# Patient Record
Sex: FEMALE | Race: WHITE | Employment: OTHER | URBAN - METROPOLITAN AREA
[De-identification: names, ages, dates, MRNs, and addresses within clinical notes are randomized per-mention and may not be internally consistent; named-entity substitution may affect disease eponyms.]

---

## 2017-04-03 PROBLEM — S32.9XXG: Status: ACTIVE | Noted: 2017-04-03

## 2017-04-03 PROBLEM — S22.089A CLOSED FRACTURE OF TWELFTH THORACIC VERTEBRA (HCC): Status: ACTIVE | Noted: 2017-04-03

## 2017-04-17 PROBLEM — S46.912A LEFT SHOULDER STRAIN: Status: ACTIVE | Noted: 2017-04-17

## 2017-05-24 PROBLEM — M81.0 OSTEOPOROSIS: Status: ACTIVE | Noted: 2017-05-24

## 2018-02-13 PROBLEM — I65.23 BILATERAL CAROTID ARTERY STENOSIS: Status: ACTIVE | Noted: 2018-02-13

## 2018-02-13 PROBLEM — I70.213 ATHEROSCLEROSIS OF NATIVE ARTERY OF BOTH LOWER EXTREMITIES WITH INTERMITTENT CLAUDICATION (HCC): Status: ACTIVE | Noted: 2018-02-13

## 2018-03-01 NOTE — PRE-PROCEDURE INSTRUCTIONS
[facility] PRE-ADMISSION TESTING INSTRUCTIONS    The Preadmission Testing patient is instructed accordingly using the following criteria (check applicable):    ARRIVAL INSTRUCTIONS:  [x] Parking the day of Surgery is located in the Main Entrance lot. Upon entering the door, make an immediate right to the surgery reception desk    [x] Complimentary [address] Parking is available Monday through Friday 6 am to 6 pm    [x] Bring photo ID and insurance card    [x] Bring in a copy of Living will or Durable Power of  papers. [x] Please be sure to arrange transportation to and from the hospital    [x] Please arrange for someone to be with you for the 24 hour period post procedure due to having anesthesia      GENERAL INSTRUCTIONS:    [x] Nothing by mouth after midnight, including gum, candy, mints or water    [x] You may brush your teeth, but do not swallow any water    [x] Take medications as instructed with 1-2 oz of water    [] Stop herbal supplements and vitamins 5 days prior to procedure    [x] Follow preop dosing of blood thinners per physician instructions    [] Do not take insulin or oral diabetic medications    [] If diabetic and have low blood sugar or feel symptomatic, take 1-2oz apple juice or glucose tablets    [] Bring inhalers day of surgery    [] Bring C-PAP/ Bi-Pap day of surgery    [] Bring urine specimen day of surgery    [x] Shower or bath with soap, lather and rinse well, AM of Surgery, no lotion, powders or creams to surgical site    [] Follow bowel prep as instructed per surgeon    [x] No tobacco products within 24 hours of surgery     [x] No alcohol or illegal drug use within 24 hours of surgery.     [x] Jewelry, body piercing's, eyeglasses, contact lenses and dentures are not permitted into surgery (bring cases)      [x] Please do not wear any nail polish, make up or hair products on the day of surgery    [x] If not already done, you can expect a call from registration    [x] If surgeon requests a time change you will be notified the day prior to surgery    [] (For Dr Lisa Quintana' patients) You will likely have an arrival time change and will be notified anytime after 2pm the day before surgery.     [x] If you receive a survey after surgery we would greatly appreciate your comments    [] Parent/guardian of a minor must accompany their child and remain on the premises  the entire time they are under our care     [] Pediatric patients may bring favorite toy, blanket or comfort item with them    [] A caregiver or family member must remain with the patient during their stay if they are mentally handicapped, have dementia, disoriented or unable to use a call light or would be a safety concern if left unattended    [x] Please notify surgeon if you develop any illness between now and time of surgery (cold, cough, sore throat, fever, nausea, vomiting) or any signs of infections  including skin, wounds, and dental.    [] Other instructions    EDUCATIONAL MATERIALS PROVIDED:    [] PAT Preoperative Education Packet/Booklet     [] Medication List    [] Fluoroscopy Information Pamphlet    [] Transfusion bracelet applied with instructions    [] Joint replacement video reviewed    [] Shower with soap, lather and rinse well, and use CHG wipes provided the evening before surgery as instructed

## 2018-03-10 ENCOUNTER — PREP FOR PROCEDURE (OUTPATIENT)
Dept: SURGERY | Age: 68
End: 2018-03-10

## 2018-03-10 RX ORDER — SODIUM CHLORIDE, SODIUM LACTATE, POTASSIUM CHLORIDE, CALCIUM CHLORIDE 600; 310; 30; 20 MG/100ML; MG/100ML; MG/100ML; MG/100ML
INJECTION, SOLUTION INTRAVENOUS CONTINUOUS
Status: CANCELLED | OUTPATIENT
Start: 2018-03-10

## 2018-03-12 ENCOUNTER — ANESTHESIA EVENT (OUTPATIENT)
Dept: OPERATING ROOM | Age: 68
DRG: 470 | End: 2018-03-12
Payer: MEDICARE

## 2018-03-12 ASSESSMENT — ENCOUNTER SYMPTOMS
EYES NEGATIVE: 1
SHORTNESS OF BREATH: 1
COUGH: 1
BACK PAIN: 1
GASTROINTESTINAL NEGATIVE: 1

## 2018-03-13 ENCOUNTER — ANESTHESIA (OUTPATIENT)
Dept: OPERATING ROOM | Age: 68
DRG: 470 | End: 2018-03-13
Payer: MEDICARE

## 2018-03-13 ENCOUNTER — APPOINTMENT (OUTPATIENT)
Dept: GENERAL RADIOLOGY | Age: 68
DRG: 470 | End: 2018-03-13
Attending: ORTHOPAEDIC SURGERY
Payer: MEDICARE

## 2018-03-13 ENCOUNTER — HOSPITAL ENCOUNTER (INPATIENT)
Age: 68
LOS: 2 days | Discharge: NURSING, REHAB OR LONG-TERM CARE | DRG: 470 | End: 2018-03-15
Attending: ORTHOPAEDIC SURGERY | Admitting: ORTHOPAEDIC SURGERY
Payer: MEDICARE

## 2018-03-13 VITALS
OXYGEN SATURATION: 100 % | TEMPERATURE: 97.5 F | DIASTOLIC BLOOD PRESSURE: 75 MMHG | SYSTOLIC BLOOD PRESSURE: 110 MMHG | RESPIRATION RATE: 24 BRPM

## 2018-03-13 DIAGNOSIS — M16.52 POST-TRAUMATIC OSTEOARTHRITIS OF LEFT HIP: Chronic | ICD-10-CM

## 2018-03-13 DIAGNOSIS — Z96.642 PRESENCE OF LEFT ARTIFICIAL HIP JOINT: ICD-10-CM

## 2018-03-13 DIAGNOSIS — Z01.818 PRE-OP TESTING: Primary | ICD-10-CM

## 2018-03-13 PROBLEM — M19.90 OSTEOARTHRITIS: Status: ACTIVE | Noted: 2018-03-13

## 2018-03-13 LAB
ANION GAP SERPL CALCULATED.3IONS-SCNC: 12 MMOL/L (ref 7–16)
BUN BLDV-MCNC: 14 MG/DL (ref 8–23)
CALCIUM SERPL-MCNC: 9.3 MG/DL (ref 8.6–10.2)
CHLORIDE BLD-SCNC: 95 MMOL/L (ref 98–107)
CO2: 28 MMOL/L (ref 22–29)
CREAT SERPL-MCNC: 0.6 MG/DL (ref 0.5–1)
GFR AFRICAN AMERICAN: >60
GFR NON-AFRICAN AMERICAN: >60 ML/MIN/1.73
GLUCOSE BLD-MCNC: 114 MG/DL (ref 74–109)
HCT VFR BLD CALC: 40.6 % (ref 34–48)
HEMOGLOBIN: 12.9 G/DL (ref 11.5–15.5)
MCH RBC QN AUTO: 29.9 PG (ref 26–35)
MCHC RBC AUTO-ENTMCNC: 31.8 % (ref 32–34.5)
MCV RBC AUTO: 94.2 FL (ref 80–99.9)
PDW BLD-RTO: 13.7 FL (ref 11.5–15)
PLATELET # BLD: 236 E9/L (ref 130–450)
PMV BLD AUTO: 10.8 FL (ref 7–12)
POTASSIUM REFLEX MAGNESIUM: 5.2 MMOL/L (ref 3.5–5)
RBC # BLD: 4.31 E12/L (ref 3.5–5.5)
SODIUM BLD-SCNC: 135 MMOL/L (ref 132–146)
WBC # BLD: 8.9 E9/L (ref 4.5–11.5)

## 2018-03-13 PROCEDURE — 6370000000 HC RX 637 (ALT 250 FOR IP): Performed by: ORTHOPAEDIC SURGERY

## 2018-03-13 PROCEDURE — 73501 X-RAY EXAM HIP UNI 1 VIEW: CPT

## 2018-03-13 PROCEDURE — 6360000002 HC RX W HCPCS: Performed by: ORTHOPAEDIC SURGERY

## 2018-03-13 PROCEDURE — 88311 DECALCIFY TISSUE: CPT

## 2018-03-13 PROCEDURE — 6360000002 HC RX W HCPCS: Performed by: PHYSICIAN ASSISTANT

## 2018-03-13 PROCEDURE — 2500000003 HC RX 250 WO HCPCS: Performed by: NURSE ANESTHETIST, CERTIFIED REGISTERED

## 2018-03-13 PROCEDURE — 2720000010 HC SURG SUPPLY STERILE: Performed by: ORTHOPAEDIC SURGERY

## 2018-03-13 PROCEDURE — 7100000001 HC PACU RECOVERY - ADDTL 15 MIN: Performed by: ORTHOPAEDIC SURGERY

## 2018-03-13 PROCEDURE — 80048 BASIC METABOLIC PNL TOTAL CA: CPT

## 2018-03-13 PROCEDURE — 6370000000 HC RX 637 (ALT 250 FOR IP): Performed by: ANESTHESIOLOGY

## 2018-03-13 PROCEDURE — 7100000000 HC PACU RECOVERY - FIRST 15 MIN: Performed by: ORTHOPAEDIC SURGERY

## 2018-03-13 PROCEDURE — C1776 JOINT DEVICE (IMPLANTABLE): HCPCS | Performed by: ORTHOPAEDIC SURGERY

## 2018-03-13 PROCEDURE — 3600000015 HC SURGERY LEVEL 5 ADDTL 15MIN: Performed by: ORTHOPAEDIC SURGERY

## 2018-03-13 PROCEDURE — 0SRB02A REPLACEMENT OF LEFT HIP JOINT WITH METAL ON POLYETHYLENE SYNTHETIC SUBSTITUTE, UNCEMENTED, OPEN APPROACH: ICD-10-PCS | Performed by: ORTHOPAEDIC SURGERY

## 2018-03-13 PROCEDURE — 2700000000 HC OXYGEN THERAPY PER DAY

## 2018-03-13 PROCEDURE — 3600000005 HC SURGERY LEVEL 5 BASE: Performed by: ORTHOPAEDIC SURGERY

## 2018-03-13 PROCEDURE — 36415 COLL VENOUS BLD VENIPUNCTURE: CPT

## 2018-03-13 PROCEDURE — 85027 COMPLETE CBC AUTOMATED: CPT

## 2018-03-13 PROCEDURE — S0028 INJECTION, FAMOTIDINE, 20 MG: HCPCS | Performed by: ORTHOPAEDIC SURGERY

## 2018-03-13 PROCEDURE — 2580000003 HC RX 258: Performed by: PHYSICIAN ASSISTANT

## 2018-03-13 PROCEDURE — 2580000003 HC RX 258: Performed by: ORTHOPAEDIC SURGERY

## 2018-03-13 PROCEDURE — 88304 TISSUE EXAM BY PATHOLOGIST: CPT

## 2018-03-13 PROCEDURE — 3700000001 HC ADD 15 MINUTES (ANESTHESIA): Performed by: ORTHOPAEDIC SURGERY

## 2018-03-13 PROCEDURE — 2500000003 HC RX 250 WO HCPCS: Performed by: ORTHOPAEDIC SURGERY

## 2018-03-13 PROCEDURE — 6360000002 HC RX W HCPCS

## 2018-03-13 PROCEDURE — 6360000002 HC RX W HCPCS: Performed by: ANESTHESIOLOGY

## 2018-03-13 PROCEDURE — 6360000002 HC RX W HCPCS: Performed by: NURSE ANESTHETIST, CERTIFIED REGISTERED

## 2018-03-13 PROCEDURE — 1200000000 HC SEMI PRIVATE

## 2018-03-13 PROCEDURE — 3700000000 HC ANESTHESIA ATTENDED CARE: Performed by: ORTHOPAEDIC SURGERY

## 2018-03-13 PROCEDURE — 97161 PT EVAL LOW COMPLEX 20 MIN: CPT

## 2018-03-13 DEVICE — LINER ACET SZ F OD44MM ID28MM ACT ARTC E1 BEAR G7: Type: IMPLANTABLE DEVICE | Site: HIP | Status: FUNCTIONAL

## 2018-03-13 DEVICE — STEM FEM SZ 10 L140MM STD OFFSET DST HIP TI PPS TYP 1 TAPR: Type: IMPLANTABLE DEVICE | Site: HIP | Status: FUNCTIONAL

## 2018-03-13 DEVICE — G7 DUAL MOBILITY LINER 44MM F: Type: IMPLANTABLE DEVICE | Site: HIP | Status: FUNCTIONAL

## 2018-03-13 DEVICE — SCREW BNE L30MM DIA6.5MM TI ALLY DOME 2 MOBILITY LO PROF G7: Type: IMPLANTABLE DEVICE | Site: HIP | Status: FUNCTIONAL

## 2018-03-13 DEVICE — IMPLANTABLE DEVICE: Type: IMPLANTABLE DEVICE | Site: HIP | Status: FUNCTIONAL

## 2018-03-13 DEVICE — SCREW BNE L25MM DIA6.5MM TI ALLY DOME 2 MOBILITY LO PROF G7: Type: IMPLANTABLE DEVICE | Site: HIP | Status: FUNCTIONAL

## 2018-03-13 RX ORDER — ROCURONIUM BROMIDE 10 MG/ML
INJECTION, SOLUTION INTRAVENOUS PRN
Status: DISCONTINUED | OUTPATIENT
Start: 2018-03-13 | End: 2018-03-13 | Stop reason: SDUPTHER

## 2018-03-13 RX ORDER — PROMETHAZINE HYDROCHLORIDE 25 MG/ML
6.25 INJECTION, SOLUTION INTRAMUSCULAR; INTRAVENOUS
Status: DISCONTINUED | OUTPATIENT
Start: 2018-03-13 | End: 2018-03-13 | Stop reason: HOSPADM

## 2018-03-13 RX ORDER — HYDROMORPHONE HCL 110MG/55ML
PATIENT CONTROLLED ANALGESIA SYRINGE INTRAVENOUS
Status: DISCONTINUED
Start: 2018-03-13 | End: 2018-03-14

## 2018-03-13 RX ORDER — FENTANYL CITRATE 50 UG/ML
INJECTION, SOLUTION INTRAMUSCULAR; INTRAVENOUS
Status: COMPLETED
Start: 2018-03-13 | End: 2018-03-13

## 2018-03-13 RX ORDER — FENTANYL CITRATE 50 UG/ML
INJECTION, SOLUTION INTRAMUSCULAR; INTRAVENOUS PRN
Status: DISCONTINUED | OUTPATIENT
Start: 2018-03-13 | End: 2018-03-13 | Stop reason: SDUPTHER

## 2018-03-13 RX ORDER — PROPOFOL 10 MG/ML
INJECTION, EMULSION INTRAVENOUS PRN
Status: DISCONTINUED | OUTPATIENT
Start: 2018-03-13 | End: 2018-03-13 | Stop reason: SDUPTHER

## 2018-03-13 RX ORDER — ONDANSETRON 2 MG/ML
INJECTION INTRAMUSCULAR; INTRAVENOUS PRN
Status: DISCONTINUED | OUTPATIENT
Start: 2018-03-13 | End: 2018-03-13 | Stop reason: SDUPTHER

## 2018-03-13 RX ORDER — LEVOTHYROXINE SODIUM 0.1 MG/1
100 TABLET ORAL DAILY
Status: DISCONTINUED | OUTPATIENT
Start: 2018-03-13 | End: 2018-03-15 | Stop reason: HOSPADM

## 2018-03-13 RX ORDER — MORPHINE SULFATE 2 MG/ML
2 INJECTION, SOLUTION INTRAMUSCULAR; INTRAVENOUS
Status: DISPENSED | OUTPATIENT
Start: 2018-03-13 | End: 2018-03-15

## 2018-03-13 RX ORDER — L-METHYLFOLATE-ALGAE-VIT B12-B6 CAP 3-90.314-2-35 MG 3-90.314-2-35 MG
1 CAP ORAL DAILY
Status: DISCONTINUED | OUTPATIENT
Start: 2018-03-13 | End: 2018-03-13 | Stop reason: CLARIF

## 2018-03-13 RX ORDER — SIMVASTATIN 40 MG
40 TABLET ORAL NIGHTLY
Status: DISCONTINUED | OUTPATIENT
Start: 2018-03-13 | End: 2018-03-15 | Stop reason: HOSPADM

## 2018-03-13 RX ORDER — FENTANYL CITRATE 50 UG/ML
25 INJECTION, SOLUTION INTRAMUSCULAR; INTRAVENOUS EVERY 5 MIN PRN
Status: DISCONTINUED | OUTPATIENT
Start: 2018-03-13 | End: 2018-03-13 | Stop reason: HOSPADM

## 2018-03-13 RX ORDER — ASPIRIN 81 MG/1
81 TABLET, CHEWABLE ORAL DAILY
Status: DISCONTINUED | OUTPATIENT
Start: 2018-03-14 | End: 2018-03-15 | Stop reason: HOSPADM

## 2018-03-13 RX ORDER — MEPERIDINE HYDROCHLORIDE 25 MG/ML
12.5 INJECTION INTRAMUSCULAR; INTRAVENOUS; SUBCUTANEOUS EVERY 5 MIN PRN
Status: DISCONTINUED | OUTPATIENT
Start: 2018-03-13 | End: 2018-03-13 | Stop reason: HOSPADM

## 2018-03-13 RX ORDER — GABAPENTIN 100 MG/1
CAPSULE ORAL
Status: DISCONTINUED
Start: 2018-03-13 | End: 2018-03-13

## 2018-03-13 RX ORDER — DOCUSATE SODIUM 100 MG/1
100 CAPSULE, LIQUID FILLED ORAL 2 TIMES DAILY
Status: DISCONTINUED | OUTPATIENT
Start: 2018-03-13 | End: 2018-03-15 | Stop reason: HOSPADM

## 2018-03-13 RX ORDER — CARVEDILOL 3.12 MG/1
3.12 TABLET ORAL 2 TIMES DAILY
Status: DISCONTINUED | OUTPATIENT
Start: 2018-03-13 | End: 2018-03-15 | Stop reason: HOSPADM

## 2018-03-13 RX ORDER — SODIUM CHLORIDE 0.9 % (FLUSH) 0.9 %
10 SYRINGE (ML) INJECTION PRN
Status: DISCONTINUED | OUTPATIENT
Start: 2018-03-13 | End: 2018-03-15 | Stop reason: HOSPADM

## 2018-03-13 RX ORDER — DEXAMETHASONE SODIUM PHOSPHATE 4 MG/ML
INJECTION, SOLUTION INTRA-ARTICULAR; INTRALESIONAL; INTRAMUSCULAR; INTRAVENOUS; SOFT TISSUE PRN
Status: DISCONTINUED | OUTPATIENT
Start: 2018-03-13 | End: 2018-03-13 | Stop reason: SDUPTHER

## 2018-03-13 RX ORDER — ACETAMINOPHEN 500 MG
TABLET ORAL
Status: DISCONTINUED
Start: 2018-03-13 | End: 2018-03-13

## 2018-03-13 RX ORDER — FENTANYL CITRATE 50 UG/ML
50 INJECTION, SOLUTION INTRAMUSCULAR; INTRAVENOUS EVERY 5 MIN PRN
Status: DISCONTINUED | OUTPATIENT
Start: 2018-03-13 | End: 2018-03-13 | Stop reason: HOSPADM

## 2018-03-13 RX ORDER — CELECOXIB 100 MG/1
200 CAPSULE ORAL ONCE
Status: COMPLETED | OUTPATIENT
Start: 2018-03-13 | End: 2018-03-13

## 2018-03-13 RX ORDER — ASPIRIN 81 MG/1
81 TABLET ORAL 2 TIMES DAILY
Status: CANCELLED | OUTPATIENT
Start: 2018-03-14 | End: 2018-03-29

## 2018-03-13 RX ORDER — OXYCODONE HYDROCHLORIDE 5 MG/1
5 TABLET ORAL ONCE
Status: COMPLETED | OUTPATIENT
Start: 2018-03-13 | End: 2018-03-13

## 2018-03-13 RX ORDER — OXYCODONE HYDROCHLORIDE AND ACETAMINOPHEN 5; 325 MG/1; MG/1
2 TABLET ORAL EVERY 4 HOURS PRN
Status: DISCONTINUED | OUTPATIENT
Start: 2018-03-13 | End: 2018-03-15 | Stop reason: HOSPADM

## 2018-03-13 RX ORDER — OXYCODONE HYDROCHLORIDE AND ACETAMINOPHEN 5; 325 MG/1; MG/1
1 TABLET ORAL EVERY 4 HOURS PRN
Status: DISCONTINUED | OUTPATIENT
Start: 2018-03-13 | End: 2018-03-15 | Stop reason: HOSPADM

## 2018-03-13 RX ORDER — ACETAMINOPHEN 500 MG
1000 TABLET ORAL ONCE
Status: COMPLETED | OUTPATIENT
Start: 2018-03-13 | End: 2018-03-13

## 2018-03-13 RX ORDER — CELECOXIB 100 MG/1
CAPSULE ORAL
Status: DISCONTINUED
Start: 2018-03-13 | End: 2018-03-13

## 2018-03-13 RX ORDER — SODIUM CHLORIDE, SODIUM LACTATE, POTASSIUM CHLORIDE, CALCIUM CHLORIDE 600; 310; 30; 20 MG/100ML; MG/100ML; MG/100ML; MG/100ML
INJECTION, SOLUTION INTRAVENOUS CONTINUOUS
Status: DISCONTINUED | OUTPATIENT
Start: 2018-03-13 | End: 2018-03-14

## 2018-03-13 RX ORDER — OXYCODONE HYDROCHLORIDE 5 MG/1
TABLET ORAL
Status: DISCONTINUED
Start: 2018-03-13 | End: 2018-03-13

## 2018-03-13 RX ORDER — SODIUM CHLORIDE, SODIUM LACTATE, POTASSIUM CHLORIDE, CALCIUM CHLORIDE 600; 310; 30; 20 MG/100ML; MG/100ML; MG/100ML; MG/100ML
INJECTION, SOLUTION INTRAVENOUS CONTINUOUS
Status: DISCONTINUED | OUTPATIENT
Start: 2018-03-13 | End: 2018-03-13

## 2018-03-13 RX ORDER — GABAPENTIN 100 MG/1
200 CAPSULE ORAL ONCE
Status: COMPLETED | OUTPATIENT
Start: 2018-03-13 | End: 2018-03-13

## 2018-03-13 RX ORDER — GABAPENTIN 400 MG/1
800 CAPSULE ORAL 3 TIMES DAILY
Status: DISCONTINUED | OUTPATIENT
Start: 2018-03-13 | End: 2018-03-15 | Stop reason: HOSPADM

## 2018-03-13 RX ORDER — LIDOCAINE HYDROCHLORIDE 20 MG/ML
INJECTION, SOLUTION EPIDURAL; INFILTRATION; INTRACAUDAL; PERINEURAL PRN
Status: DISCONTINUED | OUTPATIENT
Start: 2018-03-13 | End: 2018-03-13 | Stop reason: SDUPTHER

## 2018-03-13 RX ORDER — GLYCOPYRROLATE 0.2 MG/ML
INJECTION INTRAMUSCULAR; INTRAVENOUS PRN
Status: DISCONTINUED | OUTPATIENT
Start: 2018-03-13 | End: 2018-03-13 | Stop reason: SDUPTHER

## 2018-03-13 RX ORDER — VANCOMYCIN HYDROCHLORIDE 500 MG/10ML
INJECTION, POWDER, LYOPHILIZED, FOR SOLUTION INTRAVENOUS PRN
Status: DISCONTINUED | OUTPATIENT
Start: 2018-03-13 | End: 2018-03-13 | Stop reason: HOSPADM

## 2018-03-13 RX ORDER — DIPHENHYDRAMINE HYDROCHLORIDE 50 MG/ML
12.5 INJECTION INTRAMUSCULAR; INTRAVENOUS
Status: DISCONTINUED | OUTPATIENT
Start: 2018-03-13 | End: 2018-03-13 | Stop reason: HOSPADM

## 2018-03-13 RX ORDER — ONDANSETRON 2 MG/ML
4 INJECTION INTRAMUSCULAR; INTRAVENOUS EVERY 6 HOURS PRN
Status: DISCONTINUED | OUTPATIENT
Start: 2018-03-13 | End: 2018-03-15 | Stop reason: HOSPADM

## 2018-03-13 RX ORDER — SODIUM CHLORIDE 0.9 % (FLUSH) 0.9 %
10 SYRINGE (ML) INJECTION EVERY 12 HOURS SCHEDULED
Status: DISCONTINUED | OUTPATIENT
Start: 2018-03-13 | End: 2018-03-15 | Stop reason: HOSPADM

## 2018-03-13 RX ORDER — NEOSTIGMINE METHYLSULFATE 1 MG/ML
INJECTION, SOLUTION INTRAVENOUS PRN
Status: DISCONTINUED | OUTPATIENT
Start: 2018-03-13 | End: 2018-03-13 | Stop reason: SDUPTHER

## 2018-03-13 RX ORDER — FUROSEMIDE 40 MG/1
40 TABLET ORAL 2 TIMES DAILY
Status: DISCONTINUED | OUTPATIENT
Start: 2018-03-13 | End: 2018-03-15 | Stop reason: HOSPADM

## 2018-03-13 RX ORDER — OXYCODONE HYDROCHLORIDE AND ACETAMINOPHEN 5; 325 MG/1; MG/1
1 TABLET ORAL
Status: DISCONTINUED | OUTPATIENT
Start: 2018-03-13 | End: 2018-03-13 | Stop reason: HOSPADM

## 2018-03-13 RX ADMIN — PHENYLEPHRINE HYDROCHLORIDE 100 MCG: 10 INJECTION INTRAVENOUS at 15:20

## 2018-03-13 RX ADMIN — FENTANYL CITRATE 50 MCG: 50 INJECTION, SOLUTION INTRAMUSCULAR; INTRAVENOUS at 14:53

## 2018-03-13 RX ADMIN — FENTANYL CITRATE 100 MCG: 50 INJECTION, SOLUTION INTRAMUSCULAR; INTRAVENOUS at 14:25

## 2018-03-13 RX ADMIN — Medication 0.6 MG: at 16:30

## 2018-03-13 RX ADMIN — DOCUSATE SODIUM 100 MG: 100 CAPSULE, LIQUID FILLED ORAL at 20:15

## 2018-03-13 RX ADMIN — OXYCODONE HYDROCHLORIDE 5 MG: 5 TABLET ORAL at 13:03

## 2018-03-13 RX ADMIN — CELECOXIB 200 MG: 100 CAPSULE ORAL at 13:03

## 2018-03-13 RX ADMIN — SIMVASTATIN 40 MG: 40 TABLET, FILM COATED ORAL at 20:16

## 2018-03-13 RX ADMIN — PHENYLEPHRINE HYDROCHLORIDE 100 MCG: 10 INJECTION INTRAVENOUS at 15:22

## 2018-03-13 RX ADMIN — FENTANYL CITRATE 50 MCG: 50 INJECTION, SOLUTION INTRAMUSCULAR; INTRAVENOUS at 16:58

## 2018-03-13 RX ADMIN — OXYCODONE HYDROCHLORIDE AND ACETAMINOPHEN 2 TABLET: 5; 325 TABLET ORAL at 23:26

## 2018-03-13 RX ADMIN — GABAPENTIN 800 MG: 400 CAPSULE ORAL at 20:16

## 2018-03-13 RX ADMIN — ACETAMINOPHEN 1000 MG: 500 TABLET ORAL at 13:03

## 2018-03-13 RX ADMIN — FENTANYL CITRATE 50 MCG: 50 INJECTION, SOLUTION INTRAMUSCULAR; INTRAVENOUS at 16:51

## 2018-03-13 RX ADMIN — PROMETHAZINE HYDROCHLORIDE 6.25 MG: 25 INJECTION INTRAMUSCULAR; INTRAVENOUS at 16:54

## 2018-03-13 RX ADMIN — GABAPENTIN 200 MG: 100 CAPSULE ORAL at 13:02

## 2018-03-13 RX ADMIN — OXYCODONE HYDROCHLORIDE AND ACETAMINOPHEN 2 TABLET: 5; 325 TABLET ORAL at 19:32

## 2018-03-13 RX ADMIN — Medication 3 MG: at 16:30

## 2018-03-13 RX ADMIN — CEFAZOLIN SODIUM 2 G: 2 SOLUTION INTRAVENOUS at 21:58

## 2018-03-13 RX ADMIN — MORPHINE SULFATE 2 MG: 2 INJECTION, SOLUTION INTRAMUSCULAR; INTRAVENOUS at 18:21

## 2018-03-13 RX ADMIN — LIDOCAINE HYDROCHLORIDE 100 MG: 20 INJECTION, SOLUTION EPIDURAL; INFILTRATION; INTRACAUDAL; PERINEURAL at 14:25

## 2018-03-13 RX ADMIN — PHENYLEPHRINE HYDROCHLORIDE 100 MCG: 10 INJECTION INTRAVENOUS at 15:59

## 2018-03-13 RX ADMIN — FUROSEMIDE 40 MG: 40 TABLET ORAL at 19:19

## 2018-03-13 RX ADMIN — ONDANSETRON 4 MG: 2 INJECTION, SOLUTION INTRAMUSCULAR; INTRAVENOUS at 14:30

## 2018-03-13 RX ADMIN — CEFAZOLIN SODIUM 2 G: 2 SOLUTION INTRAVENOUS at 14:20

## 2018-03-13 RX ADMIN — PHENYLEPHRINE HYDROCHLORIDE 100 MCG: 10 INJECTION INTRAVENOUS at 16:12

## 2018-03-13 RX ADMIN — ROCURONIUM BROMIDE 40 MG: 10 SOLUTION INTRAVENOUS at 14:25

## 2018-03-13 RX ADMIN — SODIUM CHLORIDE, POTASSIUM CHLORIDE, SODIUM LACTATE AND CALCIUM CHLORIDE: 600; 310; 30; 20 INJECTION, SOLUTION INTRAVENOUS at 14:20

## 2018-03-13 RX ADMIN — FENTANYL CITRATE 100 MCG: 50 INJECTION, SOLUTION INTRAMUSCULAR; INTRAVENOUS at 15:09

## 2018-03-13 RX ADMIN — ROCURONIUM BROMIDE 10 MG: 10 SOLUTION INTRAVENOUS at 15:44

## 2018-03-13 RX ADMIN — FAMOTIDINE 20 MG: 10 INJECTION INTRAVENOUS at 20:15

## 2018-03-13 RX ADMIN — DEXAMETHASONE SODIUM PHOSPHATE 8 MG: 4 INJECTION, SOLUTION INTRA-ARTICULAR; INTRALESIONAL; INTRAMUSCULAR; INTRAVENOUS; SOFT TISSUE at 14:30

## 2018-03-13 RX ADMIN — PROPOFOL 180 MG: 10 INJECTION, EMULSION INTRAVENOUS at 14:25

## 2018-03-13 RX ADMIN — PROPOFOL 20 MG: 10 INJECTION, EMULSION INTRAVENOUS at 15:15

## 2018-03-13 RX ADMIN — TRANEXAMIC ACID 1000 MG: 100 INJECTION, SOLUTION INTRAVENOUS at 14:53

## 2018-03-13 RX ADMIN — HYDROMORPHONE HYDROCHLORIDE 0.5 MG: 1 INJECTION, SOLUTION INTRAMUSCULAR; INTRAVENOUS; SUBCUTANEOUS at 17:21

## 2018-03-13 RX ADMIN — TRANEXAMIC ACID 1000 MG: 100 INJECTION, SOLUTION INTRAVENOUS at 17:37

## 2018-03-13 ASSESSMENT — PULMONARY FUNCTION TESTS
PIF_VALUE: 19
PIF_VALUE: 18
PIF_VALUE: 17
PIF_VALUE: 19
PIF_VALUE: 17
PIF_VALUE: 19
PIF_VALUE: 18
PIF_VALUE: 30
PIF_VALUE: 3
PIF_VALUE: 19
PIF_VALUE: 18
PIF_VALUE: 18
PIF_VALUE: 19
PIF_VALUE: 2
PIF_VALUE: 19
PIF_VALUE: 18
PIF_VALUE: 19
PIF_VALUE: 10
PIF_VALUE: 20
PIF_VALUE: 18
PIF_VALUE: 1
PIF_VALUE: 17
PIF_VALUE: 18
PIF_VALUE: 17
PIF_VALUE: 18
PIF_VALUE: 18
PIF_VALUE: 19
PIF_VALUE: 18
PIF_VALUE: 18
PIF_VALUE: 17
PIF_VALUE: 19
PIF_VALUE: 16
PIF_VALUE: 17
PIF_VALUE: 19
PIF_VALUE: 19
PIF_VALUE: 18
PIF_VALUE: 19
PIF_VALUE: 17
PIF_VALUE: 19
PIF_VALUE: 18
PIF_VALUE: 18
PIF_VALUE: 17
PIF_VALUE: 1
PIF_VALUE: 18
PIF_VALUE: 18
PIF_VALUE: 4
PIF_VALUE: 18
PIF_VALUE: 19
PIF_VALUE: 0
PIF_VALUE: 18
PIF_VALUE: 19
PIF_VALUE: 18
PIF_VALUE: 23
PIF_VALUE: 19
PIF_VALUE: 18
PIF_VALUE: 18
PIF_VALUE: 17
PIF_VALUE: 17
PIF_VALUE: 18
PIF_VALUE: 3
PIF_VALUE: 17
PIF_VALUE: 3
PIF_VALUE: 18
PIF_VALUE: 5
PIF_VALUE: 18
PIF_VALUE: 17
PIF_VALUE: 19
PIF_VALUE: 17
PIF_VALUE: 17
PIF_VALUE: 19
PIF_VALUE: 20
PIF_VALUE: 18
PIF_VALUE: 0
PIF_VALUE: 19
PIF_VALUE: 18
PIF_VALUE: 18
PIF_VALUE: 19
PIF_VALUE: 0
PIF_VALUE: 17
PIF_VALUE: 18
PIF_VALUE: 17
PIF_VALUE: 18
PIF_VALUE: 18
PIF_VALUE: 19
PIF_VALUE: 19
PIF_VALUE: 18
PIF_VALUE: 19
PIF_VALUE: 18
PIF_VALUE: 19
PIF_VALUE: 17
PIF_VALUE: 19
PIF_VALUE: 18
PIF_VALUE: 18
PIF_VALUE: 17
PIF_VALUE: 2
PIF_VALUE: 19
PIF_VALUE: 18
PIF_VALUE: 19
PIF_VALUE: 18
PIF_VALUE: 19
PIF_VALUE: 17
PIF_VALUE: 19
PIF_VALUE: 18
PIF_VALUE: 19
PIF_VALUE: 1
PIF_VALUE: 18
PIF_VALUE: 19
PIF_VALUE: 25
PIF_VALUE: 17
PIF_VALUE: 19
PIF_VALUE: 18
PIF_VALUE: 19
PIF_VALUE: 18
PIF_VALUE: 19
PIF_VALUE: 17
PIF_VALUE: 18
PIF_VALUE: 19
PIF_VALUE: 21
PIF_VALUE: 1
PIF_VALUE: 18
PIF_VALUE: 18
PIF_VALUE: 19
PIF_VALUE: 18

## 2018-03-13 ASSESSMENT — PAIN SCALES - GENERAL
PAINLEVEL_OUTOF10: 7
PAINLEVEL_OUTOF10: 10
PAINLEVEL_OUTOF10: 5
PAINLEVEL_OUTOF10: 10
PAINLEVEL_OUTOF10: 7
PAINLEVEL_OUTOF10: 3
PAINLEVEL_OUTOF10: 7
PAINLEVEL_OUTOF10: 5
PAINLEVEL_OUTOF10: 10

## 2018-03-13 ASSESSMENT — PAIN DESCRIPTION - ORIENTATION
ORIENTATION: LEFT
ORIENTATION: LEFT

## 2018-03-13 ASSESSMENT — PAIN DESCRIPTION - PAIN TYPE
TYPE: SURGICAL PAIN;CHRONIC PAIN
TYPE: ACUTE PAIN

## 2018-03-13 ASSESSMENT — PAIN DESCRIPTION - DESCRIPTORS
DESCRIPTORS: ACHING;DISCOMFORT;DULL
DESCRIPTORS: CONSTANT

## 2018-03-13 ASSESSMENT — PAIN DESCRIPTION - LOCATION
LOCATION: BACK;HIP
LOCATION: HIP

## 2018-03-13 ASSESSMENT — PAIN DESCRIPTION - ONSET: ONSET: ON-GOING

## 2018-03-13 ASSESSMENT — PAIN DESCRIPTION - DIRECTION: RADIATING_TOWARDS: DOWN L LEG

## 2018-03-13 ASSESSMENT — PAIN DESCRIPTION - PROGRESSION: CLINICAL_PROGRESSION: GRADUALLY WORSENING

## 2018-03-13 ASSESSMENT — PAIN DESCRIPTION - FREQUENCY: FREQUENCY: CONTINUOUS

## 2018-03-13 NOTE — PROGRESS NOTES
Nursing Transfer Note    Data:  Summary of patients progress: post left total hip arthroplasty  Reason for transfer: inpatient    Action:  Explained reason for transfer to Patient. Report given to: Nguyen, using RN Handoff Navigator. Mode of transportation: Bed    Response:  RN Recommendations: TXA second dose given in pacu, Ancef given in OR (see MAR).

## 2018-03-13 NOTE — PROGRESS NOTES
Physical Therapy    Facility/Department: [address] ORTHO SURGERY  Initial Assessment    NAME: Olga Lidia Torres  : 1950  MRN: [id number]    Date of Service: 3/13/2018    Patient Diagnosis(es): The encounter diagnosis was Pre-op testing. has a past medical history of Arthritis; CAD (coronary artery disease); Carotid stenosis; CHF (congestive heart failure) ([address] Closed fracture of pelvis with delayed healing; Closed fracture of twelfth thoracic vertebra (HCC); COPD (chronic obstructive pulmonary disease) ([address] DVT of leg (deep venous thrombosis) ([address] Hyperlipidemia; Hypertension; Mitral regurgitation; Prolonged emergence from general anesthesia; PVD (peripheral vascular disease) with claudication ([address] Retinal ischemia; Stenosis of intracranial portions of left internal carotid artery; and Thyroid disease. has a past surgical history that includes Hysterectomy; Appendectomy; Cholecystectomy; Pacemaker insertion (Left, 2014); other surgical history (14); ECHO Limited (12/3/2014); other surgical history (2015); back surgery (10/26/15); other surgical history (2016); Carotid-subclavian Bypass Graft (Left, 2016); and eye surgery. Evaluating Therapist: Feroz Blankenship PT       Room #: 521   DIAGNOSIS:  OA s.p  L ROHIHT with excision of h. o.L hip   PRECAUTIONS: falls, FWB, posterolateral hip precautions     Social:  Pt lives alone  in a  1  Floor apartment , no   steps  to enter. Prior to admission pt walked with ww     Initial Evaluation  Date: 3/13/18 Treatment      Short Term/ Long Term   Goals   Was pt agreeable to Eval/treatment? Yes      Does pt have pain? L hip pain  - pt has been medicated      Bed Mobility  Rolling:  NT   Supine to sit:  Mod assist   Sit to supine:  NT   Scooting:   Mod assist    SBA    Transfers Sit to stand:  Min assist   Stand to sit: min assist   Stand pivot: min assist    SBA    Ambulation    6 steps  with  ww  with min assist   200 feet with ww  with

## 2018-03-13 NOTE — ANESTHESIA PRE PROCEDURE
Department of Anesthesiology  Preprocedure Note       Name:  Barb Lopez   Age:  69 y.o.  :  1950                                          MRN:  [id number]         Date:  3/13/2018      Surgeon: Wilma Metz):  Abdirizak Erwin MD    Procedure: Procedure(s):  LEFT HIP TOTAL JOINT  ARTHROPLASTY AND REMOVAL HARDWARE LEFT ACETABULUM      Medications prior to admission:   Prior to Admission medications    Medication Sig Start Date End Date Taking? Authorizing Provider   [id number] Ludy Wynn) 3-07.167-3-05 MG CAPS capsule Take by mouth daily   Yes Historical Provider, MD   oxyCODONE-acetaminophen (PERCOCET)  MG per tablet Take 1 tablet by mouth every 4 hours as needed for Pain . 17  Yes Kristi Truong MD   levothyroxine (SYNTHROID) 100 MCG tablet Take 100 mcg by mouth Daily   Yes Historical Provider, MD   simvastatin (ZOCOR) 20 MG tablet Take 2 tablets by mouth nightly 17  Yes Cem Garcia DO   gabapentin (NEURONTIN) 800 MG tablet Take 800 mg by mouth three times daily 16  Yes Historical Provider, MD   carvedilol (COREG) 3.125 MG tablet Take 3.125 mg by mouth 2 times daily 16  Yes Historical Provider, MD   KLOR-CON M20 20 MEQ tablet Take 20 mEq by mouth daily  8/20/15  Yes Historical Provider, MD   furosemide (LASIX) 40 MG tablet Take 1 tablet by mouth 2 times daily. 14  Yes Gia Chaudhry MD   aspirin 81 MG EC tablet Take 81 mg by mouth daily    Historical Provider, MD       Current medications:    Current Facility-Administered Medications   Medication Dose Route Frequency Provider Last Rate Last Dose    ceFAZolin (ANCEF) 2 g in dextrose 3 % 50 mL IVPB (duplex)  2 g Intravenous On Call to [address] [place] [address]        lactated ringers infusion   Intravenous Continuous [place], PA           Allergies:     Allergies   Allergen Reactions    Anesthetics, Betty      Difficulty waking up from ether    Penicillins Rash       Problem List:    Patient Active

## 2018-03-14 ENCOUNTER — APPOINTMENT (OUTPATIENT)
Dept: ULTRASOUND IMAGING | Age: 68
DRG: 470 | End: 2018-03-14
Attending: ORTHOPAEDIC SURGERY
Payer: MEDICARE

## 2018-03-14 PROBLEM — I70.213 ATHEROSCLEROSIS OF NATIVE ARTERY OF BOTH LOWER EXTREMITIES WITH INTERMITTENT CLAUDICATION (HCC): Chronic | Status: ACTIVE | Noted: 2018-02-13

## 2018-03-14 PROBLEM — S32.9XXG: Status: RESOLVED | Noted: 2017-04-03 | Resolved: 2018-03-14

## 2018-03-14 PROBLEM — S22.089A CLOSED FRACTURE OF TWELFTH THORACIC VERTEBRA (HCC): Status: RESOLVED | Noted: 2017-04-03 | Resolved: 2018-03-14

## 2018-03-14 PROBLEM — S46.912A LEFT SHOULDER STRAIN: Status: RESOLVED | Noted: 2017-04-17 | Resolved: 2018-03-14

## 2018-03-14 PROBLEM — M81.0 OSTEOPOROSIS: Chronic | Status: ACTIVE | Noted: 2017-05-24

## 2018-03-14 PROBLEM — I65.23 BILATERAL CAROTID ARTERY STENOSIS: Chronic | Status: ACTIVE | Noted: 2018-02-13

## 2018-03-14 LAB
ANION GAP SERPL CALCULATED.3IONS-SCNC: 17 MMOL/L (ref 7–16)
BUN BLDV-MCNC: 15 MG/DL (ref 8–23)
CALCIUM SERPL-MCNC: 8.4 MG/DL (ref 8.6–10.2)
CHLORIDE BLD-SCNC: 94 MMOL/L (ref 98–107)
CO2: 21 MMOL/L (ref 22–29)
CREAT SERPL-MCNC: 0.6 MG/DL (ref 0.5–1)
GFR AFRICAN AMERICAN: >60
GFR NON-AFRICAN AMERICAN: >60 ML/MIN/1.73
GLUCOSE BLD-MCNC: 219 MG/DL (ref 74–109)
HCT VFR BLD CALC: 32.4 % (ref 34–48)
HEMOGLOBIN: 10.1 G/DL (ref 11.5–15.5)
MCH RBC QN AUTO: 30.2 PG (ref 26–35)
MCHC RBC AUTO-ENTMCNC: 31.2 % (ref 32–34.5)
MCV RBC AUTO: 97 FL (ref 80–99.9)
PDW BLD-RTO: 13.6 FL (ref 11.5–15)
PLATELET # BLD: 226 E9/L (ref 130–450)
PMV BLD AUTO: 11 FL (ref 7–12)
POTASSIUM REFLEX MAGNESIUM: 4.6 MMOL/L (ref 3.5–5)
RBC # BLD: 3.34 E12/L (ref 3.5–5.5)
SODIUM BLD-SCNC: 132 MMOL/L (ref 132–146)
WBC # BLD: 21.7 E9/L (ref 4.5–11.5)

## 2018-03-14 PROCEDURE — 97110 THERAPEUTIC EXERCISES: CPT

## 2018-03-14 PROCEDURE — G8988 SELF CARE GOAL STATUS: HCPCS

## 2018-03-14 PROCEDURE — 36415 COLL VENOUS BLD VENIPUNCTURE: CPT

## 2018-03-14 PROCEDURE — 80048 BASIC METABOLIC PNL TOTAL CA: CPT

## 2018-03-14 PROCEDURE — 1200000000 HC SEMI PRIVATE

## 2018-03-14 PROCEDURE — 6360000002 HC RX W HCPCS: Performed by: ORTHOPAEDIC SURGERY

## 2018-03-14 PROCEDURE — 97165 OT EVAL LOW COMPLEX 30 MIN: CPT

## 2018-03-14 PROCEDURE — 93971 EXTREMITY STUDY: CPT

## 2018-03-14 PROCEDURE — 2500000003 HC RX 250 WO HCPCS: Performed by: ORTHOPAEDIC SURGERY

## 2018-03-14 PROCEDURE — 97530 THERAPEUTIC ACTIVITIES: CPT

## 2018-03-14 PROCEDURE — S0028 INJECTION, FAMOTIDINE, 20 MG: HCPCS | Performed by: ORTHOPAEDIC SURGERY

## 2018-03-14 PROCEDURE — G8987 SELF CARE CURRENT STATUS: HCPCS

## 2018-03-14 PROCEDURE — 2580000003 HC RX 258: Performed by: ORTHOPAEDIC SURGERY

## 2018-03-14 PROCEDURE — 6370000000 HC RX 637 (ALT 250 FOR IP): Performed by: INTERNAL MEDICINE

## 2018-03-14 PROCEDURE — 2700000000 HC OXYGEN THERAPY PER DAY

## 2018-03-14 PROCEDURE — 6370000000 HC RX 637 (ALT 250 FOR IP): Performed by: ORTHOPAEDIC SURGERY

## 2018-03-14 PROCEDURE — 85027 COMPLETE CBC AUTOMATED: CPT

## 2018-03-14 RX ADMIN — Medication 10 ML: at 21:08

## 2018-03-14 RX ADMIN — FUROSEMIDE 40 MG: 40 TABLET ORAL at 18:30

## 2018-03-14 RX ADMIN — FAMOTIDINE 20 MG: 10 INJECTION INTRAVENOUS at 21:08

## 2018-03-14 RX ADMIN — LEVOTHYROXINE SODIUM 100 MCG: 100 TABLET ORAL at 06:02

## 2018-03-14 RX ADMIN — DOCUSATE SODIUM 100 MG: 100 CAPSULE, LIQUID FILLED ORAL at 21:08

## 2018-03-14 RX ADMIN — GABAPENTIN 800 MG: 400 CAPSULE ORAL at 09:39

## 2018-03-14 RX ADMIN — GABAPENTIN 800 MG: 400 CAPSULE ORAL at 15:05

## 2018-03-14 RX ADMIN — FUROSEMIDE 40 MG: 40 TABLET ORAL at 06:02

## 2018-03-14 RX ADMIN — CARVEDILOL 3.12 MG: 3.12 TABLET, FILM COATED ORAL at 10:59

## 2018-03-14 RX ADMIN — OXYCODONE HYDROCHLORIDE AND ACETAMINOPHEN 2 TABLET: 5; 325 TABLET ORAL at 03:47

## 2018-03-14 RX ADMIN — APIXABAN 2.5 MG: 2.5 TABLET, FILM COATED ORAL at 18:30

## 2018-03-14 RX ADMIN — CEFAZOLIN SODIUM 2 G: 2 SOLUTION INTRAVENOUS at 06:03

## 2018-03-14 RX ADMIN — Medication 10 ML: at 09:38

## 2018-03-14 RX ADMIN — OXYCODONE HYDROCHLORIDE AND ACETAMINOPHEN 2 TABLET: 5; 325 TABLET ORAL at 09:38

## 2018-03-14 RX ADMIN — ASPIRIN 81 MG 81 MG: 81 TABLET ORAL at 09:37

## 2018-03-14 RX ADMIN — FAMOTIDINE 20 MG: 10 INJECTION INTRAVENOUS at 09:38

## 2018-03-14 RX ADMIN — DOCUSATE SODIUM 100 MG: 100 CAPSULE, LIQUID FILLED ORAL at 09:37

## 2018-03-14 RX ADMIN — OXYCODONE HYDROCHLORIDE AND ACETAMINOPHEN 2 TABLET: 5; 325 TABLET ORAL at 18:28

## 2018-03-14 RX ADMIN — GABAPENTIN 800 MG: 400 CAPSULE ORAL at 21:09

## 2018-03-14 RX ADMIN — OXYCODONE HYDROCHLORIDE AND ACETAMINOPHEN 2 TABLET: 5; 325 TABLET ORAL at 23:11

## 2018-03-14 RX ADMIN — SIMVASTATIN 40 MG: 40 TABLET, FILM COATED ORAL at 21:09

## 2018-03-14 RX ADMIN — MORPHINE SULFATE 2 MG: 2 INJECTION, SOLUTION INTRAMUSCULAR; INTRAVENOUS at 01:36

## 2018-03-14 RX ADMIN — APIXABAN 2.5 MG: 2.5 TABLET, FILM COATED ORAL at 06:02

## 2018-03-14 ASSESSMENT — PAIN DESCRIPTION - ONSET
ONSET: ON-GOING
ONSET: ON-GOING
ONSET: GRADUAL
ONSET: ON-GOING
ONSET: GRADUAL

## 2018-03-14 ASSESSMENT — PAIN DESCRIPTION - ORIENTATION
ORIENTATION: LEFT

## 2018-03-14 ASSESSMENT — PAIN DESCRIPTION - LOCATION
LOCATION: HIP

## 2018-03-14 ASSESSMENT — PAIN DESCRIPTION - DESCRIPTORS
DESCRIPTORS: ACHING;DISCOMFORT;DULL
DESCRIPTORS: ACHING;DISCOMFORT
DESCRIPTORS: ACHING
DESCRIPTORS: ACHING;DISCOMFORT;DULL
DESCRIPTORS: ACHING;DISCOMFORT
DESCRIPTORS: ACHING

## 2018-03-14 ASSESSMENT — PAIN DESCRIPTION - FREQUENCY
FREQUENCY: CONTINUOUS

## 2018-03-14 ASSESSMENT — PAIN SCALES - GENERAL
PAINLEVEL_OUTOF10: 10
PAINLEVEL_OUTOF10: 5
PAINLEVEL_OUTOF10: 9
PAINLEVEL_OUTOF10: 9
PAINLEVEL_OUTOF10: 0
PAINLEVEL_OUTOF10: 0
PAINLEVEL_OUTOF10: 9
PAINLEVEL_OUTOF10: 8
PAINLEVEL_OUTOF10: 7
PAINLEVEL_OUTOF10: 0

## 2018-03-14 ASSESSMENT — PAIN DESCRIPTION - PAIN TYPE
TYPE: SURGICAL PAIN
TYPE: ACUTE PAIN
TYPE: ACUTE PAIN
TYPE: SURGICAL PAIN

## 2018-03-14 ASSESSMENT — PAIN DESCRIPTION - PROGRESSION
CLINICAL_PROGRESSION: GRADUALLY IMPROVING
CLINICAL_PROGRESSION: GRADUALLY WORSENING

## 2018-03-14 NOTE — PROGRESS NOTES
Physical Therapy  Facility/Department: [address] ORTHO SURGERY  Daily Treatment Note  NAME: Pauly Torres  : 1950  MRN: [id number]    Date of Service: 3/14/2018    Patient Diagnosis(es):   Patient Active Problem List    Diagnosis Date Noted    Closed fracture of pelvis with delayed healing 2017     Priority: High     Class: Chronic    Osteoarthritis 2018    Post-traumatic osteoarthritis of left hip 2018    Bilateral carotid artery stenosis 2018    Atherosclerosis of native artery of both lower extremities with intermittent claudication ([address] 2018    Osteoporosis 2017    Left shoulder strain 2017    Closed fracture of twelfth thoracic vertebra ([address] 2017     Class: Acute    Left carotid artery occlusion 2016    Debility 2016    Low back pain 2016    Coronary artery disease involving native coronary artery without angina pectoris 2016    PVD (peripheral vascular disease) ([address] 10/21/2015    Hypothyroid 2015    SSS (sick sinus syndrome) ([address] 2015    Pacemaker 2015    Seizure ([address] 2014    COPD (chronic obstructive pulmonary disease) ([address] 2013    History of DVT (deep vein thrombosis) 2013    HTN (hypertension), benign 2013    Mixed hyperlipidemia 2013       Past Medical History:   Diagnosis Date    Arthritis     asthmatic bronchitis    CAD (coronary artery disease)     Carotid stenosis     [facility]     Closed fracture of pelvis with delayed healing 4/3/2017    Closed fracture of twelfth thoracic vertebra ([address] 4/3/2017    T 5,6,12    COPD (chronic obstructive pulmonary disease) ([address]     DVT of leg (deep venous thrombosis) ([address] 2010    left    Hyperlipidemia     Hypertension     Mitral regurgitation     Trace    Prolonged emergence from general anesthesia     PVD (peripheral vascular disease) with claudication ([address] 2014    exercise of the following: ankle pumps, quad sets, heel slides, SAQs while pt supine in bed. LAQs while pt sitting up in chair. Patient education  Pt was educated on PT objectives during treatment session, hand placement during transfers, w/w usage and safety. Patient response to education:   Pt verbalized understanding Pt demonstrated skill Pt requires further education in this area   Yes    yes     Additional Comments: Pt found sitting up in chair. Cueing required for hand placement during transfers. Cueing required for gait sequencing with w/w to help offload pain in left LE. No LOB during ambulation. Cueing required for maintain hip precautions during functional mobility. Pt reported being apprehensive about using walker. Pt reported she has had many falls at home. LE exercises performed supine in bed after functional mobility. Pt was left in bed with call light left by patient. Chair/bed alarm: no    Time in: 0835  Time out: 0904    Pt is making good progress toward established Physical Therapy goals. Continue with physical therapy current plan of care.     Josh Antony, PT  License Number: [id number]

## 2018-03-14 NOTE — PROGRESS NOTES
OCCUPATIONAL THERAPY INITIAL EVALUATION      Date:3/14/2018  Patient Name: Christie Miller  MRN: [id number]  : 1950  Room: [address] [place], [place] [address]     Evaluating OT: Felipe Go OTR/L #[id number]      Recommended Adaptive Equipment: TBD     AM PAC ADL RAW SCORE -      Diagnosis: Osteoarthritis, L ROHITH with excision of HO L Hip  Surgery:  L ROHITH with excision of HO L Hip 3/14  Past Medical History: Arthritis, CAD, CHF, Throaric vertebral Fx T5,6,12 2017, HTN, PVD    Precautions: falls, FWB, Posterolateral hip precautions     Home Living: Pt lives alone in a 1st floor apt with 0 stairs to enter and 0 rails; bed/bath in unit  Bathroom setup: shower/tub and standard commode  Equipment owned: ww- states recently broken    Prior Level of Function: IND with ADLs Assistance with IADLs; using ww for ambulation. Driving: No  Occupation: Retired    Pain Level: pt c/o L hip pain this session     Cognition: oriented x 3; follows 1 step directions. Poor Problem solving skills  fair Memory   Fair- Sequencing  Additional Comments: Pt is able to answer orientation question this date but demonstrates being very confused.  Pt has trouble with instruction for safety often requiring repeated verbal and tactile cues for ww management and transfers    Sensory:   Hearing: WFl  Vision: WFL    Glasses: yes [x] no [] reading []      UE Assessment:  Hand Dominance: Right [x]  Left []     Strength ROM Additional Info:    RUE   4/5 WFl good  and good FMC/dexterity noted during ADL tasks     LUE 4/5 WFl good  and good FMC/dexterity noted during ADL tasks   Sensation: Pt denies numbness and tingling this date  Tone: WFL  Edema:None noted    Functional Assessment:   Initial Eval Status 3/14/17 Comments   Feeding  IND    Grooming  SBA in sitting    Upper Body Dressing SBA     Lower Body Dressing Max A    Bathing Mod A    Toileting  Mod A    Bed Mobility  Supine to Sit: n/t  Sit to Supine:SBA  Rolling: n/t    Functional Transfers Sit to Stand: Min A  Stand to sit: Min A    Functional Mobility SBA with use of ww, moderate verbal cues for slow turns and use of UE to offset weight      Sit balance: IND  Stand balance: fair  Endurance/Activity tolerance: poor                              Comments: Upon arrival pt seated in bedside chair. At end of session pt supine in bed with all devices within reach, all lines and tubes intact. Physical therapist present     Treatment: Pt seated in bedside chair upon therapist arrival and  Requires assistance to review posterolateral hip precautions. Pt is assisted to stand with use of ww and walks a short distance in room and across home distance. Pt requires continued verbal cues to use UE to offset weight, due to stated increase pain,  and for ww management. Pt demonstrates confusion this date and requires redirection and frequent verbal cues. Pt was able to return to room and states needing to use the bathroom. Pt assisted to become seated on commode requiring step-by-step verbal and tactile cues for commode transfer. Pt was assisted to return to bed and requires assistance to recall 3 of 3 hip precautions. Pt will require further education for precautions and education on LB dressing and bathing AE while adhering to posterolateral precautions.       Assessment of current deficits   Functional mobility [x]  ADLs [x] Strength []  Cognition []  Functional transfers  [x] IADLs [x] Safety Awareness [x]  Endurance [x]  Fine Motor Coordination [] Balance [x] Vision/perception [] Sensation []   Gross Motor Coordination [] ROM []       Eval Complexity: Low      Treatment frequency: 2-4 x week      Plan of Care:  ADL retraining [x]   Equipment needs [x]   Neuromuscular re-education []  Energy Conservation Techniques [x]  Functional Transfer training [x]  Patient and/or Family Education [x]  Functional Mobility training [x]  Environmental Modifications [x]  Cognitive re-training []    Compensatory techniques for ADLs

## 2018-03-14 NOTE — CARE COORDINATION
Social Work:    Social service spoke with Lynnette prior to surgery. Lynnette resides in an apartment alone and prefers rehab at [facility]. A referral was made today and patient was accepted.      Electronically signed by DAVID Masterson on 3/14/2018 at 11:51 AM

## 2018-03-14 NOTE — PROGRESS NOTES
Left 5/19/2016    CHOLECYSTECTOMY      ECHOCARDIOGRAM LIMITED  12/3/2014         EYE SURGERY      HYSTERECTOMY      OTHER SURGICAL HISTORY  12/2/14    reposition pacer lead and katty    OTHER SURGICAL HISTORY  5/5/2015    kyphoplasty T8    OTHER SURGICAL HISTORY  2/16/2016    lumbar myelogram    PACEMAKER INSERTION Left 11-    Dr. Deedee Brink     Evaluating Therapist: Artur Rivas, PT         Room #: 934   DIAGNOSIS:  OA s.p  L ROHITH with excision of h. o.L hip   PRECAUTIONS: falls, FWB, posterolateral hip precautions      Social:  Pt lives alone  in a  1  Floor apartment , no   steps  to enter. Prior to admission pt walked with ww       Initial Evaluation  Date: 3/13/18 Treatment   3/14 pm  Short Term/ Long Term   Goals   Was pt agreeable to Eval/treatment? Yes  yes     Does pt have pain?  L hip pain  - pt has been medicated  Left hip     Bed Mobility  Rolling:  NT   Supine to sit:  Mod assist   Sit to supine:  NT   Scooting:  Mod assist   Rolling:  NT   Supine to sit:  NT  Sit to supine:  NT   Scooting:  NT  SBA    Transfers Sit to stand:  Min assist   Stand to sit: min assist   Stand pivot: min assist   Sit to stand: CGA  Stand to sit: CGA  Stand pivot: CGA  SBA    Ambulation    6 steps  with  ww  with min assist   100 feet and 15 feet x 2 with w/w  feet with ww  with  SBA    Stair negotiation: ascended and descended  NT   NT  4  steps with  1 rail with SBA    LE ROM  Decreased throughout L hip, distally WFL        LE strength  3-/ 5 L hip, 3+ to 4-/ 5 distally    Increase by 1-2/ 3 MMT grade    AM- PAC RAW score   15/ 24   17/24          Balance: standing with w/w CGA. Patient education  Pt was educated on PT objectives during treatment session, hand placement during transfers, w/w usage and safety.      Patient response to education:   Pt verbalized understanding Pt demonstrated skill Pt requires further education in this area   Yes    yes     Additional Comments: Pt found sitting up in chair. Pt assisted into restroom and then needed a seated rest break prior to longer distance ambulation. Cueing required for w/w safety. No LOB during ambulation. Slow gait speed. Pt took multiple rest breaks when standing due to pain. Ambulation distance limited due to pain. Pt was left sitting up in chair with call light left by patient. Chair/bed alarm: no    Time in: 1303  Time out: 0318    Pt is making good progress toward established Physical Therapy goals. Continue with physical therapy current plan of care.     Winsome Olmedo, PT  License Number: [id number]

## 2018-03-15 VITALS
TEMPERATURE: 98 F | SYSTOLIC BLOOD PRESSURE: 98 MMHG | RESPIRATION RATE: 16 BRPM | BODY MASS INDEX: 38.46 KG/M2 | HEIGHT: 62 IN | DIASTOLIC BLOOD PRESSURE: 52 MMHG | WEIGHT: 209 LBS | OXYGEN SATURATION: 92 % | HEART RATE: 76 BPM

## 2018-03-15 LAB
ANION GAP SERPL CALCULATED.3IONS-SCNC: 10 MMOL/L (ref 7–16)
BUN BLDV-MCNC: 15 MG/DL (ref 8–23)
CALCIUM SERPL-MCNC: 8.3 MG/DL (ref 8.6–10.2)
CHLORIDE BLD-SCNC: 98 MMOL/L (ref 98–107)
CO2: 28 MMOL/L (ref 22–29)
CREAT SERPL-MCNC: 0.7 MG/DL (ref 0.5–1)
GFR AFRICAN AMERICAN: >60
GFR NON-AFRICAN AMERICAN: >60 ML/MIN/1.73
GLUCOSE BLD-MCNC: 161 MG/DL (ref 74–109)
HCT VFR BLD CALC: 25.1 % (ref 34–48)
HEMOGLOBIN: 8.1 G/DL (ref 11.5–15.5)
MCH RBC QN AUTO: 30.5 PG (ref 26–35)
MCHC RBC AUTO-ENTMCNC: 32.3 % (ref 32–34.5)
MCV RBC AUTO: 94.4 FL (ref 80–99.9)
PDW BLD-RTO: 13.9 FL (ref 11.5–15)
PLATELET # BLD: 196 E9/L (ref 130–450)
PMV BLD AUTO: 11.1 FL (ref 7–12)
POTASSIUM REFLEX MAGNESIUM: 3.7 MMOL/L (ref 3.5–5)
RBC # BLD: 2.66 E12/L (ref 3.5–5.5)
SODIUM BLD-SCNC: 136 MMOL/L (ref 132–146)
WBC # BLD: 13.5 E9/L (ref 4.5–11.5)

## 2018-03-15 PROCEDURE — 97530 THERAPEUTIC ACTIVITIES: CPT

## 2018-03-15 PROCEDURE — 97535 SELF CARE MNGMENT TRAINING: CPT

## 2018-03-15 PROCEDURE — 36415 COLL VENOUS BLD VENIPUNCTURE: CPT

## 2018-03-15 PROCEDURE — 6370000000 HC RX 637 (ALT 250 FOR IP): Performed by: INTERNAL MEDICINE

## 2018-03-15 PROCEDURE — 80048 BASIC METABOLIC PNL TOTAL CA: CPT

## 2018-03-15 PROCEDURE — S0028 INJECTION, FAMOTIDINE, 20 MG: HCPCS | Performed by: ORTHOPAEDIC SURGERY

## 2018-03-15 PROCEDURE — 6370000000 HC RX 637 (ALT 250 FOR IP): Performed by: ORTHOPAEDIC SURGERY

## 2018-03-15 PROCEDURE — 85027 COMPLETE CBC AUTOMATED: CPT

## 2018-03-15 PROCEDURE — 2580000003 HC RX 258: Performed by: ORTHOPAEDIC SURGERY

## 2018-03-15 PROCEDURE — 2500000003 HC RX 250 WO HCPCS: Performed by: ORTHOPAEDIC SURGERY

## 2018-03-15 RX ORDER — OXYCODONE HYDROCHLORIDE AND ACETAMINOPHEN 5; 325 MG/1; MG/1
1 TABLET ORAL EVERY 4 HOURS PRN
Qty: 60 TABLET | Refills: 0 | Status: SHIPPED | OUTPATIENT
Start: 2018-03-15 | End: 2018-03-25

## 2018-03-15 RX ORDER — FAMOTIDINE 20 MG/1
20 TABLET, FILM COATED ORAL 2 TIMES DAILY
Status: DISCONTINUED | OUTPATIENT
Start: 2018-03-15 | End: 2018-03-15 | Stop reason: HOSPADM

## 2018-03-15 RX ADMIN — APIXABAN 2.5 MG: 2.5 TABLET, FILM COATED ORAL at 07:26

## 2018-03-15 RX ADMIN — LEVOTHYROXINE SODIUM 100 MCG: 100 TABLET ORAL at 07:26

## 2018-03-15 RX ADMIN — OXYCODONE HYDROCHLORIDE AND ACETAMINOPHEN 2 TABLET: 5; 325 TABLET ORAL at 03:17

## 2018-03-15 RX ADMIN — GABAPENTIN 800 MG: 400 CAPSULE ORAL at 14:19

## 2018-03-15 RX ADMIN — Medication 10 ML: at 09:46

## 2018-03-15 RX ADMIN — OXYCODONE HYDROCHLORIDE AND ACETAMINOPHEN 2 TABLET: 5; 325 TABLET ORAL at 07:55

## 2018-03-15 RX ADMIN — OXYCODONE HYDROCHLORIDE AND ACETAMINOPHEN 2 TABLET: 5; 325 TABLET ORAL at 14:20

## 2018-03-15 RX ADMIN — FAMOTIDINE 20 MG: 10 INJECTION INTRAVENOUS at 09:46

## 2018-03-15 RX ADMIN — CARVEDILOL 3.12 MG: 3.12 TABLET, FILM COATED ORAL at 09:47

## 2018-03-15 RX ADMIN — GABAPENTIN 800 MG: 400 CAPSULE ORAL at 09:46

## 2018-03-15 RX ADMIN — DOCUSATE SODIUM 100 MG: 100 CAPSULE, LIQUID FILLED ORAL at 09:48

## 2018-03-15 RX ADMIN — ASPIRIN 81 MG 81 MG: 81 TABLET ORAL at 09:47

## 2018-03-15 ASSESSMENT — PAIN DESCRIPTION - DESCRIPTORS
DESCRIPTORS: ACHING;DISCOMFORT;DULL
DESCRIPTORS: ACHING;CONSTANT;DISCOMFORT
DESCRIPTORS: ACHING;CONSTANT;DISCOMFORT
DESCRIPTORS: ACHING;DISCOMFORT;DULL
DESCRIPTORS: ACHING;CONSTANT;DISCOMFORT

## 2018-03-15 ASSESSMENT — PAIN DESCRIPTION - PROGRESSION
CLINICAL_PROGRESSION: GRADUALLY WORSENING
CLINICAL_PROGRESSION: GRADUALLY IMPROVING
CLINICAL_PROGRESSION: GRADUALLY WORSENING

## 2018-03-15 ASSESSMENT — PAIN DESCRIPTION - PAIN TYPE
TYPE: ACUTE PAIN

## 2018-03-15 ASSESSMENT — PAIN DESCRIPTION - ONSET
ONSET: ON-GOING

## 2018-03-15 ASSESSMENT — PAIN DESCRIPTION - FREQUENCY
FREQUENCY: CONTINUOUS

## 2018-03-15 ASSESSMENT — PAIN DESCRIPTION - ORIENTATION
ORIENTATION: LEFT

## 2018-03-15 ASSESSMENT — PAIN SCALES - GENERAL
PAINLEVEL_OUTOF10: 0
PAINLEVEL_OUTOF10: 9
PAINLEVEL_OUTOF10: 7
PAINLEVEL_OUTOF10: 6
PAINLEVEL_OUTOF10: 8
PAINLEVEL_OUTOF10: 5

## 2018-03-15 ASSESSMENT — PAIN DESCRIPTION - LOCATION
LOCATION: HIP

## 2018-03-15 NOTE — PROGRESS NOTES
Physical Therapy  Facility/Department: [address] ORTHO SURGERY  Daily Treatment Note  NAME: Syd Torres  : 1950  MRN: [id number]    Date of Service: 3/15/2018    Patient Diagnosis(es):   Patient Active Problem List    Diagnosis Date Noted    Osteoarthritis 2018    Post-traumatic osteoarthritis of left hip 2018    Bilateral carotid artery stenosis 2018    Atherosclerosis of native artery of both lower extremities with intermittent claudication ([address] 2018    Osteoporosis 2017    Left carotid artery occlusion 2016    Coronary artery disease involving native coronary artery without angina pectoris 2016    PVD (peripheral vascular disease) ([address] 10/21/2015    Hypothyroid 2015    SSS (sick sinus syndrome) ([address] 2015    Pacemaker 2015    Seizure ([address] 2014    COPD (chronic obstructive pulmonary disease) ([address] 2013    History of DVT (deep vein thrombosis) 2013    HTN (hypertension), benign 2013    Mixed hyperlipidemia 2013       Past Medical History:   Diagnosis Date    Arthritis     asthmatic bronchitis    CAD (coronary artery disease)     Carotid stenosis     [facility]     Closed fracture of pelvis with delayed healing 4/3/2017    Closed fracture of twelfth thoracic vertebra ([address] 4/3/2017    T 5,6,12    COPD (chronic obstructive pulmonary disease) ([address]     DVT of leg (deep venous thrombosis) ([address] 2010    left    Hyperlipidemia     Hypertension     Mitral regurgitation     Trace    Prolonged emergence from general anesthesia     PVD (peripheral vascular disease) with claudication ([address] 2014    Retinal ischemia     Stenosis of intracranial portions of left internal carotid artery 2014    Thyroid disease      Past Surgical History:   Procedure Laterality Date    APPENDECTOMY      BACK SURGERY  10/26/15    L3 kypho    CAROTID-SUBCLAVIAN BYPASS GRAFT Additional Comments: Pt found in bed. No LOB during ambulation. Cueing required for w/w safety. Pt was left in bed with call light left by patient. Chair/bed alarm: no    Time in: 1326  Time out: 1344    Pt is making good progress toward established Physical Therapy goals. Continue with physical therapy current plan of care.     Latisha King, PT  License Number: [id number]

## 2018-03-15 NOTE — PROGRESS NOTES
Recommendations:  Continue to assess     AM-PAC Inpatient Daily Activity Raw Score:  16    Pain Level: /10   Additional Notes:    Patient has made  progress during treatment sessions toward set goals. [x] Continue with current OT Plan of care.   [] Prepare for Discharge    Ashanti Johnston ESTEFANI/L [id number]    Total Tx Time: 22

## 2018-03-15 NOTE — OP NOTE
PROCEDURE:  The patient was met in the preop holding area. The left hip was marked as the correct operative site. She was taken to  the operating room where general anesthesia was administered. Intravenous  antibiotics were given prophylactically. She was positioned on her right  side and secured on a peg board. An axillary roll was placed. All bony  prominences were well padded. The left lower extremity was prepped and  draped in the usual sterile fashion. Following surgical timeout, I  accessed the left hip through the previous posterolateral incision. Skin  was incised sharply through the subcutaneous fat. The fascia was split in  line with the incision. Adhesions were released from beneath the tensor  fascia kaylee. Charnley retractor was placed. The posterior capsule was  then taken down with Bovie electrocautery and tied with #1 Ethibond. There  was a large fragment of heterotopic bone along the posterior aspect of the  greater trochanter, and this was excised in one large piece consistent with  heterotopic bone from her previous trauma. The underlying hip joint was  then visualized. The hip was then gently dislocated posteriorly. I  re-produced the femoral neck osteotomy above the lesser trochanter based on  our templating. Inspection of the femoral head revealed global  full-thickness cartilage loss with large collar osteophytes. I then  mobilized the femur anteriorly and placed our acetabular retractors. Osteophytes were trimmed from the superior and posterior portions of the  socket. I sequentially reamed the socket into a hemisphere up to size 53  mm. Along the medial wall, I can visualize a small portion of her  acetabular hardware, but this did not need to be removed for accurate  acetabular component placement. The 54 [place] acetabular shell from  [employer] was then impacted in about 40 to 45 degrees of vertical opening and  20 degrees of anteversion.   There was full seating of surgical instrumentation, wound closure,  application of surgical dressing, and patient transport from the operating  room table, no qualified resident available.         Gulshan Carmichael MD    D: 03/14/2018 12:58:20       T: 03/14/2018 21:33:34     [id number]  Job#: [id number]     Doc#: [id number]    CC:

## 2018-03-15 NOTE — PROGRESS NOTES
Left 5/19/2016    CHOLECYSTECTOMY      ECHOCARDIOGRAM LIMITED  12/3/2014         EYE SURGERY      HYSTERECTOMY      OTHER SURGICAL HISTORY  12/2/14    reposition pacer lead and katty    OTHER SURGICAL HISTORY  5/5/2015    kyphoplasty T8    OTHER SURGICAL HISTORY  2/16/2016    lumbar myelogram    PACEMAKER INSERTION Left 11-    Dr. Noemy Jaeger     Evaluating Therapist: Waldemar Howard, PT         Room #: 932   DIAGNOSIS:  OA s.p  L ROHITH with excision of h. o.L hip   PRECAUTIONS: falls, FWB, posterolateral hip precautions      Social:  Pt lives alone  in a  1  Floor apartment , no   steps  to enter. Prior to admission pt walked with ww       Initial Evaluation  Date: 3/13/18 Treatment   3/15 am   Short Term/ Long Term   Goals   Was pt agreeable to Eval/treatment? Yes  yes     Does pt have pain?  L hip pain  - pt has been medicated  Left hip     Bed Mobility  Rolling:  NT   Supine to sit:  Mod assist   Sit to supine:  NT   Scooting:  Mod assist  NT  SBA    Transfers Sit to stand:  Min assist   Stand to sit: min assist   Stand pivot: min assist   Sit to stand: CGA  Stand to sit: CGA  Stand pivot: CGA  Cueing for hand placement  SBA    Ambulation    6 steps  with  ww  with min assist  45 feet and 105 feet with w/w CG/ feet with ww  with  SBA    Stair negotiation: ascended and descended  NT   1 platform step with w/w Mod A  4  steps with  1 rail with SBA    LE ROM  Decreased throughout L hip, distally WFL        LE strength  3-/ 5 L hip, 3+ to 4-/ 5 distally    Increase by 1-2/ 3 MMT grade    AM- PAC RAW score   15/ 24   17/24          Balance: standing with w/w CG/SBA. Patient education  Pt was educated on PT objectives during treatment session, hand placement during transfers, w/w usage and safety, stair negotiation.      Patient response to education:   Pt verbalized understanding Pt demonstrated skill Pt requires further education in this area   Yes    yes     Additional Comments:

## 2018-03-15 NOTE — PROGRESS NOTES
Chart reviewed. Labs and vitals noted. No acute medical issues identified. Recommend close monitoring of CBC on discharge due to sudden drop in H/H post op and need for continued anticoagulation. Ok to discharge to [facility] once ok with ortho. Call if needed.     Electronically signed by Debbie Palma MD on 3/15/18 at 12:03 PM

## 2018-03-15 NOTE — PROGRESS NOTES
Total Joint - L ROHITH    Post op Day 2      S:  Intermittent pain. No issues overnight. O:     Vitals:    03/15/18 1229   BP: (!) 98/52   Pulse: 76   Resp: 16   Temp: 98 °F (36.7 °C)   SpO2:         Dressing c/d/i   NV exam - stable   Calf - soft     H/H:   Recent Labs      03/15/18   0310   HGB  8.1*   HCT  25.1*        PT/INR: No results for input(s): PROT, INR in the last 72 hours.     A/P:   Acute post surgical blood loss anemia - expected   Con't PT/OT - WBAT   Post op x-rays reviewed:  Pelvic rami fx's not intraoperative   Daily dressing change             DVT prophylaxis x 30 days   D/C planning - to [facility] today

## 2018-03-15 NOTE — CARE COORDINATION
Social Work: [facility] [id number] [facility] received authorization for Mary Conteh to transfer today. [facility] today at 5:00 p.m. providing Dr. Cabrera Leader approves upon his visit this afternoon. Patient and son Yasmeen Hastings, as well as Melvin Saavedra at [employer] are aware.     Electronically signed by DAVID Barahona on 3/15/2018 at 1:13 PM

## 2018-04-09 ENCOUNTER — NURSE ONLY (OUTPATIENT)
Dept: NON INVASIVE DIAGNOSTICS | Age: 68
End: 2018-04-09
Payer: MEDICARE

## 2018-04-09 DIAGNOSIS — I49.5 SSS (SICK SINUS SYNDROME) (HCC): ICD-10-CM

## 2018-04-09 DIAGNOSIS — Z95.0 PACEMAKER: Primary | Chronic | ICD-10-CM

## 2018-04-09 PROCEDURE — 93296 REM INTERROG EVL PM/IDS: CPT | Performed by: INTERNAL MEDICINE

## 2018-04-09 PROCEDURE — 93294 REM INTERROG EVL PM/LDLS PM: CPT | Performed by: INTERNAL MEDICINE

## 2018-04-10 ENCOUNTER — NURSE ONLY (OUTPATIENT)
Dept: NON INVASIVE DIAGNOSTICS | Age: 68
End: 2018-04-10

## 2018-04-10 DIAGNOSIS — Z95.0 PACEMAKER: Primary | Chronic | ICD-10-CM

## 2018-04-10 DIAGNOSIS — I49.5 SSS (SICK SINUS SYNDROME) (HCC): ICD-10-CM

## 2018-05-07 ENCOUNTER — APPOINTMENT (OUTPATIENT)
Dept: GENERAL RADIOLOGY | Age: 68
End: 2018-05-07
Payer: MEDICARE

## 2018-05-07 ENCOUNTER — HOSPITAL ENCOUNTER (EMERGENCY)
Age: 68
Discharge: HOME | End: 2018-05-07
Attending: EMERGENCY MEDICINE
Payer: MEDICARE

## 2018-05-07 ENCOUNTER — APPOINTMENT (OUTPATIENT)
Dept: CT IMAGING | Age: 68
End: 2018-05-07
Payer: MEDICARE

## 2018-05-07 VITALS
SYSTOLIC BLOOD PRESSURE: 128 MMHG | BODY MASS INDEX: 35.15 KG/M2 | HEIGHT: 62 IN | WEIGHT: 191 LBS | TEMPERATURE: 98 F | DIASTOLIC BLOOD PRESSURE: 64 MMHG | OXYGEN SATURATION: 96 % | HEART RATE: 70 BPM | RESPIRATION RATE: 16 BRPM

## 2018-05-07 DIAGNOSIS — M79.601 PAIN OF RIGHT UPPER EXTREMITY: ICD-10-CM

## 2018-05-07 DIAGNOSIS — M25.551 PAIN OF RIGHT HIP JOINT: ICD-10-CM
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
## 2018-05-07 DIAGNOSIS — S00.03XA SCALP HEMATOMA, INITIAL ENCOUNTER: Primary | ICD-10-CM

## 2018-05-07 DIAGNOSIS — M54.2 NECK PAIN: ICD-10-CM

## 2018-05-07 DIAGNOSIS — M54.9 ACUTE BACK PAIN, UNSPECIFIED BACK LOCATION, UNSPECIFIED BACK PAIN LATERALITY: ICD-10-CM

## 2018-05-07 PROCEDURE — 6360000002 HC RX W HCPCS: Performed by: PHYSICIAN ASSISTANT

## 2018-05-07 PROCEDURE — 73060 X-RAY EXAM OF HUMERUS: CPT

## 2018-05-07 PROCEDURE — 73502 X-RAY EXAM HIP UNI 2-3 VIEWS: CPT

## 2018-05-07 PROCEDURE — 6370000000 HC RX 637 (ALT 250 FOR IP): Performed by: PHYSICIAN ASSISTANT

## 2018-05-07 PROCEDURE — 70450 CT HEAD/BRAIN W/O DYE: CPT

## 2018-05-07 PROCEDURE — 90471 IMMUNIZATION ADMIN: CPT | Performed by: PHYSICIAN ASSISTANT

## 2018-05-07 PROCEDURE — 72072 X-RAY EXAM THORAC SPINE 3VWS: CPT

## 2018-05-07 PROCEDURE — 90715 TDAP VACCINE 7 YRS/> IM: CPT | Performed by: PHYSICIAN ASSISTANT

## 2018-05-07 PROCEDURE — 72125 CT NECK SPINE W/O DYE: CPT

## 2018-05-07 PROCEDURE — 72110 X-RAY EXAM L-2 SPINE 4/>VWS: CPT

## 2018-05-07 PROCEDURE — 99284 EMERGENCY DEPT VISIT MOD MDM: CPT

## 2018-05-07 RX ORDER — ACETAMINOPHEN 500 MG
1000 TABLET ORAL ONCE
Status: COMPLETED | OUTPATIENT
Start: 2018-05-07 | End: 2018-05-07

## 2018-05-07 RX ADMIN — TETANUS TOXOID, REDUCED DIPHTHERIA TOXOID AND ACELLULAR PERTUSSIS VACCINE, ADSORBED 0.5 ML: 5; 2.5; 8; 8; 2.5 SUSPENSION INTRAMUSCULAR at 11:51

## 2018-05-07 RX ADMIN — ACETAMINOPHEN 1000 MG: 500 TABLET ORAL at 11:49

## 2018-05-07 ASSESSMENT — PAIN DESCRIPTION - PAIN TYPE: TYPE: ACUTE PAIN

## 2018-05-07 ASSESSMENT — PAIN SCALES - GENERAL
PAINLEVEL_OUTOF10: 10
PAINLEVEL_OUTOF10: 10

## 2018-05-07 ASSESSMENT — PAIN DESCRIPTION - LOCATION: LOCATION: HEAD;HIP

## 2018-05-07 ASSESSMENT — PAIN DESCRIPTION - FREQUENCY: FREQUENCY: CONTINUOUS

## 2018-05-07 ASSESSMENT — PAIN DESCRIPTION - ORIENTATION: ORIENTATION: RIGHT

## 2018-05-07 ASSESSMENT — PAIN DESCRIPTION - DESCRIPTORS: DESCRIPTORS: SHARP;ACHING;STABBING

## 2018-06-27 ENCOUNTER — HOSPITAL ENCOUNTER (OUTPATIENT)
Dept: INFUSION THERAPY | Age: 68
Setting detail: INFUSION SERIES
Discharge: HOME | End: 2018-06-27
Payer: MEDICARE

## 2018-06-27 NOTE — PROGRESS NOTES
Spoke to Dr Tyrell Murray 6/26 regarding pts authorization for med assistance.    Spoke to Mariella Quiroga and informed her that co pay assistance is in progress but we need to cancel pts appt today

## 2018-07-16 ENCOUNTER — TELEPHONE (OUTPATIENT)
Dept: NON INVASIVE DIAGNOSTICS | Age: 68
End: 2018-07-16

## 2018-07-16 NOTE — TELEPHONE ENCOUNTER
Spoke to patient and informed her that I requested the [facility] for her pickup on 9/19/2018 with Dr. Ke Alvarado.

## 2018-07-24 ENCOUNTER — NURSE ONLY (OUTPATIENT)
Dept: NON INVASIVE DIAGNOSTICS | Age: 68
End: 2018-07-24
Payer: MEDICARE

## 2018-07-24 DIAGNOSIS — I49.5 SSS (SICK SINUS SYNDROME) (HCC): ICD-10-CM

## 2018-07-24 DIAGNOSIS — Z95.0 PACEMAKER: Primary | Chronic | ICD-10-CM

## 2018-07-24 PROCEDURE — 93296 REM INTERROG EVL PM/IDS: CPT | Performed by: INTERNAL MEDICINE

## 2018-07-24 PROCEDURE — 93294 REM INTERROG EVL PM/LDLS PM: CPT | Performed by: INTERNAL MEDICINE

## 2018-07-25 ENCOUNTER — TELEPHONE (OUTPATIENT)
Dept: NON INVASIVE DIAGNOSTICS | Age: 68
End: 2018-07-25

## 2018-07-25 NOTE — PROGRESS NOTES
See [employer] [place] report. Remote monitoring reviewed over a 90 day period.   End of 90 day monitoring period date of service 7-

## 2018-07-25 NOTE — PROGRESS NOTES
I have personally reviewed the remote pacemaker data. Stable battery and lead parameters. - No clinically significant arrhythmias noted  9 Brief NSVT noted for max of 4s.   Continue current medical therapy including beta-blockers  2 episodes of \"SVT\" appears to be sinus tach vs. AT, max 38s  - continue follow up as recommended    Avery Perrin MD, [address]  [facility] [place]: [place] [facility]  10:39 AM  7/25/2018

## 2018-07-31 ENCOUNTER — APPOINTMENT (OUTPATIENT)
Dept: GENERAL RADIOLOGY | Age: 68
End: 2018-07-31
Payer: MEDICARE

## 2018-07-31 ENCOUNTER — APPOINTMENT (OUTPATIENT)
Dept: CT IMAGING | Age: 68
End: 2018-07-31
Payer: MEDICARE

## 2018-07-31 ENCOUNTER — HOSPITAL ENCOUNTER (OUTPATIENT)
Age: 68
Setting detail: OBSERVATION
Discharge: HOME | End: 2018-08-02
Attending: EMERGENCY MEDICINE | Admitting: SURGERY
Payer: MEDICARE
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
## 2018-07-31 DIAGNOSIS — R07.89 CHEST WALL PAIN: ICD-10-CM

## 2018-07-31 DIAGNOSIS — S22.42XA CLOSED FRACTURE OF MULTIPLE RIBS OF LEFT SIDE, INITIAL ENCOUNTER: Primary | ICD-10-CM

## 2018-07-31 PROBLEM — T14.90XA TRAUMA: Status: ACTIVE | Noted: 2018-07-31

## 2018-07-31 LAB
ALBUMIN SERPL-MCNC: 3.9 G/DL (ref 3.5–5.2)
ALP BLD-CCNC: 100 U/L (ref 35–104)
ALT SERPL-CCNC: 13 U/L (ref 0–32)
ANION GAP SERPL CALCULATED.3IONS-SCNC: 12 MMOL/L (ref 7–16)
AST SERPL-CCNC: 17 U/L (ref 0–31)
BILIRUB SERPL-MCNC: 0.4 MG/DL (ref 0–1.2)
BUN BLDV-MCNC: 17 MG/DL (ref 8–23)
CALCIUM SERPL-MCNC: 9.2 MG/DL (ref 8.6–10.2)
CHLORIDE BLD-SCNC: 101 MMOL/L (ref 98–107)
CO2: 26 MMOL/L (ref 22–29)
CREAT SERPL-MCNC: 0.6 MG/DL (ref 0.5–1)
GFR AFRICAN AMERICAN: >60
GFR NON-AFRICAN AMERICAN: >60 ML/MIN/1.73
GLUCOSE BLD-MCNC: 180 MG/DL (ref 74–109)
HCT VFR BLD CALC: 37.9 % (ref 34–48)
HEMOGLOBIN: 12.1 G/DL (ref 11.5–15.5)
MCH RBC QN AUTO: 28.2 PG (ref 26–35)
MCHC RBC AUTO-ENTMCNC: 31.9 % (ref 32–34.5)
MCV RBC AUTO: 88.3 FL (ref 80–99.9)
PDW BLD-RTO: 16.3 FL (ref 11.5–15)
PLATELET # BLD: 263 E9/L (ref 130–450)
PMV BLD AUTO: 10.1 FL (ref 7–12)
POTASSIUM SERPL-SCNC: 4.9 MMOL/L (ref 3.5–5)
PRO-BNP: 215 PG/ML (ref 0–125)
RBC # BLD: 4.29 E12/L (ref 3.5–5.5)
SODIUM BLD-SCNC: 139 MMOL/L (ref 132–146)
TOTAL PROTEIN: 7 G/DL (ref 6.4–8.3)
TROPONIN: <0.01 NG/ML (ref 0–0.03)
WBC # BLD: 11.8 E9/L (ref 4.5–11.5)

## 2018-07-31 PROCEDURE — 73030 X-RAY EXAM OF SHOULDER: CPT

## 2018-07-31 PROCEDURE — 72125 CT NECK SPINE W/O DYE: CPT

## 2018-07-31 PROCEDURE — G0378 HOSPITAL OBSERVATION PER HR: HCPCS

## 2018-07-31 PROCEDURE — 6360000002 HC RX W HCPCS: Performed by: STUDENT IN AN ORGANIZED HEALTH CARE EDUCATION/TRAINING PROGRAM

## 2018-07-31 PROCEDURE — 84484 ASSAY OF TROPONIN QUANT: CPT

## 2018-07-31 PROCEDURE — 99285 EMERGENCY DEPT VISIT HI MDM: CPT

## 2018-07-31 PROCEDURE — 80053 COMPREHEN METABOLIC PANEL: CPT

## 2018-07-31 PROCEDURE — 2580000003 HC RX 258: Performed by: STUDENT IN AN ORGANIZED HEALTH CARE EDUCATION/TRAINING PROGRAM

## 2018-07-31 PROCEDURE — 71260 CT THORAX DX C+: CPT

## 2018-07-31 PROCEDURE — 6360000004 HC RX CONTRAST MEDICATION: Performed by: RADIOLOGY

## 2018-07-31 PROCEDURE — 96374 THER/PROPH/DIAG INJ IV PUSH: CPT

## 2018-07-31 PROCEDURE — 83880 ASSAY OF NATRIURETIC PEPTIDE: CPT

## 2018-07-31 PROCEDURE — 6360000002 HC RX W HCPCS: Performed by: PHYSICIAN ASSISTANT

## 2018-07-31 PROCEDURE — 2580000003 HC RX 258: Performed by: PHYSICIAN ASSISTANT

## 2018-07-31 PROCEDURE — 71111 X-RAY EXAM RIBS/CHEST4/> VWS: CPT

## 2018-07-31 PROCEDURE — 85027 COMPLETE CBC AUTOMATED: CPT

## 2018-07-31 PROCEDURE — 72128 CT CHEST SPINE W/O DYE: CPT

## 2018-07-31 PROCEDURE — 99219 PR INITIAL OBSERVATION CARE/DAY 50 MINUTES: CPT | Performed by: SURGERY

## 2018-07-31 PROCEDURE — 6370000000 HC RX 637 (ALT 250 FOR IP)

## 2018-07-31 PROCEDURE — 36415 COLL VENOUS BLD VENIPUNCTURE: CPT

## 2018-07-31 PROCEDURE — 6370000000 HC RX 637 (ALT 250 FOR IP): Performed by: STUDENT IN AN ORGANIZED HEALTH CARE EDUCATION/TRAINING PROGRAM

## 2018-07-31 PROCEDURE — 73060 X-RAY EXAM OF HUMERUS: CPT

## 2018-07-31 PROCEDURE — 96376 TX/PRO/DX INJ SAME DRUG ADON: CPT

## 2018-07-31 PROCEDURE — 96375 TX/PRO/DX INJ NEW DRUG ADDON: CPT

## 2018-07-31 RX ORDER — SODIUM CHLORIDE 0.9 % (FLUSH) 0.9 %
10 SYRINGE (ML) INJECTION PRN
Status: DISCONTINUED | OUTPATIENT
Start: 2018-07-31 | End: 2018-08-02 | Stop reason: HOSPADM

## 2018-07-31 RX ORDER — ASPIRIN 81 MG/1
81 TABLET ORAL DAILY
Status: DISCONTINUED | OUTPATIENT
Start: 2018-07-31 | End: 2018-08-02 | Stop reason: HOSPADM

## 2018-07-31 RX ORDER — MORPHINE SULFATE 4 MG/ML
4 INJECTION, SOLUTION INTRAMUSCULAR; INTRAVENOUS ONCE
Status: COMPLETED | OUTPATIENT
Start: 2018-07-31 | End: 2018-07-31

## 2018-07-31 RX ORDER — 0.9 % SODIUM CHLORIDE 0.9 %
1000 INTRAVENOUS SOLUTION INTRAVENOUS ONCE
Status: COMPLETED | OUTPATIENT
Start: 2018-07-31 | End: 2018-07-31

## 2018-07-31 RX ORDER — GABAPENTIN 400 MG/1
800 CAPSULE ORAL 3 TIMES DAILY
Status: DISCONTINUED | OUTPATIENT
Start: 2018-07-31 | End: 2018-08-02 | Stop reason: HOSPADM

## 2018-07-31 RX ORDER — ACETAMINOPHEN 325 MG/1
650 TABLET ORAL EVERY 4 HOURS PRN
Status: DISCONTINUED | OUTPATIENT
Start: 2018-07-31 | End: 2018-08-02 | Stop reason: HOSPADM

## 2018-07-31 RX ORDER — LIDOCAINE 50 MG/G
1 PATCH TOPICAL DAILY
Status: DISCONTINUED | OUTPATIENT
Start: 2018-08-01 | End: 2018-08-02 | Stop reason: HOSPADM

## 2018-07-31 RX ORDER — SODIUM CHLORIDE 0.9 % (FLUSH) 0.9 %
10 SYRINGE (ML) INJECTION EVERY 12 HOURS SCHEDULED
Status: DISCONTINUED | OUTPATIENT
Start: 2018-07-31 | End: 2018-08-02 | Stop reason: HOSPADM

## 2018-07-31 RX ORDER — MORPHINE SULFATE 2 MG/ML
2 INJECTION, SOLUTION INTRAMUSCULAR; INTRAVENOUS
Status: DISCONTINUED | OUTPATIENT
Start: 2018-07-31 | End: 2018-08-02

## 2018-07-31 RX ORDER — OXYCODONE HYDROCHLORIDE AND ACETAMINOPHEN 5; 325 MG/1; MG/1
2 TABLET ORAL EVERY 4 HOURS PRN
Status: DISCONTINUED | OUTPATIENT
Start: 2018-07-31 | End: 2018-08-02 | Stop reason: HOSPADM

## 2018-07-31 RX ORDER — FUROSEMIDE 40 MG/1
40 TABLET ORAL DAILY
Status: DISCONTINUED | OUTPATIENT
Start: 2018-08-01 | End: 2018-08-02 | Stop reason: HOSPADM

## 2018-07-31 RX ORDER — CARVEDILOL 3.12 MG/1
3.12 TABLET ORAL 2 TIMES DAILY
Status: DISCONTINUED | OUTPATIENT
Start: 2018-07-31 | End: 2018-08-02 | Stop reason: HOSPADM

## 2018-07-31 RX ORDER — OXYCODONE HYDROCHLORIDE AND ACETAMINOPHEN 5; 325 MG/1; MG/1
1 TABLET ORAL EVERY 4 HOURS PRN
Status: DISCONTINUED | OUTPATIENT
Start: 2018-07-31 | End: 2018-08-02 | Stop reason: HOSPADM

## 2018-07-31 RX ORDER — METHOCARBAMOL 500 MG/1
1000 TABLET, FILM COATED ORAL 4 TIMES DAILY
Status: DISCONTINUED | OUTPATIENT
Start: 2018-07-31 | End: 2018-08-02 | Stop reason: HOSPADM

## 2018-07-31 RX ORDER — POTASSIUM CHLORIDE 20 MEQ/1
20 TABLET, EXTENDED RELEASE ORAL DAILY
Status: DISCONTINUED | OUTPATIENT
Start: 2018-08-01 | End: 2018-08-02 | Stop reason: HOSPADM

## 2018-07-31 RX ORDER — OXYCODONE HYDROCHLORIDE AND ACETAMINOPHEN 5; 325 MG/1; MG/1
TABLET ORAL
Status: COMPLETED
Start: 2018-07-31 | End: 2018-07-31

## 2018-07-31 RX ORDER — L-METHYLFOLATE-ALGAE-VIT B12-B6 CAP 3-90.314-2-35 MG 3-90.314-2-35 MG
1 CAP ORAL DAILY
Status: DISCONTINUED | OUTPATIENT
Start: 2018-07-31 | End: 2018-07-31 | Stop reason: CLARIF

## 2018-07-31 RX ORDER — LEVOTHYROXINE SODIUM 0.1 MG/1
100 TABLET ORAL DAILY
Status: DISCONTINUED | OUTPATIENT
Start: 2018-08-01 | End: 2018-08-02 | Stop reason: HOSPADM

## 2018-07-31 RX ORDER — SIMVASTATIN 20 MG
20 TABLET ORAL NIGHTLY
Status: DISCONTINUED | OUTPATIENT
Start: 2018-07-31 | End: 2018-08-02 | Stop reason: HOSPADM

## 2018-07-31 RX ORDER — ONDANSETRON 2 MG/ML
4 INJECTION INTRAMUSCULAR; INTRAVENOUS EVERY 6 HOURS PRN
Status: DISCONTINUED | OUTPATIENT
Start: 2018-07-31 | End: 2018-08-02 | Stop reason: HOSPADM

## 2018-07-31 RX ADMIN — MORPHINE SULFATE 4 MG: 4 INJECTION INTRAVENOUS at 12:17

## 2018-07-31 RX ADMIN — GABAPENTIN 800 MG: 400 CAPSULE ORAL at 22:10

## 2018-07-31 RX ADMIN — OXYCODONE HYDROCHLORIDE AND ACETAMINOPHEN 1 TABLET: 5; 325 TABLET ORAL at 19:11

## 2018-07-31 RX ADMIN — MORPHINE SULFATE 2 MG: 2 INJECTION, SOLUTION INTRAMUSCULAR; INTRAVENOUS at 22:57

## 2018-07-31 RX ADMIN — CARVEDILOL 3.12 MG: 3.12 TABLET, FILM COATED ORAL at 22:10

## 2018-07-31 RX ADMIN — ACETAMINOPHEN 650 MG: 325 TABLET, FILM COATED ORAL at 21:07

## 2018-07-31 RX ADMIN — Medication 10 ML: at 22:11

## 2018-07-31 RX ADMIN — SIMVASTATIN 20 MG: 20 TABLET, FILM COATED ORAL at 22:10

## 2018-07-31 RX ADMIN — IOPAMIDOL 90 ML: 755 INJECTION, SOLUTION INTRAVENOUS at 15:03

## 2018-07-31 RX ADMIN — HYDROMORPHONE HYDROCHLORIDE 0.5 MG: 1 INJECTION, SOLUTION INTRAMUSCULAR; INTRAVENOUS; SUBCUTANEOUS at 16:45

## 2018-07-31 RX ADMIN — SODIUM CHLORIDE 1000 ML: 9 INJECTION, SOLUTION INTRAVENOUS at 14:05

## 2018-07-31 ASSESSMENT — PAIN DESCRIPTION - ORIENTATION
ORIENTATION: LEFT

## 2018-07-31 ASSESSMENT — PAIN DESCRIPTION - PROGRESSION
CLINICAL_PROGRESSION: NOT CHANGED
CLINICAL_PROGRESSION: NOT CHANGED

## 2018-07-31 ASSESSMENT — PAIN DESCRIPTION - LOCATION
LOCATION: CHEST
LOCATION: NECK;RIB CAGE
LOCATION: NECK;RIB CAGE

## 2018-07-31 ASSESSMENT — PAIN DESCRIPTION - FREQUENCY
FREQUENCY: CONTINUOUS

## 2018-07-31 ASSESSMENT — PAIN SCALES - GENERAL
PAINLEVEL_OUTOF10: 8
PAINLEVEL_OUTOF10: 8
PAINLEVEL_OUTOF10: 10

## 2018-07-31 ASSESSMENT — PAIN DESCRIPTION - PAIN TYPE
TYPE: ACUTE PAIN

## 2018-07-31 ASSESSMENT — PAIN DESCRIPTION - DESCRIPTORS
DESCRIPTORS: ACHING;DISCOMFORT;CONSTANT
DESCRIPTORS: SHARP
DESCRIPTORS: ACHING;DISCOMFORT;CONSTANT

## 2018-07-31 ASSESSMENT — PAIN DESCRIPTION - ONSET
ONSET: ON-GOING
ONSET: ON-GOING

## 2018-07-31 NOTE — ED PROVIDER NOTES
11-); other surgical history (12/2/14); ECHO Limited (12/3/2014); other surgical history (5/5/2015); back surgery (10/26/15); other surgical history (2/16/2016); Carotid-subclavian Bypass Graft (Left, 5/19/2016); eye surgery; pr total hip arthroplasty (Left, 3/13/2018); joint replacement (Left); and Fixation Kyphoplasty. Social History:  reports that she quit smoking about 13 months ago. Her smoking use included Cigarettes. She has a 10.00 pack-year smoking history. She has never used smokeless tobacco. She reports that she does not drink alcohol or use drugs. Family History: family history includes Heart Disease in her father and mother. The patients home medications have been reviewed. Allergies: Anesthetics, lulu and Penicillins  Allergies have been reviewed with patient. Physical Exam   VS:  /66   Pulse 62   Temp 97.7 °F (36.5 °C) (Temporal)   Resp 14   Ht 5' 2\" (1.575 m)   Wt 170 lb (77.1 kg)   SpO2 98%   BMI 31.09 kg/m²     Oxygen Saturation Interpretation: Normal.    Constitutional:  Alert and oriented x4, development consistent with age, NAD  HEENT:  NC/NT. No bruising or lacerations, No blood noted in nares or ears, Airway patent. Neck:  No midline or paravertebral tenderness. No crepitus   Chest:  Symmetrical without visible rash or tenderness. No bruising   Respiratory:  Clear and equal bilaterally with good airflow, No respiratory distress    CV:  Regular rate and rhythm  Chest: Left lateral rib tenderness, no bruising or rash, no crepitus  GI:  Abdomen soft, nontender, No firm or pulsatile mass, No guarding or rigidity   Pelvis:  Stable, nontender to palpation. No pain with palpation to hips   Back:  No midline or paravertebral tenderness. No step-offs. No costovertebral tenderness. Extremities: No tenderness or edema noted. Integument:  Normal turgor. Warm, dry, without visible rash, unless noted elsewhere.   Neurological:  GCS 15, CN II-XII intact, Motor 3. Fall, initial encounter      Plan   Dispo per consult  Patient condition is good    New Medications     New Prescriptions    No medications on file       Electronically signed by Samir Ch PA-C   DD: 7/31/18    **This report was transcribed using voice recognition software. Every effort was made to ensure accuracy; however, inadvertent computerized transcription errors may be present.     END OF ED PROVIDER NOTE        Samir Ch PA-C  07/31/18 9911

## 2018-07-31 NOTE — PROGRESS NOTES
[address] [place] [address] was ordered [employer]. As per the [facility], nonformulary herbals and certain dietary supplements will be discontinued.  The herbal or dietary supplement may be continued after discharge from the hospital.  Nedra Nair [facility],7/31/2018 7:57 PM

## 2018-08-01 PROBLEM — D73.89 NODULE OF SPLEEN: Status: ACTIVE | Noted: 2018-08-01

## 2018-08-01 LAB
ALBUMIN SERPL-MCNC: 3.5 G/DL (ref 3.5–5.2)
ALP BLD-CCNC: 105 U/L (ref 35–104)
ALT SERPL-CCNC: 15 U/L (ref 0–32)
ANION GAP SERPL CALCULATED.3IONS-SCNC: 11 MMOL/L (ref 7–16)
AST SERPL-CCNC: 17 U/L (ref 0–31)
BASOPHILS ABSOLUTE: 0.08 E9/L (ref 0–0.2)
BASOPHILS RELATIVE PERCENT: 0.8 % (ref 0–2)
BILIRUB SERPL-MCNC: 0.3 MG/DL (ref 0–1.2)
BUN BLDV-MCNC: 12 MG/DL (ref 8–23)
CALCIUM SERPL-MCNC: 8.5 MG/DL (ref 8.6–10.2)
CHLORIDE BLD-SCNC: 103 MMOL/L (ref 98–107)
CO2: 25 MMOL/L (ref 22–29)
CREAT SERPL-MCNC: 0.5 MG/DL (ref 0.5–1)
EOSINOPHILS ABSOLUTE: 0.46 E9/L (ref 0.05–0.5)
EOSINOPHILS RELATIVE PERCENT: 4.5 % (ref 0–6)
GFR AFRICAN AMERICAN: >60
GFR NON-AFRICAN AMERICAN: >60 ML/MIN/1.73
GLUCOSE BLD-MCNC: 123 MG/DL (ref 74–109)
HCT VFR BLD CALC: 37.5 % (ref 34–48)
HEMOGLOBIN: 11.8 G/DL (ref 11.5–15.5)
IMMATURE GRANULOCYTES #: 0.06 E9/L
IMMATURE GRANULOCYTES %: 0.6 % (ref 0–5)
LYMPHOCYTES ABSOLUTE: 2.06 E9/L (ref 1.5–4)
LYMPHOCYTES RELATIVE PERCENT: 20.4 % (ref 20–42)
MCH RBC QN AUTO: 28.1 PG (ref 26–35)
MCHC RBC AUTO-ENTMCNC: 31.5 % (ref 32–34.5)
MCV RBC AUTO: 89.3 FL (ref 80–99.9)
MONOCYTES ABSOLUTE: 0.57 E9/L (ref 0.1–0.95)
MONOCYTES RELATIVE PERCENT: 5.6 % (ref 2–12)
NEUTROPHILS ABSOLUTE: 6.89 E9/L (ref 1.8–7.3)
NEUTROPHILS RELATIVE PERCENT: 68.1 % (ref 43–80)
PDW BLD-RTO: 16.2 FL (ref 11.5–15)
PLATELET # BLD: 242 E9/L (ref 130–450)
PMV BLD AUTO: 10.4 FL (ref 7–12)
POTASSIUM REFLEX MAGNESIUM: 4.3 MMOL/L (ref 3.5–5)
RBC # BLD: 4.2 E12/L (ref 3.5–5.5)
SODIUM BLD-SCNC: 139 MMOL/L (ref 132–146)
TOTAL PROTEIN: 6.2 G/DL (ref 6.4–8.3)
WBC # BLD: 10.1 E9/L (ref 4.5–11.5)

## 2018-08-01 PROCEDURE — 2580000003 HC RX 258: Performed by: STUDENT IN AN ORGANIZED HEALTH CARE EDUCATION/TRAINING PROGRAM

## 2018-08-01 PROCEDURE — 97165 OT EVAL LOW COMPLEX 30 MIN: CPT

## 2018-08-01 PROCEDURE — G9160 LANG COMP GOAL STATUS: HCPCS

## 2018-08-01 PROCEDURE — 36415 COLL VENOUS BLD VENIPUNCTURE: CPT

## 2018-08-01 PROCEDURE — G8979 MOBILITY GOAL STATUS: HCPCS

## 2018-08-01 PROCEDURE — G8989 SELF CARE D/C STATUS: HCPCS

## 2018-08-01 PROCEDURE — 6360000002 HC RX W HCPCS: Performed by: STUDENT IN AN ORGANIZED HEALTH CARE EDUCATION/TRAINING PROGRAM

## 2018-08-01 PROCEDURE — 96372 THER/PROPH/DIAG INJ SC/IM: CPT

## 2018-08-01 PROCEDURE — APPSS30 APP SPLIT SHARED TIME 16-30 MINUTES: Performed by: NURSE PRACTITIONER

## 2018-08-01 PROCEDURE — G0378 HOSPITAL OBSERVATION PER HR: HCPCS

## 2018-08-01 PROCEDURE — 97162 PT EVAL MOD COMPLEX 30 MIN: CPT

## 2018-08-01 PROCEDURE — 6370000000 HC RX 637 (ALT 250 FOR IP): Performed by: STUDENT IN AN ORGANIZED HEALTH CARE EDUCATION/TRAINING PROGRAM

## 2018-08-01 PROCEDURE — 80053 COMPREHEN METABOLIC PANEL: CPT

## 2018-08-01 PROCEDURE — G9159 LANG COMP CURRENT STATUS: HCPCS

## 2018-08-01 PROCEDURE — G8987 SELF CARE CURRENT STATUS: HCPCS

## 2018-08-01 PROCEDURE — G9161 LANG COMP D/C STATUS: HCPCS

## 2018-08-01 PROCEDURE — 99232 SBSQ HOSP IP/OBS MODERATE 35: CPT | Performed by: SURGERY

## 2018-08-01 PROCEDURE — 6360000002 HC RX W HCPCS: Performed by: SURGERY

## 2018-08-01 PROCEDURE — 92523 SPEECH SOUND LANG COMPREHEN: CPT

## 2018-08-01 PROCEDURE — 85025 COMPLETE CBC W/AUTO DIFF WBC: CPT

## 2018-08-01 PROCEDURE — G8988 SELF CARE GOAL STATUS: HCPCS

## 2018-08-01 PROCEDURE — 96376 TX/PRO/DX INJ SAME DRUG ADON: CPT

## 2018-08-01 PROCEDURE — G8978 MOBILITY CURRENT STATUS: HCPCS

## 2018-08-01 RX ADMIN — CARVEDILOL 3.12 MG: 3.12 TABLET, FILM COATED ORAL at 21:34

## 2018-08-01 RX ADMIN — GABAPENTIN 800 MG: 400 CAPSULE ORAL at 13:43

## 2018-08-01 RX ADMIN — Medication 10 ML: at 09:08

## 2018-08-01 RX ADMIN — GABAPENTIN 800 MG: 400 CAPSULE ORAL at 08:15

## 2018-08-01 RX ADMIN — ASPIRIN 81 MG: 81 TABLET, COATED ORAL at 09:05

## 2018-08-01 RX ADMIN — OXYCODONE HYDROCHLORIDE AND ACETAMINOPHEN 2 TABLET: 5; 325 TABLET ORAL at 05:34

## 2018-08-01 RX ADMIN — ENOXAPARIN SODIUM 30 MG: 30 INJECTION SUBCUTANEOUS at 20:20

## 2018-08-01 RX ADMIN — MORPHINE SULFATE 2 MG: 2 INJECTION, SOLUTION INTRAMUSCULAR; INTRAVENOUS at 08:21

## 2018-08-01 RX ADMIN — CARVEDILOL 3.12 MG: 3.12 TABLET, FILM COATED ORAL at 09:07

## 2018-08-01 RX ADMIN — METHOCARBAMOL 1000 MG: 500 TABLET ORAL at 00:38

## 2018-08-01 RX ADMIN — METHOCARBAMOL 1000 MG: 500 TABLET ORAL at 17:00

## 2018-08-01 RX ADMIN — FUROSEMIDE 40 MG: 40 TABLET ORAL at 09:07

## 2018-08-01 RX ADMIN — POTASSIUM CHLORIDE 20 MEQ: 1500 TABLET, EXTENDED RELEASE ORAL at 09:07

## 2018-08-01 RX ADMIN — METHOCARBAMOL 1000 MG: 500 TABLET ORAL at 13:44

## 2018-08-01 RX ADMIN — Medication 10 ML: at 20:21

## 2018-08-01 RX ADMIN — MORPHINE SULFATE 2 MG: 2 INJECTION, SOLUTION INTRAMUSCULAR; INTRAVENOUS at 21:34

## 2018-08-01 RX ADMIN — METHOCARBAMOL 1000 MG: 500 TABLET ORAL at 09:05

## 2018-08-01 RX ADMIN — LEVOTHYROXINE SODIUM 100 MCG: 100 TABLET ORAL at 07:15

## 2018-08-01 RX ADMIN — OXYCODONE HYDROCHLORIDE AND ACETAMINOPHEN 2 TABLET: 5; 325 TABLET ORAL at 13:43

## 2018-08-01 RX ADMIN — OXYCODONE HYDROCHLORIDE AND ACETAMINOPHEN 2 TABLET: 5; 325 TABLET ORAL at 00:22

## 2018-08-01 RX ADMIN — OXYCODONE HYDROCHLORIDE AND ACETAMINOPHEN 1 TABLET: 5; 325 TABLET ORAL at 22:52

## 2018-08-01 RX ADMIN — SIMVASTATIN 20 MG: 20 TABLET, FILM COATED ORAL at 20:20

## 2018-08-01 RX ADMIN — GABAPENTIN 800 MG: 400 CAPSULE ORAL at 20:20

## 2018-08-01 RX ADMIN — METHOCARBAMOL 1000 MG: 500 TABLET ORAL at 20:20

## 2018-08-01 ASSESSMENT — PAIN DESCRIPTION - ONSET
ONSET: ON-GOING
ONSET: ON-GOING

## 2018-08-01 ASSESSMENT — PAIN DESCRIPTION - FREQUENCY
FREQUENCY: CONTINUOUS

## 2018-08-01 ASSESSMENT — PAIN SCALES - GENERAL
PAINLEVEL_OUTOF10: 4
PAINLEVEL_OUTOF10: 9
PAINLEVEL_OUTOF10: 10
PAINLEVEL_OUTOF10: 9
PAINLEVEL_OUTOF10: 9
PAINLEVEL_OUTOF10: 0
PAINLEVEL_OUTOF10: 4
PAINLEVEL_OUTOF10: 10
PAINLEVEL_OUTOF10: 10

## 2018-08-01 ASSESSMENT — PAIN DESCRIPTION - PAIN TYPE
TYPE: ACUTE PAIN

## 2018-08-01 ASSESSMENT — PAIN DESCRIPTION - ORIENTATION
ORIENTATION: LEFT

## 2018-08-01 ASSESSMENT — PAIN DESCRIPTION - DESCRIPTORS
DESCRIPTORS: ACHING;SORE;DISCOMFORT
DESCRIPTORS: SORE;ACHING;DISCOMFORT
DESCRIPTORS: ACHING;CONSTANT;DISCOMFORT
DESCRIPTORS: SORE;TENDER

## 2018-08-01 ASSESSMENT — PAIN DESCRIPTION - LOCATION
LOCATION: NECK;RIB CAGE
LOCATION: RIB CAGE
LOCATION: RIB CAGE
LOCATION: NECK;RIB CAGE

## 2018-08-01 ASSESSMENT — PAIN DESCRIPTION - PROGRESSION
CLINICAL_PROGRESSION: NOT CHANGED
CLINICAL_PROGRESSION: NOT CHANGED

## 2018-08-01 NOTE — PROGRESS NOTES
neutral mechanics , states Trauma d/c'd C collar   Patient agreeable to treatment yes  Home Living: Pt lives alone  in a one floor apartment with no stairs to enter and no rails; bed/bath on main floor   Bathroom setup: tub shower with HR and elevated with BHR  Equipment owned: ww, rollator, shower seat    Prior Level of Function: Independent with ADLs assisted by son with IADLs; using ww for ambulation. Driving: no   Medication management:self  Occupation: walking    Pain Level: pt c/o L chest pain due to rib fx.'s  this session     Cognition: oriented x 3; follows 2-3 step directions. Good- Problem solving skills  good Memory   Good- Sequencing  Additional Comments: alert and cooperative , followed commands with good- safety  Sensory:   Hearing: WFL  Vision: WFL    Glasses: yes [x] no [] reading []      UE Assessment:  Hand Dominance: Right [x]  Left []     Strength ROM Additional Info:    RUE   4/5 WFL good  and WFL FMC/dexterity noted during ADL tasks     LUE 4-/5 WFL Good-  and WFL FMC/dexterity noted during ADL tasks   Sensation: intact  Tone: WFL  Edema:none noted    Functional Assessment:   Initial Eval Status 8/1/18 Comments   Feeding  Independent    Grooming  Min A    Upper Body Dressing setup     Lower Body Dressing SBA    Bathing n/t    Toileting  SBA     Bed Mobility  Supine to Sit: SBA  Sit to Supine:n/t    Functional Transfers Sit to stand SBA with ww   Functional Mobility SBA household distance with ww       Sit balance: good  Stand balance: SBA  Endurance/Activity tolerance: good-                              Comments: Upon arrival pt supine in bed. At end of session pt sitting up in chair with all devices within reach, all lines and tubes intact. Nursing notified.       Treatment: OT evaluation, education on spinal mechanics and bed mobiilty, LE dressing and bathing, walker safety education, OT for functional assessment of  ADL, Functional Transfer/Mobility Training, Equipment Needs, evaluation    Hadley Luther.  [address], [address]

## 2018-08-01 NOTE — PROGRESS NOTES
SPEECH/LANGUAGE PATHOLOGY  SPEECH/LANGUAGE/COGNITIVE EVALUATION      PATIENT NAME:  Wolfgang Torres      :  1950      TODAY'S DATE:  2018      SPEECH PATHOLOGY DIAGNOSIS:    WFL    THERAPY RECOMMENDATIONS:   [x]Speech Pathology intervention is not warranted at this time.    []Speech Pathology intervention is recommended with emphasis on the following:                  MOTOR SPEECH       Oral Peripheral Examination   [x]Adequate lingual/labial strength   []Generalized oral weakness   []Right labiobuccal weakness   []Left labiobuccal weakness   []Right lingual deviation    []Left lingual deviation   []Inadequate velopharyngeal closure  []Oral apraxia      []CNT    Parameters of Speech Production  Respiration:  [x]WFL []SOB []Inadequate for speech production  Articulation:  [x]WFL []Distortions []Anticipatory struggle []CNT  Resonance:  [x]WFL []Hypernasal  []Hyponasal  []Nasal emission []CNT  Quality:   [x]WFL []Hoarse []Harsh []Strained [] Breathiness []CNT  Pitch:    [x]WFL []High []Low []CNT  Intensity: [x]WFL []Loud []Quiet []CNT  Fluency:  [x]Intact []Dysfluent []CNT  Prosody [x]Intact []Monotone []Irregular fluctuation      RECEPTIVE LANGUAGE    Comprehension of Yes/No Questions:   [x]WNL []Incomplete []Latent  []Inconsistent []Perseveration []Cueing  []Unable []CNT    Process  Simple Verbal Commands:   [x]WNL []Incomplete []Latent  []Inconsistent []Perseveration []Cueing  []Unable []CNT  Process Intermediate Verbal Commands:   [x]WNL []Incomplete []Latent  []Inconsistent []Perseveration []Cueing  []Unable []CNT  Process Complex Verbal Commands:   [x]WNL []Incomplete []Latent  []Inconsistent []Perseveration []Cueing  []Unable []CNT    Comprehension of Conversation:     [x]WNL []Incomplete []Latent  []Inconsistent []Perseveration []Cueing  []Unable []CNT       EXPRESSIVE LANGUAGE     Serials: ([x]Functional[] Impaired)    Imitation:  Words  ([x]Functional[] Impaired)    Sentences ([x]Functional[]

## 2018-08-01 NOTE — PROGRESS NOTES
Trauma Tertiary Survey    Admit Date: 7/31/2018  Hospital day 1    CC:  Fall from Treadmill       Past Medical History:   Diagnosis Date    Arthritis     asthmatic bronchitis    CAD (coronary artery disease)     Carotid stenosis     [facility]estive [facility] failure) [facility]     Closed fracture of pelvis with delayed healing 4/3/2017    Closed fracture of twelfth thoracic vertebra ([facility] 4/3/2017    T 5,6,12    COPD (chronic obstructive pulmonary disease) [facility]     DVT of leg (deep venous thrombosis) ([facility] 2010    left    Hyperlipidemia     Hypertension     Mitral regurgitation     Trace    Prolonged emergence from general anesthesia     PVD (peripheral vascular disease) with claudication ([facility] 11/18/2014    Retinal ischemia     Stenosis of intracranial portions of left internal carotid artery 11/18/2014    Thyroid disease        Alcohol pre-screening:  Women: How many times in the past year have you had 4 or more drinks in a day? none    Scheduled Meds:   aspirin  81 mg Oral Daily    carvedilol  3.125 mg Oral BID    furosemide  40 mg Oral Daily    gabapentin  800 mg Oral TID    potassium chloride  20 mEq Oral Daily    levothyroxine  100 mcg Oral Daily    simvastatin  20 mg Oral Nightly    sodium chloride flush  10 mL Intravenous 2 times per day    methocarbamol  1,000 mg Oral 4x Daily    lidocaine  1 patch Transdermal Daily     Continuous Infusions:  PRN Meds:ondansetron, sodium chloride flush, acetaminophen, magnesium hydroxide, oxyCODONE-acetaminophen **OR** oxyCODONE-acetaminophen, morphine    Subjective:   Denies any new complains this AM, major complaint is pain in her left breast.  Denies any cervical or back pain at this time. Objective:   Patient Vitals for the past 8 hrs:   BP Temp Temp src Pulse Resp SpO2   08/01/18 0345 (!) 151/70 97.9 °F (36.6 °C) Temporal 68 16 90 %       No intake/output data recorded. No intake/output data recorded.     Past Medical History:   Diagnosis Date  Arthritis     asthmatic bronchitis    CAD (coronary artery disease)     Carotid stenosis     [facility]congestive heart failure) [facility]     Closed fracture of pelvis with delayed healing 4/3/2017    Closed fracture of twelfth thoracic vertebra ([facility] 4/3/2017    T 5,6,12    COPD (chronic obstructive pulmonary disease) ([facility]     DVT of leg (deep venous thrombosis) ([facility] 2010    left    Hyperlipidemia     Hypertension     Mitral regurgitation     Trace    Prolonged emergence from general anesthesia     PVD (peripheral vascular disease) with claudication ([facility] [address] 11/18/2014    Retinal ischemia     Stenosis of intracranial portions of left internal carotid artery 11/18/2014    Thyroid disease        Radiology:  XR SHOULDER LEFT (MIN 2 VIEWS)   Final Result      XR HUMERUS LEFT (MIN 2 VIEWS)   Final Result   ALERT:  THIS IS AN ABNORMAL REPORT   1. A suspected left upper rib fracture is seen. Further evaluation   with CT scan chest recommended. 2. Mild left glenohumeral and acromioclavicular osteoarthritis. 3. Osteopenia. CT Thoracic Spine WO Contrast   Final Result   Degenerative changes   In the interval the patient is status post vertebral augmentation   Multiple compression fractures not otherwise significantly changed      CT CERVICAL SPINE WO CONTRAST   Final Result   Degenerative changes      CT CHEST W CONTRAST   Final Result      XR RIBS BILATERAL (MIN 4 VIEWS)   Final Result      1. Mild displacement of fractures involving left lateral rib 4, 5, and   6.      2. Opacities seen at left costophrenic sulcus suggestive of left   pleural effusion or hemothorax.           PHYSICAL EXAM:   GCS:  4 - Opens eyes on own   6 - Follows simple motor commands  5 - Alert and oriented    Pupil size:  Left 4 mm Right 4 mm  Pupil reaction: Yes  Wiggles fingers: Left Yes Right Yes  Hand grasp:   Left normal   Right normal  Wiggles toes: Left Yes    Right Yes  Plantar flexion: Left normal  Right normal    PHYSICAL EXAM  General: No apparent distress, comfortable   HEENT: Trachea midline, no masses, Pupils equal round cervical collar remains in place  Chest: Respiratory effort was normal with no retractions or use of accessory muscles. [address]  Cardiovascular: Extremities warm, well perfused,   Abdomen:  Soft and non distended. No tenderness, guarding, rebound, or rigidity   Extremities: Moves all 4 extremities, No pedal edema, bilateral knee abrasions     Spine:     Spine Tenderness ROM   Cervical 0 /10 Cervical collar maintained    Thoracic 0 /10 Normal   Lumbar 0 /10 Normal     Musculoskeletal    Joint Tenderness Swelling ROM   Right shoulder absent absent normal   Left shoulder absent absent normal   Right elbow absent absent normal   Left elbow absent absent normal   Right wrist absent absent normal   Left wrist absent absent normal   Right hand grasp absent absent normal   Left hand grasp absent absent normal   Right hip absent absent normal   Left hip absent absent normal   Right knee absent absent normal   Left knee absent absent normal   Right ankle absent absent normal   Left ankle absent absent normal   Right foot absent absent normal   Left foot absent absent normal       CONSULTS: Neurosurgery    PROCEDURES: None    INJURIES:        Active Problems:    Trauma    Multiple closed fractures of ribs of left side    Chest wall pain    Fall    Nodule of spleen  Resolved Problems:    * No resolved hospital problems. *        Assessment/Plan:       · Neuro:  GCS 15, no acute issues, monitor neuro status  · CV: HR near normal limits, no acute issues, monitor hemodynamics  · Pulm: Left rib fx's. Monitor RR and SpO2, SMI  · GI: tolerating general diet. Monitor bowel function  · Renal: no acute issues   · ID: afebrile, no acute issues     · Endocrine: no acute issues,   · MSK: Rib fx's, cervical pain with collar.   PT/OT, neurosurgery rec's, uses walker at home   · Heme: no acute issues      Bowel regime:  MOM  Pain

## 2018-08-02 VITALS
RESPIRATION RATE: 18 BRPM | TEMPERATURE: 97.6 F | SYSTOLIC BLOOD PRESSURE: 112 MMHG | HEART RATE: 68 BPM | DIASTOLIC BLOOD PRESSURE: 62 MMHG | BODY MASS INDEX: 31.28 KG/M2 | HEIGHT: 62 IN | OXYGEN SATURATION: 95 % | WEIGHT: 170 LBS

## 2018-08-02 LAB
ALBUMIN SERPL-MCNC: 3 G/DL (ref 3.5–5.2)
ALP BLD-CCNC: 95 U/L (ref 35–104)
ALT SERPL-CCNC: 14 U/L (ref 0–32)
ANION GAP SERPL CALCULATED.3IONS-SCNC: 10 MMOL/L (ref 7–16)
AST SERPL-CCNC: 20 U/L (ref 0–31)
BASOPHILS ABSOLUTE: 0.06 E9/L (ref 0–0.2)
BASOPHILS RELATIVE PERCENT: 0.6 % (ref 0–2)
BILIRUB SERPL-MCNC: <0.2 MG/DL (ref 0–1.2)
BUN BLDV-MCNC: 14 MG/DL (ref 8–23)
CALCIUM SERPL-MCNC: 8.4 MG/DL (ref 8.6–10.2)
CHLORIDE BLD-SCNC: 103 MMOL/L (ref 98–107)
CO2: 22 MMOL/L (ref 22–29)
CREAT SERPL-MCNC: 0.5 MG/DL (ref 0.5–1)
EOSINOPHILS ABSOLUTE: 0.39 E9/L (ref 0.05–0.5)
EOSINOPHILS RELATIVE PERCENT: 4 % (ref 0–6)
GFR AFRICAN AMERICAN: >60
GFR NON-AFRICAN AMERICAN: >60 ML/MIN/1.73
GLUCOSE BLD-MCNC: 142 MG/DL (ref 74–109)
HCT VFR BLD CALC: 38.4 % (ref 34–48)
HEMOGLOBIN: 11.9 G/DL (ref 11.5–15.5)
IMMATURE GRANULOCYTES #: 0.05 E9/L
IMMATURE GRANULOCYTES %: 0.5 % (ref 0–5)
LYMPHOCYTES ABSOLUTE: 1.75 E9/L (ref 1.5–4)
LYMPHOCYTES RELATIVE PERCENT: 18.2 % (ref 20–42)
MCH RBC QN AUTO: 27.5 PG (ref 26–35)
MCHC RBC AUTO-ENTMCNC: 31 % (ref 32–34.5)
MCV RBC AUTO: 88.9 FL (ref 80–99.9)
MONOCYTES ABSOLUTE: 0.55 E9/L (ref 0.1–0.95)
MONOCYTES RELATIVE PERCENT: 5.7 % (ref 2–12)
NEUTROPHILS ABSOLUTE: 6.83 E9/L (ref 1.8–7.3)
NEUTROPHILS RELATIVE PERCENT: 71 % (ref 43–80)
PDW BLD-RTO: 16.3 FL (ref 11.5–15)
PLATELET # BLD: 215 E9/L (ref 130–450)
PMV BLD AUTO: 11.2 FL (ref 7–12)
POTASSIUM REFLEX MAGNESIUM: 4.9 MMOL/L (ref 3.5–5)
RBC # BLD: 4.32 E12/L (ref 3.5–5.5)
SODIUM BLD-SCNC: 135 MMOL/L (ref 132–146)
TOTAL PROTEIN: 6.3 G/DL (ref 6.4–8.3)
WBC # BLD: 9.6 E9/L (ref 4.5–11.5)

## 2018-08-02 PROCEDURE — 6370000000 HC RX 637 (ALT 250 FOR IP): Performed by: STUDENT IN AN ORGANIZED HEALTH CARE EDUCATION/TRAINING PROGRAM

## 2018-08-02 PROCEDURE — 6360000002 HC RX W HCPCS: Performed by: SURGERY

## 2018-08-02 PROCEDURE — 85025 COMPLETE CBC W/AUTO DIFF WBC: CPT

## 2018-08-02 PROCEDURE — 96372 THER/PROPH/DIAG INJ SC/IM: CPT

## 2018-08-02 PROCEDURE — 2580000003 HC RX 258: Performed by: STUDENT IN AN ORGANIZED HEALTH CARE EDUCATION/TRAINING PROGRAM

## 2018-08-02 PROCEDURE — 36415 COLL VENOUS BLD VENIPUNCTURE: CPT

## 2018-08-02 PROCEDURE — 6360000002 HC RX W HCPCS: Performed by: STUDENT IN AN ORGANIZED HEALTH CARE EDUCATION/TRAINING PROGRAM

## 2018-08-02 PROCEDURE — G0378 HOSPITAL OBSERVATION PER HR: HCPCS

## 2018-08-02 PROCEDURE — 96376 TX/PRO/DX INJ SAME DRUG ADON: CPT

## 2018-08-02 PROCEDURE — 80053 COMPREHEN METABOLIC PANEL: CPT

## 2018-08-02 PROCEDURE — 99238 HOSP IP/OBS DSCHRG MGMT 30/<: CPT | Performed by: SURGERY

## 2018-08-02 RX ORDER — METHOCARBAMOL 500 MG/1
1000 TABLET, FILM COATED ORAL 4 TIMES DAILY
Qty: 80 TABLET | Refills: 0 | OUTPATIENT
Start: 2018-08-02 | End: 2018-08-12

## 2018-08-02 RX ADMIN — CARVEDILOL 3.12 MG: 3.12 TABLET, FILM COATED ORAL at 09:28

## 2018-08-02 RX ADMIN — METHOCARBAMOL 1000 MG: 500 TABLET ORAL at 11:40

## 2018-08-02 RX ADMIN — MORPHINE SULFATE 2 MG: 2 INJECTION, SOLUTION INTRAMUSCULAR; INTRAVENOUS at 06:47

## 2018-08-02 RX ADMIN — OXYCODONE HYDROCHLORIDE AND ACETAMINOPHEN 2 TABLET: 5; 325 TABLET ORAL at 04:20

## 2018-08-02 RX ADMIN — POTASSIUM CHLORIDE 20 MEQ: 1500 TABLET, EXTENDED RELEASE ORAL at 09:28

## 2018-08-02 RX ADMIN — GABAPENTIN 800 MG: 400 CAPSULE ORAL at 09:28

## 2018-08-02 RX ADMIN — MORPHINE SULFATE 2 MG: 2 INJECTION, SOLUTION INTRAMUSCULAR; INTRAVENOUS at 01:29

## 2018-08-02 RX ADMIN — FUROSEMIDE 40 MG: 40 TABLET ORAL at 09:28

## 2018-08-02 RX ADMIN — GABAPENTIN 800 MG: 400 CAPSULE ORAL at 14:28

## 2018-08-02 RX ADMIN — METHOCARBAMOL 1000 MG: 500 TABLET ORAL at 09:28

## 2018-08-02 RX ADMIN — OXYCODONE HYDROCHLORIDE AND ACETAMINOPHEN 2 TABLET: 5; 325 TABLET ORAL at 14:28

## 2018-08-02 RX ADMIN — OXYCODONE HYDROCHLORIDE AND ACETAMINOPHEN 2 TABLET: 5; 325 TABLET ORAL at 09:26

## 2018-08-02 RX ADMIN — LEVOTHYROXINE SODIUM 100 MCG: 100 TABLET ORAL at 06:53

## 2018-08-02 RX ADMIN — Medication 10 ML: at 09:27

## 2018-08-02 RX ADMIN — ENOXAPARIN SODIUM 30 MG: 30 INJECTION SUBCUTANEOUS at 09:27

## 2018-08-02 RX ADMIN — ASPIRIN 81 MG: 81 TABLET, COATED ORAL at 09:28

## 2018-08-02 ASSESSMENT — PAIN SCALES - GENERAL
PAINLEVEL_OUTOF10: 10
PAINLEVEL_OUTOF10: 9
PAINLEVEL_OUTOF10: 0
PAINLEVEL_OUTOF10: 7
PAINLEVEL_OUTOF10: 9
PAINLEVEL_OUTOF10: 3
PAINLEVEL_OUTOF10: 8
PAINLEVEL_OUTOF10: 0
PAINLEVEL_OUTOF10: 10
PAINLEVEL_OUTOF10: 10

## 2018-08-02 ASSESSMENT — PAIN DESCRIPTION - ONSET
ONSET: ON-GOING
ONSET: PROGRESSIVE
ONSET: ON-GOING

## 2018-08-02 ASSESSMENT — PAIN DESCRIPTION - FREQUENCY
FREQUENCY: CONTINUOUS

## 2018-08-02 ASSESSMENT — PAIN DESCRIPTION - ORIENTATION
ORIENTATION: LEFT

## 2018-08-02 ASSESSMENT — PAIN DESCRIPTION - LOCATION
LOCATION: RIB CAGE

## 2018-08-02 ASSESSMENT — PAIN DESCRIPTION - PAIN TYPE
TYPE: ACUTE PAIN

## 2018-08-02 ASSESSMENT — PAIN DESCRIPTION - PROGRESSION
CLINICAL_PROGRESSION: NOT CHANGED
CLINICAL_PROGRESSION: NOT CHANGED

## 2018-08-02 ASSESSMENT — PAIN DESCRIPTION - DESCRIPTORS
DESCRIPTORS: ACHING;DISCOMFORT;SORE
DESCRIPTORS: ACHING;CONSTANT;DISCOMFORT;SPASM
DESCRIPTORS: ACHING;CONSTANT;DISCOMFORT
DESCRIPTORS: ACHING;CONSTANT;DISCOMFORT
DESCRIPTORS: ACHING;DISCOMFORT;SORE

## 2018-08-02 NOTE — CARE COORDINATION
SW & CM met with pt in her room. Explained that her AM-PAC scores (PT 18/24, OT 19/24) do not qualify her to inpatient rehab , for her insurance to cover the expense. HHC/Home PT/OT offered. Pt chose [facility] for nursing, PT and OT. PT recommending front-wheeled walker.  Ordered through [facility]

## 2018-08-02 NOTE — CONSULTS
[address]                   [address] [place],  [facility]                                   CONSULTATION    PATIENT NAME: Marilyn Wolf                    :        1950  MED REC NO:   [id number]                            ROOM:       0089  ACCOUNT NO:   [de-identified]                           ADMIT DATE: 2018  PROVIDER:     Rashad Tarango MD    CONSULT DATE:  2018    REASON FOR CONSULTATION:  History of multiple vertebroplasties, status post  fall with back pain. HISTORY OF PRESENT ILLNESS:  This is a 49-year-old female with known  pacemaker who has had multiple falls in the past, multiple vertebral  kyphoplasties who recently fell from a standing height landed on her upper  left side, complaining of acute chest wall pain and back pain. There was  no loss of consciousness. Imaging revealed some acute rib fractures, but  no clear acute spine fractures; however, she cannot have an MRI because she  has a pacemaker. Her H and P was reviewed, films were reviewed. She was  examined. PHYSICAL EXAMINATION:  GENERAL:  She is awake, she is alert, she is oriented. Good grasp of the  medical issues. HEENT:  Pupils equal, round, reactive. Extraocular movements full. NECK:  Supple. No neck pain. CHEST:  She does have some upper left anterior chest wall pain due to her  rib fractures. NEUROLOGIC:  There was no motor deficits, no sensory  deficits. DIAGNOSES:  Acute rib fractures with history of multiple falls, status post  multiple kyphoplasties. For now, I agree with nonoperative management,  treat her conservatively, likely would need acute rehab. If pain continues  for a longer duration, she could potentially have a bone scan because she  is unable to have the MRI, just to see if she did have some sort of acute  fracture that we missed on CT imaging, but for now, advance activity and  diet as tolerated as well.   We will follow with you.         Amari Arroyo MD    D: 08/02/2018 8:42:46       T: 08/02/2018 11:25:22     [id number]  Job#: [id number]     Doc#: [id number]    CC:

## 2018-08-02 NOTE — PLAN OF CARE
Problem: Falls - Risk of:  Goal: Absence of physical injury  Absence of physical injury   Outcome: Met This Shift      Problem: Risk for Impaired Skin Integrity  Goal: Tissue integrity - skin and mucous membranes  Structural intactness and normal physiological function of skin and  mucous membranes.    Outcome: Met This Shift      Problem: Pain:  Goal: Control of acute pain  Control of acute pain   Outcome: Ongoing

## 2018-08-08 LAB
EKG ATRIAL RATE: 71 BPM
EKG P AXIS: 8 DEGREES
EKG P-R INTERVAL: 144 MS
EKG Q-T INTERVAL: 398 MS
EKG QRS DURATION: 82 MS
EKG QTC CALCULATION (BAZETT): 432 MS
EKG R AXIS: -17 DEGREES
EKG T AXIS: 10 DEGREES
EKG VENTRICULAR RATE: 71 BPM

## 2018-08-17 NOTE — DISCHARGE SUMMARY
Physician Discharge Summary     Patient ID:  Anila Hinojosa  [id number]  69 y.o.  1950    Admit date: 2018    Discharge date and time: 2018  4:50 PM     Admitting Physician: Susie Kelley MD     Admission Diagnoses: Trauma [T14.90XA]  Trauma [T14.90XA]    Discharge Diagnoses:   Patient Active Problem List   Diagnosis    HTN (hypertension), benign    Mixed hyperlipidemia    COPD [facility]chronic obstructive pulmonary disease) ([facility]    History of DVT (deep vein thrombosis)    Seizure (HCC)    SSS (sick sinus syndrome) ([facility]    Pacemaker    Hypothyroid    PVD (peripheral vascular disease) ([facility]    Coronary artery disease involving native coronary artery without angina pectoris    Left carotid artery occlusion    Osteoporosis    Bilateral carotid artery stenosis    Atherosclerosis of native artery of both lower extremities with intermittent claudication (HCC)    Osteoarthritis    Post-traumatic osteoarthritis of left hip    Trauma    Multiple closed fractures of ribs of left side    Chest wall pain    Fall    Nodule of spleen       Hospital Course: Anila Hinojosa is a 69 y.o. female who presented to the ED as a trauma after fall . Work up revealed L 3-4 rib fx and neck strain . Physical therapy evaluated and treated the patient and recommended discharge home when medically stable. The patient's course was otherwise uneventful. She progressed well, pain was controlled on PO medications. She was tolerating a regular diet with no nausea or vomiting, and was in a suitable condition for discharge to home.     Consults: none    Significant Diagnostic Studies:   Xr Ribs Bilateral (min 4 Views)    Result Date: 2018  Patient MRN:  [id number] : 1950 Age: 69 years Gender: Female Order Date:  2018 11:45 AM EXAM: XR RIBS BILATERAL (MIN 4 VIEWS) COMPARISON: 2017 INDICATION: Rib pain IMAGES: Ribs, unilateral with three views and PA chest FINDINGS: Chest: There is moderate cardiomegaly with mild pulmonary vascular congestion. No pneumothorax. Opacities are seen at left costophrenic sulcus. Left pacemaker. Ribs: There is minimally displaced fracture involving lateral left rib 4. There is mild displaced fracture involving lateral left rib 5. Mild displacement of fractures also seen involving anterior lateral left sixth rib. No evidence of right-sided rib fracture. 1. Mild displacement of fractures involving left lateral rib 4, 5, and 6. 2. Opacities seen at left costophrenic sulcus suggestive of left pleural effusion or hemothorax. Xr Humerus Left (min 2 Views)    Result Date: 2018  Patient MRN: [id number] : 1950 Age:  69 years Gender: Female Order Date: 2018 4:45 PM Exam: XR HUMERUS LEFT (MIN 2 VIEWS) Number of Views: 2 Indication:  Trauma, fall Thursday on the left side with worsening pain Comparison: 2017 Findings: Osteopenia. Mild degenerative changes left acromioclavicular joint. Mild degenerative changes left glenohumeral joint. Suspected left rib fracture noted at indeterminate level based on this exam. No other potential fractures are seen. ALERT:  THIS IS AN ABNORMAL REPORT 1. A suspected left upper rib fracture is seen. Further evaluation with CT scan chest recommended. 2. Mild left glenohumeral and acromioclavicular osteoarthritis. 3. Osteopenia. Ct Chest W Contrast    Result Date: 2018  Patient MRN:  [id number] : 1950 Age: 69 years Gender: Female Order Date:  2018 2:00 PM EXAM: CT CHEST W CONTRAST NUMBER OF IMAGES:  392 INDICATION:  Trauma, evaluated rib fractures and hemothorax  COMPARISON: None TECHNIQUE: Helical CT scan of the chest was performed to the neck base to the adrenal glands. IV contrast was administered. Coronal and sagittal reconstructions were also reviewed. Low-dose CT  acquisition technique included one of following options; 1 . Automated exposure control, 2.  Adjustment of MA and or KV according to patient's size or 3. Use of iterative reconstruction. FINDINGS: There is no evidence of pneumothorax, appreciable pleural effusion or pneumothorax. There are moderate linear and dependent atelectatic changes at the bases of both lungs predominantly posteriorly but also at the base of the right middle lobe. No pulmonary masses are seen. No pneumomediastinum. There is an azygos lobe on the right which is a normal variant. Thoracic aorta is mildly atherosclerotic but normal in caliber with no dissection or aortic injury. The heart is enlarged with a cardiac pacemaker in place. There are scattered calcifications of the coronary arteries particularly the LAD and left circumflex. No pericardial effusion is seen. No pathological lymphadenopathy is seen. The thyroid gland is not well seen. Through the upper abdomen show no acute abnormality. There is evidence of previous cholecystectomy and there are surgical clips in the region of the gastroesophageal junction. There is a small hiatal hernia. There is a 13 mm x 11 mm hypodense nodule in the superior spleen which is difficult to characterize but is most likely a cyst or possibly hemangioma. The visible portions of the liver and spleen appear intact. There is a nondisplaced fracture of the anterolateral left seventh rib which appears to be healing with surrounding callus formation. There is a nondisplaced fracture of the anterolateral left third and fourth ribs which appear acute. There is an old appearing fracture involving the posterior lateral right ninth rib. There is a healed fracture of the left posterior lateral fifth rib. The sternum and visible clavicles appear intact. The visible scapulae appear intact. There are chronic depression deformities of T8, T9 and T12 treated by vertebroplasty with approximately 40-50% loss of height of these vertebrae. There is mild compression deformity of the superior endplate of T7 which is likely old.  I do not see any discrete tablet  Generic drug:  potassium chloride     L-methylfolate-B6-B12 3-90.314-2-35 MG Caps capsule     levothyroxine 100 MCG tablet  Commonly known as:  SYNTHROID     oxyCODONE-acetaminophen  MG per tablet  Commonly known as:  PERCOCET     simvastatin 20 MG tablet  Commonly known as:  ZOCOR  Take 2 tablets by mouth nightly            Disposition: d/c home     Patient Instructions:    Activity: Per discharge instructions  Diet per Discharge instructions   Wound Care: Per discharge instructions   Follow-up with per discharge instructions         Signed:  Electronically signed by Go Hayes DO on 8/17/2018 at 8:33 AM

## 2018-09-20 ENCOUNTER — OFFICE VISIT (OUTPATIENT)
Dept: NON INVASIVE DIAGNOSTICS | Age: 68
End: 2018-09-20
Payer: MEDICARE

## 2018-09-20 VITALS
HEIGHT: 60 IN | DIASTOLIC BLOOD PRESSURE: 80 MMHG | WEIGHT: 196 LBS | SYSTOLIC BLOOD PRESSURE: 134 MMHG | HEART RATE: 85 BPM | RESPIRATION RATE: 18 BRPM | BODY MASS INDEX: 38.48 KG/M2

## 2018-09-20 DIAGNOSIS — Z95.0 PACEMAKER: Primary | Chronic | ICD-10-CM

## 2018-09-20 DIAGNOSIS — I10 HTN (HYPERTENSION), BENIGN: ICD-10-CM

## 2018-09-20 DIAGNOSIS — I49.5 SSS (SICK SINUS SYNDROME) (HCC): ICD-10-CM

## 2018-09-20 PROCEDURE — 93280 PM DEVICE PROGR EVAL DUAL: CPT | Performed by: INTERNAL MEDICINE

## 2018-09-20 PROCEDURE — 99213 OFFICE O/P EST LOW 20 MIN: CPT | Performed by: INTERNAL MEDICINE

## 2018-09-20 NOTE — PROGRESS NOTES
Vrates 146-150        15 VT-NS, 3-4 seconds, Vrates 148-200   Reprogramming included see below  Overall device function is normal  All device programmable settings were evaluated per above and in the scanned document, along with iterative adjustments (capture thresholds) to assess and select the most appropriate final programming to provide for consistent delivery of the appropriate therapy and to verify function of the device. Assessment: This is a 69 y.o.  female with a history of COPD, HTN, DVT, hypothyroidism, PVD, DAISHA, CAD, seizure D/O who had symptomatic sinus node dysfunction and was seen by Dr. Marci Aleman originally who ultimately recommended pacemaker placement. She underwent Medtronic dual chamber PPM on 11/28/14 and was discharged without noted acute complications. Her post op CXR and device interrogation showed no noted abnormalities. However on 12/1/14 she presented with \"stabbing\" anterior chest pain. She was found on CXR and chest CT to have an RV lead perforation. She underwent lead revision on 12/2/14, she had a small-moderate pericardial effusion which was monitored and no significant change was identified. She is now presenting for an overdue follow-up    1. Pacemaker in situ  - DOI 11/28/2014   - Medtronic; dual chamber; MRI conditional   - Indication: Sinus node dysfunction   - h/o Lead perforation; s/p revision on 12/2/14   - normal device function; see device reprogramming above     2. COPD  - states she quit smoking, now off bronchodilators      3. Carotid stenosis/ PVD   - asa, statin     4. CAD   - BB, asa, statin   - following with Dr Priya Alegre      5. Possible seizures in the past      6. H/o ETOH abuse  - states she has not drank excessively, drinks a beer or 2 per month. Cessation encouraged     7. Hypothyroidism  Lab Results   Component Value Date    TSH 3.520 04/05/2017   - on replacement therapy   - per primary     8. HTN   - controlled on today's visit     Plan:     1.  Remote

## 2018-10-05 ENCOUNTER — APPOINTMENT (OUTPATIENT)
Dept: CT IMAGING | Age: 68
End: 2018-10-05
Payer: MEDICARE

## 2018-10-05 ENCOUNTER — HOSPITAL ENCOUNTER (EMERGENCY)
Age: 68
Discharge: HOME | End: 2018-10-05
Payer: MEDICARE

## 2018-10-05 VITALS
HEART RATE: 72 BPM | SYSTOLIC BLOOD PRESSURE: 142 MMHG | BODY MASS INDEX: 38.48 KG/M2 | TEMPERATURE: 98.1 F | OXYGEN SATURATION: 96 % | RESPIRATION RATE: 16 BRPM | HEIGHT: 60 IN | WEIGHT: 196 LBS | DIASTOLIC BLOOD PRESSURE: 66 MMHG

## 2018-10-05 DIAGNOSIS — S20.229A CONTUSION OF BACK, UNSPECIFIED LATERALITY, INITIAL ENCOUNTER: ICD-10-CM

## 2018-10-05 DIAGNOSIS — S09.90XA CLOSED HEAD INJURY, INITIAL ENCOUNTER: Primary | ICD-10-CM

## 2018-10-05 PROCEDURE — 99284 EMERGENCY DEPT VISIT MOD MDM: CPT

## 2018-10-05 PROCEDURE — 70450 CT HEAD/BRAIN W/O DYE: CPT

## 2018-10-05 PROCEDURE — 72131 CT LUMBAR SPINE W/O DYE: CPT

## 2018-10-05 ASSESSMENT — PAIN DESCRIPTION - PAIN TYPE: TYPE: ACUTE PAIN

## 2018-10-05 ASSESSMENT — PAIN DESCRIPTION - ORIENTATION: ORIENTATION: LOWER

## 2018-10-05 ASSESSMENT — PAIN DESCRIPTION - ONSET: ONSET: SUDDEN

## 2018-10-05 ASSESSMENT — PAIN DESCRIPTION - LOCATION: LOCATION: BACK;COCCYX

## 2018-10-05 ASSESSMENT — PAIN DESCRIPTION - DESCRIPTORS: DESCRIPTORS: PATIENT UNABLE TO DESCRIBE

## 2018-10-05 ASSESSMENT — PAIN DESCRIPTION - FREQUENCY: FREQUENCY: CONTINUOUS

## 2018-10-23 ENCOUNTER — NURSE ONLY (OUTPATIENT)
Dept: NON INVASIVE DIAGNOSTICS | Age: 68
End: 2018-10-23
Payer: MEDICARE

## 2018-10-23 DIAGNOSIS — Z95.0 PACEMAKER: Primary | Chronic | ICD-10-CM

## 2018-10-23 DIAGNOSIS — I49.5 SSS (SICK SINUS SYNDROME) (HCC): ICD-10-CM

## 2018-10-23 PROCEDURE — 93296 REM INTERROG EVL PM/IDS: CPT | Performed by: INTERNAL MEDICINE

## 2018-10-23 PROCEDURE — 93294 REM INTERROG EVL PM/LDLS PM: CPT | Performed by: INTERNAL MEDICINE

## 2018-10-24 ENCOUNTER — TELEPHONE (OUTPATIENT)
Dept: NON INVASIVE DIAGNOSTICS | Age: 68
End: 2018-10-24

## 2019-03-04 ENCOUNTER — TELEPHONE (OUTPATIENT)
Dept: NON INVASIVE DIAGNOSTICS | Age: 69
End: 2019-03-04

## 2019-03-04 ENCOUNTER — NURSE ONLY (OUTPATIENT)
Dept: NON INVASIVE DIAGNOSTICS | Age: 69
End: 2019-03-04
Payer: MEDICARE

## 2019-03-04 DIAGNOSIS — Z95.0 PACEMAKER: Primary | ICD-10-CM

## 2019-03-04 DIAGNOSIS — I49.5 SSS (SICK SINUS SYNDROME) (HCC): ICD-10-CM

## 2019-03-04 PROCEDURE — 93296 REM INTERROG EVL PM/IDS: CPT | Performed by: INTERNAL MEDICINE

## 2019-03-04 PROCEDURE — 93294 REM INTERROG EVL PM/LDLS PM: CPT | Performed by: INTERNAL MEDICINE

## 2019-03-07 ENCOUNTER — TELEPHONE (OUTPATIENT)
Dept: NON INVASIVE DIAGNOSTICS | Age: 69
End: 2019-03-07

## 2019-04-13 ENCOUNTER — HOSPITAL ENCOUNTER (EMERGENCY)
Age: 69
Discharge: HOME | End: 2019-04-13
Attending: EMERGENCY MEDICINE
Payer: MEDICARE

## 2019-04-13 ENCOUNTER — APPOINTMENT (OUTPATIENT)
Dept: GENERAL RADIOLOGY | Age: 69
End: 2019-04-13
Payer: MEDICARE

## 2019-04-13 ENCOUNTER — APPOINTMENT (OUTPATIENT)
Dept: ULTRASOUND IMAGING | Age: 69
End: 2019-04-13
Payer: MEDICARE

## 2019-04-13 VITALS
OXYGEN SATURATION: 94 % | HEART RATE: 64 BPM | DIASTOLIC BLOOD PRESSURE: 69 MMHG | HEIGHT: 60 IN | RESPIRATION RATE: 16 BRPM | SYSTOLIC BLOOD PRESSURE: 114 MMHG | WEIGHT: 170 LBS | TEMPERATURE: 97.7 F | BODY MASS INDEX: 33.38 KG/M2

## 2019-04-13 DIAGNOSIS — M25.561 ACUTE PAIN OF RIGHT KNEE: Primary | ICD-10-CM

## 2019-04-13 PROCEDURE — 99284 EMERGENCY DEPT VISIT MOD MDM: CPT

## 2019-04-13 PROCEDURE — 6370000000 HC RX 637 (ALT 250 FOR IP): Performed by: EMERGENCY MEDICINE

## 2019-04-13 PROCEDURE — 73562 X-RAY EXAM OF KNEE 3: CPT

## 2019-04-13 PROCEDURE — 93971 EXTREMITY STUDY: CPT

## 2019-04-13 PROCEDURE — 73502 X-RAY EXAM HIP UNI 2-3 VIEWS: CPT

## 2019-04-13 RX ORDER — HYDROCODONE BITARTRATE AND ACETAMINOPHEN 7.5; 325 MG/1; MG/1
1 TABLET ORAL ONCE
Status: COMPLETED | OUTPATIENT
Start: 2019-04-13 | End: 2019-04-13

## 2019-04-13 RX ADMIN — HYDROCODONE BITARTRATE AND ACETAMINOPHEN 1 TABLET: 7.5; 325 TABLET ORAL at 17:15

## 2019-04-13 ASSESSMENT — ENCOUNTER SYMPTOMS
SHORTNESS OF BREATH: 0
ABDOMINAL PAIN: 0
BACK PAIN: 0
COUGH: 0

## 2019-04-13 ASSESSMENT — PAIN DESCRIPTION - ORIENTATION: ORIENTATION: RIGHT

## 2019-04-13 ASSESSMENT — PAIN DESCRIPTION - PROGRESSION: CLINICAL_PROGRESSION: GRADUALLY IMPROVING

## 2019-04-13 ASSESSMENT — PAIN DESCRIPTION - LOCATION: LOCATION: KNEE

## 2019-04-13 ASSESSMENT — PAIN SCALES - GENERAL
PAINLEVEL_OUTOF10: 8
PAINLEVEL_OUTOF10: 10

## 2019-04-13 ASSESSMENT — PAIN DESCRIPTION - FREQUENCY: FREQUENCY: CONTINUOUS

## 2019-04-13 ASSESSMENT — PAIN DESCRIPTION - PAIN TYPE: TYPE: ACUTE PAIN

## 2019-04-13 NOTE — ED NOTES
Bed: 18  Expected date:   Expected time:   Means of arrival:   Comments:  800 Nilton Kidd RN  04/13/19 [id number]

## 2019-04-13 NOTE — ED PROVIDER NOTES
This is a 49-year-old female with a past medical history of CHF, hypertension, hyperlipidemia and arthritis who presents to the ED for evaluation of knee pain. The patient Mariel Sprague that she has been having chronic knee pain to her right knee for the past several weeks. She states that she has been being followed by her orthopedic surgeon recently had images to her right knee and hip recently. Patient denies any traumas or falls. She states that today she felt as though she cannot bear any weight on her right knee. She describes her pain as an 8 out of 10 which is slightly improved with her oxycodone that she takes for home. She denies any use of anticoagulation, fevers or chills. The history is provided by the patient. No  was used. Review of Systems   Constitutional: Negative for fever. HENT: Negative for congestion. Eyes: Negative for visual disturbance. Respiratory: Negative for cough and shortness of breath. Cardiovascular: Negative for chest pain and leg swelling. Gastrointestinal: Negative for abdominal pain. Endocrine: Negative for polyuria. Genitourinary: Negative for dysuria. Musculoskeletal: Positive for joint swelling. Negative for back pain. Skin: Negative for rash. Allergic/Immunologic: Negative for immunocompromised state. Neurological: Negative for headaches. Hematological: Does not bruise/bleed easily. Psychiatric/Behavioral: Negative for confusion. Physical Exam   Constitutional: She is oriented to person, place, and time. She appears well-developed and well-nourished. HENT:   Head: Normocephalic and atraumatic. Mouth/Throat: Oropharynx is clear and moist.   Eyes: EOM are normal.   Neck: Normal range of motion. Neck supple. Cardiovascular: Normal rate and regular rhythm. Pulmonary/Chest: Effort normal and breath sounds normal. No stridor. No respiratory distress. She has no wheezes. Abdominal: Soft. She exhibits no mass.  There is no tenderness. There is no guarding. Musculoskeletal: She exhibits no edema. Tenderness to right hip at lateral aspect without any signs of deformity or shortening. Tenderness to anterior aspect of right knee without warmth or signs or erythema or drainage. Pulses intact. Sensation intact. Able to move toes and ankles without difficulty   Neurological: She is alert and oriented to person, place, and time. No cranial nerve deficit. Skin: Skin is warm and dry. Psychiatric: She has a normal mood and affect. Her behavior is normal.   Nursing note and vitals reviewed. Procedures    MDM  Number of Diagnoses or Management Options  Acute pain of right knee:   Diagnosis management comments: This is a 77year old female with a PMH of DVTs, COPD, HTN and Hyperlipidemia who presented to the ED for evaluation of right knee pain. The patient remarked that she has been being evaluated for chronic right knee pain and recently received a steroid injection by her orthoepedic specialist. She stated that she has been treating her pain with Percocet. She stated that today her pain was worse after she bumped into a coffee table denied hitting her head or fall to the ground. The patient did have some tenderness to the anterior aspect of her right knee over the patella but has strong pulses and sensation was intact. The patient's CAT scan was reviewed and showed similar changes to her x-ray today. Patient also had an unremarkable ultrasound of her leg. The patient had no acute findings and was advised to follow up with her PCP and orthopedic specialist. She was advised to return to the ED if she developed any numbness, tingling or any other concerns whatsoever.  She was agreeable to plan.             --------------------------------------------- PAST HISTORY ---------------------------------------------  Past Medical History:  has a past medical history of Arthritis, CAD (coronary artery disease), Carotid stenosis, CHF above commented      3. Left hip prosthesis in place. 4. Post operatory changes and fusion in the lumbar spine. XR KNEE RIGHT (3 VIEWS)   Final Result      1. Curvilinear lucency seen on lateral view could represent fracture   involving medial femoral condyle. CT could be helpful for further   evaluation. 2. Focal area of sclerosis involving medial femoral condyle appears to   have progressed compared to prior which may be related to evolving   area of bone infarct or active bone lesion. Fracture as described   above may be pathologic. MRI could be helpful for further evaluation   with and without contrast.      ALERT:  THIS IS AN ABNORMAL REPORT.                ------------------------- NURSING NOTES AND VITALS REVIEWED ---------------------------  Date / Time Roomed:  4/13/2019  3:25 PM  ED Bed Assignment:  18/18    The nursing notes within the ED encounter and vital signs as below have been reviewed. /69   Pulse 64   Temp 97.7 °F (36.5 °C) (Oral)   Resp 16   Ht 5' (1.524 m)   Wt 170 lb (77.1 kg)   SpO2 94%   BMI 33.20 kg/m²   Oxygen Saturation Interpretation: Normal      ------------------------------------------ PROGRESS NOTES ------------------------------------------  6:51 PM  I have spoken with the patient and discussed todays results, in addition to providing specific details for the plan of care and counseling regarding the diagnosis and prognosis. Their questions are answered at this time and they are agreeable with the plan. I discussed at length with them reasons for immediate return here for re evaluation.  They will followup with their and the on call primary care physician, general surgeon and orthopedic physician by calling their office tomorrow and on Monday.      --------------------------------- ADDITIONAL PROVIDER NOTES ---------------------------------  At this time the patient is without objective evidence of an acute process requiring hospitalization or inpatient management. They have remained hemodynamically stable throughout their entire ED visit and are stable for discharge with outpatient follow-up. The plan has been discussed in detail and they are aware of the specific conditions for emergent return, as well as the importance of follow-up. New Prescriptions    No medications on file       Diagnosis:  1. Acute pain of right knee        Disposition:  Patient's disposition: Discharge to home  Patient's condition is stable.           Lori Blanc DO  Resident  04/13/19 [id number]

## 2019-05-14 ENCOUNTER — HOSPITAL ENCOUNTER (OUTPATIENT)
Age: 69
Discharge: HOME | End: 2019-05-16

## 2019-05-14 LAB
ANION GAP SERPL CALCULATED.3IONS-SCNC: 11 MMOL/L (ref 7–16)
BUN BLDV-MCNC: 10 MG/DL (ref 8–23)
CALCIUM SERPL-MCNC: 9.6 MG/DL (ref 8.6–10.2)
CHLORIDE BLD-SCNC: 103 MMOL/L (ref 98–107)
CO2: 26 MMOL/L (ref 22–29)
CREAT SERPL-MCNC: 0.5 MG/DL (ref 0.5–1)
GFR AFRICAN AMERICAN: >60
GFR NON-AFRICAN AMERICAN: >60 ML/MIN/1.73
GLUCOSE BLD-MCNC: 113 MG/DL (ref 74–99)
HCT VFR BLD CALC: 42.4 % (ref 34–48)
HEMOGLOBIN: 13.2 G/DL (ref 11.5–15.5)
MCH RBC QN AUTO: 31.4 PG (ref 26–35)
MCHC RBC AUTO-ENTMCNC: 31.1 % (ref 32–34.5)
MCV RBC AUTO: 100.7 FL (ref 80–99.9)
PDW BLD-RTO: 13.6 FL (ref 11.5–15)
PLATELET # BLD: 259 E9/L (ref 130–450)
PMV BLD AUTO: 10.2 FL (ref 7–12)
POTASSIUM SERPL-SCNC: 5.5 MMOL/L (ref 3.5–5)
RBC # BLD: 4.21 E12/L (ref 3.5–5.5)
SODIUM BLD-SCNC: 140 MMOL/L (ref 132–146)
WBC # BLD: 11.1 E9/L (ref 4.5–11.5)

## 2019-05-14 PROCEDURE — 87081 CULTURE SCREEN ONLY: CPT

## 2019-05-14 PROCEDURE — 87088 URINE BACTERIA CULTURE: CPT

## 2019-05-14 PROCEDURE — 80048 BASIC METABOLIC PNL TOTAL CA: CPT

## 2019-05-14 PROCEDURE — 85027 COMPLETE CBC AUTOMATED: CPT

## 2019-05-14 PROCEDURE — 81001 URINALYSIS AUTO W/SCOPE: CPT

## 2019-05-15 LAB
BACTERIA: ABNORMAL /HPF
BILIRUBIN URINE: ABNORMAL
BLOOD, URINE: NEGATIVE
CLARITY: CLEAR
COLOR: YELLOW
CRYSTALS, UA: ABNORMAL
GLUCOSE URINE: NEGATIVE MG/DL
KETONES, URINE: NEGATIVE MG/DL
LEUKOCYTE ESTERASE, URINE: ABNORMAL
NITRITE, URINE: NEGATIVE
PH UA: 6 (ref 5–9)
PROTEIN UA: NEGATIVE MG/DL
RBC UA: ABNORMAL /HPF (ref 0–2)
SPECIFIC GRAVITY UA: 1.02 (ref 1–1.03)
UROBILINOGEN, URINE: 1 E.U./DL
WBC UA: ABNORMAL /HPF (ref 0–5)

## 2019-05-16 ENCOUNTER — OFFICE VISIT (OUTPATIENT)
Dept: CARDIOLOGY CLINIC | Age: 69
End: 2019-05-16
Payer: MEDICARE

## 2019-05-16 VITALS
DIASTOLIC BLOOD PRESSURE: 62 MMHG | WEIGHT: 170 LBS | HEART RATE: 93 BPM | BODY MASS INDEX: 26.68 KG/M2 | SYSTOLIC BLOOD PRESSURE: 110 MMHG | RESPIRATION RATE: 16 BRPM | HEIGHT: 67 IN

## 2019-05-16 DIAGNOSIS — I25.10 CORONARY ARTERY DISEASE INVOLVING NATIVE CORONARY ARTERY OF NATIVE HEART WITHOUT ANGINA PECTORIS: Primary | ICD-10-CM

## 2019-05-16 LAB
MRSA CULTURE ONLY: NORMAL
URINE CULTURE, ROUTINE: NORMAL

## 2019-05-16 PROCEDURE — 99214 OFFICE O/P EST MOD 30 MIN: CPT | Performed by: INTERNAL MEDICINE

## 2019-05-16 PROCEDURE — 93000 ELECTROCARDIOGRAM COMPLETE: CPT | Performed by: INTERNAL MEDICINE

## 2019-05-17 NOTE — PROGRESS NOTES
Ankit Torres  1950  Date of Service: 5/16/2019    Patient Active Problem List    Diagnosis Date Noted    Nodule of spleen 08/01/2018    Trauma 07/31/2018    Multiple closed fractures of ribs of left side     Chest wall pain     Osteoarthritis 03/13/2018    Post-traumatic osteoarthritis of left hip 03/13/2018    Bilateral carotid artery stenosis 02/13/2018    Atherosclerosis of native artery of both lower extremities with intermittent claudication ([address] 02/13/2018    Osteoporosis 05/24/2017     Overview Note:     Diagnosis added to problem list by Geoffrey Matias [facility] based on transcribed order from Dr. Curtis Robison Left carotid artery occlusion 04/19/2016    Coronary artery disease involving native coronary artery without angina pectoris 01/16/2016     Overview Note:     By stress only showing a fixed anteroapical defect in 2007. Since then repeat stress tests & echo's show normal perfusion & EF with no WMA's.  PVD (peripheral vascular disease) ([address] 10/21/2015    Hypothyroid 05/04/2015    SSS (sick sinus syndrome) ([address] 04/13/2015    Pacemaker 04/13/2015     Overview Note:     Dual MDT 20/50/94- complicated by lead perforation s/p revision (MRI Conditional Medtronic)        Seizure ([address] 11/17/2014    COPD (chronic obstructive pulmonary disease) ([address] 08/19/2013    History of DVT (deep vein thrombosis) 08/19/2013    HTN (hypertension), benign 08/18/2013    Mixed hyperlipidemia 08/18/2013       Social History     Socioeconomic History    Marital status:       Spouse name: None    Number of children: None    Years of education: None    Highest education level: None   Occupational History    Occupation: retired- [employer],     Social Needs    Financial resource strain: None    Food insecurity:     Worry: None     Inability: None    Transportation needs:     Medical: None     Non-medical: None   Tobacco Use    Smoking status: Former Smoker     Packs/day: 1.00 Years: 10.00     Pack years: 10.00     Types: Cigarettes     Last attempt to quit: 6/15/2017     Years since quittin.9    Smokeless tobacco: Never Used   Substance and Sexual Activity    Alcohol use: No    Drug use: No    Sexual activity: None   Lifestyle    Physical activity:     Days per week: None     Minutes per session: None    Stress: None   Relationships    Social connections:     Talks on phone: None     Gets together: None     Attends Muslim service: None     Active member of club or organization: None     Attends meetings of clubs or organizations: None     Relationship status: None    Intimate partner violence:     Fear of current or ex partner: None     Emotionally abused: None     Physically abused: None     Forced sexual activity: None   Other Topics Concern    None   Social History Narrative    None       Current Outpatient Medications   Medication Sig Dispense Refill    oxyCODONE-acetaminophen (PERCOCET)  MG per tablet TAKE 1 TABLET BY MOUTH 4 TIMES A DAY AS NEEDED  0    L-methylfolate-B6-B12 (METANX) 2-73.145-2-54 MG CAPS capsule Take by mouth daily      levothyroxine (SYNTHROID) 100 MCG tablet Take 100 mcg by mouth Daily      simvastatin (ZOCOR) 20 MG tablet Take 2 tablets by mouth nightly (Patient taking differently: Take 20 mg by mouth nightly ) 30 tablet 3    gabapentin (NEURONTIN) 800 MG tablet Take 800 mg by mouth three times daily  2    carvedilol (COREG) 3.125 MG tablet Take 3.125 mg by mouth 2 times daily  2    KLOR-CON M20 20 MEQ tablet Take 20 mEq by mouth daily   0    aspirin 81 MG EC tablet Take 81 mg by mouth daily      furosemide (LASIX) 40 MG tablet Take 1 tablet by mouth 2 times daily. (Patient taking differently: Take 40 mg by mouth daily ) 60 tablet 0     No current facility-administered medications for this visit.          Allergies   Allergen Reactions    Anesthetics, Betty      Difficulty waking up from ether    Penicillins Rash       Chief Complaint:  Parviz Corey is here today for follow up and management/recomendations for preop    History of Present Illness: Parviz Corey states that She performs no physical activities d/t her knee pain. She denies any chest discomfort, dyspnea on exertion, orthopnea/PND, or lower extremity edema. She denies any palpitations or presyncopal symptoms. REVIEW OF SYSTEMS:  As above. Patient does not complain of any fever, chills, nausea, vomiting or diarrhea. No focal, motor or neurological deficits. No changes in his/her vision, hearing, bowel or bladder habits. She is not known to have a history of thyroid problems. No recent nose bleeds. PHYSICAL EXAM:  Vitals:    05/16/19 1119   BP: 110/62   Pulse: 93   Resp: 16   Weight: 170 lb (77.1 kg)   Height: 5' 7\" (1.702 m)       GENERAL:  She is alert and oriented x 3, communicates well, in no distress. NECK:  No masses, trachea is mid position. Supple, full ROM, no JVD or bruits. No palpable thyromegaly or lymphadenopathy. HEART:  Regular rate and rhythm. Normal S1 and S2. There is an S4 gallop and a I/VI systolic murmur. LUNGS:  Clear to auscultation bilaterally. No use of accessory muscles. symmetrical excursion. ABDOMEN:  Soft, non-tender. Normal bowel sounds. EXTREMITIES:  Full ROM x 4. Trace right lower extremity edema. Good distal pulses. EYES:  Extraocular muscles intact. PERRL. Normal lids & conjunctiva. ENT:  Nares are clear & not bleeding. Moist mucosa. Normal lips formation. No external masses   NEURO: no tremors, full ROM x 4, EOMI. SKIN:  Warm, dry and intact. Normal turgor. EKG: Sinus rhythm, 93 bpm, nl axis, nonspecific ST - T wave changes. Assessment:   1. Possible CAD vs attenuation artifact on a remote stress only in 2007. She denies any symptoms of ischemia. She has had repeat stress tests with normal perfusion including 12-14. Normal EF & no WMA's.   Another stress test 2-18 showing no ischemia or MI & nl EF. 2. SSS/pacer  3. COPD  4. ETOH  5. PVD        Recommendations:  1. She is low risk for periop ischemic cardiac events. No further cardiac testing preop. 2. Cont to f/u with EP  3. F/u with Cardiology PRN      Thank you for allowing me to participate in your patient's care.       [id number], [address] [facility]  Interventional Cardiology

## 2019-05-28 ENCOUNTER — HOSPITAL ENCOUNTER (OUTPATIENT)
Age: 69
Discharge: HOME | End: 2019-05-30

## 2019-05-28 LAB
ABO/RH: NORMAL
ANTIBODY SCREEN: NORMAL

## 2019-05-28 PROCEDURE — 86901 BLOOD TYPING SEROLOGIC RH(D): CPT

## 2019-05-28 PROCEDURE — 86900 BLOOD TYPING SEROLOGIC ABO: CPT

## 2019-05-28 PROCEDURE — 86850 RBC ANTIBODY SCREEN: CPT

## 2019-05-29 ENCOUNTER — HOSPITAL ENCOUNTER (OUTPATIENT)
Age: 69
Discharge: HOME | End: 2019-05-31

## 2019-05-29 LAB
ANION GAP SERPL CALCULATED.3IONS-SCNC: 9 MMOL/L (ref 7–16)
BUN BLDV-MCNC: 9 MG/DL (ref 8–23)
CALCIUM SERPL-MCNC: 8.1 MG/DL (ref 8.6–10.2)
CHLORIDE BLD-SCNC: 102 MMOL/L (ref 98–107)
CO2: 22 MMOL/L (ref 22–29)
CREAT SERPL-MCNC: 0.5 MG/DL (ref 0.5–1)
GFR AFRICAN AMERICAN: >60
GFR NON-AFRICAN AMERICAN: >60 ML/MIN/1.73
GLUCOSE BLD-MCNC: 159 MG/DL (ref 74–99)
HCT VFR BLD CALC: 32.3 % (ref 34–48)
HEMOGLOBIN: 10.2 G/DL (ref 11.5–15.5)
MCH RBC QN AUTO: 31.4 PG (ref 26–35)
MCHC RBC AUTO-ENTMCNC: 31.6 % (ref 32–34.5)
MCV RBC AUTO: 99.4 FL (ref 80–99.9)
PDW BLD-RTO: 13.3 FL (ref 11.5–15)
PLATELET # BLD: 209 E9/L (ref 130–450)
PMV BLD AUTO: 11.1 FL (ref 7–12)
POTASSIUM SERPL-SCNC: 4.3 MMOL/L (ref 3.5–5)
RBC # BLD: 3.25 E12/L (ref 3.5–5.5)
SODIUM BLD-SCNC: 133 MMOL/L (ref 132–146)
WBC # BLD: 18.3 E9/L (ref 4.5–11.5)

## 2019-05-29 PROCEDURE — 85027 COMPLETE CBC AUTOMATED: CPT

## 2019-05-29 PROCEDURE — 80048 BASIC METABOLIC PNL TOTAL CA: CPT

## 2019-05-30 ENCOUNTER — HOSPITAL ENCOUNTER (OUTPATIENT)
Age: 69
Discharge: HOME | End: 2019-06-01

## 2019-05-30 LAB
ANION GAP SERPL CALCULATED.3IONS-SCNC: 12 MMOL/L (ref 7–16)
BUN BLDV-MCNC: 9 MG/DL (ref 8–23)
CALCIUM SERPL-MCNC: 8.3 MG/DL (ref 8.6–10.2)
CHLORIDE BLD-SCNC: 99 MMOL/L (ref 98–107)
CO2: 25 MMOL/L (ref 22–29)
CREAT SERPL-MCNC: 0.5 MG/DL (ref 0.5–1)
GFR AFRICAN AMERICAN: >60
GFR NON-AFRICAN AMERICAN: >60 ML/MIN/1.73
GLUCOSE BLD-MCNC: 119 MG/DL (ref 74–99)
HCT VFR BLD CALC: 31 % (ref 34–48)
HEMOGLOBIN: 9.5 G/DL (ref 11.5–15.5)
MCH RBC QN AUTO: 31.1 PG (ref 26–35)
MCHC RBC AUTO-ENTMCNC: 30.6 % (ref 32–34.5)
MCV RBC AUTO: 101.6 FL (ref 80–99.9)
PDW BLD-RTO: 13.8 FL (ref 11.5–15)
PLATELET # BLD: 190 E9/L (ref 130–450)
PMV BLD AUTO: 10.9 FL (ref 7–12)
POTASSIUM SERPL-SCNC: 3.9 MMOL/L (ref 3.5–5)
RBC # BLD: 3.05 E12/L (ref 3.5–5.5)
SODIUM BLD-SCNC: 136 MMOL/L (ref 132–146)
WBC # BLD: 13.4 E9/L (ref 4.5–11.5)

## 2019-05-30 PROCEDURE — 80048 BASIC METABOLIC PNL TOTAL CA: CPT

## 2019-05-30 PROCEDURE — 85027 COMPLETE CBC AUTOMATED: CPT

## 2019-06-04 ENCOUNTER — TELEPHONE (OUTPATIENT)
Dept: NON INVASIVE DIAGNOSTICS | Age: 69
End: 2019-06-04

## 2019-06-06 NOTE — TELEPHONE ENCOUNTER
Doug's son called and states his mom is in rehab, but should be home next week. He will send a remote transmission when she returns home. I instructed him to call us and let us know when he sent so the device clinic can be aware. He verbalized understanding.

## 2019-06-18 ENCOUNTER — TELEPHONE (OUTPATIENT)
Dept: NON INVASIVE DIAGNOSTICS | Age: 69
End: 2019-06-18

## 2019-06-21 ENCOUNTER — TELEPHONE (OUTPATIENT)
Dept: NON INVASIVE DIAGNOSTICS | Age: 69
End: 2019-06-21

## 2019-06-21 NOTE — TELEPHONE ENCOUNTER
David Stephens called and informed us that Chandni Suarez is still in rehab. She should be home on 6/22/2019. I asked becky to please make sure she sends a remote transmission.

## 2019-06-21 NOTE — TELEPHONE ENCOUNTER
Message left inquiring if patient isstill in rehab - remote [facility] transmission scheduled to be sent today. Requested feed back regarding status.

## 2019-06-21 NOTE — TELEPHONE ENCOUNTER
Miriam Coleychristopher called back and Mol is still in rehab. We moved her remote to Monday 6/24/2019. Miriam Clark said she should be home on 6/22/2019.   Check remote on 6/24/2019

## 2019-06-24 ENCOUNTER — NURSE ONLY (OUTPATIENT)
Dept: NON INVASIVE DIAGNOSTICS | Age: 69
End: 2019-06-24
Payer: MEDICARE

## 2019-06-24 DIAGNOSIS — Z95.0 PACEMAKER: Primary | ICD-10-CM

## 2019-06-24 DIAGNOSIS — I49.5 SSS (SICK SINUS SYNDROME) (HCC): ICD-10-CM

## 2019-06-24 PROCEDURE — 93296 REM INTERROG EVL PM/IDS: CPT | Performed by: INTERNAL MEDICINE

## 2019-06-24 PROCEDURE — 93294 REM INTERROG EVL PM/LDLS PM: CPT | Performed by: INTERNAL MEDICINE

## 2019-06-25 ENCOUNTER — TELEPHONE (OUTPATIENT)
Dept: NON INVASIVE DIAGNOSTICS | Age: 69
End: 2019-06-25

## 2019-06-25 NOTE — PROGRESS NOTES
See [employer] [facility] report. Remote monitoring reviewed over a 90 day period. End of 90 day monitoring period date of service 6-24-19.

## 2019-06-25 NOTE — TELEPHONE ENCOUNTER
We have received your remote transmission. Our staff will contact you if there is anything that needs to be discussed. Your next appointment is September 23, 2019 for remote transmission. Patient verbalized understanding.

## 2019-09-23 ENCOUNTER — NURSE ONLY (OUTPATIENT)
Dept: NON INVASIVE DIAGNOSTICS | Age: 69
End: 2019-09-23
Payer: MEDICARE

## 2019-09-23 DIAGNOSIS — Z95.0 PACEMAKER: Primary | ICD-10-CM

## 2019-09-23 DIAGNOSIS — I49.5 SSS (SICK SINUS SYNDROME) (HCC): ICD-10-CM

## 2019-09-23 PROCEDURE — 93294 REM INTERROG EVL PM/LDLS PM: CPT | Performed by: INTERNAL MEDICINE

## 2019-09-23 PROCEDURE — 93296 REM INTERROG EVL PM/IDS: CPT | Performed by: INTERNAL MEDICINE

## 2019-10-08 ENCOUNTER — TELEPHONE (OUTPATIENT)
Dept: NON INVASIVE DIAGNOSTICS | Age: 69
End: 2019-10-08

## 2019-10-22 ENCOUNTER — HOSPITAL ENCOUNTER (OUTPATIENT)
Dept: CT IMAGING | Age: 69
Discharge: HOME | End: 2019-10-24
Payer: MEDICARE

## 2019-10-22 DIAGNOSIS — I71.40 ABDOMINAL AORTIC ANEURYSM (AAA) WITHOUT RUPTURE: ICD-10-CM

## 2019-10-22 PROCEDURE — 71250 CT THORAX DX C-: CPT

## 2019-11-13 ENCOUNTER — HOSPITAL ENCOUNTER (OUTPATIENT)
Dept: MAMMOGRAPHY | Age: 69
Discharge: HOME | End: 2019-11-15
Payer: MEDICARE

## 2019-11-13 ENCOUNTER — HOSPITAL ENCOUNTER (OUTPATIENT)
Age: 69
Discharge: HOME | End: 2019-11-13
Payer: MEDICARE

## 2019-11-13 ENCOUNTER — HOSPITAL ENCOUNTER (OUTPATIENT)
Dept: INTERVENTIONAL RADIOLOGY/VASCULAR | Age: 69
Discharge: HOME | End: 2019-11-15
Payer: MEDICARE

## 2019-11-13 ENCOUNTER — HOSPITAL ENCOUNTER (OUTPATIENT)
Dept: ULTRASOUND IMAGING | Age: 69
Discharge: HOME | End: 2019-11-15
Payer: MEDICARE

## 2019-11-13 DIAGNOSIS — I65.22 STENOSIS OF LEFT CAROTID ARTERY: ICD-10-CM

## 2019-11-13 DIAGNOSIS — N95.1 MENOPAUSAL STATE: ICD-10-CM

## 2019-11-13 DIAGNOSIS — Z12.2 ENCOUNTER FOR SCREENING FOR CANCER OF RESPIRATORY ORGANS: ICD-10-CM

## 2019-11-13 DIAGNOSIS — Z13.820 OSTEOPOROSIS SCREENING: ICD-10-CM

## 2019-11-13 DIAGNOSIS — I73.9 PERIPHERAL VASCULAR DISEASE, UNSPECIFIED (HCC): ICD-10-CM

## 2019-11-13 LAB
INR BLD: 1
PLATELET # BLD: 219 E9/L (ref 130–450)
PROTHROMBIN TIME: 12.2 SEC (ref 9.3–12.4)

## 2019-11-13 PROCEDURE — 93922 UPR/L XTREMITY ART 2 LEVELS: CPT

## 2019-11-13 PROCEDURE — 85610 PROTHROMBIN TIME: CPT

## 2019-11-13 PROCEDURE — 85049 AUTOMATED PLATELET COUNT: CPT

## 2019-11-13 PROCEDURE — 77080 DXA BONE DENSITY AXIAL: CPT

## 2019-11-13 PROCEDURE — 93880 EXTRACRANIAL BILAT STUDY: CPT

## 2019-11-13 PROCEDURE — 36415 COLL VENOUS BLD VENIPUNCTURE: CPT

## 2019-11-19 ENCOUNTER — HOSPITAL ENCOUNTER (OUTPATIENT)
Dept: NUCLEAR MEDICINE | Age: 69
Discharge: HOME | End: 2019-11-19
Payer: MEDICARE

## 2019-11-19 ENCOUNTER — HOSPITAL ENCOUNTER (OUTPATIENT)
Dept: CT IMAGING | Age: 69
Discharge: HOME | End: 2019-11-21
Payer: MEDICARE

## 2019-11-19 ENCOUNTER — HOSPITAL ENCOUNTER (OUTPATIENT)
Dept: GENERAL RADIOLOGY | Age: 69
Discharge: HOME | End: 2019-11-21
Payer: MEDICARE

## 2019-11-19 ENCOUNTER — OFFICE VISIT (OUTPATIENT)
Dept: VASCULAR SURGERY | Age: 69
End: 2019-11-19
Payer: MEDICARE

## 2019-11-19 VITALS
SYSTOLIC BLOOD PRESSURE: 122 MMHG | DIASTOLIC BLOOD PRESSURE: 60 MMHG | HEART RATE: 78 BPM | RESPIRATION RATE: 18 BRPM | OXYGEN SATURATION: 96 %

## 2019-11-19 DIAGNOSIS — I65.23 BILATERAL CAROTID ARTERY STENOSIS: Primary | ICD-10-CM

## 2019-11-19 DIAGNOSIS — S22.080A CLOSED WEDGE COMPRESSION FRACTURE OF ELEVENTH THORACIC VERTEBRA, INITIAL ENCOUNTER: ICD-10-CM

## 2019-11-19 DIAGNOSIS — M54.16 LUMBAR RADICULOPATHY: ICD-10-CM

## 2019-11-19 DIAGNOSIS — I70.213 ATHEROSCLEROSIS OF NATIVE ARTERIES OF EXTREMITIES WITH INTERMITTENT CLAUDICATION, BILATERAL LEGS (HCC): ICD-10-CM

## 2019-11-19 PROCEDURE — 72132 CT LUMBAR SPINE W/DYE: CPT

## 2019-11-19 PROCEDURE — 6360000004 HC RX CONTRAST MEDICATION: Performed by: RADIOLOGY

## 2019-11-19 PROCEDURE — A9503 TC99M MEDRONATE: HCPCS | Performed by: RADIOLOGY

## 2019-11-19 PROCEDURE — 62284 INJECTION FOR MYELOGRAM: CPT

## 2019-11-19 PROCEDURE — 78306 BONE IMAGING WHOLE BODY: CPT

## 2019-11-19 PROCEDURE — 3430000000 HC RX DIAGNOSTIC RADIOPHARMACEUTICAL: Performed by: RADIOLOGY

## 2019-11-19 PROCEDURE — 99213 OFFICE O/P EST LOW 20 MIN: CPT | Performed by: SURGERY

## 2019-11-19 RX ORDER — TC 99M MEDRONATE 20 MG/10ML
25 INJECTION, POWDER, LYOPHILIZED, FOR SOLUTION INTRAVENOUS
Status: COMPLETED | OUTPATIENT
Start: 2019-11-19 | End: 2019-11-19

## 2019-11-19 RX ADMIN — TC 99M MEDRONATE 25 MILLICURIE: 20 INJECTION, POWDER, LYOPHILIZED, FOR SOLUTION INTRAVENOUS at 11:18

## 2019-11-19 RX ADMIN — IOPAMIDOL 13 ML: 408 INJECTION, SOLUTION INTRATHECAL at 10:17

## 2019-11-19 ASSESSMENT — PAIN - FUNCTIONAL ASSESSMENT: PAIN_FUNCTIONAL_ASSESSMENT: 0-10

## 2019-11-19 ASSESSMENT — PAIN DESCRIPTION - DESCRIPTORS: DESCRIPTORS: SHARP;THROBBING;CONSTANT

## 2019-12-27 ENCOUNTER — NURSE ONLY (OUTPATIENT)
Dept: NON INVASIVE DIAGNOSTICS | Age: 69
End: 2019-12-27
Payer: MEDICARE

## 2019-12-27 PROCEDURE — 93296 REM INTERROG EVL PM/IDS: CPT | Performed by: INTERNAL MEDICINE

## 2019-12-27 PROCEDURE — 93294 REM INTERROG EVL PM/LDLS PM: CPT | Performed by: INTERNAL MEDICINE

## 2020-01-01 ENCOUNTER — APPOINTMENT (OUTPATIENT)
Dept: GENERAL RADIOLOGY | Age: 70
DRG: 004 | End: 2020-01-01
Payer: MEDICARE

## 2020-01-01 ENCOUNTER — APPOINTMENT (OUTPATIENT)
Dept: CT IMAGING | Age: 70
DRG: 193 | End: 2020-01-01
Payer: MEDICARE

## 2020-01-01 ENCOUNTER — TELEPHONE (OUTPATIENT)
Dept: NON INVASIVE DIAGNOSTICS | Age: 70
End: 2020-01-01

## 2020-01-01 ENCOUNTER — HOSPITAL ENCOUNTER (INPATIENT)
Age: 70
LOS: 9 days | Discharge: NURSING, REHAB OR LONG-TERM CARE | DRG: 193 | End: 2020-12-08
Attending: EMERGENCY MEDICINE | Admitting: INTERNAL MEDICINE
Payer: MEDICARE

## 2020-01-01 ENCOUNTER — HOSPITAL ENCOUNTER (OUTPATIENT)
Age: 70
Discharge: HOME | End: 2020-08-04
Payer: MEDICARE

## 2020-01-01 ENCOUNTER — APPOINTMENT (OUTPATIENT)
Dept: ULTRASOUND IMAGING | Age: 70
DRG: 247 | End: 2020-01-01
Attending: INTERNAL MEDICINE
Payer: MEDICARE

## 2020-01-01 ENCOUNTER — ANESTHESIA (OUTPATIENT)
Dept: OPERATING ROOM | Age: 70
DRG: 004 | End: 2020-01-01
Payer: MEDICARE

## 2020-01-01 ENCOUNTER — HOSPITAL ENCOUNTER (OUTPATIENT)
Age: 70
Discharge: HOME | End: 2020-08-06
Payer: MEDICARE

## 2020-01-01 ENCOUNTER — APPOINTMENT (OUTPATIENT)
Dept: CT IMAGING | Age: 70
DRG: 004 | End: 2020-01-01
Payer: MEDICARE

## 2020-01-01 ENCOUNTER — APPOINTMENT (OUTPATIENT)
Dept: GENERAL RADIOLOGY | Age: 70
DRG: 193 | End: 2020-01-01
Payer: MEDICARE

## 2020-01-01 ENCOUNTER — NURSE ONLY (OUTPATIENT)
Dept: NON INVASIVE DIAGNOSTICS | Age: 70
End: 2020-01-01
Payer: MEDICARE

## 2020-01-01 ENCOUNTER — TELEPHONE (OUTPATIENT)
Dept: CARDIOLOGY CLINIC | Age: 70
End: 2020-01-01

## 2020-01-01 ENCOUNTER — HOSPITAL ENCOUNTER (INPATIENT)
Dept: CARDIAC CATH/INVASIVE PROCEDURES | Age: 70
LOS: 1 days | Discharge: HOME | DRG: 247 | End: 2020-08-11
Attending: INTERNAL MEDICINE | Admitting: INTERNAL MEDICINE
Payer: MEDICARE

## 2020-01-01 ENCOUNTER — HOSPITAL ENCOUNTER (OUTPATIENT)
Dept: CARDIOLOGY | Age: 70
Discharge: HOME | End: 2020-07-14
Payer: MEDICARE

## 2020-01-01 ENCOUNTER — VIRTUAL VISIT (OUTPATIENT)
Dept: NON INVASIVE DIAGNOSTICS | Age: 70
End: 2020-01-01
Payer: MEDICARE

## 2020-01-01 ENCOUNTER — ANESTHESIA EVENT (OUTPATIENT)
Dept: OPERATING ROOM | Age: 70
DRG: 004 | End: 2020-01-01
Payer: MEDICARE

## 2020-01-01 ENCOUNTER — TELEPHONE (OUTPATIENT)
Dept: OTHER | Facility: CLINIC | Age: 70
End: 2020-01-01

## 2020-01-01 ENCOUNTER — HOSPITAL ENCOUNTER (INPATIENT)
Age: 70
LOS: 50 days | DRG: 004 | End: 2021-02-01
Attending: EMERGENCY MEDICINE | Admitting: INTERNAL MEDICINE
Payer: MEDICARE

## 2020-01-01 ENCOUNTER — TELEPHONE (OUTPATIENT)
Dept: CARDIOLOGY | Age: 70
End: 2020-01-01

## 2020-01-01 ENCOUNTER — OFFICE VISIT (OUTPATIENT)
Dept: CARDIOLOGY CLINIC | Age: 70
End: 2020-01-01
Payer: MEDICARE

## 2020-01-01 ENCOUNTER — TELEPHONE (OUTPATIENT)
Dept: CARDIAC REHAB | Age: 70
End: 2020-01-01

## 2020-01-01 ENCOUNTER — TELEPHONE (OUTPATIENT)
Dept: CARDIAC CATH/INVASIVE PROCEDURES | Age: 70
End: 2020-01-01

## 2020-01-01 VITALS
HEART RATE: 72 BPM | SYSTOLIC BLOOD PRESSURE: 118 MMHG | OXYGEN SATURATION: 95 % | HEIGHT: 62 IN | RESPIRATION RATE: 18 BRPM | BODY MASS INDEX: 30.11 KG/M2 | DIASTOLIC BLOOD PRESSURE: 70 MMHG | WEIGHT: 163.6 LBS

## 2020-01-01 VITALS
HEART RATE: 71 BPM | DIASTOLIC BLOOD PRESSURE: 62 MMHG | WEIGHT: 188.56 LBS | HEIGHT: 64 IN | RESPIRATION RATE: 18 BRPM | SYSTOLIC BLOOD PRESSURE: 104 MMHG | OXYGEN SATURATION: 90 % | BODY MASS INDEX: 32.19 KG/M2 | TEMPERATURE: 97.7 F

## 2020-01-01 VITALS
SYSTOLIC BLOOD PRESSURE: 110 MMHG | WEIGHT: 163 LBS | DIASTOLIC BLOOD PRESSURE: 70 MMHG | HEART RATE: 86 BPM | BODY MASS INDEX: 26.2 KG/M2 | HEIGHT: 66 IN

## 2020-01-01 VITALS
HEIGHT: 62 IN | WEIGHT: 161.8 LBS | BODY MASS INDEX: 29.77 KG/M2 | SYSTOLIC BLOOD PRESSURE: 108 MMHG | HEART RATE: 76 BPM | RESPIRATION RATE: 18 BRPM | DIASTOLIC BLOOD PRESSURE: 60 MMHG

## 2020-01-01 VITALS — OXYGEN SATURATION: 90 % | RESPIRATION RATE: 12 BRPM

## 2020-01-01 VITALS
RESPIRATION RATE: 16 BRPM | WEIGHT: 163 LBS | OXYGEN SATURATION: 92 % | TEMPERATURE: 98.2 F | HEART RATE: 70 BPM | SYSTOLIC BLOOD PRESSURE: 90 MMHG | DIASTOLIC BLOOD PRESSURE: 60 MMHG | HEIGHT: 62 IN | BODY MASS INDEX: 30 KG/M2

## 2020-01-01 DIAGNOSIS — U07.1 ACUTE RESPIRATORY FAILURE DUE TO COVID-19 (HCC): Primary | ICD-10-CM

## 2020-01-01 DIAGNOSIS — S43.015A ANTERIOR SHOULDER DISLOCATION, LEFT, INITIAL ENCOUNTER: ICD-10-CM

## 2020-01-01 DIAGNOSIS — J96.00 ACUTE RESPIRATORY FAILURE DUE TO COVID-19 (HCC): Primary | ICD-10-CM

## 2020-01-01 DIAGNOSIS — S22.42XA CLOSED FRACTURE OF MULTIPLE RIBS OF LEFT SIDE, INITIAL ENCOUNTER: ICD-10-CM

## 2020-01-01 DIAGNOSIS — R79.89 ELEVATED LFTS: ICD-10-CM

## 2020-01-01 DIAGNOSIS — D64.9 ANEMIA, UNSPECIFIED TYPE: ICD-10-CM

## 2020-01-01 DIAGNOSIS — T14.90XA TRAUMA: ICD-10-CM

## 2020-01-01 DIAGNOSIS — E87.1 HYPONATREMIA: ICD-10-CM

## 2020-01-01 DIAGNOSIS — J43.2 CENTRILOBULAR EMPHYSEMA (HCC): Chronic | ICD-10-CM

## 2020-01-01 DIAGNOSIS — S00.93XA CONTUSION OF HEAD, INITIAL ENCOUNTER: Primary | ICD-10-CM

## 2020-01-01 LAB
AADO2: 199.1 MMHG
AADO2: 199.1 MMHG
AADO2: 209.4 MMHG
AADO2: 209.9 MMHG
AADO2: 217.5 MMHG
AADO2: 233.3 MMHG
AADO2: 252.7 MMHG
AADO2: 255.8 MMHG
AADO2: 281.9 MMHG
AADO2: 290.7 MMHG
AADO2: 301.2 MMHG
AADO2: 301.8 MMHG
AADO2: 308.4 MMHG
AADO2: 354.9 MMHG
AADO2: 359.4 MMHG
AADO2: 375.4 MMHG
AADO2: 378 MMHG
AADO2: 386.9 MMHG
AADO2: 412.1 MMHG
AADO2: 547.4 MMHG
AADO2: 549.7 MMHG
AADO2: 554.3 MMHG
AADO2: 556.8 MMHG
ABO/RH: NORMAL
ABO/RH: NORMAL
ADENOVIRUS BY PCR: NOT DETECTED
ALBUMIN SERPL-MCNC: 2 G/DL (ref 3.5–5.2)
ALBUMIN SERPL-MCNC: 2.3 G/DL (ref 3.5–5.2)
ALBUMIN SERPL-MCNC: 2.3 G/DL (ref 3.5–5.2)
ALBUMIN SERPL-MCNC: 2.4 G/DL (ref 3.5–5.2)
ALBUMIN SERPL-MCNC: 2.5 G/DL (ref 3.5–5.2)
ALBUMIN SERPL-MCNC: 2.6 G/DL (ref 3.5–5.2)
ALBUMIN SERPL-MCNC: 2.6 G/DL (ref 3.5–5.2)
ALBUMIN SERPL-MCNC: 2.8 G/DL (ref 3.5–5.2)
ALBUMIN SERPL-MCNC: 2.9 G/DL (ref 3.5–5.2)
ALBUMIN SERPL-MCNC: 2.9 G/DL (ref 3.5–5.2)
ALBUMIN SERPL-MCNC: 3.1 G/DL (ref 3.5–5.2)
ALBUMIN SERPL-MCNC: 3.2 G/DL (ref 3.5–5.2)
ALBUMIN SERPL-MCNC: 3.3 G/DL (ref 3.5–5.2)
ALBUMIN SERPL-MCNC: 3.3 G/DL (ref 3.5–5.2)
ALBUMIN SERPL-MCNC: 3.4 G/DL (ref 3.5–5.2)
ALBUMIN SERPL-MCNC: 3.4 G/DL (ref 3.5–5.2)
ALBUMIN SERPL-MCNC: 3.5 G/DL (ref 3.5–5.2)
ALBUMIN SERPL-MCNC: 3.6 G/DL (ref 3.5–5.2)
ALP BLD-CCNC: 100 U/L (ref 35–104)
ALP BLD-CCNC: 104 U/L (ref 35–104)
ALP BLD-CCNC: 113 U/L (ref 35–104)
ALP BLD-CCNC: 114 U/L (ref 35–104)
ALP BLD-CCNC: 127 U/L (ref 35–104)
ALP BLD-CCNC: 56 U/L (ref 35–104)
ALP BLD-CCNC: 68 U/L (ref 35–104)
ALP BLD-CCNC: 71 U/L (ref 35–104)
ALP BLD-CCNC: 71 U/L (ref 35–104)
ALP BLD-CCNC: 75 U/L (ref 35–104)
ALP BLD-CCNC: 76 U/L (ref 35–104)
ALP BLD-CCNC: 78 U/L (ref 35–104)
ALP BLD-CCNC: 79 U/L (ref 35–104)
ALP BLD-CCNC: 80 U/L (ref 35–104)
ALP BLD-CCNC: 83 U/L (ref 35–104)
ALP BLD-CCNC: 85 U/L (ref 35–104)
ALP BLD-CCNC: 86 U/L (ref 35–104)
ALP BLD-CCNC: 87 U/L (ref 35–104)
ALP BLD-CCNC: 91 U/L (ref 35–104)
ALP BLD-CCNC: 98 U/L (ref 35–104)
ALT SERPL-CCNC: 12 U/L (ref 0–32)
ALT SERPL-CCNC: 125 U/L (ref 0–32)
ALT SERPL-CCNC: 14 U/L (ref 0–32)
ALT SERPL-CCNC: 16 U/L (ref 0–32)
ALT SERPL-CCNC: 17 U/L (ref 0–32)
ALT SERPL-CCNC: 18 U/L (ref 0–32)
ALT SERPL-CCNC: 19 U/L (ref 0–32)
ALT SERPL-CCNC: 20 U/L (ref 0–32)
ALT SERPL-CCNC: 20 U/L (ref 0–32)
ALT SERPL-CCNC: 21 U/L (ref 0–32)
ALT SERPL-CCNC: 21 U/L (ref 0–32)
ALT SERPL-CCNC: 22 U/L (ref 0–32)
ALT SERPL-CCNC: 22 U/L (ref 0–32)
ALT SERPL-CCNC: 23 U/L (ref 0–32)
ALT SERPL-CCNC: 25 U/L (ref 0–32)
ALT SERPL-CCNC: 27 U/L (ref 0–32)
ALT SERPL-CCNC: 30 U/L (ref 0–32)
ALT SERPL-CCNC: 33 U/L (ref 0–32)
ALT SERPL-CCNC: 38 U/L (ref 0–32)
ALT SERPL-CCNC: 50 U/L (ref 0–32)
ALT SERPL-CCNC: 90 U/L (ref 0–32)
ALT SERPL-CCNC: 93 U/L (ref 0–32)
AMORPHOUS: ABNORMAL
ANION GAP SERPL CALCULATED.3IONS-SCNC: 12 MMOL/L (ref 7–16)
ANION GAP SERPL CALCULATED.3IONS-SCNC: 12 MMOL/L (ref 7–16)
ANION GAP SERPL CALCULATED.3IONS-SCNC: 2 MMOL/L (ref 7–16)
ANION GAP SERPL CALCULATED.3IONS-SCNC: 3 MMOL/L (ref 7–16)
ANION GAP SERPL CALCULATED.3IONS-SCNC: 4 MMOL/L (ref 7–16)
ANION GAP SERPL CALCULATED.3IONS-SCNC: 5 MMOL/L (ref 7–16)
ANION GAP SERPL CALCULATED.3IONS-SCNC: 6 MMOL/L (ref 7–16)
ANION GAP SERPL CALCULATED.3IONS-SCNC: 7 MMOL/L (ref 7–16)
ANION GAP SERPL CALCULATED.3IONS-SCNC: 8 MMOL/L (ref 7–16)
ANION GAP SERPL CALCULATED.3IONS-SCNC: 9 MMOL/L (ref 7–16)
ANION GAP SERPL CALCULATED.3IONS-SCNC: 9 MMOL/L (ref 7–16)
ANISOCYTOSIS: ABNORMAL
ANTIBODY SCREEN: NORMAL
ANTIBODY SCREEN: NORMAL
APTT: 21.6 SEC (ref 24.5–35.1)
AST SERPL-CCNC: 15 U/L (ref 0–31)
AST SERPL-CCNC: 16 U/L (ref 0–31)
AST SERPL-CCNC: 16 U/L (ref 0–31)
AST SERPL-CCNC: 17 U/L (ref 0–31)
AST SERPL-CCNC: 19 U/L (ref 0–31)
AST SERPL-CCNC: 20 U/L (ref 0–31)
AST SERPL-CCNC: 21 U/L (ref 0–31)
AST SERPL-CCNC: 22 U/L (ref 0–31)
AST SERPL-CCNC: 25 U/L (ref 0–31)
AST SERPL-CCNC: 26 U/L (ref 0–31)
AST SERPL-CCNC: 31 U/L (ref 0–31)
AST SERPL-CCNC: 32 U/L (ref 0–31)
AST SERPL-CCNC: 35 U/L (ref 0–31)
AST SERPL-CCNC: 37 U/L (ref 0–31)
AST SERPL-CCNC: 48 U/L (ref 0–31)
AST SERPL-CCNC: 56 U/L (ref 0–31)
AST SERPL-CCNC: 67 U/L (ref 0–31)
ATYPICAL LYMPHOCYTE RELATIVE PERCENT: 1 % (ref 0–4)
ATYPICAL LYMPHOCYTE RELATIVE PERCENT: 2.6 % (ref 0–4)
ATYPICAL LYMPHOCYTE RELATIVE PERCENT: 2.6 % (ref 0–4)
ATYPICAL LYMPHOCYTE RELATIVE PERCENT: 3 % (ref 0–4)
B.E.: -0.5 MMOL/L (ref -3–3)
B.E.: -1.6 MMOL/L (ref -3–3)
B.E.: -1.6 MMOL/L (ref -3–3)
B.E.: -2.2 MMOL/L (ref -3–3)
B.E.: -2.9 MMOL/L (ref -3–3)
B.E.: -3 MMOL/L (ref -3–3)
B.E.: -3.5 MMOL/L (ref -3–3)
B.E.: -4.6 MMOL/L (ref -3–3)
B.E.: 1.2 MMOL/L (ref -3–3)
B.E.: 1.5 MMOL/L (ref -3–3)
B.E.: 1.7 MMOL/L (ref -3–3)
B.E.: 2 MMOL/L (ref -3–3)
B.E.: 2.2 MMOL/L (ref -3–3)
B.E.: 2.4 MMOL/L (ref -3–3)
B.E.: 2.8 MMOL/L (ref -3–3)
B.E.: 3 MMOL/L (ref -3–3)
B.E.: 3 MMOL/L (ref -3–3)
B.E.: 4 MMOL/L (ref -3–3)
B.E.: 4 MMOL/L (ref -3–3)
B.E.: 4.2 MMOL/L (ref -3–3)
B.E.: 4.5 MMOL/L (ref -3–3)
B.E.: 4.8 MMOL/L (ref -3–3)
B.E.: 5.3 MMOL/L (ref -3–3)
B.E.: 6 MMOL/L (ref -3–3)
B.E.: 8.3 MMOL/L (ref -3–3)
BACTERIA: ABNORMAL /HPF
BASOPHILIC STIPPLING: ABNORMAL
BASOPHILS ABSOLUTE: 0 E9/L (ref 0–0.2)
BASOPHILS ABSOLUTE: 0.01 E9/L (ref 0–0.2)
BASOPHILS ABSOLUTE: 0.02 E9/L (ref 0–0.2)
BASOPHILS ABSOLUTE: 0.03 E9/L (ref 0–0.2)
BASOPHILS ABSOLUTE: 0.04 E9/L (ref 0–0.2)
BASOPHILS ABSOLUTE: 0.05 E9/L (ref 0–0.2)
BASOPHILS ABSOLUTE: 0.06 E9/L (ref 0–0.2)
BASOPHILS ABSOLUTE: 0.06 E9/L (ref 0–0.2)
BASOPHILS RELATIVE PERCENT: 0 % (ref 0–2)
BASOPHILS RELATIVE PERCENT: 0.1 % (ref 0–2)
BASOPHILS RELATIVE PERCENT: 0.2 % (ref 0–2)
BASOPHILS RELATIVE PERCENT: 0.2 % (ref 0–2)
BASOPHILS RELATIVE PERCENT: 0.3 % (ref 0–2)
BASOPHILS RELATIVE PERCENT: 0.4 % (ref 0–2)
BASOPHILS RELATIVE PERCENT: 0.4 % (ref 0–2)
BASOPHILS RELATIVE PERCENT: 0.9 % (ref 0–2)
BILIRUB SERPL-MCNC: 0.3 MG/DL (ref 0–1.2)
BILIRUB SERPL-MCNC: 0.4 MG/DL (ref 0–1.2)
BILIRUB SERPL-MCNC: 0.5 MG/DL (ref 0–1.2)
BILIRUB SERPL-MCNC: 0.6 MG/DL (ref 0–1.2)
BILIRUB SERPL-MCNC: 0.7 MG/DL (ref 0–1.2)
BILIRUBIN URINE: ABNORMAL
BILIRUBIN URINE: NEGATIVE
BLOOD BANK DISPENSE STATUS: NORMAL
BLOOD BANK DISPENSE STATUS: NORMAL
BLOOD BANK PRODUCT CODE: NORMAL
BLOOD BANK PRODUCT CODE: NORMAL
BLOOD CULTURE, ROUTINE: NORMAL
BLOOD CULTURE, ROUTINE: NORMAL
BLOOD, URINE: ABNORMAL
BLOOD, URINE: ABNORMAL
BLOOD, URINE: NEGATIVE
BLOOD, URINE: NEGATIVE
BORDETELLA PARAPERTUSSIS BY PCR: NOT DETECTED
BORDETELLA PERTUSSIS BY PCR: NOT DETECTED
BPU ID: NORMAL
BPU ID: NORMAL
BUN BLDV-MCNC: 10 MG/DL (ref 8–23)
BUN BLDV-MCNC: 11 MG/DL (ref 8–23)
BUN BLDV-MCNC: 12 MG/DL (ref 8–23)
BUN BLDV-MCNC: 13 MG/DL (ref 8–23)
BUN BLDV-MCNC: 14 MG/DL (ref 8–23)
BUN BLDV-MCNC: 14 MG/DL (ref 8–23)
BUN BLDV-MCNC: 15 MG/DL (ref 8–23)
BUN BLDV-MCNC: 16 MG/DL (ref 8–23)
BUN BLDV-MCNC: 17 MG/DL
BUN BLDV-MCNC: 19 MG/DL (ref 8–23)
BUN BLDV-MCNC: 19 MG/DL (ref 8–23)
BUN BLDV-MCNC: 21 MG/DL (ref 8–23)
BUN BLDV-MCNC: 21 MG/DL (ref 8–23)
BUN BLDV-MCNC: 24 MG/DL (ref 8–23)
BUN BLDV-MCNC: 6 MG/DL (ref 8–23)
BUN BLDV-MCNC: 7 MG/DL (ref 8–23)
BUN BLDV-MCNC: 7 MG/DL (ref 8–23)
BUN BLDV-MCNC: 8 MG/DL (ref 8–23)
BUN BLDV-MCNC: 9 MG/DL (ref 8–23)
BURR CELLS: ABNORMAL
C-REACTIVE PROTEIN: 1.9 MG/DL (ref 0–0.4)
C-REACTIVE PROTEIN: 11.3 MG/DL (ref 0–0.4)
CALCIUM SERPL-MCNC: 5.5 MG/DL (ref 8.6–10.2)
CALCIUM SERPL-MCNC: 5.9 MG/DL (ref 8.6–10.2)
CALCIUM SERPL-MCNC: 5.9 MG/DL (ref 8.6–10.2)
CALCIUM SERPL-MCNC: 6 MG/DL (ref 8.6–10.2)
CALCIUM SERPL-MCNC: 6.1 MG/DL (ref 8.6–10.2)
CALCIUM SERPL-MCNC: 6.2 MG/DL (ref 8.6–10.2)
CALCIUM SERPL-MCNC: 6.2 MG/DL (ref 8.6–10.2)
CALCIUM SERPL-MCNC: 6.3 MG/DL (ref 8.6–10.2)
CALCIUM SERPL-MCNC: 6.4 MG/DL (ref 8.6–10.2)
CALCIUM SERPL-MCNC: 6.5 MG/DL (ref 8.6–10.2)
CALCIUM SERPL-MCNC: 6.5 MG/DL (ref 8.6–10.2)
CALCIUM SERPL-MCNC: 6.6 MG/DL (ref 8.6–10.2)
CALCIUM SERPL-MCNC: 6.8 MG/DL (ref 8.6–10.2)
CALCIUM SERPL-MCNC: 6.9 MG/DL (ref 8.6–10.2)
CALCIUM SERPL-MCNC: 7 MG/DL (ref 8.6–10.2)
CALCIUM SERPL-MCNC: 7.2 MG/DL (ref 8.6–10.2)
CALCIUM SERPL-MCNC: 7.3 MG/DL (ref 8.6–10.2)
CALCIUM SERPL-MCNC: 7.4 MG/DL (ref 8.6–10.2)
CALCIUM SERPL-MCNC: 7.4 MG/DL (ref 8.6–10.2)
CALCIUM SERPL-MCNC: 7.5 MG/DL (ref 8.6–10.2)
CALCIUM SERPL-MCNC: 7.6 MG/DL (ref 8.6–10.2)
CALCIUM SERPL-MCNC: 8 MG/DL (ref 8.6–10.2)
CALCIUM SERPL-MCNC: 8.2 MG/DL (ref 8.6–10.2)
CALCIUM SERPL-MCNC: 8.2 MG/DL (ref 8.6–10.2)
CALCIUM SERPL-MCNC: 8.4 MG/DL (ref 8.6–10.2)
CALCIUM SERPL-MCNC: 8.4 MG/DL (ref 8.6–10.2)
CALCIUM SERPL-MCNC: 8.5 MG/DL (ref 8.6–10.2)
CALCIUM SERPL-MCNC: 8.7 MG/DL (ref 8.6–10.2)
CALCIUM SERPL-MCNC: 8.7 MG/DL (ref 8.6–10.2)
CALCIUM SERPL-MCNC: 8.8 MG/DL (ref 8.6–10.2)
CALCIUM SERPL-MCNC: 8.9 MG/DL (ref 8.6–10.2)
CALCIUM SERPL-MCNC: 9.1 MG/DL (ref 8.6–10.2)
CALCIUM SERPL-MCNC: 9.2 MG/DL
CALCIUM SERPL-MCNC: 9.3 MG/DL (ref 8.6–10.2)
CHLAMYDOPHILIA PNEUMONIAE BY PCR: NOT DETECTED
CHLORIDE BLD-SCNC: 100 MMOL/L (ref 98–107)
CHLORIDE BLD-SCNC: 102 MMOL/L (ref 98–107)
CHLORIDE BLD-SCNC: 103 MMOL/L (ref 98–107)
CHLORIDE BLD-SCNC: 104 MMOL/L (ref 98–107)
CHLORIDE BLD-SCNC: 105 MMOL/L
CHLORIDE BLD-SCNC: 106 MMOL/L (ref 98–107)
CHLORIDE BLD-SCNC: 108 MMOL/L (ref 98–107)
CHLORIDE BLD-SCNC: 108 MMOL/L (ref 98–107)
CHLORIDE BLD-SCNC: 110 MMOL/L (ref 98–107)
CHLORIDE BLD-SCNC: 111 MMOL/L (ref 98–107)
CHLORIDE BLD-SCNC: 113 MMOL/L (ref 98–107)
CHLORIDE BLD-SCNC: 115 MMOL/L (ref 98–107)
CHLORIDE BLD-SCNC: 87 MMOL/L (ref 98–107)
CHLORIDE BLD-SCNC: 87 MMOL/L (ref 98–107)
CHLORIDE BLD-SCNC: 88 MMOL/L (ref 98–107)
CHLORIDE BLD-SCNC: 89 MMOL/L (ref 98–107)
CHLORIDE BLD-SCNC: 90 MMOL/L (ref 98–107)
CHLORIDE BLD-SCNC: 90 MMOL/L (ref 98–107)
CHLORIDE BLD-SCNC: 91 MMOL/L (ref 98–107)
CHLORIDE BLD-SCNC: 92 MMOL/L (ref 98–107)
CHLORIDE BLD-SCNC: 93 MMOL/L (ref 98–107)
CHLORIDE BLD-SCNC: 94 MMOL/L (ref 98–107)
CHLORIDE BLD-SCNC: 95 MMOL/L (ref 98–107)
CHLORIDE BLD-SCNC: 96 MMOL/L (ref 98–107)
CHLORIDE BLD-SCNC: 96 MMOL/L (ref 98–107)
CHLORIDE BLD-SCNC: 97 MMOL/L (ref 98–107)
CHLORIDE BLD-SCNC: 98 MMOL/L (ref 98–107)
CHLORIDE BLD-SCNC: 98 MMOL/L (ref 98–107)
CHLORIDE BLD-SCNC: 99 MMOL/L (ref 98–107)
CHLORIDE URINE RANDOM: 112 MMOL/L
CHLORIDE URINE RANDOM: 116 MMOL/L
CHLORIDE URINE RANDOM: 170 MMOL/L
CHLORIDE URINE RANDOM: 22 MMOL/L
CHLORIDE URINE RANDOM: 39 MMOL/L
CHLORIDE URINE RANDOM: 90 MMOL/L
CHP ED QC CHECK: NORMAL
CLARITY: ABNORMAL
CLARITY: ABNORMAL
CLARITY: CLEAR
CLARITY: CLEAR
CO2: 21 MMOL/L (ref 22–29)
CO2: 22 MMOL/L (ref 22–29)
CO2: 22 MMOL/L (ref 22–29)
CO2: 23 MMOL/L (ref 22–29)
CO2: 24 MMOL/L
CO2: 24 MMOL/L (ref 22–29)
CO2: 25 MMOL/L (ref 22–29)
CO2: 25 MMOL/L (ref 22–29)
CO2: 26 MMOL/L (ref 22–29)
CO2: 27 MMOL/L (ref 22–29)
CO2: 28 MMOL/L (ref 22–29)
CO2: 29 MMOL/L (ref 22–29)
CO2: 30 MMOL/L (ref 22–29)
CO2: 31 MMOL/L (ref 22–29)
CO2: 32 MMOL/L (ref 22–29)
CO2: 33 MMOL/L (ref 22–29)
CO2: 34 MMOL/L (ref 22–29)
CO2: 34 MMOL/L (ref 22–29)
CO2: 35 MMOL/L (ref 22–29)
COARSE CASTS, UA: ABNORMAL /LPF (ref 0–2)
COHB: 0.2 % (ref 0–1.5)
COHB: 0.3 % (ref 0–1.5)
COHB: 0.4 % (ref 0–1.5)
COHB: 0.4 % (ref 0–1.5)
COHB: 0.5 % (ref 0–1.5)
COHB: 0.6 % (ref 0–1.5)
COHB: 0.7 % (ref 0–1.5)
COHB: 0.8 % (ref 0–1.5)
COHB: 0.8 % (ref 0–1.5)
COHB: 0.9 % (ref 0–1.5)
COHB: 1 % (ref 0–1.5)
COHB: 1 % (ref 0–1.5)
COHB: 1.2 % (ref 0–1.5)
COHB: 1.3 % (ref 0–1.5)
COHB: 1.3 % (ref 0–1.5)
COHB: 1.4 % (ref 0–1.5)
COLOR: ABNORMAL
COLOR: YELLOW
COMMENT: ABNORMAL
COMMENT: ABNORMAL
CORONAVIRUS 229E BY PCR: NOT DETECTED
CORONAVIRUS HKU1 BY PCR: NOT DETECTED
CORONAVIRUS NL63 BY PCR: NOT DETECTED
CORONAVIRUS OC43 BY PCR: NOT DETECTED
CORTISOL TOTAL: 1.42 MCG/DL (ref 2.68–18.4)
CREAT SERPL-MCNC: 0.3 MG/DL (ref 0.5–1)
CREAT SERPL-MCNC: 0.4 MG/DL (ref 0.5–1)
CREAT SERPL-MCNC: 0.5 MG/DL (ref 0.5–1)
CREAT SERPL-MCNC: 0.6 MG/DL
CREAT SERPL-MCNC: 0.6 MG/DL (ref 0.5–1)
CREATININE URINE: 162 MG/DL (ref 29–226)
CREATININE URINE: 22 MG/DL (ref 29–226)
CREATININE URINE: 67 MG/DL (ref 29–226)
CREATININE URINE: 86 MG/DL (ref 29–226)
CRITICAL: ABNORMAL
CULTURE, BLOOD 2: NORMAL
CULTURE, BLOOD 2: NORMAL
CULTURE, RESPIRATORY: ABNORMAL
CULTURE, RESPIRATORY: ABNORMAL
D DIMER: 1004 NG/ML DDU
D DIMER: 1037 NG/ML DDU
D DIMER: 1330 NG/ML DDU
D DIMER: 2041 NG/ML DDU
D DIMER: 2100 NG/ML DDU
D DIMER: 2403 NG/ML DDU
D DIMER: 2509 NG/ML DDU
D DIMER: 2549 NG/ML DDU
D DIMER: 2598 NG/ML DDU
D DIMER: 3086 NG/ML DDU
D DIMER: 3262 NG/ML DDU
D DIMER: 3496 NG/ML DDU
D DIMER: 3520 NG/ML DDU
D DIMER: 3727 NG/ML DDU
D DIMER: 3839 NG/ML DDU
D DIMER: 405 NG/ML DDU
D DIMER: 440 NG/ML DDU
D DIMER: 553 NG/ML DDU
D DIMER: 659 NG/ML DDU
D DIMER: 692 NG/ML DDU
D DIMER: 695 NG/ML DDU
D DIMER: 765 NG/ML DDU
D DIMER: 819 NG/ML DDU
D DIMER: 866 NG/ML DDU
D DIMER: 874 NG/ML DDU
D DIMER: 887 NG/ML DDU
D DIMER: 983 NG/ML DDU
DATE ANALYZED: ABNORMAL
DATE OF COLLECTION: ABNORMAL
DESCRIPTION BLOOD BANK: NORMAL
DESCRIPTION BLOOD BANK: NORMAL
EKG ATRIAL RATE: 348 BPM
EKG ATRIAL RATE: 67 BPM
EKG ATRIAL RATE: 70 BPM
EKG ATRIAL RATE: 74 BPM
EKG ATRIAL RATE: 87 BPM
EKG P AXIS: 0 DEGREES
EKG P AXIS: 13 DEGREES
EKG P AXIS: 29 DEGREES
EKG P AXIS: 50 DEGREES
EKG P-R INTERVAL: 126 MS
EKG P-R INTERVAL: 134 MS
EKG P-R INTERVAL: 136 MS
EKG P-R INTERVAL: 140 MS
EKG Q-T INTERVAL: 324 MS
EKG Q-T INTERVAL: 386 MS
EKG Q-T INTERVAL: 414 MS
EKG Q-T INTERVAL: 430 MS
EKG Q-T INTERVAL: 444 MS
EKG QRS DURATION: 82 MS
EKG QRS DURATION: 82 MS
EKG QRS DURATION: 86 MS
EKG QRS DURATION: 88 MS
EKG QRS DURATION: 88 MS
EKG QTC CALCULATION (BAZETT): 389 MS
EKG QTC CALCULATION (BAZETT): 454 MS
EKG QTC CALCULATION (BAZETT): 459 MS
EKG QTC CALCULATION (BAZETT): 479 MS
EKG QTC CALCULATION (BAZETT): 519 MS
EKG R AXIS: -11 DEGREES
EKG R AXIS: -12 DEGREES
EKG R AXIS: -13 DEGREES
EKG R AXIS: -18 DEGREES
EKG R AXIS: -26 DEGREES
EKG T AXIS: -159 DEGREES
EKG T AXIS: -22 DEGREES
EKG T AXIS: -32 DEGREES
EKG T AXIS: -63 DEGREES
EKG T AXIS: 124 DEGREES
EKG VENTRICULAR RATE: 109 BPM
EKG VENTRICULAR RATE: 67 BPM
EKG VENTRICULAR RATE: 70 BPM
EKG VENTRICULAR RATE: 74 BPM
EKG VENTRICULAR RATE: 87 BPM
EOSINOPHILS ABSOLUTE: 0 E9/L (ref 0.05–0.5)
EOSINOPHILS ABSOLUTE: 0.01 E9/L (ref 0.05–0.5)
EOSINOPHILS ABSOLUTE: 0.02 E9/L (ref 0.05–0.5)
EOSINOPHILS ABSOLUTE: 0.03 E9/L (ref 0.05–0.5)
EOSINOPHILS ABSOLUTE: 0.04 E9/L (ref 0.05–0.5)
EOSINOPHILS ABSOLUTE: 0.05 E9/L (ref 0.05–0.5)
EOSINOPHILS ABSOLUTE: 0.06 E9/L (ref 0.05–0.5)
EOSINOPHILS ABSOLUTE: 0.08 E9/L (ref 0.05–0.5)
EOSINOPHILS ABSOLUTE: 0.15 E9/L (ref 0.05–0.5)
EOSINOPHILS ABSOLUTE: 0.18 E9/L (ref 0.05–0.5)
EOSINOPHILS ABSOLUTE: 0.42 E9/L (ref 0.05–0.5)
EOSINOPHILS ABSOLUTE: 0.6 E9/L (ref 0.05–0.5)
EOSINOPHILS RELATIVE PERCENT: 0 % (ref 0–6)
EOSINOPHILS RELATIVE PERCENT: 0.1 % (ref 0–6)
EOSINOPHILS RELATIVE PERCENT: 0.3 % (ref 0–6)
EOSINOPHILS RELATIVE PERCENT: 0.3 % (ref 0–6)
EOSINOPHILS RELATIVE PERCENT: 0.4 % (ref 0–6)
EOSINOPHILS RELATIVE PERCENT: 0.6 % (ref 0–6)
EOSINOPHILS RELATIVE PERCENT: 1 % (ref 0–6)
EOSINOPHILS RELATIVE PERCENT: 1.2 % (ref 0–6)
EOSINOPHILS RELATIVE PERCENT: 1.7 % (ref 0–6)
EOSINOPHILS RELATIVE PERCENT: 1.8 % (ref 0–6)
EOSINOPHILS RELATIVE PERCENT: 1.8 % (ref 0–6)
FERRITIN: 349 NG/ML
FERRITIN: 90 NG/ML
FIBRINOGEN: 213 MG/DL (ref 225–540)
FIBRINOGEN: 269 MG/DL (ref 225–540)
FIBRINOGEN: 274 MG/DL (ref 225–540)
FIBRINOGEN: 276 MG/DL (ref 225–540)
FIBRINOGEN: 282 MG/DL (ref 225–540)
FIBRINOGEN: 290 MG/DL (ref 225–540)
FIBRINOGEN: 295 MG/DL (ref 225–540)
FIBRINOGEN: 297 MG/DL (ref 225–540)
FIBRINOGEN: 301 MG/DL (ref 225–540)
FIBRINOGEN: 321 MG/DL (ref 225–540)
FIBRINOGEN: 331 MG/DL (ref 225–540)
FIBRINOGEN: 342 MG/DL (ref 225–540)
FIBRINOGEN: 345 MG/DL (ref 225–540)
FIBRINOGEN: 354 MG/DL (ref 225–540)
FIBRINOGEN: 355 MG/DL (ref 225–540)
FIBRINOGEN: 413 MG/DL (ref 225–540)
FIBRINOGEN: 438 MG/DL (ref 225–540)
FIBRINOGEN: 439 MG/DL (ref 225–540)
FIBRINOGEN: 444 MG/DL (ref 225–540)
FIBRINOGEN: 495 MG/DL (ref 225–540)
FIBRINOGEN: 551 MG/DL (ref 225–540)
FIBRINOGEN: 584 MG/DL (ref 225–540)
FIBRINOGEN: >700 MG/DL (ref 225–540)
FIO2: 100 %
FIO2: 45 %
FIO2: 45 %
FIO2: 50 %
FIO2: 55 %
FIO2: 60 %
FIO2: 70 %
FIO2: 80 %
GFR AFRICAN AMERICAN: >60
GFR CALCULATED: NORMAL
GFR NON-AFRICAN AMERICAN: >60 ML/MIN/1.73
GLUCOSE BLD-MCNC: 109 MG/DL (ref 74–99)
GLUCOSE BLD-MCNC: 113 MG/DL (ref 74–99)
GLUCOSE BLD-MCNC: 134 MG/DL (ref 74–99)
GLUCOSE BLD-MCNC: 142 MG/DL (ref 74–99)
GLUCOSE BLD-MCNC: 142 MG/DL (ref 74–99)
GLUCOSE BLD-MCNC: 143 MG/DL (ref 74–99)
GLUCOSE BLD-MCNC: 144 MG/DL (ref 74–99)
GLUCOSE BLD-MCNC: 145 MG/DL (ref 74–99)
GLUCOSE BLD-MCNC: 146 MG/DL (ref 74–99)
GLUCOSE BLD-MCNC: 148 MG/DL (ref 74–99)
GLUCOSE BLD-MCNC: 153 MG/DL (ref 74–99)
GLUCOSE BLD-MCNC: 153 MG/DL (ref 74–99)
GLUCOSE BLD-MCNC: 159 MG/DL (ref 74–99)
GLUCOSE BLD-MCNC: 160 MG/DL (ref 74–99)
GLUCOSE BLD-MCNC: 161 MG/DL (ref 74–99)
GLUCOSE BLD-MCNC: 163 MG/DL (ref 74–99)
GLUCOSE BLD-MCNC: 166 MG/DL (ref 74–99)
GLUCOSE BLD-MCNC: 169 MG/DL (ref 74–99)
GLUCOSE BLD-MCNC: 170 MG/DL (ref 74–99)
GLUCOSE BLD-MCNC: 175 MG/DL (ref 74–99)
GLUCOSE BLD-MCNC: 177 MG/DL (ref 74–99)
GLUCOSE BLD-MCNC: 183 MG/DL (ref 74–99)
GLUCOSE BLD-MCNC: 184 MG/DL (ref 74–99)
GLUCOSE BLD-MCNC: 190 MG/DL (ref 74–99)
GLUCOSE BLD-MCNC: 196 MG/DL (ref 74–99)
GLUCOSE BLD-MCNC: 196 MG/DL (ref 74–99)
GLUCOSE BLD-MCNC: 198 MG/DL (ref 74–99)
GLUCOSE BLD-MCNC: 203 MG/DL (ref 74–99)
GLUCOSE BLD-MCNC: 208 MG/DL (ref 74–99)
GLUCOSE BLD-MCNC: 212 MG/DL (ref 74–99)
GLUCOSE BLD-MCNC: 212 MG/DL (ref 74–99)
GLUCOSE BLD-MCNC: 214 MG/DL (ref 74–99)
GLUCOSE BLD-MCNC: 214 MG/DL (ref 74–99)
GLUCOSE BLD-MCNC: 216 MG/DL (ref 74–99)
GLUCOSE BLD-MCNC: 217 MG/DL (ref 74–99)
GLUCOSE BLD-MCNC: 220 MG/DL (ref 74–99)
GLUCOSE BLD-MCNC: 224 MG/DL (ref 74–99)
GLUCOSE BLD-MCNC: 225 MG/DL (ref 74–99)
GLUCOSE BLD-MCNC: 227 MG/DL (ref 74–99)
GLUCOSE BLD-MCNC: 234 MG/DL (ref 74–99)
GLUCOSE BLD-MCNC: 236 MG/DL (ref 74–99)
GLUCOSE BLD-MCNC: 237 MG/DL (ref 74–99)
GLUCOSE BLD-MCNC: 238 MG/DL (ref 74–99)
GLUCOSE BLD-MCNC: 240 MG/DL (ref 74–99)
GLUCOSE BLD-MCNC: 244 MG/DL (ref 74–99)
GLUCOSE BLD-MCNC: 252 MG/DL (ref 74–99)
GLUCOSE BLD-MCNC: 263 MG/DL (ref 74–99)
GLUCOSE BLD-MCNC: 271 MG/DL (ref 74–99)
GLUCOSE BLD-MCNC: 278 MG/DL (ref 74–99)
GLUCOSE BLD-MCNC: 279 MG/DL (ref 74–99)
GLUCOSE BLD-MCNC: 283 MG/DL (ref 74–99)
GLUCOSE BLD-MCNC: 300 MG/DL (ref 74–99)
GLUCOSE BLD-MCNC: 306 MG/DL (ref 74–99)
GLUCOSE BLD-MCNC: 308 MG/DL (ref 74–99)
GLUCOSE BLD-MCNC: 310 MG/DL (ref 74–99)
GLUCOSE BLD-MCNC: 76 MG/DL (ref 74–99)
GLUCOSE BLD-MCNC: 91 MG/DL (ref 74–99)
GLUCOSE BLD-MCNC: 98 MG/DL
GLUCOSE URINE: 100 MG/DL
GLUCOSE URINE: 250 MG/DL
GLUCOSE URINE: NEGATIVE MG/DL
GLUCOSE URINE: NEGATIVE MG/DL
HCO3: 21.7 MMOL/L (ref 22–26)
HCO3: 22 MMOL/L (ref 22–26)
HCO3: 22.5 MMOL/L (ref 22–26)
HCO3: 23.6 MMOL/L (ref 22–26)
HCO3: 23.7 MMOL/L (ref 22–26)
HCO3: 24.7 MMOL/L (ref 22–26)
HCO3: 25.9 MMOL/L (ref 22–26)
HCO3: 26.3 MMOL/L (ref 22–26)
HCO3: 26.6 MMOL/L (ref 22–26)
HCO3: 26.9 MMOL/L (ref 22–26)
HCO3: 27.4 MMOL/L (ref 22–26)
HCO3: 27.5 MMOL/L (ref 22–26)
HCO3: 27.8 MMOL/L (ref 22–26)
HCO3: 28.2 MMOL/L (ref 22–26)
HCO3: 28.4 MMOL/L (ref 22–26)
HCO3: 28.6 MMOL/L (ref 22–26)
HCO3: 28.8 MMOL/L (ref 22–26)
HCO3: 28.9 MMOL/L (ref 22–26)
HCO3: 29 MMOL/L (ref 22–26)
HCO3: 29.5 MMOL/L (ref 22–26)
HCO3: 30.1 MMOL/L (ref 22–26)
HCO3: 30.6 MMOL/L (ref 22–26)
HCO3: 34.8 MMOL/L (ref 22–26)
HCT VFR BLD CALC: 21.1 % (ref 34–48)
HCT VFR BLD CALC: 26 % (ref 34–48)
HCT VFR BLD CALC: 26.4 % (ref 34–48)
HCT VFR BLD CALC: 27.1 % (ref 34–48)
HCT VFR BLD CALC: 27.2 % (ref 34–48)
HCT VFR BLD CALC: 27.6 % (ref 34–48)
HCT VFR BLD CALC: 28.1 % (ref 34–48)
HCT VFR BLD CALC: 28.7 % (ref 34–48)
HCT VFR BLD CALC: 29.1 % (ref 34–48)
HCT VFR BLD CALC: 29.7 % (ref 34–48)
HCT VFR BLD CALC: 30.6 % (ref 34–48)
HCT VFR BLD CALC: 30.9 % (ref 34–48)
HCT VFR BLD CALC: 31.1 % (ref 34–48)
HCT VFR BLD CALC: 31.2 % (ref 34–48)
HCT VFR BLD CALC: 32.2 % (ref 34–48)
HCT VFR BLD CALC: 32.2 % (ref 34–48)
HCT VFR BLD CALC: 32.7 % (ref 34–48)
HCT VFR BLD CALC: 32.9 % (ref 34–48)
HCT VFR BLD CALC: 32.9 % (ref 34–48)
HCT VFR BLD CALC: 33.2 % (ref 34–48)
HCT VFR BLD CALC: 33.5 % (ref 34–48)
HCT VFR BLD CALC: 33.6 % (ref 34–48)
HCT VFR BLD CALC: 33.6 % (ref 34–48)
HCT VFR BLD CALC: 33.7 % (ref 34–48)
HCT VFR BLD CALC: 34.2 % (ref 34–48)
HCT VFR BLD CALC: 34.4 % (ref 34–48)
HCT VFR BLD CALC: 34.5 % (ref 34–48)
HCT VFR BLD CALC: 35 % (ref 34–48)
HCT VFR BLD CALC: 36.7 % (ref 34–48)
HCT VFR BLD CALC: 37.7 % (ref 34–48)
HCT VFR BLD CALC: 37.8 % (ref 34–48)
HCT VFR BLD CALC: 37.9 % (ref 34–48)
HCT VFR BLD CALC: 44.3 % (ref 34–48)
HEMOCCULT STL QL: NORMAL
HEMOGLOBIN: 10 G/DL (ref 11.5–15.5)
HEMOGLOBIN: 10 G/DL (ref 11.5–15.5)
HEMOGLOBIN: 10.1 G/DL (ref 11.5–15.5)
HEMOGLOBIN: 10.3 G/DL (ref 11.5–15.5)
HEMOGLOBIN: 10.4 G/DL (ref 11.5–15.5)
HEMOGLOBIN: 10.6 G/DL (ref 11.5–15.5)
HEMOGLOBIN: 10.6 G/DL (ref 11.5–15.5)
HEMOGLOBIN: 10.7 G/DL (ref 11.5–15.5)
HEMOGLOBIN: 10.7 G/DL (ref 11.5–15.5)
HEMOGLOBIN: 11 G/DL (ref 11.5–15.5)
HEMOGLOBIN: 11 G/DL (ref 11.5–15.5)
HEMOGLOBIN: 11.1 G/DL (ref 11.5–15.5)
HEMOGLOBIN: 11.8 G/DL (ref 11.5–15.5)
HEMOGLOBIN: 12 G/DL (ref 11.5–15.5)
HEMOGLOBIN: 14.3 G/DL (ref 11.5–15.5)
HEMOGLOBIN: 6.8 G/DL (ref 11.5–15.5)
HEMOGLOBIN: 8.1 G/DL (ref 11.5–15.5)
HEMOGLOBIN: 8.5 G/DL (ref 11.5–15.5)
HEMOGLOBIN: 8.6 G/DL (ref 11.5–15.5)
HEMOGLOBIN: 8.6 G/DL (ref 11.5–15.5)
HEMOGLOBIN: 8.8 G/DL (ref 11.5–15.5)
HEMOGLOBIN: 9 G/DL (ref 11.5–15.5)
HEMOGLOBIN: 9.1 G/DL (ref 11.5–15.5)
HEMOGLOBIN: 9.3 G/DL (ref 11.5–15.5)
HEMOGLOBIN: 9.4 G/DL (ref 11.5–15.5)
HEMOGLOBIN: 9.4 G/DL (ref 11.5–15.5)
HEMOGLOBIN: 9.5 G/DL (ref 11.5–15.5)
HEMOGLOBIN: 9.7 G/DL (ref 11.5–15.5)
HHB: 1.1 % (ref 0–5)
HHB: 1.9 % (ref 0–5)
HHB: 11.4 % (ref 0–5)
HHB: 11.8 % (ref 0–5)
HHB: 13 % (ref 0–5)
HHB: 2.7 % (ref 0–5)
HHB: 3.5 % (ref 0–5)
HHB: 3.6 % (ref 0–5)
HHB: 3.7 % (ref 0–5)
HHB: 3.9 % (ref 0–5)
HHB: 37.4 % (ref 0–5)
HHB: 4.1 % (ref 0–5)
HHB: 4.6 % (ref 0–5)
HHB: 5.4 % (ref 0–5)
HHB: 5.6 % (ref 0–5)
HHB: 6.4 % (ref 0–5)
HHB: 6.7 % (ref 0–5)
HHB: 7.5 % (ref 0–5)
HHB: 7.7 % (ref 0–5)
HHB: 7.7 % (ref 0–5)
HHB: 7.9 % (ref 0–5)
HHB: 8 % (ref 0–5)
HHB: 8.5 % (ref 0–5)
HHB: 8.7 % (ref 0–5)
HHB: 9.5 % (ref 0–5)
HUMAN METAPNEUMOVIRUS BY PCR: NOT DETECTED
HUMAN RHINOVIRUS/ENTEROVIRUS BY PCR: NOT DETECTED
HYPOCHROMIA: ABNORMAL
IMMATURE GRANULOCYTES #: 0.09 E9/L
IMMATURE GRANULOCYTES #: 0.13 E9/L
IMMATURE GRANULOCYTES #: 0.13 E9/L
IMMATURE GRANULOCYTES #: 0.14 E9/L
IMMATURE GRANULOCYTES #: 0.14 E9/L
IMMATURE GRANULOCYTES #: 0.15 E9/L
IMMATURE GRANULOCYTES #: 0.18 E9/L
IMMATURE GRANULOCYTES #: 0.2 E9/L
IMMATURE GRANULOCYTES #: 0.21 E9/L
IMMATURE GRANULOCYTES #: 0.25 E9/L
IMMATURE GRANULOCYTES #: 0.26 E9/L
IMMATURE GRANULOCYTES #: 0.28 E9/L
IMMATURE GRANULOCYTES #: 0.29 E9/L
IMMATURE GRANULOCYTES #: 0.35 E9/L
IMMATURE GRANULOCYTES #: 0.4 E9/L
IMMATURE GRANULOCYTES #: 0.53 E9/L
IMMATURE GRANULOCYTES #: 0.79 E9/L
IMMATURE GRANULOCYTES %: 0.8 % (ref 0–5)
IMMATURE GRANULOCYTES %: 0.9 % (ref 0–5)
IMMATURE GRANULOCYTES %: 1.3 % (ref 0–5)
IMMATURE GRANULOCYTES %: 1.3 % (ref 0–5)
IMMATURE GRANULOCYTES %: 1.4 % (ref 0–5)
IMMATURE GRANULOCYTES %: 1.9 % (ref 0–5)
IMMATURE GRANULOCYTES %: 2.2 % (ref 0–5)
IMMATURE GRANULOCYTES %: 2.2 % (ref 0–5)
IMMATURE GRANULOCYTES %: 2.6 % (ref 0–5)
IMMATURE GRANULOCYTES %: 2.7 % (ref 0–5)
IMMATURE GRANULOCYTES %: 3.1 % (ref 0–5)
IMMATURE GRANULOCYTES %: 3.1 % (ref 0–5)
IMMATURE GRANULOCYTES %: 4.4 % (ref 0–5)
IMMATURE GRANULOCYTES %: 4.5 % (ref 0–5)
IMMATURE GRANULOCYTES %: 4.5 % (ref 0–5)
INFLUENZA A BY PCR: NOT DETECTED
INFLUENZA B BY PCR: NOT DETECTED
INR BLD: 1
INR BLD: 1
KETONES, URINE: ABNORMAL MG/DL
KETONES, URINE: NEGATIVE MG/DL
L. PNEUMOPHILA SEROGP 1 UR AG: NORMAL
LAB: ABNORMAL
LACTATE DEHYDROGENASE: 288 U/L (ref 135–214)
LACTATE DEHYDROGENASE: 410 U/L (ref 135–214)
LACTIC ACID, SEPSIS: 0.9 MMOL/L (ref 0.5–1.9)
LACTIC ACID, SEPSIS: 1.6 MMOL/L (ref 0.5–1.9)
LACTIC ACID: 0.6 MMOL/L (ref 0.5–2.2)
LACTIC ACID: 0.7 MMOL/L (ref 0.5–2.2)
LACTIC ACID: 0.7 MMOL/L (ref 0.5–2.2)
LACTIC ACID: 0.8 MMOL/L (ref 0.5–2.2)
LACTIC ACID: 0.9 MMOL/L (ref 0.5–2.2)
LACTIC ACID: 1 MMOL/L (ref 0.5–2.2)
LACTIC ACID: 1.2 MMOL/L (ref 0.5–2.2)
LACTIC ACID: 1.2 MMOL/L (ref 0.5–2.2)
LACTIC ACID: 1.4 MMOL/L (ref 0.5–2.2)
LACTIC ACID: 1.4 MMOL/L (ref 0.5–2.2)
LACTIC ACID: 1.9 MMOL/L (ref 0.5–2.2)
LACTIC ACID: 2 MMOL/L (ref 0.5–2.2)
LEUKOCYTE ESTERASE, URINE: NEGATIVE
LV EF: 60 %
LV EF: 63 %
LVEF MODALITY: NORMAL
LVEF MODALITY: NORMAL
LYMPHOCYTES ABSOLUTE: 0.25 E9/L (ref 1.5–4)
LYMPHOCYTES ABSOLUTE: 0.38 E9/L (ref 1.5–4)
LYMPHOCYTES ABSOLUTE: 0.38 E9/L (ref 1.5–4)
LYMPHOCYTES ABSOLUTE: 0.44 E9/L (ref 1.5–4)
LYMPHOCYTES ABSOLUTE: 0.49 E9/L (ref 1.5–4)
LYMPHOCYTES ABSOLUTE: 0.5 E9/L (ref 1.5–4)
LYMPHOCYTES ABSOLUTE: 0.5 E9/L (ref 1.5–4)
LYMPHOCYTES ABSOLUTE: 0.59 E9/L (ref 1.5–4)
LYMPHOCYTES ABSOLUTE: 0.61 E9/L (ref 1.5–4)
LYMPHOCYTES ABSOLUTE: 0.65 E9/L (ref 1.5–4)
LYMPHOCYTES ABSOLUTE: 0.65 E9/L (ref 1.5–4)
LYMPHOCYTES ABSOLUTE: 0.69 E9/L (ref 1.5–4)
LYMPHOCYTES ABSOLUTE: 0.75 E9/L (ref 1.5–4)
LYMPHOCYTES ABSOLUTE: 0.75 E9/L (ref 1.5–4)
LYMPHOCYTES ABSOLUTE: 0.76 E9/L (ref 1.5–4)
LYMPHOCYTES ABSOLUTE: 0.79 E9/L (ref 1.5–4)
LYMPHOCYTES ABSOLUTE: 0.8 E9/L (ref 1.5–4)
LYMPHOCYTES ABSOLUTE: 0.88 E9/L (ref 1.5–4)
LYMPHOCYTES ABSOLUTE: 0.91 E9/L (ref 1.5–4)
LYMPHOCYTES ABSOLUTE: 1.08 E9/L (ref 1.5–4)
LYMPHOCYTES ABSOLUTE: 1.23 E9/L (ref 1.5–4)
LYMPHOCYTES ABSOLUTE: 1.26 E9/L (ref 1.5–4)
LYMPHOCYTES ABSOLUTE: 1.46 E9/L (ref 1.5–4)
LYMPHOCYTES ABSOLUTE: 1.51 E9/L (ref 1.5–4)
LYMPHOCYTES ABSOLUTE: 1.58 E9/L (ref 1.5–4)
LYMPHOCYTES ABSOLUTE: 1.94 E9/L (ref 1.5–4)
LYMPHOCYTES ABSOLUTE: 2.47 E9/L (ref 1.5–4)
LYMPHOCYTES ABSOLUTE: 3.98 E9/L (ref 1.5–4)
LYMPHOCYTES RELATIVE PERCENT: 10.2 % (ref 20–42)
LYMPHOCYTES RELATIVE PERCENT: 10.2 % (ref 20–42)
LYMPHOCYTES RELATIVE PERCENT: 10.5 % (ref 20–42)
LYMPHOCYTES RELATIVE PERCENT: 11.4 % (ref 20–42)
LYMPHOCYTES RELATIVE PERCENT: 15 % (ref 20–42)
LYMPHOCYTES RELATIVE PERCENT: 3.3 % (ref 20–42)
LYMPHOCYTES RELATIVE PERCENT: 3.5 % (ref 20–42)
LYMPHOCYTES RELATIVE PERCENT: 3.7 % (ref 20–42)
LYMPHOCYTES RELATIVE PERCENT: 3.9 % (ref 20–42)
LYMPHOCYTES RELATIVE PERCENT: 4.2 % (ref 20–42)
LYMPHOCYTES RELATIVE PERCENT: 4.4 % (ref 20–42)
LYMPHOCYTES RELATIVE PERCENT: 4.7 % (ref 20–42)
LYMPHOCYTES RELATIVE PERCENT: 5 % (ref 20–42)
LYMPHOCYTES RELATIVE PERCENT: 5.6 % (ref 20–42)
LYMPHOCYTES RELATIVE PERCENT: 5.7 % (ref 20–42)
LYMPHOCYTES RELATIVE PERCENT: 5.8 % (ref 20–42)
LYMPHOCYTES RELATIVE PERCENT: 6 % (ref 20–42)
LYMPHOCYTES RELATIVE PERCENT: 6 % (ref 20–42)
LYMPHOCYTES RELATIVE PERCENT: 6.9 % (ref 20–42)
LYMPHOCYTES RELATIVE PERCENT: 6.9 % (ref 20–42)
LYMPHOCYTES RELATIVE PERCENT: 7 % (ref 20–42)
LYMPHOCYTES RELATIVE PERCENT: 7 % (ref 20–42)
LYMPHOCYTES RELATIVE PERCENT: 8 % (ref 20–42)
LYMPHOCYTES RELATIVE PERCENT: 8.4 % (ref 20–42)
LYMPHOCYTES RELATIVE PERCENT: 9.6 % (ref 20–42)
LYMPHOCYTES RELATIVE PERCENT: 9.9 % (ref 20–42)
Lab: ABNORMAL
MAGNESIUM: 1.5 MG/DL (ref 1.6–2.6)
MAGNESIUM: 1.8 MG/DL (ref 1.6–2.6)
MAGNESIUM: 1.9 MG/DL (ref 1.6–2.6)
MAGNESIUM: 2 MG/DL
MAGNESIUM: 2 MG/DL (ref 1.6–2.6)
MAGNESIUM: 2.1 MG/DL (ref 1.6–2.6)
MAGNESIUM: 2.3 MG/DL (ref 1.6–2.6)
MCH RBC QN AUTO: 30.1 PG (ref 26–35)
MCH RBC QN AUTO: 30.2 PG (ref 26–35)
MCH RBC QN AUTO: 30.3 PG (ref 26–35)
MCH RBC QN AUTO: 30.4 PG (ref 26–35)
MCH RBC QN AUTO: 30.4 PG (ref 26–35)
MCH RBC QN AUTO: 30.6 PG (ref 26–35)
MCH RBC QN AUTO: 30.7 PG (ref 26–35)
MCH RBC QN AUTO: 30.8 PG (ref 26–35)
MCH RBC QN AUTO: 30.9 PG (ref 26–35)
MCH RBC QN AUTO: 31.1 PG (ref 26–35)
MCH RBC QN AUTO: 31.1 PG (ref 26–35)
MCH RBC QN AUTO: 31.2 PG (ref 26–35)
MCH RBC QN AUTO: 31.2 PG (ref 26–35)
MCH RBC QN AUTO: 31.3 PG (ref 26–35)
MCH RBC QN AUTO: 31.4 PG (ref 26–35)
MCH RBC QN AUTO: 31.5 PG (ref 26–35)
MCH RBC QN AUTO: 31.9 PG (ref 26–35)
MCHC RBC AUTO-ENTMCNC: 29.7 % (ref 32–34.5)
MCHC RBC AUTO-ENTMCNC: 29.9 % (ref 32–34.5)
MCHC RBC AUTO-ENTMCNC: 29.9 % (ref 32–34.5)
MCHC RBC AUTO-ENTMCNC: 30.1 % (ref 32–34.5)
MCHC RBC AUTO-ENTMCNC: 30.1 % (ref 32–34.5)
MCHC RBC AUTO-ENTMCNC: 30.4 % (ref 32–34.5)
MCHC RBC AUTO-ENTMCNC: 30.6 % (ref 32–34.5)
MCHC RBC AUTO-ENTMCNC: 30.7 % (ref 32–34.5)
MCHC RBC AUTO-ENTMCNC: 30.9 % (ref 32–34.5)
MCHC RBC AUTO-ENTMCNC: 31.1 % (ref 32–34.5)
MCHC RBC AUTO-ENTMCNC: 31.3 % (ref 32–34.5)
MCHC RBC AUTO-ENTMCNC: 31.4 % (ref 32–34.5)
MCHC RBC AUTO-ENTMCNC: 31.5 % (ref 32–34.5)
MCHC RBC AUTO-ENTMCNC: 31.6 % (ref 32–34.5)
MCHC RBC AUTO-ENTMCNC: 31.6 % (ref 32–34.5)
MCHC RBC AUTO-ENTMCNC: 31.7 % (ref 32–34.5)
MCHC RBC AUTO-ENTMCNC: 31.7 % (ref 32–34.5)
MCHC RBC AUTO-ENTMCNC: 31.8 % (ref 32–34.5)
MCHC RBC AUTO-ENTMCNC: 31.8 % (ref 32–34.5)
MCHC RBC AUTO-ENTMCNC: 31.9 % (ref 32–34.5)
MCHC RBC AUTO-ENTMCNC: 32.2 % (ref 32–34.5)
MCHC RBC AUTO-ENTMCNC: 32.3 % (ref 32–34.5)
MCHC RBC AUTO-ENTMCNC: 32.3 % (ref 32–34.5)
MCHC RBC AUTO-ENTMCNC: 32.4 % (ref 32–34.5)
MCHC RBC AUTO-ENTMCNC: 32.6 % (ref 32–34.5)
MCHC RBC AUTO-ENTMCNC: 32.7 % (ref 32–34.5)
MCHC RBC AUTO-ENTMCNC: 32.8 % (ref 32–34.5)
MCV RBC AUTO: 100 FL (ref 80–99.9)
MCV RBC AUTO: 100.3 FL (ref 80–99.9)
MCV RBC AUTO: 100.6 FL (ref 80–99.9)
MCV RBC AUTO: 100.6 FL (ref 80–99.9)
MCV RBC AUTO: 100.9 FL (ref 80–99.9)
MCV RBC AUTO: 101.1 FL (ref 80–99.9)
MCV RBC AUTO: 101.7 FL (ref 80–99.9)
MCV RBC AUTO: 102.2 FL (ref 80–99.9)
MCV RBC AUTO: 102.8 FL (ref 80–99.9)
MCV RBC AUTO: 92.5 FL (ref 80–99.9)
MCV RBC AUTO: 93.5 FL (ref 80–99.9)
MCV RBC AUTO: 94 FL (ref 80–99.9)
MCV RBC AUTO: 94 FL (ref 80–99.9)
MCV RBC AUTO: 96 FL (ref 80–99.9)
MCV RBC AUTO: 96.1 FL (ref 80–99.9)
MCV RBC AUTO: 96.6 FL (ref 80–99.9)
MCV RBC AUTO: 96.6 FL (ref 80–99.9)
MCV RBC AUTO: 96.9 FL (ref 80–99.9)
MCV RBC AUTO: 97.2 FL (ref 80–99.9)
MCV RBC AUTO: 97.3 FL (ref 80–99.9)
MCV RBC AUTO: 97.4 FL (ref 80–99.9)
MCV RBC AUTO: 97.7 FL (ref 80–99.9)
MCV RBC AUTO: 98 FL (ref 80–99.9)
MCV RBC AUTO: 98.5 FL (ref 80–99.9)
MCV RBC AUTO: 98.6 FL (ref 80–99.9)
MCV RBC AUTO: 98.9 FL (ref 80–99.9)
MCV RBC AUTO: 99.1 FL (ref 80–99.9)
MCV RBC AUTO: 99.4 FL (ref 80–99.9)
MCV RBC AUTO: 99.7 FL (ref 80–99.9)
METAMYELOCYTES RELATIVE PERCENT: 0.9 % (ref 0–1)
METAMYELOCYTES RELATIVE PERCENT: 0.9 % (ref 0–1)
METAMYELOCYTES RELATIVE PERCENT: 1 % (ref 0–1)
METAMYELOCYTES RELATIVE PERCENT: 1.7 % (ref 0–1)
METAMYELOCYTES RELATIVE PERCENT: 2 % (ref 0–1)
METAMYELOCYTES RELATIVE PERCENT: 2.6 % (ref 0–1)
METAMYELOCYTES RELATIVE PERCENT: 2.6 % (ref 0–1)
METAMYELOCYTES RELATIVE PERCENT: 3.5 % (ref 0–1)
METER GLUCOSE: 106 MG/DL (ref 74–99)
METER GLUCOSE: 108 MG/DL (ref 74–99)
METER GLUCOSE: 112 MG/DL (ref 74–99)
METER GLUCOSE: 117 MG/DL (ref 74–99)
METER GLUCOSE: 120 MG/DL (ref 74–99)
METER GLUCOSE: 123 MG/DL (ref 74–99)
METER GLUCOSE: 126 MG/DL (ref 74–99)
METER GLUCOSE: 130 MG/DL (ref 74–99)
METER GLUCOSE: 132 MG/DL (ref 74–99)
METER GLUCOSE: 137 MG/DL (ref 74–99)
METER GLUCOSE: 137 MG/DL (ref 74–99)
METER GLUCOSE: 140 MG/DL (ref 74–99)
METER GLUCOSE: 141 MG/DL (ref 74–99)
METER GLUCOSE: 143 MG/DL (ref 74–99)
METER GLUCOSE: 143 MG/DL (ref 74–99)
METER GLUCOSE: 144 MG/DL (ref 74–99)
METER GLUCOSE: 145 MG/DL (ref 74–99)
METER GLUCOSE: 146 MG/DL (ref 74–99)
METER GLUCOSE: 152 MG/DL (ref 74–99)
METER GLUCOSE: 154 MG/DL (ref 74–99)
METER GLUCOSE: 162 MG/DL (ref 74–99)
METER GLUCOSE: 165 MG/DL (ref 74–99)
METER GLUCOSE: 166 MG/DL (ref 74–99)
METER GLUCOSE: 169 MG/DL (ref 74–99)
METER GLUCOSE: 170 MG/DL (ref 74–99)
METER GLUCOSE: 170 MG/DL (ref 74–99)
METER GLUCOSE: 171 MG/DL (ref 74–99)
METER GLUCOSE: 174 MG/DL (ref 74–99)
METER GLUCOSE: 174 MG/DL (ref 74–99)
METER GLUCOSE: 181 MG/DL (ref 74–99)
METER GLUCOSE: 183 MG/DL (ref 74–99)
METER GLUCOSE: 187 MG/DL (ref 74–99)
METER GLUCOSE: 187 MG/DL (ref 74–99)
METER GLUCOSE: 188 MG/DL (ref 74–99)
METER GLUCOSE: 190 MG/DL (ref 74–99)
METER GLUCOSE: 194 MG/DL (ref 74–99)
METER GLUCOSE: 195 MG/DL (ref 74–99)
METER GLUCOSE: 196 MG/DL (ref 74–99)
METER GLUCOSE: 198 MG/DL (ref 74–99)
METER GLUCOSE: 209 MG/DL (ref 74–99)
METER GLUCOSE: 209 MG/DL (ref 74–99)
METER GLUCOSE: 212 MG/DL (ref 74–99)
METER GLUCOSE: 213 MG/DL (ref 74–99)
METER GLUCOSE: 214 MG/DL (ref 74–99)
METER GLUCOSE: 215 MG/DL (ref 74–99)
METER GLUCOSE: 216 MG/DL (ref 74–99)
METER GLUCOSE: 232 MG/DL (ref 74–99)
METER GLUCOSE: 243 MG/DL (ref 74–99)
METER GLUCOSE: 255 MG/DL (ref 74–99)
METER GLUCOSE: 259 MG/DL (ref 74–99)
METER GLUCOSE: 268 MG/DL (ref 74–99)
METER GLUCOSE: 269 MG/DL (ref 74–99)
METER GLUCOSE: 270 MG/DL (ref 74–99)
METER GLUCOSE: 272 MG/DL (ref 74–99)
METER GLUCOSE: 303 MG/DL (ref 74–99)
METER GLUCOSE: 55 MG/DL (ref 74–99)
METER GLUCOSE: 56 MG/DL (ref 74–99)
METER GLUCOSE: 89 MG/DL (ref 74–99)
METER GLUCOSE: 90 MG/DL (ref 74–99)
METER GLUCOSE: 92 MG/DL (ref 74–99)
METHB: 0 % (ref 0–1.5)
METHB: 0.1 % (ref 0–1.5)
METHB: 0.2 % (ref 0–1.5)
METHB: 0.2 % (ref 0–1.5)
METHB: 0.3 % (ref 0–1.5)
MODE: ABNORMAL
MODE: AC
MONOCYTES ABSOLUTE: 0 E9/L (ref 0.1–0.95)
MONOCYTES ABSOLUTE: 0.06 E9/L (ref 0.1–0.95)
MONOCYTES ABSOLUTE: 0.11 E9/L (ref 0.1–0.95)
MONOCYTES ABSOLUTE: 0.11 E9/L (ref 0.1–0.95)
MONOCYTES ABSOLUTE: 0.14 E9/L (ref 0.1–0.95)
MONOCYTES ABSOLUTE: 0.19 E9/L (ref 0.1–0.95)
MONOCYTES ABSOLUTE: 0.19 E9/L (ref 0.1–0.95)
MONOCYTES ABSOLUTE: 0.2 E9/L (ref 0.1–0.95)
MONOCYTES ABSOLUTE: 0.22 E9/L (ref 0.1–0.95)
MONOCYTES ABSOLUTE: 0.22 E9/L (ref 0.1–0.95)
MONOCYTES ABSOLUTE: 0.24 E9/L (ref 0.1–0.95)
MONOCYTES ABSOLUTE: 0.24 E9/L (ref 0.1–0.95)
MONOCYTES ABSOLUTE: 0.26 E9/L (ref 0.1–0.95)
MONOCYTES ABSOLUTE: 0.28 E9/L (ref 0.1–0.95)
MONOCYTES ABSOLUTE: 0.31 E9/L (ref 0.1–0.95)
MONOCYTES ABSOLUTE: 0.35 E9/L (ref 0.1–0.95)
MONOCYTES ABSOLUTE: 0.35 E9/L (ref 0.1–0.95)
MONOCYTES ABSOLUTE: 0.36 E9/L (ref 0.1–0.95)
MONOCYTES ABSOLUTE: 0.36 E9/L (ref 0.1–0.95)
MONOCYTES ABSOLUTE: 0.38 E9/L (ref 0.1–0.95)
MONOCYTES ABSOLUTE: 0.38 E9/L (ref 0.1–0.95)
MONOCYTES ABSOLUTE: 0.4 E9/L (ref 0.1–0.95)
MONOCYTES ABSOLUTE: 0.4 E9/L (ref 0.1–0.95)
MONOCYTES ABSOLUTE: 0.57 E9/L (ref 0.1–0.95)
MONOCYTES ABSOLUTE: 0.66 E9/L (ref 0.1–0.95)
MONOCYTES ABSOLUTE: 0.69 E9/L (ref 0.1–0.95)
MONOCYTES ABSOLUTE: 0.91 E9/L (ref 0.1–0.95)
MONOCYTES ABSOLUTE: 0.99 E9/L (ref 0.1–0.95)
MONOCYTES ABSOLUTE: 1.22 E9/L (ref 0.1–0.95)
MONOCYTES ABSOLUTE: 1.66 E9/L (ref 0.1–0.95)
MONOCYTES RELATIVE PERCENT: 0 % (ref 2–12)
MONOCYTES RELATIVE PERCENT: 0.9 % (ref 2–12)
MONOCYTES RELATIVE PERCENT: 0.9 % (ref 2–12)
MONOCYTES RELATIVE PERCENT: 1 % (ref 2–12)
MONOCYTES RELATIVE PERCENT: 1.6 % (ref 2–12)
MONOCYTES RELATIVE PERCENT: 1.7 % (ref 2–12)
MONOCYTES RELATIVE PERCENT: 1.8 % (ref 2–12)
MONOCYTES RELATIVE PERCENT: 1.9 % (ref 2–12)
MONOCYTES RELATIVE PERCENT: 2 % (ref 2–12)
MONOCYTES RELATIVE PERCENT: 2.1 % (ref 2–12)
MONOCYTES RELATIVE PERCENT: 2.2 % (ref 2–12)
MONOCYTES RELATIVE PERCENT: 2.5 % (ref 2–12)
MONOCYTES RELATIVE PERCENT: 2.8 % (ref 2–12)
MONOCYTES RELATIVE PERCENT: 3 % (ref 2–12)
MONOCYTES RELATIVE PERCENT: 3.2 % (ref 2–12)
MONOCYTES RELATIVE PERCENT: 3.4 % (ref 2–12)
MONOCYTES RELATIVE PERCENT: 3.5 % (ref 2–12)
MONOCYTES RELATIVE PERCENT: 3.5 % (ref 2–12)
MONOCYTES RELATIVE PERCENT: 3.9 % (ref 2–12)
MONOCYTES RELATIVE PERCENT: 4 % (ref 2–12)
MONOCYTES RELATIVE PERCENT: 4.2 % (ref 2–12)
MONOCYTES RELATIVE PERCENT: 4.3 % (ref 2–12)
MONOCYTES RELATIVE PERCENT: 5 % (ref 2–12)
MONOCYTES RELATIVE PERCENT: 6.1 % (ref 2–12)
MONOCYTES RELATIVE PERCENT: 6.2 % (ref 2–12)
MRSA CULTURE ONLY: NORMAL
MYCOPLASMA PNEUMONIAE BY PCR: NOT DETECTED
MYELOCYTE PERCENT: 1.7 % (ref 0–0)
MYELOCYTE PERCENT: 1.8 % (ref 0–0)
MYELOCYTE PERCENT: 2.6 % (ref 0–0)
MYELOCYTE PERCENT: 3 % (ref 0–0)
MYELOCYTE PERCENT: 4.4 % (ref 0–0)
NEUTROPHILS ABSOLUTE: 10.24 E9/L (ref 1.8–7.3)
NEUTROPHILS ABSOLUTE: 10.58 E9/L (ref 1.8–7.3)
NEUTROPHILS ABSOLUTE: 11.69 E9/L (ref 1.8–7.3)
NEUTROPHILS ABSOLUTE: 12.19 E9/L (ref 1.8–7.3)
NEUTROPHILS ABSOLUTE: 14.51 E9/L (ref 1.8–7.3)
NEUTROPHILS ABSOLUTE: 15.28 E9/L (ref 1.8–7.3)
NEUTROPHILS ABSOLUTE: 15.3 E9/L (ref 1.8–7.3)
NEUTROPHILS ABSOLUTE: 15.33 E9/L (ref 1.8–7.3)
NEUTROPHILS ABSOLUTE: 15.4 E9/L (ref 1.8–7.3)
NEUTROPHILS ABSOLUTE: 16.25 E9/L (ref 1.8–7.3)
NEUTROPHILS ABSOLUTE: 19.78 E9/L (ref 1.8–7.3)
NEUTROPHILS ABSOLUTE: 20.75 E9/L (ref 1.8–7.3)
NEUTROPHILS ABSOLUTE: 22.05 E9/L (ref 1.8–7.3)
NEUTROPHILS ABSOLUTE: 24.1 E9/L (ref 1.8–7.3)
NEUTROPHILS ABSOLUTE: 27.89 E9/L (ref 1.8–7.3)
NEUTROPHILS ABSOLUTE: 4.79 E9/L (ref 1.8–7.3)
NEUTROPHILS ABSOLUTE: 5.29 E9/L (ref 1.8–7.3)
NEUTROPHILS ABSOLUTE: 5.89 E9/L (ref 1.8–7.3)
NEUTROPHILS ABSOLUTE: 6.34 E9/L (ref 1.8–7.3)
NEUTROPHILS ABSOLUTE: 6.99 E9/L (ref 1.8–7.3)
NEUTROPHILS ABSOLUTE: 7.66 E9/L (ref 1.8–7.3)
NEUTROPHILS ABSOLUTE: 7.74 E9/L (ref 1.8–7.3)
NEUTROPHILS ABSOLUTE: 8.09 E9/L (ref 1.8–7.3)
NEUTROPHILS ABSOLUTE: 8.46 E9/L (ref 1.8–7.3)
NEUTROPHILS ABSOLUTE: 8.65 E9/L (ref 1.8–7.3)
NEUTROPHILS ABSOLUTE: 8.96 E9/L (ref 1.8–7.3)
NEUTROPHILS ABSOLUTE: 9.01 E9/L (ref 1.8–7.3)
NEUTROPHILS ABSOLUTE: 9.22 E9/L (ref 1.8–7.3)
NEUTROPHILS ABSOLUTE: 9.76 E9/L (ref 1.8–7.3)
NEUTROPHILS ABSOLUTE: 9.99 E9/L (ref 1.8–7.3)
NEUTROPHILS RELATIVE PERCENT: 75.4 % (ref 43–80)
NEUTROPHILS RELATIVE PERCENT: 79.8 % (ref 43–80)
NEUTROPHILS RELATIVE PERCENT: 80.6 % (ref 43–80)
NEUTROPHILS RELATIVE PERCENT: 82.2 % (ref 43–80)
NEUTROPHILS RELATIVE PERCENT: 82.4 % (ref 43–80)
NEUTROPHILS RELATIVE PERCENT: 84.2 % (ref 43–80)
NEUTROPHILS RELATIVE PERCENT: 84.5 % (ref 43–80)
NEUTROPHILS RELATIVE PERCENT: 84.8 % (ref 43–80)
NEUTROPHILS RELATIVE PERCENT: 85 % (ref 43–80)
NEUTROPHILS RELATIVE PERCENT: 85.3 % (ref 43–80)
NEUTROPHILS RELATIVE PERCENT: 86.1 % (ref 43–80)
NEUTROPHILS RELATIVE PERCENT: 86.3 % (ref 43–80)
NEUTROPHILS RELATIVE PERCENT: 87 % (ref 43–80)
NEUTROPHILS RELATIVE PERCENT: 87 % (ref 43–80)
NEUTROPHILS RELATIVE PERCENT: 87.6 % (ref 43–80)
NEUTROPHILS RELATIVE PERCENT: 87.8 % (ref 43–80)
NEUTROPHILS RELATIVE PERCENT: 87.8 % (ref 43–80)
NEUTROPHILS RELATIVE PERCENT: 87.9 % (ref 43–80)
NEUTROPHILS RELATIVE PERCENT: 88.2 % (ref 43–80)
NEUTROPHILS RELATIVE PERCENT: 88.6 % (ref 43–80)
NEUTROPHILS RELATIVE PERCENT: 88.9 % (ref 43–80)
NEUTROPHILS RELATIVE PERCENT: 90.1 % (ref 43–80)
NEUTROPHILS RELATIVE PERCENT: 90.3 % (ref 43–80)
NEUTROPHILS RELATIVE PERCENT: 91 % (ref 43–80)
NEUTROPHILS RELATIVE PERCENT: 91.5 % (ref 43–80)
NEUTROPHILS RELATIVE PERCENT: 91.7 % (ref 43–80)
NEUTROPHILS RELATIVE PERCENT: 92.2 % (ref 43–80)
NEUTROPHILS RELATIVE PERCENT: 93 % (ref 43–80)
NEUTROPHILS RELATIVE PERCENT: 93.2 % (ref 43–80)
NEUTROPHILS RELATIVE PERCENT: 93.5 % (ref 43–80)
NITRITE, URINE: NEGATIVE
NUCLEATED RED BLOOD CELLS: 0 /100 WBC
NUCLEATED RED BLOOD CELLS: 0.9 /100 WBC
NUCLEATED RED BLOOD CELLS: 1.8 /100 WBC
NUCLEATED RED BLOOD CELLS: 2 /100 WBC
NUCLEATED RED BLOOD CELLS: 4.3 /100 WBC
O2 CONTENT: 10.9 ML/DL
O2 CONTENT: 11 ML/DL
O2 CONTENT: 12.1 ML/DL
O2 CONTENT: 12.3 ML/DL
O2 CONTENT: 12.5 ML/DL
O2 CONTENT: 12.9 ML/DL
O2 CONTENT: 13.1 ML/DL
O2 CONTENT: 13.2 ML/DL
O2 CONTENT: 13.3 ML/DL
O2 CONTENT: 13.4 ML/DL
O2 CONTENT: 13.6 ML/DL
O2 CONTENT: 13.7 ML/DL
O2 CONTENT: 14.3 ML/DL
O2 CONTENT: 14.3 ML/DL
O2 CONTENT: 14.5 ML/DL
O2 CONTENT: 14.6 ML/DL
O2 CONTENT: 14.6 ML/DL
O2 CONTENT: 14.7 ML/DL
O2 CONTENT: 15.4 ML/DL
O2 CONTENT: 15.6 ML/DL
O2 CONTENT: 16.3 ML/DL
O2 SATURATION: 62.3 % (ref 92–98.5)
O2 SATURATION: 86.9 % (ref 92–98.5)
O2 SATURATION: 88 % (ref 92–98.5)
O2 SATURATION: 88.5 % (ref 92–98.5)
O2 SATURATION: 90.4 % (ref 92–98.5)
O2 SATURATION: 91.2 % (ref 92–98.5)
O2 SATURATION: 91.4 % (ref 92–98.5)
O2 SATURATION: 91.9 % (ref 92–98.5)
O2 SATURATION: 92 % (ref 92–98.5)
O2 SATURATION: 92.2 % (ref 92–98.5)
O2 SATURATION: 92.2 % (ref 92–98.5)
O2 SATURATION: 92.4 % (ref 92–98.5)
O2 SATURATION: 93.3 % (ref 92–98.5)
O2 SATURATION: 93.5 % (ref 92–98.5)
O2 SATURATION: 94.4 % (ref 92–98.5)
O2 SATURATION: 94.6 % (ref 92–98.5)
O2 SATURATION: 95.4 % (ref 92–98.5)
O2 SATURATION: 95.9 % (ref 92–98.5)
O2 SATURATION: 96.1 % (ref 92–98.5)
O2 SATURATION: 96.3 % (ref 92–98.5)
O2 SATURATION: 96.4 % (ref 92–98.5)
O2 SATURATION: 96.5 % (ref 92–98.5)
O2 SATURATION: 97.3 % (ref 92–98.5)
O2 SATURATION: 98.1 % (ref 92–98.5)
O2 SATURATION: 98.9 % (ref 92–98.5)
O2HB: 61.8 % (ref 94–97)
O2HB: 86 % (ref 94–97)
O2HB: 86.8 % (ref 94–97)
O2HB: 87.7 % (ref 94–97)
O2HB: 89.2 % (ref 94–97)
O2HB: 90 % (ref 94–97)
O2HB: 90.4 % (ref 94–97)
O2HB: 90.8 % (ref 94–97)
O2HB: 90.8 % (ref 94–97)
O2HB: 91 % (ref 94–97)
O2HB: 91 % (ref 94–97)
O2HB: 91.2 % (ref 94–97)
O2HB: 92.4 % (ref 94–97)
O2HB: 93 % (ref 94–97)
O2HB: 93.6 % (ref 94–97)
O2HB: 94.1 % (ref 94–97)
O2HB: 94.4 % (ref 94–97)
O2HB: 94.8 % (ref 94–97)
O2HB: 94.9 % (ref 94–97)
O2HB: 95.1 % (ref 94–97)
O2HB: 95.5 % (ref 94–97)
O2HB: 95.7 % (ref 94–97)
O2HB: 96.6 % (ref 94–97)
O2HB: 97.3 % (ref 94–97)
O2HB: 98.4 % (ref 94–97)
OPERATOR ID: 1394
OPERATOR ID: 166
OPERATOR ID: 166
OPERATOR ID: 1687
OPERATOR ID: 2485
OPERATOR ID: 3114
OPERATOR ID: 3114
OPERATOR ID: 3186
OPERATOR ID: 3342
OPERATOR ID: 3342
OPERATOR ID: 420
OPERATOR ID: 789
OPERATOR ID: ABNORMAL
ORGANISM: ABNORMAL
OSMOLALITY URINE: 215 MOSM/KG (ref 300–900)
OSMOLALITY URINE: 455 MOSM/KG (ref 300–900)
OSMOLALITY URINE: 614 MOSM/KG (ref 300–900)
OSMOLALITY URINE: 633 MOSM/KG (ref 300–900)
OSMOLALITY URINE: 716 MOSM/KG (ref 300–900)
OSMOLALITY URINE: 746 MOSM/KG (ref 300–900)
OSMOLALITY: 260 MOSM/KG (ref 285–310)
OSMOLALITY: 269 MOSM/KG (ref 285–310)
OSMOLALITY: 317 MOSM/KG (ref 285–310)
OVALOCYTES: ABNORMAL
PARAINFLUENZA VIRUS 1 BY PCR: NOT DETECTED
PARAINFLUENZA VIRUS 2 BY PCR: NOT DETECTED
PARAINFLUENZA VIRUS 3 BY PCR: NOT DETECTED
PARAINFLUENZA VIRUS 4 BY PCR: NOT DETECTED
PATIENT TEMP: 37 C
PCO2: 35.1 MMHG (ref 35–45)
PCO2: 38 MMHG (ref 35–45)
PCO2: 40 MMHG (ref 35–45)
PCO2: 40 MMHG (ref 35–45)
PCO2: 41.1 MMHG (ref 35–45)
PCO2: 41.2 MMHG (ref 35–45)
PCO2: 41.3 MMHG (ref 35–45)
PCO2: 42.4 MMHG (ref 35–45)
PCO2: 42.5 MMHG (ref 35–45)
PCO2: 42.6 MMHG (ref 35–45)
PCO2: 42.9 MMHG (ref 35–45)
PCO2: 43 MMHG (ref 35–45)
PCO2: 43.4 MMHG (ref 35–45)
PCO2: 45 MMHG (ref 35–45)
PCO2: 45.8 MMHG (ref 35–45)
PCO2: 47.5 MMHG (ref 35–45)
PCO2: 48 MMHG (ref 35–45)
PCO2: 48.7 MMHG (ref 35–45)
PCO2: 55.7 MMHG (ref 35–45)
PCO2: 57.8 MMHG (ref 35–45)
PCO2: 57.8 MMHG (ref 35–45)
PCO2: 69 MMHG (ref 35–45)
PCO2: 88.8 MMHG (ref 35–45)
PDW BLD-RTO: 13.3 FL (ref 11.5–15)
PDW BLD-RTO: 13.3 FL (ref 11.5–15)
PDW BLD-RTO: 13.4 FL (ref 11.5–15)
PDW BLD-RTO: 13.4 FL (ref 11.5–15)
PDW BLD-RTO: 13.5 FL (ref 11.5–15)
PDW BLD-RTO: 13.6 FL (ref 11.5–15)
PDW BLD-RTO: 13.6 FL (ref 11.5–15)
PDW BLD-RTO: 13.7 FL (ref 11.5–15)
PDW BLD-RTO: 14 FL (ref 11.5–15)
PDW BLD-RTO: 14.3 FL (ref 11.5–15)
PDW BLD-RTO: 14.4 FL (ref 11.5–15)
PDW BLD-RTO: 15.2 FL (ref 11.5–15)
PDW BLD-RTO: 15.4 FL (ref 11.5–15)
PDW BLD-RTO: 15.8 FL (ref 11.5–15)
PDW BLD-RTO: 15.9 FL (ref 11.5–15)
PDW BLD-RTO: 16.1 FL (ref 11.5–15)
PDW BLD-RTO: 16.1 FL (ref 11.5–15)
PDW BLD-RTO: 17.2 FL (ref 11.5–15)
PDW BLD-RTO: 17.6 FL (ref 11.5–15)
PDW BLD-RTO: 17.7 FL (ref 11.5–15)
PDW BLD-RTO: 18 FL (ref 11.5–15)
PDW BLD-RTO: 18 FL (ref 11.5–15)
PDW BLD-RTO: 18.5 FL (ref 11.5–15)
PDW BLD-RTO: 18.8 FL (ref 11.5–15)
PDW BLD-RTO: 18.9 FL (ref 11.5–15)
PDW BLD-RTO: 18.9 FL (ref 11.5–15)
PDW BLD-RTO: 19 FL (ref 11.5–15)
PDW BLD-RTO: 19.1 FL (ref 11.5–15)
PDW BLD-RTO: 19.9 FL (ref 11.5–15)
PEEP/CPAP: 10 CMH2O
PEEP/CPAP: 12 CMH2O
PFO2: 0.42 MMHG/%
PFO2: 0.69 MMHG/%
PFO2: 0.72 MMHG/%
PFO2: 0.78 MMHG/%
PFO2: 0.85 MMHG/%
PFO2: 0.86 MMHG/%
PFO2: 0.89 MMHG/%
PFO2: 0.92 MMHG/%
PFO2: 0.98 MMHG/%
PFO2: 1.04 MMHG/%
PFO2: 1.07 MMHG/%
PFO2: 1.09 MMHG/%
PFO2: 1.21 MMHG/%
PFO2: 1.24 MMHG/%
PFO2: 1.24 MMHG/%
PFO2: 1.26 MMHG/%
PFO2: 1.28 MMHG/%
PFO2: 1.38 MMHG/%
PFO2: 1.45 MMHG/%
PFO2: 1.67 MMHG/%
PFO2: 1.79 MMHG/%
PFO2: 1.89 MMHG/%
PFO2: 2.22 MMHG/%
PH BLOOD GAS: 7.1 (ref 7.35–7.45)
PH BLOOD GAS: 7.22 (ref 7.35–7.45)
PH BLOOD GAS: 7.3 (ref 7.35–7.45)
PH BLOOD GAS: 7.31 (ref 7.35–7.45)
PH BLOOD GAS: 7.33 (ref 7.35–7.45)
PH BLOOD GAS: 7.34 (ref 7.35–7.45)
PH BLOOD GAS: 7.35 (ref 7.35–7.45)
PH BLOOD GAS: 7.37 (ref 7.35–7.45)
PH BLOOD GAS: 7.38 (ref 7.35–7.45)
PH BLOOD GAS: 7.38 (ref 7.35–7.45)
PH BLOOD GAS: 7.39 (ref 7.35–7.45)
PH BLOOD GAS: 7.39 (ref 7.35–7.45)
PH BLOOD GAS: 7.4 (ref 7.35–7.45)
PH BLOOD GAS: 7.41 (ref 7.35–7.45)
PH BLOOD GAS: 7.43 (ref 7.35–7.45)
PH BLOOD GAS: 7.43 (ref 7.35–7.45)
PH BLOOD GAS: 7.44 (ref 7.35–7.45)
PH BLOOD GAS: 7.44 (ref 7.35–7.45)
PH BLOOD GAS: 7.45 (ref 7.35–7.45)
PH BLOOD GAS: 7.45 (ref 7.35–7.45)
PH BLOOD GAS: 7.46 (ref 7.35–7.45)
PH UA: 6 (ref 5–9)
PH UA: 7 (ref 5–9)
PHOSPHORUS: 1.3 MG/DL (ref 2.5–4.5)
PHOSPHORUS: 1.5 MG/DL (ref 2.5–4.5)
PHOSPHORUS: 1.7 MG/DL (ref 2.5–4.5)
PHOSPHORUS: 1.8 MG/DL (ref 2.5–4.5)
PHOSPHORUS: 1.9 MG/DL (ref 2.5–4.5)
PHOSPHORUS: 1.9 MG/DL (ref 2.5–4.5)
PHOSPHORUS: 2 MG/DL (ref 2.5–4.5)
PHOSPHORUS: 2 MG/DL (ref 2.5–4.5)
PHOSPHORUS: 2.1 MG/DL (ref 2.5–4.5)
PHOSPHORUS: 2.1 MG/DL (ref 2.5–4.5)
PHOSPHORUS: 2.2 MG/DL (ref 2.5–4.5)
PHOSPHORUS: 2.2 MG/DL (ref 2.5–4.5)
PHOSPHORUS: 2.3 MG/DL (ref 2.5–4.5)
PHOSPHORUS: 2.5 MG/DL (ref 2.5–4.5)
PHOSPHORUS: 2.8 MG/DL (ref 2.5–4.5)
PHOSPHORUS: 2.9 MG/DL (ref 2.5–4.5)
PHOSPHORUS: 3.2 MG/DL (ref 2.5–4.5)
PIP: 14 CMH2O
PIP: 14 CMH2O
PIP: 26 CMH2O
PLATELET # BLD: 129 E9/L (ref 130–450)
PLATELET # BLD: 138 E9/L (ref 130–450)
PLATELET # BLD: 147 E9/L (ref 130–450)
PLATELET # BLD: 148 E9/L (ref 130–450)
PLATELET # BLD: 152 E9/L (ref 130–450)
PLATELET # BLD: 152 E9/L (ref 130–450)
PLATELET # BLD: 158 E9/L (ref 130–450)
PLATELET # BLD: 170 E9/L (ref 130–450)
PLATELET # BLD: 174 E9/L (ref 130–450)
PLATELET # BLD: 175 E9/L (ref 130–450)
PLATELET # BLD: 176 E9/L (ref 130–450)
PLATELET # BLD: 177 E9/L (ref 130–450)
PLATELET # BLD: 177 E9/L (ref 130–450)
PLATELET # BLD: 179 E9/L (ref 130–450)
PLATELET # BLD: 180 E9/L (ref 130–450)
PLATELET # BLD: 183 E9/L (ref 130–450)
PLATELET # BLD: 189 E9/L (ref 130–450)
PLATELET # BLD: 192 E9/L (ref 130–450)
PLATELET # BLD: 195 E9/L (ref 130–450)
PLATELET # BLD: 205 E9/L (ref 130–450)
PLATELET # BLD: 208 E9/L (ref 130–450)
PLATELET # BLD: 209 E9/L (ref 130–450)
PLATELET # BLD: 223 E9/L (ref 130–450)
PLATELET # BLD: 241 E9/L (ref 130–450)
PLATELET # BLD: 250 E9/L (ref 130–450)
PLATELET # BLD: 252 E9/L (ref 130–450)
PLATELET # BLD: 265 E9/L (ref 130–450)
PLATELET # BLD: 279 E9/L (ref 130–450)
PLATELET # BLD: 279 E9/L (ref 130–450)
PLATELET # BLD: 289 E9/L (ref 130–450)
PLATELET # BLD: 312 E9/L (ref 130–450)
PMV BLD AUTO: 10 FL (ref 7–12)
PMV BLD AUTO: 10 FL (ref 7–12)
PMV BLD AUTO: 10.1 FL (ref 7–12)
PMV BLD AUTO: 10.2 FL (ref 7–12)
PMV BLD AUTO: 10.2 FL (ref 7–12)
PMV BLD AUTO: 10.3 FL (ref 7–12)
PMV BLD AUTO: 10.6 FL (ref 7–12)
PMV BLD AUTO: 10.8 FL (ref 7–12)
PMV BLD AUTO: 10.9 FL (ref 7–12)
PMV BLD AUTO: 8.9 FL (ref 7–12)
PMV BLD AUTO: 8.9 FL (ref 7–12)
PMV BLD AUTO: 9.4 FL (ref 7–12)
PMV BLD AUTO: 9.5 FL (ref 7–12)
PMV BLD AUTO: 9.7 FL (ref 7–12)
PMV BLD AUTO: 9.8 FL (ref 7–12)
PMV BLD AUTO: 9.9 FL (ref 7–12)
PO2: 113.4 MMHG (ref 75–100)
PO2: 155.3 MMHG (ref 75–100)
PO2: 42.4 MMHG (ref 75–100)
PO2: 50.5 MMHG (ref 75–100)
PO2: 54.4 MMHG (ref 75–100)
PO2: 56.5 MMHG (ref 75–100)
PO2: 60.4 MMHG (ref 75–100)
PO2: 61.9 MMHG (ref 75–100)
PO2: 62.3 MMHG (ref 75–100)
PO2: 62.3 MMHG (ref 75–100)
PO2: 62.6 MMHG (ref 75–100)
PO2: 64.4 MMHG (ref 75–100)
PO2: 64.6 MMHG (ref 75–100)
PO2: 67.3 MMHG (ref 75–100)
PO2: 68.4 MMHG (ref 75–100)
PO2: 69.3 MMHG (ref 75–100)
PO2: 72.4 MMHG (ref 75–100)
PO2: 72.5 MMHG (ref 75–100)
PO2: 76.8 MMHG (ref 75–100)
PO2: 83.5 MMHG (ref 75–100)
PO2: 85 MMHG (ref 75–100)
PO2: 85.6 MMHG (ref 75–100)
PO2: 87.2 MMHG (ref 75–100)
PO2: 89.6 MMHG (ref 75–100)
PO2: 97.6 MMHG (ref 75–100)
POC ACT LR: 151 SECONDS
POC ACT LR: 213 SECONDS
POC ACT LR: 256 SECONDS
POC ACT LR: 302 SECONDS
POC ACT LR: >400 SECONDS
POIKILOCYTES: ABNORMAL
POLYCHROMASIA: ABNORMAL
POTASSIUM REFLEX MAGNESIUM: 3.3 MMOL/L (ref 3.5–5)
POTASSIUM REFLEX MAGNESIUM: 4.3 MMOL/L (ref 3.5–5)
POTASSIUM REFLEX MAGNESIUM: 4.4 MMOL/L (ref 3.5–5)
POTASSIUM REFLEX MAGNESIUM: 4.6 MMOL/L (ref 3.5–5)
POTASSIUM SERPL-SCNC: 2.8 MMOL/L (ref 3.5–5)
POTASSIUM SERPL-SCNC: 3.2 MMOL/L (ref 3.5–5)
POTASSIUM SERPL-SCNC: 3.2 MMOL/L (ref 3.5–5)
POTASSIUM SERPL-SCNC: 3.3 MMOL/L (ref 3.5–5)
POTASSIUM SERPL-SCNC: 3.4 MMOL/L (ref 3.5–5)
POTASSIUM SERPL-SCNC: 3.5 MMOL/L (ref 3.5–5)
POTASSIUM SERPL-SCNC: 3.6 MMOL/L (ref 3.5–5)
POTASSIUM SERPL-SCNC: 3.7 MMOL/L (ref 3.5–5)
POTASSIUM SERPL-SCNC: 3.8 MMOL/L (ref 3.5–5)
POTASSIUM SERPL-SCNC: 3.8 MMOL/L (ref 3.5–5)
POTASSIUM SERPL-SCNC: 3.9 MMOL/L (ref 3.5–5)
POTASSIUM SERPL-SCNC: 3.9 MMOL/L (ref 3.5–5)
POTASSIUM SERPL-SCNC: 4 MMOL/L (ref 3.5–5)
POTASSIUM SERPL-SCNC: 4.1 MMOL/L (ref 3.5–5)
POTASSIUM SERPL-SCNC: 4.1 MMOL/L (ref 3.5–5)
POTASSIUM SERPL-SCNC: 4.2 MMOL/L (ref 3.5–5)
POTASSIUM SERPL-SCNC: 4.3 MMOL/L (ref 3.5–5)
POTASSIUM SERPL-SCNC: 4.4 MMOL/L
POTASSIUM SERPL-SCNC: 4.4 MMOL/L (ref 3.5–5)
POTASSIUM SERPL-SCNC: 4.5 MMOL/L (ref 3.5–5)
POTASSIUM SERPL-SCNC: 4.6 MMOL/L (ref 3.5–5)
POTASSIUM SERPL-SCNC: 4.6 MMOL/L (ref 3.5–5)
POTASSIUM SERPL-SCNC: 4.7 MMOL/L (ref 3.5–5)
POTASSIUM SERPL-SCNC: 4.9 MMOL/L (ref 3.5–5)
POTASSIUM SERPL-SCNC: 5.2 MMOL/L (ref 3.5–5)
POTASSIUM, UR: 15.9 MMOL/L
POTASSIUM, UR: 27.1 MMOL/L
POTASSIUM, UR: 30 MMOL/L
POTASSIUM, UR: 37.2 MMOL/L
POTASSIUM, UR: 44.8 MMOL/L
PRO-BNP: 1036 PG/ML (ref 0–125)
PRO-BNP: 106 PG/ML (ref 0–125)
PRO-BNP: 841 PG/ML (ref 0–125)
PROCALCITONIN: 0.04 NG/ML (ref 0–0.08)
PROCALCITONIN: 0.06 NG/ML (ref 0–0.08)
PROCALCITONIN: 0.07 NG/ML (ref 0–0.08)
PROCALCITONIN: 0.09 NG/ML (ref 0–0.08)
PROMYELOCYTES PERCENT: 1.8 % (ref 0–0)
PROTEIN PROTEIN: 185 MG/DL (ref 0–12)
PROTEIN UA: 100 MG/DL
PROTEIN UA: ABNORMAL MG/DL
PROTEIN UA: ABNORMAL MG/DL
PROTEIN UA: NEGATIVE MG/DL
PROTEIN/CREAT RATIO: 1.1
PROTEIN/CREAT RATIO: 1.1 (ref 0–0.2)
PROTHROMBIN TIME: 10.9 SEC (ref 9.3–12.4)
PROTHROMBIN TIME: 11.4 SEC (ref 9.3–12.4)
RBC # BLD: 2.13 E12/L (ref 3.5–5.5)
RBC # BLD: 2.68 E12/L (ref 3.5–5.5)
RBC # BLD: 2.75 E12/L (ref 3.5–5.5)
RBC # BLD: 2.8 E12/L (ref 3.5–5.5)
RBC # BLD: 2.81 E12/L (ref 3.5–5.5)
RBC # BLD: 2.81 E12/L (ref 3.5–5.5)
RBC # BLD: 2.86 E12/L (ref 3.5–5.5)
RBC # BLD: 2.99 E12/L (ref 3.5–5.5)
RBC # BLD: 3.05 E12/L (ref 3.5–5.5)
RBC # BLD: 3.07 E12/L (ref 3.5–5.5)
RBC # BLD: 3.09 E12/L (ref 3.5–5.5)
RBC # BLD: 3.09 E12/L (ref 3.5–5.5)
RBC # BLD: 3.19 E12/L (ref 3.5–5.5)
RBC # BLD: 3.2 E12/L (ref 3.5–5.5)
RBC # BLD: 3.2 E12/L (ref 3.5–5.5)
RBC # BLD: 3.27 E12/L (ref 3.5–5.5)
RBC # BLD: 3.3 E12/L (ref 3.5–5.5)
RBC # BLD: 3.34 E12/L (ref 3.5–5.5)
RBC # BLD: 3.34 E12/L (ref 3.5–5.5)
RBC # BLD: 3.44 E12/L (ref 3.5–5.5)
RBC # BLD: 3.44 E12/L (ref 3.5–5.5)
RBC # BLD: 3.46 E12/L (ref 3.5–5.5)
RBC # BLD: 3.52 E12/L (ref 3.5–5.5)
RBC # BLD: 3.54 E12/L (ref 3.5–5.5)
RBC # BLD: 3.56 E12/L (ref 3.5–5.5)
RBC # BLD: 3.59 E12/L (ref 3.5–5.5)
RBC # BLD: 3.77 E12/L (ref 3.5–5.5)
RBC # BLD: 3.88 E12/L (ref 3.5–5.5)
RBC # BLD: 3.88 E12/L (ref 3.5–5.5)
RBC # BLD: 3.89 E12/L (ref 3.5–5.5)
RBC # BLD: 4.57 E12/L (ref 3.5–5.5)
RBC # BLD: ABNORMAL 10*6/UL
RBC UA: ABNORMAL /HPF (ref 0–2)
RESPIRATORY SYNCYTIAL VIRUS BY PCR: NOT DETECTED
RI(T): 1313 %
RI(T): 182 %
RI(T): 223 %
RI(T): 234 %
RI(T): 251 %
RI(T): 300 %
RI(T): 320 %
RI(T): 352 %
RI(T): 374 %
RI(T): 377 %
RI(T): 378 %
RI(T): 417 %
RI(T): 468 %
RI(T): 473 %
RI(T): 493 %
RI(T): 549 %
RI(T): 561 %
RI(T): 577 %
RI(T): 622 %
RI(T): 652 %
RI(T): 695 %
RI(T): 766 %
RI(T): 793 %
RR MECHANICAL: 14 B/MIN
RR MECHANICAL: 16 B/MIN
RR MECHANICAL: 24 B/MIN
RR MECHANICAL: 26 B/MIN
RR MECHANICAL: 28 B/MIN
SARS-COV-2, NAAT: DETECTED
SARS-COV-2, PCR: NOT DETECTED
SARS-COV-2, PCR: NOT DETECTED
SARS-COV-2: NOT DETECTED
SARS-COV-2: NOT DETECTED
SCHISTOCYTES: ABNORMAL
SCHISTOCYTES: ABNORMAL
SEDIMENTATION RATE, ERYTHROCYTE: 27 MM/HR (ref 0–20)
SEDIMENTATION RATE, ERYTHROCYTE: 8 MM/HR (ref 0–20)
SMEAR, RESPIRATORY: ABNORMAL
SODIUM BLD-SCNC: 119 MMOL/L (ref 132–146)
SODIUM BLD-SCNC: 121 MMOL/L (ref 132–146)
SODIUM BLD-SCNC: 121 MMOL/L (ref 132–146)
SODIUM BLD-SCNC: 122 MMOL/L (ref 132–146)
SODIUM BLD-SCNC: 122 MMOL/L (ref 132–146)
SODIUM BLD-SCNC: 124 MMOL/L (ref 132–146)
SODIUM BLD-SCNC: 124 MMOL/L (ref 132–146)
SODIUM BLD-SCNC: 125 MMOL/L (ref 132–146)
SODIUM BLD-SCNC: 126 MMOL/L (ref 132–146)
SODIUM BLD-SCNC: 127 MMOL/L (ref 132–146)
SODIUM BLD-SCNC: 128 MMOL/L (ref 132–146)
SODIUM BLD-SCNC: 128 MMOL/L (ref 132–146)
SODIUM BLD-SCNC: 129 MMOL/L (ref 132–146)
SODIUM BLD-SCNC: 129 MMOL/L (ref 132–146)
SODIUM BLD-SCNC: 131 MMOL/L (ref 132–146)
SODIUM BLD-SCNC: 131 MMOL/L (ref 132–146)
SODIUM BLD-SCNC: 132 MMOL/L (ref 132–146)
SODIUM BLD-SCNC: 133 MMOL/L (ref 132–146)
SODIUM BLD-SCNC: 133 MMOL/L (ref 132–146)
SODIUM BLD-SCNC: 136 MMOL/L (ref 132–146)
SODIUM BLD-SCNC: 137 MMOL/L (ref 132–146)
SODIUM BLD-SCNC: 138 MMOL/L (ref 132–146)
SODIUM BLD-SCNC: 139 MMOL/L
SODIUM BLD-SCNC: 139 MMOL/L (ref 132–146)
SODIUM BLD-SCNC: 140 MMOL/L (ref 132–146)
SODIUM BLD-SCNC: 141 MMOL/L (ref 132–146)
SODIUM BLD-SCNC: 142 MMOL/L (ref 132–146)
SODIUM BLD-SCNC: 142 MMOL/L (ref 132–146)
SODIUM BLD-SCNC: 143 MMOL/L (ref 132–146)
SODIUM BLD-SCNC: 144 MMOL/L (ref 132–146)
SODIUM BLD-SCNC: 146 MMOL/L (ref 132–146)
SODIUM BLD-SCNC: 149 MMOL/L (ref 132–146)
SODIUM URINE: 159 MMOL/L
SODIUM URINE: 71 MMOL/L
SODIUM URINE: <20 MMOL/L
SOURCE, BLOOD GAS: ABNORMAL
SOURCE: NORMAL
SOURCE: NORMAL
SPECIFIC GRAVITY UA: 1.02 (ref 1–1.03)
SPECIFIC GRAVITY UA: >=1.03 (ref 1–1.03)
STREP PNEUMONIAE ANTIGEN, URINE: NORMAL
TARGET CELLS: ABNORMAL
TEAR DROP CELLS: ABNORMAL
THB: 10 G/DL (ref 11.5–16.5)
THB: 10.1 G/DL (ref 11.5–16.5)
THB: 10.4 G/DL (ref 11.5–16.5)
THB: 10.6 G/DL (ref 11.5–16.5)
THB: 10.6 G/DL (ref 11.5–16.5)
THB: 10.7 G/DL (ref 11.5–16.5)
THB: 10.7 G/DL (ref 11.5–16.5)
THB: 10.8 G/DL (ref 11.5–16.5)
THB: 11.1 G/DL (ref 11.5–16.5)
THB: 11.3 G/DL (ref 11.5–16.5)
THB: 11.4 G/DL (ref 11.5–16.5)
THB: 11.5 G/DL (ref 11.5–16.5)
THB: 11.6 G/DL (ref 11.5–16.5)
THB: 12.1 G/DL (ref 11.5–16.5)
THB: 12.1 G/DL (ref 11.5–16.5)
THB: 12.5 G/DL (ref 11.5–16.5)
THB: 9.1 G/DL (ref 11.5–16.5)
THB: 9.1 G/DL (ref 11.5–16.5)
THB: 9.4 G/DL (ref 11.5–16.5)
THB: 9.6 G/DL (ref 11.5–16.5)
THB: 9.7 G/DL (ref 11.5–16.5)
THB: 9.7 G/DL (ref 11.5–16.5)
THB: 9.8 G/DL (ref 11.5–16.5)
THB: 9.8 G/DL (ref 11.5–16.5)
THB: 9.9 G/DL (ref 11.5–16.5)
TIME ANALYZED: 1019
TIME ANALYZED: 1145
TIME ANALYZED: 1159
TIME ANALYZED: 1208
TIME ANALYZED: 1232
TIME ANALYZED: 1237
TIME ANALYZED: 1350
TIME ANALYZED: 1509
TIME ANALYZED: 1601
TIME ANALYZED: 504
TIME ANALYZED: 530
TIME ANALYZED: 536
TIME ANALYZED: 536
TIME ANALYZED: 545
TIME ANALYZED: 549
TIME ANALYZED: 552
TIME ANALYZED: 555
TIME ANALYZED: 556
TIME ANALYZED: 603
TIME ANALYZED: 620
TIME ANALYZED: 624
TIME ANALYZED: 625
TIME ANALYZED: 635
TIME ANALYZED: 648
TIME ANALYZED: 657
TOTAL PROTEIN: 4.2 G/DL (ref 6.4–8.3)
TOTAL PROTEIN: 4.3 G/DL (ref 6.4–8.3)
TOTAL PROTEIN: 4.5 G/DL (ref 6.4–8.3)
TOTAL PROTEIN: 4.6 G/DL (ref 6.4–8.3)
TOTAL PROTEIN: 4.8 G/DL (ref 6.4–8.3)
TOTAL PROTEIN: 4.9 G/DL (ref 6.4–8.3)
TOTAL PROTEIN: 4.9 G/DL (ref 6.4–8.3)
TOTAL PROTEIN: 5 G/DL (ref 6.4–8.3)
TOTAL PROTEIN: 5 G/DL (ref 6.4–8.3)
TOTAL PROTEIN: 5.2 G/DL (ref 6.4–8.3)
TOTAL PROTEIN: 5.3 G/DL (ref 6.4–8.3)
TOTAL PROTEIN: 5.4 G/DL (ref 6.4–8.3)
TOTAL PROTEIN: 5.5 G/DL (ref 6.4–8.3)
TOTAL PROTEIN: 5.6 G/DL (ref 6.4–8.3)
TOTAL PROTEIN: 5.9 G/DL (ref 6.4–8.3)
TOTAL PROTEIN: 6.2 G/DL (ref 6.4–8.3)
TOTAL PROTEIN: 6.5 G/DL (ref 6.4–8.3)
TROPONIN: <0.01 NG/ML (ref 0–0.03)
TSH SERPL DL<=0.05 MIU/L-ACNC: 0.38 UIU/ML (ref 0.27–4.2)
UROBILINOGEN, URINE: 0.2 E.U./DL
UROBILINOGEN, URINE: 0.2 E.U./DL
UROBILINOGEN, URINE: 1 E.U./DL
UROBILINOGEN, URINE: 1 E.U./DL
VANCOMYCIN TROUGH: 9.9 MCG/ML (ref 5–16)
VT MECHANICAL: 400 ML
VT MECHANICAL: 450 ML
WBC # BLD: 10.5 E9/L (ref 4.5–11.5)
WBC # BLD: 10.6 E9/L (ref 4.5–11.5)
WBC # BLD: 10.7 E9/L (ref 4.5–11.5)
WBC # BLD: 10.9 E9/L (ref 4.5–11.5)
WBC # BLD: 11.1 E9/L (ref 4.5–11.5)
WBC # BLD: 11.2 E9/L (ref 4.5–11.5)
WBC # BLD: 11.5 E9/L (ref 4.5–11.5)
WBC # BLD: 12.9 E9/L (ref 4.5–11.5)
WBC # BLD: 14.8 E9/L (ref 4.5–11.5)
WBC # BLD: 16.1 E9/L (ref 4.5–11.5)
WBC # BLD: 16.4 E9/L (ref 4.5–11.5)
WBC # BLD: 16.9 E9/L (ref 4.5–11.5)
WBC # BLD: 17.5 E9/L (ref 4.5–11.5)
WBC # BLD: 17.7 E9/L (ref 4.5–11.5)
WBC # BLD: 17.7 E9/L (ref 4.5–11.5)
WBC # BLD: 21.5 E9/L (ref 4.5–11.5)
WBC # BLD: 24.5 E9/L (ref 4.5–11.5)
WBC # BLD: 24.7 E9/L (ref 4.5–11.5)
WBC # BLD: 27.7 E9/L (ref 4.5–11.5)
WBC # BLD: 33.2 E9/L (ref 4.5–11.5)
WBC # BLD: 5.5 E9/L (ref 4.5–11.5)
WBC # BLD: 6.2 E9/L (ref 4.5–11.5)
WBC # BLD: 6.6 E9/L (ref 4.5–11.5)
WBC # BLD: 7.9 E9/L (ref 4.5–11.5)
WBC # BLD: 8.4 E9/L (ref 4.5–11.5)
WBC # BLD: 8.7 E9/L (ref 4.5–11.5)
WBC # BLD: 8.8 E9/L (ref 4.5–11.5)
WBC # BLD: 8.8 E9/L (ref 4.5–11.5)
WBC # BLD: 9.3 E9/L (ref 4.5–11.5)
WBC # BLD: 9.4 E9/L (ref 4.5–11.5)
WBC # BLD: 9.9 E9/L (ref 4.5–11.5)
WBC UA: ABNORMAL /HPF (ref 0–5)
YEAST: PRESENT /HPF

## 2020-01-01 PROCEDURE — 2000000000 HC ICU R&B

## 2020-01-01 PROCEDURE — 84133 ASSAY OF URINE POTASSIUM: CPT

## 2020-01-01 PROCEDURE — 97535 SELF CARE MNGMENT TRAINING: CPT

## 2020-01-01 PROCEDURE — 71045 X-RAY EXAM CHEST 1 VIEW: CPT

## 2020-01-01 PROCEDURE — 94640 AIRWAY INHALATION TREATMENT: CPT

## 2020-01-01 PROCEDURE — 6360000002 HC RX W HCPCS: Performed by: INTERNAL MEDICINE

## 2020-01-01 PROCEDURE — 6370000000 HC RX 637 (ALT 250 FOR IP): Performed by: PHYSICIAN ASSISTANT

## 2020-01-01 PROCEDURE — 6370000000 HC RX 637 (ALT 250 FOR IP): Performed by: NURSE PRACTITIONER

## 2020-01-01 PROCEDURE — 2580000003 HC RX 258: Performed by: STUDENT IN AN ORGANIZED HEALTH CARE EDUCATION/TRAINING PROGRAM

## 2020-01-01 PROCEDURE — C9113 INJ PANTOPRAZOLE SODIUM, VIA: HCPCS | Performed by: STUDENT IN AN ORGANIZED HEALTH CARE EDUCATION/TRAINING PROGRAM

## 2020-01-01 PROCEDURE — 97530 THERAPEUTIC ACTIVITIES: CPT

## 2020-01-01 PROCEDURE — 94660 CPAP INITIATION&MGMT: CPT

## 2020-01-01 PROCEDURE — 2580000003 HC RX 258: Performed by: INTERNAL MEDICINE

## 2020-01-01 PROCEDURE — 6370000000 HC RX 637 (ALT 250 FOR IP): Performed by: INTERNAL MEDICINE

## 2020-01-01 PROCEDURE — 80053 COMPREHEN METABOLIC PANEL: CPT

## 2020-01-01 PROCEDURE — 0BJ08ZZ INSPECTION OF TRACHEOBRONCHIAL TREE, VIA NATURAL OR ARTIFICIAL OPENING ENDOSCOPIC: ICD-10-PCS | Performed by: SURGERY

## 2020-01-01 PROCEDURE — 82962 GLUCOSE BLOOD TEST: CPT

## 2020-01-01 PROCEDURE — 85025 COMPLETE CBC W/AUTO DIFF WBC: CPT

## 2020-01-01 PROCEDURE — 6370000000 HC RX 637 (ALT 250 FOR IP): Performed by: SURGERY

## 2020-01-01 PROCEDURE — 2580000003 HC RX 258

## 2020-01-01 PROCEDURE — 93458 L HRT ARTERY/VENTRICLE ANGIO: CPT | Performed by: INTERNAL MEDICINE

## 2020-01-01 PROCEDURE — 85384 FIBRINOGEN ACTIVITY: CPT

## 2020-01-01 PROCEDURE — 2700000000 HC OXYGEN THERAPY PER DAY

## 2020-01-01 PROCEDURE — 85014 HEMATOCRIT: CPT

## 2020-01-01 PROCEDURE — 70450 CT HEAD/BRAIN W/O DYE: CPT

## 2020-01-01 PROCEDURE — 82805 BLOOD GASES W/O2 SATURATION: CPT

## 2020-01-01 PROCEDURE — 85651 RBC SED RATE NONAUTOMATED: CPT

## 2020-01-01 PROCEDURE — G0378 HOSPITAL OBSERVATION PER HR: HCPCS

## 2020-01-01 PROCEDURE — 37799 UNLISTED PX VASCULAR SURGERY: CPT

## 2020-01-01 PROCEDURE — 2580000003 HC RX 258: Performed by: NURSE PRACTITIONER

## 2020-01-01 PROCEDURE — 83735 ASSAY OF MAGNESIUM: CPT

## 2020-01-01 PROCEDURE — 72125 CT NECK SPINE W/O DYE: CPT

## 2020-01-01 PROCEDURE — 84300 ASSAY OF URINE SODIUM: CPT

## 2020-01-01 PROCEDURE — 84443 ASSAY THYROID STIM HORMONE: CPT

## 2020-01-01 PROCEDURE — 94003 VENT MGMT INPAT SUBQ DAY: CPT

## 2020-01-01 PROCEDURE — G0379 DIRECT REFER HOSPITAL OBSERV: HCPCS

## 2020-01-01 PROCEDURE — B2111ZZ FLUOROSCOPY OF MULTIPLE CORONARY ARTERIES USING LOW OSMOLAR CONTRAST: ICD-10-PCS | Performed by: INTERNAL MEDICINE

## 2020-01-01 PROCEDURE — 94002 VENT MGMT INPAT INIT DAY: CPT

## 2020-01-01 PROCEDURE — 2580000003 HC RX 258: Performed by: SURGERY

## 2020-01-01 PROCEDURE — 80048 BASIC METABOLIC PNL TOTAL CA: CPT

## 2020-01-01 PROCEDURE — 93010 ELECTROCARDIOGRAM REPORT: CPT | Performed by: INTERNAL MEDICINE

## 2020-01-01 PROCEDURE — 6360000002 HC RX W HCPCS: Performed by: EMERGENCY MEDICINE

## 2020-01-01 PROCEDURE — 6370000000 HC RX 637 (ALT 250 FOR IP): Performed by: STUDENT IN AN ORGANIZED HEALTH CARE EDUCATION/TRAINING PROGRAM

## 2020-01-01 PROCEDURE — 43762 RPLC GTUBE NO REVJ TRC: CPT

## 2020-01-01 PROCEDURE — 027035Z DILATION OF CORONARY ARTERY, ONE ARTERY WITH TWO DRUG-ELUTING INTRALUMINAL DEVICES, PERCUTANEOUS APPROACH: ICD-10-PCS | Performed by: INTERNAL MEDICINE

## 2020-01-01 PROCEDURE — 85378 FIBRIN DEGRADE SEMIQUANT: CPT

## 2020-01-01 PROCEDURE — XW033E5 INTRODUCTION OF REMDESIVIR ANTI-INFECTIVE INTO PERIPHERAL VEIN, PERCUTANEOUS APPROACH, NEW TECHNOLOGY GROUP 5: ICD-10-PCS | Performed by: INTERNAL MEDICINE

## 2020-01-01 PROCEDURE — 6360000002 HC RX W HCPCS: Performed by: SURGERY

## 2020-01-01 PROCEDURE — 84156 ASSAY OF PROTEIN URINE: CPT

## 2020-01-01 PROCEDURE — 2500000003 HC RX 250 WO HCPCS: Performed by: INTERNAL MEDICINE

## 2020-01-01 PROCEDURE — 2500000003 HC RX 250 WO HCPCS: Performed by: EMERGENCY MEDICINE

## 2020-01-01 PROCEDURE — 86850 RBC ANTIBODY SCREEN: CPT

## 2020-01-01 PROCEDURE — 99231 SBSQ HOSP IP/OBS SF/LOW 25: CPT | Performed by: FAMILY MEDICINE

## 2020-01-01 PROCEDURE — 36415 COLL VENOUS BLD VENIPUNCTURE: CPT

## 2020-01-01 PROCEDURE — 2580000003 HC RX 258: Performed by: EMERGENCY MEDICINE

## 2020-01-01 PROCEDURE — 93306 TTE W/DOPPLER COMPLETE: CPT | Performed by: PSYCHIATRY & NEUROLOGY

## 2020-01-01 PROCEDURE — 6360000002 HC RX W HCPCS: Performed by: NURSE PRACTITIONER

## 2020-01-01 PROCEDURE — 97110 THERAPEUTIC EXERCISES: CPT

## 2020-01-01 PROCEDURE — 83605 ASSAY OF LACTIC ACID: CPT

## 2020-01-01 PROCEDURE — 84100 ASSAY OF PHOSPHORUS: CPT

## 2020-01-01 PROCEDURE — 86140 C-REACTIVE PROTEIN: CPT

## 2020-01-01 PROCEDURE — 71275 CT ANGIOGRAPHY CHEST: CPT

## 2020-01-01 PROCEDURE — 94669 MECHANICAL CHEST WALL OSCILL: CPT

## 2020-01-01 PROCEDURE — 31502 CHANGE OF WINDPIPE AIRWAY: CPT

## 2020-01-01 PROCEDURE — 36556 INSERT NON-TUNNEL CV CATH: CPT

## 2020-01-01 PROCEDURE — 74176 CT ABD & PELVIS W/O CONTRAST: CPT

## 2020-01-01 PROCEDURE — U0003 INFECTIOUS AGENT DETECTION BY NUCLEIC ACID (DNA OR RNA); SEVERE ACUTE RESPIRATORY SYNDROME CORONAVIRUS 2 (SARS-COV-2) (CORONAVIRUS DISEASE [COVID-19]), AMPLIFIED PROBE TECHNIQUE, MAKING USE OF HIGH THROUGHPUT TECHNOLOGIES AS DESCRIBED BY CMS-2020-01-R: HCPCS

## 2020-01-01 PROCEDURE — 83930 ASSAY OF BLOOD OSMOLALITY: CPT

## 2020-01-01 PROCEDURE — 3430000000 HC RX DIAGNOSTIC RADIOPHARMACEUTICAL: Performed by: INTERNAL MEDICINE

## 2020-01-01 PROCEDURE — 82570 ASSAY OF URINE CREATININE: CPT

## 2020-01-01 PROCEDURE — 96375 TX/PRO/DX INJ NEW DRUG ADDON: CPT

## 2020-01-01 PROCEDURE — 3700000000 HC ANESTHESIA ATTENDED CARE: Performed by: SURGERY

## 2020-01-01 PROCEDURE — 2500000003 HC RX 250 WO HCPCS: Performed by: STUDENT IN AN ORGANIZED HEALTH CARE EDUCATION/TRAINING PROGRAM

## 2020-01-01 PROCEDURE — 99215 OFFICE O/P EST HI 40 MIN: CPT | Performed by: INTERNAL MEDICINE

## 2020-01-01 PROCEDURE — 0RSKXZZ REPOSITION LEFT SHOULDER JOINT, EXTERNAL APPROACH: ICD-10-PCS | Performed by: INTERNAL MEDICINE

## 2020-01-01 PROCEDURE — 82728 ASSAY OF FERRITIN: CPT

## 2020-01-01 PROCEDURE — 6360000002 HC RX W HCPCS: Performed by: STUDENT IN AN ORGANIZED HEALTH CARE EDUCATION/TRAINING PROGRAM

## 2020-01-01 PROCEDURE — 99222 1ST HOSP IP/OBS MODERATE 55: CPT | Performed by: SURGERY

## 2020-01-01 PROCEDURE — 2500000003 HC RX 250 WO HCPCS: Performed by: SURGERY

## 2020-01-01 PROCEDURE — 94761 N-INVAS EAR/PLS OXIMETRY MLT: CPT

## 2020-01-01 PROCEDURE — 1200000000 HC SEMI PRIVATE

## 2020-01-01 PROCEDURE — 82436 ASSAY OF URINE CHLORIDE: CPT

## 2020-01-01 PROCEDURE — 83935 ASSAY OF URINE OSMOLALITY: CPT

## 2020-01-01 PROCEDURE — 3700000001 HC ADD 15 MINUTES (ANESTHESIA): Performed by: SURGERY

## 2020-01-01 PROCEDURE — 84145 PROCALCITONIN (PCT): CPT

## 2020-01-01 PROCEDURE — 86901 BLOOD TYPING SEROLOGIC RH(D): CPT

## 2020-01-01 PROCEDURE — 86923 COMPATIBILITY TEST ELECTRIC: CPT

## 2020-01-01 PROCEDURE — C1769 GUIDE WIRE: HCPCS | Performed by: SURGERY

## 2020-01-01 PROCEDURE — 85018 HEMOGLOBIN: CPT

## 2020-01-01 PROCEDURE — 73521 X-RAY EXAM HIPS BI 2 VIEWS: CPT

## 2020-01-01 PROCEDURE — 87070 CULTURE OTHR SPECIMN AEROBIC: CPT

## 2020-01-01 PROCEDURE — 31500 INSERT EMERGENCY AIRWAY: CPT

## 2020-01-01 PROCEDURE — 96365 THER/PROPH/DIAG IV INF INIT: CPT

## 2020-01-01 PROCEDURE — C1887 CATHETER, GUIDING: HCPCS

## 2020-01-01 PROCEDURE — 99232 SBSQ HOSP IP/OBS MODERATE 35: CPT | Performed by: INTERNAL MEDICINE

## 2020-01-01 PROCEDURE — 93926 LOWER EXTREMITY STUDY: CPT

## 2020-01-01 PROCEDURE — 83880 ASSAY OF NATRIURETIC PEPTIDE: CPT

## 2020-01-01 PROCEDURE — 85347 COAGULATION TIME ACTIVATED: CPT

## 2020-01-01 PROCEDURE — 2140000000 HC CCU INTERMEDIATE R&B

## 2020-01-01 PROCEDURE — 93000 ELECTROCARDIOGRAM COMPLETE: CPT | Performed by: INTERNAL MEDICINE

## 2020-01-01 PROCEDURE — 97161 PT EVAL LOW COMPLEX 20 MIN: CPT

## 2020-01-01 PROCEDURE — 6370000000 HC RX 637 (ALT 250 FOR IP)

## 2020-01-01 PROCEDURE — 93296 REM INTERROG EVL PM/IDS: CPT | Performed by: INTERNAL MEDICINE

## 2020-01-01 PROCEDURE — 36592 COLLECT BLOOD FROM PICC: CPT

## 2020-01-01 PROCEDURE — 73030 X-RAY EXAM OF SHOULDER: CPT

## 2020-01-01 PROCEDURE — 96376 TX/PRO/DX INJ SAME DRUG ADON: CPT

## 2020-01-01 PROCEDURE — 6360000002 HC RX W HCPCS

## 2020-01-01 PROCEDURE — 02C03ZZ EXTIRPATION OF MATTER FROM CORONARY ARTERY, ONE ARTERY, PERCUTANEOUS APPROACH: ICD-10-PCS | Performed by: INTERNAL MEDICINE

## 2020-01-01 PROCEDURE — 6360000002 HC RX W HCPCS: Performed by: NURSE ANESTHETIST, CERTIFIED REGISTERED

## 2020-01-01 PROCEDURE — 93005 ELECTROCARDIOGRAM TRACING: CPT | Performed by: EMERGENCY MEDICINE

## 2020-01-01 PROCEDURE — 81001 URINALYSIS AUTO W/SCOPE: CPT

## 2020-01-01 PROCEDURE — 87449 NOS EACH ORGANISM AG IA: CPT

## 2020-01-01 PROCEDURE — 73080 X-RAY EXAM OF ELBOW: CPT

## 2020-01-01 PROCEDURE — 87206 SMEAR FLUORESCENT/ACID STAI: CPT

## 2020-01-01 PROCEDURE — 23650 CLTX SHO DSLC W/MNPJ WO ANES: CPT

## 2020-01-01 PROCEDURE — 99222 1ST HOSP IP/OBS MODERATE 55: CPT | Performed by: ORTHOPAEDIC SURGERY

## 2020-01-01 PROCEDURE — C9113 INJ PANTOPRAZOLE SODIUM, VIA: HCPCS | Performed by: SURGERY

## 2020-01-01 PROCEDURE — C9602 PERC D-E COR STENT ATHER S: HCPCS | Performed by: INTERNAL MEDICINE

## 2020-01-01 PROCEDURE — 83615 LACTATE (LD) (LDH) ENZYME: CPT

## 2020-01-01 PROCEDURE — 6360000004 HC RX CONTRAST MEDICATION: Performed by: RADIOLOGY

## 2020-01-01 PROCEDURE — 2580000003 HC RX 258: Performed by: RADIOLOGY

## 2020-01-01 PROCEDURE — 2500000003 HC RX 250 WO HCPCS

## 2020-01-01 PROCEDURE — 99285 EMERGENCY DEPT VISIT HI MDM: CPT

## 2020-01-01 PROCEDURE — C1894 INTRO/SHEATH, NON-LASER: HCPCS

## 2020-01-01 PROCEDURE — C1769 GUIDE WIRE: HCPCS

## 2020-01-01 PROCEDURE — 93005 ELECTROCARDIOGRAM TRACING: CPT | Performed by: INTERNAL MEDICINE

## 2020-01-01 PROCEDURE — 2709999900 HC NON-CHARGEABLE SUPPLY

## 2020-01-01 PROCEDURE — C9600 PERC DRUG-EL COR STENT SING: HCPCS | Performed by: INTERNAL MEDICINE

## 2020-01-01 PROCEDURE — U0002 COVID-19 LAB TEST NON-CDC: HCPCS

## 2020-01-01 PROCEDURE — 85610 PROTHROMBIN TIME: CPT

## 2020-01-01 PROCEDURE — 02HV33Z INSERTION OF INFUSION DEVICE INTO SUPERIOR VENA CAVA, PERCUTANEOUS APPROACH: ICD-10-PCS | Performed by: INTERNAL MEDICINE

## 2020-01-01 PROCEDURE — 97165 OT EVAL LOW COMPLEX 30 MIN: CPT

## 2020-01-01 PROCEDURE — 94770 HC ETCO2 MONITOR DAILY: CPT

## 2020-01-01 PROCEDURE — 89220 SPUTUM SPECIMEN COLLECTION: CPT

## 2020-01-01 PROCEDURE — 85027 COMPLETE CBC AUTOMATED: CPT

## 2020-01-01 PROCEDURE — 84484 ASSAY OF TROPONIN QUANT: CPT

## 2020-01-01 PROCEDURE — 31600 PLANNED TRACHEOSTOMY: CPT | Performed by: SURGERY

## 2020-01-01 PROCEDURE — 99152 MOD SED SAME PHYS/QHP 5/>YRS: CPT

## 2020-01-01 PROCEDURE — 93294 REM INTERROG EVL PM/LDLS PM: CPT | Performed by: INTERNAL MEDICINE

## 2020-01-01 PROCEDURE — 87040 BLOOD CULTURE FOR BACTERIA: CPT

## 2020-01-01 PROCEDURE — 86900 BLOOD TYPING SEROLOGIC ABO: CPT

## 2020-01-01 PROCEDURE — 71046 X-RAY EXAM CHEST 2 VIEWS: CPT

## 2020-01-01 PROCEDURE — 43246 EGD PLACE GASTROSTOMY TUBE: CPT | Performed by: SURGERY

## 2020-01-01 PROCEDURE — 0202U NFCT DS 22 TRGT SARS-COV-2: CPT

## 2020-01-01 PROCEDURE — P9016 RBC LEUKOCYTES REDUCED: HCPCS

## 2020-01-01 PROCEDURE — C1874 STENT, COATED/COV W/DEL SYS: HCPCS

## 2020-01-01 PROCEDURE — 96374 THER/PROPH/DIAG INJ IV PUSH: CPT

## 2020-01-01 PROCEDURE — A9500 TC99M SESTAMIBI: HCPCS | Performed by: INTERNAL MEDICINE

## 2020-01-01 PROCEDURE — 3600000002 HC SURGERY LEVEL 2 BASE: Performed by: SURGERY

## 2020-01-01 PROCEDURE — 4A023N7 MEASUREMENT OF CARDIAC SAMPLING AND PRESSURE, LEFT HEART, PERCUTANEOUS APPROACH: ICD-10-PCS | Performed by: INTERNAL MEDICINE

## 2020-01-01 PROCEDURE — 94664 DEMO&/EVAL PT USE INHALER: CPT

## 2020-01-01 PROCEDURE — 87081 CULTURE SCREEN ONLY: CPT

## 2020-01-01 PROCEDURE — 92933 PRQ TRLML C ATHRC ST ANGIOP1: CPT | Performed by: INTERNAL MEDICINE

## 2020-01-01 PROCEDURE — 80202 ASSAY OF VANCOMYCIN: CPT

## 2020-01-01 PROCEDURE — P9035 PLATELET PHERES LEUKOREDUCED: HCPCS

## 2020-01-01 PROCEDURE — 99233 SBSQ HOSP IP/OBS HIGH 50: CPT | Performed by: STUDENT IN AN ORGANIZED HEALTH CARE EDUCATION/TRAINING PROGRAM

## 2020-01-01 PROCEDURE — XW033H5 INTRODUCTION OF TOCILIZUMAB INTO PERIPHERAL VEIN, PERCUTANEOUS APPROACH, NEW TECHNOLOGY GROUP 5: ICD-10-PCS | Performed by: INTERNAL MEDICINE

## 2020-01-01 PROCEDURE — C1751 CATH, INF, PER/CENT/MIDLINE: HCPCS

## 2020-01-01 PROCEDURE — 36569 INSJ PICC 5 YR+ W/O IMAGING: CPT

## 2020-01-01 PROCEDURE — 99221 1ST HOSP IP/OBS SF/LOW 40: CPT | Performed by: STUDENT IN AN ORGANIZED HEALTH CARE EDUCATION/TRAINING PROGRAM

## 2020-01-01 PROCEDURE — P9047 ALBUMIN (HUMAN), 25%, 50ML: HCPCS | Performed by: INTERNAL MEDICINE

## 2020-01-01 PROCEDURE — 0B110F4 BYPASS TRACHEA TO CUTANEOUS WITH TRACHEOSTOMY DEVICE, OPEN APPROACH: ICD-10-PCS | Performed by: SURGERY

## 2020-01-01 PROCEDURE — 78452 HT MUSCLE IMAGE SPECT MULT: CPT

## 2020-01-01 PROCEDURE — 85730 THROMBOPLASTIN TIME PARTIAL: CPT

## 2020-01-01 PROCEDURE — 72131 CT LUMBAR SPINE W/O DYE: CPT

## 2020-01-01 PROCEDURE — 5A1955Z RESPIRATORY VENTILATION, GREATER THAN 96 CONSECUTIVE HOURS: ICD-10-PCS | Performed by: INTERNAL MEDICINE

## 2020-01-01 PROCEDURE — 99213 OFFICE O/P EST LOW 20 MIN: CPT | Performed by: INTERNAL MEDICINE

## 2020-01-01 PROCEDURE — 3600000012 HC SURGERY LEVEL 2 ADDTL 15MIN: Performed by: SURGERY

## 2020-01-01 PROCEDURE — 74018 RADEX ABDOMEN 1 VIEW: CPT

## 2020-01-01 PROCEDURE — 82533 TOTAL CORTISOL: CPT

## 2020-01-01 PROCEDURE — C1725 CATH, TRANSLUMIN NON-LASER: HCPCS

## 2020-01-01 PROCEDURE — C1724 CATH, TRANS ATHEREC,ROTATION: HCPCS

## 2020-01-01 PROCEDURE — 93017 CV STRESS TEST TRACING ONLY: CPT

## 2020-01-01 PROCEDURE — 96368 THER/DIAG CONCURRENT INF: CPT

## 2020-01-01 PROCEDURE — 2500000003 HC RX 250 WO HCPCS: Performed by: NURSE ANESTHETIST, CERTIFIED REGISTERED

## 2020-01-01 PROCEDURE — 2709999900 HC NON-CHARGEABLE SUPPLY: Performed by: SURGERY

## 2020-01-01 PROCEDURE — 0DH63UZ INSERTION OF FEEDING DEVICE INTO STOMACH, PERCUTANEOUS APPROACH: ICD-10-PCS | Performed by: SURGERY

## 2020-01-01 PROCEDURE — 93005 ELECTROCARDIOGRAM TRACING: CPT | Performed by: STUDENT IN AN ORGANIZED HEALTH CARE EDUCATION/TRAINING PROGRAM

## 2020-01-01 PROCEDURE — 76937 US GUIDE VASCULAR ACCESS: CPT

## 2020-01-01 RX ORDER — 0.9 % SODIUM CHLORIDE 0.9 %
250 INTRAVENOUS SOLUTION INTRAVENOUS ONCE
Status: COMPLETED | OUTPATIENT
Start: 2020-01-01 | End: 2020-01-01

## 2020-01-01 RX ORDER — ZINC SULFATE 50(220)MG
50 CAPSULE ORAL DAILY
Status: DISCONTINUED | OUTPATIENT
Start: 2020-01-01 | End: 2021-01-01 | Stop reason: HOSPADM

## 2020-01-01 RX ORDER — PREDNISONE 20 MG/1
40 TABLET ORAL DAILY
Qty: 20 TABLET | Refills: 0 | Status: ON HOLD | DISCHARGE
Start: 2020-01-01 | End: 2021-01-01 | Stop reason: HOSPADM

## 2020-01-01 RX ORDER — MIDAZOLAM HYDROCHLORIDE 2 MG/2ML
2 INJECTION, SOLUTION INTRAMUSCULAR; INTRAVENOUS ONCE
Status: COMPLETED | OUTPATIENT
Start: 2020-01-01 | End: 2020-01-01

## 2020-01-01 RX ORDER — SODIUM CHLORIDE 0.9 % (FLUSH) 0.9 %
10 SYRINGE (ML) INJECTION PRN
Status: DISCONTINUED | OUTPATIENT
Start: 2020-01-01 | End: 2020-01-01 | Stop reason: HOSPADM

## 2020-01-01 RX ORDER — RANOLAZINE 500 MG/1
500 TABLET, EXTENDED RELEASE ORAL 2 TIMES DAILY
Qty: 180 TABLET | Refills: 3 | Status: SHIPPED | OUTPATIENT
Start: 2020-01-01

## 2020-01-01 RX ORDER — LIDOCAINE HYDROCHLORIDE AND EPINEPHRINE 10; 10 MG/ML; UG/ML
INJECTION, SOLUTION INFILTRATION; PERINEURAL PRN
Status: DISCONTINUED | OUTPATIENT
Start: 2020-01-01 | End: 2020-01-01 | Stop reason: ALTCHOICE

## 2020-01-01 RX ORDER — ZINC GLUCONATE 50 MG
50 TABLET ORAL DAILY
COMMUNITY

## 2020-01-01 RX ORDER — ACETYLCYSTEINE 200 MG/ML
70 SOLUTION ORAL; RESPIRATORY (INHALATION) 3 TIMES DAILY PRN
COMMUNITY

## 2020-01-01 RX ORDER — SODIUM CHLORIDE 9 MG/ML
10 INJECTION INTRAVENOUS DAILY
Status: DISCONTINUED | OUTPATIENT
Start: 2020-01-01 | End: 2021-01-01

## 2020-01-01 RX ORDER — POTASSIUM CHLORIDE 29.8 MG/ML
40 INJECTION INTRAVENOUS ONCE
Status: COMPLETED | OUTPATIENT
Start: 2020-01-01 | End: 2020-01-01

## 2020-01-01 RX ORDER — ACETAMINOPHEN 325 MG/1
650 TABLET ORAL EVERY 6 HOURS PRN
Status: DISCONTINUED | OUTPATIENT
Start: 2020-01-01 | End: 2020-01-01 | Stop reason: HOSPADM

## 2020-01-01 RX ORDER — LIDOCAINE 4 G/G
1 PATCH TOPICAL DAILY
Status: DISCONTINUED | OUTPATIENT
Start: 2020-01-01 | End: 2020-01-01 | Stop reason: HOSPADM

## 2020-01-01 RX ORDER — BUDESONIDE 0.5 MG/2ML
500 INHALANT ORAL 2 TIMES DAILY
Status: DISCONTINUED | OUTPATIENT
Start: 2020-01-01 | End: 2020-01-01 | Stop reason: HOSPADM

## 2020-01-01 RX ORDER — MAGNESIUM SULFATE IN WATER 40 MG/ML
2 INJECTION, SOLUTION INTRAVENOUS ONCE
Status: COMPLETED | OUTPATIENT
Start: 2020-01-01 | End: 2020-01-01

## 2020-01-01 RX ORDER — ARFORMOTEROL TARTRATE 15 UG/2ML
15 SOLUTION RESPIRATORY (INHALATION) 2 TIMES DAILY
Status: DISCONTINUED | OUTPATIENT
Start: 2020-01-01 | End: 2020-01-01 | Stop reason: HOSPADM

## 2020-01-01 RX ORDER — INSULIN GLARGINE 100 [IU]/ML
10 INJECTION, SOLUTION SUBCUTANEOUS DAILY
Status: DISCONTINUED | OUTPATIENT
Start: 2020-01-01 | End: 2020-01-01

## 2020-01-01 RX ORDER — PREDNISONE 20 MG/1
40 TABLET ORAL DAILY
Qty: 20 TABLET | Refills: 0 | Status: SHIPPED | OUTPATIENT
Start: 2020-01-01 | End: 2020-01-01

## 2020-01-01 RX ORDER — HEPARIN SODIUM (PORCINE) LOCK FLUSH IV SOLN 100 UNIT/ML 100 UNIT/ML
3 SOLUTION INTRAVENOUS PRN
Status: DISCONTINUED | OUTPATIENT
Start: 2020-01-01 | End: 2021-01-01 | Stop reason: HOSPADM

## 2020-01-01 RX ORDER — METOPROLOL SUCCINATE 25 MG/1
25 TABLET, EXTENDED RELEASE ORAL DAILY
Status: DISCONTINUED | OUTPATIENT
Start: 2020-01-01 | End: 2020-01-01 | Stop reason: ALTCHOICE

## 2020-01-01 RX ORDER — SODIUM CHLORIDE 0.9 % (FLUSH) 0.9 %
10 SYRINGE (ML) INJECTION EVERY 12 HOURS SCHEDULED
Status: DISCONTINUED | OUTPATIENT
Start: 2020-01-01 | End: 2020-01-01 | Stop reason: HOSPADM

## 2020-01-01 RX ORDER — ALBUTEROL SULFATE 90 UG/1
2 AEROSOL, METERED RESPIRATORY (INHALATION)
Status: DISCONTINUED | OUTPATIENT
Start: 2020-01-01 | End: 2020-01-01

## 2020-01-01 RX ORDER — POLYETHYLENE GLYCOL 3350 17 G/17G
17 POWDER, FOR SOLUTION ORAL DAILY PRN
Status: DISCONTINUED | OUTPATIENT
Start: 2020-01-01 | End: 2021-01-01 | Stop reason: HOSPADM

## 2020-01-01 RX ORDER — CLOPIDOGREL BISULFATE 75 MG/1
75 TABLET ORAL DAILY
Status: DISCONTINUED | OUTPATIENT
Start: 2020-01-01 | End: 2020-01-01 | Stop reason: HOSPADM

## 2020-01-01 RX ORDER — ALBUTEROL SULFATE 2.5 MG/3ML
2.5 SOLUTION RESPIRATORY (INHALATION) 4 TIMES DAILY
Status: DISPENSED | OUTPATIENT
Start: 2020-01-01 | End: 2020-01-01

## 2020-01-01 RX ORDER — ACETAMINOPHEN 650 MG/1
650 SUPPOSITORY RECTAL EVERY 6 HOURS PRN
Status: DISCONTINUED | OUTPATIENT
Start: 2020-01-01 | End: 2020-01-01 | Stop reason: HOSPADM

## 2020-01-01 RX ORDER — ALBUMIN (HUMAN) 12.5 G/50ML
25 SOLUTION INTRAVENOUS ONCE
Status: COMPLETED | OUTPATIENT
Start: 2020-01-01 | End: 2020-01-01

## 2020-01-01 RX ORDER — 0.9 % SODIUM CHLORIDE 0.9 %
20 INTRAVENOUS SOLUTION INTRAVENOUS ONCE
Status: COMPLETED | OUTPATIENT
Start: 2020-01-01 | End: 2020-01-01

## 2020-01-01 RX ORDER — SODIUM CHLORIDE 0.9 % (FLUSH) 0.9 %
10 SYRINGE (ML) INJECTION EVERY 12 HOURS SCHEDULED
Status: DISCONTINUED | OUTPATIENT
Start: 2020-01-01 | End: 2021-01-01 | Stop reason: HOSPADM

## 2020-01-01 RX ORDER — OXYCODONE AND ACETAMINOPHEN 10; 325 MG/1; MG/1
1 TABLET ORAL EVERY 4 HOURS PRN
Status: DISCONTINUED | OUTPATIENT
Start: 2020-01-01 | End: 2020-01-01 | Stop reason: HOSPADM

## 2020-01-01 RX ORDER — POTASSIUM CHLORIDE 20 MEQ/1
40 TABLET, EXTENDED RELEASE ORAL ONCE
Status: COMPLETED | OUTPATIENT
Start: 2020-01-01 | End: 2020-01-01

## 2020-01-01 RX ORDER — METOPROLOL SUCCINATE 25 MG/1
25 TABLET, EXTENDED RELEASE ORAL DAILY
Status: DISCONTINUED | OUTPATIENT
Start: 2020-01-01 | End: 2020-01-01 | Stop reason: HOSPADM

## 2020-01-01 RX ORDER — ARFORMOTEROL TARTRATE 15 UG/2ML
15 SOLUTION RESPIRATORY (INHALATION) 2 TIMES DAILY
Qty: 120 ML | Refills: 3 | Status: SHIPPED | OUTPATIENT
Start: 2020-01-01 | End: 2020-01-01

## 2020-01-01 RX ORDER — ALBUTEROL SULFATE 90 UG/1
2 AEROSOL, METERED RESPIRATORY (INHALATION) ONCE
Status: COMPLETED | OUTPATIENT
Start: 2020-01-01 | End: 2020-01-01

## 2020-01-01 RX ORDER — METHYLPREDNISOLONE SODIUM SUCCINATE 40 MG/ML
40 INJECTION, POWDER, LYOPHILIZED, FOR SOLUTION INTRAMUSCULAR; INTRAVENOUS EVERY 12 HOURS
Status: DISCONTINUED | OUTPATIENT
Start: 2020-01-01 | End: 2020-01-01

## 2020-01-01 RX ORDER — ACETAMINOPHEN 650 MG/1
650 SUPPOSITORY RECTAL ONCE
Status: DISCONTINUED | OUTPATIENT
Start: 2020-01-01 | End: 2021-01-01

## 2020-01-01 RX ORDER — ONDANSETRON 2 MG/ML
4 INJECTION INTRAMUSCULAR; INTRAVENOUS EVERY 6 HOURS PRN
Status: DISCONTINUED | OUTPATIENT
Start: 2020-01-01 | End: 2021-01-01 | Stop reason: HOSPADM

## 2020-01-01 RX ORDER — CHLORHEXIDINE GLUCONATE 0.12 MG/ML
15 RINSE ORAL 2 TIMES DAILY
Status: DISCONTINUED | OUTPATIENT
Start: 2020-01-01 | End: 2021-01-01 | Stop reason: HOSPADM

## 2020-01-01 RX ORDER — ETOMIDATE 2 MG/ML
0.3 INJECTION INTRAVENOUS ONCE
Status: COMPLETED | OUTPATIENT
Start: 2020-01-01 | End: 2020-01-01

## 2020-01-01 RX ORDER — LIDOCAINE HYDROCHLORIDE 20 MG/ML
INJECTION, SOLUTION EPIDURAL; INFILTRATION; INTRACAUDAL; PERINEURAL PRN
Status: DISCONTINUED | OUTPATIENT
Start: 2020-01-01 | End: 2020-01-01 | Stop reason: SDUPTHER

## 2020-01-01 RX ORDER — 0.9 % SODIUM CHLORIDE 0.9 %
1000 INTRAVENOUS SOLUTION INTRAVENOUS ONCE
Status: COMPLETED | OUTPATIENT
Start: 2020-01-01 | End: 2020-01-01

## 2020-01-01 RX ORDER — ATORVASTATIN CALCIUM 10 MG/1
10 TABLET, FILM COATED ORAL NIGHTLY
Status: DISCONTINUED | OUTPATIENT
Start: 2020-01-01 | End: 2020-01-01 | Stop reason: HOSPADM

## 2020-01-01 RX ORDER — CARVEDILOL 6.25 MG/1
6.25 TABLET ORAL 2 TIMES DAILY
Qty: 60 TABLET | Refills: 11 | Status: SHIPPED
Start: 2020-01-01 | End: 2020-01-01 | Stop reason: SINTOL

## 2020-01-01 RX ORDER — SENNOSIDES 8.8 MG/5ML
5 LIQUID ORAL NIGHTLY
Status: DISCONTINUED | OUTPATIENT
Start: 2020-01-01 | End: 2021-01-01

## 2020-01-01 RX ORDER — GABAPENTIN 400 MG/1
800 CAPSULE ORAL 3 TIMES DAILY
Status: DISCONTINUED | OUTPATIENT
Start: 2020-01-01 | End: 2020-01-01 | Stop reason: HOSPADM

## 2020-01-01 RX ORDER — ASCORBIC ACID 500 MG
500 TABLET ORAL 2 TIMES DAILY
Status: DISCONTINUED | OUTPATIENT
Start: 2020-01-01 | End: 2021-01-01

## 2020-01-01 RX ORDER — ASPIRIN 325 MG
325 TABLET ORAL ONCE
Status: COMPLETED | OUTPATIENT
Start: 2020-01-01 | End: 2020-01-01

## 2020-01-01 RX ORDER — FLUTICASONE FUROATE AND VILANTEROL 100; 25 UG/1; UG/1
1 POWDER RESPIRATORY (INHALATION) DAILY
Status: DISCONTINUED | OUTPATIENT
Start: 2020-01-01 | End: 2020-01-01 | Stop reason: CLARIF

## 2020-01-01 RX ORDER — ONDANSETRON 2 MG/ML
4 INJECTION INTRAMUSCULAR; INTRAVENOUS EVERY 6 HOURS PRN
Status: DISCONTINUED | OUTPATIENT
Start: 2020-01-01 | End: 2020-01-01 | Stop reason: HOSPADM

## 2020-01-01 RX ORDER — IPRATROPIUM BROMIDE AND ALBUTEROL SULFATE 2.5; .5 MG/3ML; MG/3ML
1 SOLUTION RESPIRATORY (INHALATION)
Status: DISCONTINUED | OUTPATIENT
Start: 2020-01-01 | End: 2021-01-01 | Stop reason: HOSPADM

## 2020-01-01 RX ORDER — DOXYCYCLINE HYCLATE 100 MG/1
100 CAPSULE ORAL EVERY 12 HOURS SCHEDULED
Status: DISCONTINUED | OUTPATIENT
Start: 2020-01-01 | End: 2020-01-01

## 2020-01-01 RX ORDER — PANTOPRAZOLE SODIUM 40 MG/1
40 TABLET, DELAYED RELEASE ORAL DAILY
Status: ON HOLD | COMMUNITY
End: 2021-01-01 | Stop reason: HOSPADM

## 2020-01-01 RX ORDER — MORPHINE SULFATE 2 MG/ML
2 INJECTION, SOLUTION INTRAMUSCULAR; INTRAVENOUS
Status: DISCONTINUED | OUTPATIENT
Start: 2020-01-01 | End: 2020-01-01

## 2020-01-01 RX ORDER — METHYLPREDNISOLONE SODIUM SUCCINATE 40 MG/ML
40 INJECTION, POWDER, LYOPHILIZED, FOR SOLUTION INTRAMUSCULAR; INTRAVENOUS EVERY 8 HOURS
Status: DISCONTINUED | OUTPATIENT
Start: 2020-01-01 | End: 2020-01-01

## 2020-01-01 RX ORDER — LEVOTHYROXINE SODIUM 112 UG/1
112 TABLET ORAL DAILY
Status: DISCONTINUED | OUTPATIENT
Start: 2020-01-01 | End: 2020-01-01 | Stop reason: HOSPADM

## 2020-01-01 RX ORDER — SODIUM CHLORIDE 9 MG/ML
INJECTION, SOLUTION INTRAVENOUS CONTINUOUS
Status: ACTIVE | OUTPATIENT
Start: 2020-01-01 | End: 2020-01-01

## 2020-01-01 RX ORDER — OXYCODONE AND ACETAMINOPHEN 10; 325 MG/1; MG/1
1 TABLET ORAL EVERY 4 HOURS PRN
Qty: 18 TABLET | Refills: 0 | Status: SHIPPED | DISCHARGE
Start: 2020-01-01 | End: 2020-01-01

## 2020-01-01 RX ORDER — BUMETANIDE 0.25 MG/ML
1 INJECTION, SOLUTION INTRAMUSCULAR; INTRAVENOUS ONCE
Status: COMPLETED | OUTPATIENT
Start: 2020-01-01 | End: 2020-01-01

## 2020-01-01 RX ORDER — SODIUM CHLORIDE 0.9 % (FLUSH) 0.9 %
10 SYRINGE (ML) INJECTION PRN
Status: COMPLETED | OUTPATIENT
Start: 2020-01-01 | End: 2020-01-01

## 2020-01-01 RX ORDER — KETAMINE HYDROCHLORIDE 10 MG/ML
1 INJECTION, SOLUTION INTRAMUSCULAR; INTRAVENOUS ONCE
Status: COMPLETED | OUTPATIENT
Start: 2020-01-01 | End: 2020-01-01

## 2020-01-01 RX ORDER — METOPROLOL SUCCINATE 25 MG/1
25 TABLET, EXTENDED RELEASE ORAL DAILY
Status: ON HOLD | COMMUNITY
End: 2021-01-01 | Stop reason: HOSPADM

## 2020-01-01 RX ORDER — DEXTROSE MONOHYDRATE 25 G/50ML
INJECTION, SOLUTION INTRAVENOUS
Status: COMPLETED
Start: 2020-01-01 | End: 2020-01-01

## 2020-01-01 RX ORDER — SODIUM CHLORIDE FOR INHALATION 3 %
4 VIAL, NEBULIZER (ML) INHALATION 2 TIMES DAILY
Status: DISCONTINUED | OUTPATIENT
Start: 2020-01-01 | End: 2020-01-01

## 2020-01-01 RX ORDER — INSULIN GLARGINE 100 [IU]/ML
25 INJECTION, SOLUTION SUBCUTANEOUS DAILY
Status: DISCONTINUED | OUTPATIENT
Start: 2020-01-01 | End: 2021-01-01

## 2020-01-01 RX ORDER — OXYCODONE HYDROCHLORIDE AND ACETAMINOPHEN 5; 325 MG/1; MG/1
1 TABLET ORAL EVERY 4 HOURS PRN
Status: DISCONTINUED | OUTPATIENT
Start: 2020-01-01 | End: 2020-01-01 | Stop reason: HOSPADM

## 2020-01-01 RX ORDER — MORPHINE SULFATE 2 MG/ML
2 INJECTION, SOLUTION INTRAMUSCULAR; INTRAVENOUS EVERY 6 HOURS PRN
Status: DISCONTINUED | OUTPATIENT
Start: 2020-01-01 | End: 2020-01-01 | Stop reason: HOSPADM

## 2020-01-01 RX ORDER — POTASSIUM CHLORIDE 20 MEQ/1
20 TABLET, EXTENDED RELEASE ORAL DAILY
Status: DISCONTINUED | OUTPATIENT
Start: 2020-01-01 | End: 2020-01-01 | Stop reason: HOSPADM

## 2020-01-01 RX ORDER — ARFORMOTEROL TARTRATE 15 UG/2ML
15 SOLUTION RESPIRATORY (INHALATION) 2 TIMES DAILY
Qty: 120 ML | Refills: 3 | DISCHARGE
Start: 2020-01-01

## 2020-01-01 RX ORDER — GUAIFENESIN 400 MG/1
400 TABLET ORAL 3 TIMES DAILY
Qty: 56 TABLET | Refills: 0 | Status: SHIPPED | OUTPATIENT
Start: 2020-01-01 | End: 2020-01-01

## 2020-01-01 RX ORDER — IPRATROPIUM BROMIDE AND ALBUTEROL SULFATE 2.5; .5 MG/3ML; MG/3ML
1 SOLUTION RESPIRATORY (INHALATION) 4 TIMES DAILY
Qty: 360 ML | Refills: 3 | Status: SHIPPED | OUTPATIENT
Start: 2020-01-01 | End: 2020-01-01 | Stop reason: SDUPTHER

## 2020-01-01 RX ORDER — NICOTINE 21 MG/24HR
1 PATCH, TRANSDERMAL 24 HOURS TRANSDERMAL DAILY
Status: DISCONTINUED | OUTPATIENT
Start: 2020-01-01 | End: 2021-01-01 | Stop reason: HOSPADM

## 2020-01-01 RX ORDER — POTASSIUM CHLORIDE 20 MEQ/1
20 TABLET, EXTENDED RELEASE ORAL DAILY
Status: DISCONTINUED | OUTPATIENT
Start: 2020-01-01 | End: 2020-01-01

## 2020-01-01 RX ORDER — CALCIUM GLUCONATE 94 MG/ML
1 INJECTION, SOLUTION INTRAVENOUS ONCE
Status: DISCONTINUED | OUTPATIENT
Start: 2020-01-01 | End: 2020-01-01

## 2020-01-01 RX ORDER — ACETAMINOPHEN 325 MG/1
650 TABLET ORAL EVERY 6 HOURS PRN
Status: DISCONTINUED | OUTPATIENT
Start: 2020-01-01 | End: 2020-01-01 | Stop reason: SDUPTHER

## 2020-01-01 RX ORDER — LIDOCAINE HYDROCHLORIDE 10 MG/ML
5 INJECTION, SOLUTION EPIDURAL; INFILTRATION; INTRACAUDAL; PERINEURAL ONCE
Status: COMPLETED | OUTPATIENT
Start: 2020-01-01 | End: 2020-01-01

## 2020-01-01 RX ORDER — METOPROLOL SUCCINATE 25 MG/1
25 TABLET, EXTENDED RELEASE ORAL DAILY
COMMUNITY
End: 2020-01-01 | Stop reason: SDUPTHER

## 2020-01-01 RX ORDER — MORPHINE SULFATE 2 MG/ML
INJECTION, SOLUTION INTRAMUSCULAR; INTRAVENOUS
Status: COMPLETED
Start: 2020-01-01 | End: 2020-01-01

## 2020-01-01 RX ORDER — BUDESONIDE 0.5 MG/2ML
500 INHALANT ORAL 2 TIMES DAILY
Qty: 60 AMPULE | Refills: 3 | Status: SHIPPED | OUTPATIENT
Start: 2020-01-01 | End: 2020-01-01

## 2020-01-01 RX ORDER — BUMETANIDE 0.25 MG/ML
0.5 INJECTION, SOLUTION INTRAMUSCULAR; INTRAVENOUS ONCE
Status: DISCONTINUED | OUTPATIENT
Start: 2020-01-01 | End: 2020-01-01

## 2020-01-01 RX ORDER — FENTANYL CITRATE 50 UG/ML
INJECTION, SOLUTION INTRAMUSCULAR; INTRAVENOUS PRN
Status: DISCONTINUED | OUTPATIENT
Start: 2020-01-01 | End: 2020-01-01 | Stop reason: SDUPTHER

## 2020-01-01 RX ORDER — ASPIRIN 81 MG/1
81 TABLET ORAL DAILY
Status: DISCONTINUED | OUTPATIENT
Start: 2020-01-01 | End: 2020-01-01 | Stop reason: SDUPTHER

## 2020-01-01 RX ORDER — LEVOTHYROXINE SODIUM 0.1 MG/1
100 TABLET ORAL DAILY
Status: DISCONTINUED | OUTPATIENT
Start: 2020-01-01 | End: 2021-01-01

## 2020-01-01 RX ORDER — SODIUM CHLORIDE 9 MG/ML
INJECTION, SOLUTION INTRAVENOUS ONCE
Status: COMPLETED | OUTPATIENT
Start: 2020-01-01 | End: 2020-01-01

## 2020-01-01 RX ORDER — ACETAMINOPHEN 325 MG/1
650 TABLET ORAL EVERY 6 HOURS PRN
COMMUNITY

## 2020-01-01 RX ORDER — VITAMIN B COMPLEX
2000 TABLET ORAL DAILY
Status: DISCONTINUED | OUTPATIENT
Start: 2020-01-01 | End: 2021-01-01

## 2020-01-01 RX ORDER — METHOCARBAMOL 500 MG/1
500 TABLET, FILM COATED ORAL 4 TIMES DAILY
Status: DISCONTINUED | OUTPATIENT
Start: 2020-01-01 | End: 2020-01-01 | Stop reason: HOSPADM

## 2020-01-01 RX ORDER — ACETAMINOPHEN 650 MG/1
650 SUPPOSITORY RECTAL EVERY 6 HOURS PRN
Status: DISCONTINUED | OUTPATIENT
Start: 2020-01-01 | End: 2021-01-01

## 2020-01-01 RX ORDER — KETOROLAC TROMETHAMINE 30 MG/ML
15 INJECTION, SOLUTION INTRAMUSCULAR; INTRAVENOUS EVERY 6 HOURS PRN
Status: DISPENSED | OUTPATIENT
Start: 2020-01-01 | End: 2020-01-01

## 2020-01-01 RX ORDER — INSULIN GLARGINE 100 [IU]/ML
15 INJECTION, SOLUTION SUBCUTANEOUS ONCE
Status: COMPLETED | OUTPATIENT
Start: 2020-01-01 | End: 2020-01-01

## 2020-01-01 RX ORDER — PROMETHAZINE HYDROCHLORIDE 25 MG/1
12.5 TABLET ORAL EVERY 6 HOURS PRN
Status: DISCONTINUED | OUTPATIENT
Start: 2020-01-01 | End: 2021-01-01

## 2020-01-01 RX ORDER — AMIODARONE HYDROCHLORIDE 200 MG/1
200 TABLET ORAL DAILY
Status: DISCONTINUED | OUTPATIENT
Start: 2021-01-01 | End: 2021-01-01

## 2020-01-01 RX ORDER — IPRATROPIUM BROMIDE AND ALBUTEROL SULFATE 2.5; .5 MG/3ML; MG/3ML
1 SOLUTION RESPIRATORY (INHALATION)
Status: DISCONTINUED | OUTPATIENT
Start: 2020-01-01 | End: 2020-01-01 | Stop reason: SDUPTHER

## 2020-01-01 RX ORDER — ACETAMINOPHEN 325 MG/1
650 TABLET ORAL EVERY 6 HOURS PRN
Status: DISCONTINUED | OUTPATIENT
Start: 2020-01-01 | End: 2021-01-01

## 2020-01-01 RX ORDER — POTASSIUM CHLORIDE 29.8 MG/ML
20 INJECTION INTRAVENOUS ONCE
Status: COMPLETED | OUTPATIENT
Start: 2020-01-01 | End: 2020-01-01

## 2020-01-01 RX ORDER — AMIODARONE HYDROCHLORIDE 200 MG/1
400 TABLET ORAL 2 TIMES DAILY
Status: COMPLETED | OUTPATIENT
Start: 2020-01-01 | End: 2021-01-01

## 2020-01-01 RX ORDER — 0.9 % SODIUM CHLORIDE 0.9 %
30 INTRAVENOUS SOLUTION INTRAVENOUS PRN
Status: DISCONTINUED | OUTPATIENT
Start: 2020-01-01 | End: 2020-01-01

## 2020-01-01 RX ORDER — IPRATROPIUM BROMIDE AND ALBUTEROL SULFATE 2.5; .5 MG/3ML; MG/3ML
3 SOLUTION RESPIRATORY (INHALATION) ONCE
Status: DISCONTINUED | OUTPATIENT
Start: 2020-01-01 | End: 2020-01-01

## 2020-01-01 RX ORDER — ACETYLCYSTEINE 200 MG/ML
400 SOLUTION ORAL; RESPIRATORY (INHALATION) 2 TIMES DAILY
Status: DISCONTINUED | OUTPATIENT
Start: 2020-01-01 | End: 2020-01-01 | Stop reason: HOSPADM

## 2020-01-01 RX ORDER — POLYETHYLENE GLYCOL 3350 17 G/17G
17 POWDER, FOR SOLUTION ORAL DAILY PRN
Status: DISCONTINUED | OUTPATIENT
Start: 2020-01-01 | End: 2020-01-01 | Stop reason: HOSPADM

## 2020-01-01 RX ORDER — IPRATROPIUM BROMIDE AND ALBUTEROL SULFATE 2.5; .5 MG/3ML; MG/3ML
1 SOLUTION RESPIRATORY (INHALATION) 4 TIMES DAILY
Qty: 360 ML | Refills: 3 | DISCHARGE
Start: 2020-01-01

## 2020-01-01 RX ORDER — OXYCODONE AND ACETAMINOPHEN 10; 325 MG/1; MG/1
1 TABLET ORAL EVERY 4 HOURS PRN
Status: ON HOLD | COMMUNITY
End: 2021-01-01 | Stop reason: HOSPADM

## 2020-01-01 RX ORDER — ARFORMOTEROL TARTRATE 15 UG/2ML
15 SOLUTION RESPIRATORY (INHALATION) 2 TIMES DAILY
Status: DISCONTINUED | OUTPATIENT
Start: 2020-01-01 | End: 2021-01-01 | Stop reason: HOSPADM

## 2020-01-01 RX ORDER — GABAPENTIN 400 MG/1
800 CAPSULE ORAL 3 TIMES DAILY
Status: DISCONTINUED | OUTPATIENT
Start: 2020-01-01 | End: 2021-01-01 | Stop reason: HOSPADM

## 2020-01-01 RX ORDER — RANOLAZINE 500 MG/1
500 TABLET, EXTENDED RELEASE ORAL 2 TIMES DAILY
Status: DISCONTINUED | OUTPATIENT
Start: 2020-01-01 | End: 2020-01-01

## 2020-01-01 RX ORDER — 0.9 % SODIUM CHLORIDE 0.9 %
500 INTRAVENOUS SOLUTION INTRAVENOUS ONCE
Status: COMPLETED | OUTPATIENT
Start: 2020-01-01 | End: 2020-01-01

## 2020-01-01 RX ORDER — POTASSIUM CHLORIDE 20 MEQ/1
20 TABLET, EXTENDED RELEASE ORAL ONCE
Status: COMPLETED | OUTPATIENT
Start: 2020-01-01 | End: 2020-01-01

## 2020-01-01 RX ORDER — MECOBAL/LEVOMEFOLAT CA/B6 PHOS 2-3-35 MG
TABLET ORAL DAILY
COMMUNITY

## 2020-01-01 RX ORDER — SODIUM CHLORIDE 450 MG/100ML
INJECTION, SOLUTION INTRAVENOUS CONTINUOUS
Status: DISCONTINUED | OUTPATIENT
Start: 2020-01-01 | End: 2020-01-01

## 2020-01-01 RX ORDER — LANOLIN ALCOHOL/MO/W.PET/CERES
100 CREAM (GRAM) TOPICAL DAILY
Status: DISCONTINUED | OUTPATIENT
Start: 2020-01-01 | End: 2021-01-01 | Stop reason: HOSPADM

## 2020-01-01 RX ORDER — DEXAMETHASONE SODIUM PHOSPHATE 10 MG/ML
10 INJECTION, SOLUTION INTRAMUSCULAR; INTRAVENOUS ONCE
Status: COMPLETED | OUTPATIENT
Start: 2020-01-01 | End: 2020-01-01

## 2020-01-01 RX ORDER — POTASSIUM CHLORIDE 29.8 MG/ML
40 INJECTION INTRAVENOUS
Status: DISCONTINUED | OUTPATIENT
Start: 2020-01-01 | End: 2020-01-01

## 2020-01-01 RX ORDER — ATORVASTATIN CALCIUM 40 MG/1
40 TABLET, FILM COATED ORAL NIGHTLY
Status: DISCONTINUED | OUTPATIENT
Start: 2020-01-01 | End: 2020-01-01 | Stop reason: HOSPADM

## 2020-01-01 RX ORDER — FENTANYL 50 UG/H
1 PATCH TRANSDERMAL
Status: DISCONTINUED | OUTPATIENT
Start: 2020-01-01 | End: 2021-01-01

## 2020-01-01 RX ORDER — ACETAMINOPHEN 650 MG/1
650 SUPPOSITORY RECTAL EVERY 6 HOURS PRN
Status: DISCONTINUED | OUTPATIENT
Start: 2020-01-01 | End: 2020-01-01 | Stop reason: SDUPTHER

## 2020-01-01 RX ORDER — METOPROLOL TARTRATE 5 MG/5ML
5 INJECTION INTRAVENOUS EVERY 6 HOURS PRN
Status: DISCONTINUED | OUTPATIENT
Start: 2020-01-01 | End: 2021-01-01

## 2020-01-01 RX ORDER — NICOTINE 21 MG/24HR
1 PATCH, TRANSDERMAL 24 HOURS TRANSDERMAL DAILY
Status: DISCONTINUED | OUTPATIENT
Start: 2020-01-01 | End: 2020-01-01 | Stop reason: HOSPADM

## 2020-01-01 RX ORDER — CLOPIDOGREL BISULFATE 75 MG/1
75 TABLET ORAL DAILY
Status: DISCONTINUED | OUTPATIENT
Start: 2020-01-01 | End: 2020-01-01

## 2020-01-01 RX ORDER — CEFAZOLIN SODIUM 1 G/3ML
INJECTION, POWDER, FOR SOLUTION INTRAMUSCULAR; INTRAVENOUS PRN
Status: DISCONTINUED | OUTPATIENT
Start: 2020-01-01 | End: 2020-01-01 | Stop reason: SDUPTHER

## 2020-01-01 RX ORDER — HYDROCODONE BITARTRATE AND ACETAMINOPHEN 5; 325 MG/1; MG/1
1 TABLET ORAL EVERY 8 HOURS PRN
Qty: 9 TABLET | Refills: 0 | Status: SHIPPED | OUTPATIENT
Start: 2020-01-01 | End: 2020-01-01

## 2020-01-01 RX ORDER — SODIUM CHLORIDE 9 MG/ML
INJECTION, SOLUTION INTRAVENOUS EVERY 12 HOURS
Status: DISCONTINUED | OUTPATIENT
Start: 2020-01-01 | End: 2020-01-01

## 2020-01-01 RX ORDER — FUROSEMIDE 40 MG/1
40 TABLET ORAL DAILY
Qty: 30 TABLET | Refills: 3 | Status: ON HOLD
Start: 2020-01-01 | End: 2020-01-01 | Stop reason: HOSPADM

## 2020-01-01 RX ORDER — SIMVASTATIN 20 MG
20 TABLET ORAL NIGHTLY
Status: DISCONTINUED | OUTPATIENT
Start: 2020-01-01 | End: 2020-01-01 | Stop reason: CLARIF

## 2020-01-01 RX ORDER — GUAIFENESIN 400 MG/1
400 TABLET ORAL 3 TIMES DAILY
Status: DISCONTINUED | OUTPATIENT
Start: 2020-01-01 | End: 2020-01-01 | Stop reason: HOSPADM

## 2020-01-01 RX ORDER — ATORVASTATIN CALCIUM 20 MG/1
20 TABLET, FILM COATED ORAL DAILY
Refills: 3 | Status: DISCONTINUED | OUTPATIENT
Start: 2020-01-01 | End: 2021-01-01 | Stop reason: HOSPADM

## 2020-01-01 RX ORDER — ASPIRIN 81 MG/1
81 TABLET ORAL DAILY
Status: DISCONTINUED | OUTPATIENT
Start: 2020-01-01 | End: 2020-01-01 | Stop reason: HOSPADM

## 2020-01-01 RX ORDER — IPRATROPIUM BROMIDE AND ALBUTEROL SULFATE 2.5; .5 MG/3ML; MG/3ML
1 SOLUTION RESPIRATORY (INHALATION)
Status: DISCONTINUED | OUTPATIENT
Start: 2020-01-01 | End: 2020-01-01 | Stop reason: ALTCHOICE

## 2020-01-01 RX ORDER — FENTANYL CITRATE 50 UG/ML
25 INJECTION, SOLUTION INTRAMUSCULAR; INTRAVENOUS
Status: DISCONTINUED | OUTPATIENT
Start: 2020-01-01 | End: 2021-01-01

## 2020-01-01 RX ORDER — BUDESONIDE 0.5 MG/2ML
500 INHALANT ORAL 2 TIMES DAILY
Qty: 60 AMPULE | Refills: 3 | DISCHARGE
Start: 2020-01-01

## 2020-01-01 RX ORDER — ROCURONIUM BROMIDE 10 MG/ML
1 INJECTION, SOLUTION INTRAVENOUS ONCE
Status: COMPLETED | OUTPATIENT
Start: 2020-01-01 | End: 2020-01-01

## 2020-01-01 RX ORDER — BUMETANIDE 0.25 MG/ML
0.5 INJECTION, SOLUTION INTRAMUSCULAR; INTRAVENOUS ONCE
Status: COMPLETED | OUTPATIENT
Start: 2020-01-01 | End: 2020-01-01

## 2020-01-01 RX ORDER — SODIUM CHLORIDE 9 MG/ML
500 INJECTION, SOLUTION INTRAVENOUS CONTINUOUS
Status: DISCONTINUED | OUTPATIENT
Start: 2020-01-01 | End: 2020-01-01

## 2020-01-01 RX ORDER — INSULIN GLARGINE 100 [IU]/ML
25 INJECTION, SOLUTION SUBCUTANEOUS DAILY
Status: DISCONTINUED | OUTPATIENT
Start: 2020-01-01 | End: 2020-01-01

## 2020-01-01 RX ORDER — LEVOFLOXACIN 750 MG/1
750 TABLET ORAL DAILY
Status: ON HOLD | COMMUNITY
End: 2021-01-01 | Stop reason: HOSPADM

## 2020-01-01 RX ORDER — CLOPIDOGREL BISULFATE 75 MG/1
75 TABLET ORAL DAILY
Qty: 30 TABLET | Refills: 3 | Status: SHIPPED | OUTPATIENT
Start: 2020-01-01

## 2020-01-01 RX ORDER — DEXAMETHASONE 4 MG/1
6 TABLET ORAL EVERY 12 HOURS SCHEDULED
Status: DISCONTINUED | OUTPATIENT
Start: 2020-01-01 | End: 2020-01-01 | Stop reason: ALTCHOICE

## 2020-01-01 RX ORDER — FENTANYL CITRATE 50 UG/ML
50 INJECTION, SOLUTION INTRAMUSCULAR; INTRAVENOUS ONCE
Status: COMPLETED | OUTPATIENT
Start: 2020-01-01 | End: 2020-01-01

## 2020-01-01 RX ORDER — MIDAZOLAM HYDROCHLORIDE 1 MG/ML
INJECTION INTRAMUSCULAR; INTRAVENOUS
Status: COMPLETED
Start: 2020-01-01 | End: 2020-01-01

## 2020-01-01 RX ORDER — ACETAMINOPHEN 325 MG/1
650 TABLET ORAL EVERY 4 HOURS PRN
Status: DISCONTINUED | OUTPATIENT
Start: 2020-01-01 | End: 2020-01-01 | Stop reason: HOSPADM

## 2020-01-01 RX ORDER — RANOLAZINE 500 MG/1
500 TABLET, EXTENDED RELEASE ORAL 2 TIMES DAILY
Status: DISCONTINUED | OUTPATIENT
Start: 2020-01-01 | End: 2020-01-01 | Stop reason: HOSPADM

## 2020-01-01 RX ORDER — MIDODRINE HYDROCHLORIDE 5 MG/1
10 TABLET ORAL 3 TIMES DAILY
Status: DISCONTINUED | OUTPATIENT
Start: 2020-01-01 | End: 2021-01-01

## 2020-01-01 RX ORDER — ACETYLCYSTEINE 200 MG/ML
600 SOLUTION ORAL; RESPIRATORY (INHALATION) 2 TIMES DAILY
Status: DISCONTINUED | OUTPATIENT
Start: 2020-01-01 | End: 2020-01-01 | Stop reason: DRUGHIGH

## 2020-01-01 RX ORDER — PANTOPRAZOLE SODIUM 40 MG/10ML
40 INJECTION, POWDER, LYOPHILIZED, FOR SOLUTION INTRAVENOUS DAILY
Status: DISCONTINUED | OUTPATIENT
Start: 2020-01-01 | End: 2021-01-01

## 2020-01-01 RX ORDER — CLOPIDOGREL BISULFATE 75 MG/1
75 TABLET ORAL DAILY
Status: DISCONTINUED | OUTPATIENT
Start: 2020-01-01 | End: 2021-01-01 | Stop reason: HOSPADM

## 2020-01-01 RX ORDER — PROMETHAZINE HYDROCHLORIDE 25 MG/1
12.5 TABLET ORAL EVERY 6 HOURS PRN
Status: DISCONTINUED | OUTPATIENT
Start: 2020-01-01 | End: 2020-01-01 | Stop reason: HOSPADM

## 2020-01-01 RX ORDER — LEVOTHYROXINE SODIUM 0.1 MG/1
100 TABLET ORAL DAILY
Status: DISCONTINUED | OUTPATIENT
Start: 2020-01-01 | End: 2020-01-01 | Stop reason: HOSPADM

## 2020-01-01 RX ORDER — FUROSEMIDE 40 MG/1
40 TABLET ORAL DAILY
Status: DISCONTINUED | OUTPATIENT
Start: 2020-01-01 | End: 2020-01-01

## 2020-01-01 RX ORDER — SODIUM CHLORIDE 0.9 % (FLUSH) 0.9 %
10 SYRINGE (ML) INJECTION PRN
Status: DISCONTINUED | OUTPATIENT
Start: 2020-01-01 | End: 2020-01-01

## 2020-01-01 RX ORDER — POTASSIUM CHLORIDE 7.45 MG/ML
10 INJECTION INTRAVENOUS ONCE
Status: COMPLETED | OUTPATIENT
Start: 2020-01-01 | End: 2020-01-01

## 2020-01-01 RX ORDER — SODIUM CHLORIDE 0.9 % (FLUSH) 0.9 %
10 SYRINGE (ML) INJECTION PRN
Status: DISCONTINUED | OUTPATIENT
Start: 2020-01-01 | End: 2021-01-01

## 2020-01-01 RX ORDER — FUROSEMIDE 10 MG/ML
40 INJECTION INTRAMUSCULAR; INTRAVENOUS DAILY
Status: DISCONTINUED | OUTPATIENT
Start: 2020-01-01 | End: 2021-01-01

## 2020-01-01 RX ORDER — METOPROLOL SUCCINATE 25 MG/1
25 TABLET, EXTENDED RELEASE ORAL DAILY
Qty: 30 TABLET | Refills: 11 | Status: SHIPPED
Start: 2020-01-01 | End: 2020-01-01 | Stop reason: ALTCHOICE

## 2020-01-01 RX ORDER — ROCURONIUM BROMIDE 10 MG/ML
INJECTION, SOLUTION INTRAVENOUS PRN
Status: DISCONTINUED | OUTPATIENT
Start: 2020-01-01 | End: 2020-01-01 | Stop reason: SDUPTHER

## 2020-01-01 RX ORDER — GUAIFENESIN 400 MG/1
400 TABLET ORAL 3 TIMES DAILY
Qty: 56 TABLET | Refills: 0 | Status: ON HOLD | DISCHARGE
Start: 2020-01-01 | End: 2021-01-01 | Stop reason: HOSPADM

## 2020-01-01 RX ORDER — MORPHINE SULFATE 2 MG/ML
2 INJECTION, SOLUTION INTRAMUSCULAR; INTRAVENOUS EVERY 4 HOURS PRN
Status: DISCONTINUED | OUTPATIENT
Start: 2020-01-01 | End: 2020-01-01

## 2020-01-01 RX ORDER — PROPOFOL 10 MG/ML
1 INJECTION, EMULSION INTRAVENOUS ONCE
Status: COMPLETED | OUTPATIENT
Start: 2020-01-01 | End: 2020-01-01

## 2020-01-01 RX ORDER — BUDESONIDE 0.25 MG/2ML
0.25 INHALANT ORAL 2 TIMES DAILY
Status: DISCONTINUED | OUTPATIENT
Start: 2020-01-01 | End: 2020-01-01 | Stop reason: HOSPADM

## 2020-01-01 RX ORDER — HEPARIN SODIUM (PORCINE) LOCK FLUSH IV SOLN 100 UNIT/ML 100 UNIT/ML
3 SOLUTION INTRAVENOUS EVERY 12 HOURS SCHEDULED
Status: DISCONTINUED | OUTPATIENT
Start: 2020-01-01 | End: 2021-01-01

## 2020-01-01 RX ORDER — METHYLPREDNISOLONE SODIUM SUCCINATE 40 MG/ML
40 INJECTION, POWDER, LYOPHILIZED, FOR SOLUTION INTRAMUSCULAR; INTRAVENOUS EVERY 6 HOURS
Status: DISCONTINUED | OUTPATIENT
Start: 2020-01-01 | End: 2020-01-01

## 2020-01-01 RX ORDER — BUDESONIDE 0.5 MG/2ML
500 INHALANT ORAL 2 TIMES DAILY
Status: DISCONTINUED | OUTPATIENT
Start: 2020-01-01 | End: 2021-01-01 | Stop reason: HOSPADM

## 2020-01-01 RX ORDER — FUROSEMIDE 40 MG/1
40 TABLET ORAL DAILY
Status: DISCONTINUED | OUTPATIENT
Start: 2020-01-01 | End: 2020-01-01 | Stop reason: HOSPADM

## 2020-01-01 RX ORDER — ATORVASTATIN CALCIUM 20 MG/1
20 TABLET, FILM COATED ORAL NIGHTLY
COMMUNITY

## 2020-01-01 RX ORDER — POTASSIUM CHLORIDE 29.8 MG/ML
20 INJECTION INTRAVENOUS PRN
Status: DISCONTINUED | OUTPATIENT
Start: 2020-01-01 | End: 2021-01-01 | Stop reason: HOSPADM

## 2020-01-01 RX ORDER — DIAZEPAM 5 MG/1
2.5 TABLET ORAL EVERY 6 HOURS PRN
Status: DISCONTINUED | OUTPATIENT
Start: 2020-01-01 | End: 2020-01-01 | Stop reason: HOSPADM

## 2020-01-01 RX ORDER — CARVEDILOL 3.12 MG/1
3.12 TABLET ORAL 2 TIMES DAILY
COMMUNITY
Start: 2020-01-01 | End: 2020-01-01 | Stop reason: ALTCHOICE

## 2020-01-01 RX ORDER — DIMETHICONE, OXYBENZONE, AND PADIMATE O 2; 2.5; 6.6 G/100G; G/100G; G/100G
STICK TOPICAL
Status: COMPLETED
Start: 2020-01-01 | End: 2020-01-01

## 2020-01-01 RX ORDER — BACITRACIN 500 [USP'U]/G
OINTMENT TOPICAL PRN
COMMUNITY

## 2020-01-01 RX ORDER — L-METHYLFOLATE-ALGAE-VIT B12-B6 CAP 3-90.314-2-35 MG 3-90.314-2-35 MG
1 CAP ORAL DAILY
Status: DISCONTINUED | OUTPATIENT
Start: 2020-01-01 | End: 2020-01-01 | Stop reason: CLARIF

## 2020-01-01 RX ORDER — PREDNISONE 20 MG/1
40 TABLET ORAL DAILY
Status: DISCONTINUED | OUTPATIENT
Start: 2020-01-01 | End: 2020-01-01 | Stop reason: HOSPADM

## 2020-01-01 RX ORDER — POTASSIUM CHLORIDE 7.45 MG/ML
10 INJECTION INTRAVENOUS
Status: DISCONTINUED | OUTPATIENT
Start: 2020-01-01 | End: 2020-01-01

## 2020-01-01 RX ORDER — ACETYLCYSTEINE 200 MG/ML
600 SOLUTION ORAL; RESPIRATORY (INHALATION) 2 TIMES DAILY
Status: DISCONTINUED | OUTPATIENT
Start: 2020-01-01 | End: 2020-01-01

## 2020-01-01 RX ORDER — SODIUM CHLORIDE 9 MG/ML
INJECTION, SOLUTION INTRAVENOUS CONTINUOUS
Status: DISCONTINUED | OUTPATIENT
Start: 2020-01-01 | End: 2020-01-01

## 2020-01-01 RX ORDER — ALBUTEROL SULFATE 2.5 MG/3ML
2.5 SOLUTION RESPIRATORY (INHALATION)
Status: DISCONTINUED | OUTPATIENT
Start: 2020-01-01 | End: 2020-01-01 | Stop reason: HOSPADM

## 2020-01-01 RX ADMIN — POTASSIUM CHLORIDE 20 MEQ: 1500 TABLET, EXTENDED RELEASE ORAL at 10:03

## 2020-01-01 RX ADMIN — MIDODRINE HYDROCHLORIDE 10 MG: 5 TABLET ORAL at 12:11

## 2020-01-01 RX ADMIN — ALBUTEROL SULFATE 2 PUFF: 90 AEROSOL, METERED RESPIRATORY (INHALATION) at 16:21

## 2020-01-01 RX ADMIN — SODIUM CHLORIDE 20 ML: 9 INJECTION, SOLUTION INTRAVENOUS at 14:45

## 2020-01-01 RX ADMIN — ATORVASTATIN CALCIUM 20 MG: 20 TABLET, FILM COATED ORAL at 08:20

## 2020-01-01 RX ADMIN — Medication 100 MG: at 08:47

## 2020-01-01 RX ADMIN — PANTOPRAZOLE SODIUM 40 MG: 40 INJECTION, POWDER, FOR SOLUTION INTRAVENOUS at 09:42

## 2020-01-01 RX ADMIN — MIDODRINE HYDROCHLORIDE 10 MG: 5 TABLET ORAL at 13:00

## 2020-01-01 RX ADMIN — Medication 10 ML: at 20:39

## 2020-01-01 RX ADMIN — REGADENOSON 0.4 MG: 0.08 INJECTION, SOLUTION INTRAVENOUS at 11:12

## 2020-01-01 RX ADMIN — INSULIN LISPRO 2 UNITS: 100 INJECTION, SOLUTION INTRAVENOUS; SUBCUTANEOUS at 13:15

## 2020-01-01 RX ADMIN — POTASSIUM CHLORIDE 40 MEQ: 20 TABLET, EXTENDED RELEASE ORAL at 21:18

## 2020-01-01 RX ADMIN — METHOCARBAMOL TABLETS 500 MG: 500 TABLET, COATED ORAL at 09:00

## 2020-01-01 RX ADMIN — ZINC SULFATE 220 MG (50 MG) CAPSULE 50 MG: CAPSULE at 08:53

## 2020-01-01 RX ADMIN — PANTOPRAZOLE SODIUM 40 MG: 40 INJECTION, POWDER, FOR SOLUTION INTRAVENOUS at 09:02

## 2020-01-01 RX ADMIN — ACETYLCYSTEINE 400 MG: 200 SOLUTION ORAL; RESPIRATORY (INHALATION) at 21:07

## 2020-01-01 RX ADMIN — MORPHINE SULFATE 2 MG: 2 INJECTION, SOLUTION INTRAMUSCULAR; INTRAVENOUS at 17:25

## 2020-01-01 RX ADMIN — INSULIN LISPRO 3 UNITS: 100 INJECTION, SOLUTION INTRAVENOUS; SUBCUTANEOUS at 12:45

## 2020-01-01 RX ADMIN — Medication 150 MCG/HR: at 09:52

## 2020-01-01 RX ADMIN — HYDROCORTISONE SODIUM SUCCINATE 100 MG: 100 INJECTION, POWDER, FOR SOLUTION INTRAMUSCULAR; INTRAVENOUS at 16:38

## 2020-01-01 RX ADMIN — HYDROCORTISONE SODIUM SUCCINATE 100 MG: 100 INJECTION, POWDER, FOR SOLUTION INTRAMUSCULAR; INTRAVENOUS at 10:47

## 2020-01-01 RX ADMIN — Medication: at 17:19

## 2020-01-01 RX ADMIN — POTASSIUM CHLORIDE 20 MEQ: 400 INJECTION, SOLUTION INTRAVENOUS at 21:14

## 2020-01-01 RX ADMIN — CLOPIDOGREL 75 MG: 75 TABLET, FILM COATED ORAL at 08:20

## 2020-01-01 RX ADMIN — Medication 10 ML: at 10:32

## 2020-01-01 RX ADMIN — RANOLAZINE 500 MG: 500 TABLET, FILM COATED, EXTENDED RELEASE ORAL at 08:42

## 2020-01-01 RX ADMIN — Medication 10 ML: at 20:30

## 2020-01-01 RX ADMIN — DOCUSATE SODIUM 100 MG: 50 LIQUID ORAL at 08:20

## 2020-01-01 RX ADMIN — ASPIRIN 325 MG: 325 TABLET, FILM COATED ORAL at 07:39

## 2020-01-01 RX ADMIN — Medication 1 MG/HR: at 22:57

## 2020-01-01 RX ADMIN — METHOCARBAMOL TABLETS 500 MG: 500 TABLET, COATED ORAL at 09:08

## 2020-01-01 RX ADMIN — CLOPIDOGREL BISULFATE 75 MG: 75 TABLET ORAL at 09:32

## 2020-01-01 RX ADMIN — POTASSIUM PHOSPHATE, MONOBASIC AND POTASSIUM PHOSPHATE, DIBASIC 30 MMOL: 224; 236 INJECTION, SOLUTION, CONCENTRATE INTRAVENOUS at 10:16

## 2020-01-01 RX ADMIN — CEFEPIME 2 G: 2 INJECTION, POWDER, FOR SOLUTION INTRAMUSCULAR; INTRAVENOUS at 16:15

## 2020-01-01 RX ADMIN — Medication 10 ML: at 21:19

## 2020-01-01 RX ADMIN — ACETYLCYSTEINE 400 MG: 200 SOLUTION ORAL; RESPIRATORY (INHALATION) at 08:13

## 2020-01-01 RX ADMIN — INSULIN LISPRO 6 UNITS: 100 INJECTION, SOLUTION INTRAVENOUS; SUBCUTANEOUS at 00:18

## 2020-01-01 RX ADMIN — POTASSIUM CHLORIDE 10 MEQ: 10 INJECTION, SOLUTION INTRAVENOUS at 14:56

## 2020-01-01 RX ADMIN — HYDROCORTISONE SODIUM SUCCINATE 50 MG: 100 INJECTION, POWDER, FOR SOLUTION INTRAMUSCULAR; INTRAVENOUS at 09:55

## 2020-01-01 RX ADMIN — OXYCODONE AND ACETAMINOPHEN 1 TABLET: 10; 325 TABLET ORAL at 10:55

## 2020-01-01 RX ADMIN — SODIUM CHLORIDE: 4.5 INJECTION, SOLUTION INTRAVENOUS at 07:31

## 2020-01-01 RX ADMIN — SODIUM CHLORIDE, PRESERVATIVE FREE 300 UNITS: 5 INJECTION INTRAVENOUS at 21:17

## 2020-01-01 RX ADMIN — INSULIN LISPRO 2 UNITS: 100 INJECTION, SOLUTION INTRAVENOUS; SUBCUTANEOUS at 06:32

## 2020-01-01 RX ADMIN — IPRATROPIUM BROMIDE AND ALBUTEROL SULFATE 1 AMPULE: 2.5; .5 SOLUTION RESPIRATORY (INHALATION) at 20:39

## 2020-01-01 RX ADMIN — RANOLAZINE 500 MG: 500 TABLET, FILM COATED, EXTENDED RELEASE ORAL at 20:48

## 2020-01-01 RX ADMIN — Medication 100 MG: at 09:55

## 2020-01-01 RX ADMIN — IPRATROPIUM BROMIDE AND ALBUTEROL SULFATE 1 AMPULE: .5; 3 SOLUTION RESPIRATORY (INHALATION) at 10:57

## 2020-01-01 RX ADMIN — GABAPENTIN 800 MG: 400 CAPSULE ORAL at 09:19

## 2020-01-01 RX ADMIN — ALBUTEROL SULFATE 2 PUFF: 90 AEROSOL, METERED RESPIRATORY (INHALATION) at 08:02

## 2020-01-01 RX ADMIN — ZINC SULFATE 220 MG (50 MG) CAPSULE 50 MG: CAPSULE at 08:52

## 2020-01-01 RX ADMIN — Medication 10 ML: at 09:08

## 2020-01-01 RX ADMIN — VASOPRESSIN 0.04 UNITS/MIN: 20 INJECTION INTRAVENOUS at 07:28

## 2020-01-01 RX ADMIN — VASOPRESSIN 0.04 UNITS/MIN: 20 INJECTION INTRAVENOUS at 06:40

## 2020-01-01 RX ADMIN — ZINC SULFATE 220 MG (50 MG) CAPSULE 50 MG: CAPSULE at 08:20

## 2020-01-01 RX ADMIN — OXYCODONE AND ACETAMINOPHEN 1 TABLET: 10; 325 TABLET ORAL at 06:48

## 2020-01-01 RX ADMIN — BUDESONIDE 500 MCG: 0.5 SUSPENSION RESPIRATORY (INHALATION) at 20:39

## 2020-01-01 RX ADMIN — SODIUM CHLORIDE 2 MCG/KG/MIN: 9 INJECTION, SOLUTION INTRAVENOUS at 09:28

## 2020-01-01 RX ADMIN — GABAPENTIN 800 MG: 400 CAPSULE ORAL at 13:21

## 2020-01-01 RX ADMIN — Medication 15 ML: at 20:56

## 2020-01-01 RX ADMIN — ENOXAPARIN SODIUM 30 MG: 30 INJECTION SUBCUTANEOUS at 09:31

## 2020-01-01 RX ADMIN — SODIUM CHLORIDE, PRESERVATIVE FREE 10 ML: 5 INJECTION INTRAVENOUS at 09:19

## 2020-01-01 RX ADMIN — INSULIN LISPRO 3 UNITS: 100 INJECTION, SOLUTION INTRAVENOUS; SUBCUTANEOUS at 12:00

## 2020-01-01 RX ADMIN — CLOPIDOGREL BISULFATE 75 MG: 75 TABLET ORAL at 08:42

## 2020-01-01 RX ADMIN — Medication 10 ML: at 20:26

## 2020-01-01 RX ADMIN — CEFTRIAXONE 1 G: 1 INJECTION, POWDER, FOR SOLUTION INTRAMUSCULAR; INTRAVENOUS at 18:41

## 2020-01-01 RX ADMIN — MIDODRINE HYDROCHLORIDE 10 MG: 5 TABLET ORAL at 18:30

## 2020-01-01 RX ADMIN — ARFORMOTEROL TARTRATE 15 MCG: 15 SOLUTION RESPIRATORY (INHALATION) at 20:30

## 2020-01-01 RX ADMIN — Medication 10 ML: at 20:48

## 2020-01-01 RX ADMIN — METHYLPREDNISOLONE SODIUM SUCCINATE 40 MG: 40 INJECTION, POWDER, LYOPHILIZED, FOR SOLUTION INTRAMUSCULAR; INTRAVENOUS at 06:30

## 2020-01-01 RX ADMIN — Medication 15 ML: at 20:25

## 2020-01-01 RX ADMIN — MIDODRINE HYDROCHLORIDE 10 MG: 5 TABLET ORAL at 09:56

## 2020-01-01 RX ADMIN — METHYLPREDNISOLONE SODIUM SUCCINATE 40 MG: 40 INJECTION, POWDER, LYOPHILIZED, FOR SOLUTION INTRAMUSCULAR; INTRAVENOUS at 01:51

## 2020-01-01 RX ADMIN — Medication 100 MG: at 12:06

## 2020-01-01 RX ADMIN — ENOXAPARIN SODIUM 30 MG: 30 INJECTION SUBCUTANEOUS at 09:02

## 2020-01-01 RX ADMIN — CEFEPIME 2 G: 2 INJECTION, POWDER, FOR SOLUTION INTRAVENOUS at 09:43

## 2020-01-01 RX ADMIN — RANOLAZINE 500 MG: 500 TABLET, FILM COATED, EXTENDED RELEASE ORAL at 20:32

## 2020-01-01 RX ADMIN — SODIUM CHLORIDE, PRESERVATIVE FREE 10 ML: 5 INJECTION INTRAVENOUS at 11:12

## 2020-01-01 RX ADMIN — ATORVASTATIN CALCIUM 20 MG: 20 TABLET, FILM COATED ORAL at 11:46

## 2020-01-01 RX ADMIN — IPRATROPIUM BROMIDE AND ALBUTEROL SULFATE 1 AMPULE: 2.5; .5 SOLUTION RESPIRATORY (INHALATION) at 12:29

## 2020-01-01 RX ADMIN — Medication 5 MG/HR: at 23:29

## 2020-01-01 RX ADMIN — LEVOTHYROXINE SODIUM 100 MCG: 100 TABLET ORAL at 06:32

## 2020-01-01 RX ADMIN — Medication 10 ML: at 09:43

## 2020-01-01 RX ADMIN — ATORVASTATIN CALCIUM 20 MG: 20 TABLET, FILM COATED ORAL at 08:50

## 2020-01-01 RX ADMIN — OXYCODONE AND ACETAMINOPHEN 1 TABLET: 10; 325 TABLET ORAL at 12:53

## 2020-01-01 RX ADMIN — DOCUSATE SODIUM 100 MG: 50 LIQUID ORAL at 12:43

## 2020-01-01 RX ADMIN — GUAIFENESIN 400 MG: 400 TABLET ORAL at 08:02

## 2020-01-01 RX ADMIN — ALBUTEROL SULFATE 2.5 MG: 2.5 SOLUTION RESPIRATORY (INHALATION) at 11:54

## 2020-01-01 RX ADMIN — Medication 1 TABLET: at 08:02

## 2020-01-01 RX ADMIN — SODIUM CHLORIDE: 4.5 INJECTION, SOLUTION INTRAVENOUS at 21:04

## 2020-01-01 RX ADMIN — CEFEPIME 2 G: 2 INJECTION, POWDER, FOR SOLUTION INTRAVENOUS at 21:43

## 2020-01-01 RX ADMIN — PANTOPRAZOLE SODIUM 40 MG: 40 INJECTION, POWDER, FOR SOLUTION INTRAVENOUS at 09:55

## 2020-01-01 RX ADMIN — Medication 2000 UNITS: at 08:52

## 2020-01-01 RX ADMIN — IPRATROPIUM BROMIDE AND ALBUTEROL SULFATE 1 AMPULE: 2.5; .5 SOLUTION RESPIRATORY (INHALATION) at 09:20

## 2020-01-01 RX ADMIN — SODIUM CHLORIDE 2 MCG/KG/MIN: 9 INJECTION, SOLUTION INTRAVENOUS at 18:53

## 2020-01-01 RX ADMIN — Medication 100 MCG/HR: at 07:22

## 2020-01-01 RX ADMIN — METOPROLOL TARTRATE 5 MG: 5 INJECTION INTRAVENOUS at 05:11

## 2020-01-01 RX ADMIN — Medication 10 ML: at 08:51

## 2020-01-01 RX ADMIN — GABAPENTIN 800 MG: 400 CAPSULE ORAL at 08:30

## 2020-01-01 RX ADMIN — Medication 100 MG: at 09:04

## 2020-01-01 RX ADMIN — IPRATROPIUM BROMIDE AND ALBUTEROL SULFATE 1 AMPULE: 2.5; .5 SOLUTION RESPIRATORY (INHALATION) at 08:26

## 2020-01-01 RX ADMIN — ENOXAPARIN SODIUM 40 MG: 40 INJECTION SUBCUTANEOUS at 08:52

## 2020-01-01 RX ADMIN — OXYCODONE AND ACETAMINOPHEN 1 TABLET: 10; 325 TABLET ORAL at 15:30

## 2020-01-01 RX ADMIN — ENOXAPARIN SODIUM 40 MG: 40 INJECTION SUBCUTANEOUS at 08:58

## 2020-01-01 RX ADMIN — OXYCODONE HYDROCHLORIDE AND ACETAMINOPHEN 500 MG: 500 TABLET ORAL at 08:56

## 2020-01-01 RX ADMIN — OXYCODONE HYDROCHLORIDE AND ACETAMINOPHEN 500 MG: 500 TABLET ORAL at 08:43

## 2020-01-01 RX ADMIN — AMIODARONE HYDROCHLORIDE 1 MG/MIN: 50 INJECTION, SOLUTION INTRAVENOUS at 10:39

## 2020-01-01 RX ADMIN — SENNOSIDES 8.8 MG: 8.8 SYRUP ORAL at 21:01

## 2020-01-01 RX ADMIN — SODIUM CHLORIDE, PRESERVATIVE FREE 10 ML: 5 INJECTION INTRAVENOUS at 08:52

## 2020-01-01 RX ADMIN — FUROSEMIDE 40 MG: 40 TABLET ORAL at 09:00

## 2020-01-01 RX ADMIN — MIDODRINE HYDROCHLORIDE 10 MG: 5 TABLET ORAL at 17:30

## 2020-01-01 RX ADMIN — OXYCODONE AND ACETAMINOPHEN 1 TABLET: 10; 325 TABLET ORAL at 16:34

## 2020-01-01 RX ADMIN — Medication 1 TABLET: at 09:08

## 2020-01-01 RX ADMIN — IPRATROPIUM BROMIDE AND ALBUTEROL SULFATE 1 AMPULE: 2.5; .5 SOLUTION RESPIRATORY (INHALATION) at 19:45

## 2020-01-01 RX ADMIN — NOREPINEPHRINE BITARTRATE 2 MCG/MIN: 1 INJECTION, SOLUTION, CONCENTRATE INTRAVENOUS at 08:51

## 2020-01-01 RX ADMIN — METHOCARBAMOL TABLETS 500 MG: 500 TABLET, COATED ORAL at 21:24

## 2020-01-01 RX ADMIN — IPRATROPIUM BROMIDE 1 PUFF: 17 AEROSOL, METERED RESPIRATORY (INHALATION) at 16:21

## 2020-01-01 RX ADMIN — HYDROCORTISONE SODIUM SUCCINATE 50 MG: 100 INJECTION, POWDER, FOR SOLUTION INTRAMUSCULAR; INTRAVENOUS at 09:19

## 2020-01-01 RX ADMIN — IPRATROPIUM BROMIDE AND ALBUTEROL SULFATE 1 AMPULE: 2.5; .5 SOLUTION RESPIRATORY (INHALATION) at 10:58

## 2020-01-01 RX ADMIN — ENOXAPARIN SODIUM 100 MG: 100 INJECTION SUBCUTANEOUS at 08:44

## 2020-01-01 RX ADMIN — HYDROCORTISONE SODIUM SUCCINATE 100 MG: 100 INJECTION, POWDER, FOR SOLUTION INTRAMUSCULAR; INTRAVENOUS at 00:34

## 2020-01-01 RX ADMIN — MORPHINE SULFATE 2 MG: 2 INJECTION, SOLUTION INTRAMUSCULAR; INTRAVENOUS at 20:46

## 2020-01-01 RX ADMIN — PANTOPRAZOLE SODIUM 40 MG: 40 INJECTION, POWDER, FOR SOLUTION INTRAVENOUS at 09:09

## 2020-01-01 RX ADMIN — METHYLPREDNISOLONE SODIUM SUCCINATE 40 MG: 40 INJECTION, POWDER, LYOPHILIZED, FOR SOLUTION INTRAMUSCULAR; INTRAVENOUS at 20:19

## 2020-01-01 RX ADMIN — CLOPIDOGREL 75 MG: 75 TABLET, FILM COATED ORAL at 08:52

## 2020-01-01 RX ADMIN — IPRATROPIUM BROMIDE AND ALBUTEROL SULFATE 1 AMPULE: 2.5; .5 SOLUTION RESPIRATORY (INHALATION) at 20:21

## 2020-01-01 RX ADMIN — ATORVASTATIN CALCIUM 20 MG: 20 TABLET, FILM COATED ORAL at 08:52

## 2020-01-01 RX ADMIN — CEFEPIME 2 G: 2 INJECTION, POWDER, FOR SOLUTION INTRAVENOUS at 09:04

## 2020-01-01 RX ADMIN — ARFORMOTEROL TARTRATE 15 MCG: 15 SOLUTION RESPIRATORY (INHALATION) at 20:14

## 2020-01-01 RX ADMIN — OXYCODONE AND ACETAMINOPHEN 1 TABLET: 5; 325 TABLET ORAL at 17:48

## 2020-01-01 RX ADMIN — HYDROCORTISONE SODIUM SUCCINATE 50 MG: 100 INJECTION, POWDER, FOR SOLUTION INTRAMUSCULAR; INTRAVENOUS at 21:25

## 2020-01-01 RX ADMIN — SODIUM CHLORIDE, PRESERVATIVE FREE 300 UNITS: 5 INJECTION INTRAVENOUS at 21:19

## 2020-01-01 RX ADMIN — OXYCODONE HYDROCHLORIDE AND ACETAMINOPHEN 500 MG: 500 TABLET ORAL at 21:27

## 2020-01-01 RX ADMIN — MORPHINE SULFATE 2 MG: 2 INJECTION, SOLUTION INTRAMUSCULAR; INTRAVENOUS at 05:54

## 2020-01-01 RX ADMIN — MIDODRINE HYDROCHLORIDE 10 MG: 5 TABLET ORAL at 08:30

## 2020-01-01 RX ADMIN — OXYCODONE AND ACETAMINOPHEN 1 TABLET: 10; 325 TABLET ORAL at 15:17

## 2020-01-01 RX ADMIN — HYDROCORTISONE SODIUM SUCCINATE 50 MG: 100 INJECTION, POWDER, FOR SOLUTION INTRAMUSCULAR; INTRAVENOUS at 16:27

## 2020-01-01 RX ADMIN — Medication 50 MCG/HR: at 23:52

## 2020-01-01 RX ADMIN — Medication 1 TABLET: at 08:43

## 2020-01-01 RX ADMIN — DOXYCYCLINE HYCLATE 100 MG: 100 CAPSULE ORAL at 19:46

## 2020-01-01 RX ADMIN — OXYCODONE HYDROCHLORIDE AND ACETAMINOPHEN 500 MG: 500 TABLET ORAL at 20:04

## 2020-01-01 RX ADMIN — BUDESONIDE 500 MCG: 0.5 SUSPENSION RESPIRATORY (INHALATION) at 08:25

## 2020-01-01 RX ADMIN — MIDODRINE HYDROCHLORIDE 10 MG: 5 TABLET ORAL at 12:52

## 2020-01-01 RX ADMIN — INSULIN GLARGINE 10 UNITS: 100 INJECTION, SOLUTION SUBCUTANEOUS at 11:00

## 2020-01-01 RX ADMIN — IPRATROPIUM BROMIDE AND ALBUTEROL SULFATE 1 AMPULE: 2.5; .5 SOLUTION RESPIRATORY (INHALATION) at 08:24

## 2020-01-01 RX ADMIN — FUROSEMIDE 40 MG: 40 TABLET ORAL at 09:08

## 2020-01-01 RX ADMIN — HYDROCORTISONE SODIUM SUCCINATE 50 MG: 100 INJECTION, POWDER, FOR SOLUTION INTRAMUSCULAR; INTRAVENOUS at 04:59

## 2020-01-01 RX ADMIN — DOXYCYCLINE HYCLATE 100 MG: 100 CAPSULE ORAL at 11:43

## 2020-01-01 RX ADMIN — METHOCARBAMOL TABLETS 500 MG: 500 TABLET, COATED ORAL at 12:23

## 2020-01-01 RX ADMIN — HYDROCORTISONE SODIUM SUCCINATE 100 MG: 100 INJECTION, POWDER, FOR SOLUTION INTRAMUSCULAR; INTRAVENOUS at 00:00

## 2020-01-01 RX ADMIN — LEVOTHYROXINE SODIUM 100 MCG: 100 TABLET ORAL at 06:07

## 2020-01-01 RX ADMIN — HYDROCORTISONE SODIUM SUCCINATE 50 MG: 100 INJECTION, POWDER, FOR SOLUTION INTRAMUSCULAR; INTRAVENOUS at 22:22

## 2020-01-01 RX ADMIN — ENOXAPARIN SODIUM 30 MG: 30 INJECTION SUBCUTANEOUS at 08:42

## 2020-01-01 RX ADMIN — INSULIN LISPRO 3 UNITS: 100 INJECTION, SOLUTION INTRAVENOUS; SUBCUTANEOUS at 06:24

## 2020-01-01 RX ADMIN — Medication 10 ML: at 21:24

## 2020-01-01 RX ADMIN — INSULIN GLARGINE 25 UNITS: 100 INJECTION, SOLUTION SUBCUTANEOUS at 08:37

## 2020-01-01 RX ADMIN — Medication 10 ML: at 09:19

## 2020-01-01 RX ADMIN — MORPHINE SULFATE 2 MG: 2 INJECTION, SOLUTION INTRAMUSCULAR; INTRAVENOUS at 22:14

## 2020-01-01 RX ADMIN — METHOCARBAMOL TABLETS 500 MG: 500 TABLET, COATED ORAL at 20:20

## 2020-01-01 RX ADMIN — ARFORMOTEROL TARTRATE 15 MCG: 15 SOLUTION RESPIRATORY (INHALATION) at 21:37

## 2020-01-01 RX ADMIN — LEVOTHYROXINE SODIUM 100 MCG: 100 TABLET ORAL at 06:29

## 2020-01-01 RX ADMIN — MIDODRINE HYDROCHLORIDE 10 MG: 5 TABLET ORAL at 11:31

## 2020-01-01 RX ADMIN — AZITHROMYCIN 500 MG: 500 INJECTION, POWDER, LYOPHILIZED, FOR SOLUTION INTRAVENOUS at 23:04

## 2020-01-01 RX ADMIN — KETAMINE HYDROCHLORIDE 73.5 MG: 10 INJECTION, SOLUTION INTRAMUSCULAR; INTRAVENOUS at 19:29

## 2020-01-01 RX ADMIN — ALBUTEROL SULFATE 2 PUFF: 90 AEROSOL, METERED RESPIRATORY (INHALATION) at 12:28

## 2020-01-01 RX ADMIN — Medication 200 MCG/HR: at 15:54

## 2020-01-01 RX ADMIN — METHOCARBAMOL TABLETS 500 MG: 500 TABLET, COATED ORAL at 08:02

## 2020-01-01 RX ADMIN — METHYLPREDNISOLONE SODIUM SUCCINATE 40 MG: 40 INJECTION, POWDER, LYOPHILIZED, FOR SOLUTION INTRAMUSCULAR; INTRAVENOUS at 12:09

## 2020-01-01 RX ADMIN — Medication 150 MCG/HR: at 11:51

## 2020-01-01 RX ADMIN — VANCOMYCIN HYDROCHLORIDE 1000 MG: 1 INJECTION, POWDER, LYOPHILIZED, FOR SOLUTION INTRAVENOUS at 00:53

## 2020-01-01 RX ADMIN — HYDROCORTISONE SODIUM SUCCINATE 50 MG: 100 INJECTION, POWDER, FOR SOLUTION INTRAMUSCULAR; INTRAVENOUS at 17:19

## 2020-01-01 RX ADMIN — METHYLPREDNISOLONE SODIUM SUCCINATE 40 MG: 40 INJECTION, POWDER, LYOPHILIZED, FOR SOLUTION INTRAMUSCULAR; INTRAVENOUS at 04:45

## 2020-01-01 RX ADMIN — ALBUTEROL SULFATE 2 PUFF: 90 AEROSOL, METERED RESPIRATORY (INHALATION) at 20:18

## 2020-01-01 RX ADMIN — MIDODRINE HYDROCHLORIDE 10 MG: 5 TABLET ORAL at 08:57

## 2020-01-01 RX ADMIN — BUDESONIDE 500 MCG: 0.5 SUSPENSION RESPIRATORY (INHALATION) at 09:11

## 2020-01-01 RX ADMIN — POTASSIUM CHLORIDE 20 MEQ: 20 TABLET, EXTENDED RELEASE ORAL at 09:09

## 2020-01-01 RX ADMIN — Medication 15 ML: at 19:53

## 2020-01-01 RX ADMIN — ATORVASTATIN CALCIUM 10 MG: 10 TABLET, FILM COATED ORAL at 23:46

## 2020-01-01 RX ADMIN — Medication 10 ML: at 08:53

## 2020-01-01 RX ADMIN — DOXYCYCLINE HYCLATE 100 MG: 100 CAPSULE ORAL at 09:32

## 2020-01-01 RX ADMIN — BUDESONIDE 500 MCG: 0.5 SUSPENSION RESPIRATORY (INHALATION) at 07:56

## 2020-01-01 RX ADMIN — DOCUSATE SODIUM 100 MG: 50 LIQUID ORAL at 09:55

## 2020-01-01 RX ADMIN — INSULIN LISPRO 3 UNITS: 100 INJECTION, SOLUTION INTRAVENOUS; SUBCUTANEOUS at 17:45

## 2020-01-01 RX ADMIN — ASPIRIN 81 MG: 81 TABLET, COATED ORAL at 09:32

## 2020-01-01 RX ADMIN — GABAPENTIN 800 MG: 400 CAPSULE ORAL at 08:43

## 2020-01-01 RX ADMIN — IPRATROPIUM BROMIDE AND ALBUTEROL SULFATE 1 AMPULE: 2.5; .5 SOLUTION RESPIRATORY (INHALATION) at 09:06

## 2020-01-01 RX ADMIN — Medication 10 ML: at 20:34

## 2020-01-01 RX ADMIN — HYDROCORTISONE SODIUM SUCCINATE 50 MG: 100 INJECTION, POWDER, FOR SOLUTION INTRAMUSCULAR; INTRAVENOUS at 08:51

## 2020-01-01 RX ADMIN — Medication 75 MCG/HR: at 01:05

## 2020-01-01 RX ADMIN — GABAPENTIN 800 MG: 400 CAPSULE ORAL at 17:20

## 2020-01-01 RX ADMIN — IPRATROPIUM BROMIDE AND ALBUTEROL SULFATE 1 AMPULE: 2.5; .5 SOLUTION RESPIRATORY (INHALATION) at 08:21

## 2020-01-01 RX ADMIN — CLOPIDOGREL BISULFATE 75 MG: 75 TABLET ORAL at 11:44

## 2020-01-01 RX ADMIN — GUAIFENESIN 400 MG: 400 TABLET ORAL at 13:18

## 2020-01-01 RX ADMIN — ASPIRIN 81 MG: 81 TABLET, COATED ORAL at 09:08

## 2020-01-01 RX ADMIN — INSULIN LISPRO 3 UNITS: 100 INJECTION, SOLUTION INTRAVENOUS; SUBCUTANEOUS at 11:01

## 2020-01-01 RX ADMIN — INSULIN LISPRO 3 UNITS: 100 INJECTION, SOLUTION INTRAVENOUS; SUBCUTANEOUS at 06:16

## 2020-01-01 RX ADMIN — OXYCODONE AND ACETAMINOPHEN 1 TABLET: 10; 325 TABLET ORAL at 06:07

## 2020-01-01 RX ADMIN — METOPROLOL SUCCINATE 25 MG: 25 TABLET, FILM COATED, EXTENDED RELEASE ORAL at 09:22

## 2020-01-01 RX ADMIN — RANOLAZINE 500 MG: 500 TABLET, FILM COATED, EXTENDED RELEASE ORAL at 20:46

## 2020-01-01 RX ADMIN — Medication 150 MCG/HR: at 22:18

## 2020-01-01 RX ADMIN — METHYLPREDNISOLONE SODIUM SUCCINATE 40 MG: 40 INJECTION, POWDER, LYOPHILIZED, FOR SOLUTION INTRAMUSCULAR; INTRAVENOUS at 00:03

## 2020-01-01 RX ADMIN — INSULIN LISPRO 3 UNITS: 100 INJECTION, SOLUTION INTRAVENOUS; SUBCUTANEOUS at 01:20

## 2020-01-01 RX ADMIN — BUDESONIDE 500 MCG: 0.5 SUSPENSION RESPIRATORY (INHALATION) at 08:41

## 2020-01-01 RX ADMIN — IPRATROPIUM BROMIDE AND ALBUTEROL SULFATE 1 AMPULE: 2.5; .5 SOLUTION RESPIRATORY (INHALATION) at 16:17

## 2020-01-01 RX ADMIN — DOXYCYCLINE HYCLATE 100 MG: 100 CAPSULE ORAL at 21:26

## 2020-01-01 RX ADMIN — OXYCODONE AND ACETAMINOPHEN 1 TABLET: 5; 325 TABLET ORAL at 03:42

## 2020-01-01 RX ADMIN — CLOPIDOGREL BISULFATE 75 MG: 75 TABLET ORAL at 09:01

## 2020-01-01 RX ADMIN — SODIUM CHLORIDE: 9 INJECTION, SOLUTION INTRAVENOUS at 10:40

## 2020-01-01 RX ADMIN — ATORVASTATIN CALCIUM 10 MG: 10 TABLET, FILM COATED ORAL at 20:20

## 2020-01-01 RX ADMIN — SODIUM PHOSPHATE, MONOBASIC, MONOHYDRATE 30 MMOL: 276; 142 INJECTION, SOLUTION INTRAVENOUS at 08:27

## 2020-01-01 RX ADMIN — WATER 2 ML: 1 INJECTION INTRAMUSCULAR; INTRAVENOUS; SUBCUTANEOUS at 01:02

## 2020-01-01 RX ADMIN — Medication 15 ML: at 09:42

## 2020-01-01 RX ADMIN — OXYCODONE AND ACETAMINOPHEN 1 TABLET: 10; 325 TABLET ORAL at 10:57

## 2020-01-01 RX ADMIN — INSULIN LISPRO 3 UNITS: 100 INJECTION, SOLUTION INTRAVENOUS; SUBCUTANEOUS at 18:42

## 2020-01-01 RX ADMIN — HYDROCORTISONE SODIUM SUCCINATE 100 MG: 100 INJECTION, POWDER, FOR SOLUTION INTRAMUSCULAR; INTRAVENOUS at 08:48

## 2020-01-01 RX ADMIN — BUDESONIDE 500 MCG: 0.5 SUSPENSION RESPIRATORY (INHALATION) at 19:59

## 2020-01-01 RX ADMIN — INSULIN GLARGINE 25 UNITS: 100 INJECTION, SOLUTION SUBCUTANEOUS at 09:13

## 2020-01-01 RX ADMIN — FENTANYL CITRATE 25 MCG: 50 INJECTION, SOLUTION INTRAMUSCULAR; INTRAVENOUS at 11:17

## 2020-01-01 RX ADMIN — IPRATROPIUM BROMIDE AND ALBUTEROL SULFATE 1 AMPULE: 2.5; .5 SOLUTION RESPIRATORY (INHALATION) at 20:30

## 2020-01-01 RX ADMIN — WATER 10 ML: 1 INJECTION INTRAMUSCULAR; INTRAVENOUS; SUBCUTANEOUS at 11:29

## 2020-01-01 RX ADMIN — POTASSIUM CHLORIDE 20 MEQ: 29.8 INJECTION, SOLUTION INTRAVENOUS at 16:29

## 2020-01-01 RX ADMIN — VASOPRESSIN 0.04 UNITS/MIN: 20 INJECTION INTRAVENOUS at 16:55

## 2020-01-01 RX ADMIN — GABAPENTIN 800 MG: 400 CAPSULE ORAL at 01:16

## 2020-01-01 RX ADMIN — OXYCODONE AND ACETAMINOPHEN 1 TABLET: 10; 325 TABLET ORAL at 21:24

## 2020-01-01 RX ADMIN — POTASSIUM PHOSPHATE, MONOBASIC POTASSIUM PHOSPHATE, DIBASIC 30 MMOL: 224; 236 INJECTION, SOLUTION, CONCENTRATE INTRAVENOUS at 08:07

## 2020-01-01 RX ADMIN — ARFORMOTEROL TARTRATE 15 MCG: 15 SOLUTION RESPIRATORY (INHALATION) at 21:19

## 2020-01-01 RX ADMIN — FUROSEMIDE 40 MG: 40 TABLET ORAL at 08:43

## 2020-01-01 RX ADMIN — LIDOCAINE HYDROCHLORIDE 1 ML: 10 INJECTION, SOLUTION EPIDURAL; INFILTRATION; INTRACAUDAL; PERINEURAL at 09:50

## 2020-01-01 RX ADMIN — INSULIN LISPRO 3 UNITS: 100 INJECTION, SOLUTION INTRAVENOUS; SUBCUTANEOUS at 17:36

## 2020-01-01 RX ADMIN — HYDROCORTISONE SODIUM SUCCINATE 50 MG: 100 INJECTION, POWDER, FOR SOLUTION INTRAMUSCULAR; INTRAVENOUS at 08:14

## 2020-01-01 RX ADMIN — SENNOSIDES 8.8 MG: 8.8 SYRUP ORAL at 20:59

## 2020-01-01 RX ADMIN — Medication 10 ML: at 20:12

## 2020-01-01 RX ADMIN — Medication 15 ML: at 20:28

## 2020-01-01 RX ADMIN — INSULIN GLARGINE 25 UNITS: 100 INJECTION, SOLUTION SUBCUTANEOUS at 09:28

## 2020-01-01 RX ADMIN — ENOXAPARIN SODIUM 40 MG: 40 INJECTION SUBCUTANEOUS at 08:56

## 2020-01-01 RX ADMIN — IPRATROPIUM BROMIDE AND ALBUTEROL SULFATE 1 AMPULE: 2.5; .5 SOLUTION RESPIRATORY (INHALATION) at 11:53

## 2020-01-01 RX ADMIN — POTASSIUM CHLORIDE 20 MEQ: 400 INJECTION, SOLUTION INTRAVENOUS at 09:29

## 2020-01-01 RX ADMIN — CEFEPIME 2 G: 2 INJECTION, POWDER, FOR SOLUTION INTRAMUSCULAR; INTRAVENOUS at 04:18

## 2020-01-01 RX ADMIN — METHOCARBAMOL TABLETS 500 MG: 500 TABLET, COATED ORAL at 12:53

## 2020-01-01 RX ADMIN — INSULIN GLARGINE 25 UNITS: 100 INJECTION, SOLUTION SUBCUTANEOUS at 09:34

## 2020-01-01 RX ADMIN — DOXYCYCLINE HYCLATE 100 MG: 100 CAPSULE ORAL at 08:02

## 2020-01-01 RX ADMIN — SODIUM CHLORIDE 2 MCG/KG/MIN: 9 INJECTION, SOLUTION INTRAVENOUS at 06:12

## 2020-01-01 RX ADMIN — BUDESONIDE 250 MCG: 0.25 SUSPENSION RESPIRATORY (INHALATION) at 09:04

## 2020-01-01 RX ADMIN — MIDODRINE HYDROCHLORIDE 10 MG: 5 TABLET ORAL at 17:20

## 2020-01-01 RX ADMIN — POTASSIUM PHOSPHATE, MONOBASIC AND POTASSIUM PHOSPHATE, DIBASIC 30 MMOL: 224; 236 INJECTION, SOLUTION, CONCENTRATE INTRAVENOUS at 09:52

## 2020-01-01 RX ADMIN — INSULIN LISPRO 9 UNITS: 100 INJECTION, SOLUTION INTRAVENOUS; SUBCUTANEOUS at 06:40

## 2020-01-01 RX ADMIN — OXYCODONE HYDROCHLORIDE AND ACETAMINOPHEN 500 MG: 500 TABLET ORAL at 20:35

## 2020-01-01 RX ADMIN — ASPIRIN 81 MG: 81 TABLET, COATED ORAL at 08:42

## 2020-01-01 RX ADMIN — Medication 10 ML: at 20:17

## 2020-01-01 RX ADMIN — WATER 2 ML: 1 INJECTION INTRAMUSCULAR; INTRAVENOUS; SUBCUTANEOUS at 00:01

## 2020-01-01 RX ADMIN — Medication 15 ML: at 08:31

## 2020-01-01 RX ADMIN — METHYLPREDNISOLONE SODIUM SUCCINATE 40 MG: 40 INJECTION, POWDER, LYOPHILIZED, FOR SOLUTION INTRAMUSCULAR; INTRAVENOUS at 21:43

## 2020-01-01 RX ADMIN — METOPROLOL SUCCINATE 25 MG: 25 TABLET, FILM COATED, EXTENDED RELEASE ORAL at 09:32

## 2020-01-01 RX ADMIN — ATORVASTATIN CALCIUM 10 MG: 10 TABLET, FILM COATED ORAL at 21:45

## 2020-01-01 RX ADMIN — Medication 10 ML: at 08:56

## 2020-01-01 RX ADMIN — METHOCARBAMOL TABLETS 500 MG: 500 TABLET, COATED ORAL at 09:45

## 2020-01-01 RX ADMIN — GABAPENTIN 800 MG: 400 CAPSULE ORAL at 17:25

## 2020-01-01 RX ADMIN — ARFORMOTEROL TARTRATE 15 MCG: 15 SOLUTION RESPIRATORY (INHALATION) at 08:20

## 2020-01-01 RX ADMIN — LEVOTHYROXINE SODIUM 100 MCG: 100 TABLET ORAL at 06:03

## 2020-01-01 RX ADMIN — MORPHINE SULFATE 2 MG: 2 INJECTION, SOLUTION INTRAMUSCULAR; INTRAVENOUS at 10:29

## 2020-01-01 RX ADMIN — MIDAZOLAM HYDROCHLORIDE 2 MG: 2 INJECTION, SOLUTION INTRAMUSCULAR; INTRAVENOUS at 13:39

## 2020-01-01 RX ADMIN — POTASSIUM PHOSPHATE, MONOBASIC POTASSIUM PHOSPHATE, DIBASIC 20 MMOL: 224; 236 INJECTION, SOLUTION, CONCENTRATE INTRAVENOUS at 19:47

## 2020-01-01 RX ADMIN — ENOXAPARIN SODIUM 40 MG: 40 INJECTION SUBCUTANEOUS at 09:18

## 2020-01-01 RX ADMIN — METOPROLOL SUCCINATE 25 MG: 25 TABLET, FILM COATED, EXTENDED RELEASE ORAL at 08:43

## 2020-01-01 RX ADMIN — Medication 2000 UNITS: at 09:04

## 2020-01-01 RX ADMIN — INSULIN GLARGINE 25 UNITS: 100 INJECTION, SOLUTION SUBCUTANEOUS at 10:00

## 2020-01-01 RX ADMIN — ARFORMOTEROL TARTRATE 15 MCG: 15 SOLUTION RESPIRATORY (INHALATION) at 08:53

## 2020-01-01 RX ADMIN — MIDODRINE HYDROCHLORIDE 10 MG: 5 TABLET ORAL at 16:36

## 2020-01-01 RX ADMIN — SODIUM CHLORIDE: 9 INJECTION, SOLUTION INTRAVENOUS at 01:26

## 2020-01-01 RX ADMIN — METHOCARBAMOL TABLETS 500 MG: 500 TABLET, COATED ORAL at 21:45

## 2020-01-01 RX ADMIN — HYDROCORTISONE SODIUM SUCCINATE 50 MG: 100 INJECTION, POWDER, FOR SOLUTION INTRAMUSCULAR; INTRAVENOUS at 08:20

## 2020-01-01 RX ADMIN — MORPHINE SULFATE 2 MG: 2 INJECTION, SOLUTION INTRAMUSCULAR; INTRAVENOUS at 04:37

## 2020-01-01 RX ADMIN — IPRATROPIUM BROMIDE 1 PUFF: 17 AEROSOL, METERED RESPIRATORY (INHALATION) at 08:01

## 2020-01-01 RX ADMIN — Medication 2000 UNITS: at 12:20

## 2020-01-01 RX ADMIN — HYDROCORTISONE SODIUM SUCCINATE 100 MG: 100 INJECTION, POWDER, FOR SOLUTION INTRAMUSCULAR; INTRAVENOUS at 08:31

## 2020-01-01 RX ADMIN — POTASSIUM PHOSPHATE, MONOBASIC AND POTASSIUM PHOSPHATE, DIBASIC 30 MMOL: 224; 236 INJECTION, SOLUTION, CONCENTRATE INTRAVENOUS at 09:38

## 2020-01-01 RX ADMIN — SODIUM CHLORIDE, PRESERVATIVE FREE 10 ML: 5 INJECTION INTRAVENOUS at 10:36

## 2020-01-01 RX ADMIN — GABAPENTIN 800 MG: 400 CAPSULE ORAL at 08:49

## 2020-01-01 RX ADMIN — ENOXAPARIN SODIUM 30 MG: 30 INJECTION SUBCUTANEOUS at 08:48

## 2020-01-01 RX ADMIN — INSULIN GLARGINE 25 UNITS: 100 INJECTION, SOLUTION SUBCUTANEOUS at 09:30

## 2020-01-01 RX ADMIN — BUDESONIDE 500 MCG: 0.5 SUSPENSION RESPIRATORY (INHALATION) at 19:33

## 2020-01-01 RX ADMIN — LEVOTHYROXINE SODIUM 100 MCG: 100 TABLET ORAL at 08:48

## 2020-01-01 RX ADMIN — GABAPENTIN 800 MG: 400 CAPSULE ORAL at 14:44

## 2020-01-01 RX ADMIN — Medication 150 MCG/HR: at 21:22

## 2020-01-01 RX ADMIN — DOCUSATE SODIUM 100 MG: 50 LIQUID ORAL at 08:44

## 2020-01-01 RX ADMIN — FENTANYL CITRATE 25 MCG: 50 INJECTION, SOLUTION INTRAMUSCULAR; INTRAVENOUS at 07:03

## 2020-01-01 RX ADMIN — OXYCODONE AND ACETAMINOPHEN 1 TABLET: 10; 325 TABLET ORAL at 18:29

## 2020-01-01 RX ADMIN — Medication 150 MCG/HR: at 15:40

## 2020-01-01 RX ADMIN — OXYCODONE AND ACETAMINOPHEN 1 TABLET: 10; 325 TABLET ORAL at 23:55

## 2020-01-01 RX ADMIN — GABAPENTIN 800 MG: 400 CAPSULE ORAL at 13:33

## 2020-01-01 RX ADMIN — OXYCODONE HYDROCHLORIDE AND ACETAMINOPHEN 500 MG: 500 TABLET ORAL at 19:53

## 2020-01-01 RX ADMIN — SODIUM CHLORIDE, PRESERVATIVE FREE 10 ML: 5 INJECTION INTRAVENOUS at 08:31

## 2020-01-01 RX ADMIN — METHYLPREDNISOLONE SODIUM SUCCINATE 40 MG: 40 INJECTION, POWDER, LYOPHILIZED, FOR SOLUTION INTRAMUSCULAR; INTRAVENOUS at 09:32

## 2020-01-01 RX ADMIN — BUMETANIDE 0.5 MG: 0.25 INJECTION, SOLUTION INTRAMUSCULAR; INTRAVENOUS at 12:10

## 2020-01-01 RX ADMIN — GABAPENTIN 800 MG: 400 CAPSULE ORAL at 01:22

## 2020-01-01 RX ADMIN — Medication 10 ML: at 21:26

## 2020-01-01 RX ADMIN — IPRATROPIUM BROMIDE AND ALBUTEROL SULFATE 1 AMPULE: 2.5; .5 SOLUTION RESPIRATORY (INHALATION) at 20:31

## 2020-01-01 RX ADMIN — METHOCARBAMOL TABLETS 500 MG: 500 TABLET, COATED ORAL at 13:17

## 2020-01-01 RX ADMIN — Medication 10 ML: at 16:27

## 2020-01-01 RX ADMIN — CEFEPIME 2 G: 2 INJECTION, POWDER, FOR SOLUTION INTRAVENOUS at 21:47

## 2020-01-01 RX ADMIN — SODIUM CHLORIDE SOLN NEBU 3% 4 ML: 3 NEBU SOLN at 21:14

## 2020-01-01 RX ADMIN — SODIUM CHLORIDE: 9 INJECTION, SOLUTION INTRAVENOUS at 07:50

## 2020-01-01 RX ADMIN — RANOLAZINE 500 MG: 500 TABLET, FILM COATED, EXTENDED RELEASE ORAL at 09:33

## 2020-01-01 RX ADMIN — Medication 15 ML: at 21:25

## 2020-01-01 RX ADMIN — AMIODARONE HYDROCHLORIDE 1 MG/MIN: 50 INJECTION, SOLUTION INTRAVENOUS at 17:17

## 2020-01-01 RX ADMIN — CEFAZOLIN 2000 MG: 1 INJECTION, POWDER, FOR SOLUTION INTRAMUSCULAR; INTRAVENOUS at 14:10

## 2020-01-01 RX ADMIN — SODIUM CHLORIDE, PRESERVATIVE FREE 10 ML: 5 INJECTION INTRAVENOUS at 09:43

## 2020-01-01 RX ADMIN — GABAPENTIN 800 MG: 400 CAPSULE ORAL at 14:54

## 2020-01-01 RX ADMIN — METHYLPREDNISOLONE SODIUM SUCCINATE 40 MG: 40 INJECTION, POWDER, LYOPHILIZED, FOR SOLUTION INTRAMUSCULAR; INTRAVENOUS at 18:42

## 2020-01-01 RX ADMIN — VASOPRESSIN 0.04 UNITS/MIN: 20 INJECTION INTRAVENOUS at 21:12

## 2020-01-01 RX ADMIN — DOCUSATE SODIUM 100 MG: 50 LIQUID ORAL at 11:55

## 2020-01-01 RX ADMIN — Medication 1 TABLET: at 08:50

## 2020-01-01 RX ADMIN — POTASSIUM PHOSPHATE, MONOBASIC AND POTASSIUM PHOSPHATE, DIBASIC 30 MMOL: 224; 236 INJECTION, SOLUTION, CONCENTRATE INTRAVENOUS at 10:56

## 2020-01-01 RX ADMIN — CLOPIDOGREL 75 MG: 75 TABLET, FILM COATED ORAL at 08:30

## 2020-01-01 RX ADMIN — OXYCODONE AND ACETAMINOPHEN 1 TABLET: 10; 325 TABLET ORAL at 06:24

## 2020-01-01 RX ADMIN — MIDODRINE HYDROCHLORIDE 10 MG: 5 TABLET ORAL at 18:55

## 2020-01-01 RX ADMIN — ATORVASTATIN CALCIUM 10 MG: 10 TABLET, FILM COATED ORAL at 21:26

## 2020-01-01 RX ADMIN — Medication 10 ML: at 20:56

## 2020-01-01 RX ADMIN — ACETAMINOPHEN 650 MG: 325 TABLET ORAL at 20:56

## 2020-01-01 RX ADMIN — LEVOTHYROXINE SODIUM 100 MCG: 100 TABLET ORAL at 06:24

## 2020-01-01 RX ADMIN — GUAIFENESIN 400 MG: 400 TABLET ORAL at 15:49

## 2020-01-01 RX ADMIN — CLOPIDOGREL 75 MG: 75 TABLET, FILM COATED ORAL at 16:30

## 2020-01-01 RX ADMIN — INSULIN LISPRO 9 UNITS: 100 INJECTION, SOLUTION INTRAVENOUS; SUBCUTANEOUS at 18:25

## 2020-01-01 RX ADMIN — GABAPENTIN 800 MG: 400 CAPSULE ORAL at 08:56

## 2020-01-01 RX ADMIN — GABAPENTIN 800 MG: 400 CAPSULE ORAL at 09:00

## 2020-01-01 RX ADMIN — SODIUM CHLORIDE, PRESERVATIVE FREE 300 UNITS: 5 INJECTION INTRAVENOUS at 08:45

## 2020-01-01 RX ADMIN — ALBUTEROL SULFATE 2.5 MG: 2.5 SOLUTION RESPIRATORY (INHALATION) at 21:56

## 2020-01-01 RX ADMIN — ARFORMOTEROL TARTRATE 15 MCG: 15 SOLUTION RESPIRATORY (INHALATION) at 21:57

## 2020-01-01 RX ADMIN — Medication 150 MCG/HR: at 10:22

## 2020-01-01 RX ADMIN — Medication 10 ML: at 09:01

## 2020-01-01 RX ADMIN — Medication 100 MG: at 08:51

## 2020-01-01 RX ADMIN — SODIUM CHLORIDE: 9 INJECTION, SOLUTION INTRAVENOUS at 13:31

## 2020-01-01 RX ADMIN — GABAPENTIN 800 MG: 400 CAPSULE ORAL at 17:43

## 2020-01-01 RX ADMIN — MIDODRINE HYDROCHLORIDE 10 MG: 5 TABLET ORAL at 14:56

## 2020-01-01 RX ADMIN — ASPIRIN 81 MG: 81 TABLET, COATED ORAL at 08:02

## 2020-01-01 RX ADMIN — Medication 15 ML: at 08:58

## 2020-01-01 RX ADMIN — IPRATROPIUM BROMIDE AND ALBUTEROL SULFATE 1 AMPULE: 2.5; .5 SOLUTION RESPIRATORY (INHALATION) at 11:33

## 2020-01-01 RX ADMIN — GABAPENTIN 800 MG: 400 CAPSULE ORAL at 06:41

## 2020-01-01 RX ADMIN — ENOXAPARIN SODIUM 30 MG: 30 INJECTION SUBCUTANEOUS at 07:47

## 2020-01-01 RX ADMIN — GABAPENTIN 800 MG: 400 CAPSULE ORAL at 12:33

## 2020-01-01 RX ADMIN — VASOPRESSIN 0.04 UNITS/MIN: 20 INJECTION INTRAVENOUS at 02:11

## 2020-01-01 RX ADMIN — BUDESONIDE 500 MCG: 0.5 SUSPENSION RESPIRATORY (INHALATION) at 08:31

## 2020-01-01 RX ADMIN — DOXYCYCLINE HYCLATE 100 MG: 100 CAPSULE ORAL at 20:20

## 2020-01-01 RX ADMIN — SODIUM CHLORIDE, PRESERVATIVE FREE 300 UNITS: 5 INJECTION INTRAVENOUS at 20:05

## 2020-01-01 RX ADMIN — INSULIN LISPRO 9 UNITS: 100 INJECTION, SOLUTION INTRAVENOUS; SUBCUTANEOUS at 05:54

## 2020-01-01 RX ADMIN — MIDODRINE HYDROCHLORIDE 10 MG: 5 TABLET ORAL at 08:59

## 2020-01-01 RX ADMIN — IPRATROPIUM BROMIDE 1 PUFF: 17 AEROSOL, METERED RESPIRATORY (INHALATION) at 10:57

## 2020-01-01 RX ADMIN — BUDESONIDE 500 MCG: 0.5 SUSPENSION RESPIRATORY (INHALATION) at 08:21

## 2020-01-01 RX ADMIN — LEVOTHYROXINE SODIUM 100 MCG: 100 TABLET ORAL at 06:10

## 2020-01-01 RX ADMIN — SENNOSIDES 8.8 MG: 8.8 SYRUP ORAL at 20:12

## 2020-01-01 RX ADMIN — PHENYLEPHRINE HYDROCHLORIDE 125 MCG/MIN: 10 INJECTION INTRAVENOUS at 01:05

## 2020-01-01 RX ADMIN — METHYLPREDNISOLONE SODIUM SUCCINATE 40 MG: 40 INJECTION, POWDER, LYOPHILIZED, FOR SOLUTION INTRAMUSCULAR; INTRAVENOUS at 23:04

## 2020-01-01 RX ADMIN — Medication 1 TABLET: at 09:00

## 2020-01-01 RX ADMIN — HYDROCORTISONE SODIUM SUCCINATE 50 MG: 100 INJECTION, POWDER, FOR SOLUTION INTRAMUSCULAR; INTRAVENOUS at 20:54

## 2020-01-01 RX ADMIN — METHYLPREDNISOLONE SODIUM SUCCINATE 40 MG: 40 INJECTION, POWDER, LYOPHILIZED, FOR SOLUTION INTRAMUSCULAR; INTRAVENOUS at 15:37

## 2020-01-01 RX ADMIN — POTASSIUM CHLORIDE 20 MEQ: 20 TABLET, EXTENDED RELEASE ORAL at 08:56

## 2020-01-01 RX ADMIN — OXYCODONE AND ACETAMINOPHEN 1 TABLET: 10; 325 TABLET ORAL at 21:43

## 2020-01-01 RX ADMIN — ALBUTEROL SULFATE 2 PUFF: 90 AEROSOL, METERED RESPIRATORY (INHALATION) at 07:53

## 2020-01-01 RX ADMIN — INSULIN LISPRO 3 UNITS: 100 INJECTION, SOLUTION INTRAVENOUS; SUBCUTANEOUS at 13:00

## 2020-01-01 RX ADMIN — METHYLPREDNISOLONE SODIUM SUCCINATE 40 MG: 40 INJECTION, POWDER, LYOPHILIZED, FOR SOLUTION INTRAMUSCULAR; INTRAVENOUS at 17:25

## 2020-01-01 RX ADMIN — CEFEPIME 2 G: 2 INJECTION, POWDER, FOR SOLUTION INTRAMUSCULAR; INTRAVENOUS at 03:34

## 2020-01-01 RX ADMIN — GABAPENTIN 800 MG: 400 CAPSULE ORAL at 14:11

## 2020-01-01 RX ADMIN — Medication 10 ML: at 19:47

## 2020-01-01 RX ADMIN — AMIODARONE HYDROCHLORIDE 0.5 MG/MIN: 50 INJECTION, SOLUTION INTRAVENOUS at 22:28

## 2020-01-01 RX ADMIN — DEXTROSE MONOHYDRATE 50 ML: 25 INJECTION, SOLUTION INTRAVENOUS at 19:00

## 2020-01-01 RX ADMIN — POTASSIUM CHLORIDE 20 MEQ: 400 INJECTION, SOLUTION INTRAVENOUS at 08:15

## 2020-01-01 RX ADMIN — CLOPIDOGREL BISULFATE 75 MG: 75 TABLET ORAL at 10:02

## 2020-01-01 RX ADMIN — PROMETHAZINE HYDROCHLORIDE 12.5 MG: 25 TABLET ORAL at 03:40

## 2020-01-01 RX ADMIN — POLYETHYLENE GLYCOL 3350 17 G: 17 POWDER, FOR SOLUTION ORAL at 14:22

## 2020-01-01 RX ADMIN — OXYCODONE HYDROCHLORIDE AND ACETAMINOPHEN 500 MG: 500 TABLET ORAL at 21:19

## 2020-01-01 RX ADMIN — ATORVASTATIN CALCIUM 20 MG: 20 TABLET, FILM COATED ORAL at 08:58

## 2020-01-01 RX ADMIN — CEFEPIME 2 G: 2 INJECTION, POWDER, FOR SOLUTION INTRAMUSCULAR; INTRAVENOUS at 03:45

## 2020-01-01 RX ADMIN — MORPHINE SULFATE 2 MG: 2 INJECTION, SOLUTION INTRAMUSCULAR; INTRAVENOUS at 17:48

## 2020-01-01 RX ADMIN — GABAPENTIN 800 MG: 400 CAPSULE ORAL at 19:45

## 2020-01-01 RX ADMIN — Medication 10 ML: at 11:50

## 2020-01-01 RX ADMIN — SODIUM CHLORIDE SOLN NEBU 3% 4 ML: 3 NEBU SOLN at 08:04

## 2020-01-01 RX ADMIN — ALBUTEROL SULFATE 2 PUFF: 90 AEROSOL, METERED RESPIRATORY (INHALATION) at 10:57

## 2020-01-01 RX ADMIN — CEFEPIME 2 G: 2 INJECTION, POWDER, FOR SOLUTION INTRAMUSCULAR; INTRAVENOUS at 19:30

## 2020-01-01 RX ADMIN — GUAIFENESIN 400 MG: 400 TABLET ORAL at 20:32

## 2020-01-01 RX ADMIN — Medication 100 MG: at 08:20

## 2020-01-01 RX ADMIN — LEVOTHYROXINE SODIUM 100 MCG: 100 TABLET ORAL at 05:38

## 2020-01-01 RX ADMIN — ARFORMOTEROL TARTRATE 15 MCG: 15 SOLUTION RESPIRATORY (INHALATION) at 07:56

## 2020-01-01 RX ADMIN — Medication 15 ML: at 08:46

## 2020-01-01 RX ADMIN — OXYCODONE AND ACETAMINOPHEN 1 TABLET: 10; 325 TABLET ORAL at 05:39

## 2020-01-01 RX ADMIN — Medication 150 MCG/HR: at 12:54

## 2020-01-01 RX ADMIN — SODIUM CHLORIDE 500 ML: 9 INJECTION, SOLUTION INTRAVENOUS at 14:37

## 2020-01-01 RX ADMIN — CALCIUM GLUCONATE 2 G: 98 INJECTION, SOLUTION INTRAVENOUS at 08:19

## 2020-01-01 RX ADMIN — CLOPIDOGREL 75 MG: 75 TABLET, FILM COATED ORAL at 09:19

## 2020-01-01 RX ADMIN — Medication 10 ML: at 09:00

## 2020-01-01 RX ADMIN — ALBUTEROL SULFATE 2 PUFF: 90 AEROSOL, METERED RESPIRATORY (INHALATION) at 16:58

## 2020-01-01 RX ADMIN — LEVOTHYROXINE SODIUM 100 MCG: 100 TABLET ORAL at 06:41

## 2020-01-01 RX ADMIN — AMIODARONE HYDROCHLORIDE 400 MG: 200 TABLET ORAL at 20:52

## 2020-01-01 RX ADMIN — IPRATROPIUM BROMIDE AND ALBUTEROL SULFATE 1 AMPULE: 2.5; .5 SOLUTION RESPIRATORY (INHALATION) at 15:43

## 2020-01-01 RX ADMIN — OXYCODONE AND ACETAMINOPHEN 1 TABLET: 5; 325 TABLET ORAL at 20:51

## 2020-01-01 RX ADMIN — AMIODARONE HYDROCHLORIDE 0.5 MG/MIN: 50 INJECTION, SOLUTION INTRAVENOUS at 05:38

## 2020-01-01 RX ADMIN — INSULIN LISPRO 2 UNITS: 100 INJECTION, SOLUTION INTRAVENOUS; SUBCUTANEOUS at 00:34

## 2020-01-01 RX ADMIN — CALCIUM GLUCONATE 2 G: 98 INJECTION, SOLUTION INTRAVENOUS at 01:22

## 2020-01-01 RX ADMIN — METHOCARBAMOL TABLETS 500 MG: 500 TABLET, COATED ORAL at 21:26

## 2020-01-01 RX ADMIN — VASOPRESSIN 0.04 UNITS/MIN: 20 INJECTION INTRAVENOUS at 00:28

## 2020-01-01 RX ADMIN — SODIUM CHLORIDE: 4.5 INJECTION, SOLUTION INTRAVENOUS at 09:30

## 2020-01-01 RX ADMIN — ARFORMOTEROL TARTRATE 15 MCG: 15 SOLUTION RESPIRATORY (INHALATION) at 19:37

## 2020-01-01 RX ADMIN — MAGNESIUM SULFATE 2 G: 2 INJECTION INTRAVENOUS at 08:12

## 2020-01-01 RX ADMIN — IPRATROPIUM BROMIDE AND ALBUTEROL SULFATE 1 AMPULE: 2.5; .5 SOLUTION RESPIRATORY (INHALATION) at 12:11

## 2020-01-01 RX ADMIN — AMIODARONE HYDROCHLORIDE 150 MG: 50 INJECTION, SOLUTION INTRAVENOUS at 10:13

## 2020-01-01 RX ADMIN — MORPHINE SULFATE 2 MG: 2 INJECTION, SOLUTION INTRAMUSCULAR; INTRAVENOUS at 18:42

## 2020-01-01 RX ADMIN — Medication 5 MG/HR: at 15:39

## 2020-01-01 RX ADMIN — ARFORMOTEROL TARTRATE 15 MCG: 15 SOLUTION RESPIRATORY (INHALATION) at 07:54

## 2020-01-01 RX ADMIN — ARFORMOTEROL TARTRATE 15 MCG: 15 SOLUTION RESPIRATORY (INHALATION) at 20:10

## 2020-01-01 RX ADMIN — SODIUM CHLORIDE, PRESERVATIVE FREE 300 UNITS: 5 INJECTION INTRAVENOUS at 08:52

## 2020-01-01 RX ADMIN — HYDROCORTISONE SODIUM SUCCINATE 50 MG: 100 INJECTION, POWDER, FOR SOLUTION INTRAMUSCULAR; INTRAVENOUS at 09:11

## 2020-01-01 RX ADMIN — OXYCODONE HYDROCHLORIDE AND ACETAMINOPHEN 500 MG: 500 TABLET ORAL at 11:30

## 2020-01-01 RX ADMIN — Medication 2000 UNITS: at 09:55

## 2020-01-01 RX ADMIN — Medication 10 ML: at 08:31

## 2020-01-01 RX ADMIN — CLOPIDOGREL 75 MG: 75 TABLET, FILM COATED ORAL at 08:48

## 2020-01-01 RX ADMIN — Medication 200 MCG/HR: at 20:20

## 2020-01-01 RX ADMIN — IPRATROPIUM BROMIDE AND ALBUTEROL SULFATE 1 AMPULE: 2.5; .5 SOLUTION RESPIRATORY (INHALATION) at 20:10

## 2020-01-01 RX ADMIN — PANTOPRAZOLE SODIUM 40 MG: 40 INJECTION, POWDER, FOR SOLUTION INTRAVENOUS at 09:03

## 2020-01-01 RX ADMIN — ARFORMOTEROL TARTRATE 15 MCG: 15 SOLUTION RESPIRATORY (INHALATION) at 08:06

## 2020-01-01 RX ADMIN — HYDROCORTISONE SODIUM SUCCINATE 100 MG: 100 INJECTION, POWDER, FOR SOLUTION INTRAMUSCULAR; INTRAVENOUS at 18:29

## 2020-01-01 RX ADMIN — INSULIN LISPRO 12 UNITS: 100 INJECTION, SOLUTION INTRAVENOUS; SUBCUTANEOUS at 00:06

## 2020-01-01 RX ADMIN — BUDESONIDE 500 MCG: 0.5 SUSPENSION RESPIRATORY (INHALATION) at 19:21

## 2020-01-01 RX ADMIN — ALBUTEROL SULFATE 2.5 MG: 2.5 SOLUTION RESPIRATORY (INHALATION) at 15:59

## 2020-01-01 RX ADMIN — BUDESONIDE 500 MCG: 0.5 SUSPENSION RESPIRATORY (INHALATION) at 08:23

## 2020-01-01 RX ADMIN — BUDESONIDE 500 MCG: 0.5 SUSPENSION RESPIRATORY (INHALATION) at 21:14

## 2020-01-01 RX ADMIN — CEFTRIAXONE 1 G: 1 INJECTION, POWDER, FOR SOLUTION INTRAMUSCULAR; INTRAVENOUS at 19:47

## 2020-01-01 RX ADMIN — GABAPENTIN 800 MG: 400 CAPSULE ORAL at 09:45

## 2020-01-01 RX ADMIN — ENOXAPARIN SODIUM 40 MG: 40 INJECTION SUBCUTANEOUS at 08:31

## 2020-01-01 RX ADMIN — LEVOTHYROXINE SODIUM 100 MCG: 100 TABLET ORAL at 06:26

## 2020-01-01 RX ADMIN — SODIUM CHLORIDE, PRESERVATIVE FREE 10 ML: 5 INJECTION INTRAVENOUS at 08:53

## 2020-01-01 RX ADMIN — INSULIN LISPRO 3 UNITS: 100 INJECTION, SOLUTION INTRAVENOUS; SUBCUTANEOUS at 17:43

## 2020-01-01 RX ADMIN — HYDROCORTISONE SODIUM SUCCINATE 50 MG: 100 INJECTION, POWDER, FOR SOLUTION INTRAMUSCULAR; INTRAVENOUS at 01:22

## 2020-01-01 RX ADMIN — MIDODRINE HYDROCHLORIDE 10 MG: 5 TABLET ORAL at 18:00

## 2020-01-01 RX ADMIN — ENOXAPARIN SODIUM 30 MG: 30 INJECTION SUBCUTANEOUS at 20:19

## 2020-01-01 RX ADMIN — ENOXAPARIN SODIUM 30 MG: 30 INJECTION SUBCUTANEOUS at 21:26

## 2020-01-01 RX ADMIN — Medication 1 TABLET: at 08:42

## 2020-01-01 RX ADMIN — Medication 10 ML: at 07:48

## 2020-01-01 RX ADMIN — RANOLAZINE 500 MG: 500 TABLET, FILM COATED, EXTENDED RELEASE ORAL at 08:58

## 2020-01-01 RX ADMIN — SODIUM CHLORIDE 250 ML: 9 INJECTION, SOLUTION INTRAVENOUS at 21:57

## 2020-01-01 RX ADMIN — HYDROCORTISONE SODIUM SUCCINATE 50 MG: 100 INJECTION, POWDER, FOR SOLUTION INTRAMUSCULAR; INTRAVENOUS at 20:26

## 2020-01-01 RX ADMIN — MORPHINE SULFATE 2 MG: 2 INJECTION, SOLUTION INTRAMUSCULAR; INTRAVENOUS at 11:47

## 2020-01-01 RX ADMIN — Medication 1 TABLET: at 09:45

## 2020-01-01 RX ADMIN — Medication 15 ML: at 21:05

## 2020-01-01 RX ADMIN — SENNOSIDES 8.8 MG: 8.8 SYRUP ORAL at 20:39

## 2020-01-01 RX ADMIN — HYDROCORTISONE SODIUM SUCCINATE 100 MG: 100 INJECTION, POWDER, FOR SOLUTION INTRAMUSCULAR; INTRAVENOUS at 01:35

## 2020-01-01 RX ADMIN — Medication 5 MG/HR: at 05:23

## 2020-01-01 RX ADMIN — INSULIN GLARGINE 15 UNITS: 100 INJECTION, SOLUTION SUBCUTANEOUS at 12:27

## 2020-01-01 RX ADMIN — MIDAZOLAM HYDROCHLORIDE 2 MG: 2 INJECTION, SOLUTION INTRAMUSCULAR; INTRAVENOUS at 13:21

## 2020-01-01 RX ADMIN — LEVOTHYROXINE SODIUM 100 MCG: 100 TABLET ORAL at 05:45

## 2020-01-01 RX ADMIN — VANCOMYCIN HYDROCHLORIDE 1000 MG: 1 INJECTION, POWDER, LYOPHILIZED, FOR SOLUTION INTRAVENOUS at 16:29

## 2020-01-01 RX ADMIN — RANOLAZINE 500 MG: 500 TABLET, FILM COATED, EXTENDED RELEASE ORAL at 21:02

## 2020-01-01 RX ADMIN — BUDESONIDE 500 MCG: 0.5 SUSPENSION RESPIRATORY (INHALATION) at 21:37

## 2020-01-01 RX ADMIN — METHYLPREDNISOLONE SODIUM SUCCINATE 40 MG: 40 INJECTION, POWDER, LYOPHILIZED, FOR SOLUTION INTRAMUSCULAR; INTRAVENOUS at 14:11

## 2020-01-01 RX ADMIN — Medication 2000 UNITS: at 08:47

## 2020-01-01 RX ADMIN — IPRATROPIUM BROMIDE AND ALBUTEROL SULFATE 1 AMPULE: 2.5; .5 SOLUTION RESPIRATORY (INHALATION) at 08:52

## 2020-01-01 RX ADMIN — INSULIN LISPRO 3 UNITS: 100 INJECTION, SOLUTION INTRAVENOUS; SUBCUTANEOUS at 00:23

## 2020-01-01 RX ADMIN — Medication 15 ML: at 21:18

## 2020-01-01 RX ADMIN — SODIUM CHLORIDE, PRESERVATIVE FREE 300 UNITS: 5 INJECTION INTRAVENOUS at 20:26

## 2020-01-01 RX ADMIN — PANTOPRAZOLE SODIUM 40 MG: 40 INJECTION, POWDER, FOR SOLUTION INTRAVENOUS at 08:58

## 2020-01-01 RX ADMIN — POTASSIUM CHLORIDE 40 MEQ: 29.8 INJECTION, SOLUTION INTRAVENOUS at 10:46

## 2020-01-01 RX ADMIN — Medication 10 ML: at 08:45

## 2020-01-01 RX ADMIN — POTASSIUM CHLORIDE 20 MEQ: 400 INJECTION, SOLUTION INTRAVENOUS at 01:45

## 2020-01-01 RX ADMIN — INSULIN LISPRO 3 UNITS: 100 INJECTION, SOLUTION INTRAVENOUS; SUBCUTANEOUS at 17:23

## 2020-01-01 RX ADMIN — ACETYLCYSTEINE 600 MG: 200 SOLUTION ORAL; RESPIRATORY (INHALATION) at 08:04

## 2020-01-01 RX ADMIN — HYDROCORTISONE SODIUM SUCCINATE 50 MG: 100 INJECTION, POWDER, FOR SOLUTION INTRAMUSCULAR; INTRAVENOUS at 20:03

## 2020-01-01 RX ADMIN — METHOCARBAMOL TABLETS 500 MG: 500 TABLET, COATED ORAL at 16:05

## 2020-01-01 RX ADMIN — SODIUM CHLORIDE: 9 INJECTION, SOLUTION INTRAVENOUS at 21:30

## 2020-01-01 RX ADMIN — ARFORMOTEROL TARTRATE 15 MCG: 15 SOLUTION RESPIRATORY (INHALATION) at 09:11

## 2020-01-01 RX ADMIN — GUAIFENESIN 400 MG: 400 TABLET ORAL at 21:46

## 2020-01-01 RX ADMIN — Medication 2000 UNITS: at 08:59

## 2020-01-01 RX ADMIN — ENOXAPARIN SODIUM 30 MG: 30 INJECTION SUBCUTANEOUS at 20:31

## 2020-01-01 RX ADMIN — ASPIRIN 81 MG: 81 TABLET, COATED ORAL at 10:02

## 2020-01-01 RX ADMIN — BUDESONIDE 500 MCG: 0.5 SUSPENSION RESPIRATORY (INHALATION) at 09:19

## 2020-01-01 RX ADMIN — IPRATROPIUM BROMIDE AND ALBUTEROL SULFATE 1 AMPULE: 2.5; .5 SOLUTION RESPIRATORY (INHALATION) at 16:30

## 2020-01-01 RX ADMIN — METOPROLOL SUCCINATE 25 MG: 25 TABLET, EXTENDED RELEASE ORAL at 10:02

## 2020-01-01 RX ADMIN — CEFEPIME 2 G: 2 INJECTION, POWDER, FOR SOLUTION INTRAMUSCULAR; INTRAVENOUS at 15:19

## 2020-01-01 RX ADMIN — OXYCODONE HYDROCHLORIDE AND ACETAMINOPHEN 500 MG: 500 TABLET ORAL at 09:10

## 2020-01-01 RX ADMIN — METOPROLOL TARTRATE 5 MG: 5 INJECTION INTRAVENOUS at 12:57

## 2020-01-01 RX ADMIN — Medication 1 MG/HR: at 22:03

## 2020-01-01 RX ADMIN — ARFORMOTEROL TARTRATE 15 MCG: 15 SOLUTION RESPIRATORY (INHALATION) at 21:25

## 2020-01-01 RX ADMIN — ACETYLCYSTEINE 600 MG: 200 SOLUTION ORAL; RESPIRATORY (INHALATION) at 21:19

## 2020-01-01 RX ADMIN — METHYLPREDNISOLONE SODIUM SUCCINATE 40 MG: 40 INJECTION, POWDER, LYOPHILIZED, FOR SOLUTION INTRAMUSCULAR; INTRAVENOUS at 09:43

## 2020-01-01 RX ADMIN — ALBUTEROL SULFATE 2.5 MG: 2.5 SOLUTION RESPIRATORY (INHALATION) at 12:09

## 2020-01-01 RX ADMIN — GABAPENTIN 800 MG: 400 CAPSULE ORAL at 09:38

## 2020-01-01 RX ADMIN — Medication 10 ML: at 20:59

## 2020-01-01 RX ADMIN — OXYCODONE AND ACETAMINOPHEN 1 TABLET: 10; 325 TABLET ORAL at 04:01

## 2020-01-01 RX ADMIN — IPRATROPIUM BROMIDE AND ALBUTEROL SULFATE 1 AMPULE: 2.5; .5 SOLUTION RESPIRATORY (INHALATION) at 20:11

## 2020-01-01 RX ADMIN — ASPIRIN 81 MG: 81 TABLET, COATED ORAL at 10:57

## 2020-01-01 RX ADMIN — POTASSIUM CHLORIDE 20 MEQ: 20 TABLET, EXTENDED RELEASE ORAL at 10:57

## 2020-01-01 RX ADMIN — DOXYCYCLINE 100 MG: 100 INJECTION, POWDER, LYOPHILIZED, FOR SOLUTION INTRAVENOUS at 17:59

## 2020-01-01 RX ADMIN — RANOLAZINE 500 MG: 500 TABLET, FILM COATED, EXTENDED RELEASE ORAL at 08:43

## 2020-01-01 RX ADMIN — ARFORMOTEROL TARTRATE 15 MCG: 15 SOLUTION RESPIRATORY (INHALATION) at 07:52

## 2020-01-01 RX ADMIN — MIDODRINE HYDROCHLORIDE 10 MG: 5 TABLET ORAL at 16:29

## 2020-01-01 RX ADMIN — Medication 2000 UNITS: at 11:55

## 2020-01-01 RX ADMIN — ALBUTEROL SULFATE 2 PUFF: 90 AEROSOL, METERED RESPIRATORY (INHALATION) at 16:06

## 2020-01-01 RX ADMIN — HYDROCORTISONE SODIUM SUCCINATE 50 MG: 100 INJECTION, POWDER, FOR SOLUTION INTRAMUSCULAR; INTRAVENOUS at 21:42

## 2020-01-01 RX ADMIN — ZINC SULFATE 220 MG (50 MG) CAPSULE 50 MG: CAPSULE at 09:55

## 2020-01-01 RX ADMIN — HYDROCORTISONE SODIUM SUCCINATE 50 MG: 100 INJECTION, POWDER, FOR SOLUTION INTRAMUSCULAR; INTRAVENOUS at 21:19

## 2020-01-01 RX ADMIN — SODIUM CHLORIDE, PRESERVATIVE FREE 10 ML: 5 INJECTION INTRAVENOUS at 08:59

## 2020-01-01 RX ADMIN — IPRATROPIUM BROMIDE AND ALBUTEROL SULFATE 1 AMPULE: 2.5; .5 SOLUTION RESPIRATORY (INHALATION) at 20:14

## 2020-01-01 RX ADMIN — SODIUM CHLORIDE: 4.5 INJECTION, SOLUTION INTRAVENOUS at 09:33

## 2020-01-01 RX ADMIN — FUROSEMIDE 40 MG: 40 TABLET ORAL at 08:03

## 2020-01-01 RX ADMIN — AMIODARONE HYDROCHLORIDE 150 MG: 50 INJECTION, SOLUTION INTRAVENOUS at 17:00

## 2020-01-01 RX ADMIN — MAGNESIUM SULFATE HEPTAHYDRATE 2 G: 40 INJECTION, SOLUTION INTRAVENOUS at 10:04

## 2020-01-01 RX ADMIN — ZINC SULFATE 220 MG (50 MG) CAPSULE 50 MG: CAPSULE at 09:10

## 2020-01-01 RX ADMIN — GABAPENTIN 800 MG: 400 CAPSULE ORAL at 21:45

## 2020-01-01 RX ADMIN — BUDESONIDE 500 MCG: 0.5 SUSPENSION RESPIRATORY (INHALATION) at 07:54

## 2020-01-01 RX ADMIN — MIDODRINE HYDROCHLORIDE 10 MG: 5 TABLET ORAL at 18:16

## 2020-01-01 RX ADMIN — OXYCODONE AND ACETAMINOPHEN 1 TABLET: 10; 325 TABLET ORAL at 10:17

## 2020-01-01 RX ADMIN — Medication 100 MCG/HR: at 03:34

## 2020-01-01 RX ADMIN — LEVOTHYROXINE SODIUM 100 MCG: 100 TABLET ORAL at 06:30

## 2020-01-01 RX ADMIN — GABAPENTIN 800 MG: 400 CAPSULE ORAL at 08:42

## 2020-01-01 RX ADMIN — Medication 10 ML: at 10:00

## 2020-01-01 RX ADMIN — IPRATROPIUM BROMIDE AND ALBUTEROL SULFATE 1 AMPULE: 2.5; .5 SOLUTION RESPIRATORY (INHALATION) at 16:02

## 2020-01-01 RX ADMIN — IPRATROPIUM BROMIDE AND ALBUTEROL SULFATE 1 AMPULE: 2.5; .5 SOLUTION RESPIRATORY (INHALATION) at 08:18

## 2020-01-01 RX ADMIN — SODIUM CHLORIDE: 9 INJECTION, SOLUTION INTRAVENOUS at 01:47

## 2020-01-01 RX ADMIN — GABAPENTIN 800 MG: 400 CAPSULE ORAL at 20:20

## 2020-01-01 RX ADMIN — Medication 15 ML: at 20:12

## 2020-01-01 RX ADMIN — Medication 15 ML: at 09:18

## 2020-01-01 RX ADMIN — ARFORMOTEROL TARTRATE 15 MCG: 15 SOLUTION RESPIRATORY (INHALATION) at 08:22

## 2020-01-01 RX ADMIN — SODIUM CHLORIDE SOLN NEBU 3% 4 ML: 3 NEBU SOLN at 19:59

## 2020-01-01 RX ADMIN — GABAPENTIN 800 MG: 400 CAPSULE ORAL at 01:20

## 2020-01-01 RX ADMIN — CLOPIDOGREL BISULFATE 75 MG: 75 TABLET ORAL at 08:50

## 2020-01-01 RX ADMIN — POTASSIUM CHLORIDE 20 MEQ: 400 INJECTION, SOLUTION INTRAVENOUS at 19:19

## 2020-01-01 RX ADMIN — INSULIN LISPRO 6 UNITS: 100 INJECTION, SOLUTION INTRAVENOUS; SUBCUTANEOUS at 12:22

## 2020-01-01 RX ADMIN — SODIUM CHLORIDE, PRESERVATIVE FREE 300 UNITS: 5 INJECTION INTRAVENOUS at 20:12

## 2020-01-01 RX ADMIN — IPRATROPIUM BROMIDE 1 PUFF: 17 AEROSOL, METERED RESPIRATORY (INHALATION) at 20:40

## 2020-01-01 RX ADMIN — ENOXAPARIN SODIUM 30 MG: 30 INJECTION SUBCUTANEOUS at 08:03

## 2020-01-01 RX ADMIN — METHYLPREDNISOLONE SODIUM SUCCINATE 40 MG: 40 INJECTION, POWDER, LYOPHILIZED, FOR SOLUTION INTRAMUSCULAR; INTRAVENOUS at 03:26

## 2020-01-01 RX ADMIN — OXYCODONE HYDROCHLORIDE AND ACETAMINOPHEN 500 MG: 500 TABLET ORAL at 20:25

## 2020-01-01 RX ADMIN — PANTOPRAZOLE SODIUM 40 MG: 40 INJECTION, POWDER, FOR SOLUTION INTRAVENOUS at 08:51

## 2020-01-01 RX ADMIN — ARFORMOTEROL TARTRATE 15 MCG: 15 SOLUTION RESPIRATORY (INHALATION) at 21:08

## 2020-01-01 RX ADMIN — Medication 10 ML: at 21:00

## 2020-01-01 RX ADMIN — SODIUM CHLORIDE, PRESERVATIVE FREE 10 ML: 5 INJECTION INTRAVENOUS at 09:56

## 2020-01-01 RX ADMIN — ATORVASTATIN CALCIUM 20 MG: 20 TABLET, FILM COATED ORAL at 08:30

## 2020-01-01 RX ADMIN — Medication 15 ML: at 20:58

## 2020-01-01 RX ADMIN — Medication 10 ML: at 21:18

## 2020-01-01 RX ADMIN — SODIUM CHLORIDE SOLN NEBU 3% 4 ML: 3 NEBU SOLN at 07:55

## 2020-01-01 RX ADMIN — MIDODRINE HYDROCHLORIDE 10 MG: 5 TABLET ORAL at 11:55

## 2020-01-01 RX ADMIN — ARFORMOTEROL TARTRATE 15 MCG: 15 SOLUTION RESPIRATORY (INHALATION) at 08:25

## 2020-01-01 RX ADMIN — PANTOPRAZOLE SODIUM 40 MG: 40 INJECTION, POWDER, FOR SOLUTION INTRAVENOUS at 08:15

## 2020-01-01 RX ADMIN — Medication 10 ML: at 09:05

## 2020-01-01 RX ADMIN — POTASSIUM CHLORIDE 20 MEQ: 400 INJECTION, SOLUTION INTRAVENOUS at 18:13

## 2020-01-01 RX ADMIN — INSULIN LISPRO 3 UNITS: 100 INJECTION, SOLUTION INTRAVENOUS; SUBCUTANEOUS at 12:39

## 2020-01-01 RX ADMIN — GUAIFENESIN 400 MG: 400 TABLET ORAL at 12:59

## 2020-01-01 RX ADMIN — MIDAZOLAM 2 MG: 1 INJECTION INTRAMUSCULAR; INTRAVENOUS at 13:39

## 2020-01-01 RX ADMIN — ATORVASTATIN CALCIUM 20 MG: 20 TABLET, FILM COATED ORAL at 09:10

## 2020-01-01 RX ADMIN — BUDESONIDE 500 MCG: 0.5 SUSPENSION RESPIRATORY (INHALATION) at 08:54

## 2020-01-01 RX ADMIN — SODIUM CHLORIDE: 9 INJECTION, SOLUTION INTRAVENOUS at 14:52

## 2020-01-01 RX ADMIN — RANOLAZINE 500 MG: 500 TABLET, FILM COATED, EXTENDED RELEASE ORAL at 08:02

## 2020-01-01 RX ADMIN — INSULIN LISPRO 3 UNITS: 100 INJECTION, SOLUTION INTRAVENOUS; SUBCUTANEOUS at 05:37

## 2020-01-01 RX ADMIN — GABAPENTIN 800 MG: 400 CAPSULE ORAL at 11:45

## 2020-01-01 RX ADMIN — RANOLAZINE 500 MG: 500 TABLET, FILM COATED, EXTENDED RELEASE ORAL at 21:24

## 2020-01-01 RX ADMIN — ATORVASTATIN CALCIUM 20 MG: 20 TABLET, FILM COATED ORAL at 09:18

## 2020-01-01 RX ADMIN — Medication 200 MCG/HR: at 09:37

## 2020-01-01 RX ADMIN — OXYCODONE AND ACETAMINOPHEN 1 TABLET: 10; 325 TABLET ORAL at 02:19

## 2020-01-01 RX ADMIN — ATORVASTATIN CALCIUM 20 MG: 20 TABLET, FILM COATED ORAL at 08:47

## 2020-01-01 RX ADMIN — PANTOPRAZOLE SODIUM 40 MG: 40 INJECTION, POWDER, FOR SOLUTION INTRAVENOUS at 08:46

## 2020-01-01 RX ADMIN — Medication 15 ML: at 08:30

## 2020-01-01 RX ADMIN — MIDODRINE HYDROCHLORIDE 10 MG: 5 TABLET ORAL at 17:29

## 2020-01-01 RX ADMIN — PANTOPRAZOLE SODIUM 40 MG: 40 INJECTION, POWDER, FOR SOLUTION INTRAVENOUS at 08:20

## 2020-01-01 RX ADMIN — MORPHINE SULFATE 2 MG: 2 INJECTION, SOLUTION INTRAMUSCULAR; INTRAVENOUS at 07:27

## 2020-01-01 RX ADMIN — DOXYCYCLINE HYCLATE 100 MG: 100 CAPSULE ORAL at 08:50

## 2020-01-01 RX ADMIN — CALCIUM GLUCONATE 2 G: 98 INJECTION, SOLUTION INTRAVENOUS at 12:02

## 2020-01-01 RX ADMIN — ARFORMOTEROL TARTRATE 15 MCG: 15 SOLUTION RESPIRATORY (INHALATION) at 08:23

## 2020-01-01 RX ADMIN — POTASSIUM CHLORIDE 20 MEQ: 400 INJECTION, SOLUTION INTRAVENOUS at 10:58

## 2020-01-01 RX ADMIN — MORPHINE SULFATE 2 MG: 2 INJECTION, SOLUTION INTRAMUSCULAR; INTRAVENOUS at 01:42

## 2020-01-01 RX ADMIN — Medication 100 MCG/HR: at 13:11

## 2020-01-01 RX ADMIN — BUDESONIDE 500 MCG: 0.5 SUSPENSION RESPIRATORY (INHALATION) at 20:21

## 2020-01-01 RX ADMIN — POTASSIUM CHLORIDE 20 MEQ: 400 INJECTION, SOLUTION INTRAVENOUS at 08:45

## 2020-01-01 RX ADMIN — REMDESIVIR 200 MG: 100 INJECTION, POWDER, LYOPHILIZED, FOR SOLUTION INTRAVENOUS at 14:07

## 2020-01-01 RX ADMIN — HYDROCORTISONE SODIUM SUCCINATE 100 MG: 100 INJECTION, POWDER, FOR SOLUTION INTRAMUSCULAR; INTRAVENOUS at 16:30

## 2020-01-01 RX ADMIN — OXYCODONE AND ACETAMINOPHEN 1 TABLET: 10; 325 TABLET ORAL at 21:02

## 2020-01-01 RX ADMIN — DIAZEPAM 2.5 MG: 5 TABLET ORAL at 12:09

## 2020-01-01 RX ADMIN — OXYCODONE HYDROCHLORIDE AND ACETAMINOPHEN 500 MG: 500 TABLET ORAL at 08:52

## 2020-01-01 RX ADMIN — METHYLPREDNISOLONE SODIUM SUCCINATE 40 MG: 40 INJECTION, POWDER, LYOPHILIZED, FOR SOLUTION INTRAMUSCULAR; INTRAVENOUS at 21:26

## 2020-01-01 RX ADMIN — BUDESONIDE 500 MCG: 0.5 SUSPENSION RESPIRATORY (INHALATION) at 08:20

## 2020-01-01 RX ADMIN — Medication 1 MG/HR: at 12:29

## 2020-01-01 RX ADMIN — BUDESONIDE 500 MCG: 0.5 SUSPENSION RESPIRATORY (INHALATION) at 19:45

## 2020-01-01 RX ADMIN — SODIUM CHLORIDE: 9 INJECTION, SOLUTION INTRAVENOUS at 07:39

## 2020-01-01 RX ADMIN — Medication 15 ML: at 21:19

## 2020-01-01 RX ADMIN — KETOROLAC TROMETHAMINE 15 MG: 30 INJECTION, SOLUTION INTRAMUSCULAR; INTRAVENOUS at 14:01

## 2020-01-01 RX ADMIN — ASPIRIN 81 MG: 81 TABLET, COATED ORAL at 09:01

## 2020-01-01 RX ADMIN — FUROSEMIDE 40 MG: 40 TABLET ORAL at 14:44

## 2020-01-01 RX ADMIN — METHOCARBAMOL TABLETS 500 MG: 500 TABLET, COATED ORAL at 18:15

## 2020-01-01 RX ADMIN — SODIUM CHLORIDE, PRESERVATIVE FREE 300 UNITS: 5 INJECTION INTRAVENOUS at 20:58

## 2020-01-01 RX ADMIN — ATORVASTATIN CALCIUM 10 MG: 10 TABLET, FILM COATED ORAL at 21:43

## 2020-01-01 RX ADMIN — POTASSIUM CHLORIDE 20 MEQ: 20 TABLET, EXTENDED RELEASE ORAL at 08:02

## 2020-01-01 RX ADMIN — BUDESONIDE 500 MCG: 0.5 SUSPENSION RESPIRATORY (INHALATION) at 20:26

## 2020-01-01 RX ADMIN — METHOCARBAMOL TABLETS 500 MG: 500 TABLET, COATED ORAL at 16:34

## 2020-01-01 RX ADMIN — INSULIN LISPRO 3 UNITS: 100 INJECTION, SOLUTION INTRAVENOUS; SUBCUTANEOUS at 23:53

## 2020-01-01 RX ADMIN — METHOCARBAMOL TABLETS 500 MG: 500 TABLET, COATED ORAL at 13:34

## 2020-01-01 RX ADMIN — INSULIN LISPRO 3 UNITS: 100 INJECTION, SOLUTION INTRAVENOUS; SUBCUTANEOUS at 01:16

## 2020-01-01 RX ADMIN — POTASSIUM CHLORIDE 20 MEQ: 400 INJECTION, SOLUTION INTRAVENOUS at 11:25

## 2020-01-01 RX ADMIN — DOCUSATE SODIUM 100 MG: 50 LIQUID ORAL at 20:26

## 2020-01-01 RX ADMIN — NOREPINEPHRINE BITARTRATE 3 MCG/MIN: 1 INJECTION, SOLUTION, CONCENTRATE INTRAVENOUS at 15:05

## 2020-01-01 RX ADMIN — Medication 15 ML: at 08:57

## 2020-01-01 RX ADMIN — IPRATROPIUM BROMIDE AND ALBUTEROL SULFATE 1 AMPULE: 2.5; .5 SOLUTION RESPIRATORY (INHALATION) at 08:27

## 2020-01-01 RX ADMIN — MIDODRINE HYDROCHLORIDE 10 MG: 5 TABLET ORAL at 08:21

## 2020-01-01 RX ADMIN — Medication 10 ML: at 21:17

## 2020-01-01 RX ADMIN — ZINC SULFATE 220 MG (50 MG) CAPSULE 50 MG: CAPSULE at 09:04

## 2020-01-01 RX ADMIN — ATORVASTATIN CALCIUM 20 MG: 20 TABLET, FILM COATED ORAL at 08:44

## 2020-01-01 RX ADMIN — CLOPIDOGREL BISULFATE 75 MG: 75 TABLET ORAL at 09:45

## 2020-01-01 RX ADMIN — GABAPENTIN 800 MG: 400 CAPSULE ORAL at 00:37

## 2020-01-01 RX ADMIN — GABAPENTIN 800 MG: 400 CAPSULE ORAL at 10:02

## 2020-01-01 RX ADMIN — RANOLAZINE 500 MG: 500 TABLET, FILM COATED, EXTENDED RELEASE ORAL at 09:08

## 2020-01-01 RX ADMIN — Medication 10 ML: at 20:18

## 2020-01-01 RX ADMIN — GUAIFENESIN 400 MG: 400 TABLET ORAL at 21:28

## 2020-01-01 RX ADMIN — POTASSIUM CHLORIDE 20 MEQ: 400 INJECTION, SOLUTION INTRAVENOUS at 17:46

## 2020-01-01 RX ADMIN — CEFEPIME 2 G: 2 INJECTION, POWDER, FOR SOLUTION INTRAVENOUS at 10:39

## 2020-01-01 RX ADMIN — CALCIUM GLUCONATE 2 G: 98 INJECTION, SOLUTION INTRAVENOUS at 10:16

## 2020-01-01 RX ADMIN — POTASSIUM CHLORIDE 20 MEQ: 20 TABLET, EXTENDED RELEASE ORAL at 08:42

## 2020-01-01 RX ADMIN — SODIUM CHLORIDE SOLN NEBU 3% 4 ML: 3 NEBU SOLN at 21:25

## 2020-01-01 RX ADMIN — POTASSIUM CHLORIDE 20 MEQ: 1500 TABLET, EXTENDED RELEASE ORAL at 10:04

## 2020-01-01 RX ADMIN — PROPOFOL 1000 MCG: 10 INJECTION, EMULSION INTRAVENOUS at 19:29

## 2020-01-01 RX ADMIN — Medication 10 ML: at 08:59

## 2020-01-01 RX ADMIN — GUAIFENESIN 400 MG: 400 TABLET ORAL at 07:47

## 2020-01-01 RX ADMIN — IPRATROPIUM BROMIDE AND ALBUTEROL SULFATE 1 AMPULE: 2.5; .5 SOLUTION RESPIRATORY (INHALATION) at 08:41

## 2020-01-01 RX ADMIN — CEFEPIME 2 G: 2 INJECTION, POWDER, FOR SOLUTION INTRAVENOUS at 21:25

## 2020-01-01 RX ADMIN — GUAIFENESIN 400 MG: 400 TABLET ORAL at 08:50

## 2020-01-01 RX ADMIN — ATORVASTATIN CALCIUM 10 MG: 10 TABLET, FILM COATED ORAL at 20:28

## 2020-01-01 RX ADMIN — CLOPIDOGREL 75 MG: 75 TABLET, FILM COATED ORAL at 09:10

## 2020-01-01 RX ADMIN — METHOCARBAMOL TABLETS 500 MG: 500 TABLET, COATED ORAL at 16:50

## 2020-01-01 RX ADMIN — SODIUM CHLORIDE 20 ML: 9 INJECTION, SOLUTION INTRAVENOUS at 16:30

## 2020-01-01 RX ADMIN — DOXYCYCLINE HYCLATE 100 MG: 100 CAPSULE ORAL at 08:56

## 2020-01-01 RX ADMIN — IPRATROPIUM BROMIDE AND ALBUTEROL SULFATE 1 AMPULE: 2.5; .5 SOLUTION RESPIRATORY (INHALATION) at 19:37

## 2020-01-01 RX ADMIN — Medication 150 MCG/HR: at 18:11

## 2020-01-01 RX ADMIN — OXYCODONE HYDROCHLORIDE AND ACETAMINOPHEN 500 MG: 500 TABLET ORAL at 09:55

## 2020-01-01 RX ADMIN — SENNOSIDES 8.8 MG: 8.8 SYRUP ORAL at 21:21

## 2020-01-01 RX ADMIN — Medication 10 ML: at 21:34

## 2020-01-01 RX ADMIN — GABAPENTIN 800 MG: 400 CAPSULE ORAL at 20:28

## 2020-01-01 RX ADMIN — SENNOSIDES 8.8 MG: 8.8 SYRUP ORAL at 20:53

## 2020-01-01 RX ADMIN — OXYCODONE AND ACETAMINOPHEN 1 TABLET: 10; 325 TABLET ORAL at 01:25

## 2020-01-01 RX ADMIN — WATER 10 ML: 1 INJECTION INTRAMUSCULAR; INTRAVENOUS; SUBCUTANEOUS at 18:00

## 2020-01-01 RX ADMIN — Medication 2000 UNITS: at 08:56

## 2020-01-01 RX ADMIN — BUDESONIDE 500 MCG: 0.5 SUSPENSION RESPIRATORY (INHALATION) at 08:52

## 2020-01-01 RX ADMIN — AMIODARONE HYDROCHLORIDE 400 MG: 200 TABLET ORAL at 11:28

## 2020-01-01 RX ADMIN — CEFEPIME 2 G: 2 INJECTION, POWDER, FOR SOLUTION INTRAVENOUS at 20:15

## 2020-01-01 RX ADMIN — IPRATROPIUM BROMIDE 1 PUFF: 17 AEROSOL, METERED RESPIRATORY (INHALATION) at 07:53

## 2020-01-01 RX ADMIN — HYDROCORTISONE SODIUM SUCCINATE 50 MG: 100 INJECTION, POWDER, FOR SOLUTION INTRAMUSCULAR; INTRAVENOUS at 17:25

## 2020-01-01 RX ADMIN — MIDODRINE HYDROCHLORIDE 10 MG: 5 TABLET ORAL at 17:31

## 2020-01-01 RX ADMIN — FUROSEMIDE 40 MG: 10 INJECTION, SOLUTION INTRAMUSCULAR; INTRAVENOUS at 11:22

## 2020-01-01 RX ADMIN — METOPROLOL SUCCINATE 25 MG: 25 TABLET, FILM COATED, EXTENDED RELEASE ORAL at 08:42

## 2020-01-01 RX ADMIN — Medication 2000 UNITS: at 08:50

## 2020-01-01 RX ADMIN — MORPHINE SULFATE 2 MG: 2 INJECTION, SOLUTION INTRAMUSCULAR; INTRAVENOUS at 22:06

## 2020-01-01 RX ADMIN — ALBUTEROL SULFATE 2 PUFF: 90 AEROSOL, METERED RESPIRATORY (INHALATION) at 16:39

## 2020-01-01 RX ADMIN — METHOCARBAMOL TABLETS 500 MG: 500 TABLET, COATED ORAL at 17:09

## 2020-01-01 RX ADMIN — BUDESONIDE 500 MCG: 0.5 SUSPENSION RESPIRATORY (INHALATION) at 07:48

## 2020-01-01 RX ADMIN — Medication 10 ML: at 09:12

## 2020-01-01 RX ADMIN — MIDODRINE HYDROCHLORIDE 10 MG: 5 TABLET ORAL at 08:50

## 2020-01-01 RX ADMIN — Medication 10 ML: at 08:04

## 2020-01-01 RX ADMIN — Medication 3 MG/HR: at 22:28

## 2020-01-01 RX ADMIN — ALBUTEROL SULFATE 2.5 MG: 2.5 SOLUTION RESPIRATORY (INHALATION) at 16:24

## 2020-01-01 RX ADMIN — BUDESONIDE 500 MCG: 0.5 SUSPENSION RESPIRATORY (INHALATION) at 19:41

## 2020-01-01 RX ADMIN — LEVOTHYROXINE SODIUM 100 MCG: 100 TABLET ORAL at 06:39

## 2020-01-01 RX ADMIN — REMDESIVIR 100 MG: 100 INJECTION, POWDER, LYOPHILIZED, FOR SOLUTION INTRAVENOUS at 15:41

## 2020-01-01 RX ADMIN — Medication 5 MG/HR: at 23:44

## 2020-01-01 RX ADMIN — ARFORMOTEROL TARTRATE 15 MCG: 15 SOLUTION RESPIRATORY (INHALATION) at 21:14

## 2020-01-01 RX ADMIN — SODIUM CHLORIDE, PRESERVATIVE FREE 10 ML: 5 INJECTION INTRAVENOUS at 08:32

## 2020-01-01 RX ADMIN — HYDROCORTISONE SODIUM SUCCINATE 100 MG: 100 INJECTION, POWDER, FOR SOLUTION INTRAMUSCULAR; INTRAVENOUS at 01:02

## 2020-01-01 RX ADMIN — Medication 15 ML: at 09:56

## 2020-01-01 RX ADMIN — METHOCARBAMOL TABLETS 500 MG: 500 TABLET, COATED ORAL at 20:28

## 2020-01-01 RX ADMIN — IPRATROPIUM BROMIDE AND ALBUTEROL SULFATE 1 AMPULE: 2.5; .5 SOLUTION RESPIRATORY (INHALATION) at 16:31

## 2020-01-01 RX ADMIN — BUDESONIDE 500 MCG: 0.5 SUSPENSION RESPIRATORY (INHALATION) at 20:30

## 2020-01-01 RX ADMIN — Medication 2000 UNITS: at 08:30

## 2020-01-01 RX ADMIN — GABAPENTIN 800 MG: 400 CAPSULE ORAL at 23:45

## 2020-01-01 RX ADMIN — MIDODRINE HYDROCHLORIDE 10 MG: 5 TABLET ORAL at 11:52

## 2020-01-01 RX ADMIN — Medication 9 MILLICURIE: at 09:34

## 2020-01-01 RX ADMIN — MORPHINE SULFATE 2 MG: 2 INJECTION, SOLUTION INTRAMUSCULAR; INTRAVENOUS at 12:09

## 2020-01-01 RX ADMIN — IPRATROPIUM BROMIDE AND ALBUTEROL SULFATE 1 AMPULE: 2.5; .5 SOLUTION RESPIRATORY (INHALATION) at 07:49

## 2020-01-01 RX ADMIN — MIDODRINE HYDROCHLORIDE 10 MG: 5 TABLET ORAL at 07:57

## 2020-01-01 RX ADMIN — INSULIN LISPRO 6 UNITS: 100 INJECTION, SOLUTION INTRAVENOUS; SUBCUTANEOUS at 00:03

## 2020-01-01 RX ADMIN — GABAPENTIN 800 MG: 400 CAPSULE ORAL at 15:36

## 2020-01-01 RX ADMIN — IPRATROPIUM BROMIDE AND ALBUTEROL SULFATE 1 AMPULE: 2.5; .5 SOLUTION RESPIRATORY (INHALATION) at 19:28

## 2020-01-01 RX ADMIN — GABAPENTIN 800 MG: 400 CAPSULE ORAL at 09:08

## 2020-01-01 RX ADMIN — METHOCARBAMOL TABLETS 500 MG: 500 TABLET, COATED ORAL at 13:36

## 2020-01-01 RX ADMIN — ASPIRIN 81 MG: 81 TABLET, COATED ORAL at 09:45

## 2020-01-01 RX ADMIN — Medication 125 MCG/HR: at 16:13

## 2020-01-01 RX ADMIN — ETOMIDATE 25.6 MG: 2 INJECTION, SOLUTION INTRAVENOUS at 09:39

## 2020-01-01 RX ADMIN — Medication 3 MG/HR: at 06:45

## 2020-01-01 RX ADMIN — Medication 150 MCG/HR: at 02:00

## 2020-01-01 RX ADMIN — Medication 10 ML: at 09:56

## 2020-01-01 RX ADMIN — Medication 10 ML: at 20:53

## 2020-01-01 RX ADMIN — HYDROMORPHONE HYDROCHLORIDE 0.5 MG: 1 INJECTION, SOLUTION INTRAMUSCULAR; INTRAVENOUS; SUBCUTANEOUS at 21:07

## 2020-01-01 RX ADMIN — GUAIFENESIN 400 MG: 400 TABLET ORAL at 09:45

## 2020-01-01 RX ADMIN — Medication 5 MG/HR: at 03:11

## 2020-01-01 RX ADMIN — Medication 150 MCG/HR: at 22:08

## 2020-01-01 RX ADMIN — PANTOPRAZOLE SODIUM 40 MG: 40 INJECTION, POWDER, FOR SOLUTION INTRAVENOUS at 08:30

## 2020-01-01 RX ADMIN — ARFORMOTEROL TARTRATE 15 MCG: 15 SOLUTION RESPIRATORY (INHALATION) at 08:19

## 2020-01-01 RX ADMIN — FUROSEMIDE 40 MG: 40 TABLET ORAL at 08:57

## 2020-01-01 RX ADMIN — BUDESONIDE 500 MCG: 0.5 SUSPENSION RESPIRATORY (INHALATION) at 08:27

## 2020-01-01 RX ADMIN — PHENYLEPHRINE HYDROCHLORIDE 125 MCG/MIN: 10 INJECTION INTRAVENOUS at 07:29

## 2020-01-01 RX ADMIN — INSULIN GLARGINE 25 UNITS: 100 INJECTION, SOLUTION SUBCUTANEOUS at 08:47

## 2020-01-01 RX ADMIN — OXYCODONE AND ACETAMINOPHEN 1 TABLET: 10; 325 TABLET ORAL at 12:59

## 2020-01-01 RX ADMIN — ARFORMOTEROL TARTRATE 15 MCG: 15 SOLUTION RESPIRATORY (INHALATION) at 08:04

## 2020-01-01 RX ADMIN — LEVOTHYROXINE SODIUM 100 MCG: 100 TABLET ORAL at 06:31

## 2020-01-01 RX ADMIN — METHYLPREDNISOLONE SODIUM SUCCINATE 40 MG: 40 INJECTION, POWDER, LYOPHILIZED, FOR SOLUTION INTRAMUSCULAR; INTRAVENOUS at 08:49

## 2020-01-01 RX ADMIN — DOCUSATE SODIUM 100 MG: 50 LIQUID ORAL at 08:15

## 2020-01-01 RX ADMIN — IPRATROPIUM BROMIDE AND ALBUTEROL SULFATE 1 AMPULE: .5; 3 SOLUTION RESPIRATORY (INHALATION) at 20:19

## 2020-01-01 RX ADMIN — SODIUM CHLORIDE: 9 INJECTION, SOLUTION INTRAVENOUS at 15:36

## 2020-01-01 RX ADMIN — CLOPIDOGREL 75 MG: 75 TABLET, FILM COATED ORAL at 08:56

## 2020-01-01 RX ADMIN — SODIUM CHLORIDE, PRESERVATIVE FREE 10 ML: 5 INJECTION INTRAVENOUS at 09:44

## 2020-01-01 RX ADMIN — SODIUM CHLORIDE 1000 ML: 9 INJECTION, SOLUTION INTRAVENOUS at 17:23

## 2020-01-01 RX ADMIN — RANOLAZINE 500 MG: 500 TABLET, FILM COATED, EXTENDED RELEASE ORAL at 09:01

## 2020-01-01 RX ADMIN — Medication 150 MCG/HR: at 16:45

## 2020-01-01 RX ADMIN — Medication 1 TABLET: at 09:01

## 2020-01-01 RX ADMIN — GABAPENTIN 800 MG: 400 CAPSULE ORAL at 14:48

## 2020-01-01 RX ADMIN — IPRATROPIUM BROMIDE AND ALBUTEROL SULFATE 1 AMPULE: 2.5; .5 SOLUTION RESPIRATORY (INHALATION) at 19:21

## 2020-01-01 RX ADMIN — GABAPENTIN 800 MG: 400 CAPSULE ORAL at 13:34

## 2020-01-01 RX ADMIN — SODIUM PHOSPHATE, MONOBASIC, MONOHYDRATE 30 MMOL: 276; 142 INJECTION, SOLUTION INTRAVENOUS at 11:25

## 2020-01-01 RX ADMIN — NOREPINEPHRINE BITARTRATE: 1 INJECTION, SOLUTION, CONCENTRATE INTRAVENOUS at 22:52

## 2020-01-01 RX ADMIN — DOCUSATE SODIUM 100 MG: 50 LIQUID ORAL at 09:56

## 2020-01-01 RX ADMIN — BUDESONIDE 500 MCG: 0.5 SUSPENSION RESPIRATORY (INHALATION) at 19:28

## 2020-01-01 RX ADMIN — Medication 15 ML: at 10:00

## 2020-01-01 RX ADMIN — GABAPENTIN 800 MG: 400 CAPSULE ORAL at 13:04

## 2020-01-01 RX ADMIN — MAGNESIUM SULFATE HEPTAHYDRATE 2 G: 40 INJECTION, SOLUTION INTRAVENOUS at 17:29

## 2020-01-01 RX ADMIN — RANOLAZINE 500 MG: 500 TABLET, FILM COATED, EXTENDED RELEASE ORAL at 20:14

## 2020-01-01 RX ADMIN — ARFORMOTEROL TARTRATE 15 MCG: 15 SOLUTION RESPIRATORY (INHALATION) at 19:28

## 2020-01-01 RX ADMIN — OXYCODONE AND ACETAMINOPHEN 1 TABLET: 5; 325 TABLET ORAL at 16:27

## 2020-01-01 RX ADMIN — SODIUM CHLORIDE, PRESERVATIVE FREE 10 ML: 5 INJECTION INTRAVENOUS at 09:08

## 2020-01-01 RX ADMIN — LEVOTHYROXINE SODIUM 100 MCG: 100 TABLET ORAL at 05:39

## 2020-01-01 RX ADMIN — Medication 150 MCG/HR: at 05:35

## 2020-01-01 RX ADMIN — SODIUM CHLORIDE 1000 ML: 9 INJECTION, SOLUTION INTRAVENOUS at 13:35

## 2020-01-01 RX ADMIN — Medication 15 ML: at 09:57

## 2020-01-01 RX ADMIN — INSULIN LISPRO 3 UNITS: 100 INJECTION, SOLUTION INTRAVENOUS; SUBCUTANEOUS at 01:06

## 2020-01-01 RX ADMIN — Medication 15 ML: at 08:20

## 2020-01-01 RX ADMIN — METHYLPREDNISOLONE SODIUM SUCCINATE 40 MG: 40 INJECTION, POWDER, LYOPHILIZED, FOR SOLUTION INTRAMUSCULAR; INTRAVENOUS at 06:29

## 2020-01-01 RX ADMIN — SENNOSIDES 8.8 MG: 8.8 SYRUP ORAL at 20:05

## 2020-01-01 RX ADMIN — MIDODRINE HYDROCHLORIDE 10 MG: 5 TABLET ORAL at 16:28

## 2020-01-01 RX ADMIN — METHYLPREDNISOLONE SODIUM SUCCINATE 40 MG: 40 INJECTION, POWDER, LYOPHILIZED, FOR SOLUTION INTRAMUSCULAR; INTRAVENOUS at 07:21

## 2020-01-01 RX ADMIN — Medication 10 ML: at 09:44

## 2020-01-01 RX ADMIN — GUAIFENESIN 400 MG: 400 TABLET ORAL at 20:20

## 2020-01-01 RX ADMIN — ASPIRIN 81 MG: 81 TABLET, COATED ORAL at 08:43

## 2020-01-01 RX ADMIN — TOCILIZUMAB 700 MG: 20 INJECTION, SOLUTION, CONCENTRATE INTRAVENOUS at 22:18

## 2020-01-01 RX ADMIN — GABAPENTIN 800 MG: 400 CAPSULE ORAL at 20:30

## 2020-01-01 RX ADMIN — BUMETANIDE 1 MG: 0.25 INJECTION INTRAMUSCULAR; INTRAVENOUS at 10:04

## 2020-01-01 RX ADMIN — GABAPENTIN 800 MG: 400 CAPSULE ORAL at 17:19

## 2020-01-01 RX ADMIN — ASPIRIN 81 MG: 81 TABLET, COATED ORAL at 08:56

## 2020-01-01 RX ADMIN — IPRATROPIUM BROMIDE AND ALBUTEROL SULFATE 1 AMPULE: 2.5; .5 SOLUTION RESPIRATORY (INHALATION) at 15:42

## 2020-01-01 RX ADMIN — DEXAMETHASONE SODIUM PHOSPHATE 10 MG: 10 INJECTION, SOLUTION INTRAMUSCULAR; INTRAVENOUS at 13:34

## 2020-01-01 RX ADMIN — ALBUTEROL SULFATE 2 PUFF: 90 AEROSOL, METERED RESPIRATORY (INHALATION) at 13:33

## 2020-01-01 RX ADMIN — ATORVASTATIN CALCIUM 10 MG: 10 TABLET, FILM COATED ORAL at 20:14

## 2020-01-01 RX ADMIN — BUDESONIDE 500 MCG: 0.5 SUSPENSION RESPIRATORY (INHALATION) at 08:17

## 2020-01-01 RX ADMIN — IPRATROPIUM BROMIDE AND ALBUTEROL SULFATE 1 AMPULE: 2.5; .5 SOLUTION RESPIRATORY (INHALATION) at 19:46

## 2020-01-01 RX ADMIN — BUDESONIDE 250 MCG: 0.25 SUSPENSION RESPIRATORY (INHALATION) at 21:58

## 2020-01-01 RX ADMIN — ROCURONIUM BROMIDE 85 MG: 10 INJECTION INTRAVENOUS at 09:40

## 2020-01-01 RX ADMIN — BUDESONIDE 500 MCG: 0.5 SUSPENSION RESPIRATORY (INHALATION) at 20:14

## 2020-01-01 RX ADMIN — GUAIFENESIN 400 MG: 400 TABLET ORAL at 21:24

## 2020-01-01 RX ADMIN — VANCOMYCIN HYDROCHLORIDE 1000 MG: 1 INJECTION, POWDER, LYOPHILIZED, FOR SOLUTION INTRAVENOUS at 01:45

## 2020-01-01 RX ADMIN — METHYLPREDNISOLONE SODIUM SUCCINATE 40 MG: 40 INJECTION, POWDER, LYOPHILIZED, FOR SOLUTION INTRAMUSCULAR; INTRAVENOUS at 11:43

## 2020-01-01 RX ADMIN — OXYCODONE AND ACETAMINOPHEN 1 TABLET: 10; 325 TABLET ORAL at 23:39

## 2020-01-01 RX ADMIN — ARFORMOTEROL TARTRATE 15 MCG: 15 SOLUTION RESPIRATORY (INHALATION) at 08:26

## 2020-01-01 RX ADMIN — LEVOTHYROXINE SODIUM 100 MCG: 100 TABLET ORAL at 06:48

## 2020-01-01 RX ADMIN — LEVOTHYROXINE SODIUM 100 MCG: 100 TABLET ORAL at 06:54

## 2020-01-01 RX ADMIN — MIDODRINE HYDROCHLORIDE 10 MG: 5 TABLET ORAL at 12:31

## 2020-01-01 RX ADMIN — CEFEPIME 2 G: 2 INJECTION, POWDER, FOR SOLUTION INTRAVENOUS at 20:19

## 2020-01-01 RX ADMIN — GABAPENTIN 800 MG: 400 CAPSULE ORAL at 08:52

## 2020-01-01 RX ADMIN — ZINC SULFATE 220 MG (50 MG) CAPSULE 50 MG: CAPSULE at 12:06

## 2020-01-01 RX ADMIN — INSULIN GLARGINE 25 UNITS: 100 INJECTION, SOLUTION SUBCUTANEOUS at 08:36

## 2020-01-01 RX ADMIN — SODIUM CHLORIDE SOLN NEBU 3% 4 ML: 3 NEBU SOLN at 07:52

## 2020-01-01 RX ADMIN — Medication 10 ML: at 20:27

## 2020-01-01 RX ADMIN — GABAPENTIN 800 MG: 400 CAPSULE ORAL at 12:59

## 2020-01-01 RX ADMIN — ROCURONIUM BROMIDE 50 MG: 10 INJECTION, SOLUTION INTRAVENOUS at 14:07

## 2020-01-01 RX ADMIN — DOXYCYCLINE HYCLATE 100 MG: 100 CAPSULE ORAL at 09:01

## 2020-01-01 RX ADMIN — OXYCODONE AND ACETAMINOPHEN 1 TABLET: 10; 325 TABLET ORAL at 01:59

## 2020-01-01 RX ADMIN — PANTOPRAZOLE SODIUM 40 MG: 40 INJECTION, POWDER, FOR SOLUTION INTRAVENOUS at 08:31

## 2020-01-01 RX ADMIN — OXYCODONE AND ACETAMINOPHEN 1 TABLET: 10; 325 TABLET ORAL at 10:42

## 2020-01-01 RX ADMIN — HYDROCORTISONE SODIUM SUCCINATE 50 MG: 100 INJECTION, POWDER, FOR SOLUTION INTRAMUSCULAR; INTRAVENOUS at 00:48

## 2020-01-01 RX ADMIN — Medication 10 ML: at 20:15

## 2020-01-01 RX ADMIN — Medication 3 MG/HR: at 23:14

## 2020-01-01 RX ADMIN — OXYCODONE HYDROCHLORIDE AND ACETAMINOPHEN 500 MG: 500 TABLET ORAL at 08:51

## 2020-01-01 RX ADMIN — MIDODRINE HYDROCHLORIDE 10 MG: 5 TABLET ORAL at 09:10

## 2020-01-01 RX ADMIN — MIDODRINE HYDROCHLORIDE 10 MG: 5 TABLET ORAL at 08:46

## 2020-01-01 RX ADMIN — GABAPENTIN 800 MG: 400 CAPSULE ORAL at 19:30

## 2020-01-01 RX ADMIN — BUDESONIDE 500 MCG: 0.5 SUSPENSION RESPIRATORY (INHALATION) at 21:07

## 2020-01-01 RX ADMIN — GABAPENTIN 800 MG: 400 CAPSULE ORAL at 00:48

## 2020-01-01 RX ADMIN — GABAPENTIN 800 MG: 400 CAPSULE ORAL at 20:33

## 2020-01-01 RX ADMIN — Medication 15 ML: at 09:09

## 2020-01-01 RX ADMIN — OXYCODONE HYDROCHLORIDE AND ACETAMINOPHEN 500 MG: 500 TABLET ORAL at 20:12

## 2020-01-01 RX ADMIN — DEXAMETHASONE 6 MG: 4 TABLET ORAL at 19:30

## 2020-01-01 RX ADMIN — SODIUM CHLORIDE, PRESERVATIVE FREE 10 ML: 5 INJECTION INTRAVENOUS at 09:11

## 2020-01-01 RX ADMIN — METHYLPREDNISOLONE SODIUM SUCCINATE 40 MG: 40 INJECTION, POWDER, LYOPHILIZED, FOR SOLUTION INTRAMUSCULAR; INTRAVENOUS at 12:33

## 2020-01-01 RX ADMIN — HYDROCORTISONE SODIUM SUCCINATE 50 MG: 100 INJECTION, POWDER, FOR SOLUTION INTRAMUSCULAR; INTRAVENOUS at 18:00

## 2020-01-01 RX ADMIN — GABAPENTIN 800 MG: 400 CAPSULE ORAL at 17:21

## 2020-01-01 RX ADMIN — IPRATROPIUM BROMIDE 1 PUFF: 17 AEROSOL, METERED RESPIRATORY (INHALATION) at 13:33

## 2020-01-01 RX ADMIN — SODIUM CHLORIDE, PRESERVATIVE FREE 300 UNITS: 5 INJECTION INTRAVENOUS at 20:52

## 2020-01-01 RX ADMIN — WATER 10 ML: 1 INJECTION INTRAMUSCULAR; INTRAVENOUS; SUBCUTANEOUS at 08:31

## 2020-01-01 RX ADMIN — MORPHINE SULFATE 2 MG: 2 INJECTION, SOLUTION INTRAMUSCULAR; INTRAVENOUS at 03:03

## 2020-01-01 RX ADMIN — Medication 100 MCG/HR: at 23:22

## 2020-01-01 RX ADMIN — ENOXAPARIN SODIUM 30 MG: 30 INJECTION SUBCUTANEOUS at 15:37

## 2020-01-01 RX ADMIN — SODIUM CHLORIDE, PRESERVATIVE FREE 10 ML: 5 INJECTION INTRAVENOUS at 08:20

## 2020-01-01 RX ADMIN — CEFEPIME 2 G: 2 INJECTION, POWDER, FOR SOLUTION INTRAVENOUS at 10:37

## 2020-01-01 RX ADMIN — CLOPIDOGREL BISULFATE 75 MG: 75 TABLET ORAL at 08:56

## 2020-01-01 RX ADMIN — MORPHINE SULFATE 2 MG: 2 INJECTION, SOLUTION INTRAMUSCULAR; INTRAVENOUS at 18:50

## 2020-01-01 RX ADMIN — DOXYCYCLINE HYCLATE 100 MG: 100 CAPSULE ORAL at 20:28

## 2020-01-01 RX ADMIN — METHOCARBAMOL TABLETS 500 MG: 500 TABLET, COATED ORAL at 12:59

## 2020-01-01 RX ADMIN — Medication 200 MCG/HR: at 13:06

## 2020-01-01 RX ADMIN — IPRATROPIUM BROMIDE AND ALBUTEROL SULFATE 1 AMPULE: 2.5; .5 SOLUTION RESPIRATORY (INHALATION) at 20:37

## 2020-01-01 RX ADMIN — ALBUTEROL SULFATE 2.5 MG: 2.5 SOLUTION RESPIRATORY (INHALATION) at 09:03

## 2020-01-01 RX ADMIN — IPRATROPIUM BROMIDE 1 PUFF: 17 AEROSOL, METERED RESPIRATORY (INHALATION) at 20:39

## 2020-01-01 RX ADMIN — RANOLAZINE 500 MG: 500 TABLET, FILM COATED, EXTENDED RELEASE ORAL at 09:45

## 2020-01-01 RX ADMIN — GABAPENTIN 800 MG: 400 CAPSULE ORAL at 20:54

## 2020-01-01 RX ADMIN — IPRATROPIUM BROMIDE AND ALBUTEROL SULFATE 1 AMPULE: 2.5; .5 SOLUTION RESPIRATORY (INHALATION) at 16:40

## 2020-01-01 RX ADMIN — ARFORMOTEROL TARTRATE 15 MCG: 15 SOLUTION RESPIRATORY (INHALATION) at 07:48

## 2020-01-01 RX ADMIN — WATER 10 ML: 1 INJECTION INTRAMUSCULAR; INTRAVENOUS; SUBCUTANEOUS at 09:42

## 2020-01-01 RX ADMIN — OXYCODONE AND ACETAMINOPHEN 1 TABLET: 5; 325 TABLET ORAL at 10:04

## 2020-01-01 RX ADMIN — BUDESONIDE 250 MCG: 0.25 SUSPENSION RESPIRATORY (INHALATION) at 12:09

## 2020-01-01 RX ADMIN — Medication 2000 UNITS: at 08:21

## 2020-01-01 RX ADMIN — METHOCARBAMOL TABLETS 500 MG: 500 TABLET, COATED ORAL at 16:41

## 2020-01-01 RX ADMIN — GABAPENTIN 800 MG: 400 CAPSULE ORAL at 09:10

## 2020-01-01 RX ADMIN — RANOLAZINE 500 MG: 500 TABLET, FILM COATED, EXTENDED RELEASE ORAL at 20:28

## 2020-01-01 RX ADMIN — ARFORMOTEROL TARTRATE 15 MCG: 15 SOLUTION RESPIRATORY (INHALATION) at 08:17

## 2020-01-01 RX ADMIN — Medication 10 ML: at 19:35

## 2020-01-01 RX ADMIN — IPRATROPIUM BROMIDE AND ALBUTEROL SULFATE 1 AMPULE: 2.5; .5 SOLUTION RESPIRATORY (INHALATION) at 12:17

## 2020-01-01 RX ADMIN — GABAPENTIN 800 MG: 400 CAPSULE ORAL at 14:14

## 2020-01-01 RX ADMIN — Medication 125 MCG/HR: at 00:59

## 2020-01-01 RX ADMIN — ARFORMOTEROL TARTRATE 15 MCG: 15 SOLUTION RESPIRATORY (INHALATION) at 19:45

## 2020-01-01 RX ADMIN — GABAPENTIN 800 MG: 400 CAPSULE ORAL at 21:20

## 2020-01-01 RX ADMIN — IPRATROPIUM BROMIDE AND ALBUTEROL SULFATE 1 AMPULE: 2.5; .5 SOLUTION RESPIRATORY (INHALATION) at 16:49

## 2020-01-01 RX ADMIN — GABAPENTIN 800 MG: 400 CAPSULE ORAL at 21:35

## 2020-01-01 RX ADMIN — HYDROCORTISONE SODIUM SUCCINATE 50 MG: 100 INJECTION, POWDER, FOR SOLUTION INTRAMUSCULAR; INTRAVENOUS at 17:00

## 2020-01-01 RX ADMIN — BUDESONIDE 500 MCG: 0.5 SUSPENSION RESPIRATORY (INHALATION) at 07:52

## 2020-01-01 RX ADMIN — OXYCODONE AND ACETAMINOPHEN 1 TABLET: 10; 325 TABLET ORAL at 19:03

## 2020-01-01 RX ADMIN — Medication 200 MCG/HR: at 12:22

## 2020-01-01 RX ADMIN — GABAPENTIN 800 MG: 400 CAPSULE ORAL at 21:24

## 2020-01-01 RX ADMIN — POTASSIUM CHLORIDE 20 MEQ: 400 INJECTION, SOLUTION INTRAVENOUS at 06:35

## 2020-01-01 RX ADMIN — HYDROCORTISONE SODIUM SUCCINATE 50 MG: 100 INJECTION, POWDER, FOR SOLUTION INTRAMUSCULAR; INTRAVENOUS at 21:00

## 2020-01-01 RX ADMIN — MIDODRINE HYDROCHLORIDE 10 MG: 5 TABLET ORAL at 12:43

## 2020-01-01 RX ADMIN — VASOPRESSIN 0.04 UNITS/MIN: 20 INJECTION INTRAVENOUS at 10:54

## 2020-01-01 RX ADMIN — IPRATROPIUM BROMIDE AND ALBUTEROL SULFATE 1 AMPULE: 2.5; .5 SOLUTION RESPIRATORY (INHALATION) at 12:19

## 2020-01-01 RX ADMIN — SODIUM CHLORIDE SOLN NEBU 3% 4 ML: 3 NEBU SOLN at 21:37

## 2020-01-01 RX ADMIN — IPRATROPIUM BROMIDE AND ALBUTEROL SULFATE 1 AMPULE: 2.5; .5 SOLUTION RESPIRATORY (INHALATION) at 16:23

## 2020-01-01 RX ADMIN — FENTANYL CITRATE 50 MCG: 50 INJECTION, SOLUTION INTRAMUSCULAR; INTRAVENOUS at 12:39

## 2020-01-01 RX ADMIN — ARFORMOTEROL TARTRATE 15 MCG: 15 SOLUTION RESPIRATORY (INHALATION) at 09:19

## 2020-01-01 RX ADMIN — RANOLAZINE 500 MG: 500 TABLET, FILM COATED, EXTENDED RELEASE ORAL at 08:50

## 2020-01-01 RX ADMIN — ARFORMOTEROL TARTRATE 15 MCG: 15 SOLUTION RESPIRATORY (INHALATION) at 19:46

## 2020-01-01 RX ADMIN — LEVOTHYROXINE SODIUM 100 MCG: 100 TABLET ORAL at 05:58

## 2020-01-01 RX ADMIN — IOPAMIDOL 75 ML: 755 INJECTION, SOLUTION INTRAVENOUS at 20:42

## 2020-01-01 RX ADMIN — Medication 150 MCG/HR: at 09:18

## 2020-01-01 RX ADMIN — ARFORMOTEROL TARTRATE 15 MCG: 15 SOLUTION RESPIRATORY (INHALATION) at 20:39

## 2020-01-01 RX ADMIN — SODIUM CHLORIDE, PRESERVATIVE FREE 300 UNITS: 5 INJECTION INTRAVENOUS at 08:57

## 2020-01-01 RX ADMIN — MIDODRINE HYDROCHLORIDE 10 MG: 5 TABLET ORAL at 11:13

## 2020-01-01 RX ADMIN — DESMOPRESSIN ACETATE 40 MG: 0.2 TABLET ORAL at 20:51

## 2020-01-01 RX ADMIN — SODIUM CHLORIDE: 9 INJECTION, SOLUTION INTRAVENOUS at 00:22

## 2020-01-01 RX ADMIN — DOXYCYCLINE HYCLATE 100 MG: 100 CAPSULE ORAL at 08:42

## 2020-01-01 RX ADMIN — HYDROCORTISONE SODIUM SUCCINATE 50 MG: 100 INJECTION, POWDER, FOR SOLUTION INTRAMUSCULAR; INTRAVENOUS at 09:42

## 2020-01-01 RX ADMIN — Medication 1 TABLET: at 08:58

## 2020-01-01 RX ADMIN — OXYCODONE HYDROCHLORIDE AND ACETAMINOPHEN 500 MG: 500 TABLET ORAL at 08:57

## 2020-01-01 RX ADMIN — IPRATROPIUM BROMIDE AND ALBUTEROL SULFATE 1 AMPULE: 2.5; .5 SOLUTION RESPIRATORY (INHALATION) at 12:26

## 2020-01-01 RX ADMIN — SODIUM CHLORIDE: 9 INJECTION, SOLUTION INTRAVENOUS at 13:23

## 2020-01-01 RX ADMIN — RANOLAZINE 500 MG: 500 TABLET, FILM COATED, EXTENDED RELEASE ORAL at 21:26

## 2020-01-01 RX ADMIN — Medication 150 MCG/HR: at 05:30

## 2020-01-01 RX ADMIN — GABAPENTIN 800 MG: 400 CAPSULE ORAL at 09:11

## 2020-01-01 RX ADMIN — SODIUM CHLORIDE, PRESERVATIVE FREE 300 UNITS: 5 INJECTION INTRAVENOUS at 20:04

## 2020-01-01 RX ADMIN — ENOXAPARIN SODIUM 100 MG: 100 INJECTION SUBCUTANEOUS at 20:25

## 2020-01-01 RX ADMIN — ATORVASTATIN CALCIUM 10 MG: 10 TABLET, FILM COATED ORAL at 19:46

## 2020-01-01 RX ADMIN — DOXYCYCLINE HYCLATE 100 MG: 100 CAPSULE ORAL at 21:02

## 2020-01-01 RX ADMIN — GABAPENTIN 800 MG: 400 CAPSULE ORAL at 21:46

## 2020-01-01 RX ADMIN — GABAPENTIN 800 MG: 400 CAPSULE ORAL at 17:45

## 2020-01-01 RX ADMIN — SODIUM CHLORIDE 30 ML: 9 INJECTION, SOLUTION INTRAVENOUS at 16:15

## 2020-01-01 RX ADMIN — GUAIFENESIN 400 MG: 400 TABLET ORAL at 13:49

## 2020-01-01 RX ADMIN — METHYLPREDNISOLONE SODIUM SUCCINATE 40 MG: 40 INJECTION, POWDER, LYOPHILIZED, FOR SOLUTION INTRAMUSCULAR; INTRAVENOUS at 19:46

## 2020-01-01 RX ADMIN — GUAIFENESIN 400 MG: 400 TABLET ORAL at 09:01

## 2020-01-01 RX ADMIN — SENNOSIDES 8.8 MG: 8.8 SYRUP ORAL at 21:26

## 2020-01-01 RX ADMIN — ARFORMOTEROL TARTRATE 15 MCG: 15 SOLUTION RESPIRATORY (INHALATION) at 19:21

## 2020-01-01 RX ADMIN — SODIUM CHLORIDE 30 ML: 9 INJECTION, SOLUTION INTRAVENOUS at 04:52

## 2020-01-01 RX ADMIN — Medication 150 MCG/HR: at 20:30

## 2020-01-01 RX ADMIN — FENTANYL CITRATE 25 MCG: 50 INJECTION, SOLUTION INTRAMUSCULAR; INTRAVENOUS at 03:31

## 2020-01-01 RX ADMIN — Medication 200 MCG/HR: at 05:54

## 2020-01-01 RX ADMIN — AMIODARONE HYDROCHLORIDE 0.5 MG/MIN: 50 INJECTION, SOLUTION INTRAVENOUS at 17:43

## 2020-01-01 RX ADMIN — IPRATROPIUM BROMIDE AND ALBUTEROL SULFATE 1 AMPULE: 2.5; .5 SOLUTION RESPIRATORY (INHALATION) at 07:54

## 2020-01-01 RX ADMIN — VASOPRESSIN 0.04 UNITS/MIN: 20 INJECTION INTRAVENOUS at 23:29

## 2020-01-01 RX ADMIN — OXYCODONE HYDROCHLORIDE AND ACETAMINOPHEN 500 MG: 500 TABLET ORAL at 08:20

## 2020-01-01 RX ADMIN — Medication 2000 UNITS: at 09:18

## 2020-01-01 RX ADMIN — SENNOSIDES 8.8 MG: 8.8 SYRUP ORAL at 20:27

## 2020-01-01 RX ADMIN — PHENYLEPHRINE HYDROCHLORIDE 100 MCG/MIN: 10 INJECTION INTRAVENOUS at 10:13

## 2020-01-01 RX ADMIN — GABAPENTIN 800 MG: 400 CAPSULE ORAL at 08:48

## 2020-01-01 RX ADMIN — ARFORMOTEROL TARTRATE 15 MCG: 15 SOLUTION RESPIRATORY (INHALATION) at 08:41

## 2020-01-01 RX ADMIN — INSULIN LISPRO 3 UNITS: 100 INJECTION, SOLUTION INTRAVENOUS; SUBCUTANEOUS at 01:03

## 2020-01-01 RX ADMIN — GUAIFENESIN 400 MG: 400 TABLET ORAL at 13:09

## 2020-01-01 RX ADMIN — Medication 10 ML: at 10:29

## 2020-01-01 RX ADMIN — GABAPENTIN 800 MG: 400 CAPSULE ORAL at 09:01

## 2020-01-01 RX ADMIN — GABAPENTIN 800 MG: 400 CAPSULE ORAL at 15:25

## 2020-01-01 RX ADMIN — CLOPIDOGREL 75 MG: 75 TABLET, FILM COATED ORAL at 08:58

## 2020-01-01 RX ADMIN — NOREPINEPHRINE BITARTRATE 12 MCG/MIN: 1 INJECTION, SOLUTION, CONCENTRATE INTRAVENOUS at 10:22

## 2020-01-01 RX ADMIN — SODIUM CHLORIDE, PRESERVATIVE FREE 10 ML: 5 INJECTION INTRAVENOUS at 09:34

## 2020-01-01 RX ADMIN — INSULIN LISPRO 3 UNITS: 100 INJECTION, SOLUTION INTRAVENOUS; SUBCUTANEOUS at 06:18

## 2020-01-01 RX ADMIN — IPRATROPIUM BROMIDE 1 PUFF: 17 AEROSOL, METERED RESPIRATORY (INHALATION) at 16:39

## 2020-01-01 RX ADMIN — ENOXAPARIN SODIUM 40 MG: 40 INJECTION SUBCUTANEOUS at 08:14

## 2020-01-01 RX ADMIN — CLOPIDOGREL BISULFATE 75 MG: 75 TABLET ORAL at 09:08

## 2020-01-01 RX ADMIN — GABAPENTIN 800 MG: 400 CAPSULE ORAL at 08:03

## 2020-01-01 RX ADMIN — DOXYCYCLINE HYCLATE 100 MG: 100 CAPSULE ORAL at 09:08

## 2020-01-01 RX ADMIN — HYDROCORTISONE SODIUM SUCCINATE 100 MG: 100 INJECTION, POWDER, FOR SOLUTION INTRAMUSCULAR; INTRAVENOUS at 08:58

## 2020-01-01 RX ADMIN — Medication 15 ML: at 21:01

## 2020-01-01 RX ADMIN — GABAPENTIN 800 MG: 400 CAPSULE ORAL at 22:21

## 2020-01-01 RX ADMIN — GUAIFENESIN 400 MG: 400 TABLET ORAL at 09:38

## 2020-01-01 RX ADMIN — Medication 10 ML: at 21:47

## 2020-01-01 RX ADMIN — ARFORMOTEROL TARTRATE 15 MCG: 15 SOLUTION RESPIRATORY (INHALATION) at 20:19

## 2020-01-01 RX ADMIN — AMIODARONE HYDROCHLORIDE 400 MG: 200 TABLET ORAL at 09:55

## 2020-01-01 RX ADMIN — Medication 40 ML: at 10:00

## 2020-01-01 RX ADMIN — ENOXAPARIN SODIUM 40 MG: 40 INJECTION SUBCUTANEOUS at 09:09

## 2020-01-01 RX ADMIN — GABAPENTIN 800 MG: 400 CAPSULE ORAL at 13:37

## 2020-01-01 RX ADMIN — MIDODRINE HYDROCHLORIDE 10 MG: 5 TABLET ORAL at 09:02

## 2020-01-01 RX ADMIN — LIDOCAINE HYDROCHLORIDE 100 MG: 20 INJECTION, SOLUTION EPIDURAL; INFILTRATION; INTRACAUDAL; PERINEURAL at 14:07

## 2020-01-01 RX ADMIN — METHYLPREDNISOLONE SODIUM SUCCINATE 40 MG: 40 INJECTION, POWDER, LYOPHILIZED, FOR SOLUTION INTRAMUSCULAR; INTRAVENOUS at 18:50

## 2020-01-01 RX ADMIN — OXYCODONE AND ACETAMINOPHEN 1 TABLET: 10; 325 TABLET ORAL at 15:36

## 2020-01-01 RX ADMIN — GABAPENTIN 800 MG: 400 CAPSULE ORAL at 21:05

## 2020-01-01 RX ADMIN — SODIUM CHLORIDE 500 ML: 9 INJECTION, SOLUTION INTRAVENOUS at 10:19

## 2020-01-01 RX ADMIN — OXYCODONE AND ACETAMINOPHEN 1 TABLET: 10; 325 TABLET ORAL at 03:08

## 2020-01-01 RX ADMIN — METHOCARBAMOL TABLETS 500 MG: 500 TABLET, COATED ORAL at 00:36

## 2020-01-01 RX ADMIN — ATORVASTATIN CALCIUM 10 MG: 10 TABLET, FILM COATED ORAL at 20:32

## 2020-01-01 RX ADMIN — Medication 10 ML: at 09:02

## 2020-01-01 RX ADMIN — LEVOTHYROXINE SODIUM 100 MCG: 100 TABLET ORAL at 06:00

## 2020-01-01 RX ADMIN — DOCUSATE SODIUM 100 MG: 50 LIQUID ORAL at 11:31

## 2020-01-01 RX ADMIN — METHYLPREDNISOLONE SODIUM SUCCINATE 40 MG: 40 INJECTION, POWDER, LYOPHILIZED, FOR SOLUTION INTRAMUSCULAR; INTRAVENOUS at 01:14

## 2020-01-01 RX ADMIN — DOCUSATE SODIUM 100 MG: 50 LIQUID ORAL at 10:03

## 2020-01-01 RX ADMIN — DOXYCYCLINE HYCLATE 100 MG: 100 CAPSULE ORAL at 09:45

## 2020-01-01 RX ADMIN — Medication 15 ML: at 08:51

## 2020-01-01 RX ADMIN — NOREPINEPHRINE BITARTRATE: 1 INJECTION, SOLUTION, CONCENTRATE INTRAVENOUS at 11:47

## 2020-01-01 RX ADMIN — GABAPENTIN 800 MG: 400 CAPSULE ORAL at 17:30

## 2020-01-01 RX ADMIN — PANTOPRAZOLE SODIUM 40 MG: 40 INJECTION, POWDER, FOR SOLUTION INTRAVENOUS at 09:18

## 2020-01-01 RX ADMIN — POTASSIUM CHLORIDE 20 MEQ: 400 INJECTION, SOLUTION INTRAVENOUS at 21:18

## 2020-01-01 RX ADMIN — ENOXAPARIN SODIUM 30 MG: 30 INJECTION SUBCUTANEOUS at 09:43

## 2020-01-01 RX ADMIN — Medication 100 MCG/HR: at 03:44

## 2020-01-01 RX ADMIN — Medication 10 ML: at 09:34

## 2020-01-01 RX ADMIN — IPRATROPIUM BROMIDE AND ALBUTEROL SULFATE 1 AMPULE: 2.5; .5 SOLUTION RESPIRATORY (INHALATION) at 15:40

## 2020-01-01 RX ADMIN — Medication 10 ML: at 08:49

## 2020-01-01 RX ADMIN — MIDODRINE HYDROCHLORIDE 10 MG: 5 TABLET ORAL at 08:51

## 2020-01-01 RX ADMIN — GABAPENTIN 800 MG: 400 CAPSULE ORAL at 14:30

## 2020-01-01 RX ADMIN — INSULIN LISPRO 3 UNITS: 100 INJECTION, SOLUTION INTRAVENOUS; SUBCUTANEOUS at 00:31

## 2020-01-01 RX ADMIN — POTASSIUM CHLORIDE 20 MEQ: 400 INJECTION, SOLUTION INTRAVENOUS at 20:03

## 2020-01-01 RX ADMIN — POTASSIUM CHLORIDE 20 MEQ: 400 INJECTION, SOLUTION INTRAVENOUS at 19:00

## 2020-01-01 RX ADMIN — BUDESONIDE 500 MCG: 0.5 SUSPENSION RESPIRATORY (INHALATION) at 19:38

## 2020-01-01 RX ADMIN — SODIUM CHLORIDE, PRESERVATIVE FREE 300 UNITS: 5 INJECTION INTRAVENOUS at 20:59

## 2020-01-01 RX ADMIN — MORPHINE SULFATE 2 MG: 2 INJECTION, SOLUTION INTRAMUSCULAR; INTRAVENOUS at 14:11

## 2020-01-01 RX ADMIN — GABAPENTIN 800 MG: 400 CAPSULE ORAL at 13:18

## 2020-01-01 RX ADMIN — FENTANYL CITRATE 50 MCG: 50 INJECTION, SOLUTION INTRAMUSCULAR; INTRAVENOUS at 18:44

## 2020-01-01 RX ADMIN — ATORVASTATIN CALCIUM 10 MG: 10 TABLET, FILM COATED ORAL at 21:02

## 2020-01-01 RX ADMIN — Medication 4 MG/HR: at 01:34

## 2020-01-01 RX ADMIN — METHOCARBAMOL TABLETS 500 MG: 500 TABLET, COATED ORAL at 15:36

## 2020-01-01 RX ADMIN — Medication 10 ML: at 20:05

## 2020-01-01 RX ADMIN — Medication 10 ML: at 18:49

## 2020-01-01 RX ADMIN — INSULIN LISPRO 2 UNITS: 100 INJECTION, SOLUTION INTRAVENOUS; SUBCUTANEOUS at 19:00

## 2020-01-01 RX ADMIN — INSULIN LISPRO 9 UNITS: 100 INJECTION, SOLUTION INTRAVENOUS; SUBCUTANEOUS at 11:50

## 2020-01-01 RX ADMIN — IPRATROPIUM BROMIDE AND ALBUTEROL SULFATE 1 AMPULE: 2.5; .5 SOLUTION RESPIRATORY (INHALATION) at 12:13

## 2020-01-01 RX ADMIN — FUROSEMIDE 40 MG: 10 INJECTION, SOLUTION INTRAMUSCULAR; INTRAVENOUS at 09:55

## 2020-01-01 RX ADMIN — DOCUSATE SODIUM 100 MG: 50 LIQUID ORAL at 09:18

## 2020-01-01 RX ADMIN — GABAPENTIN 800 MG: 400 CAPSULE ORAL at 20:32

## 2020-01-01 RX ADMIN — Medication 15 ML: at 20:05

## 2020-01-01 RX ADMIN — Medication 200 MCG/HR: at 02:30

## 2020-01-01 RX ADMIN — GABAPENTIN 800 MG: 400 CAPSULE ORAL at 06:39

## 2020-01-01 RX ADMIN — GABAPENTIN 800 MG: 400 CAPSULE ORAL at 08:14

## 2020-01-01 RX ADMIN — OXYCODONE AND ACETAMINOPHEN 1 TABLET: 10; 325 TABLET ORAL at 21:46

## 2020-01-01 RX ADMIN — POTASSIUM PHOSPHATE, MONOBASIC AND POTASSIUM PHOSPHATE, DIBASIC 30 MMOL: 224; 236 INJECTION, SOLUTION, CONCENTRATE INTRAVENOUS at 21:57

## 2020-01-01 RX ADMIN — IPRATROPIUM BROMIDE 1 PUFF: 17 AEROSOL, METERED RESPIRATORY (INHALATION) at 16:06

## 2020-01-01 RX ADMIN — ENOXAPARIN SODIUM 30 MG: 30 INJECTION SUBCUTANEOUS at 21:43

## 2020-01-01 RX ADMIN — IPRATROPIUM BROMIDE 1 PUFF: 17 AEROSOL, METERED RESPIRATORY (INHALATION) at 20:18

## 2020-01-01 RX ADMIN — CEFEPIME 2 G: 2 INJECTION, POWDER, FOR SOLUTION INTRAVENOUS at 09:31

## 2020-01-01 RX ADMIN — MIDODRINE HYDROCHLORIDE 10 MG: 5 TABLET ORAL at 11:47

## 2020-01-01 RX ADMIN — GABAPENTIN 800 MG: 400 CAPSULE ORAL at 08:20

## 2020-01-01 RX ADMIN — CLOPIDOGREL 75 MG: 75 TABLET, FILM COATED ORAL at 08:14

## 2020-01-01 RX ADMIN — ATORVASTATIN CALCIUM 10 MG: 10 TABLET, FILM COATED ORAL at 20:48

## 2020-01-01 RX ADMIN — BUDESONIDE 500 MCG: 0.5 SUSPENSION RESPIRATORY (INHALATION) at 08:19

## 2020-01-01 RX ADMIN — ARFORMOTEROL TARTRATE 15 MCG: 15 SOLUTION RESPIRATORY (INHALATION) at 19:59

## 2020-01-01 RX ADMIN — ALBUTEROL SULFATE 4 PUFF: 90 AEROSOL, METERED RESPIRATORY (INHALATION) at 12:36

## 2020-01-01 RX ADMIN — FENTANYL CITRATE 100 MCG: 50 INJECTION, SOLUTION INTRAMUSCULAR; INTRAVENOUS at 14:07

## 2020-01-01 RX ADMIN — CEFEPIME 2 G: 2 INJECTION, POWDER, FOR SOLUTION INTRAVENOUS at 21:24

## 2020-01-01 RX ADMIN — GABAPENTIN 800 MG: 400 CAPSULE ORAL at 17:31

## 2020-01-01 RX ADMIN — OXYCODONE HYDROCHLORIDE AND ACETAMINOPHEN 500 MG: 500 TABLET ORAL at 20:56

## 2020-01-01 RX ADMIN — INSULIN LISPRO 9 UNITS: 100 INJECTION, SOLUTION INTRAVENOUS; SUBCUTANEOUS at 18:45

## 2020-01-01 RX ADMIN — GUAIFENESIN 400 MG: 400 TABLET ORAL at 13:21

## 2020-01-01 RX ADMIN — HYDROCORTISONE SODIUM SUCCINATE 100 MG: 100 INJECTION, POWDER, FOR SOLUTION INTRAMUSCULAR; INTRAVENOUS at 17:18

## 2020-01-01 RX ADMIN — ARFORMOTEROL TARTRATE 15 MCG: 15 SOLUTION RESPIRATORY (INHALATION) at 20:11

## 2020-01-01 RX ADMIN — ARFORMOTEROL TARTRATE 15 MCG: 15 SOLUTION RESPIRATORY (INHALATION) at 20:21

## 2020-01-01 RX ADMIN — MIDODRINE HYDROCHLORIDE 10 MG: 5 TABLET ORAL at 17:25

## 2020-01-01 RX ADMIN — ENOXAPARIN SODIUM 40 MG: 40 INJECTION SUBCUTANEOUS at 08:47

## 2020-01-01 RX ADMIN — METHOCARBAMOL TABLETS 500 MG: 500 TABLET, COATED ORAL at 08:42

## 2020-01-01 RX ADMIN — GABAPENTIN 800 MG: 400 CAPSULE ORAL at 13:42

## 2020-01-01 RX ADMIN — MIDODRINE HYDROCHLORIDE 10 MG: 5 TABLET ORAL at 12:06

## 2020-01-01 RX ADMIN — Medication 5 MG/HR: at 21:22

## 2020-01-01 RX ADMIN — ATORVASTATIN CALCIUM 20 MG: 20 TABLET, FILM COATED ORAL at 09:56

## 2020-01-01 RX ADMIN — OXYCODONE AND ACETAMINOPHEN 1 TABLET: 10; 325 TABLET ORAL at 02:21

## 2020-01-01 RX ADMIN — ENOXAPARIN SODIUM 30 MG: 30 INJECTION SUBCUTANEOUS at 21:24

## 2020-01-01 RX ADMIN — OXYCODONE AND ACETAMINOPHEN 1 TABLET: 10; 325 TABLET ORAL at 06:43

## 2020-01-01 RX ADMIN — Medication 5 MG/HR: at 11:06

## 2020-01-01 RX ADMIN — BUDESONIDE 500 MCG: 0.5 SUSPENSION RESPIRATORY (INHALATION) at 20:11

## 2020-01-01 RX ADMIN — Medication 10 ML: at 09:09

## 2020-01-01 RX ADMIN — SENNOSIDES 8.8 MG: 8.8 SYRUP ORAL at 19:53

## 2020-01-01 RX ADMIN — Medication 200 MCG/HR: at 17:44

## 2020-01-01 RX ADMIN — Medication 1 TABLET: at 10:57

## 2020-01-01 RX ADMIN — ALBUMIN (HUMAN) 25 G: 0.25 INJECTION, SOLUTION INTRAVENOUS at 21:00

## 2020-01-01 RX ADMIN — Medication 150 MCG/HR: at 01:10

## 2020-01-01 RX ADMIN — BUDESONIDE 500 MCG: 0.5 SUSPENSION RESPIRATORY (INHALATION) at 09:06

## 2020-01-01 RX ADMIN — Medication 100 MG: at 08:56

## 2020-01-01 RX ADMIN — PANTOPRAZOLE SODIUM 40 MG: 40 INJECTION, POWDER, FOR SOLUTION INTRAVENOUS at 08:52

## 2020-01-01 RX ADMIN — IPRATROPIUM BROMIDE AND ALBUTEROL SULFATE 1 AMPULE: 2.5; .5 SOLUTION RESPIRATORY (INHALATION) at 19:40

## 2020-01-01 RX ADMIN — METHYLPREDNISOLONE SODIUM SUCCINATE 40 MG: 40 INJECTION, POWDER, LYOPHILIZED, FOR SOLUTION INTRAMUSCULAR; INTRAVENOUS at 21:46

## 2020-01-01 RX ADMIN — GABAPENTIN 800 MG: 400 CAPSULE ORAL at 21:02

## 2020-01-01 RX ADMIN — Medication 15 ML: at 20:21

## 2020-01-01 RX ADMIN — MORPHINE SULFATE 2 MG: 2 INJECTION, SOLUTION INTRAMUSCULAR; INTRAVENOUS at 06:03

## 2020-01-01 RX ADMIN — DOXYCYCLINE HYCLATE 100 MG: 100 CAPSULE ORAL at 21:24

## 2020-01-01 RX ADMIN — IPRATROPIUM BROMIDE AND ALBUTEROL SULFATE 1 AMPULE: 2.5; .5 SOLUTION RESPIRATORY (INHALATION) at 16:01

## 2020-01-01 RX ADMIN — BUDESONIDE 500 MCG: 0.5 SUSPENSION RESPIRATORY (INHALATION) at 08:06

## 2020-01-01 RX ADMIN — VASOPRESSIN 0.04 UNITS/MIN: 20 INJECTION INTRAVENOUS at 15:40

## 2020-01-01 RX ADMIN — CLOPIDOGREL BISULFATE 75 MG: 75 TABLET ORAL at 10:57

## 2020-01-01 RX ADMIN — INSULIN GLARGINE 10 UNITS: 100 INJECTION, SOLUTION SUBCUTANEOUS at 09:31

## 2020-01-01 RX ADMIN — IPRATROPIUM BROMIDE AND ALBUTEROL SULFATE 1 AMPULE: 2.5; .5 SOLUTION RESPIRATORY (INHALATION) at 15:49

## 2020-01-01 RX ADMIN — METHYLPREDNISOLONE SODIUM SUCCINATE 40 MG: 40 INJECTION, POWDER, LYOPHILIZED, FOR SOLUTION INTRAMUSCULAR; INTRAVENOUS at 22:06

## 2020-01-01 RX ADMIN — GABAPENTIN 800 MG: 400 CAPSULE ORAL at 06:20

## 2020-01-01 RX ADMIN — Medication 10 ML: at 20:52

## 2020-01-01 RX ADMIN — Medication 150 MCG/HR: at 02:27

## 2020-01-01 RX ADMIN — AMIODARONE HYDROCHLORIDE 0.5 MG/MIN: 50 INJECTION, SOLUTION INTRAVENOUS at 02:34

## 2020-01-01 RX ADMIN — RANOLAZINE 500 MG: 500 TABLET, FILM COATED, EXTENDED RELEASE ORAL at 21:45

## 2020-01-01 RX ADMIN — RANOLAZINE 500 MG: 500 TABLET, FILM COATED, EXTENDED RELEASE ORAL at 20:20

## 2020-01-01 RX ADMIN — Medication 100 MG: at 09:10

## 2020-01-01 RX ADMIN — FUROSEMIDE 40 MG: 40 TABLET ORAL at 10:02

## 2020-01-01 RX ADMIN — Medication 5 MG/HR: at 08:23

## 2020-01-01 RX ADMIN — LEVOTHYROXINE SODIUM 100 MCG: 100 TABLET ORAL at 11:46

## 2020-01-01 RX ADMIN — HYDROCORTISONE SODIUM SUCCINATE 50 MG: 100 INJECTION, POWDER, FOR SOLUTION INTRAMUSCULAR; INTRAVENOUS at 09:01

## 2020-01-01 RX ADMIN — METHYLPREDNISOLONE SODIUM SUCCINATE 40 MG: 40 INJECTION, POWDER, LYOPHILIZED, FOR SOLUTION INTRAMUSCULAR; INTRAVENOUS at 09:01

## 2020-01-01 RX ADMIN — BUDESONIDE 500 MCG: 0.5 SUSPENSION RESPIRATORY (INHALATION) at 21:19

## 2020-01-01 RX ADMIN — IPRATROPIUM BROMIDE 1 PUFF: 17 AEROSOL, METERED RESPIRATORY (INHALATION) at 16:59

## 2020-01-01 RX ADMIN — GABAPENTIN 800 MG: 400 CAPSULE ORAL at 14:05

## 2020-01-01 RX ADMIN — SODIUM CHLORIDE 250 ML: 9 INJECTION, SOLUTION INTRAVENOUS at 20:58

## 2020-01-01 RX ADMIN — METHOCARBAMOL TABLETS 500 MG: 500 TABLET, COATED ORAL at 12:33

## 2020-01-01 RX ADMIN — BUDESONIDE 500 MCG: 0.5 SUSPENSION RESPIRATORY (INHALATION) at 20:10

## 2020-01-01 RX ADMIN — IPRATROPIUM BROMIDE AND ALBUTEROL SULFATE 1 AMPULE: 2.5; .5 SOLUTION RESPIRATORY (INHALATION) at 16:33

## 2020-01-01 RX ADMIN — GABAPENTIN 800 MG: 400 CAPSULE ORAL at 21:28

## 2020-01-01 RX ADMIN — BUDESONIDE 500 MCG: 0.5 SUSPENSION RESPIRATORY (INHALATION) at 20:19

## 2020-01-01 RX ADMIN — GABAPENTIN 800 MG: 400 CAPSULE ORAL at 01:13

## 2020-01-01 RX ADMIN — IPRATROPIUM BROMIDE AND ALBUTEROL SULFATE 1 AMPULE: 2.5; .5 SOLUTION RESPIRATORY (INHALATION) at 16:00

## 2020-01-01 RX ADMIN — IPRATROPIUM BROMIDE AND ALBUTEROL SULFATE 1 AMPULE: 2.5; .5 SOLUTION RESPIRATORY (INHALATION) at 08:54

## 2020-01-01 RX ADMIN — ALBUTEROL SULFATE 2 PUFF: 90 AEROSOL, METERED RESPIRATORY (INHALATION) at 20:40

## 2020-01-01 RX ADMIN — SODIUM CHLORIDE: 9 INJECTION, SOLUTION INTRAVENOUS at 03:00

## 2020-01-01 RX ADMIN — OXYCODONE AND ACETAMINOPHEN 1 TABLET: 5; 325 TABLET ORAL at 11:59

## 2020-01-01 RX ADMIN — METOPROLOL SUCCINATE 25 MG: 25 TABLET, FILM COATED, EXTENDED RELEASE ORAL at 08:56

## 2020-01-01 RX ADMIN — DESMOPRESSIN ACETATE 34 MCG: 4 SOLUTION INTRAVENOUS at 17:35

## 2020-01-01 RX ADMIN — INSULIN LISPRO 3 UNITS: 100 INJECTION, SOLUTION INTRAVENOUS; SUBCUTANEOUS at 18:27

## 2020-01-01 RX ADMIN — NOREPINEPHRINE BITARTRATE 5 MCG/MIN: 1 INJECTION, SOLUTION, CONCENTRATE INTRAVENOUS at 09:39

## 2020-01-01 RX ADMIN — IPRATROPIUM BROMIDE AND ALBUTEROL SULFATE 1 AMPULE: 2.5; .5 SOLUTION RESPIRATORY (INHALATION) at 17:06

## 2020-01-01 RX ADMIN — ARFORMOTEROL TARTRATE 15 MCG: 15 SOLUTION RESPIRATORY (INHALATION) at 19:41

## 2020-01-01 RX ADMIN — OXYCODONE HYDROCHLORIDE AND ACETAMINOPHEN 500 MG: 500 TABLET ORAL at 21:00

## 2020-01-01 RX ADMIN — ARFORMOTEROL TARTRATE 15 MCG: 15 SOLUTION RESPIRATORY (INHALATION) at 20:31

## 2020-01-01 RX ADMIN — CEFEPIME 2 G: 2 INJECTION, POWDER, FOR SOLUTION INTRAMUSCULAR; INTRAVENOUS at 03:42

## 2020-01-01 RX ADMIN — GUAIFENESIN 400 MG: 400 TABLET ORAL at 21:45

## 2020-01-01 RX ADMIN — DOCUSATE SODIUM 100 MG: 50 LIQUID ORAL at 08:51

## 2020-01-01 RX ADMIN — ZINC SULFATE 220 MG (50 MG) CAPSULE 50 MG: CAPSULE at 08:47

## 2020-01-01 RX ADMIN — Medication 200 MCG/HR: at 22:57

## 2020-01-01 RX ADMIN — Medication 15 ML: at 20:15

## 2020-01-01 RX ADMIN — ARFORMOTEROL TARTRATE 15 MCG: 15 SOLUTION RESPIRATORY (INHALATION) at 12:09

## 2020-01-01 RX ADMIN — MIDAZOLAM HYDROCHLORIDE 2 MG: 1 INJECTION, SOLUTION INTRAMUSCULAR; INTRAVENOUS at 13:21

## 2020-01-01 RX ADMIN — SODIUM CHLORIDE 1000 ML: 9 INJECTION, SOLUTION INTRAVENOUS at 12:26

## 2020-01-01 RX ADMIN — ATORVASTATIN CALCIUM 10 MG: 10 TABLET, FILM COATED ORAL at 21:24

## 2020-01-01 RX ADMIN — IPRATROPIUM BROMIDE AND ALBUTEROL SULFATE 1 AMPULE: 2.5; .5 SOLUTION RESPIRATORY (INHALATION) at 07:48

## 2020-01-01 RX ADMIN — Medication 15 ML: at 08:44

## 2020-01-01 RX ADMIN — BUDESONIDE 500 MCG: 0.5 SUSPENSION RESPIRATORY (INHALATION) at 08:04

## 2020-01-01 RX ADMIN — HYDROCORTISONE SODIUM SUCCINATE 50 MG: 100 INJECTION, POWDER, FOR SOLUTION INTRAMUSCULAR; INTRAVENOUS at 01:13

## 2020-01-01 RX ADMIN — ENOXAPARIN SODIUM 100 MG: 100 INJECTION SUBCUTANEOUS at 08:50

## 2020-01-01 RX ADMIN — Medication 1 TABLET: at 09:33

## 2020-01-01 RX ADMIN — ENOXAPARIN SODIUM 100 MG: 100 INJECTION SUBCUTANEOUS at 20:58

## 2020-01-01 RX ADMIN — BUDESONIDE 500 MCG: 0.5 SUSPENSION RESPIRATORY (INHALATION) at 07:49

## 2020-01-01 RX ADMIN — SODIUM CHLORIDE SOLN NEBU 3% 4 ML: 3 NEBU SOLN at 08:17

## 2020-01-01 RX ADMIN — CEFEPIME 2 G: 2 INJECTION, POWDER, FOR SOLUTION INTRAVENOUS at 10:02

## 2020-01-01 RX ADMIN — SENNOSIDES 8.8 MG: 8.8 SYRUP ORAL at 21:19

## 2020-01-01 RX ADMIN — RANOLAZINE 500 MG: 500 TABLET, FILM COATED, EXTENDED RELEASE ORAL at 23:46

## 2020-01-01 RX ADMIN — Medication 15 ML: at 20:33

## 2020-01-01 RX ADMIN — Medication 25 MCG/HR: at 10:36

## 2020-01-01 RX ADMIN — GABAPENTIN 800 MG: 400 CAPSULE ORAL at 20:51

## 2020-01-01 RX ADMIN — POTASSIUM PHOSPHATE, MONOBASIC POTASSIUM PHOSPHATE, DIBASIC 30 MMOL: 224; 236 INJECTION, SOLUTION, CONCENTRATE INTRAVENOUS at 12:51

## 2020-01-01 RX ADMIN — RANOLAZINE 500 MG: 500 TABLET, FILM COATED, EXTENDED RELEASE ORAL at 20:51

## 2020-01-01 RX ADMIN — SODIUM CHLORIDE, PRESERVATIVE FREE 10 ML: 5 INJECTION INTRAVENOUS at 08:57

## 2020-01-01 RX ADMIN — GABAPENTIN 800 MG: 400 CAPSULE ORAL at 06:30

## 2020-01-01 RX ADMIN — IPRATROPIUM BROMIDE AND ALBUTEROL SULFATE 1 AMPULE: 2.5; .5 SOLUTION RESPIRATORY (INHALATION) at 12:15

## 2020-01-01 RX ADMIN — VASOPRESSIN 0.04 UNITS/MIN: 20 INJECTION INTRAVENOUS at 08:58

## 2020-01-01 RX ADMIN — GUAIFENESIN 400 MG: 400 TABLET ORAL at 13:34

## 2020-01-01 RX ADMIN — BUDESONIDE 500 MCG: 0.5 SUSPENSION RESPIRATORY (INHALATION) at 20:38

## 2020-01-01 RX ADMIN — NOREPINEPHRINE BITARTRATE 5 MCG/MIN: 1 INJECTION, SOLUTION, CONCENTRATE INTRAVENOUS at 11:17

## 2020-01-01 RX ADMIN — VANCOMYCIN HYDROCHLORIDE 1000 MG: 1 INJECTION, POWDER, LYOPHILIZED, FOR SOLUTION INTRAVENOUS at 01:34

## 2020-01-01 RX ADMIN — METHYLPREDNISOLONE SODIUM SUCCINATE 40 MG: 40 INJECTION, POWDER, LYOPHILIZED, FOR SOLUTION INTRAMUSCULAR; INTRAVENOUS at 14:52

## 2020-01-01 RX ADMIN — HYDROCORTISONE SODIUM SUCCINATE 50 MG: 100 INJECTION, POWDER, FOR SOLUTION INTRAMUSCULAR; INTRAVENOUS at 08:45

## 2020-01-01 RX ADMIN — DOCUSATE SODIUM 100 MG: 50 LIQUID ORAL at 08:56

## 2020-01-01 RX ADMIN — Medication 150 MCG/HR: at 16:55

## 2020-01-01 RX ADMIN — Medication 2000 UNITS: at 11:46

## 2020-01-01 RX ADMIN — MIDODRINE HYDROCHLORIDE 10 MG: 5 TABLET ORAL at 08:14

## 2020-01-01 RX ADMIN — FENTANYL CITRATE 25 MCG: 50 INJECTION, SOLUTION INTRAMUSCULAR; INTRAVENOUS at 22:56

## 2020-01-01 RX ADMIN — MORPHINE SULFATE 2 MG: 2 INJECTION, SOLUTION INTRAMUSCULAR; INTRAVENOUS at 09:41

## 2020-01-01 RX ADMIN — METHOCARBAMOL TABLETS 500 MG: 500 TABLET, COATED ORAL at 08:50

## 2020-01-01 RX ADMIN — RANOLAZINE 500 MG: 500 TABLET, FILM COATED, EXTENDED RELEASE ORAL at 09:00

## 2020-01-01 RX ADMIN — IPRATROPIUM BROMIDE AND ALBUTEROL SULFATE 1 AMPULE: 2.5; .5 SOLUTION RESPIRATORY (INHALATION) at 20:19

## 2020-01-01 RX ADMIN — POLYETHYLENE GLYCOL 3350 17 G: 17 POWDER, FOR SOLUTION ORAL at 08:58

## 2020-01-01 RX ADMIN — BUDESONIDE 500 MCG: 0.5 SUSPENSION RESPIRATORY (INHALATION) at 20:31

## 2020-01-01 RX ADMIN — GABAPENTIN 800 MG: 400 CAPSULE ORAL at 07:47

## 2020-01-01 RX ADMIN — REMDESIVIR 100 MG: 100 INJECTION, POWDER, LYOPHILIZED, FOR SOLUTION INTRAVENOUS at 03:42

## 2020-01-01 RX ADMIN — ACETAMINOPHEN 650 MG: 325 TABLET ORAL at 09:03

## 2020-01-01 RX ADMIN — OXYCODONE AND ACETAMINOPHEN 1 TABLET: 10; 325 TABLET ORAL at 10:40

## 2020-01-01 RX ADMIN — IPRATROPIUM BROMIDE AND ALBUTEROL SULFATE 1 AMPULE: 2.5; .5 SOLUTION RESPIRATORY (INHALATION) at 12:37

## 2020-01-01 RX ADMIN — IPRATROPIUM BROMIDE AND ALBUTEROL SULFATE 1 AMPULE: 2.5; .5 SOLUTION RESPIRATORY (INHALATION) at 08:19

## 2020-01-01 RX ADMIN — BUDESONIDE 500 MCG: 0.5 SUSPENSION RESPIRATORY (INHALATION) at 19:46

## 2020-01-01 RX ADMIN — OXYCODONE AND ACETAMINOPHEN 1 TABLET: 10; 325 TABLET ORAL at 19:24

## 2020-01-01 RX ADMIN — SODIUM CHLORIDE SOLN NEBU 3% 4 ML: 3 NEBU SOLN at 09:10

## 2020-01-01 RX ADMIN — VANCOMYCIN HYDROCHLORIDE 1750 MG: 5 INJECTION, POWDER, LYOPHILIZED, FOR SOLUTION INTRAVENOUS at 13:35

## 2020-01-01 RX ADMIN — AMIODARONE HYDROCHLORIDE 400 MG: 200 TABLET ORAL at 21:16

## 2020-01-01 RX ADMIN — ATORVASTATIN CALCIUM 20 MG: 20 TABLET, FILM COATED ORAL at 08:15

## 2020-01-01 RX ADMIN — ARFORMOTEROL TARTRATE 15 MCG: 15 SOLUTION RESPIRATORY (INHALATION) at 08:31

## 2020-01-01 RX ADMIN — Medication 2000 UNITS: at 08:14

## 2020-01-01 RX ADMIN — IPRATROPIUM BROMIDE AND ALBUTEROL SULFATE 1 AMPULE: 2.5; .5 SOLUTION RESPIRATORY (INHALATION) at 11:18

## 2020-01-01 RX ADMIN — HYDROCORTISONE SODIUM SUCCINATE 50 MG: 100 INJECTION, POWDER, FOR SOLUTION INTRAMUSCULAR; INTRAVENOUS at 03:57

## 2020-01-01 RX ADMIN — Medication 150 MCG/HR: at 01:35

## 2020-01-01 RX ADMIN — MIDODRINE HYDROCHLORIDE 10 MG: 5 TABLET ORAL at 16:27

## 2020-01-01 RX ADMIN — SODIUM CHLORIDE: 9 INJECTION, SOLUTION INTRAVENOUS at 00:34

## 2020-01-01 RX ADMIN — BUDESONIDE 500 MCG: 0.5 SUSPENSION RESPIRATORY (INHALATION) at 21:25

## 2020-01-01 RX ADMIN — Medication 150 MCG/HR: at 06:11

## 2020-01-01 RX ADMIN — Medication 10 ML: at 08:46

## 2020-01-01 RX ADMIN — LEVOTHYROXINE SODIUM 112 MCG: 0.11 TABLET ORAL at 06:20

## 2020-01-01 RX ADMIN — Medication 5 MG/HR: at 20:43

## 2020-01-01 RX ADMIN — METHOCARBAMOL TABLETS 500 MG: 500 TABLET, COATED ORAL at 09:01

## 2020-01-01 RX ADMIN — DEXTROSE MONOHYDRATE 50 ML: 25 INJECTION, SOLUTION INTRAVENOUS at 17:30

## 2020-01-01 RX ADMIN — ACETAMINOPHEN 650 MG: 325 TABLET ORAL at 15:46

## 2020-01-01 RX ADMIN — ARFORMOTEROL TARTRATE 15 MCG: 15 SOLUTION RESPIRATORY (INHALATION) at 09:06

## 2020-01-01 RX ADMIN — GABAPENTIN 800 MG: 400 CAPSULE ORAL at 20:13

## 2020-01-01 RX ADMIN — OXYCODONE HYDROCHLORIDE AND ACETAMINOPHEN 500 MG: 500 TABLET ORAL at 21:18

## 2020-01-01 RX ADMIN — ASPIRIN 81 MG: 81 TABLET, COATED ORAL at 08:50

## 2020-01-01 RX ADMIN — POTASSIUM CHLORIDE 20 MEQ: 400 INJECTION, SOLUTION INTRAVENOUS at 10:00

## 2020-01-01 RX ADMIN — ENOXAPARIN SODIUM 40 MG: 40 INJECTION SUBCUTANEOUS at 16:30

## 2020-01-01 RX ADMIN — MORPHINE SULFATE 2 MG: 2 INJECTION, SOLUTION INTRAMUSCULAR; INTRAVENOUS at 01:51

## 2020-01-01 RX ADMIN — IPRATROPIUM BROMIDE 1 PUFF: 17 AEROSOL, METERED RESPIRATORY (INHALATION) at 12:29

## 2020-01-01 RX ADMIN — POTASSIUM CHLORIDE 20 MEQ: 20 TABLET, EXTENDED RELEASE ORAL at 09:01

## 2020-01-01 RX ADMIN — GABAPENTIN 800 MG: 400 CAPSULE ORAL at 13:49

## 2020-01-01 RX ADMIN — METHOCARBAMOL TABLETS 500 MG: 500 TABLET, COATED ORAL at 16:52

## 2020-01-01 RX ADMIN — OXYCODONE HYDROCHLORIDE AND ACETAMINOPHEN 500 MG: 500 TABLET ORAL at 20:52

## 2020-01-01 RX ADMIN — POTASSIUM PHOSPHATE, MONOBASIC AND POTASSIUM PHOSPHATE, DIBASIC 30 MMOL: 224; 236 INJECTION, SOLUTION, CONCENTRATE INTRAVENOUS at 10:18

## 2020-01-01 RX ADMIN — ENOXAPARIN SODIUM 40 MG: 40 INJECTION SUBCUTANEOUS at 08:20

## 2020-01-01 RX ADMIN — IPRATROPIUM BROMIDE AND ALBUTEROL SULFATE 1 AMPULE: 2.5; .5 SOLUTION RESPIRATORY (INHALATION) at 16:15

## 2020-01-01 RX ADMIN — PHENYLEPHRINE HYDROCHLORIDE 50 MCG/MIN: 10 INJECTION INTRAVENOUS at 00:20

## 2020-01-01 RX ADMIN — METOPROLOL SUCCINATE 25 MG: 25 TABLET, FILM COATED, EXTENDED RELEASE ORAL at 08:03

## 2020-01-01 RX ADMIN — RANOLAZINE 500 MG: 500 TABLET, FILM COATED, EXTENDED RELEASE ORAL at 10:57

## 2020-01-01 RX ADMIN — Medication 15 ML: at 08:50

## 2020-01-01 RX ADMIN — SODIUM CHLORIDE SOLN NEBU 3% 4 ML: 3 NEBU SOLN at 21:19

## 2020-01-01 RX ADMIN — IPRATROPIUM BROMIDE AND ALBUTEROL SULFATE 1 AMPULE: 2.5; .5 SOLUTION RESPIRATORY (INHALATION) at 11:12

## 2020-01-01 RX ADMIN — METHOCARBAMOL TABLETS 500 MG: 500 TABLET, COATED ORAL at 09:33

## 2020-01-01 RX ADMIN — ARFORMOTEROL TARTRATE 15 MCG: 15 SOLUTION RESPIRATORY (INHALATION) at 20:26

## 2020-01-01 RX ADMIN — MIDODRINE HYDROCHLORIDE 10 MG: 5 TABLET ORAL at 11:56

## 2020-01-01 RX ADMIN — FUROSEMIDE 40 MG: 40 TABLET ORAL at 08:42

## 2020-01-01 RX ADMIN — MIDODRINE HYDROCHLORIDE 10 MG: 5 TABLET ORAL at 17:18

## 2020-01-01 RX ADMIN — Medication 15 ML: at 20:53

## 2020-01-01 RX ADMIN — ALBUTEROL SULFATE 2 PUFF: 90 AEROSOL, METERED RESPIRATORY (INHALATION) at 20:39

## 2020-01-01 RX ADMIN — LEVOTHYROXINE SODIUM 100 MCG: 100 TABLET ORAL at 07:22

## 2020-01-01 RX ADMIN — METHOCARBAMOL TABLETS 500 MG: 500 TABLET, COATED ORAL at 08:58

## 2020-01-01 RX ADMIN — ARFORMOTEROL TARTRATE 15 MCG: 15 SOLUTION RESPIRATORY (INHALATION) at 09:03

## 2020-01-01 RX ADMIN — SODIUM PHOSPHATE, MONOBASIC, MONOHYDRATE 30 MMOL: 276; 142 INJECTION, SOLUTION INTRAVENOUS at 09:36

## 2020-01-01 RX ADMIN — VASOPRESSIN 0.04 UNITS/MIN: 20 INJECTION INTRAVENOUS at 12:50

## 2020-01-01 RX ADMIN — ZINC SULFATE 220 MG (50 MG) CAPSULE 50 MG: CAPSULE at 08:57

## 2020-01-01 RX ADMIN — PREDNISONE 40 MG: 20 TABLET ORAL at 08:42

## 2020-01-01 RX ADMIN — INSULIN LISPRO 6 UNITS: 100 INJECTION, SOLUTION INTRAVENOUS; SUBCUTANEOUS at 05:59

## 2020-01-01 RX ADMIN — SODIUM CHLORIDE, PRESERVATIVE FREE 10 ML: 5 INJECTION INTRAVENOUS at 08:46

## 2020-01-01 RX ADMIN — METHYLPREDNISOLONE SODIUM SUCCINATE 40 MG: 40 INJECTION, POWDER, LYOPHILIZED, FOR SOLUTION INTRAMUSCULAR; INTRAVENOUS at 08:03

## 2020-01-01 RX ADMIN — GABAPENTIN 800 MG: 400 CAPSULE ORAL at 05:11

## 2020-01-01 RX ADMIN — ARFORMOTEROL TARTRATE 15 MCG: 15 SOLUTION RESPIRATORY (INHALATION) at 20:38

## 2020-01-01 RX ADMIN — CLOPIDOGREL BISULFATE 75 MG: 75 TABLET ORAL at 08:03

## 2020-01-01 RX ADMIN — ARFORMOTEROL TARTRATE 15 MCG: 15 SOLUTION RESPIRATORY (INHALATION) at 08:51

## 2020-01-01 RX ADMIN — Medication 150 MCG/HR: at 06:54

## 2020-01-01 RX ADMIN — METHOCARBAMOL TABLETS 500 MG: 500 TABLET, COATED ORAL at 21:42

## 2020-01-01 RX ADMIN — METHOCARBAMOL TABLETS 500 MG: 500 TABLET, COATED ORAL at 20:46

## 2020-01-01 RX ADMIN — OXYCODONE AND ACETAMINOPHEN 1 TABLET: 10; 325 TABLET ORAL at 12:33

## 2020-01-01 RX ADMIN — VASOPRESSIN 0.04 UNITS/MIN: 20 INJECTION INTRAVENOUS at 10:32

## 2020-01-01 RX ADMIN — SODIUM CHLORIDE, PRESERVATIVE FREE 10 ML: 5 INJECTION INTRAVENOUS at 11:13

## 2020-01-01 RX ADMIN — FUROSEMIDE 40 MG: 10 INJECTION, SOLUTION INTRAMUSCULAR; INTRAVENOUS at 11:00

## 2020-01-01 RX ADMIN — INSULIN LISPRO 3 UNITS: 100 INJECTION, SOLUTION INTRAVENOUS; SUBCUTANEOUS at 11:15

## 2020-01-01 RX ADMIN — MORPHINE SULFATE 2 MG: 2 INJECTION, SOLUTION INTRAMUSCULAR; INTRAVENOUS at 08:43

## 2020-01-01 RX ADMIN — IOPAMIDOL 75 ML: 755 INJECTION, SOLUTION INTRAVENOUS at 18:50

## 2020-01-01 RX ADMIN — Medication 30 MILLICURIE: at 11:12

## 2020-01-01 RX ADMIN — INSULIN LISPRO 3 UNITS: 100 INJECTION, SOLUTION INTRAVENOUS; SUBCUTANEOUS at 13:05

## 2020-01-01 RX ADMIN — DOXYCYCLINE HYCLATE 100 MG: 100 CAPSULE ORAL at 21:42

## 2020-01-01 RX ADMIN — MORPHINE SULFATE 2 MG: 2 INJECTION, SOLUTION INTRAMUSCULAR; INTRAVENOUS at 09:07

## 2020-01-01 RX ADMIN — DOXYCYCLINE HYCLATE 100 MG: 100 CAPSULE ORAL at 21:46

## 2020-01-01 RX ADMIN — METHOCARBAMOL TABLETS 500 MG: 500 TABLET, COATED ORAL at 20:32

## 2020-01-01 RX ADMIN — REMDESIVIR 100 MG: 100 INJECTION, POWDER, LYOPHILIZED, FOR SOLUTION INTRAVENOUS at 15:12

## 2020-01-01 RX ADMIN — INSULIN GLARGINE 25 UNITS: 100 INJECTION, SOLUTION SUBCUTANEOUS at 10:33

## 2020-01-01 RX ADMIN — SENNOSIDES 8.8 MG: 8.8 SYRUP ORAL at 20:58

## 2020-01-01 RX ADMIN — POTASSIUM CHLORIDE 40 MEQ: 29.8 INJECTION, SOLUTION INTRAVENOUS at 10:05

## 2020-01-01 RX ADMIN — IPRATROPIUM BROMIDE AND ALBUTEROL SULFATE 1 AMPULE: 2.5; .5 SOLUTION RESPIRATORY (INHALATION) at 20:26

## 2020-01-01 RX ADMIN — ARFORMOTEROL TARTRATE 15 MCG: 15 SOLUTION RESPIRATORY (INHALATION) at 19:32

## 2020-01-01 RX ADMIN — POTASSIUM CHLORIDE 20 MEQ: 400 INJECTION, SOLUTION INTRAVENOUS at 08:17

## 2020-01-01 RX ADMIN — Medication 150 MCG/HR: at 18:51

## 2020-01-01 RX ADMIN — IPRATROPIUM BROMIDE AND ALBUTEROL SULFATE 1 AMPULE: 2.5; .5 SOLUTION RESPIRATORY (INHALATION) at 12:25

## 2020-01-01 RX ADMIN — NOREPINEPHRINE BITARTRATE 17 MCG/MIN: 1 INJECTION, SOLUTION, CONCENTRATE INTRAVENOUS at 01:54

## 2020-01-01 RX ADMIN — GABAPENTIN 800 MG: 400 CAPSULE ORAL at 01:02

## 2020-01-01 RX ADMIN — OXYCODONE AND ACETAMINOPHEN 1 TABLET: 10; 325 TABLET ORAL at 20:22

## 2020-01-01 RX ADMIN — OXYCODONE AND ACETAMINOPHEN 1 TABLET: 10; 325 TABLET ORAL at 12:23

## 2020-01-01 RX ADMIN — NOREPINEPHRINE BITARTRATE 10 MCG/MIN: 1 INJECTION, SOLUTION, CONCENTRATE INTRAVENOUS at 12:21

## 2020-01-01 RX ADMIN — OXYCODONE AND ACETAMINOPHEN 1 TABLET: 10; 325 TABLET ORAL at 16:52

## 2020-01-01 RX ADMIN — VASOPRESSIN 0.04 UNITS/MIN: 20 INJECTION INTRAVENOUS at 16:36

## 2020-01-01 RX ADMIN — CEFTRIAXONE 1 G: 1 INJECTION, POWDER, FOR SOLUTION INTRAMUSCULAR; INTRAVENOUS at 21:02

## 2020-01-01 RX ADMIN — PANTOPRAZOLE SODIUM 40 MG: 40 INJECTION, POWDER, FOR SOLUTION INTRAVENOUS at 13:26

## 2020-01-01 RX ADMIN — HYDROCORTISONE SODIUM SUCCINATE 50 MG: 100 INJECTION, POWDER, FOR SOLUTION INTRAMUSCULAR; INTRAVENOUS at 00:08

## 2020-01-01 RX ADMIN — LEVOTHYROXINE SODIUM 100 MCG: 100 TABLET ORAL at 05:25

## 2020-01-01 RX ADMIN — ATORVASTATIN CALCIUM 20 MG: 20 TABLET, FILM COATED ORAL at 08:56

## 2020-01-01 RX ADMIN — METHOCARBAMOL TABLETS 500 MG: 500 TABLET, COATED ORAL at 13:10

## 2020-01-01 RX ADMIN — HYDROCORTISONE SODIUM SUCCINATE 50 MG: 100 INJECTION, POWDER, FOR SOLUTION INTRAMUSCULAR; INTRAVENOUS at 16:29

## 2020-01-01 RX ADMIN — RANOLAZINE 500 MG: 500 TABLET, FILM COATED, EXTENDED RELEASE ORAL at 10:02

## 2020-01-01 RX ADMIN — Medication 10 ML: at 08:43

## 2020-01-01 RX ADMIN — ATORVASTATIN CALCIUM 20 MG: 20 TABLET, FILM COATED ORAL at 09:55

## 2020-01-01 RX ADMIN — SODIUM CHLORIDE, PRESERVATIVE FREE 10 ML: 5 INJECTION INTRAVENOUS at 13:32

## 2020-01-01 RX ADMIN — RANOLAZINE 500 MG: 500 TABLET, FILM COATED, EXTENDED RELEASE ORAL at 21:43

## 2020-01-01 RX ADMIN — Medication 10 ML: at 20:04

## 2020-01-01 RX ADMIN — WATER 10 ML: 1 INJECTION INTRAMUSCULAR; INTRAVENOUS; SUBCUTANEOUS at 15:37

## 2020-01-01 RX ADMIN — CALCIUM GLUCONATE 1 G: 98 INJECTION, SOLUTION INTRAVENOUS at 13:29

## 2020-01-01 RX ADMIN — SENNOSIDES 8.8 MG: 8.8 SYRUP ORAL at 21:27

## 2020-01-01 RX ADMIN — REMDESIVIR 100 MG: 100 INJECTION, POWDER, LYOPHILIZED, FOR SOLUTION INTRAVENOUS at 15:47

## 2020-01-01 RX ADMIN — HYDROCORTISONE SODIUM SUCCINATE 50 MG: 100 INJECTION, POWDER, FOR SOLUTION INTRAMUSCULAR; INTRAVENOUS at 08:59

## 2020-01-01 RX ADMIN — MIDODRINE HYDROCHLORIDE 10 MG: 5 TABLET ORAL at 12:20

## 2020-01-01 RX ADMIN — GABAPENTIN 800 MG: 400 CAPSULE ORAL at 13:09

## 2020-01-01 RX ADMIN — OXYCODONE AND ACETAMINOPHEN 1 TABLET: 10; 325 TABLET ORAL at 20:20

## 2020-01-01 RX ADMIN — VANCOMYCIN HYDROCHLORIDE 1000 MG: 1 INJECTION, POWDER, LYOPHILIZED, FOR SOLUTION INTRAVENOUS at 14:44

## 2020-01-01 RX ADMIN — WATER 10 ML: 1 INJECTION INTRAMUSCULAR; INTRAVENOUS; SUBCUTANEOUS at 17:00

## 2020-01-01 RX ADMIN — HYDROCORTISONE SODIUM SUCCINATE 50 MG: 100 INJECTION, POWDER, FOR SOLUTION INTRAMUSCULAR; INTRAVENOUS at 20:59

## 2020-01-01 RX ADMIN — LEVOTHYROXINE SODIUM 100 MCG: 100 TABLET ORAL at 05:11

## 2020-01-01 RX ADMIN — LEVOTHYROXINE SODIUM 100 MCG: 100 TABLET ORAL at 06:25

## 2020-01-01 RX ADMIN — INSULIN GLARGINE 25 UNITS: 100 INJECTION, SOLUTION SUBCUTANEOUS at 09:41

## 2020-01-01 RX ADMIN — PANTOPRAZOLE SODIUM 40 MG: 40 INJECTION, POWDER, FOR SOLUTION INTRAVENOUS at 09:30

## 2020-01-01 RX ADMIN — ENOXAPARIN SODIUM 30 MG: 30 INJECTION SUBCUTANEOUS at 23:53

## 2020-01-01 RX ADMIN — SODIUM CHLORIDE: 9 INJECTION, SOLUTION INTRAVENOUS at 02:39

## 2020-01-01 RX ADMIN — ALBUMIN (HUMAN) 25 G: 0.25 INJECTION, SOLUTION INTRAVENOUS at 21:58

## 2020-01-01 RX ADMIN — CLOPIDOGREL 75 MG: 75 TABLET, FILM COATED ORAL at 08:44

## 2020-01-01 RX ADMIN — Medication 200 MCG/HR: at 03:13

## 2020-01-01 RX ADMIN — INSULIN LISPRO 6 UNITS: 100 INJECTION, SOLUTION INTRAVENOUS; SUBCUTANEOUS at 06:42

## 2020-01-01 RX ADMIN — DOXYCYCLINE HYCLATE 100 MG: 100 CAPSULE ORAL at 20:13

## 2020-01-01 ASSESSMENT — PAIN SCALES - GENERAL
PAINLEVEL_OUTOF10: 0
PAINLEVEL_OUTOF10: 10
PAINLEVEL_OUTOF10: 0
PAINLEVEL_OUTOF10: 8
PAINLEVEL_OUTOF10: 0
PAINLEVEL_OUTOF10: 10
PAINLEVEL_OUTOF10: 0
PAINLEVEL_OUTOF10: 10
PAINLEVEL_OUTOF10: 0
PAINLEVEL_OUTOF10: 9
PAINLEVEL_OUTOF10: 10
PAINLEVEL_OUTOF10: 10
PAINLEVEL_OUTOF10: 0
PAINLEVEL_OUTOF10: 10
PAINLEVEL_OUTOF10: 0
PAINLEVEL_OUTOF10: 10
PAINLEVEL_OUTOF10: 2
PAINLEVEL_OUTOF10: 6
PAINLEVEL_OUTOF10: 8
PAINLEVEL_OUTOF10: 0
PAINLEVEL_OUTOF10: 0
PAINLEVEL_OUTOF10: 10
PAINLEVEL_OUTOF10: 0
PAINLEVEL_OUTOF10: 10
PAINLEVEL_OUTOF10: 8
PAINLEVEL_OUTOF10: 8
PAINLEVEL_OUTOF10: 0
PAINLEVEL_OUTOF10: 10
PAINLEVEL_OUTOF10: 0
PAINLEVEL_OUTOF10: 10
PAINLEVEL_OUTOF10: 0
PAINLEVEL_OUTOF10: 10
PAINLEVEL_OUTOF10: 6
PAINLEVEL_OUTOF10: 0
PAINLEVEL_OUTOF10: 10
PAINLEVEL_OUTOF10: 0
PAINLEVEL_OUTOF10: 10
PAINLEVEL_OUTOF10: 0
PAINLEVEL_OUTOF10: 10
PAINLEVEL_OUTOF10: 10
PAINLEVEL_OUTOF10: 0
PAINLEVEL_OUTOF10: 10
PAINLEVEL_OUTOF10: 9
PAINLEVEL_OUTOF10: 10
PAINLEVEL_OUTOF10: 8
PAINLEVEL_OUTOF10: 10
PAINLEVEL_OUTOF10: 0
PAINLEVEL_OUTOF10: 10
PAINLEVEL_OUTOF10: 0
PAINLEVEL_OUTOF10: 10
PAINLEVEL_OUTOF10: 9
PAINLEVEL_OUTOF10: 10
PAINLEVEL_OUTOF10: 9
PAINLEVEL_OUTOF10: 10
PAINLEVEL_OUTOF10: 5
PAINLEVEL_OUTOF10: 10
PAINLEVEL_OUTOF10: 9
PAINLEVEL_OUTOF10: 10
PAINLEVEL_OUTOF10: 10
PAINLEVEL_OUTOF10: 0
PAINLEVEL_OUTOF10: 7
PAINLEVEL_OUTOF10: 10
PAINLEVEL_OUTOF10: 10
PAINLEVEL_OUTOF10: 0
PAINLEVEL_OUTOF10: 10
PAINLEVEL_OUTOF10: 3
PAINLEVEL_OUTOF10: 0
PAINLEVEL_OUTOF10: 10
PAINLEVEL_OUTOF10: 0
PAINLEVEL_OUTOF10: 0
PAINLEVEL_OUTOF10: 10
PAINLEVEL_OUTOF10: 9
PAINLEVEL_OUTOF10: 10
PAINLEVEL_OUTOF10: 0
PAINLEVEL_OUTOF10: 0
PAINLEVEL_OUTOF10: 10
PAINLEVEL_OUTOF10: 0
PAINLEVEL_OUTOF10: 10
PAINLEVEL_OUTOF10: 0
PAINLEVEL_OUTOF10: 7
PAINLEVEL_OUTOF10: 0
PAINLEVEL_OUTOF10: 10
PAINLEVEL_OUTOF10: 9
PAINLEVEL_OUTOF10: 0
PAINLEVEL_OUTOF10: 0
PAINLEVEL_OUTOF10: 9
PAINLEVEL_OUTOF10: 0
PAINLEVEL_OUTOF10: 10
PAINLEVEL_OUTOF10: 4
PAINLEVEL_OUTOF10: 0
PAINLEVEL_OUTOF10: 8
PAINLEVEL_OUTOF10: 10
PAINLEVEL_OUTOF10: 0
PAINLEVEL_OUTOF10: 0
PAINLEVEL_OUTOF10: 10
PAINLEVEL_OUTOF10: 10
PAINLEVEL_OUTOF10: 0
PAINLEVEL_OUTOF10: 10
PAINLEVEL_OUTOF10: 0
PAINLEVEL_OUTOF10: 10
PAINLEVEL_OUTOF10: 8
PAINLEVEL_OUTOF10: 0
PAINLEVEL_OUTOF10: 0
PAINLEVEL_OUTOF10: 1
PAINLEVEL_OUTOF10: 4
PAINLEVEL_OUTOF10: 10
PAINLEVEL_OUTOF10: 4
PAINLEVEL_OUTOF10: 8
PAINLEVEL_OUTOF10: 10
PAINLEVEL_OUTOF10: 10
PAINLEVEL_OUTOF10: 0
PAINLEVEL_OUTOF10: 0
PAINLEVEL_OUTOF10: 10
PAINLEVEL_OUTOF10: 10
PAINLEVEL_OUTOF10: 0
PAINLEVEL_OUTOF10: 9
PAINLEVEL_OUTOF10: 10
PAINLEVEL_OUTOF10: 10
PAINLEVEL_OUTOF10: 0
PAINLEVEL_OUTOF10: 10
PAINLEVEL_OUTOF10: 0
PAINLEVEL_OUTOF10: 8
PAINLEVEL_OUTOF10: 0
PAINLEVEL_OUTOF10: 6
PAINLEVEL_OUTOF10: 10
PAINLEVEL_OUTOF10: 0
PAINLEVEL_OUTOF10: 10
PAINLEVEL_OUTOF10: 0
PAINLEVEL_OUTOF10: 9
PAINLEVEL_OUTOF10: 8
PAINLEVEL_OUTOF10: 10
PAINLEVEL_OUTOF10: 0
PAINLEVEL_OUTOF10: 10
PAINLEVEL_OUTOF10: 0
PAINLEVEL_OUTOF10: 0
PAINLEVEL_OUTOF10: 10
PAINLEVEL_OUTOF10: 10
PAINLEVEL_OUTOF10: 0
PAINLEVEL_OUTOF10: 7
PAINLEVEL_OUTOF10: 10
PAINLEVEL_OUTOF10: 0
PAINLEVEL_OUTOF10: 10
PAINLEVEL_OUTOF10: 0

## 2020-01-01 ASSESSMENT — PAIN DESCRIPTION - PAIN TYPE
TYPE: ACUTE PAIN
TYPE: CHRONIC PAIN
TYPE: ACUTE PAIN
TYPE: CHRONIC PAIN
TYPE: ACUTE PAIN

## 2020-01-01 ASSESSMENT — PAIN DESCRIPTION - PROGRESSION
CLINICAL_PROGRESSION: GRADUALLY IMPROVING
CLINICAL_PROGRESSION: NOT CHANGED
CLINICAL_PROGRESSION: GRADUALLY WORSENING
CLINICAL_PROGRESSION: NOT CHANGED
CLINICAL_PROGRESSION: GRADUALLY WORSENING
CLINICAL_PROGRESSION: GRADUALLY WORSENING
CLINICAL_PROGRESSION: NOT CHANGED
CLINICAL_PROGRESSION: GRADUALLY IMPROVING
CLINICAL_PROGRESSION: NOT CHANGED

## 2020-01-01 ASSESSMENT — PULMONARY FUNCTION TESTS
PIF_VALUE: 29
PIF_VALUE: 32
PIF_VALUE: 30
PIF_VALUE: 25
PIF_VALUE: 1
PIF_VALUE: 31
PIF_VALUE: 33
PIF_VALUE: 35
PIF_VALUE: 34
PIF_VALUE: 31
PIF_VALUE: 33
PIF_VALUE: 30
PIF_VALUE: 28
PIF_VALUE: 34
PIF_VALUE: 36
PIF_VALUE: 30
PIF_VALUE: 31
PIF_VALUE: 19
PIF_VALUE: 25
PIF_VALUE: 26
PIF_VALUE: 26
PIF_VALUE: 29
PIF_VALUE: 28
PIF_VALUE: 36
PIF_VALUE: 35
PIF_VALUE: 30
PIF_VALUE: 27
PIF_VALUE: 30
PIF_VALUE: 30
PIF_VALUE: 49
PIF_VALUE: 28
PIF_VALUE: 54
PIF_VALUE: 29
PIF_VALUE: 30
PIF_VALUE: 26
PIF_VALUE: 30
PIF_VALUE: 32
PIF_VALUE: 19
PIF_VALUE: 31
PIF_VALUE: 25
PIF_VALUE: 26
PIF_VALUE: 27
PIF_VALUE: 30
PIF_VALUE: 27
PIF_VALUE: 28
PIF_VALUE: 32
PIF_VALUE: 25
PIF_VALUE: 27
PIF_VALUE: 23
PIF_VALUE: 36
PIF_VALUE: 35
PIF_VALUE: 27
PIF_VALUE: 19
PIF_VALUE: 24
PIF_VALUE: 28
PIF_VALUE: 26
PIF_VALUE: 23
PIF_VALUE: 33
PIF_VALUE: 29
PIF_VALUE: 27
PIF_VALUE: 30
PIF_VALUE: 41
PIF_VALUE: 33
PIF_VALUE: 29
PIF_VALUE: 29
PIF_VALUE: 26
PIF_VALUE: 36
PIF_VALUE: 9
PIF_VALUE: 31
PIF_VALUE: 30
PIF_VALUE: 33
PIF_VALUE: 36
PIF_VALUE: 32
PIF_VALUE: 27
PIF_VALUE: 29
PIF_VALUE: 28
PIF_VALUE: 26
PIF_VALUE: 28
PIF_VALUE: 36
PIF_VALUE: 36
PIF_VALUE: 31
PIF_VALUE: 39
PIF_VALUE: 26
PIF_VALUE: 26
PIF_VALUE: 27
PIF_VALUE: 30
PIF_VALUE: 21
PIF_VALUE: 36
PIF_VALUE: 27
PIF_VALUE: 25
PIF_VALUE: 30
PIF_VALUE: 36
PIF_VALUE: 29
PIF_VALUE: 3
PIF_VALUE: 29
PIF_VALUE: 28
PIF_VALUE: 26
PIF_VALUE: 25
PIF_VALUE: 32
PIF_VALUE: 28
PIF_VALUE: 30
PIF_VALUE: 35
PIF_VALUE: 22
PIF_VALUE: 28
PIF_VALUE: 34
PIF_VALUE: 27
PIF_VALUE: 36
PIF_VALUE: 27
PIF_VALUE: 28
PIF_VALUE: 33
PIF_VALUE: 29
PIF_VALUE: 26
PIF_VALUE: 30
PIF_VALUE: 49
PIF_VALUE: 9
PIF_VALUE: 27
PIF_VALUE: 27
PIF_VALUE: 26
PIF_VALUE: 29
PIF_VALUE: 36
PIF_VALUE: 26
PIF_VALUE: 28
PIF_VALUE: 33
PIF_VALUE: 37
PIF_VALUE: 0
PIF_VALUE: 26
PIF_VALUE: 29
PIF_VALUE: 29
PIF_VALUE: 28
PIF_VALUE: 0
PIF_VALUE: 30
PIF_VALUE: 28
PIF_VALUE: 29
PIF_VALUE: 34
PIF_VALUE: 28
PIF_VALUE: 27
PIF_VALUE: 25
PIF_VALUE: 29
PIF_VALUE: 27
PIF_VALUE: 26
PIF_VALUE: 35
PIF_VALUE: 29
PIF_VALUE: 36
PIF_VALUE: 29
PIF_VALUE: 32
PIF_VALUE: 29
PIF_VALUE: 27
PIF_VALUE: 36
PIF_VALUE: 27
PIF_VALUE: 23
PIF_VALUE: 21
PIF_VALUE: 28
PIF_VALUE: 35
PIF_VALUE: 26
PIF_VALUE: 34
PIF_VALUE: 31
PIF_VALUE: 19
PIF_VALUE: 34
PIF_VALUE: 20
PIF_VALUE: 28
PIF_VALUE: 30
PIF_VALUE: 25
PIF_VALUE: 28
PIF_VALUE: 29
PIF_VALUE: 27
PIF_VALUE: 27
PIF_VALUE: 36
PIF_VALUE: 18
PIF_VALUE: 29
PIF_VALUE: 30
PIF_VALUE: 36
PIF_VALUE: 32
PIF_VALUE: 27
PIF_VALUE: 27
PIF_VALUE: 33
PIF_VALUE: 25
PIF_VALUE: 43
PIF_VALUE: 26
PIF_VALUE: 27
PIF_VALUE: 32
PIF_VALUE: 24
PIF_VALUE: 42
PIF_VALUE: 26
PIF_VALUE: 29
PIF_VALUE: 17
PIF_VALUE: 29
PIF_VALUE: 29
PIF_VALUE: 31
PIF_VALUE: 19
PIF_VALUE: 8
PIF_VALUE: 28
PIF_VALUE: 22
PIF_VALUE: 30
PIF_VALUE: 40
PIF_VALUE: 25
PIF_VALUE: 37
PIF_VALUE: 33
PIF_VALUE: 27
PIF_VALUE: 30
PIF_VALUE: 29
PIF_VALUE: 34
PIF_VALUE: 29
PIF_VALUE: 32
PIF_VALUE: 34
PIF_VALUE: 36
PIF_VALUE: 25
PIF_VALUE: 28
PIF_VALUE: 0
PIF_VALUE: 26
PIF_VALUE: 29
PIF_VALUE: 16
PIF_VALUE: 19
PIF_VALUE: 32
PIF_VALUE: 25
PIF_VALUE: 28
PIF_VALUE: 27
PIF_VALUE: 18
PIF_VALUE: 40
PIF_VALUE: 30
PIF_VALUE: 18
PIF_VALUE: 28
PIF_VALUE: 33
PIF_VALUE: 28
PIF_VALUE: 32
PIF_VALUE: 36
PIF_VALUE: 28
PIF_VALUE: 27
PIF_VALUE: 33
PIF_VALUE: 35
PIF_VALUE: 50
PIF_VALUE: 17
PIF_VALUE: 23
PIF_VALUE: 34
PIF_VALUE: 32
PIF_VALUE: 32
PIF_VALUE: 25
PIF_VALUE: 29
PIF_VALUE: 30
PIF_VALUE: 30
PIF_VALUE: 36
PIF_VALUE: 29
PIF_VALUE: 28
PIF_VALUE: 19
PIF_VALUE: 27
PIF_VALUE: 25
PIF_VALUE: 24
PIF_VALUE: 29
PIF_VALUE: 18
PIF_VALUE: 37
PIF_VALUE: 28
PIF_VALUE: 26
PIF_VALUE: 32
PIF_VALUE: 17
PIF_VALUE: 22
PIF_VALUE: 30
PIF_VALUE: 26
PIF_VALUE: 40
PIF_VALUE: 28
PIF_VALUE: 31
PIF_VALUE: 25
PIF_VALUE: 27
PIF_VALUE: 25
PIF_VALUE: 27
PIF_VALUE: 41
PIF_VALUE: 31
PIF_VALUE: 25
PIF_VALUE: 26
PIF_VALUE: 32
PIF_VALUE: 18
PIF_VALUE: 31
PIF_VALUE: 40
PIF_VALUE: 24
PIF_VALUE: 25
PIF_VALUE: 21
PIF_VALUE: 19
PIF_VALUE: 27
PIF_VALUE: 29
PIF_VALUE: 19
PIF_VALUE: 28
PIF_VALUE: 28
PIF_VALUE: 35
PIF_VALUE: 23
PIF_VALUE: 24
PIF_VALUE: 0
PIF_VALUE: 24
PIF_VALUE: 42
PIF_VALUE: 35
PIF_VALUE: 32
PIF_VALUE: 26
PIF_VALUE: 7
PIF_VALUE: 29
PIF_VALUE: 33
PIF_VALUE: 29
PIF_VALUE: 45
PIF_VALUE: 28
PIF_VALUE: 30
PIF_VALUE: 35
PIF_VALUE: 30
PIF_VALUE: 50
PIF_VALUE: 28
PIF_VALUE: 29
PIF_VALUE: 28
PIF_VALUE: 28
PIF_VALUE: 19
PIF_VALUE: 21
PIF_VALUE: 36
PIF_VALUE: 27
PIF_VALUE: 29
PIF_VALUE: 36
PIF_VALUE: 25
PIF_VALUE: 36
PIF_VALUE: 25
PIF_VALUE: 27
PIF_VALUE: 36
PIF_VALUE: 20
PIF_VALUE: 21
PIF_VALUE: 28
PIF_VALUE: 30
PIF_VALUE: 28
PIF_VALUE: 27
PIF_VALUE: 11
PIF_VALUE: 30
PIF_VALUE: 32
PIF_VALUE: 36
PIF_VALUE: 36
PIF_VALUE: 30
PIF_VALUE: 49
PIF_VALUE: 19

## 2020-01-01 ASSESSMENT — PAIN DESCRIPTION - LOCATION
LOCATION: RIB CAGE
LOCATION: RIB CAGE;ARM
LOCATION: RIB CAGE
LOCATION: RIB CAGE
LOCATION: RIB CAGE;SHOULDER
LOCATION: ARM;SHOULDER;RIB CAGE
LOCATION: ARM;RIB CAGE
LOCATION: ABDOMEN;RIB CAGE;SHOULDER
LOCATION: ARM;SHOULDER;RIB CAGE
LOCATION: SHOULDER;ARM
LOCATION: SHOULDER;ARM;ELBOW
LOCATION: RIB CAGE
LOCATION: BACK;RIB CAGE
LOCATION: SHOULDER;RIB CAGE;ARM
LOCATION: SHOULDER
LOCATION: ABDOMEN;RIB CAGE;SHOULDER
LOCATION: RIB CAGE
LOCATION: SHOULDER
LOCATION: ARM;SHOULDER;RIB CAGE
LOCATION: RIB CAGE;SHOULDER
LOCATION: RIB CAGE
LOCATION: RIB CAGE;SHOULDER
LOCATION: BACK;RIB CAGE
LOCATION: ARM;SHOULDER;RIB CAGE
LOCATION: RIB CAGE
LOCATION: RIB CAGE
LOCATION: ARM;SHOULDER;RIB CAGE
LOCATION: RIB CAGE
LOCATION: RIB CAGE;ARM
LOCATION: RIB CAGE;SHOULDER
LOCATION: SHOULDER;RIB CAGE
LOCATION: SHOULDER;RIB CAGE;ARM
LOCATION: BACK
LOCATION: RIB CAGE;SHOULDER
LOCATION: ARM;RIB CAGE;SHOULDER
LOCATION: RIB CAGE
LOCATION: SHOULDER
LOCATION: BACK
LOCATION: ARM;SHOULDER;RIB CAGE
LOCATION: SHOULDER

## 2020-01-01 ASSESSMENT — PAIN - FUNCTIONAL ASSESSMENT
PAIN_FUNCTIONAL_ASSESSMENT: PREVENTS OR INTERFERES WITH ALL ACTIVE AND SOME PASSIVE ACTIVITIES
PAIN_FUNCTIONAL_ASSESSMENT: PREVENTS OR INTERFERES SOME ACTIVE ACTIVITIES AND ADLS
PAIN_FUNCTIONAL_ASSESSMENT: PREVENTS OR INTERFERES WITH MANY ACTIVE NOT PASSIVE ACTIVITIES
PAIN_FUNCTIONAL_ASSESSMENT: PREVENTS OR INTERFERES SOME ACTIVE ACTIVITIES AND ADLS

## 2020-01-01 ASSESSMENT — PAIN DESCRIPTION - ONSET
ONSET: ON-GOING
ONSET: AWAKENED FROM SLEEP
ONSET: ON-GOING

## 2020-01-01 ASSESSMENT — PAIN DESCRIPTION - FREQUENCY
FREQUENCY: CONTINUOUS
FREQUENCY: INTERMITTENT
FREQUENCY: CONTINUOUS
FREQUENCY: CONTINUOUS
FREQUENCY: INTERMITTENT
FREQUENCY: CONTINUOUS
FREQUENCY: INTERMITTENT
FREQUENCY: CONTINUOUS
FREQUENCY: CONTINUOUS
FREQUENCY: INTERMITTENT
FREQUENCY: INTERMITTENT

## 2020-01-01 ASSESSMENT — PAIN DESCRIPTION - DESCRIPTORS
DESCRIPTORS: SHARP;ACHING
DESCRIPTORS: ACHING;DISCOMFORT
DESCRIPTORS: THROBBING;SORE
DESCRIPTORS: ACHING;SORE
DESCRIPTORS: SHARP;THROBBING
DESCRIPTORS: SHARP
DESCRIPTORS: STABBING
DESCRIPTORS: ACHING;DISCOMFORT
DESCRIPTORS: ACHING;THROBBING
DESCRIPTORS: ACHING;SHARP
DESCRIPTORS: STABBING
DESCRIPTORS: SORE;THROBBING
DESCRIPTORS: ACHING;SORE
DESCRIPTORS: SORE;THROBBING
DESCRIPTORS: ACHING;SHARP;DISCOMFORT
DESCRIPTORS: ACHING
DESCRIPTORS: ACHING
DESCRIPTORS: ACHING;SHARP;SHOOTING
DESCRIPTORS: ACHING
DESCRIPTORS: ACHING;SHARP;DISCOMFORT;CONSTANT
DESCRIPTORS: ACHING;SHARP;DISCOMFORT;CONSTANT
DESCRIPTORS: ACHING;SHARP
DESCRIPTORS: ACHING
DESCRIPTORS: ACHING;DISCOMFORT;CONSTANT
DESCRIPTORS: ACHING;DISCOMFORT
DESCRIPTORS: CONSTANT;DISCOMFORT;SHARP
DESCRIPTORS: ACHING;DISCOMFORT;CONSTANT
DESCRIPTORS: ACHING;DISCOMFORT;SORE
DESCRIPTORS: ACHING;THROBBING
DESCRIPTORS: ACHING;SHARP;SHOOTING
DESCRIPTORS: ACHING;DISCOMFORT

## 2020-01-01 ASSESSMENT — ENCOUNTER SYMPTOMS
ABDOMINAL PAIN: 0
COLOR CHANGE: 0
BACK PAIN: 0
CONSTIPATION: 0
COUGH: 1
SHORTNESS OF BREATH: 0
TROUBLE SWALLOWING: 0
SHORTNESS OF BREATH: 1
NAUSEA: 0
ABDOMINAL DISTENTION: 0
DIARRHEA: 0
VOMITING: 0
ABDOMINAL PAIN: 0

## 2020-01-01 ASSESSMENT — PAIN DESCRIPTION - ORIENTATION
ORIENTATION: LEFT
ORIENTATION: LEFT;ANTERIOR
ORIENTATION: LEFT
ORIENTATION: LEFT;ANTERIOR
ORIENTATION: LEFT
ORIENTATION: LEFT
ORIENTATION: LEFT;ANTERIOR
ORIENTATION: LEFT
ORIENTATION: LEFT
ORIENTATION: LEFT;LOWER;MID
ORIENTATION: LEFT
ORIENTATION: LEFT;ANTERIOR
ORIENTATION: LEFT;LOWER;MID
ORIENTATION: LEFT;ANTERIOR
ORIENTATION: LEFT;ANTERIOR
ORIENTATION: LEFT
ORIENTATION: LEFT;RIGHT
ORIENTATION: LOWER;MID;LEFT
ORIENTATION: LEFT
ORIENTATION: LEFT;LOWER;MID
ORIENTATION: LEFT
ORIENTATION: LEFT
ORIENTATION: LEFT;ANTERIOR
ORIENTATION: LEFT

## 2020-01-01 ASSESSMENT — LIFESTYLE VARIABLES: SMOKING_STATUS: 1

## 2020-05-12 NOTE — TELEPHONE ENCOUNTER
----- Message from Junaid Bloom MD sent at 5/12/2020 12:28 PM EDT -----  Pls increase coreg to 6.25mg po bid. She needs it called in  Pls schedule ECHO to assess LVEF, BMP, Mg as she had some NSVT. She was asymptomatic.  Lets check another remote transmission next month  Thx

## 2020-05-13 NOTE — TELEPHONE ENCOUNTER
Scheduled for 6/16/2020. Patient is non wireless. Left message for the patient to call back.      Luis A Conroy

## 2020-06-15 NOTE — TELEPHONE ENCOUNTER
Ok to go discontinue. We can try toprol XL 25mg daily and uptitrate if she tolerates it.  has she done the echo?

## 2020-06-16 NOTE — PROGRESS NOTES
See [employer] [place] report. Remote monitoring reviewed over a 90 day period. End of 90 day monitoring period date of service 6-.

## 2020-07-13 NOTE — TELEPHONE ENCOUNTER
Spoke with patient and confirmed Lexiscan stress test and echocardiogram appointments on July 14,2020 starting at 0830. Instructions for testing and COVID-19 preprocedure checklist reviewed, questions answered.

## 2020-07-14 NOTE — PROCEDURES
[address] [facility] [address] [phone]                Pharmacologic Stress Nuclear Gated SPECT Study    Name: Ebony Bet[address] Account Number: [de-identified]    :  1950          Sex: female         Date of Study:  2020    Height: 5' 6\" (167.6 cm)         Weight: 163 lb (73.9 kg)     Ordering Provider: William Membreno MD          PCP: Avery Veloz[address] Sneha Grover MD               Interpreting Physician: Rosmery Negron MD  _________________________________________________________________________________    Indication:   Detecting the presence and location of coronary artery disease    Clinical History:   Patient has no known history of coronary artery disease. Resting ECG:    Normal sinus rhythm with frequent premature atrial complexes, anteroseptal MI age undetermined, ST-T changes suggestive inferior wall ischemia, abnormal EKG. Procedure:   Pharmacologic stress testing was performed with regadenoson 0.4 mg for 15 seconds. The heart rate was 81 at baseline and rafael to 100 beats during the infusion. This corresponds to 67% of maximum predicted heart rate. The blood pressure at baseline was 120/70 and blood pressure at the end of infusion was 116/70. Blood pressure response was normal during the stress procedure. The patient experienced lightheadedness, cough, and headache during the infusion. ECG during the infusion did not change. IMAGING: Myocardial perfusion imaging was performed at rest 30-35 minutes following the intravenous injection of 9.0 mCi of (Tc-Sestamibi) followed by 10 ml of Normal Saline. As per infusion protocol, the patient was injected intravenously with 30.0 mCi of (Tc-Sestamibi) followed by 10 ml of Normal Saline. Gated post-stress tomographic imaging was performed 20-25 minutes after stress.      FINDINGS: The overall quality of the study was good. Left ventricular cavity size was noted to be normal.    Rotational analog analysis demonstrated no patient motion or abnormal extracardiac radioactivity. The gated SPECT stress imaging in the short, vertical long, and horizontal long axis demonstrated abnormal tracer distribution in the myocardium. A mild defect was present in the mid anterior wall(s) that was  moderate sized by quantification. The resting images reveal complete reversibility. Gated SPECT left ventricular ejection fraction was calculated to be 58%, with normal myocardial thickening and wall motion. TID 1.05    Impression:    1. ECG during the infusion did not change. 2. The myocardial perfusion imaging was abnormal.    The abnormality was a a moderate sized reversible defect in the anterior wall  3. Overall left ventricular systolic function was normal without regional wall motion abnormalities. 4. Intermediate risk general pharmacologic stress test.    Thank you for sending your patient to this [employer].      Electronically signed by Francois Valles MD on 7/14/20 at 5:59 PM EDT

## 2020-07-17 NOTE — TELEPHONE ENCOUNTER
Per Yareli in EP, patient had abnormal stress test. Patient currently scheduled for 8/5/20. Please review and advise.

## 2020-07-28 NOTE — PROGRESS NOTES
Years: 10.00     Pack years: 10.00     Types: Cigarettes     Last attempt to quit: 6/15/2017     Years since quitting: 3.1    Smokeless tobacco: Never Used   Substance and Sexual Activity    Alcohol use: No    Drug use: No    Sexual activity: Not Currently     Partners: Male   Lifestyle    Physical activity     Days per week: None     Minutes per session: None    Stress: None   Relationships    Social connections     Talks on phone: None     Gets together: None     Attends Mu-ism service: None     Active member of club or organization: None     Attends meetings of clubs or organizations: None     Relationship status: None    Intimate partner violence     Fear of current or ex partner: None     Emotionally abused: None     Physically abused: None     Forced sexual activity: None   Other Topics Concern    None   Social History Narrative    None       Current Outpatient Medications   Medication Sig Dispense Refill    ranolazine (RANEXA) 500 MG extended release tablet Take 1 tablet by mouth 2 times daily 180 tablet 3    carvedilol (COREG) 3.125 MG tablet 3.125 mg 2 times daily       metoprolol succinate (TOPROL XL) 25 MG extended release tablet Take 25 mg by mouth daily      oxyCODONE-acetaminophen (PERCOCET)  MG per tablet TAKE 1 TABLET BY MOUTH 4 TIMES A DAY AS NEEDED  0    L-methylfolate-B6-B12 (METANX) 9-99.051-8-12 MG CAPS capsule Take by mouth daily      levothyroxine (SYNTHROID) 112 MCG tablet Take 112 mcg by mouth Daily       simvastatin (ZOCOR) 20 MG tablet Take 2 tablets by mouth nightly (Patient taking differently: Take 20 mg by mouth nightly ) 30 tablet 3    gabapentin (NEURONTIN) 800 MG tablet Take 800 mg by mouth three times daily  2    KLOR-CON M20 20 MEQ tablet Take 20 mEq by mouth daily   0    aspirin 81 MG EC tablet Take 81 mg by mouth daily      furosemide (LASIX) 40 MG tablet Take 1 tablet by mouth 2 times daily.  (Patient taking differently: Take 40 mg by mouth daily ) 60 tablet 0    fluticasone-vilanterol (BREO ELLIPTA) 100-25 MCG/INH AEPB inhaler Inhale 1 puff into the lungs daily (Patient not taking: Reported on 11/19/2019) 60 each 5    albuterol (PROVENTIL) (2.5 MG/3ML) 0.083% nebulizer solution Take 3 mLs by nebulization every 4 hours (while awake) DX: COPD J44.9 (Patient not taking: Reported on 11/19/2019) 360 vial 5     No current facility-administered medications for this visit. Allergies   Allergen Reactions    Anesthetics, Betty      Difficulty waking up from ether    Penicillins Rash       Chief Complaint:  [address] [place] [address] is here today for follow up and management/recomendations for preop    History of Present Illness: [address] [place] [address] Avenue states that She performs no physical activities d/t her knee pain. She uses a walker. She denies any chest discomfort, dyspnea on exertion, orthopnea/PND, or lower extremity edema. She denies any palpitations or presyncopal symptoms. REVIEW OF SYSTEMS:  As above. Patient does not complain of any fever, chills, nausea, vomiting or diarrhea. No focal, motor or neurological deficits. No changes in his/her vision, hearing, bowel or bladder habits. She is not known to have a history of thyroid problems. No recent nose bleeds. PHYSICAL EXAM:  Vitals:    07/28/20 1043   BP: 108/60   Pulse: 76   Resp: 18   Weight: 161 lb 12.8 oz (73.4 kg)   Height: 5' 2\" (1.575 m)       GENERAL:  She is alert and oriented x 3, communicates well, in no distress. NECK:  No masses, trachea is mid position. Supple, full ROM, no JVD or bruits. No palpable thyromegaly or lymphadenopathy. HEART:  Regular rate and rhythm. Normal S1 and S2. There is an S4 gallop and a I/VI systolic ejection murmur. LUNGS: Poor air movement with mild diffuse bilateral rhonchi. No use of accessory muscles. symmetrical excursion. ABDOMEN:  Soft, non-tender. Normal bowel sounds. Obese  EXTREMITIES:  Full ROM x 4. Trace right lower extremity edema. Good distal pulses. EYES:  Extraocular muscles intact. PERRL. Normal lids & conjunctiva. ENT:  Nares are clear & not bleeding. Moist mucosa. Normal lips formation. No external masses   NEURO: no tremors, full ROM x 4, EOMI. SKIN:  Warm, dry and intact. Normal turgor. EKG: Sinus rhythm, 76 bpm, nl axis, nonspecific ST - T wave changes. Assessment:   1. Possible CAD vs attenuation artifact on a remote stress only in 2007. She has had repeat stress tests with normal perfusion including 12-14. Normal EF & no WMA's. Another stress test 2-18 showing no ischemia or MI & nl EF. Stress test now 7/14/2020 demonstrating a moderate sized anterior ischemia. 2. SSS/pacer  3. COPD  4. ETOH  5. PVD  6. Nonsustained ventricular tachycardia on a pacemaker check. Possibly ischemic given the above new ischemia on her stress test.      Recommendations:  1. Cardiac catheterization and possible PCI. 2. AUC: 7-17  CATH RISKS:  I discussed the risks and benefits of cardiac catheterization and percutaneous coronary intervention including but not limited to exposure to radiation, bleeding, infection, sedation, allergy, peripheral embolization, acute renal failure, vascular damage, emergent CABG, CVA, MI, and death. He/she states that he/she understands this and agrees to proceed. Thank you for allowing me to participate in your patient's care.       [address], [address] [facility]  Interventional Cardiology

## 2020-07-29 NOTE — TELEPHONE ENCOUNTER
Patient scheduled for PAT/COVID testing 8/4/20 [facility]) for cardiac cath 8/10/20 ([address]). Patient's son notified that patient will need to self-quarantine after testing until procedure. He states he understands and they will comply.

## 2020-08-05 NOTE — PROGRESS NOTES
[facility]  ATTENDING PHYSICIAN PROGRESS NOTE     I have examined the patient, reviewed the record, and discussed the case with the APN/  Resident. I have reviewed all relevant labs and imaging data. Please refer to the  APN/ resident's note. I agree with the  assessment and plan with the following corrections/ additions. The following summarizes my clinical findings and independent assessment. CC: fall     S. Pt notes that her posterior neck no longer hurts. She complains of mild pain along her left chest    O.  Vitals:    08/01/18 0800   BP: (!) 102/58   Pulse: 64   Resp: 16   Temp: 97.6 °F (36.4 °C)   SpO2: 92%     GCS 15  Ext:  5/5  Aspen present  No c spine tenderness  s1s2  Abdomen soft nt nd    ASSESSMENT:  Active Problems:    Trauma    Multiple closed fractures of ribs of left side    Chest wall pain    Fall    Nodule of spleen  Resolved Problems:    * No resolved hospital problems. *       PLAN:  Left 3-4 rib fx--pain control/ pulmonary toilet  Neck strain--C spine no longer tender with range of motion and palpation.   CT cspine neg  C spine collar discontinued  PT/OT  DVT Proph: SCDS/ Lovenox       Nani Craig MD, FACS  8/1/2018  5:23 PM Pain sounds muscular.  If there has been no change in the cough and no fevers or shortness of breath that is changed he probably does not have pneumonia.  Patient may need to follow-up for evaluation.

## 2020-08-07 NOTE — TELEPHONE ENCOUNTER
Reminded patient of scheduled procedure on 8/10/20. Instructions given and COVID questionnaire completed.

## 2020-08-10 PROBLEM — I25.10 CAD IN NATIVE ARTERY: Status: ACTIVE | Noted: 2020-01-01

## 2020-08-10 NOTE — PROGRESS NOTES
Pharmacy Note  Pharmacy Note    Korina Torres was ordered [employer]. As per the [address] [place] [address], herbals and certain dietary supplements will be discontinued.   The herbal or dietary supplement may be continued after discharge from the hospital.  Ana Crooks, PharmD 8/10/2020 11:49 AM

## 2020-08-10 NOTE — PROCEDURES
Procedure:    1. Left heart cath  2. Percutaneous coronary artery intervention of the LAD with sequential and overlapping 3.0 x 15 and 3.0 x 15 mm drug-eluting stents to the proximal and proximal to mid vessel. 3.  Rotational atherectomy of the proximal and proximal to mid LAD. Complications: None    Physician: Arlene Denson DO. Assistant: none    Indication: Moderate risk abnormal stress test, nonsustained ventricular tachycardia. AUC: 7  AUC indication: 17    PCI AUC: 4  PCI AUC incication: 2    Anesthesia: 2% Xylocaine, intravenous fentanyl     Sedation: Intravenous Versed    Sedation time: I was present for sedation administration at 0854. I ended sedation at 1047for a total face-to-face sedation time of 1:50. Estimated blood loss: Minimal    Specimens: none    Contrast used: 140cc    Hemodynamics:  Opening Aortic pressure: 626/53  LV systolic pressure: 582  LVEDP: 15  No significant gradient across the aortic valve. Angiographic Results/findings:  Laft Main: No angiographically significant stenosis  LAD: Heavily calcified proximal diffuse 75 to 80%, proximal to mid 70% diffuse stenosis. D1: Small vessel. No angiographically significant stenosis. D2: Small vessel. No angiographically significant stenosis. Ricka Distad Cx: Dominant vessel. Heavily calcified. Mid diffuse 60% stenosis, mid to distal diffuse 50% stenosis. .  OM1: No angiographically significant stenosis. .  OM2: No angiographically significant stenosis. OM 3: Small vessel. No angiographically significant stenosis. OM 4: No angiographically significant stenosis. OM 5: No angiographically significant stenosis. Small vessel. .  RCA: Nondominant vessel. No angiographically significant stenosis. Anterior takeoff. LV: No wall motion abnormalities. Normal LV systolic function. Ejection fraction 60%. .    Interventional results:  PrePCI KUSUM 3 flow.     Successful Rotablator of the proximal and proximal to mid LAD with a 1.5 mm then aspirated & flushed with saline & pressures were obtained. An BRAXTON view was then obtained. The catheter was then aspirated & flushed with saline once again & pull back pressures were then obtained across the aortic vlave. They were then anticoagulated with heparin to maintain an ACT greater than 250. A 6 f EBU 4 guiding was used. They were already on ASA & they were loaded with Plavix. A luge wire was advanced across the proximal and proximal to mid LAD lesions and placed in the distal vessel. An over-the-wire balloon was then used and the wire was exchanged for a Rotafloppy wire. A 1.5 mm Rotablator bur was then prepped out of the body. This is advanced proximal to the lesion. With a packing technique both lesions were rotablated. The proximal lesion was then Rotablator with a 1.75 mm balloon using the same technique. Both lesions were then crossed and predilated with a 2.5 mm balloon. The more distal lesion was then crossed and stented with a 3.0 x 15 mm drug-eluting stent inflated to 14 atmospheres of pressure. This resulted in 20% residual stenosis and KUSUM 3 flow. The proximal lesion was then crossed and stented with a 3.0 x 15 mm drug-eluting stent. This was deployed to 14 enio as well. The overlapping portions of this dense was then postdilated with that 3.0 mm balloon. 3.0 x 12 mm noncompliant balloon was then used to post dilate the more distal stent. The overlapping portions of the 2 stents in the proximal stent were also postdilated with this balloon. The residual stenosis in the distal stent was then postdilated with a 3.25 mm noncompliant balloon again to 14 enio pressure. The overlapping portions were dilated with this balloon and again in the proximal stent was postdilated with this balloon again to 16 enio pressure. This demonstrated 10 % residual stenosis in the distal stent only and excellent KUSUM-3 flow.     The right femoral artery sheath was sutured in place and will be removed when the ACT is 170 or less. .  They tolerated the procedure well with no complications.

## 2020-08-11 NOTE — H&P
[address] [facility]  History and Physical      CHIEF COMPLAINT: Angina      HISTORY OF PRESENT ILLNESS:      The patient is a 68 y.o. female patient of Dr. Evonne Townsend history of CAD, PAD, COPD, VTE who presents with anginal.  Patient presented to the Cath Lab and underwent left heart catheterization. This revealed significant LAD stenosis. Patient underwent PCI with 2 overlapping SYLWIA to proximal to mid LAD. Patient is admitted for further evaluation and treatment. Denies any chest pain or trouble breathing. Denies palpitations vomiting or diarrhea. No fevers or chills. No exacerbation relief. Denies COVID-19 exposure.     Past Medical History:    Past Medical History:   Diagnosis Date    Arthritis     asthmatic bronchitis    CAD (coronary artery disease)     Carotid stenosis     [facility]estive [facility] failure[facility])     Closed fracture of pelvis with delayed healing 4/3/2017    Closed fracture of twelfth thoracic vertebra ([address] 4/3/2017    T 5,6,12    COPD (chronic obstructive pulmonary disease) ([address]     DVT of leg (deep venous thrombosis) ([address] 2010    left    Hx of blood clots     Hyperlipidemia     Hypertension     Mitral regurgitation     Trace    Prolonged emergence from general anesthesia     PVD (peripheral vascular disease) with claudication ([address] 11/18/2014    Retinal ischemia     Stenosis of intracranial portions of left internal carotid artery 11/18/2014    Thyroid disease        Past Surgical History:    Past Surgical History:   Procedure Laterality Date    APPENDECTOMY      BACK SURGERY  10/26/15    L3 kypho    CARDIAC CATHETERIZATION  08/10/2020    Dr Zak Glover    CAROTID-SUBCLAVIAN BYPASS GRAFT Left 5/19/2016    CHOLECYSTECTOMY      CORONARY ANGIOPLASTY WITH STENT PLACEMENT  08/10/2020    Dr Zak Glover Prox LAD rotablator 1.5/1.75  Resolute Alfredo 3.0x15  3.0x15    ECHOCARDIOGRAM LIMITED  12/3/2014         EYE SURGERY      FIXATION KYPHOPLASTY      FRACTURE cigarettes. She has a 10.00 pack-year smoking history. She has never used smokeless tobacco. She reports that she does not drink alcohol or use drugs. Family History:   family history includes Heart Disease in her father and mother. REVIEW OF SYSTEMS:  As above in the HPI, otherwise negative    PHYSICAL EXAM:    Vitals:  BP 98/60   Pulse 89   Temp 99 °F (37.2 °C) (Temporal)   Resp 16   Ht 5' 2\" (1.575 m)   Wt 163 lb (73.9 kg)   SpO2 92%   BMI 29.81 kg/m²     General:  Awake, alert, oriented X 3. Well developed, well nourished, well groomed. No apparent distress. HEENT:  Normocephalic, atraumatic. Pupils equal, round, reactive to light. No scleral icterus. No conjunctival injection. Normal lips, teeth, and gums. No nasal discharge. Neck:  Supple  Heart:  RRR, no murmurs, gallops, rubs  Lungs:  CTA bilaterally, bilat symmetrical expansion, no wheeze, rales, or rhonchi  Abdomen:   Bowel sounds present, soft, nontender, no masses, no organomegaly, no peritoneal signs  Extremities:  No clubbing, cyanosis, or edema  Skin:  Warm and dry, no open lesions or rash  Neuro:  Cranial nerves 2-12 intact, no focal deficits  Breast: deferred  Rectal: deferred  Genitalia:  deferred    LABS:    CBC with Differential:    Lab Results   Component Value Date    WBC 10.5 08/04/2020    RBC 4.57 08/04/2020    HGB 14.3 08/04/2020    HCT 44.3 08/04/2020     08/04/2020    MCV 96.9 08/04/2020    MCH 31.3 08/04/2020    MCHC 32.3 08/04/2020    RDW 14.4 08/04/2020    SEGSPCT 89 03/10/2014    BANDSPCT 1 05/19/2016    LYMPHOPCT 18.2 08/02/2018    MONOPCT 5.7 08/02/2018    BASOPCT 0.6 08/02/2018    MONOSABS 0.55 08/02/2018    LYMPHSABS 1.75 08/02/2018    EOSABS 0.39 08/02/2018    BASOSABS 0.06 08/02/2018     CMP:    Lab Results   Component Value Date     08/11/2020    K 3.7 08/11/2020    K 4.9 08/02/2018    CL 94 08/11/2020    CO2 27 08/11/2020    BUN 8 08/11/2020    CREATININE 0.5 08/11/2020    GFRAA >60 08/11/2020 LABGLOM >60 08/11/2020    GLUCOSE 144 08/11/2020    GLUCOSE 106 12/15/2011    PROT 6.3 08/02/2018    LABALBU 3.0 08/02/2018    CALCIUM 8.0 08/11/2020    BILITOT <0.2 08/02/2018    ALKPHOS 95 08/02/2018    AST 20 08/02/2018    ALT 14 08/02/2018     Magnesium:    Lab Results   Component Value Date    MG 2.0 05/18/2020     Phosphorus:    Lab Results   Component Value Date    PHOS 2.9 04/05/2017     PT/INR:    Lab Results   Component Value Date    PROTIME 10.9 08/04/2020    PROTIME 11.9 12/15/2011    INR 1.0 08/04/2020     Last 3 Troponin:    Lab Results   Component Value Date    TROPONINI <0.01 07/31/2018    TROPONINI <0.01 05/15/2017    TROPONINI <0.01 12/18/2016     U/A:    Lab Results   Component Value Date    COLORU Yellow 05/14/2019    PROTEINU Negative 05/14/2019    PHUR 6.0 05/14/2019    WBCUA 0-1 05/14/2019    WBCUA 0-1 12/15/2011    RBCUA 0-1 05/14/2019    RBCUA 1-3 08/19/2013    BACTERIA MODERATE 05/14/2019    CLARITYU Clear 05/14/2019    SPECGRAV 1.020 05/14/2019    LEUKOCYTESUR TRACE 05/14/2019    UROBILINOGEN 1.0 05/14/2019    BILIRUBINUR SMALL 05/14/2019    BILIRUBINUR NEGATIVE 12/15/2011    BLOODU Negative 05/14/2019    GLUCOSEU Negative 05/14/2019    GLUCOSEU NEGATIVE 12/15/2011     ABG:  No results found for: PH, PCO2, PO2, HCO3, BE, THGB, TCO2, O2SAT  HgBA1c:    Lab Results   Component Value Date    LABA1C 5.8 04/05/2017     FLP:    Lab Results   Component Value Date    TRIG 116 04/05/2017    HDL 40 04/05/2017    LDLCALC 88 04/05/2017    LABVLDL 23 04/05/2017     TSH:    Lab Results   Component Value Date    TSH 3.520 04/05/2017       No orders to display       ASSESSMENT:      Principal Problem:    CAD in native artery  Active Problems:    HTN (hypertension), benign    Mixed hyperlipidemia    COPD (chronic obstructive pulmonary disease) (HCC)    History of DVT (deep vein thrombosis)    PVD (peripheral vascular disease) (HCC)  Resolved Problems:    * No resolved hospital problems. *      PLAN:    66-year-old female history of CAD, PVD, VTE admitted post catheterization with PCI and 2 stents to LAD    DAPT  High intensity statin  Metoprolol succinate  Medications for other co morbidities cont as appropriate w dosage adjustments as necessary   PT/OT  DVT PPx  DC planning     Electronically signed by Mariann Montoya MD on 8/11/2020 at 10:38 AM

## 2020-08-11 NOTE — DISCHARGE SUMMARY
Physician Discharge Summary     Patient ID:  Nini Sykes  [id number]  68 y.o.  1950    Admit date: 8/10/2020    Discharge date and time:  8/11/2020    Discharge Diagnoses: Principal Problem:    CAD in native artery  Active Problems:    HTN (hypertension), benign    Mixed hyperlipidemia    COPD (chronic obstructive pulmonary disease) ([facility])    History of DVT (deep vein thrombosis)    PVD (peripheral vascular disease) ([address]  Resolved Problems:    * No resolved hospital problems.  *      Consults: IP CONSULT TO CARDIAC REHAB    Procedures: See below    Hospital Course: 66-year-old female history of CAD, PVD, VTE admitted post catheterization with PCI and 2 stents to LAD     DAPT  High intensity statin  Metoprolol succinate  Medications for other co morbidities cont as appropriate w dosage adjustments as necessary   PT/OT  DVT PPx  DC planning     Discharge Exam:  See progress note from today    Condition:  Stable    Disposition: home    Patient Instructions:   Current Discharge Medication List      START taking these medications    Details   clopidogrel (PLAVIX) 75 MG tablet Take 1 tablet by mouth daily  Qty: 30 tablet, Refills: 3         CONTINUE these medications which have CHANGED    Details   furosemide (LASIX) 40 MG tablet Take 1 tablet by mouth daily  Qty: 30 tablet, Refills: 3         CONTINUE these medications which have NOT CHANGED    Details   ranolazine (RANEXA) 500 MG extended release tablet Take 1 tablet by mouth 2 times daily  Qty: 180 tablet, Refills: 3      metoprolol succinate (TOPROL XL) 25 MG extended release tablet Take 25 mg by mouth daily      oxyCODONE-acetaminophen (PERCOCET)  MG per tablet TAKE 1 TABLET BY MOUTH 4 TIMES A DAY AS NEEDED  Refills: 0      L-methylfolate-B6-B12 (METANX) 1-05.536-5-35 MG CAPS capsule Take by mouth daily      levothyroxine (SYNTHROID) 112 MCG tablet Take 112 mcg by mouth Daily       simvastatin (ZOCOR) 20 MG tablet Take 2 tablets by mouth nightly  Qty: 30

## 2020-08-11 NOTE — CONSULTS
Met with patient and discussed that their physician has ordered a referral to our outpatient Phase II Cardiac Rehabilitation program. Reviewed the benefits of cardiac rehabilitation based on their diagnosis and personal risk factors. Patient demonstrates strong interest in Cardiac Rehabilitation at this time. Cardiac Rehabilitation brochure provided to patient/family. The Cardiac Rehabilitation Program has been provided the patient's referral information and pertinent patient details and history. The patient may call [facility] [place] at [phone] for additional information or questions. Contact information for [facility] and other choices close to the patient's residence have been provided in the discharge instructions so that the patient may call and schedule an appointment when cleared by their physician.  Thank you for the referral.

## 2020-08-11 NOTE — CARE COORDINATION
Transition of care: SYLWIA x2 - LAD on 08/10/20. Met with pt in room. Pt lives with her son, Maria Del Rosario Torres, in a 1st floor apartment. Independent with ADLs. Maria Del Rosario Torres drives for her, helps with meal prep and the laundry. DME- FWW and grab bars in bathroom. History of hhc with [facility]. Declines needing hhc at discharge. Denies any needs for home at present. PCP is Dr. Jim Callaway and pharmacy is [facility] in [facility]. Sw/cm will follow.

## 2020-08-11 NOTE — PROGRESS NOTES
PROGRESS NOTE     CARDIOLOGY    Chief complaint: Seen today for follow up, management & recommendations for coronary artery disease. She denies chest pain or shortness of breath today. She was lying flat in bed. She was comfortable and in no distress. Wt Readings from Last 3 Encounters:   08/10/20 163 lb (73.9 kg)   07/28/20 161 lb 12.8 oz (73.4 kg)   07/14/20 163 lb (73.9 kg)     Temp Readings from Last 3 Encounters:   08/11/20 98.9 °F (37.2 °C) (Temporal)   05/21/19 97 °F (36.1 °C) (Temporal)   04/13/19 97.7 °F (36.5 °C) (Oral)     BP Readings from Last 3 Encounters:   08/11/20 (!) 90/56   07/28/20 108/60   07/14/20 110/70     Pulse Readings from Last 3 Encounters:   08/11/20 88   07/28/20 76   07/14/20 86         Intake/Output Summary (Last 24 hours) at 8/11/2020 1458  Last data filed at 8/11/2020 1120  Gross per 24 hour   Intake 1700 ml   Output 1950 ml   Net -250 ml       No results for input(s): WBC, HGB, HCT, MCV, PLT in the last 72 hours. Recent Labs     08/11/20  0358      K 3.7   CL 94*   CO2 27   BUN 8   CREATININE 0.5     No results for input(s): PROTIME, INR in the last 72 hours. No results for input(s): CKTOTAL, CKMB, CKMBINDEX, TROPONINI in the last 72 hours. No results for input(s): BNP in the last 72 hours. No results for input(s): CHOL, HDL, TRIG in the last 72 hours.     Invalid input(s): CHOLHDLR, LDLCALCU      enoxaparin (LOVENOX) injection 40 mg, Daily  furosemide (LASIX) tablet 40 mg, Daily  aspirin EC tablet 81 mg, Daily  potassium chloride (KLOR-CON M) extended release tablet 20 mEq, Daily  gabapentin (NEURONTIN) capsule 800 mg, TID  levothyroxine (SYNTHROID) tablet 112 mcg, Daily  albuterol (PROVENTIL) nebulizer solution 2.5 mg, Q4H WA  metoprolol succinate (TOPROL XL) extended release tablet 25 mg, Daily  ranolazine (RANEXA) extended release tablet 500 mg, BID  acetaminophen (TYLENOL) tablet 650 mg, Q4H PRN  atorvastatin (LIPITOR) tablet 40 mg, Nightly  clopidogrel (PLAVIX) tablet 75 mg, Daily  oxyCODONE-acetaminophen (PERCOCET) 5-325 MG per tablet 1 tablet, Q4H PRN  budesonide (PULMICORT) nebulizer suspension 250 mcg, BID    And  Arformoterol Tartrate (BROVANA) nebulizer solution 15 mcg, BID        Review of systems:     Heart: as above   Lungs: as above   Eyes: denies changes in vision or discharge. Ears: denies changes in hearing or pain. Nose: denies epistaxis or masses   Throat: denies sore throat or trouble swallowing. Neuro: denies numbness, tingling, tremors. Skin: denies rashes or itching. : denies hematuria, dysuria   GI: denies vomiting, diarrhea   Psych: denies mood changed, anxiety, depression. Physical exam:    Constitutional: A&O x3, communicates well, no acute distress. Eyes: extraocular muscles intact, PERRL. Normal lids & conjunctiva. No icterus. ENT: clear, no bleeding. No external masses. Lips normal formation. Neck: supple, full ROM, no JVD, no bruits, no lymphadenopathy. No masses. trachea midline. Heart: regular rate & rhythm, normal S1 & S2, I/VI (normal physiologic) systolic murmur, S4 gallop. No heave. Lungs: CTA. No accessory muscles. Abd: soft, non-tender. Normal bowel sounds. Neuro: Full ROM X 4, EOMI, no tremors. EXT: No bilateral lower extremity edema  Skin: warm, dry, intact. Good turgor. Psych: A&O x 3, normal behavior, not anxious. Femoral artery access site: Golf ball size hematoma with ecchymosis. Assessment/Recommendations  1. Coronary artery disease. Sequential Rotablator and overlapping 3.0 x 15 mm and 3.0 x 15 mm drug-eluting stents to the proximal and mid LAD. 2. Sick sinus syndrome, pacemaker. 3. Small hematoma. I will order an ultrasound to rule out pseudoaneurysm. 4. Hypertension  5. DVT  6. COPD  7. Peripheral vascular disease  8. Osteoporosis. 9. Okay for discharge with cardiology if the ultrasound is negative.

## 2020-08-26 NOTE — TELEPHONE ENCOUNTER
Patient notified of Dr. John Apple recommendation. F/U scheduled for 10/26/20 at 9:20 a.m. Iam Chu requested.

## 2020-10-09 NOTE — PROGRESS NOTES
Natalia Torres  1950  Date of Service: 10/9/2020    Patient Active Problem List    Diagnosis Date Noted    CAD in native artery 08/10/2020    Nodule of spleen 08/01/2018    Trauma 07/31/2018    Multiple closed fractures of ribs of left side     Chest wall pain     Osteoarthritis 03/13/2018    Post-traumatic osteoarthritis of left hip 03/13/2018    Bilateral carotid artery stenosis 02/13/2018    Atherosclerosis of native artery of both lower extremities with intermittent claudication ([address] 02/13/2018    Osteoporosis 05/24/2017     Overview Note:     Diagnosis added to problem list by [facility] based on transcribed order from Dr. Cruz Shell Left carotid artery occlusion 04/19/2016    Coronary artery disease involving native coronary artery without angina pectoris 01/16/2016     Overview Note:     By stress only showing a fixed anteroapical defect in 2007. Since then repeat stress tests & echo's show normal perfusion & EF with no WMA's.  PVD (peripheral vascular disease) ([address] 10/21/2015    Hypothyroid 05/04/2015    SSS (sick sinus syndrome) ([address] 04/13/2015    Pacemaker 04/13/2015     Overview Note:     Dual MDT 40/14/86- complicated by lead perforation s/p revision (MRI Conditional [employer])        Seizure ([address] 11/17/2014    COPD (chronic obstructive pulmonary disease) ([address] 08/19/2013    History of DVT (deep vein thrombosis) 08/19/2013    HTN (hypertension), benign 08/18/2013    Mixed hyperlipidemia 08/18/2013       Social History     Socioeconomic History    Marital status:       Spouse name: None    Number of children: None    Years of education: None    Highest education level: None   Occupational History    Occupation: retired- [employer],     Social Needs    Financial resource strain: None    Food insecurity     Worry: None     Inability: None    Transportation needs     Medical: None     Non-medical: None   Tobacco Use    Smoking status: Former Smoker     Packs/day: 1.00     Years: 10.00     Pack years: 10.00     Types: Cigarettes     Last attempt to quit: 6/15/2017     Years since quitting: 3.3    Smokeless tobacco: Never Used   Substance and Sexual Activity    Alcohol use: No    Drug use: No    Sexual activity: Not Currently     Partners: Male   Lifestyle    Physical activity     Days per week: None     Minutes per session: None    Stress: None   Relationships    Social connections     Talks on phone: None     Gets together: None     Attends Church service: None     Active member of club or organization: None     Attends meetings of clubs or organizations: None     Relationship status: None    Intimate partner violence     Fear of current or ex partner: None     Emotionally abused: None     Physically abused: None     Forced sexual activity: None   Other Topics Concern    None   Social History Narrative    None       Current Outpatient Medications   Medication Sig Dispense Refill    clopidogrel (PLAVIX) 75 MG tablet Take 1 tablet by mouth daily 30 tablet 3    furosemide (LASIX) 40 MG tablet Take 1 tablet by mouth daily 30 tablet 3    ranolazine (RANEXA) 500 MG extended release tablet Take 1 tablet by mouth 2 times daily 180 tablet 3    metoprolol succinate (TOPROL XL) 25 MG extended release tablet Take 25 mg by mouth daily      oxyCODONE-acetaminophen (PERCOCET)  MG per tablet TAKE 1 TABLET BY MOUTH 4 TIMES A DAY AS NEEDED  0    L-methylfolate-B6-B12 (METANX) 9-11.621-0-87 MG CAPS capsule Take by mouth daily      levothyroxine (SYNTHROID) 112 MCG tablet Take 112 mcg by mouth Daily       simvastatin (ZOCOR) 20 MG tablet Take 2 tablets by mouth nightly (Patient taking differently: Take 20 mg by mouth nightly ) 30 tablet 3    gabapentin (NEURONTIN) 800 MG tablet Take 800 mg by mouth three times daily  2    KLOR-CON M20 20 MEQ tablet Take 20 mEq by mouth daily   0    aspirin 81 MG EC tablet Take 81 mg by mouth daily      fluticasone-vilanterol (BREO ELLIPTA) 100-25 MCG/INH AEPB inhaler Inhale 1 puff into the lungs daily (Patient not taking: Reported on 11/19/2019) 60 each 5    albuterol (PROVENTIL) (2.5 MG/3ML) 0.083% nebulizer solution Take 3 mLs by nebulization every 4 hours (while awake) DX: COPD J44.9 (Patient not taking: Reported on 11/19/2019) 360 vial 5     No current facility-administered medications for this visit. Allergies   Allergen Reactions    Anesthetics, Betty      Difficulty waking up from ether    Penicillins Rash       Chief Complaint:  [address] [place] [address] is here today for follow up and management/recomendations for preop    History of Present Illness: [address] [place] [address] states that  She uses a walker. She does some light household chores and she does walk from her apartment out to the sidewalk and back. She thinks that it may be close to half a block. She denies any chest discomfort, dyspnea on exertion, orthopnea/PND, or lower extremity edema. She denies any palpitations or presyncopal symptoms. REVIEW OF SYSTEMS:  As above. Patient does not complain of any fever, chills, nausea, vomiting or diarrhea. No focal, motor or neurological deficits. No changes in his/her vision, hearing, bowel or bladder habits. She is not known to have a history of thyroid problems. No recent nose bleeds. PHYSICAL EXAM:  Vitals:    10/09/20 0922   BP: 118/70   Site: Left Upper Arm   Position: Sitting   Cuff Size: Small Adult   Pulse: 72   Resp: 18   SpO2: 95%   Weight: 163 lb 9.6 oz (74.2 kg)   Height: 5' 2\" (1.575 m)       GENERAL:  She is alert and oriented x 3, communicates well, in no distress. NECK:  No masses, trachea is mid position. Supple, full ROM, no JVD or bruits. No palpable thyromegaly or lymphadenopathy. HEART:  Regular rate and rhythm. Normal S1 and S2. There is an S4 gallop and a I/VI systolic ejection murmur. LUNGS: Poor air movement. Mild diffuse bilateral rhonchi.   No use of accessory muscles. symmetrical excursion. ABDOMEN:  Soft, non-tender. Normal bowel sounds. Obese  EXTREMITIES:  Full ROM x 4. Trace right lower extremity edema. Good distal pulses. EYES:  Extraocular muscles intact. PERRL. Normal lids & conjunctiva. ENT:  Nares are clear & not bleeding. Moist mucosa. Normal lips formation. No external masses   NEURO: no tremors, full ROM x 4, EOMI. SKIN:  Warm, dry and intact. Normal turgor. EKG: Sinus rhythm, 72 bpm, nl axis, nonspecific ST - T wave changes. Assessment:   1. Coronary artery disease. No symptoms of recurring ischemia at this time. 2. SSS/pacer  3. COPD  4. ETOH  5. PVD      Recommendations:  1. Continue to follow her cholesterol with Dr. Marge Crandall. 2. I recommended cardiac rehab. She defers stating that she does not have transportation. 3. I discussed with her ways to continue her physical activities. 4. I discussed with her the importance of compliance. Thank you for allowing me to participate in your patient's care.       [address], [address] [facility]  Interventional Cardiology

## 2020-11-26 PROBLEM — I63.81 LACUNAR INFARCTION (HCC): Status: ACTIVE | Noted: 2020-01-01

## 2020-11-26 PROBLEM — I50.9 CHF (CONGESTIVE HEART FAILURE) (HCC): Status: ACTIVE | Noted: 2020-01-01

## 2020-11-26 PROBLEM — S43.005D SHOULDER DISLOCATION, LEFT, SUBSEQUENT ENCOUNTER: Status: ACTIVE | Noted: 2020-01-01

## 2020-11-26 PROBLEM — G62.9 NEUROPATHY: Status: ACTIVE | Noted: 2020-01-01

## 2020-11-27 NOTE — DISCHARGE INSTR - COC
Continuity of Care Form    Patient Name: Megan Dominguez   :  1950  MRN:  [id number]    Admit date:  2020  Discharge date:  ***    Code Status Order: Full Code   Advance Directives:   Advance Care Flowsheet Documentation       Date/Time Healthcare Directive Type of Healthcare Directive Copy in [address] Agent's Name Healthcare Agent's Phone Number    20 2213  No, patient does not have an advance directive for healthcare treatment -- -- -- -- --            Admitting Physician:  Violeta Dotson MD  PCP: Terri Grullon DO    Discharging Nurse: [facility] Unit/Room#: 8578/4194-V  Discharging Unit Phone Number: [phone]    Emergency Contact:   Extended Emergency Contact Information  Primary Emergency Contact: Donnie Torres  Address: [address] [place] [address], [address] [place] [address] Phone: [phone]  Relation: Child  Secondary Emergency Contact: Leroy Torres  Address: [address], [address] [place] [address] Phone: [phone]  Work Phone: [phone]  Relation: Child    Past Surgical History:  Past Surgical History:   Procedure Laterality Date    APPENDECTOMY      BACK SURGERY  10/26/15    L3 kypho    CARDIAC CATHETERIZATION  08/10/2020    Dr Sunny Sims    CAROTID-SUBCLAVIAN BYPASS GRAFT Left 2016    CHOLECYSTECTOMY      CORONARY ANGIOPLASTY WITH STENT PLACEMENT  08/10/2020    Dr Sunny Sims Prox LAD rotablator 1.5/1.75  Resolute Alfredo 3.0x15  3.0x15    ECHOCARDIOGRAM LIMITED  12/3/2014         EYE SURGERY      FIXATION KYPHOPLASTY      FRACTURE SURGERY Right     knee    HYSTERECTOMY      JOINT REPLACEMENT Bilateral     hips    OTHER SURGICAL HISTORY  14    reposition pacer lead and katty    OTHER SURGICAL HISTORY  2015    kyphoplasty T8    OTHER SURGICAL HISTORY  2016    lumbar myelogram    PACEMAKER INSERTION Left 2014    Dr. John Baker CO TOTAL HIP ARTHROPLASTY Left 3/13/2018    LEFT HIP TOTAL JOINT  ARTHROPLASTY AND REMOVAL HARDWARE LEFT ACETABULUM  ---Mark Hare--- performed by Dex High MD at [address]       Immunization History:   Immunization History   Administered Date(s) Administered    Influenza Vaccine, unspecified formulation 01/01/2005, 10/01/2011    Influenza Virus Vaccine 10/01/2014, 12/10/2015, 09/06/2017    Influenza, MDCK, Preservative free 10/19/2016    Pneumococcal Polysaccharide ([id number]) 01/24/2008, 10/01/2010    Tdap (Boostrix, Adacel) 05/07/2018       Active Problems:  Patient Active Problem List   Diagnosis Code    History of hypertension Z86.79    HLD (hyperlipidemia) E78.5    Tobacco abuse Z72.0    COPD (chronic obstructive pulmonary disease) ([facility] J44.9    History of DVT (deep vein thrombosis) Z86.718    Seizure ([facility]) R56.9    SSS (sick sinus syndrome) ([facility]) I49.5    Pacemaker Z95.0    Hypothyroid E03.9    PVD (peripheral vascular disease) ([facility] I73.9    Coronary artery disease involving native coronary artery without angina pectoris I25.10    Left carotid artery occlusion I65.22    Osteoporosis M81.0    Bilateral carotid artery stenosis I65.23    Atherosclerosis of native artery of both lower extremities with intermittent claudication ([facility]) I70.213    Osteoarthritis M19.90    Post-traumatic osteoarthritis of left hip M16.52    Trauma T14.90XA    Multiple closed fractures of ribs of left side S22.42XA    Chest wall pain R07.89    Fall W19. XXXA    Nodule of spleen D73.89    CAD in native artery I25.10    Shoulder dislocation, left, subsequent encounter S43.005D    Neuropathy G62.9    Lacunar infarction ([facility]) I63.81    CHF (congestive heart failure) ([facility]) I50.9       Isolation/Infection:   Isolation            No Isolation          Patient Infection Status       Infection Onset Added Last Indicated Last Indicated By Review Planned Expiration Resolved Resolved By    None active    Resolved    COVID-19 Rule Out 08/04/20 08/04/20 08/04/20 COVID-19 Ambulatory (Ordered)   08/06/20 Rule-Out Test Resulted            Nurse Assessment:  Last Vital Signs: BP (!) 132/0 Comment: 132/doppler   Pulse 78   Temp 98.1 °F (36.7 °C) (Oral)   Resp 22   Ht 5' 4\" (1.626 m)   Wt 184 lb 11.2 oz (83.8 kg)   SpO2 93%   BMI 31.70 kg/m²     Last documented pain score (0-10 scale): Pain Level: 10  Last Weight:   Wt Readings from Last 1 Encounters:   11/27/20 184 lb 11.2 oz (83.8 kg)     Mental Status:  oriented, alert, coherent, logical, thought processes intact and able to concentrate and follow conversation    IV Access:  - None    Nursing Mobility/ADLs:  Walking   Assisted  Transfer  Assisted  Bathing  Assisted  Dressing  Assisted  Toileting  Assisted  Feeding  Independent  Med [address]  Med Delivery   whole    Wound Care Documentation and Therapy:  Incision 03/13/18 Hip Left (Active)   Number of days: 990        Elimination:  Continence: Bowel: {YES / PP:21327}  Bladder: {YES / NX:26298}  Urinary Catheter: None   Colostomy/Ileostomy/Ileal Conduit: {YES / NT:20196}       Date of Last BM: ***    Intake/Output Summary (Last 24 hours) at 11/27/2020 1530  Last data filed at 11/27/2020 0400  Gross per 24 hour   Intake 480.44 ml   Output --   Net 480.44 ml     I/O last 3 completed shifts: In: 480.4 [P.O.:480; I.V.:0.4]  Out: -     Safety Concerns:     History of Falls (last 30 days)    Impairments/Disabilities:      Glasses    Nutrition Therapy:  Current Nutrition Therapy:   - General Diet: 1500ml fluid restriction     Routes of Feeding: Oral  Liquids: {Slp liquid thickness:75914}  Daily Fluid Restriction: yes - amount 1500  Last Modified Barium Swallow with Video (Video Swallowing Test): not done    Treatments at the Time of Hospital Discharge:   Respiratory Treatments: duonebs QID, brovana and budesonide BID  Oxygen Therapy:  is on oxygen at 3 L/min per nasal cannula.  Wean to keep >90% will need ambulatory oxygen testing prior to returning home  Ventilator:    - BiPAP: 12/6  only when sleeping    Rehab Therapies: Physical Therapy and Occupational Therapy  Weight Bearing Status/Restrictions: No weight bearing restirctions  Other Medical Equipment (for information only, NOT a DME order):  {EQUIPMENT:[id number]}  Other Treatments: ***    Patient's personal belongings (please select all that are sent with patient):      RN SIGNATURE:  Electronically signed by Marie Valadez RN on 12/8/20 at 2:22 PM EST    CASE MANAGEMENT/SOCIAL WORK SECTION    Inpatient Status Date: ***    Readmission Risk Assessment Score:  Readmission Risk              Risk of Unplanned Readmission:        0           Discharging to Facility/ Agency   Name:   Address:  Phone:  Fax:    / signature: Electronically signed by STEPHANIE Kraus on 11/27/20 at 3:30 PM EST    PHYSICIAN SECTION    Prognosis: Good    Condition at Discharge: Stable    Rehab Potential (if transferring to Rehab): Good    Recommended Labs or Other Treatments After Discharge: Check BMP in 5 days - monitor for hyponatremia    Physician Certification: I certify the above information and transfer of Esther Prieto  is necessary for the continuing treatment of the diagnosis listed and that she requires [facility] for less 30 days.      Update Admission H&P: No change in H&P    PHYSICIAN SIGNATURE:  Electronically signed by Laron Cardona MD on 12/8/20 at 1:46 PM EST

## 2020-11-27 NOTE — PLAN OF CARE
Problem: Falls - Risk of:  Goal: Will remain free from falls  Description: Will remain free from falls  Outcome: Met This Shift     Problem: Skin Integrity:  Goal: Absence of new skin breakdown  Description: Absence of new skin breakdown  Outcome: Met This Shift     Problem: Pain:  Description: Pain management should include both nonpharmacologic and pharmacologic interventions. Goal: Control of acute pain  Description: Control of acute pain  Outcome: Met This Shift     Problem: Anxiety:  Goal: Level of anxiety will decrease  Description: Level of anxiety will decrease  Outcome: Met This Shift     Problem:  Activity:  Goal: Ability to tolerate increased activity will improve  Description: Ability to tolerate increased activity will improve  Outcome: Ongoing

## 2020-11-27 NOTE — ED PROVIDER NOTES
Julian Garcia 9      Pt Name: Gonsalo Velasquez  MRN: [id number]  [place] 1950  Date of evaluation: 11/26/2020      CHIEF COMPLAINT       Chief Complaint   Patient presents with   Marcelene Cool     left shoulder        HPI  Gonsalo Velasquez is a 65 y.o. female with a past medical history of blood clots on Eliquis presents with left shoulder pain that started 1 hour prior to presentation. She describes her shoulder pain as severe, sharp, nonradiating, located on her left shoulder. Exacerbated with movement. Alleviated with rest.  She has not taken any medications to alleviate her symptoms. Patient states that she was drinking with her family during Thanksgiving when she stumbled and lost her balance and fell on her left side. Admits to hitting her head. But denies any loss of consciousness or emesis after the event. No other associated symptoms. Denies fevers, chills, blurred vision, syncopal or near syncopal episode, chest pain, shortness of breath, abdominal pain, flank pain, dysuria, hematuria, diarrhea, constipation, new rashes or sores. Except as noted above the remainder of the review of systems was reviewed and negative. Review of Systems   Constitutional: Negative for activity change, appetite change, diaphoresis, fatigue, fever and unexpected weight change. HENT: Negative for congestion and trouble swallowing. Eyes: Negative for visual disturbance. Respiratory: Negative for shortness of breath. Cardiovascular: Negative for chest pain, palpitations and leg swelling. Gastrointestinal: Negative for abdominal distention, abdominal pain, constipation, diarrhea, nausea and vomiting. Endocrine: Negative for polyphagia and polyuria. Genitourinary: Negative for dysuria. Musculoskeletal: Negative for back pain, gait problem, joint swelling and myalgias. Left shoulder pain. Skin: Negative for color change, pallor, rash and wound.    Neurological: Negative for dizziness, tremors, seizures, syncope, facial asymmetry, speech difficulty, weakness, light-headedness, numbness and headaches. Hematological: Negative for adenopathy. Does not bruise/bleed easily. Psychiatric/Behavioral: Negative for agitation. Physical Exam  Vitals signs and nursing note reviewed. Constitutional:       General: She is not in acute distress. Appearance: Normal appearance. She is not ill-appearing or diaphoretic. HENT:      Head: Normocephalic and atraumatic. Nose: Nose normal.   Eyes:      Extraocular Movements: Extraocular movements intact. Pupils: Pupils are equal, round, and reactive to light. Neck:      Musculoskeletal: Normal range of motion. Cardiovascular:      Rate and Rhythm: Normal rate and regular rhythm. Pulses: Normal pulses. Heart sounds: Normal heart sounds. Pulmonary:      Effort: Pulmonary effort is normal.      Breath sounds: Normal breath sounds. Abdominal:      General: Bowel sounds are normal.      Palpations: Abdomen is soft. Tenderness: There is no abdominal tenderness. There is no right CVA tenderness, left CVA tenderness or rebound. Negative signs include Yoon's sign, Rovsing's sign and McBurney's sign. Genitourinary:     Vagina: No vaginal discharge. Musculoskeletal: Normal range of motion. General: Signs of injury present. Comments: Left shoulder is exquisitely tender to palpation. She has lateral deltoid flattening. Range of motion is limited due to pain. Patient unable to abduct left shoulder. Or raise her arm in front of her. Radial, median, ulnar nerves intact. Capillary refill less than 2 seconds bilaterall  Radial pulses 2+ equal and bilateral.   Skin:     General: Skin is warm and dry. Capillary Refill: Capillary refill takes less than 2 seconds. Neurological:      General: No focal deficit present.       Mental Status: She is alert and oriented to person, place, and time.   Psychiatric:         Mood and Affect: Mood normal.          Procedures   Joint Reduction Procedure Note    Indication: Joint dislocation    Consent: The patient was counseled regarding the procedure, it's indications, risks, potential complications and alternatives and any questions were answered. Consent was obtained. Procedure: The pre-reduction exam showed distal perfusion & neurologic function to be normal. The patient was placed in the appropriate position. Anesthesia/pain control was obtained using conscious sedation with ketophol. Reduction of the left shoulder was performed by traction and counter traction. Post reduction films were obtained and revealed satisfactory reduction. A post-reduction exam revealed distal perfusion & neurologic function to be normal. The affected area was immobilized with shoulder immobilizer. The patient tolerated the procedure well. Complications: None    MDM  Number of Diagnoses or Management Options  Anterior shoulder dislocation, left, initial encounter:   Contusion of head, initial encounter:   Diagnosis management comments: 40-year-old female with a past medical history of blood clots on Eliquis presents status post mechanical fall at home on the left side 1 hour prior to presentation. Vitals within normal limits. On physical exam patient is in no acute distress, speaking in full sentences alert and oriented x3. She is uncomfortable expressing severe pain on her left shoulder. Her left shoulder is exquisitely tender to palpation. No tenderness on her left arm, forearm, wrist, or hands. Radial and, median, and ulnar nerves intact. Capillary refill less than 2 seconds bilaterally in her upper extremities. Radial pulses 2+ equal and bilateral.  Lungs clear to auscultation bilaterally. No wheezes rales rhonchi. Normal S1-S2. No murmurs rubs gallops. Abdomen soft nontender, no rebound or guarding.   No C-spine, thoracic spine, or lumbar spine tenderness. Patient is c-collar. CT head and C-spine are negative for acute fractures. Left shoulder on imaging does show a left anterior shoulder dislocation. Anterior left shoulder reduction was performed by me without difficulty in the department. Conscious sedation was performed by Dr. Bonnie Garcia. We were supervised by Dr. Rohith Kamara. Patient tolerated the procedure well. Shoulder was reduced successfully. Patient be discharged home about we received a call patient's son Gina Koenig who made sure we knew that the patient uses a walker to ambulate as she lives by herself. As the patient is on a left arm sling now after the shoulder dislocation was deemed in the patient's best interest to be admitted for observation for possible PT OT in the morning. Dr. Shannan Chaudhari service was consulted and they are in agreement. Patient admitted to the hospital.  Patient was given analgesic medications with relief of pain. Patient was informed of all the results of evaluation. She is agreeable with plan. ED Course as of Nov 27 0507   u Nov 26, 2020 2039 Spoke with patient's son Gina Koenig. He states that his mother lives by herself and she uses a walker to ambulate. He is afraid that she will not be able to take care of herself when she gets home with a shoulder dislocation. [JV]   2047 Spoke with April admitting for Dr. Janna Marsh. Patient will be admitted to [place] [id number] observation. Spoke with patient's son Gina Koenig is agreeable with plan. Don's phone number is [phone].     [JV]      ED Course User Index  [JV] Faraz PresMD renetta             --------------------------------------------- PAST HISTORY ---------------------------------------------  Past Medical History:  has a past medical history of Arthritis, CAD (coronary artery disease), Carotid stenosis, [facility]est[facility]) [facility] Closed fracture of pelvis with delayed healing, Closed fracture of twelfth thoracic vertebra ([address] COPD (chronic obstructive pulmonary disease) ([facility] DVT of leg (deep venous thrombosis) ([facility] Hx of blood clots, Hyperlipidemia, Hypertension, Mitral regurgitation, Prolonged emergence from general anesthesia, PVD (peripheral vascular disease) with claudication ([facility] Retinal ischemia, Stenosis of intracranial portions of left internal carotid artery, and Thyroid disease. Past Surgical History:  has a past surgical history that includes Hysterectomy; Appendectomy; Cholecystectomy; Pacemaker insertion (Left, 11-); other surgical history (12/2/14); ECHO Limited (12/3/2014); other surgical history (5/5/2015); back surgery (10/26/15); other surgical history (2/16/2016); Carotid-subclavian Bypass Graft (Left, 5/19/2016); eye surgery; pr total hip arthroplasty (Left, 3/13/2018); Fixation Kyphoplasty; joint replacement (Bilateral); fracture surgery (Right); Cardiac catheterization (08/10/2020); and Coronary angioplasty with stent (08/10/2020). Social History:  reports that she has been smoking cigarettes. She has a 2.50 pack-year smoking history. She has never used smokeless tobacco. She reports current alcohol use. She reports that she does not use drugs. Family History: family history includes Heart Disease in her father and mother. The patients home medications have been reviewed.     Allergies: Anesthetics, lulu and Penicillins    -------------------------------------------------- RESULTS -------------------------------------------------    LABS:  Results for orders placed or performed during the hospital encounter of 11/26/20   Comprehensive Metabolic Panel w/ Reflex to MG   Result Value Ref Range    Sodium 132 132 - 146 mmol/L    Potassium reflex Magnesium 4.6 3.5 - 5.0 mmol/L    Chloride 99 98 - 107 mmol/L    CO2 27 22 - 29 mmol/L    Anion Gap 6 (L) 7 - 16 mmol/L    Glucose 142 (H) 74 - 99 mg/dL    BUN 10 8 - 23 mg/dL    CREATININE 0.6 0.5 - 1.0 mg/dL    GFR Non-African American >60 >=60 mL/min/1.73    GFR African American >60     Calcium 8.2 (L) 8.6 - 10.2 mg/dL    Total Protein 5.9 (L) 6.4 - 8.3 g/dL    Alb 3.3 (L) 3.5 - 5.2 g/dL    Total Bilirubin 0.5 0.0 - 1.2 mg/dL    Alkaline Phosphatase 68 35 - 104 U/L    ALT 12 0 - 32 U/L    AST 22 0 - 31 U/L   CBC auto differential   Result Value Ref Range    WBC 14.8 (H) 4.5 - 11.5 E9/L    RBC 3.77 3.50 - 5.50 E12/L    Hemoglobin 11.8 11.5 - 15.5 g/dL    Hematocrit 36.7 34.0 - 48.0 %    MCV 97.3 80.0 - 99.9 fL    MCH 31.3 26.0 - 35.0 pg    MCHC 32.2 32.0 - 34.5 %    RDW 13.5 11.5 - 15.0 fL    Platelets 144 821 - 097 E9/L    MPV 10.8 7.0 - 12.0 fL    Neutrophils % 82.4 (H) 43.0 - 80.0 %    Immature Granulocytes % 0.9 0.0 - 5.0 %    Lymphocytes % 9.9 (L) 20.0 - 42.0 %    Monocytes % 6.2 2.0 - 12.0 %    Eosinophils % 0.3 0.0 - 6.0 %    Basophils % 0.3 0.0 - 2.0 %    Neutrophils Absolute 12.19 (H) 1.80 - 7.30 E9/L    Immature Granulocytes # 0.13 E9/L    Lymphocytes Absolute 1.46 (L) 1.50 - 4.00 E9/L    Monocytes Absolute 0.91 0.10 - 0.95 E9/L    Eosinophils Absolute 0.04 (L) 0.05 - 0.50 E9/L    Basophils Absolute 0.05 0.00 - 0.20 E9/L       RADIOLOGY:  XR SHOULDER LEFT (MIN 2 VIEWS)   Final Result   1. Status post reduction of the left shoulder. No fracture identified. 2. Mild degenerative changes. XR SHOULDER LEFT (MIN 2 VIEWS)   Final Result   1. Anterior shoulder dislocation. 2.  No acute fracture is identified. XR ELBOW LEFT (MIN 3 VIEWS)   Final Result   Diagnostically limited examination. Question of dislocated ulnar head with possible occult fracture of the   olecranon process. Further assessment is recommended. CT HEAD WO CONTRAST   Final Result   No acute intracranial abnormality. Age-related loss of brain volume and   chronic periventricular ischemic changes. Small chronic basal ganglia   lacunar infarcts. CT Cervical Spine WO Contrast   Final Result   No acute cervical spine fracture. Degenerative changes of the cervical spine as described. ------------------------- NURSING NOTES AND VITALS REVIEWED ---------------------------  Date / Time Roomed:  11/26/2020  5:48 PM  ED Bed Assignment:  [id number]    The nursing notes within the ED encounter and vital signs as below have been reviewed. Patient Vitals for the past 24 hrs:   BP Temp Temp src Pulse Resp SpO2 Height Weight   11/27/20 0401 -- -- -- -- -- -- -- 184 lb 11.2 oz (83.8 kg)   11/27/20 0000 (!) 103/58 97.9 °F (36.6 °C) Oral 67 18 -- -- --   11/26/20 2210 96/67 97.8 °F (36.6 °C) Oral 71 16 93 % -- --   11/26/20 2120 86/68 97.4 °F (36.3 °C) Oral 68 20 94 % -- --   11/26/20 2111 -- 97.4 °F (36.3 °C) Oral 68 20 93 % -- --   11/26/20 1951 100/68 -- -- 71 18 95 % -- --   11/1950 -- -- -- 71 18 94 % -- --   11/26/20 1943 109/75 -- -- 73 16 95 % -- --   11/26/20 1940 -- -- -- 75 18 95 % -- --   11/26/20 1935 110/70 -- -- 71 18 92 % -- --   11/26/20 1841 (!) 98/58 -- -- -- -- -- -- --   11/26/20 1810 -- 97.4 °F (36.3 °C) Oral 70 18 93 % 5' 4\" (1.626 m) 162 lb (73.5 kg)       Oxygen Saturation Interpretation: Normal    ------------------------------------------ PROGRESS NOTES ------------------------------------------    Counseling:  I have spoken with the patient and discussed todays results, in addition to providing specific details for the plan of care and counseling regarding the diagnosis and prognosis. Their questions are answered at this time and they are agreeable with the plan of admission.    --------------------------------- ADDITIONAL PROVIDER NOTES ---------------------------------  Consultations:  Spoke with Dr. Javed Arciniega service. Discussed case. They will admit the patient.   This patient's ED course included: a personal history and physicial examination, re-evaluation prior to disposition, multiple bedside re-evaluations, IV medications, cardiac monitoring and continuous pulse oximetry    This patient has remained hemodynamically stable during their ED course. Diagnosis:  1. Contusion of head, initial encounter    2. Anterior shoulder dislocation, left, initial encounter        Disposition:  Patient's disposition: Admit to med/surg floor  Patient's condition is stable.         Vi Spencer MD  Resident  11/27/20 [id number]

## 2020-11-27 NOTE — CARE COORDINATION
Social Work discharge planning   SW met with pt and discussed her discharge plan and PT [facility] of 9/24 today. Sw gave her [facility] choice list. She chose 1. [facility] - as she was there earlier this year. Sw called referral to [address] at [place]. They will eval pt for possible bed Monday. In mean time, SW advised pt she will need to make at least 2 more choices from snf list to give SW/CM for referrals IF [facility] has no bed. Pt stated understanding. N17 started. Social Work to follow for support and discharge planning with CM.   Electronically signed by Yvonne Guerra on 11/27/2020 at 3:29 PM

## 2020-11-27 NOTE — H&P
[address] [facility]  Attending History and Physical      CHIEF COMPLAINT:  Left shoulder pain      HISTORY OF PRESENT ILLNESS:      The patient is a 65 y.o. female patient of Dr. Julee Denney who presents with complaints of left shoulder pain after falling during Thanksgiving, patient was drinking. Patient hit her head though denies loss of consciousness. Patient had anterior left shoulder reduction in ED. Cannot be discharged home as patient lives by herself and ambulates with a walker, left arm is in a sling. Seen by orthopedic recommend nonweightbearing of left upper extremity, left arm in immobilizer or sling. Patient is complaining of low back pain.        Past Medical History:    Past Medical History:   Diagnosis Date    Arthritis     asthmatic bronchitis    CAD (coronary artery disease)     Carotid stenosis     [facility]est[facility] failure) [facility]     Closed fracture of pelvis with delayed healing 4/3/2017    Closed fracture of twelfth thoracic vertebra ([address] 4/3/2017    T 5,6,12    COPD (chronic obstructive pulmonary disease) ([address]     DVT of leg (deep venous thrombosis) ([address] 2010    left    Hx of blood clots     Hyperlipidemia     Hypertension     Mitral regurgitation     Trace    Prolonged emergence from general anesthesia     PVD (peripheral vascular disease) with claudication ([address] 11/18/2014    Retinal ischemia     Stenosis of intracranial portions of left internal carotid artery 11/18/2014    Thyroid disease        Past Surgical History:    Past Surgical History:   Procedure Laterality Date    APPENDECTOMY      BACK SURGERY  10/26/15    L3 kypho    CARDIAC CATHETERIZATION  08/10/2020    Dr Idania Ramos    CAROTID-SUBCLAVIAN BYPASS GRAFT Left 5/19/2016    CHOLECYSTECTOMY      CORONARY ANGIOPLASTY WITH STENT PLACEMENT  08/10/2020    Dr Idania Ramos Prox LAD rotablator 1.5/1.75  [facility] [place] 3.0x15  3.0x15    ECHOCARDIOGRAM LIMITED  12/3/2014         EYE SURGERY      FIXATION KYPHOPLASTY      FRACTURE SURGERY Right     knee    HYSTERECTOMY      JOINT REPLACEMENT Bilateral     hips    OTHER SURGICAL HISTORY  12/2/14    reposition pacer lead and katty    OTHER SURGICAL HISTORY  5/5/2015    kyphoplasty T8    OTHER SURGICAL HISTORY  2/16/2016    lumbar myelogram    PACEMAKER INSERTION Left 11-    Dr. Ro Arce Left 3/13/2018    LEFT HIP TOTAL JOINT  ARTHROPLASTY AND REMOVAL HARDWARE LEFT ACETABULUM  ---Millicent Lower--- performed by Deniz Begum MD at [facility] OR       Medications Prior to Admission:    Medications Prior to Admission: clopidogrel (PLAVIX) 75 MG tablet, Take 1 tablet by mouth daily  furosemide (LASIX) 40 MG tablet, Take 1 tablet by mouth daily  ranolazine (RANEXA) 500 MG extended release tablet, Take 1 tablet by mouth 2 times daily  metoprolol succinate (TOPROL XL) 25 MG extended release tablet, Take 25 mg by mouth daily  oxyCODONE-acetaminophen (PERCOCET)  MG per tablet, TAKE 1 TABLET BY MOUTH 4 TIMES A DAY AS NEEDED  levothyroxine (SYNTHROID) 112 MCG tablet, Take 100 mcg by mouth Daily   simvastatin (ZOCOR) 20 MG tablet, Take 2 tablets by mouth nightly (Patient taking differently: Take 20 mg by mouth nightly )  gabapentin (NEURONTIN) 800 MG tablet, Take 800 mg by mouth three times daily  KLOR-CON M20 20 MEQ tablet, Take 20 mEq by mouth daily   aspirin 81 MG EC tablet, Take 81 mg by mouth daily  L-methylfolate-B6-B12 (METANX) 3-90.314-2-35 MG CAPS capsule, Take by mouth daily    Allergies:    Anesthetics, lulu and Penicillins    Social History:    reports that she has been smoking cigarettes. She has a 2.50 pack-year smoking history. She has never used smokeless tobacco. She reports current alcohol use. She reports that she does not use drugs. Family History:   family history includes Heart Disease in her father and mother.     REVIEW OF SYSTEMS:  As above in the HPI, otherwise negative    PHYSICAL EXAM:    Vitals:  BP (!) 132/0 Comment: 132/doppler   Pulse 78   Temp 98.1 °F (36.7 °C) (Oral)   Resp 22   Ht 5' 4\" (1.626 m)   Wt 184 lb 11.2 oz (83.8 kg)   SpO2 93%   BMI 31.70 kg/m²     General:  Awake, alert, oriented X 3. In moderate pain  HEENT:  Normocephalic, atraumatic. Pupils equal, round, reactive to light. No scleral icterus. No conjunctival injection. Normal lips, teeth, and gums. No nasal discharge. Neck:  Supple  Heart:  RRR, no murmurs, gallops, rubs  Lungs:  CTA bilaterally, bilat symmetrical expansion, no wheeze, rales, or rhonchi  Abdomen:   Bowel sounds present, soft, nontender, no masses, no organomegaly, no peritoneal signs  Extremities:  No clubbing, cyanosis, or edema  Skin:  Warm and dry, no open lesions or rash  Neuro:  Cranial nerves 2-12 intact, no focal deficits  Breast: deferred  Rectal: deferred  Genitalia:  deferred    LABS:    CBC:   Lab Results   Component Value Date    WBC 14.8 11/27/2020    RBC 3.77 11/27/2020    HGB 11.8 11/27/2020    HCT 36.7 11/27/2020    MCV 97.3 11/27/2020    MCH 31.3 11/27/2020    MCHC 32.2 11/27/2020    RDW 13.5 11/27/2020     11/27/2020    MPV 10.8 11/27/2020     BMP:    Lab Results   Component Value Date     11/27/2020    K 4.6 11/27/2020    CL 99 11/27/2020    CO2 27 11/27/2020    BUN 10 11/27/2020    LABALBU 3.3 11/27/2020    CREATININE 0.6 11/27/2020    CALCIUM 8.2 11/27/2020    GFRAA >60 11/27/2020    LABGLOM >60 11/27/2020    GLUCOSE 142 11/27/2020    GLUCOSE 106 12/15/2011       ASSESSMENT:      Patient Active Problem List   Diagnosis    History of hypertension    HLD (hyperlipidemia)    Tobacco abuse    [facility]) [facility]    History of DVT (deep vein thrombosis)    Seizure (HCC)    SSS (sick sinus syndrome) ([facility]    Pacemaker    Hypothyroid    PVD (peripheral vascular disease) ([facility]    Coronary artery disease involving native coronary artery without angina pectoris    Left carotid artery occlusion    Osteoporosis    Bilateral carotid artery stenosis    Atherosclerosis of native artery of both lower extremities with intermittent claudication (HCC)    Osteoarthritis    Post-traumatic osteoarthritis of left hip    Trauma    Multiple closed fractures of ribs of left side    Chest wall pain    Fall    Nodule of spleen    CAD in native artery    Shoulder dislocation, left, subsequent encounter    Neuropathy    [facility]ar infarction [facility]    CHF (congestive heart failure) ([facility]         PLAN:    1. Left anterior shoulder dislocation   Reduced in ED   Seen by ortho, NWB to left arm, f/u with ortho in 2-3 weeks   PT/OT and  for placement   Pain control  2. Lower back + hip pain   Check CT lumbar spine and XR b/l hips given recent fall  3. HTN - continue home anti HTN  4.  H.LD - statin    Stephanie Gupta MD  5:19 PM  11/27/2020

## 2020-11-27 NOTE — CONSULTS
Orthopaedic Consult Note        Beck Vanessa is a 65 y.o. female, her YOB: 1950 with the following history as recorded in [facility]:    Patient Active Problem List    Diagnosis Date Noted    Shoulder dislocation, left, subsequent encounter 11/26/2020    Neuropathy 11/26/2020    Lacunar infarction ([facility] 11/26/2020    CHF (congestive heart failure) ([facility] [address] 11/26/2020    CAD in native artery 08/10/2020    Nodule of spleen 08/01/2018    Trauma 07/31/2018    Multiple closed fractures of ribs of left side     Chest wall pain     Fall     Osteoarthritis 03/13/2018    Post-traumatic osteoarthritis of left hip 03/13/2018    Bilateral carotid artery stenosis 02/13/2018    Atherosclerosis of native artery of both lower extremities with intermittent claudication ([facility] [address] [facility] 02/13/2018    Osteoporosis 05/24/2017    Left carotid artery occlusion 04/19/2016    Coronary artery disease involving native coronary artery without angina pectoris 01/16/2016    PVD (peripheral vascular disease) ([facility] 10/21/2015    Hypothyroid 05/04/2015    SSS (sick sinus syndrome) ([facility] [address] 04/13/2015    Pacemaker 04/13/2015    Seizure ([address] 11/17/2014    Tobacco abuse 08/19/2013    COPD (chronic obstructive pulmonary disease) ([facility] [address] [facility] 08/19/2013    History of DVT (deep vein thrombosis) 08/19/2013    History of hypertension 08/18/2013    HLD (hyperlipidemia) 08/18/2013     Current Facility-Administered Medications   Medication Dose Route Frequency Provider Last Rate Last Dose    sodium chloride flush 0.9 % injection 10 mL  10 mL Intravenous 2 times per day April ELLIE Valiente - CNP   10 mL at 11/27/20 1000    sodium chloride flush 0.9 % injection 10 mL  10 mL Intravenous PRN April ELLIE Valiente - CNP   10 mL at 11/27/20 1032    acetaminophen (TYLENOL) tablet 650 mg  650 mg Oral Q6H PRN April ELLIE Valiente - CNP        Or    acetaminophen (TYLENOL) suppository 650 mg  650 mg Rectal Q6H PRN April ELLIE Valiente CNP        polyethylene glycol [facility]) packet 17 g  17 g Oral Daily PRN April ELLIE Valiente CNP        promethazine (PHENERGAN) tablet 12.5 mg  12.5 mg Oral Q6H PRN April ELLIE Valiente CNP        Or    ondansetron [facility]) injection 4 mg  4 mg Intravenous Q6H PRN April ELLIE Valiente CNP        aspirin EC tablet 81 mg  81 mg Oral Daily April ELLIE Valiente CNP   81 mg at 11/27/20 1057    clopidogrel (PLAVIX) tablet 75 mg  75 mg Oral Daily April ELLIE Valiente CNP   75 mg at 11/27/20 1057    furosemide (LASIX) tablet 40 mg  40 mg Oral Daily April ELLIE Valiente CNP   Stopped at 11/27/20 1000    gabapentin (NEURONTIN) capsule 800 mg  800 mg Oral TID April ELLIE Valiente CNP   800 mg at 11/27/20 1405    potassium chloride (KLOR-CON M) extended release tablet 20 mEq  20 mEq Oral Daily April ELLIE Valiente CNP   20 mEq at 11/27/20 1057    levothyroxine (SYNTHROID) tablet 100 mcg  100 mcg Oral Daily April ELLIE Valiente CNP   100 mcg at 11/27/20 8459    metoprolol succinate (TOPROL XL) extended release tablet 25 mg  25 mg Oral Daily April ELLIE Valiente CNP   Stopped at 11/27/20 1000    oxyCODONE-acetaminophen (PERCOCET)  MG per tablet 1 tablet  1 tablet Oral Q4H PRN April ELLIE Valiente CNP   1 tablet at 11/27/20 1057    ranolazine (RANEXA) extended release tablet 500 mg  500 mg Oral BID April ELLIE Valiente CNP   500 mg at 23/18/07 5584    folic acid-pyridoxine-cyancobalamin (FOLTX) 2.5-25-2 MG TABS 1 tablet  1 tablet Oral Daily April ELLIE Valiente CNP   1 tablet at 11/27/20 1057    atorvastatin (LIPITOR) tablet 10 mg  10 mg Oral Nightly April ELLIE Valiente CNP   10 mg at 11/26/20 7440     Allergies: Anesthetics, lulu and Penicillins  Past Medical History:   Diagnosis Date    Arthritis     asthmatic bronchitis    CAD (coronary artery disease)     Carotid stenosis     CH[facility]congestive heart failure) ([facility]     Closed fracture of pelvis with delayed healing 4/3/2017    Closed fracture of twelfth thoracic vertebra ([facility] [address] 4/3/2017    T 5,6,12    COPD (chronic obstructive pulmonary disease) ([facility])     DVT of leg (deep venous thrombosis) ([address] 2010    left    Hx of blood clots     Hyperlipidemia     Hypertension     Mitral regurgitation     Trace    Prolonged emergence from general anesthesia     PVD (peripheral vascular disease) with claudication ([address] 11/18/2014    Retinal ischemia     Stenosis of intracranial portions of left internal carotid artery 11/18/2014    Thyroid disease      Past Surgical History:   Procedure Laterality Date    APPENDECTOMY      BACK SURGERY  10/26/15    L3 kypho    CARDIAC CATHETERIZATION  08/10/2020    Dr Mekhi Edouard    CAROTID-SUBCLAVIAN BYPASS GRAFT Left 5/19/2016    CHOLECYSTECTOMY      CORONARY ANGIOPLASTY WITH STENT PLACEMENT  08/10/2020    Dr Mekhi Edouard Prox LAD rotablator 1.5/1.75  [employer] Alfredo 3.0x15  3.0x15    ECHOCARDIOGRAM LIMITED  12/3/2014         EYE SURGERY      FIXATION KYPHOPLASTY      FRACTURE SURGERY Right     knee    HYSTERECTOMY      JOINT REPLACEMENT Bilateral     hips    OTHER SURGICAL HISTORY  12/2/14    reposition pacer lead and katty    OTHER SURGICAL HISTORY  5/5/2015    kyphoplasty T8    OTHER SURGICAL HISTORY  2/16/2016    lumbar myelogram    PACEMAKER INSERTION Left 11-    Dr. Cruz Ro Left 3/13/2018    LEFT HIP TOTAL JOINT  ARTHROPLASTY AND REMOVAL HARDWARE LEFT ACETABULUM  ---Junito Espinal--- performed by Mariya Horowitz MD at [facility] OR     Family History   Problem Relation Age of Onset    Heart Disease Mother     Heart Disease Father      Social History     Tobacco Use    Smoking status: Current Every Day Smoker     Packs/day: 0.25     Years: 10.00     Pack years: 2.50     Types: Cigarettes     Last attempt to quit: 6/15/2017     Years since quitting: 3.4    Smokeless tobacco: Never Used   Substance Use Topics    Alcohol use: Yes     Comment: socially                           Review of Systems   Constitutional: Negative for fever and chills. HENT: Negative for ear pain, sore throat and sinus pressure. Eyes: Negative for pain, discharge and redness. Respiratory: Negative for cough, shortness of breath and wheezing. Cardiovascular: Negative for chest pain. Gastrointestinal: Negative for nausea, vomiting, diarrhea and abdominal distention. Genitourinary: Negative for dysuria and frequency. Musculoskeletal: Negative for back pain and arthralgias. All other systems reviewed and negative. CC: Left shoulder injury    S: Mary Kate Ayala is a 65 y.o. who was admitted due to left shoulder d/l which was reduced in ER. She typically uses a walker for ambulation and lives alone. Unable to care for herself due to recent injury. RHD. Injury occurred yesterday evening, +EtOH. Denies any other injuries or regions of pain besides left rib cage. Physical Exam  BP (!) 132/0 Comment: 132/doppler   Pulse 78   Temp 98.1 °F (36.7 °C) (Oral)   Resp 22   Ht 5' 4\" (1.626 m)   Wt 184 lb 11.2 oz (83.8 kg)   SpO2 93%   BMI 31.70 kg/m²   O:   CONSTITUTIONAL: awake, alert, cooperative, no apparent distress, and appears stated age  EYES: Lids and lashes normal, pupils equal, round and reactive to light, extra ocular muscles intact, sclera clear, conjunctiva normal  ENT: Normocephalic, without obvious abnormality, atraumatic, external ears without lesions, oral pharynx with moist mucus membranes  NECK: Trachea midline, skin normal  LUNGS: No increased work of breathing, good air exchange  CARDIOVASCULAR: 2+ pulses throughout and capillary Refill less than 2 seconds  ABDOMEN: soft, non-distended, non-tender and no rebound tenderness or guarding  NEUROLOGIC: Awake, alert, oriented to name, place and time.  Cranial nerves II-XII are grossly intact. Motor is 5 out of 5 bilaterally. Sensory is intact. Left Upper Extremity Exam:  Pateint in no immobilization, states she took it off as it was hurting her  Skin intact, moderate swelling about shoulder  TTP about shoulder and right rib cage, no crepitus  Unable to ROM shoulder due to pain  AROM elbow wrist and hand c/o pain  Palpable distal pulses, cap refill brisk in all digits. Demonstrates active range of motion of all digits. Motor function intact to anterior interosseous/posterior interosseous/ulnar distributions to hand. Sensation intact to touch in radial/ulnar/median/axillary nerve distributions   Compartments supple throughout arm and forearm. Xrays Reviewed  Left anterior shoulder dislocation with interval postreduction    ASSESSMENT:  Left shoulder dislocation, s/p CR    PLAN:   Discussed with nurse patient must remain immobilized in her immobilizer or a sling  NWB LUE  Can f/u in office in 2-3 w     Electronically Signed By  Grover Matute DO  11/27/20    NOTE: This report was transcribed using voice recognition software.  Every effort was made to ensure accuracy; however, inadvertent computerized transcription errors may be present

## 2020-11-27 NOTE — ED NOTES
End tidal machine was not working. It stated warming up. MD Tati Schmitz stated it is ok and we will keep going.      Sandra Robison RN  11/26/20 1944

## 2020-11-27 NOTE — PROGRESS NOTES
Physical Therapy    Facility/Department: [facility] MED SURG  Initial Assessment    NAME: Nik Torres  : 1950  MRN: [id number]    Date of Service: 2020      Attending Provider:  Sarbjit Roberts MD    Evaluating PT:  Xiomara Tierney, P.T. Room #:  2253/2771-  Diagnosis:  L shoulder dislocation, fell at home on L side  Imaging: X-ray shows possible L elbow fx  Procedure: L shoulder reduction 20  Precautions:  FALLS, confusion, sling LUE    SUBJECTIVE:  Per chart:   Pt lives alone in a 1 floor ground level apt with no stairs and no rail to enter. Pt ambulated with ww PTA. OBJECTIVE:   Initial Evaluation  Date: 20 Treatment Short Term/ Long Term   Goals   Was pt agreeable to Eval/treatment? yes     Does pt have pain? C/o R hip pain, L shoulder, elbow, and rib pain too     Bed Mobility  Rolling: MIN A  Supine to sit: MAX A  Sit to supine: MAX A  Scooting: MAX A  Independent    Transfers Sit to stand: NA  Stand to sit: NA  Stand pivot: NA  SBA   Ambulation   NA  50 feet with AAD SBA   Stair negotiation: ascended and descended NA  NA   AM-PAC 6 Clicks        BLE ROM is WFL. BLE strength is grossly 3-/5 to 4-/5. Balance: sitting is MAX A due to severe posterior lean that she was unable to correct with positioning, VCs, or tactile cues. Endurance: fair-    Patient education  Pt educated on leaning forward while sitting EOB to avoid falling. Patient response to education:   Pt verbalized understanding Pt demonstrated skill Pt requires further education in this area   yes no yes     ASSESSMENT:    Comments:  Pt was found in bed L 1/4 turn and c/o R hip pain. She was agreeable to PT and trying to move. She states she can not walk without a ww and I informed her of her NWB on LUE so for now a ww is out of the question. Pt verbalized understanding.   She sat up to EOB with MAX A and while positioning her hips to get her squared up on the bed and feet near the floor she began leaning posteriorly with a very strong push and was unable to correct with VCs, positioning, or tactile cues. She at this point was assisted back to supine in bed to avoid her falling backward or having her hips slide forward off of the EOB. Pt was repositioned L 1/4 turn as found and HOB was raised to comfortable position. Pt became impulsive and confused while sitting EOB and to the point it was unsafe to have pt sitting EOB. She intermittently c/o R hip pain and has visible bruise R buttock. Pt was left L 1/4 turn as found with call light left by patient. Chair/bed alarm: bed alarm was re-activated. Pt's/ family goals   1. To get better. Patient and or family understand(s) diagnosis, prognosis, and plan of care. PLAN OF CARE:    Current Treatment Recommendations      [x] Strengthening     [x] ROM   [x] Balance Training   [x] Endurance Training   [x] Transfer Training   [x] Gait Training   [] Stair Training   [] Positioning   [] Safety and Education Training   [] Patient/Caregiver Education   [] HEP  [] Other     PT care will be provided in accordance with the objectives noted above. Exercises and functional mobility practice will be used as well as appropriate assistive devices or modalities to obtain goals. Patient and family education will also be administered as needed. Frequency of treatments: 2-5x/week x 1-2 weeks. Time in  13:10  Time out  13:30    Evaluation Time includes thorough review of current medical information, gathering information on past medical history/social history and prior level of function, completion of standardized testing/informal observation of tasks, assessment of data and education on plan of care and goals.     CPT codes:  [x] Low Complexity PT evaluation 75552  [] Moderate Complexity PT evaluation 54883  [] High Complexity PT evaluation 83613  [] PT Re-evaluation 25604  [] Gait training 63110 ** minutes  [] Manual therapy 70691 ** minutes  [] Therapeutic activities 71901 ** minutes  [] Therapeutic exercises 27398 ** minutes  [] Neuromuscular reeducation 27167 ** minutes     Maik Byrd., P.T.   License Number: [id number]

## 2020-11-28 PROBLEM — J96.01 ACUTE HYPOXEMIC RESPIRATORY FAILURE (HCC): Status: ACTIVE | Noted: 2020-01-01

## 2020-11-28 NOTE — PROGRESS NOTES
Chief Complaint:  Chief Complaint   Patient presents with    Fall     left shoulder     Shoulder dislocation, left, subsequent encounter     Subjective:    She is feeling extremely short of breath. She also feels very sharp pain under her left breast    Objective:    BP (!) 104/51   Pulse 85   Temp 98.1 °F (36.7 °C) (Oral)   Resp 18   Ht 5' 4\" (1.626 m)   Wt 191 lb 3.2 oz (86.7 kg)   SpO2 90%   BMI 32.82 kg/m²     Current medications that patient is taking have been reviewed. General appearance: Very frail appearing female, uncomfortable appearing  HEENT: AT/NC, MMM  Neck: FROM, supple  Lungs: WOB elevated with diffuse severe wheezing and rhonchi audible from across the room.   CV: RRR, no MRGs  Chest wall: Sharp tenderness under left breast  Abdomen: Soft, non-tender; no masses or HSM, +BS  Extremities: No peripheral edema or digital cyanosis  Skin: no rash, lesions or ulcers  Psych: Calm and cooperative  Neuro: Alert and interactive, nonfocal    Labs:  CBC with Differential:    Lab Results   Component Value Date    WBC 14.8 11/27/2020    RBC 3.77 11/27/2020    HGB 11.8 11/27/2020    HCT 36.7 11/27/2020     11/27/2020    MCV 97.3 11/27/2020    MCH 31.3 11/27/2020    MCHC 32.2 11/27/2020    RDW 13.5 11/27/2020    SEGSPCT 89 03/10/2014    BANDSPCT 1 05/19/2016    LYMPHOPCT 9.9 11/27/2020    MONOPCT 6.2 11/27/2020    BASOPCT 0.3 11/27/2020    MONOSABS 0.91 11/27/2020    LYMPHSABS 1.46 11/27/2020    EOSABS 0.04 11/27/2020    BASOSABS 0.05 11/27/2020     CMP:    Lab Results   Component Value Date     11/27/2020    K 4.6 11/27/2020    CL 99 11/27/2020    CO2 27 11/27/2020    BUN 10 11/27/2020    CREATININE 0.6 11/27/2020    GFRAA >60 11/27/2020    LABGLOM >60 11/27/2020    GLUCOSE 142 11/27/2020    GLUCOSE 106 12/15/2011    PROT 5.9 11/27/2020    LABALBU 3.3 11/27/2020    CALCIUM 8.2 11/27/2020    BILITOT 0.5 11/27/2020    ALKPHOS 68 11/27/2020    AST 22 11/27/2020    ALT 12 11/27/2020 Assessment/Plan:  Principal Problem:    Shoulder dislocation, left, subsequent encounter  Active Problems:    History of hypertension    HLD (hyperlipidemia)    Tobacco abuse    COPD (chronic obstructive pulmonary disease) ([facility])    Coronary artery disease involving native coronary artery without angina pectoris    Neuropathy    CHF (congestive heart failure) ([facility])    Acute hypoxemic respiratory failure ([facility])  Resolved Problems:    * No resolved hospital problems.  *       Clearly is having a COPD exacerbation with severe wheezing, dyspnea, and hypoxemia    Start IV solumedrol and scheduled bronchodilators with doxycycline    D-Dimer > 900 - will r/o PE as well as rib fracture with CTA chest    Sling and PT for shoulder dislocation    Nicotine patch    Requires continued inpatient level of care     Humble Gaitan    5:54 PM  11/28/2020  Cell: [phone]

## 2020-11-28 NOTE — PROGRESS NOTES
Occupational Therapy  OCCUPATIONAL THERAPY INITIAL EVALUATION      Date:2020  Patient Name: Olivia Gray  MRN: [id number]  : 1950  Room: [address] [place], [place] [address]A    Referring Provider: Melissa CAMPOS      Evaluating OT: Daisy Bhatt OTR/L [id number]    AM-PAC Daily Activity Raw Score: 10/24    Recommended Adaptive Equipment:  TBD     Diagnosis: L shoulder dislocation,L shoulder reduction 20  s/p fall   Per ortho note:   remain immobilized in her immobilizer or a sling, NWB LUE.  2020  Pertinent Medical History:  Multiple closed L side rib fractures,  PVD, CHF, COPD, Closed fracture thoracic vertebra,pacemaker , lumbar stenosis, osteoporosis   Precautions:  Falls, NWB L UE,alarm , O2     Home Living: Pt lives alone, aprtment. No steps.  Ambulated with walker      Pain Level: L UE, R side rib/back  pain ;   Cognition: alert, oriented  and conversing throughout session    Memory:  Fair    Sequencing:  Fair    Problem solving:  Fair    Judgement/safety:  [address]     Patient agreeable to OOB activity and wanting \" to sit up or move around \" states she was in uncomfortable position in bed      Functional Assessment:   Initial Eval Status  Date: 20 Treatment Status  Date: STGs = LTGs  Time frame: 10/14 days   Feeding Set-up   Upon entry patient set up with lunch try   Patient reporting not feeling  very hungry just thirsty      Grooming Max A,seated   Min A   UB Dressing Max A   Min A   LB Dressing Dependent   Mod A    Bathing Max  A  Min A    Toileting Dependent      Bed Mobility  Max A  Supine <>sit      Functional Transfers NT   Patient observed to have increase pain (R side, L UE) and SOB   Patient with O2 on entire session   Educated on deep breathing tech   O2 sats ranged to low 80s to mid/high 80s (increase O2 to 6 L)    Patient left resting in bed, comfortable position, sling on O2 on 6 L , conversing,   O2 sat 90 %    Therapist informed nurse of patient O2 status     Min  A Functional Mobility NT   Returned to bed   Min A with fair + tolerance    Balance Sitting:     Static:  Min A - briefly sat EOB - returned to bed for safety and comfort     Supervision sititng balance   with good tolerance   Min A standing    Activity Tolerance Poor with very light activity   Good with ADL activity    Visual/  Perceptual Glasses: yes                 Hand Dominance right    Strength ROM Additional Info:    RUE  3+/5  WFL good  and wfl FMC/dexterity noted during ADL tasks       LUE  L UE NWB   Patient initially had L UE out of sling when entering room   Therapist repositioned and educated on importance of wearing sling and positioning       Hearing: WFL   Sensation:  No c/o numbness or tingling   Tone: WFL     Comments: Upon arrival patient lying in bed . At end of session, patient returned to bed  with call light and phone within reach, all lines and tubes intact. ALARM  ON      Overall patient demonstrated  decreased independence and safety during completion of ADL/functional transfer/mobility tasks. Pt would benefit from continued skilled OT to increase safety and independence with completion of ADL/IADL tasks for functional independence and quality of life.        Assessment of current deficits   Functional mobility [x]  ADLs [x] Strength [x]  Cognition []  Functional transfers  [x] IADLs [x] Safety Awareness [x]  Endurance [x]  Fine Motor Coordination [] Balance [x] Vision/perception [] Sensation []   Gross Motor Coordination [] ROM [] Delirium []                  Motor Control []       Plan of Care: 1-3 days/week for 1-2 weeks PRN   [x]ADL retraining/adapted techniques and AE recommendations to increase functional independence within precautions                    [x]Energy conservation techniques to improve tolerance for selfcare routine   [x]Functional transfer/mobility training/DME recommendations for increased independence, safety and fall prevention         [x]Patient/family education to increase safety and functional independence             [x]Environmental modifications for safe mobility and completion of ADLs                             []Cognitive retraining ex's to improve problem solving skills & safe participation in ADLs/IADLs     []Sensory re-education techniques to improve extremity awareness, maintain skin integrity and improve hand function                             []Visual/Perceptual retraining  to improve body awareness and safety during transfers and ADLs  []Splinting/positioning needs to maintain joint/skin integrity and prevent contractures  [x]Therapeutic activity to improve functional performance during ADLs. [x]Therapeutic exercise to improve tolerance and functional strength for ADLs   [x]Balance retraining/tolerance tasks for facilitation of postural control with dynamic challenges during ADLs . []Neuromuscular re-education: facilitation of righting/equilibrium reactions, midline orientation, scapular stability/mobility, Normalization muscle     tone and facilitation active functional movement/Attention                         []Delirium prevention/treatment    [x]Positioning to improve functional independence  []Other:       Rehab Potential:  Good for established goals     Patient / Family Goal: return home      Patient and/or family were instructed on functional diagnosis, prognosis/goals and OT plan of care. Demonstrated good  understanding.      Eval Complexity: Low      Time In:1300  Time Out: 1320        Min Units   OT Eval Low 97165  x 1   OT Eval Medium 64633      OT Eval High 72668       OT Re-Eval [id number]       Therapeutic Ex 34875       Therapeutic Activities 69390       ADL/Self Care 60733       Orthotic Management 53793       Neuro Re-Ed 61230       Non-Billable Time          Evaluation Time includes thorough review of current medical information, gathering information on past medical history/social history and prior level of function, completion of standardized testing/informal observation of tasks, assessment of data and education on plan of care and goals.             Milady Isaac OTR/L [id number]

## 2020-11-29 PROBLEM — J18.9 PNEUMONIA: Status: ACTIVE | Noted: 2020-01-01

## 2020-11-29 NOTE — PROGRESS NOTES
Physical Therapy  Facility/Department: [address] MED SURG  Daily Treatment Note  NAME: Sukh Torres  : 1950  MRN: [id number]    Date of Service: 2020    Patient Diagnosis(es): The primary encounter diagnosis was Contusion of head, initial encounter. A diagnosis of Anterior shoulder dislocation, left, initial encounter was also pertinent to this visit. 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 has a past surgical history that includes Hysterectomy; Appendectomy; Cholecystectomy; Pacemaker insertion (Left, 2014); other surgical history (14); ECHO Limited (12/3/2014); other surgical history (2015); back surgery (10/26/15); other surgical history (2016); Carotid-subclavian Bypass Graft (Left, 2016); eye surgery; pr total hip arthroplasty (Left, 3/13/2018); Fixation Kyphoplasty; joint replacement (Bilateral); fracture surgery (Right); Cardiac catheterization (08/10/2020); and Coronary angioplasty with stent (08/10/2020). Attending Provider:  Milena Boland MD     Evaluating PT:  Carmen Pedroza.  Dorcas Viramontes, P.T.     Room #:  7469/1537-B  Diagnosis:  L shoulder dislocation, fell at home on L side  Imaging: X-ray shows possible L elbow fx  Procedure: L shoulder reduction 20  Precautions:  FALLS, confusion, sling LUE, droplet plus isolation     SUBJECTIVE:  Per chart:   Pt lives alone in a 1 floor ground level apt with no stairs and no rail to enter. Pt ambulated with ww PTA.     OBJECTIVE:    Initial Evaluation  Date: 11/27/20 Treatment  11/29/20 Short Term/ Long Term   Goals   Was pt agreeable to Eval/treatment? yes  yes     Does pt have pain? C/o R hip pain, L shoulder, elbow, and rib pain too L shoulder and side pain      Bed Mobility  Rolling: MIN A  Supine to sit: MAX A  Sit to supine: MAX A  Scooting: MAX A  rolling min assist  Supine to sit mod assist  Sit to supine mod assist  Scooting to edge of bed max assist Independent    Transfers Sit to stand: NA  Stand to sit: NA  Stand pivot: NA  sit to stand max asssit SBA   Ambulation   NA   50 feet with AAD SBA   Stair negotiation: ascended and descended NA   NA   AM-PAC 6 Clicks 5/00 22/08          Patient education  Pt was educated on NWB L UE    Patient response to education:   Pt verbalized understanding Pt demonstrated skill Pt requires further education in this area   yes With cues yes     Additional Comments/treatment: Pt in bed and agreeable to PT session. Pt wanted to try to get up to left side of bed. Cues to maintain NWB. Once seated edge of bed pt max assist to scoot to edge of bed. Pt's sling adjusted, pt states it is too tight this way, prefers to let arm hang down in sling. Pt education on importance of proper placement of sling. Sit <> stand transfer x5. Pt required seated rest after each attempt. Pt very short of breath with mobility. Pt performed seated ankle pumps and LAQ seated edge of bed. Pt returned to bed at end of session. Pt was left in bed with call light left by patient. Time in: 1100  Time out: 1125    Total treatment time 25 minutes    Pt is making  progress toward established Physical Therapy goals. Continue with physical therapy current plan of care.     Vi PT [id number]      CPT codes:  [] Low Complexity PT evaluation 59376  [] Moderate Complexity PT evaluation 37540  [] High Complexity PT evaluation 22541  [] PT Re-evaluation [id number]  [] Gait training 81549  minutes  [] Manual therapy 58769    minutes  [x] Therapeutic activities 52201  15  minutes  [x] Therapeutic exercises 62511   10  minutes  [] Neuromuscular reeducation 97254     minutes

## 2020-11-30 NOTE — CARE COORDINATION
Per Evelina at [facility]- they are able to accept. Pt will need precert to admit, however [address] stated that pt currently qualifies as a click 6. Will need continued PT/OT evals for precert/click 6 status/puposes. [address] noted that pt's O2 will need weaned before she can admit- [facility] can only accommodate up to 5 L O2. Pt Tested negative Covid-19 on 11/27 and 11/19.

## 2020-11-30 NOTE — PROGRESS NOTES
Chief Complaint:  Chief Complaint   Patient presents with    Fall     left shoulder     Shoulder dislocation, left, subsequent encounter     Subjective:    Her breathing feels a bit better today but she still has a lot of left lower chest pain with movement and deep breath, and is EXTREMELY anxious    Objective:    BP (!) 120/56   Pulse 87   Temp 98.1 °F (36.7 °C) (Oral)   Resp 18   Ht 5' 4\" (1.626 m)   Wt 192 lb 8 oz (87.3 kg)   SpO2 90%   BMI 33.04 kg/m²     Current medications that patient is taking have been reviewed.     General appearance: Very frail appearing female, comfortably appearing  HEENT: AT/NC, MMM  Neck: FROM, supple  Lungs: WOB much improved with mild wheezing  CV: RRR, no MRGs  Chest wall: Sharp tenderness under left breast  Abdomen: Soft, non-tender; no masses or HSM, +BS  Extremities: No peripheral edema or digital cyanosis  Skin: no rash, lesions or ulcers  Psych: ANXIOUS  Neuro: Alert and interactive, nonfocal    Labs:  CBC with Differential:    Lab Results   Component Value Date    WBC 16.4 11/29/2020    RBC 3.44 11/29/2020    HGB 10.6 11/29/2020    HCT 33.7 11/29/2020     11/29/2020    MCV 98.0 11/29/2020    MCH 30.8 11/29/2020    MCHC 31.5 11/29/2020    RDW 13.3 11/29/2020    SEGSPCT 89 03/10/2014    BANDSPCT 1 05/19/2016    LYMPHOPCT 3.7 11/29/2020    MONOPCT 2.2 11/29/2020    BASOPCT 0.1 11/29/2020    MONOSABS 0.36 11/29/2020    LYMPHSABS 0.61 11/29/2020    EOSABS 0.00 11/29/2020    BASOSABS 0.01 11/29/2020     CMP:    Lab Results   Component Value Date     11/29/2020    K 4.4 11/29/2020    K 4.6 11/27/2020    CL 95 11/29/2020    CO2 28 11/29/2020    BUN 11 11/29/2020    CREATININE 0.5 11/29/2020    GFRAA >60 11/29/2020    LABGLOM >60 11/29/2020    GLUCOSE 225 11/29/2020    GLUCOSE 106 12/15/2011    PROT 6.5 11/29/2020    LABALBU 3.6 11/29/2020    CALCIUM 8.7 11/29/2020    BILITOT 0.4 11/29/2020    ALKPHOS 76 11/29/2020    AST 26 11/29/2020    ALT 20 11/29/2020 Assessment/Plan:  Principal Problem:    Shoulder dislocation, left, subsequent encounter  Active Problems:    History of hypertension    HLD (hyperlipidemia)    Tobacco abuse    COPD (chronic obstructive pulmonary disease) ([facility])    Coronary artery disease involving native coronary artery without angina pectoris    Neuropathy    CHF (congestive heart failure) ([facility])    Acute hypoxemic respiratory failure ([facility])    Pneumonia  Resolved Problems:    * No resolved hospital problems. *       She is having a COPD exacerbation with wheezing, dyspnea, and hypoxemia    CTA yesterday personally reviewed, shows diffuse GGOs. Check procal and recheck COVID    Continue IV solumedrol and scheduled bronchodilators with doxycycline    Procal is quite delayed -- empirically start ceftriaxone as well    Lidocaine patch for chest pain.   No rib fracture seen on CT    Sling and PT for shoulder dislocation    Nicotine patch    Requires continued inpatient level of care     Humble Thomas    7:22 PM  11/29/2020  Cell: [phone]

## 2020-11-30 NOTE — PROGRESS NOTES
Subjective:    Patient seen and examined at bedside, significant improvement in shortness of breath. Is still on 9 L via nasal cannula. Has a history of COPD, previously on oxygen. \"Quit smoking\" on Thursday    Objective:    BP (!) 115/53 Comment: Nurse is aware  Pulse 76   Temp 98.3 °F (36.8 °C) (Oral)   Resp 18   Ht 5' 4\" (1.626 m)   Wt 182 lb (82.6 kg)   SpO2 94%   BMI 31.24 kg/m²     Current medications that patient is taking have been reviewed. Heart:  RRR, no murmurs, gallops, or rubs.   Lungs: Mild diffuse inspiratory crackles, no wheeze  Abd: bowel sounds present, soft, nontender, nondistended, no masses  Extrem:  No cyanosis or edema    CBC:   Lab Results   Component Value Date    WBC 17.7 11/30/2020    RBC 3.27 11/30/2020    HGB 10.1 11/30/2020    HCT 32.2 11/30/2020    MCV 98.5 11/30/2020    MCH 30.9 11/30/2020    MCHC 31.4 11/30/2020    RDW 13.4 11/30/2020     11/30/2020    MPV 10.6 11/30/2020     BMP:    Lab Results   Component Value Date     11/30/2020    K 4.3 11/30/2020    K 4.6 11/27/2020    CL 92 11/30/2020    CO2 33 11/30/2020    BUN 15 11/30/2020    LABALBU 3.2 11/30/2020    CREATININE 0.5 11/30/2020    CALCIUM 8.9 11/30/2020    GFRAA >60 11/30/2020    LABGLOM >60 11/30/2020    GLUCOSE 220 11/30/2020    GLUCOSE 106 12/15/2011        Assessment:    Patient Active Problem List   Diagnosis    History of hypertension    HLD (hyperlipidemia)    Tobacco abuse    COPD [facility]ch[facility] obstructive [facility]) [facility]    History of DVT (deep vein thrombosis)    Seizure ([facility])    SSS (sick sinus syndrome) ([address]    Pacemaker    Hypothyroid    PVD (peripheral vascular disease) ([address]    Coronary artery disease involving native coronary artery without angina pectoris    Left carotid artery occlusion    Osteoporosis    Bilateral carotid artery stenosis    Atherosclerosis of native artery of both lower extremities with intermittent claudication ([address]    Osteoarthritis    Post-traumatic osteoarthritis of left hip    Trauma    Multiple closed fractures of ribs of left side    Chest wall pain    Fall    Nodule of spleen    CAD in native artery    Shoulder dislocation, left, subsequent encounter    Neuropathy    Lacunar infarction ([address]    CHF (congestive heart failure) ([facility])    Acute hypoxemic respiratory failure ([facility])    Pneumonia       Plan:    1. Left anterior shoulder dislocation              Reduced in ED. Seen by ortho, NWB to left arm, f/u with ortho in 2-3 weeks              PT/OT and  for placement              Pain control  2. Lower back + hip pain - imprved   CT lumbar spine - chronic changes. No hip fractures  3. HTN - continue home anti HTN  4. HLD - statin  5.  COPD exacerbation + multifocal PNA   Continue with rocephin, doxy, duonebs, solumedrol   Wean oxygen before SNF    Nohemy Stephens    1:55 PM  11/30/2020

## 2020-11-30 NOTE — PROGRESS NOTES
Occupational Therapy  OT BEDSIDE TREATMENT NOTE      Date:2020  Patient Name: Tiffanie Love  MRN: [id number]  : 1950  Room: [address] [place] [address]A        Referring Provider: Melissa ARGUETA_GERONIMO        Evaluating OT: Lev العلي OTR/L [id number]     AM-PAC Daily Activity Raw Score: 14     Recommended Adaptive Equipment:  TBD      Diagnosis: L shoulder dislocation,L shoulder reduction 20  s/p fall   Per ortho note:   remain immobilized in her immobilizer or a sling, NWB LUE.  2020  Pertinent Medical History:  Multiple closed L side rib fractures,  PVD, CHF, COPD, Closed fracture thoracic vertebra,pacemaker , lumbar stenosis, osteoporosis   Precautions:  Falls, NWB L UE, sling, O2 high flow      Home Living: Pt lives alone, aprtment. No steps. Ambulated with walker       Pain Level: Pt with complaint of rib pain and L UE pain. Cognition: Awake and alert. Poor safety awareness. Poor follow up of restrictions. Impulsive.                      Functional Assessment:    Initial Eval Status  Date: 20 Treatment Status   STGs = LTGs  Time frame: 10/14 days   Feeding Set-up   Upon entry patient set up with lunch try   Patient reporting not feeling  very hungry just thirsty  Assist to open containers. Pt able to feed herself using R UE.       Grooming Max A,seated  Mod A   Min A   UB Dressing Max A  Mod A   Min A   LB Dressing Dependent  Mod A   Mod A    Bathing Max  A   Min A    Toileting Dependent  Max A        Bed Mobility  Max A  Supine <>sit  Mod A supine to sit       Functional Transfers NT   Patient observed to have increase pain (R side, L UE) and SOB   Patient with O2 on entire session   Educated on deep breathing tech   O2 sats ranged to low 80s to mid/high 80s (increase O2 to 6 L)     Patient left resting in bed, comfortable position, sling on O2 on 6 L , conversing,   O2 sat 90 %     Therapist informed nurse of patient O2 status     Mod A sit to stand with poor safety awareness. Increased anxiety when standing and pt grabbing at therapist with Rambo Whitley. Poor carry over of NWB to L UE during transfer to chair.    Min  A    Functional Mobility NT   Returned to bed    Min A with fair + tolerance    Balance Sitting:     Static:  Min A - briefly sat EOB - returned to bed for safety and comfort     Sit balance CGA. Pt continually attempting to use L UE for support on the bed for balance.  Supervision sititng balance   with good tolerance   Min A standing    Activity Tolerance Poor with very light activity   poor  Good with ADL activity        Comments:  Decreased O2 saturation during transfer. Pt using L UE during functional activity despite verbal and tactile cues to keep L UE in sling. Sling too large and slides out of position with slightest movement. Pt with poor carry over of positioning. Pt remained in chair at end of the session with alarm activated. Education/treatment:  ADL retraining with facilitation of movement and instruction of one handed skills due to NWB/sling to L UE. Therapeutic activity to address balance, strength, and endurance for ADL and transfers. Pt education of daily orientation, L UE restrictions and transfer safety. · Pt has made  progress towards set goals.      Plan of Care: 1-3 days/week for 1-2 weeks PRN   [x]? ?ADL retraining/adapted techniques and AE recommendations to increase functional independence within precautions                    [x]? ? Energy conservation techniques to improve tolerance for selfcare routine   [x]? ? Functional transfer/mobility training/DME recommendations for increased independence, safety and fall prevention         [x]? ?Patient/family education to increase safety and functional independence             [x]? ? Environmental modifications for safe mobility and completion of ADLs                             []? ? Cognitive retraining ex's to improve problem solving skills & safe participation in ADLs/IADLs     []? ?Sensory re-education techniques to improve extremity awareness, maintain skin integrity and improve hand function                             []? ? Visual/Perceptual retraining  to improve body awareness and safety during transfers and ADLs  []? ? Splinting/positioning needs to maintain joint/skin integrity and prevent contractures  [x]? ? Therapeutic activity to improve functional performance during ADLs.                                         [x]? ? Therapeutic exercise to improve tolerance and functional strength for ADLs   [x]? ? Balance retraining/tolerance tasks for facilitation of postural control with dynamic challenges during ADLs .  []?? Neuromuscular re-education: facilitation of righting/equilibrium reactions, midline orientation, scapular stability/mobility, Normalization muscle     tone and facilitation active functional movement/Attention                         []? ? Delirium prevention/treatment    [x]? Positioning to improve functional independence    Time In: 11:07   Time Out: 11:30      Min Units   Therapeutic Ex 24445     Therapeutic Activities 75522 8 1   ADL/Self Care 32482 15 1   Orthotic Management 96303     Neuro Re-Ed 00219     Non-Billable Time     TOTAL TIMED TREATMENT [address] [facility] ESTEFANI/L 53986

## 2020-11-30 NOTE — PROGRESS NOTES
and no rail to enter. Pt ambulated with ww PTA.     OBJECTIVE:    Initial Evaluation  Date: 11/27/20 Treatment  11/30/20 Short Term/ Long Term   Goals   Was pt agreeable to Eval/treatment? yes  yes     Does pt have pain? C/o R hip pain, L shoulder, elbow, and rib pain too L shoulder and side pain      Bed Mobility  Rolling: MIN A  Supine to sit: MAX A  Sit to supine: MAX A  Scooting: MAX A  rolling min assist to R side  Supine to sit NT  Sit to supine mod assist  Scooting NT Independent    Transfers Sit to stand: NA  Stand to sit: NA  Stand pivot: NA sit to stand mod assist from bedside chair  Stand to sit: min A  Stand Pivot mod A for balance without A/D SBA   Ambulation   NA NT 50 feet with AAD SBA   Stair negotiation: ascended and descended NA   NA   AM-PAC 6 Clicks 7/48  93/26            Pt is alert and able to follow instruction  Balance: poor during pivot transfer without A/D    Pt performed therapeutic exercise of the following: NT    Patient education  Pt was educated on R UE usage to assist with transfers    Patient response to education:   Pt verbalized understanding Pt demonstrated skill Pt requires further education in this area   yes yes yes     ASSESSMENT:   Comments: Pt found in a bedside chair, has a lot of rib pain, wants to get back to bed. Student Nurse present all Rx. Pt incontinent of urine, environmental clean up required prior to transfer. Pt able to stand and WB B LE during pivot transfer, displays poor balance. Pt states fatigued from sitting in the chair. Pt was left in bed per request with call light in reach. L UE support by a pillow for comfort, B LEs elevated    Chair/bed alarm: bed alarm active    Time in 1305   Time out 1322   Total Treatment Time 17 minutes   CPT codes:     Therapeutic activities 11151 17 minutes   Therapeutic exercises 29767 0 minutes       Pt is making good progress toward established Physical Therapy goals as per transfer participation.   Continue with physical therapy current plan of care promoting increased functional mobility as able.     Paula Hinojosa PTA   License Number: [id number]

## 2020-11-30 NOTE — PLAN OF CARE
Problem: Falls - Risk of:  Goal: Will remain free from falls  Description: Will remain free from falls  Outcome: Ongoing  Goal: Absence of physical injury  Description: Absence of physical injury  Outcome: Ongoing     Problem: Skin Integrity:  Goal: Will show no infection signs and symptoms  Description: Will show no infection signs and symptoms  Outcome: Ongoing  Goal: Absence of new skin breakdown  Description: Absence of new skin breakdown  Outcome: Ongoing     Problem: Pain:  Goal: Pain level will decrease  Description: Pain level will decrease  Outcome: Ongoing  Goal: Control of acute pain  Description: Control of acute pain  Outcome: Ongoing  Goal: Control of chronic pain  Description: Control of chronic pain  Outcome: Ongoing     Problem: Anxiety:  Goal: Level of anxiety will decrease  Description: Level of anxiety will decrease  Outcome: Ongoing     Problem:  Activity:  Goal: Ability to tolerate increased activity will improve  Description: Ability to tolerate increased activity will improve  Outcome: Ongoing     Problem: Discharge Planning:  Goal: Discharged to appropriate level of care  Description: Discharged to appropriate level of care  Outcome: Ongoing

## 2020-11-30 NOTE — PATIENT CARE CONFERENCE
[facility] Quality Flow/Interdisciplinary Rounds Progress Note        Quality Flow Rounds held on November 30, 2020    Disciplines Attending:  Bedside Nurse, ,  and Nursing Unit 72 Violette Vaughn was admitted on 11/26/2020  5:48 PM    Anticipated Discharge Date:  Expected Discharge Date: 11/28/20    Disposition:    Aaron Score:  Aaron Scale Score: 18    Readmission Risk              Risk of Unplanned Readmission:        10           Discussed patient goal for the day, patient clinical progression, and barriers to discharge.   The following Goal(s) of the Day/Commitment(s) have been identified:  Diagnostics - Report Results      [facility]  November 30, 2020

## 2020-12-01 NOTE — PROGRESS NOTES
Subjective:  Patient seen and examined at bedside, sleeping comfortably. Awakens and screams in pain. Complaining of rib pain on left side. Shortness of breath slightly improved. Still on 6 L via nasal cannula. Objective:    /60   Pulse 74   Temp 97.5 °F (36.4 °C) (Oral)   Resp 20   Ht 5' 4\" (1.626 m)   Wt 195 lb (88.5 kg)   SpO2 92%   BMI 33.47 kg/m²     Current medications that patient is taking have been reviewed. Heart:  RRR, no murmurs, gallops, or rubs.   Lungs: Coarse breath sounds, decreased air entry bilaterally, no wheeze  Abd: bowel sounds present, soft, nontender, nondistended, no masses  Extrem:  No cyanosis or edema    CBC:   Lab Results   Component Value Date    WBC 17.7 11/30/2020    RBC 3.27 11/30/2020    HGB 10.1 11/30/2020    HCT 32.2 11/30/2020    MCV 98.5 11/30/2020    MCH 30.9 11/30/2020    MCHC 31.4 11/30/2020    RDW 13.4 11/30/2020     11/30/2020    MPV 10.6 11/30/2020     BMP:    Lab Results   Component Value Date     11/30/2020    K 4.3 11/30/2020    K 4.6 11/27/2020    CL 92 11/30/2020    CO2 33 11/30/2020    BUN 15 11/30/2020    LABALBU 3.2 11/30/2020    CREATININE 0.5 11/30/2020    CALCIUM 8.9 11/30/2020    GFRAA >60 11/30/2020    LABGLOM >60 11/30/2020    GLUCOSE 220 11/30/2020    GLUCOSE 106 12/15/2011        Assessment:    Patient Active Problem List   Diagnosis    History of hypertension    HLD (hyperlipidemia)    Tobacco abuse    COPD [facility]ch[facility]) ([facility]    History of DVT (deep vein thrombosis)    Seizure ([facility])    SSS (sick sinus syndrome) ([address]    Pacemaker    Hypothyroid    PVD (peripheral vascular disease) ([address]    Coronary artery disease involving native coronary artery without angina pectoris    Left carotid artery occlusion    Osteoporosis    Bilateral carotid artery stenosis    Atherosclerosis of native artery of both lower extremities with intermittent claudication ([address]    Osteoarthritis    Post-traumatic osteoarthritis of left hip    Trauma    Multiple closed fractures of ribs of left side    Chest wall pain    Fall    Nodule of spleen    CAD in native artery    Shoulder dislocation, left, subsequent encounter    Neuropathy    Lacunar infarction ([address]    CHF (congestive heart failure) ([facility])    Acute hypoxemic respiratory failure ([facility])    Pneumonia       Plan:  1. Left anterior shoulder dislocation              Reduced in ED.             Seen by ortho, NWB to left arm, f/u with ortho in 2-3 weeks              PT/OT and  for placement              Pain control  2. Lower back + hip pain - improved              CT lumbar spine - chronic changes. No hip fractures  3. HTN - continue home anti HTN  4. HLD - statin  5.  COPD exacerbation + multifocal PNA              Continue with rocephin, doxy, duonebs, solumedrol            Wean oxygen        Milton Carranza    1:31 PM  12/1/2020

## 2020-12-01 NOTE — PROGRESS NOTES
Occupational Therapy  OT BEDSIDE TREATMENT NOTE      Date:2020  Patient Name: Rao Garcia  MRN: [id number]  : 1950  Room: [address] [place], [place] [address]        Referring Provider: Melissa Shoemaker APRN_CNP        Evaluating OT: Jared Temple OTR/L [id number]     AM-PAC Daily Activity Raw Score: 14     Recommended Adaptive Equipment:  TBD      Diagnosis: L shoulder dislocation,L shoulder reduction 20  s/p fall   Per ortho note:   remain immobilized in her immobilizer or a sling, NWB LUE.  2020  Pertinent Medical History:  Multiple closed L side rib fractures,  PVD, CHF, COPD, Closed fracture thoracic vertebra,pacemaker , lumbar stenosis, osteoporosis   Precautions:  Falls, NWB L UE, sling, O2 high flow      Home Living: Pt lives alone, aprtment. No steps. Ambulated with walker       Pain Level: Pt with complaint of rib pain and L UE pain. Cognition: Awake and alert. Poor safety awareness. Poor follow up of restrictions. Impulsive.                      Functional Assessment:    Initial Eval Status  Date: 20 Treatment Status   STGs = LTGs  Time frame: 10/14 days   Feeding Set-up   Upon entry patient set up with lunch try   Patient reporting not feeling  very hungry just thirsty       Grooming Max A,seated  Mod A   Min A   UB Dressing Max A  Max A to reposition sling. Min A   LB Dressing Dependent    Mod A    Bathing Max  A   Min A    Toileting Dependent  Max A        Bed Mobility  Max A  Supine <>sit  Mod A supine to sit       Functional Transfers NT   Patient observed to have increase pain (R side, L UE) and SOB   Patient with O2 on entire session   Educated on deep breathing tech   O2 sats ranged to low 80s to mid/high 80s (increase O2 to 6 L)     Patient left resting in bed, comfortable position, sling on O2 on 6 L , conversing,   O2 sat 90 %     Therapist informed nurse of patient O2 status     Max A sit to stand with poor safety awareness. Pt pushing chair backwards during attempts to stand. improve body awareness and safety during transfers and ADLs  []? ? Splinting/positioning needs to maintain joint/skin integrity and prevent contractures  [x]? ? Therapeutic activity to improve functional performance during ADLs.                                         [x]? ? Therapeutic exercise to improve tolerance and functional strength for ADLs   [x]? ? Balance retraining/tolerance tasks for facilitation of postural control with dynamic challenges during ADLs .  []?? Neuromuscular re-education: facilitation of righting/equilibrium reactions, midline orientation, scapular stability/mobility, Normalization muscle     tone and facilitation active functional movement/Attention                         []? ? Delirium prevention/treatment    [x]? Positioning to improve functional independence    Time In: 8:37   Time Out: 8:50     Min Units   Therapeutic Ex 79670     Therapeutic Activities [id number] 8    ADL/Self Care 57225 5    Orthotic Management [id number]     Neuro Re-Ed 82825     Non-Billable Time     TOTAL TIMED TREATMENT 13 [address] [facility] ESTEFANI/L 94552

## 2020-12-01 NOTE — PROGRESS NOTES
Physical Therapy  Facility/Department: [address] MED SURG  Treatment Note  NAME: Cindi Torres  : 1950  MRN: [id number]    Date of Service: 2020    Patient Diagnosis(es): The primary encounter diagnosis was Contusion of head, initial encounter. A diagnosis of Anterior shoulder dislocation, left, initial encounter was also pertinent to this visit. 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 has a past surgical history that includes Hysterectomy; Appendectomy; Cholecystectomy; Pacemaker insertion (Left, 2014); other surgical history (14); ECHO Limited (12/3/2014); other surgical history (2015); back surgery (10/26/15); other surgical history (2016); Carotid-subclavian Bypass Graft (Left, 2016); eye surgery; pr total hip arthroplasty (Left, 3/13/2018); Fixation Kyphoplasty; joint replacement (Bilateral); fracture surgery (Right); Cardiac catheterization (08/10/2020); and Coronary angioplasty with stent (08/10/2020). Attending Provider:  Stephanie Gupta MD     Evaluating PT:  Junaid Stallworth, P.TTrin[place]     Room #:  9343/4412-C  Diagnosis:  L shoulder dislocation, fell at home on L side  Imaging: X-ray shows possible L elbow fx  Procedure: L shoulder reduction 20  Precautions:  FALLS, confusion, sling LUE     SUBJECTIVE:  Per chart:   Pt lives alone in a 1 floor ground level apt with no stairs and no rail to enter.   Pt ambulated with ww PTA.     OBJECTIVE:    Initial Evaluation  Date: 11/27/20 Treatment  12/1/20 Short Term/ Long Term   Goals   Was pt agreeable to Eval/treatment? yes  yes     Does pt have pain? C/o R hip pain, L shoulder, elbow, and rib pain too L shoulder and side pain      Bed Mobility  Rolling: MIN A  Supine to sit: MAX A  Sit to supine: MAX A  Scooting: MAX A Rolling NT  Supine to sit NT  Sit to supine mod assist LE support  Scooting NT Independent    Transfers Sit to stand: NA  Stand to sit: NA  Stand pivot: NA sit to stand mod assist from bedside chair  Stand to sit: min A  Stand Pivot min A for balance without A/D SBA   Ambulation   NA NT 50 feet with AAD SBA   Stair negotiation: ascended and descended NA   NA   AM-PAC 6 Clicks 1/78  44/66            Pt is alert and able to follow instruction  Balance: poor during pivot transfer without A/D    Pt performed therapeutic exercise of the following: NT    Patient education  Pt was educated on R UE usage to assist with transfers    Patient response to education:   Pt verbalized understanding Pt demonstrated skill Pt requires further education in this area   yes yes yes     ASSESSMENT:   Comments: Pt found in a bedside chair, states fatigued, wants to get back to bed. Pt able to stand and WB B LE during pivot transfer, displays poor balance. Gait attempted, Pt states unable. Pt short of breath during pivot bed to chair. Pt on 6 L 02 NC all rx, saturation 90% at rest, decreased to 78% after brief activity. Pt was left in bed per request with call light in reach. L UE support by a pillow for comfort, B LEs elevated    Chair/bed alarm: bed alarm active    Time in 0958   Time out 1014   Total Treatment Time 16 minutes   CPT codes:     Therapeutic activities 19304 16 minutes   Therapeutic exercises 82261 0 minutes       Pt is showing fair progress toward established Physical Therapy goals as per less assist with transfer participation.   Continue with physical therapy current plan of care promoting increased functional mobility as able.     [facility] PTA   License Number: [id number]

## 2020-12-02 NOTE — PROGRESS NOTES
Physical Therapy  Facility/Department: [address] MED SURG  Treatment Note  NAME: Mono Torres  : 1950  MRN: [id number]    Date of Service: 2020    Patient Diagnosis(es): The primary encounter diagnosis was Contusion of head, initial encounter. A diagnosis of Anterior shoulder dislocation, left, initial encounter was also pertinent to this visit. 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 has a past surgical history that includes Hysterectomy; Appendectomy; Cholecystectomy; Pacemaker insertion (Left, 2014); other surgical history (14); ECHO Limited (12/3/2014); other surgical history (2015); back surgery (10/26/15); other surgical history (2016); Carotid-subclavian Bypass Graft (Left, 2016); eye surgery; pr total hip arthroplasty (Left, 3/13/2018); Fixation Kyphoplasty; joint replacement (Bilateral); fracture surgery (Right); Cardiac catheterization (08/10/2020); and Coronary angioplasty with stent (08/10/2020). Attending Provider:  Helene Salazar MD     Evaluating PT:  Yris Apodaca.  Mini Hyatt., P.T.     Room #:  6397/2574-L  Diagnosis:  L shoulder dislocation, fell at home on L side  Imaging: X-ray shows possible L elbow fx  Procedure: L shoulder reduction 20  Precautions:  FALLS, confusion, sling LUE, 15 L HF NC     SUBJECTIVE:  Per chart:   Pt lives alone in a 1 floor ground level apt with no stairs and no rail to enter.  Pt ambulated with ww PTA.     OBJECTIVE:    Initial Evaluation  Date: 11/27/20 Treatment  12/2/2020 Short Term/ Long Term   Goals   Was pt agreeable to Eval/treatment? yes  yes     Does pt have pain? C/o R hip pain, L shoulder, elbow, and rib pain too L shoulder and side pain      Bed Mobility  Rolling: MIN A  Supine to sit: MAX A  Sit to supine: MAX A  Scooting: MAX A Rolling NT  Supine to sit NT  Sit to supine mod assist LE support  Scooting NT Independent    Transfers Sit to stand: NA  Stand to sit: NA  Stand pivot: NA sit to stand mod assist from bedside chair  Stand to sit: min A  Stand Pivot min A for balance without A/D SBA   Ambulation   NA 8 small steps during pivot transfer min A 50 feet with AAD SBA   Stair negotiation: ascended and descended NA   NA   AM-PAC 6 Clicks 7/47  42/73            Pt is alert and able to follow instruction  Balance: poor during pivot transfer without A/D    Pt performed therapeutic exercise of the following: NT    Patient education  Pt was educated on R UE usage to assist with transfers    Patient response to education:   Pt verbalized understanding Pt demonstrated skill Pt requires further education in this area   yes yes yes     ASSESSMENT:   Comments: Pt found in a bedside chair, states fatigued, wants to get back to bed. Pt able to stand and WB B LE during pivot transfer, remains with poor balance. Pt short of breath during pivot bed to chair. Pt on 15 L 02 HF NC NC all rx, saturation 96% at rest and brief activity. Pt was left in bed per request with call light in reach. L UE support by a pillow for comfort, B LEs elevated    Chair/bed alarm: bed alarm active    Time in 0958   Time out 1018   Total Treatment Time 20 minutes   CPT codes:     Therapeutic activities 03078 20 minutes   Therapeutic exercises 92473 0 minutes       Pt is showing consistent progress toward established Physical Therapy goals as per assist with transfer participation.   Continue with physical therapy current plan of care promoting increased functional mobility as able.     Dennise Sheikh PTA   License Number: [id number]

## 2020-12-02 NOTE — CARE COORDINATION
Social Work discharge planning   Noted pt's pending Covid test. Plan remains [facility] at [place] [phone] SNF at discharge. HENS done. N17 started. Ambulance forms ready in folder.   Electronically signed by Moisés Weinstein on 12/2/2020 at 3:36 PM

## 2020-12-02 NOTE — PROGRESS NOTES
Continuous pulse ox started to beep around 5:20 this morning. JULIAN Parish increased flow to 13L and then to 15L. Pt continues to range with a pulse ox in the 80's. Charge nurse, Ryan Clark notified. Monitoring patient.

## 2020-12-02 NOTE — CONSULTS
Felecia Cordon M.D.,[facility]  Joss Ha D.O., F.A.C.O.I., Duran Kc M.D. Talat Cosme M.D., Jung Dawkins M.D. Juancarlos Hudson D.O. Patient:  Daina Torres 65 y.o. female MRN: [id number]     Date of Service: 12/2/2020      PULMONARY CONSULTATION    Reason for Consultation: increased oxygen requirements  Referring Physician: Dr. Jada Moyer MD    Communication with the referring physician will be sent via the electronic medical record. Chief Complaint: shortness of breath     CODE STATUS: FULL     SUBJECTIVE:  HPI:  Donnie Grove is a 65 y.o.  female who we are asked to evaluate today for increasing oxygen requirements. She is a known patient to our practice followed by Dr. Kennon Landau for severe COPD. Last PFT testing 2019 with FVC 54% of predicted and FEV1 46% of predicted. She has not been seen in office since May 2019. She was prescribed Breo daily but has not been taking this medication. She does have smoking history and continues to smoke 3 cigarettes daily. She states that she does not wear oxygen at home. She has a past medical history significant for arthritis, falls, CAD with ptca/stenting by Dr Carlito Plascencia, carotid stenosis, CHF, closed fracture of the pelvis with total hip arthroplasty on the left 2018 with delayed healing, DVT of the leg, history of lacunar infarct, seizure, hypertension, hyperlipidemia, peripheral vascular disease with claudication, mitral regurgitation, retinal ischemia, stenosis of intracranial portions of the left internal carotid artery, thyroid disease, permanent pacemaker in situ 2014, carotid subclavian bypass graft 2016, and fixed kyphoplasty. She presented to the ED at [facility] via EMS on 11/26/2020 after suffering a fall at home when her walker gave out from under her as she was going into her apartment.   She states that she did have 3 beers with her family at Thanksgiving dinner was slightly more unsteady with her gait. She did hit her head she did not lose consciousness. She managed to get onto her bed and call for help. She did suffer a left anterior shoulder dislocation that was reduced in the emergency department. Ortho surgery recommended nonweightbearing with a sling to the left arm. She was placed on oxygen since admission to the hospital on 6 L and increased to high flow now on 15 L nasal cannula. We are asked to make further recommendations for this. D-dimer 983. Proceeded with CTA of the chest on 11/28/2020 with no evidence of pulmonary embolism. There are multifocal groundglass and interstitial airspace disease in the lungs bilaterally. This is consistent with multifocal pneumonia. There are nonspecific mediastinal and hilar nodes which may be reactive. No evidence of rib fractures per bone pathology. Chest x-ray today reveals slight progression of airspace disease within the lower lobes. She has been placed on doxycycline since 11/28/2020. Cefepime was added for HCAP coverage today based on recent imaging and increasing oxygen requirements. She is also penicillin allergic. She is starting to cough and expectorate mucus. She denies fevers or chills. Increasing WBCs 17.7. Hemoglobin 11.1, platelets 330, sodium 132, CO2 31, BUN 16, creatinine 0.5, procalcitonin on 1129 0.07. Troponin less than 0.01, D-dimer 983, proBNP 106, ALT 22, AST 25, respiratory film array including COVID-19 were both negative.     Past Medical History:   Diagnosis Date    Arthritis     asthmatic bronchitis    CAD (coronary artery disease)     Carotid stenosis     CH[facility]congestive heart failure) ([facility])     Closed fracture of pelvis with delayed healing 4/3/2017    Closed fracture of twelfth thoracic vertebra ([address] 4/3/2017    T 5,6,12    COPD (chronic obstructive pulmonary disease) ([address]     DVT of leg (deep venous thrombosis) ([address] 2010    left    Hx of blood clots     Hyperlipidemia     Hypertension     Mitral regurgitation     Trace    Prolonged emergence from general anesthesia     PVD (peripheral vascular disease) with claudication ([address] 11/18/2014    Retinal ischemia     Stenosis of intracranial portions of left internal carotid artery 11/18/2014    Thyroid disease        Past Surgical History:   Procedure Laterality Date    APPENDECTOMY      BACK SURGERY  10/26/15    L3 kypho    CARDIAC CATHETERIZATION  08/10/2020    Dr Yajaira Raymundo    CAROTID-SUBCLAVIAN BYPASS GRAFT Left 5/19/2016    CHOLECYSTECTOMY      CORONARY ANGIOPLASTY WITH STENT PLACEMENT  08/10/2020    Dr Yajaira Raymundo Prox LAD rotablator 1.5/1.75  Resolute Alfredo 3.0x15  3.0x15    ECHOCARDIOGRAM LIMITED  12/3/2014         EYE SURGERY      FIXATION KYPHOPLASTY      FRACTURE SURGERY Right     knee    HYSTERECTOMY      JOINT REPLACEMENT Bilateral     hips    OTHER SURGICAL HISTORY  12/2/14    reposition pacer lead and katty    OTHER SURGICAL HISTORY  5/5/2015    kyphoplasty T8    OTHER SURGICAL HISTORY  2/16/2016    lumbar myelogram    PACEMAKER INSERTION Left 11-     K[employer] [employer] Left 3/13/2018    LEFT HIP TOTAL JOINT  ARTHROPLASTY AND REMOVAL HARDWARE LEFT ACETABULUM  ---Yesi Robin--- performed by Sandrine Marin MD at [facility] OR       Family History   Problem Relation Age of Onset    Heart Disease Mother     Heart Disease Father        Social History:   Social History     Socioeconomic History    Marital status:       Spouse name: Not on file    Number of children: Not on file    Years of education: Not on file    Highest education level: Not on file   Occupational History    Occupation: retired- [employer],     Social Needs    Financial resource strain: Not on file    Food insecurity     Worry: Not on file     Inability: Not on file   [employer] needs     Medical: Not on file     Non-medical: Not on file   Tobacco Use    Smoking status: Current Every Day Smoker Packs/day: 0.25     Years: 10.00     Pack years: 2.50     Types: Cigarettes     Last attempt to quit: 6/15/2017     Years since quitting: 3.4    Smokeless tobacco: Never Used   Substance and Sexual Activity    Alcohol use: Yes     Comment: socially    Drug use: No    Sexual activity: Not Currently     Partners: Male   Lifestyle    Physical activity     Days per week: Not on file     Minutes per session: Not on file    Stress: Not on file   Relationships    Social connections     Talks on phone: Not on file     Gets together: Not on file     Attends Oriental orthodox service: Not on file     Active member of club or organization: Not on file     Attends meetings of clubs or organizations: Not on file     Relationship status: Not on file    Intimate partner violence     Fear of current or ex partner: Not on file     Emotionally abused: Not on file     Physically abused: Not on file     Forced sexual activity: Not on file   Other Topics Concern    Not on file   Social History Narrative    Not on file     Smoking history: The patient is an active smoker 3 cigarettes daily quit a few days ago. She quit briefly for 2 yrs then started up again this past year. Her smoking use included cigarettes. She has a 10.00 pack-year smoking history. She has never used smokeless tobacco. She reports that she does not drink alcohol or use drugs. ETOH:   reports current alcohol use. Exposures: There  is not history of TB or TB exposure. There is not asbestos or silica dust exposure. The patient reports does not have coal, foundry, quarry or [employer] exposure. Recent travel history none . There is not  history of recreational or IV drug use. There is not hot tub exposure. The patient does not have any exotic pets, turtles or exotic birds.        Vaccines:    Influenza:  Up-to-date  Pneumococcal Polysaccharide:  Due for prevnar 13  Immunization History   Administered Date(s) Administered    Influenza Vaccine, unspecified formulation 01/01/2005, 10/01/2011    Influenza Virus Vaccine 10/01/2014, 12/10/2015, 09/06/2017    Influenza, MDCK, Preservative free 10/19/2016    Pneumococcal Polysaccharide ([id number]) 01/24/2008, 10/01/2010    Tdap (Boostrix, Adacel) 05/07/2018        Home Meds: Medications Prior to Admission: clopidogrel (PLAVIX) 75 MG tablet, Take 1 tablet by mouth daily  furosemide (LASIX) 40 MG tablet, Take 1 tablet by mouth daily  ranolazine (RANEXA) 500 MG extended release tablet, Take 1 tablet by mouth 2 times daily  metoprolol succinate (TOPROL XL) 25 MG extended release tablet, Take 25 mg by mouth daily  oxyCODONE-acetaminophen (PERCOCET)  MG per tablet, TAKE 1 TABLET BY MOUTH 4 TIMES A DAY AS NEEDED  levothyroxine (SYNTHROID) 112 MCG tablet, Take 100 mcg by mouth Daily   simvastatin (ZOCOR) 20 MG tablet, Take 2 tablets by mouth nightly (Patient taking differently: Take 20 mg by mouth nightly )  gabapentin (NEURONTIN) 800 MG tablet, Take 800 mg by mouth three times daily  KLOR-CON M20 20 MEQ tablet, Take 20 mEq by mouth daily   aspirin 81 MG EC tablet, Take 81 mg by mouth daily  L-methylfolate-B6-B12 (METANX) 1-83.690-3-36 MG CAPS capsule, Take by mouth daily    CURRENT MEDS :  Scheduled Meds:   cefepime  2 g Intravenous Q12H    lidocaine  1 patch Transdermal Daily    methocarbamol  500 mg Oral 4x Daily    nicotine  1 patch Transdermal Daily    methylPREDNISolone  40 mg Intravenous Q6H    doxycycline hyclate  100 mg Oral 2 times per day    ipratropium  1 puff Inhalation Q4H WA    albuterol sulfate HFA  2 puff Inhalation Q4H WA    sodium chloride flush  10 mL Intravenous 2 times per day    aspirin  81 mg Oral Daily    clopidogrel  75 mg Oral Daily    furosemide  40 mg Oral Daily    gabapentin  800 mg Oral TID    potassium chloride  20 mEq Oral Daily    levothyroxine  100 mcg Oral Daily    metoprolol succinate  25 mg Oral Daily    ranolazine  500 mg Oral BID    folic acid-pyridoxine-cyancobalamin  1 tablet Oral Daily    atorvastatin  10 mg Oral Nightly       Continuous Infusions:   sodium chloride         Allergies   Allergen Reactions    Anesthetics, Betty      Difficulty waking up from ether    Penicillins Rash       REVIEW OF SYSTEMS:  Constitutional: Denies fever, weight loss, night sweats, and fatigue  Skin: Denies pigmentation, dark lesions, and rashes   HEENT: Denies hearing loss, tinnitus, ear drainage, epistaxis, sore throat, and hoarseness. Cardiovascular: Denies palpitations, chest pain, and chest pressure. Respiratory: Positive for coughing dyspnea worse with exertion starting to expectorate mucus  No hemoptysis, apnea, and choking. Gastrointestinal: Denies nausea, vomiting, poor appetite, diarrhea, heartburn or reflux  Genitourinary: Denies dysuria, frequency, urgency or hematuria  Musculoskeletal: Denies myalgias, muscle weakness, and bone pain, left arm pain patient has unsteady gait working with physical therapy at bedside  Neurological: Denies dizziness, vertigo, headache positive lower extremity weakness uses a walker to ambulate, chronic back pain, mid back across scapula with ecchymosis and bruising from previous fall  Psychological: Denies anxiety and depression  Endocrine: Denies heat intolerance and cold intolerance  Hematopoietic/Lymphatic: Denies bleeding problems and blood transfusions    OBJECTIVE:   BP (!) 158/74 Comment: Nurse is aware  Pulse 72   Temp 98.3 °F (36.8 °C) (Oral)   Resp 18   Ht 5' 4\" (1.626 m)   Wt 193 lb 9.6 oz (87.8 kg)   SpO2 92%   BMI 33.23 kg/m²   SpO2 Readings from Last 1 Encounters:   12/02/20 92%        I/O:    Intake/Output Summary (Last 24 hours) at 12/2/2020 1026  Last data filed at 12/1/2020 2008  Gross per 24 hour   Intake --   Output 1700 ml   Net -1700 ml     Vent Information  SpO2: 92 %                Physical Exam:  General: The patient is lying in bed comfortably without any distress.   Breathing is not labored  HEENT: Pupils are equal round and reactive to light, there are no oral lesions and no post-nasal drip   Neck: supple without adenopathy  Cardiovascular: regular rate and rhythm without murmur or gallop  Respiratory: rhonchi, exp wheeze bilaterally  to auscultation   Air entry is symmetric +accessory muscle use, tachypnea  Abdomen: soft, non-tender, non-distended, normal bowel sounds  Extremities: warm, no edema, no clubbing  Skin: no rash or lesion. Back with ecchymosis and bruising between scapula region. Tender . Left arm in sling  Neurologic: CN II-XII grossly intact, no focal deficits    Pulmonary Function Testing personally reviewed and interpreted  (In office testing)  DATA: Spirometry done in the office today demonstrates an FVC of 1.89 liters which is 54 % of predicted with an FEV1 of  1.22 liters which is 46 % of predicted.  FEV1/FVC ratio is 65 %. Mid expiratory flow rates are 27% of predicted.   Flow volume loop shows no signs of intrathoracic or extrathoracic obstruction.      Impressions:   1. Severe obstruction       Imaging personally reviewed:  CTA chest  Pulmonary Arteries: Pulmonary arteries are adequately opacified for    evaluation.  No evidence of intraluminal filling defect to suggest pulmonary    embolism.  Main pulmonary artery is normal in caliber. Mediastinum: Multiple calcified hilar and mediastinal nodes. Atherosclerotic calcification along the thoracic aorta with no evidence of    aneurysmal dilatation or dissection. Nonspecific mediastinal nodes and hilar nodes, right greater than left. No pericardial effusion. Coronary atherosclerotic calcifications. Lungs/pleura: Multifocal ground-glass and interstitial airspace disease in    the lungs bilaterally. More confluent airspace disease in the posterobasal segment of the lower    lobes bilaterally. No evidence of pleural effusions. Upper Abdomen: Perigastric surgical changes.     Status post cholecystectomy. Prominent atherosclerotic peripheral vascular disease. Soft Tissues/Bones: Pacemaker in the left pectoral region. No acute bony pathology.  Status post multiple thoracic and lumbar    kyphoplasties.         Impression    No evidence of pulmonary emboli. Multifocal ground-glass and interstitial airspace disease in the lungs    bilaterally and more confluent airspace disease in the posterobasal segment    of the lower lobes bilaterally consistent with multifocal pneumonia. Nonspecific mediastinal nodes and hilar nodes, which may be reactive.                 12/1/2020 chest x-ray        Echo:  July 2020  EF 60 to 65%, RVSP is 30 mmHg, normal left ventricle size and systolic function. Stage I diastolic dysfunction. Labs:  Lab Results   Component Value Date    WBC 17.7 12/02/2020    HGB 11.1 12/02/2020    HCT 34.4 12/02/2020    MCV 96.6 12/02/2020    MCH 31.2 12/02/2020    MCHC 32.3 12/02/2020    RDW 13.6 12/02/2020     12/02/2020    MPV 10.3 12/02/2020     Lab Results   Component Value Date     12/02/2020    K 4.4 12/02/2020    K 4.6 11/27/2020    CL 93 12/02/2020    CO2 31 12/02/2020    BUN 16 12/02/2020    CREATININE 0.5 12/02/2020    LABALBU 3.2 11/30/2020    CALCIUM 8.8 12/02/2020    GFRAA >60 12/02/2020    LABGLOM >60 12/02/2020     Lab Results   Component Value Date    PROTIME 10.9 08/04/2020    PROTIME 11.9 12/15/2011    INR 1.0 08/04/2020     No results for input(s): PROBNP in the last 72 hours. No results for input(s): TROPONINI in the last 72 hours. Recent Labs     11/29/20  1720   PROCAL 0.07     This SmartLink has not been configured with any valid records. Micro:  No results for input(s): CULTRESP in the last 72 hours. No results for input(s): LABGRAM in the last 72 hours. No results for input(s): LEGUR in the last 72 hours. No results for input(s): STREPNEUMAGU in the last 72 hours.   No results for input(s): LP1UAG in the last 72 hours.      Assessment:  Acute respiratory failure with hypoxia  Severe Gold stage III COPD with acute exacerbation  Multifocal pneumonia with groundglass opacities bilaterally  Leukocytosis -on corticosteroids  Status post recent fall at home with dislocation on left anterior shoulder status post reduction  History of DVT 2006  Ongoing nicotine dependence, 10 pack years   HFpEF stage I DD  History of sick sinus syndrome with pacemaker in situ  Osteoarthritis  History of falls  History of kyphoplasty  History of left hip surgery status post fracture    Plan:  Oxygen therapy 15 L high flow nasal cannula wean to keep greater than 92%  Noninvasive ventilation in the form of BiPAP 12/6 to help with respiratory support  Follow chest x-ray today with progression of bilateral infiltrates  Monitor WBCs 17.7 . Pt on corticosteroids since 11/28 solumedrol 40 IV Q 6 hrs  Started on cefepime today by primary team (pcn allergy) and continue doxycycline  Bronchodilator regimen-switch to nebulized form, Brovana and budesonide twice daily. Albuterol 4 times daily. Add 3% nebulized saline twice daily. Add guaifenesin 3 times daily to assist with mucus clearance. Add chest vest twice daily. Obtain respiratory and blood cultures. Trend inflammatory markers CRP, LDH, and sed rate. Repeat procalcitonin. DVT, GI prophylaxis. Daily lasix 40 po this is home dose. Pro . Tobacco cessation, nicoderm patch     Thank you for allowing me to participate in the care of Hallie Echeverria. Please feel free to call with questions. This plan of care was reviewed in collaboration with Dr. Torres Hernandez    Electronically signed by ELLIE Bermudez CNP on 12/2/2020 at 10:26 AM      Note: This report was completed utilizing computer voice recognition software.  Every effort has been made to ensure accuracy, however; inadvertent computerized transcription errors may be present        Addendum:  I personally saw, examined, and cared for the patient. Labs, medications, radiographs reviewed. Mrs. Yanick Bishop is well-known to me. She has a history of severe COPD. Last was seen in the office in May 2019. She has not been fully compliant for her bronchodilator regimen i.e. Breo and unfortunately continues to smoke. Said that she has never been on oxygen before. Her CT scan findings does show some bilateral groundglass opacities. Consider a her hypoxia and these findings although her initial rapid test was negative but there is still the possibility she could have Covid pneumonia infection. We will send a PCR test.      I agree with current pulmonary regimen of Pulmicort and Brovana along with albuterol. Agree with IV Solu-Medrol. We will add a flutter valve. Wean her FiO2 for saturations above 92%. I agree with history exam and plans detailed in NP note.   Kaley Aguiar MD

## 2020-12-02 NOTE — PROGRESS NOTES
Occupational Therapy  OT BEDSIDE TREATMENT NOTE      Date:2020  Patient Name: Steve Escudero  MRN: [id number]  : 1950  Room: [address] [place] [address]        Referring Provider: Melissa CAMPOS        Evaluating OT: Milady Isaac OTR/L [id number]     AM-PAC Daily Activity Raw Score: 14     Recommended Adaptive Equipment:  TBD      Diagnosis: L shoulder dislocation,L shoulder reduction 20  s/p fall   Per ortho note:   remain immobilized in her immobilizer or a sling, NWB LUE.  2020  Pertinent Medical History:  Multiple closed L side rib fractures,  PVD, CHF, COPD, Closed fracture thoracic vertebra,pacemaker , lumbar stenosis, osteoporosis   Precautions:  Falls, NWB L UE, sling, O2 high flow      Home Living: Pt lives alone, aprtment. No steps. Ambulated with walker       Pain Level: Pt with complaint of rib pain and L UE pain. Cognition: Awake and alert. Poor safety awareness. Poor follow up of restrictions. Impulsive.                      Functional Assessment:    Initial Eval Status  Date: 20 Treatment Status   STGs = LTGs  Time frame: 10/14 days   Feeding Set-up   Upon entry patient set up with lunch try   Patient reporting not feeling  very hungry just thirsty       Grooming Max A,seated  Mod A   Min A   UB Dressing Max A  Mod A to don hospital gown. Max A to reposition sling. Sling constantly sliding out of position. Min A   LB Dressing Dependent  Max A to don slipper socks and brief.   Mod A    Bathing Max  A Mod A UB bathe.  Min A    Toileting Dependent  Max A hygiene and arrange brief.        Bed Mobility  Max A  Supine <>sit  Mod A supine to sit.  Pt constantly attempting to use L UE to assist with bed mobility and transfers.        Functional Transfers NT   Patient observed to have increase pain (R side, L UE) and SOB   Patient with O2 on entire session   Educated on deep breathing tech   O2 sats ranged to low 80s to mid/high 80s (increase O2 to 6 L)     Patient left resting in bed, comfortable position, sling on O2 on 6 L , conversing,   O2 sat 90 %     Therapist informed nurse of patient O2 status     Max A sit to stand with poor safety awareness. Mary Kid Posterior lean. Increased anxiety when standing. Max cues for nwb to L UE during transfer.    Min  A    Functional Mobility NT   Returned to bed    Min A with fair + tolerance    Balance Sitting:     Static:  Min A - briefly sat EOB - returned to bed for safety and comfort     Poor stand balance for ADL. Marked posterior lean. Supervision sititng balance   with good tolerance   Min A standing    Activity Tolerance Poor with very light activity   poor  Good with ADL activity        Comments:  Decreased O2 saturation during transfer/ADL  to 79%. Pt using L UE during functional activity despite verbal and tactile cues to keep L UE in sling. Pt with poor carry over of positioning. Pt remained in chair at end of the session with alarm activated. Education/treatment:  ADL retraining with facilitation of movement and instruction of one handed skills due to NWB/sling to L UE. Therapeutic activity to address balance, strength, and endurance for ADL and transfers. Pt education of daily orientation, L UE restrictions and transfer safety. · Pt has made  progress towards set goals.      Plan of Care: 1-3 days/week for 1-2 weeks PRN   [x]? ?ADL retraining/adapted techniques and AE recommendations to increase functional independence within precautions                    [x]? ? Energy conservation techniques to improve tolerance for selfcare routine   [x]? ? Functional transfer/mobility training/DME recommendations for increased independence, safety and fall prevention         [x]? ?Patient/family education to increase safety and functional independence             [x]? ? Environmental modifications for safe mobility and completion of ADLs                             []? ? Cognitive retraining ex's to improve problem solving skills & safe participation in ADLs/IADLs     []? ?Sensory re-education techniques to improve extremity awareness, maintain skin integrity and improve hand function                             []? ? Visual/Perceptual retraining  to improve body awareness and safety during transfers and ADLs  []? ? Splinting/positioning needs to maintain joint/skin integrity and prevent contractures  [x]? ? Therapeutic activity to improve functional performance during ADLs.                                         [x]? ? Therapeutic exercise to improve tolerance and functional strength for ADLs   [x]? ? Balance retraining/tolerance tasks for facilitation of postural control with dynamic challenges during ADLs .  []?? Neuromuscular re-education: facilitation of righting/equilibrium reactions, midline orientation, scapular stability/mobility, Normalization muscle     tone and facilitation active functional movement/Attention                         []? ? Delirium prevention/treatment    [x]? Positioning to improve functional independence    Time In: 8:15   Time Out: 8:43     Min Units   Therapeutic Ex 95049     Therapeutic Activities 91759 8 1   ADL/Self Care 49008 20 1   Orthotic Management [id number]     Neuro Re-Ed 15554     Non-Billable Time     TOTAL TIMED TREATMENT [facility] ESTEFANI/L [id number]

## 2020-12-02 NOTE — PROGRESS NOTES
Subjective:    Patient seen and examined at bedside, significantly worsening shortness of breath and hypoxemia overnight. Objective:    BP (!) 158/74 Comment: Nurse is aware  Pulse 72   Temp 98.3 °F (36.8 °C) (Oral)   Resp 18   Ht 5' 4\" (1.626 m)   Wt 193 lb 9.6 oz (87.8 kg)   SpO2 92%   BMI 33.23 kg/m²     Current medications that patient is taking have been reviewed. Heart:  RRR, no murmurs, gallops, or rubs.   Lungs: Decreased air entry bilaterally, no wheeze   abd: bowel sounds present, soft, nontender, nondistended, no masses  Extrem:  No cyanosis or edema    CBC:   Lab Results   Component Value Date    WBC 17.7 12/02/2020    RBC 3.56 12/02/2020    HGB 11.1 12/02/2020    HCT 34.4 12/02/2020    MCV 96.6 12/02/2020    MCH 31.2 12/02/2020    MCHC 32.3 12/02/2020    RDW 13.6 12/02/2020     12/02/2020    MPV 10.3 12/02/2020     BMP:    Lab Results   Component Value Date     12/02/2020    K 4.4 12/02/2020    K 4.6 11/27/2020    CL 93 12/02/2020    CO2 31 12/02/2020    BUN 16 12/02/2020    LABALBU 3.2 11/30/2020    CREATININE 0.5 12/02/2020    CALCIUM 8.8 12/02/2020    GFRAA >60 12/02/2020    LABGLOM >60 12/02/2020    GLUCOSE 212 12/02/2020    GLUCOSE 106 12/15/2011        Assessment:    Patient Active Problem List   Diagnosis    History of hypertension    HLD (hyperlipidemia)    Tobacco abuse    COPD (chronic obstructive pulmonary disease) (HCC)    History of DVT (deep vein thrombosis)    [facility]    [facility]SS [facility]ick [facility]) [facility] [address]    Pacemaker    Hypothyroid    PVD (peripheral vascular disease) ([facility] [address]    Coronary artery disease involving native coronary artery without angina pectoris    Left carotid artery occlusion    Osteoporosis    Bilateral carotid artery stenosis    Atherosclerosis of native artery of both lower extremities with intermittent claudication (HCC)    Osteoarthritis    Post-traumatic osteoarthritis of left hip    Trauma    Multiple closed fractures of ribs of left side    Chest wall pain    Fall    Nodule of spleen    CAD in native artery    Shoulder dislocation, left, subsequent encounter    Neuropathy    [facility]ar infarction ([facility] [address]    CHF (congestive heart failure) (HCC)    Acute hypoxemic respiratory failure (HCC)    Pneumonia       Plan:  1. Left anterior shoulder dislocation              Reduced in ED.             Seen by ortho, NWB to left arm, f/u with ortho in 2-3 weeks              PT/OT and  for placement, discharge to SNF once oxygen requirements decreased  2. Lower back + hip pain - improved              CT lumbar spine - chronic changes. No hip fractures  3. HTN - continue home anti HTN  4. HLD - statin  5. COPD exacerbation + multifocal PNA              Continue with doxy, duonebs, solumedrol.  Change rocephin to cefepime (pen allergic)             CXR 12/2 slight progression of PNA   Decrease morphine dose   Consult patients pulmonologist      Jodie Cruz    9:37 AM  12/2/2020

## 2020-12-03 NOTE — PATIENT CARE CONFERENCE
[facility] Quality Flow/Interdisciplinary Rounds Progress Note        Quality Flow Rounds held on December 3, 2020    Disciplines Attending:   and Nursing Unit Leadership    [id number] was admitted on 11/26/2020  5:48 PM    Anticipated Discharge Date:  Expected Discharge Date: 11/28/20    Disposition:    Aaron Score:  Aaron Scale Score: 16    Readmission Risk              Risk of Unplanned Readmission:        19           Discussed patient goal for the day, patient clinical progression, and barriers to discharge.   The following Goal(s) of the Day/Commitment(s) have been identified:  Diagnostics - Report Results      Dylon Johnston  December 3, 2020 no

## 2020-12-03 NOTE — PROGRESS NOTES
Occupational Therapy  Attempted therapy 2x this date. Pt just into chair upon therapist arrival in the AM. SOB and fatigued. Returned in the PM.  Pt already back to bed and declined to get back out of bed at this time.    Reinaldo NELSON [id number]

## 2020-12-03 NOTE — PROGRESS NOTES
Fransico Ames M.D.,[facility]  Sudhir Funes D.O., F.A.C.O.I., Gia Bradford M.D. Karla Pham M.D., Gerald Weaver M.D. Yolette Cabrera D.O. Daily Pulmonary Progress Note    Patient:  Shelly Torres 65 y.o. female MRN: [id number]     Date of Service: 12/3/2020      Synopsis     We are following patient for increased oxygen requirements    \"CC\" cough, shortness of breath    Code status: FULL       Subjective      Patient was seen and examined. Lying in bed no distress. Breathing seems improved today. Oxygen weaned to 7 liters HF. Hacking cough, clear white mucus expectorated. Less chest tightness wheeze and dyspnea. No fevers. Await results of  covid pcr test sent 12/2. Tolerated Bipap a few hrs last HS. Review of Systems:   Constitutional: Denies fever, weight loss, night sweats, and fatigue  Skin: Denies pigmentation, dark lesions, and rashes   HEENT: Denies hearing loss, tinnitus, ear drainage, epistaxis, sore throat, and hoarseness. Cardiovascular: Denies palpitations, chest pain, and chest pressure. Respiratory: Positive for coughing dyspnea worse with exertion starting to expectorate mucus  No hemoptysis, apnea, and choking.   Gastrointestinal: Denies nausea, vomiting, poor appetite, diarrhea, heartburn or reflux  Genitourinary: Denies dysuria, frequency, urgency or hematuria  Musculoskeletal: Denies myalgias, muscle weakness, and bone pain, left arm pain patient has unsteady gait working with physical therapy at bedside  Neurological: Denies dizziness, vertigo, headache positive lower extremity weakness uses a walker to ambulate, chronic back pain, mid back across scapula with ecchymosis and bruising from previous fall  Psychological: Denies anxiety and depression  Endocrine: Denies heat intolerance and cold intolerance  Hematopoietic/Lymphatic: Denies bleeding problems and blood transfusions    24-hour events:  Improved oxygen levels    Objective   Vitals: /68 Pulse 61   Temp 97.8 °F (36.6 °C) (Axillary)   Resp 18   Ht 5' 4\" (1.626 m)   Wt 190 lb 1.6 oz (86.2 kg)   SpO2 98%   BMI 32.63 kg/m²     I/O:    Intake/Output Summary (Last 24 hours) at 12/3/2020 1045  Last data filed at 12/2/2020 1830  Gross per 24 hour   Intake 180 ml   Output 800 ml   Net -620 ml       Vent Information  Skin Assessment: Clean, dry, & intact  FiO2 : 40 %  SpO2: 98 %  SpO2/FiO2 ratio: 192  I Time/ I Time %: 0.9 s  Mask Type: Full face mask  Mask Size: Medium       IPAP: 12 cmH20  CPAP/EPAP: 6 cmH2O     CURRENT MEDS :  Scheduled Meds:   cefepime  2 g Intravenous Q12H    budesonide  500 mcg Nebulization BID    Arformoterol Tartrate  15 mcg Nebulization BID    guaiFENesin  400 mg Oral TID    sodium chloride (Inhalant)  4 mL Nebulization BID    enoxaparin  30 mg Subcutaneous BID    lidocaine  1 patch Transdermal Daily    methocarbamol  500 mg Oral 4x Daily    nicotine  1 patch Transdermal Daily    methylPREDNISolone  40 mg Intravenous Q6H    doxycycline hyclate  100 mg Oral 2 times per day    sodium chloride flush  10 mL Intravenous 2 times per day    aspirin  81 mg Oral Daily    clopidogrel  75 mg Oral Daily    gabapentin  800 mg Oral TID    potassium chloride  20 mEq Oral Daily    levothyroxine  100 mcg Oral Daily    metoprolol succinate  25 mg Oral Daily    ranolazine  500 mg Oral BID    folic acid-pyridoxine-cyancobalamin  1 tablet Oral Daily    atorvastatin  10 mg Oral Nightly       Physical Exam:     General: The patient is lying in bed comfortably without any distress.   Breathing is not labored  HEENT: Pupils are equal round and reactive to light, there are no oral lesions and no post-nasal drip   Neck: supple without adenopathy  Cardiovascular: regular rate and rhythm without murmur or gallop  Respiratory: less rhonchi, few exp wheeze bilaterally  to auscultation   Air entry is symmetric    Abdomen: soft, non-tender, non-distended, normal bowel sounds  Extremities: warm, no edema, no clubbing  Skin: no rash or lesion. Left lateral chest under arm and Back with ecchymosis and bruising between scapula region. Tender . Left arm in sling  Neurologic: CN II-XII grossly intact, no focal deficits  Pertinent/ New Labs and Imaging Studies     Imaging Personally Reviewed:   CTA chest  Pulmonary Arteries: Pulmonary arteries are adequately opacified for    evaluation.  No evidence of intraluminal filling defect to suggest pulmonary    embolism.  Main pulmonary artery is normal in caliber. Mediastinum: Multiple calcified hilar and mediastinal nodes. Atherosclerotic calcification along the thoracic aorta with no evidence of    aneurysmal dilatation or dissection. Nonspecific mediastinal nodes and hilar nodes, right greater than left. No pericardial effusion. Coronary atherosclerotic calcifications. Lungs/pleura: Multifocal ground-glass and interstitial airspace disease in    the lungs bilaterally. More confluent airspace disease in the posterobasal segment of the lower    lobes bilaterally. No evidence of pleural effusions. Upper Abdomen: Perigastric surgical changes. Status post cholecystectomy. Prominent atherosclerotic peripheral vascular disease. Soft Tissues/Bones: Pacemaker in the left pectoral region. No acute bony pathology.  Status post multiple thoracic and lumbar    kyphoplasties.         Impression    No evidence of pulmonary emboli. Multifocal ground-glass and interstitial airspace disease in the lungs    bilaterally and more confluent airspace disease in the posterobasal segment    of the lower lobes bilaterally consistent with multifocal pneumonia. Nonspecific mediastinal nodes and hilar nodes, which may be reactive.                    12/1/2020 chest x-ray          Echo:  July 2020  EF 60 to 65%, RVSP is 30 mmHg, normal left ventricle size and systolic function. Stage I diastolic dysfunction.       Labs:  Lab Results   Component Value Date    WBC 17.5 12/03/2020    HGB 11.0 12/03/2020    HCT 34.5 12/03/2020    MCV 96.1 12/03/2020    MCH 30.6 12/03/2020    MCHC 31.9 12/03/2020    RDW 13.4 12/03/2020     12/03/2020    MPV 10.2 12/03/2020     Lab Results   Component Value Date     12/03/2020    K 4.6 12/03/2020    K 4.6 11/27/2020    CL 87 12/03/2020    CO2 31 12/03/2020    BUN 19 12/03/2020    CREATININE 0.5 12/03/2020    LABALBU 3.2 11/30/2020    CALCIUM 8.4 12/03/2020    GFRAA >60 12/03/2020    LABGLOM >60 12/03/2020     Lab Results   Component Value Date    PROTIME 10.9 08/04/2020    PROTIME 11.9 12/15/2011    INR 1.0 08/04/2020     No results for input(s): PROBNP in the last 72 hours. Recent Labs     12/02/20  0640   PROCAL 0.04     This SmartLink has not been configured with any valid records. Micro:  No results for input(s): CULTRESP in the last 72 hours. No results for input(s): LABGRAM in the last 72 hours. No results for input(s): LEGUR in the last 72 hours. Recent Labs     12/02/20  1555   STREPNEUMAGU Presumptive negative- suggests no current or recent  pneumococcal infection. Infection due to Strep pneumoniae cannot be  ruled out since the antigen present in the sample  may be below the detection limit of the test.  Normal Range:Presumptive Negative       Recent Labs     12/02/20  1555   LP1UAG Presumptive Negative -suggesting no recent or current infections  with Legionella pneumophila serogroup 1. Infection to Legionella cannot be ruled out since other serogroups  and species may cause infection, antigen may not be present in  early infection, or level of antigen may be below the  detection limit. Normal Range: Presumptive Negative            Assessment:    1. Acute respiratory failure with hypoxia  2. Severe Gold stage III COPD with acute exacerbation  3. Multifocal pneumonia with groundglass opacities bilaterally  4. Leukocytosis -on corticosteroids  5.  Status post recent fall at home with dislocation on left anterior shoulder status post reduction  6. History of DVT 2006  7. Ongoing nicotine dependence, 10 pack years  8. HFpEF stage I DD  9. History of sick sinus syndrome with pacemaker in situ  10. Osteoarthritis  11. History of falls  12. History of kyphoplasty  13. History of left hip surgery status post fracture      Plan:   1. Oxygen therapy down to 7 L high flow nasal cannula wean to keep greater than 92%  2. Noninvasive ventilation in the form of BiPAP 12/6 to help with respiratory support  3. Follow chest x-ray 12/2/2020  4. To taper solumedrol to 40 IV Q 8 hrs today  5. Continue cefepime by primary team (pcn allergy) and continue doxycycline BID  6. Bronchodilator regimen-Brovana and budesonide twice daily. Albuterol 4 times daily. 3% nebulized saline twice daily. guaifenesin 3 times daily to assist with mucus clearance. 7. Continue flutter valve Q4 hours and chest vest twice daily. 8. Blood and respiratory cultures pending. Bacterial antigens negative. D dimer 659,  Fibrinogen 495, ferritin 90, crp 1.9, , sed rate 8,  procalcitonin 0.04. repeat covid pcr pending 12/2/2020  9. DVT, GI prophylaxis. 10. Daily lasix 40 po this is home dose. Pro . 11. Tobacco cessation, nicoderm patch     This plan of care was reviewed in collaboration with Dr. Marge Barr  Electronically signed by ELLIE Moya CNP on 12/3/2020 at 10:45 AM    I personally saw, examined, and cared for the patient. Labs, medications, radiographs reviewed. I agree with history exam and plans detailed in NP note. Megan Patrick M.D.    Pulmonary/Critical Care Medicine

## 2020-12-03 NOTE — PROGRESS NOTES
Date: 12/2/2020    Time: 10:26 PM    Patient Placed On BIPAP/CPAP/ Non-Invasive Ventilation? Yes    Facial area red/color change? No       BIPAP/CPAP skin barrier?   Yes    Skin barrier type:mepilexlite       Comments:     12/02/20 0268   NIV Type   NIV Started/Stopped On   Equipment Type V60   Mode Bilevel   Mask Type Full face mask   Mask Size Medium   Settings/Measurements   IPAP 12 cmH20   CPAP/EPAP 6 cmH2O   Rate Ordered 10   Resp 16   Insp Rise Time (%) 2 %   FiO2  50 %   I Time/ I Time % 0.9 s   Vt Exhaled 527 mL   Minute Volume 8.2 Liters   Mask Leak (lpm) 27 lpm   Comfort Level Good   Using Accessory Muscles No   SpO2 96         Melisa Lopez

## 2020-12-03 NOTE — PROGRESS NOTES
Pt talked into wearing bipap for the night. Stressed importance of wearing bipap at night and if she wanted to take off to put call light on so that she can be placed on oxygen. Pt verbalized understanding.  Penny Fung

## 2020-12-03 NOTE — PROGRESS NOTES
Patient called nurses station and stated her BiPap was alarming. RN entered room. Patient had removed BiPap herself, masks velcro was unfastened and patient was holding the tubing in her hand. SPO2 was 80%. Stated she did not want to wear it, she does not like it, and she prefers the nasal cannula. RN educated patient on importance of wearing the BiPap and benefits it provides. Patient verbalized understanding and stated she wanted to wear the nasal cannula. Nasal cannula placed 7L oxygen, SPO2 improved to 93% after placement. Will continue to monitor.

## 2020-12-03 NOTE — PROGRESS NOTES
Subjective:    Patient seen and examined at bedside, improvement in SOB. Oxygen requirements improved from 15 to 8 to 7L    Objective:    /68   Pulse 61   Temp 97.8 °F (36.6 °C) (Axillary)   Resp 18   Ht 5' 4\" (1.626 m)   Wt 190 lb 1.6 oz (86.2 kg)   SpO2 98%   BMI 32.63 kg/m²     Current medications that patient is taking have been reviewed. Heart:  RRR, no murmurs, gallops, or rubs.   Lungs:  Coarse breath sounds, no wheeze  Abd: bowel sounds present, soft, nontender, nondistended, no masses  Extrem:  No cyanosis or edema    CBC:   Lab Results   Component Value Date    WBC 17.5 12/03/2020    RBC 3.59 12/03/2020    HGB 11.0 12/03/2020    HCT 34.5 12/03/2020    MCV 96.1 12/03/2020    MCH 30.6 12/03/2020    MCHC 31.9 12/03/2020    RDW 13.4 12/03/2020     12/03/2020    MPV 10.2 12/03/2020     BMP:    Lab Results   Component Value Date     12/03/2020    K 4.6 12/03/2020    K 4.6 11/27/2020    CL 87 12/03/2020    CO2 31 12/03/2020    BUN 19 12/03/2020    LABALBU 3.2 11/30/2020    CREATININE 0.5 12/03/2020    CALCIUM 8.4 12/03/2020    GFRAA >60 12/03/2020    LABGLOM >60 12/03/2020    GLUCOSE 252 12/03/2020    GLUCOSE 106 12/15/2011        Assessment:    Patient Active Problem List   Diagnosis    History of hypertension    HLD (hyperlipidemia)    Tobacco abuse    COPD (chronic obstructive pulmonary disease) (HCC)    History of DVT (deep vein thrombosis)    [facility]    SSS (sick [facility] syndrome) ([facility] [address]    Pacemaker    Hypothyroid    PVD (peripheral vascular disease) ([facility] [address]    Coronary artery disease involving native coronary artery without angina pectoris    Left carotid artery occlusion    Osteoporosis    Bilateral carotid artery stenosis    Atherosclerosis of native artery of both lower extremities with intermittent claudication (HCC)    Osteoarthritis    Post-traumatic osteoarthritis of left hip    Trauma    Multiple closed fractures of ribs of left side    Chest wall pain    Fall    Nodule of spleen    CAD in native artery    Shoulder dislocation, left, subsequent encounter    Neuropathy    [facility]ar infarction ([facility] [address]    CHF (congestive heart failure) (HCC)    Acute hypoxemic respiratory failure ([facility] [address]    Pneumonia       Plan:    1. Left anterior shoulder dislocation              Reduced in ED.             Seen by ortho, NWB to left arm, f/u with ortho in 2-3 weeks              PT/OT and  for placement, discharge to SNF once oxygen requirements decreased  2. Lower back + hip pain - improved              CT lumbar spine - chronic changes. No hip fractures  3. HTN - continue home anti HTN  4. HLD - statin  5. COPD exacerbation + multifocal PNA              Continue with doxy + cefepime, duonebs, solumedrol.               CXR 12/2 slight progression of PNA              Discontinue morphine, continue with oxycodone              Pulm consulted   6.  Hyponatremia - discontinue lasix   Fluid restrict 2L      Tinnie Cashing    3:19 PM  12/3/2020

## 2020-12-04 NOTE — PROGRESS NOTES
Patient removed from oxygen. O2 sat dropped to 85% within 30 seconds of being off oxygen. Patient quickly recovered 95% 6L O2 nasal cannula.     Electronically signed by Cherise Hall RN on 12/4/2020 at 4:57 PM

## 2020-12-04 NOTE — PROGRESS NOTES
Layo Dumont M.D.,[facility]  Ninfa Kohli D.O., F.A.C.O.I., Ghulam Greenberg M.D. Scarlett Theodore M.D., Veto Quiles M.D. Cristi Krishnamurthy D.O. Daily Pulmonary Progress Note    Patient:  Gretchen Torres 65 y.o. female MRN: [id number]     Date of Service: 12/4/2020      Synopsis     We are following patient for increased oxygen requirements    \"CC\" cough, shortness of breath    Code status: FULL       Subjective      Patient was seen and examined. Up to chair eating lunch. Breathing seems improved today. Oxygen weaned to 5 liters HF. Hacking cough, clear white mucus expectorated. Less chest tightness wheeze and dyspnea. No fevers. Await results of  covid pcr test sent 12/2. Tolerated Bipap a few hrs last HS. Review of Systems:   Constitutional: Denies fever, weight loss, night sweats, and fatigue  Skin: Denies pigmentation, dark lesions, and rashes   HEENT: Denies hearing loss, tinnitus, ear drainage, epistaxis, sore throat, and hoarseness. Cardiovascular: Denies palpitations, chest pain, and chest pressure. Respiratory: Positive for coughing dyspnea worse with exertion starting to expectorate mucus  No hemoptysis, apnea, and choking.   Gastrointestinal: Denies nausea, vomiting, poor appetite, diarrhea, heartburn or reflux  Genitourinary: Denies dysuria, frequency, urgency or hematuria  Musculoskeletal: Denies myalgias, muscle weakness, and bone pain, left arm pain patient has unsteady gait working with physical therapy at bedside  Neurological: Denies dizziness, vertigo, headache positive lower extremity weakness uses a walker to ambulate, chronic back pain, mid back across scapula with ecchymosis and bruising from previous fall  Psychological: Denies anxiety and depression  Endocrine: Denies heat intolerance and cold intolerance  Hematopoietic/Lymphatic: Denies bleeding problems and blood transfusions    24-hour events:  Improved oxygen levels    Objective   Vitals: BP (!) 84/50   Pulse 62   Temp 98.2 °F (36.8 °C) (Oral)   Resp 20   Ht 5' 4\" (1.626 m)   Wt 191 lb 9 oz (86.9 kg)   SpO2 96%   BMI 32.88 kg/m²     I/O:  No intake or output data in the 24 hours ending 12/04/20 1415    Vent Information  Skin Assessment: Clean, dry, & intact  FiO2 : 40 %  SpO2: 96 %  SpO2/FiO2 ratio: 240  I Time/ I Time %: 0.9 s  Mask Type: Full face mask  Mask Size: Medium       IPAP: 12 cmH20  CPAP/EPAP: 6 cmH2O     CURRENT MEDS :  Scheduled Meds:   methylPREDNISolone  40 mg Intravenous Q8H    cefepime  2 g Intravenous Q12H    budesonide  500 mcg Nebulization BID    Arformoterol Tartrate  15 mcg Nebulization BID    guaiFENesin  400 mg Oral TID    sodium chloride (Inhalant)  4 mL Nebulization BID    enoxaparin  30 mg Subcutaneous BID    lidocaine  1 patch Transdermal Daily    methocarbamol  500 mg Oral 4x Daily    nicotine  1 patch Transdermal Daily    doxycycline hyclate  100 mg Oral 2 times per day    sodium chloride flush  10 mL Intravenous 2 times per day    aspirin  81 mg Oral Daily    clopidogrel  75 mg Oral Daily    gabapentin  800 mg Oral TID    levothyroxine  100 mcg Oral Daily    metoprolol succinate  25 mg Oral Daily    ranolazine  500 mg Oral BID    folic acid-pyridoxine-cyancobalamin  1 tablet Oral Daily    atorvastatin  10 mg Oral Nightly       Physical Exam:     General: The patient is lying in bed comfortably without any distress. Breathing is not labored  HEENT: Pupils are equal round and reactive to light, there are no oral lesions and no post-nasal drip   Neck: supple without adenopathy  Cardiovascular: regular rate and rhythm without murmur or gallop  Respiratory: less rhonchi, few exp wheeze bilaterally  to auscultation   Air entry is symmetric    Abdomen: soft, non-tender, non-distended, normal bowel sounds  Extremities: warm, no edema, no clubbing  Skin: no rash or lesion.  Left lateral chest under arm and Back with ecchymosis and bruising between scapula region. Tender . Left arm in sling  Neurologic: CN II-XII grossly intact, no focal deficits  Pertinent/ New Labs and Imaging Studies     Imaging Personally Reviewed:   CTA chest  Pulmonary Arteries: Pulmonary arteries are adequately opacified for    evaluation.  No evidence of intraluminal filling defect to suggest pulmonary    embolism.  Main pulmonary artery is normal in caliber. Mediastinum: Multiple calcified hilar and mediastinal nodes. Atherosclerotic calcification along the thoracic aorta with no evidence of    aneurysmal dilatation or dissection. Nonspecific mediastinal nodes and hilar nodes, right greater than left. No pericardial effusion. Coronary atherosclerotic calcifications. Lungs/pleura: Multifocal ground-glass and interstitial airspace disease in    the lungs bilaterally. More confluent airspace disease in the posterobasal segment of the lower    lobes bilaterally. No evidence of pleural effusions. Upper Abdomen: Perigastric surgical changes. Status post cholecystectomy. Prominent atherosclerotic peripheral vascular disease. Soft Tissues/Bones: Pacemaker in the left pectoral region. No acute bony pathology.  Status post multiple thoracic and lumbar    kyphoplasties.         Impression    No evidence of pulmonary emboli. Multifocal ground-glass and interstitial airspace disease in the lungs    bilaterally and more confluent airspace disease in the posterobasal segment    of the lower lobes bilaterally consistent with multifocal pneumonia. Nonspecific mediastinal nodes and hilar nodes, which may be reactive.                    12/1/2020 chest x-ray          Echo:  July 2020  EF 60 to 65%, RVSP is 30 mmHg, normal left ventricle size and systolic function. Stage I diastolic dysfunction.       Labs:  Lab Results   Component Value Date    WBC 24.5 12/04/2020    HGB 12.0 12/04/2020    HCT 37.8 12/04/2020    MCV 97.4 12/04/2020    MCH 30.9 12/04/2020    MCHC 31.7 12/04/2020    RDW 13.3 12/04/2020     12/04/2020    MPV 9.9 12/04/2020     Lab Results   Component Value Date     12/04/2020    K 4.5 12/04/2020    K 4.6 11/27/2020    CL 89 12/04/2020    CO2 34 12/04/2020    BUN 21 12/04/2020    CREATININE 0.5 12/04/2020    LABALBU 3.2 11/30/2020    CALCIUM 8.8 12/04/2020    GFRAA >60 12/04/2020    LABGLOM >60 12/04/2020     Lab Results   Component Value Date    PROTIME 10.9 08/04/2020    PROTIME 11.9 12/15/2011    INR 1.0 08/04/2020     No results for input(s): PROBNP in the last 72 hours. Recent Labs     12/02/20  0640   PROCAL 0.04     This SmartLink has not been configured with any valid records. Micro:  No results for input(s): CULTRESP in the last 72 hours. No results for input(s): LABGRAM in the last 72 hours. No results for input(s): LEGUR in the last 72 hours. Recent Labs     12/02/20  1555   STREPNEUMAGU Presumptive negative- suggests no current or recent  pneumococcal infection. Infection due to Strep pneumoniae cannot be  ruled out since the antigen present in the sample  may be below the detection limit of the test.  Normal Range:Presumptive Negative       Recent Labs     12/02/20  1555   LP1UAG Presumptive Negative -suggesting no recent or current infections  with Legionella pneumophila serogroup 1. Infection to Legionella cannot be ruled out since other serogroups  and species may cause infection, antigen may not be present in  early infection, or level of antigen may be below the  detection limit. Normal Range: Presumptive Negative            Assessment:    1. Acute respiratory failure with hypoxia  2. Severe Gold stage III COPD with acute exacerbation  3. Multifocal pneumonia with groundglass opacities bilaterally  4. Leukocytosis -on corticosteroids  5. Status post recent fall at home with dislocation on left anterior shoulder status post reduction  6. History of DVT 2006  7. Ongoing nicotine dependence, 10 pack years  8.   HFpEF stage I DD  9. History of sick sinus syndrome with pacemaker in situ  10. Osteoarthritis  11. History of falls  12. History of kyphoplasty  13. History of left hip surgery status post fracture      Plan:   1. Oxygen therapy down to 5 L high flow nasal cannula wean to keep greater than 92%  2. Will need oxygen testing prior to dc   3. Noninvasive ventilation in the form of BiPAP 12/6 to help with respiratory support  4. Follow chest x-ray 12/2/2020  5. To taper solumedrol to 40 IV Q 12 hrs today  6. 12/2/2020 cefepime by primary team (pcn allergy). 11/28-doxycycline BID can stop after 7 days  7. Bronchodilator regimen-Brovana and budesonide twice daily. Albuterol 4 times daily. 3% nebulized saline twice daily. guaifenesin 3 times daily to assist with mucus clearance. 8. Continue flutter valve Q4 hours and chest vest twice daily. 9. Blood and respiratory cultures pending. Bacterial antigens negative. D dimer 659,  Fibrinogen 495, ferritin 90, crp 1.9, , sed rate 8,  procalcitonin 0.04. repeat covid pcr pending 12/2/2020  10. DVT, GI prophylaxis. 11. Daily lasix 40 po this is home dose. Pro . 12. Tobacco cessation, nicoderm patch     This plan of care was reviewed in collaboration with Dr. Nayely Hutchison   Electronically signed by ELLIE Byrnes CNP on 12/4/2020 at 2:15 PM         I personally saw, examined, and cared for the patient. Labs, medications, radiographs reviewed. I agree with history exam and plans detailed in NP note.   Nato Torres MD

## 2020-12-04 NOTE — PROGRESS NOTES
Subjective:    Patient seen and examined at bedside, complaining of rib pain. Down to 5L via NC    Objective:    BP (!) 84/50   Pulse 62   Temp 98.2 °F (36.8 °C) (Oral)   Resp 20   Ht 5' 4\" (1.626 m)   Wt 191 lb 9 oz (86.9 kg)   SpO2 96%   BMI 32.88 kg/m²     Current medications that patient is taking have been reviewed. Heart:  RRR, no murmurs, gallops, or rubs.   Lungs:  CTA bilaterally, no wheeze, rales or rhonchi  Abd: bowel sounds present, soft, nontender, nondistended, no masses  Extrem:  No cyanosis or edema    CBC:   Lab Results   Component Value Date    WBC 24.5 12/04/2020    RBC 3.88 12/04/2020    HGB 12.0 12/04/2020    HCT 37.8 12/04/2020    MCV 97.4 12/04/2020    MCH 30.9 12/04/2020    MCHC 31.7 12/04/2020    RDW 13.3 12/04/2020     12/04/2020    MPV 9.9 12/04/2020     BMP:    Lab Results   Component Value Date     12/04/2020    K 4.5 12/04/2020    K 4.6 11/27/2020    CL 89 12/04/2020    CO2 34 12/04/2020    BUN 21 12/04/2020    LABALBU 3.2 11/30/2020    CREATININE 0.5 12/04/2020    CALCIUM 8.8 12/04/2020    GFRAA >60 12/04/2020    LABGLOM >60 12/04/2020    GLUCOSE 198 12/04/2020    GLUCOSE 106 12/15/2011        Assessment:    Patient Active Problem List   Diagnosis    History of hypertension    HLD (hyperlipidemia)    Tobacco abuse    COPD [facility]chronic obstructive [facility] disease) [facility]    History of DVT (deep vein thrombosis)    Seizure ([facility])    SSS (sick sinus syndrome) [facility]    Pacemaker    Hypothyroid    PVD (peripheral vascular disease) ([facility]    Coronary artery disease involving native coronary artery without angina pectoris    Left carotid artery occlusion    Osteoporosis    Bilateral carotid artery stenosis    Atherosclerosis of native artery of both lower extremities with intermittent claudication (HCC)    Osteoarthritis    Post-traumatic osteoarthritis of left hip    Trauma    Multiple closed fractures of ribs of left side    Chest wall pain    Fall    Nodule of spleen    CAD in native artery    Shoulder dislocation, left, subsequent encounter    Neuropathy    [facility]ar infarction [facility] [facility]    CHF (congestive heart failure) (HCC)    Acute hypoxemic respiratory failure (HCC)    Pneumonia       Plan:    1. Left anterior shoulder dislocation              Reduced in ED.             Seen by ortho, NWB to left arm, f/u with ortho in 2-3 weeks              PT/OT and  for placement, discharge to SNF once COVID returns  2. Lower back + hip pain - improved              CT lumbar spine - chronic changes. No hip fractures  3. HTN - continue home anti HTN  4. HLD - statin  5. COPD exacerbation + multifocal PNA              Continue with doxy + cefepime, duonebs, solumedrol.               CXR 12/2 slight progression of PNA              Discontinue morphine, continue with oxycodone              Pulmonary following   6.  Hyponatremia - discontinue lasix              Fluid restrict 2L      Durene Gupta    4:09 PM  12/4/2020

## 2020-12-04 NOTE — PROGRESS NOTES
[facility] Quality Flow/Interdisciplinary Rounds Progress Note        Quality Flow Rounds held on December 4, 2020    Disciplines Attending:  Bedside Nurse, ,  and Nursing Unit 72 Violette Vaughn was admitted on 11/26/2020  5:48 PM    Anticipated Discharge Date:  Expected Discharge Date: 11/28/20    Disposition:    Aaron Score:  Aaron Scale Score: 16    Readmission Risk              Risk of Unplanned Readmission:        19           Discussed patient goal for the day, patient clinical progression, and barriers to discharge.   The following Goal(s) of the Day/Commitment(s) have been identified:  Diagnostics - Report Results and Labs - Report Results      Nidhi Otero  December 4, 2020

## 2020-12-04 NOTE — PROGRESS NOTES
Occupational Therapy  OT BEDSIDE TREATMENT NOTE      Date:2020  Patient Name: Tierney Madsen  MRN: [id number]  : 1950  Room: [address] [place] [address]-A        Referring Provider: Melissa CAMPOS        Evaluating OT: Joesph Knutson OTR/L [id number]     AM-PAC Daily Activity Raw Score: 14     Recommended Adaptive Equipment:  TBD      Diagnosis: L shoulder dislocation,L shoulder reduction 20  s/p fall   Per ortho note:   remain immobilized in her immobilizer or a sling, NWB LUE.  2020  Pertinent Medical History:  Multiple closed L side rib fractures,  PVD, CHF, COPD, Closed fracture thoracic vertebra,pacemaker , lumbar stenosis, osteoporosis   Precautions:  Falls, NWB L UE, sling, O2 high flow      Home Living: Pt lives alone, aprtment. No steps. Ambulated with walker       Pain Level: Pt with complaint of rib pain and L UE pain. Cognition: Awake and alert. Poor safety awareness. Poor follow up of restrictions. Impulsive.                      Functional Assessment:    Initial Eval Status  Date: 20 Treatment Status   STGs = LTGs  Time frame: 10/14 days   Feeding Set-up   Upon entry patient set up with lunch try   Patient reporting not feeling  very hungry just thirsty  Setup      Grooming Max A,seated    Min A   UB Dressing Max A  Mod A to don hospital gown. Max A to reposition sling. Sling constantly sliding out of position. Min A   LB Dressing Dependent  Max A to don slipper socks and brief. Marked posterior lean when standing. Pt sat into chair before brief was arranged completely over hips. Pt refusing to stand again.   Mod A    Bathing Max  A Max  A liliana hygiene  Min A    Toileting Dependent  Max A hygiene and arrange brief.        Bed Mobility  Max A  Supine <>sit  Mod A supine to sit.  Pt constantly attempting to use L UE to assist with bed mobility and transfers.        Functional Transfers NT   Patient observed to have increase pain (R side, L UE) and SOB   Patient with O2 on entire session   Educated on deep breathing tech   O2 sats ranged to low 80s to mid/high 80s (increase O2 to 6 L)     Patient left resting in bed, comfortable position, sling on O2 on 6 L , conversing,   O2 sat 90 %     Therapist informed nurse of patient O2 status     Max A sit to stand with poor safety awareness. Posterior lean. Increased anxiety when standing. Max cues for nwb to L UE during transfer.    Min  A    Functional Mobility NT   Returned to bed  Mod A pivot to chair.   Min A with fair + tolerance    Balance Sitting:     Static:  Min A - briefly sat EOB - returned to bed for safety and comfort     Poor stand balance for ADL. Marked posterior lean. Supervision sititng balance   with good tolerance   Min A standing    Activity Tolerance Poor with very light activity   poor  Good with ADL activity        Comments:  Changed chair in the room for increased safety and positioning during transfers. Pt with ill fitting sling. Too large and slides out of position almost instantly. Pt had smaller sling and refused to wear it due to it being uncomfortable. Pt using L UE during functional activity despite verbal and tactile cues to keep L UE in sling. Pt with poor carry over of positioning. Pt remained in chair at end of the session. Education/treatment:  ADL retraining with facilitation of movement and instruction of one handed skills due to NWB/sling to L UE. Therapeutic activity to address balance, strength, and endurance for ADL and transfers. Pt education of daily orientation, L UE restrictions and transfer safety. · Pt has made  progress towards set goals.      Plan of Care: 1-3 days/week for 1-2 weeks PRN   [x]? ?ADL retraining/adapted techniques and AE recommendations to increase functional independence within precautions                    [x]? ? Energy conservation techniques to improve tolerance for selfcare routine   [x]? ? Functional transfer/mobility training/DME recommendations for increased independence, safety and fall prevention         [x]? ?Patient/family education to increase safety and functional independence             [x]? ? Environmental modifications for safe mobility and completion of ADLs                             []? ? Cognitive retraining ex's to improve problem solving skills & safe participation in ADLs/IADLs     []? ?Sensory re-education techniques to improve extremity awareness, maintain skin integrity and improve hand function                             []? ? Visual/Perceptual retraining  to improve body awareness and safety during transfers and ADLs  []? ? Splinting/positioning needs to maintain joint/skin integrity and prevent contractures  [x]? ? Therapeutic activity to improve functional performance during ADLs.                                         [x]? ? Therapeutic exercise to improve tolerance and functional strength for ADLs   [x]? ? Balance retraining/tolerance tasks for facilitation of postural control with dynamic challenges during ADLs .  []?? Neuromuscular re-education: facilitation of righting/equilibrium reactions, midline orientation, scapular stability/mobility, Normalization muscle     tone and facilitation active functional movement/Attention                         []? ? Delirium prevention/treatment    [x]? Positioning to improve functional independence    Time In: 9:15  Time Out: 9:46     Min Units   Therapeutic Ex 27636     Therapeutic Activities 66605 41 1   ADL/Self Care 24726 20 1   Orthotic Management 17004     Neuro Re-Ed 55590     Non-Billable Time     TOTAL TIMED TREATMENT [facility] ESTEFANI/L [id number]

## 2020-12-04 NOTE — PROGRESS NOTES
Physical Therapy  Facility/Department: [address] MED SURG  Treatment Note  NAME: Cindi Torres  : 1950  MRN: [id number]    Date of Service: 2020    Patient Diagnosis(es): The primary encounter diagnosis was Contusion of head, initial encounter. A diagnosis of Anterior shoulder dislocation, left, initial encounter was also pertinent to this visit. 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 has a past surgical history that includes Hysterectomy; Appendectomy; Cholecystectomy; Pacemaker insertion (Left, 2014); other surgical history (14); ECHO Limited (12/3/2014); other surgical history (2015); back surgery (10/26/15); other surgical history (2016); Carotid-subclavian Bypass Graft (Left, 2016); eye surgery; pr total hip arthroplasty (Left, 3/13/2018); Fixation Kyphoplasty; joint replacement (Bilateral); fracture surgery (Right); Cardiac catheterization (08/10/2020); and Coronary angioplasty with stent (08/10/2020). Attending Provider:  Stephanie Gupta MD     Evaluating PT:  Junaid Stallworth, P.TTrin[place]     Room #:  0073/1330-I  Diagnosis:  L shoulder dislocation, fell at home on L side  Imaging: X-ray shows possible L elbow fx  Procedure: L shoulder reduction 20  Precautions:  FALLS, confusion, sling LUE, 15 L HF NC, covid rule out     SUBJECTIVE:  Per chart:   Pt lives alone in a 1 floor ground level apt with no stairs and no rail to enter. Pt ambulated with ww PTA.     OBJECTIVE:    Initial Evaluation  Date: 11/27/20 Treatment  12/4/2020 Short Term/ Long Term   Goals   Was pt agreeable to Eval/treatment? yes  yes     Does pt have pain? C/o R hip pain, L shoulder, elbow, and rib pain too L shoulder and side pain      Bed Mobility  Rolling: MIN A  Supine to sit: MAX A  Sit to supine: MAX A  Scooting: MAX A Rolling NT  Supine to sit NT  Sit to supine NT  Scooting NT Independent    Transfers Sit to stand: NA  Stand to sit: NA  Stand pivot: NA sit to stand mod assist from bedside chair  Stand to sit: min A  Stand Pivot NT SBA   Ambulation   NA NT 50 feet with AAD SBA   Stair negotiation: ascended and descended NA   NA   AM-PAC 6 Clicks 7/25 21/70            Pt is alert and able to follow instruction  Balance: poor during pivot transfer without A/D    Pt performed therapeutic exercise of the following: seated B ankle pumps AROM; B LAQ's/hamsting curls, hip ABd/ADd and heel slide motions with minimal manual resistance x 20    Patient education  Pt was educated on exercise promoting circulation and strengthening    Patient response to education:   Pt verbalized understanding Pt demonstrated skill Pt requires further education in this area   yes yes yes     ASSESSMENT:   Comments: Pt found in a bedside chair, states wants to stay in the chair to eat lunch. B LE seated exercise performed. Pt stood from the chair 90 seconds x 2 and 40 seconds x 1 with Min/Mod A for balance. Wt shifting performed to simulate WB needed for gait. Pt fatigued after activity, short of breath. 02 saturation 92% and above all Rx on 15 L high flow NC. Pt was left in a bedside chair with call light in reach.  L UE support by a pillow for comfort    Time in 1252  Time out 1325  Total Treatment Time 27 minutes   CPT codes:     Therapeutic activities 68672 1017 minutes   Therapeutic exercises 51414 0 minutes       Pt is making fair progress toward established Physical Therapy goals as per standing tolerance and exercise  participation. Continue with physical therapy current plan of care promoting increased functional mobility as able.     Socorro Oppenheim PTA   License Number: [id number]

## 2020-12-04 NOTE — PROGRESS NOTES
Date: 12/3/2020    Time: 9:55 PM    Patient Placed On BIPAP/CPAP/ Non-Invasive Ventilation? Yes    Facial area red/color change? No        BIPAP/CPAP skin barrier?   Yes    Skin barrier type:mepilexlite       Comments:     12/03/20 2331   NIV Type   NIV Started/Stopped On   Equipment Type V60   Mode Bilevel   Mask Type Full face mask   Mask Size Medium   Settings/Measurements   IPAP 12 cmH20   CPAP/EPAP 6 cmH2O   Rate Ordered 10   Resp 21   Insp Rise Time (%) 2 %   FiO2  40 %   I Time/ I Time % 0.9 s   Vt Exhaled 812 mL   Minute Volume 12.1 Liters   Mask Leak (lpm) 44 lpm   Comfort Level Good   Using Accessory Muscles No   SpO2 96         Melisa Lopez

## 2020-12-04 NOTE — CARE COORDINATION
CASE MANAGEMENT. ... COVID NEG 11/27/2020 and 11/29/2020. Send out from 12/2/2020 still pending. Remains in r/o COVID. Continues on iv solumedrol 40mg q12hrs, ivf, iv maxipime q12hrs and po doxy. Still needing 5lnc hi flow. Fareed Dillard can take back when patient is 5lnc or less. HENS done. N17 started. Forms in chart.

## 2020-12-05 NOTE — PROGRESS NOTES
Subjective:    Patient seen and examined at bedside, complaining of rib pain, but improving. Down to 6L via NC. No other complaints. BM yesterday night feels much better. Objective:    BP (!) 98/58   Pulse 64   Temp 98 °F (36.7 °C) (Oral)   Resp 18   Ht 5' 4\" (1.626 m)   Wt 190 lb (86.2 kg)   SpO2 99%   BMI 32.61 kg/m²     Current medications that patient is taking have been reviewed. Heart:  RRR, no murmurs, gallops, or rubs. Lungs:  CTA bilaterally, no wheeze, rales or rhonchi  Abd: bowel sounds present, soft, nontender, nondistended, no masses  Extrem:  No cyanosis or edema, left shoulder in sling.     CBC:   Lab Results   Component Value Date    WBC 27.7 12/05/2020    RBC 3.88 12/05/2020    HGB 12.0 12/05/2020    HCT 37.7 12/05/2020    MCV 97.2 12/05/2020    MCH 30.9 12/05/2020    MCHC 31.8 12/05/2020    RDW 13.7 12/05/2020     12/05/2020    MPV 9.7 12/05/2020     BMP:    Lab Results   Component Value Date     12/05/2020    K 4.9 12/05/2020    K 4.6 11/27/2020    CL 89 12/05/2020    CO2 33 12/05/2020    BUN 24 12/05/2020    LABALBU 3.2 11/30/2020    CREATININE 0.6 12/05/2020    CALCIUM 8.7 12/05/2020    GFRAA >60 12/05/2020    LABGLOM >60 12/05/2020    GLUCOSE 263 12/05/2020    GLUCOSE 106 12/15/2011        Assessment:    Patient Active Problem List   Diagnosis    History of hypertension    HLD (hyperlipidemia)    Tobacco abuse    [facility]    History of DVT (deep vein thrombosis)    Seizure ([facility])    [facility]) [facility]    Pacemaker    Hypothyroid    PVD (peripheral vascular disease) [facility]    Coronary artery disease involving native coronary artery without angina pectoris    Left carotid artery occlusion    Osteoporosis    Bilateral carotid artery stenosis    Atherosclerosis of native artery of both lower extremities with intermittent claudication ([facility])    Osteoarthritis    Post-traumatic osteoarthritis of left hip    Trauma    Multiple closed fractures of ribs of left side    Chest wall pain    Fall    Nodule of spleen    CAD in native artery    Shoulder dislocation, left, subsequent encounter    Neuropathy    [facility]    CHF (congestive heart failure) (HCC)    Acute hypoxemic respiratory failure (HCC)    Pneumonia       Plan:    1. Left anterior shoulder dislocation              Reduced in ED.             Seen by ortho, NWB to left arm, f/u with ortho in 2-3 weeks              PT/OT and  for placement, discharge to SNF once COVID returns   Pain controlled, in sling. 2. Lower back + hip pain - improved              CT lumbar spine - chronic changes. No hip fractures  3. HTN - continue home anti HTN - stable  4. HLD - statin  5. COPD exacerbation + multifocal PNA              Continue with doxy + cefepime, duonebs, solumedrol.               CXR 12/2 slight progression of PNA              Continue with oxycodone              Pulmonary following   Leukocytosis secondary to steroids, patient afebrile   6.  Hyponatremia - discontinue lasix              Fluid restrict 2L  DVT prophalaxis - Lovenox      Jacques Lone Wolf    2:00 PM  12/5/2020     Above reviewed in conjunction with ELLIE Montalvo I agree  Pt Seen and Examined, questions answered by me   Nawaf Ferrera made to HPI/PE/as deemed neccessary by me  Plan formulated in conjunction with me after discussion     Nataly Maza MD

## 2020-12-05 NOTE — PROGRESS NOTES
Date: 12/4/2020    Time: 11:46 PM    Patient Placed On BIPAP/CPAP/ Non-Invasive Ventilation? Yes    If no must comment. Facial area red/color change? No           If YES are Blister/Lesion present? No   If yes must notify nursing staff  BIPAP/CPAP skin barrier?   Yes    Skin barrier type:mepilexlite       Comments:        Fabiola Kumar

## 2020-12-05 NOTE — PROGRESS NOTES
Nutrition Assessment     Type and Reason for Visit: RD Nutrition Re-Screen/LOS    Nutrition Recommendations/Plan: Continue current diet    Malnutrition Assessment:  Malnutrition Status: Insufficient data    Nutrition Related Findings: pt alert, abd WDL, no noted edema, -I/Os      Current Nutrition Therapies:    DIET GENERAL; Daily Fluid Restriction: 2000 ml    Anthropometric Measures:  · Height: 5' 4\" (162.6 cm)  · Current Body Wt: 190 lb (86.2 kg)(12/5)   · BMI: 32.6    Nutrition Diagnosis:   No nutrition diagnosis at this time     Nutrition Interventions:   Food and/or Nutrient Delivery:  Continue Current Diet  Nutrition Education/Counseling:  Education not indicated   Coordination of Nutrition Care:  Continue to monitor while inpatient    Goals:  Consume >75% meals       Nutrition Monitoring and Evaluation:   Food/Nutrient Intake Outcomes:  Food and Nutrient Intake  Physical Signs/Symptoms Outcomes:  Biochemical Data, GI Status, Fluid Status or Edema, Nutrition Focused Physical Findings, Skin, Weight     Discharge Planning:    No discharge needs at this time     Electronically signed by Jeremy Francois, MS, RD, LD on 12/5/20 at 2:36 PM EST    Contact: [phone]

## 2020-12-05 NOTE — PROGRESS NOTES
Physical Therapy  Facility/Department: [address] MED SURG  Treatment Note  NAME: Roxie Lesches Peace  : 1950  MRN: [id number]    Date of Service: 2020    Patient Diagnosis(es): The primary encounter diagnosis was Contusion of head, initial encounter. A diagnosis of Anterior shoulder dislocation, left, initial encounter was also pertinent to this visit. 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 has a past surgical history that includes Hysterectomy; Appendectomy; Cholecystectomy; Pacemaker insertion (Left, 2014); other surgical history (14); ECHO Limited (12/3/2014); other surgical history (2015); back surgery (10/26/15); other surgical history (2016); Carotid-subclavian Bypass Graft (Left, 2016); eye surgery; pr total hip arthroplasty (Left, 3/13/2018); Fixation Kyphoplasty; joint replacement (Bilateral); fracture surgery (Right); Cardiac catheterization (08/10/2020); and Coronary angioplasty with stent (08/10/2020). Attending Provider:  Denisha Holcomb MD     Evaluating PT:  Sho Frances.  Sam Gibson., P.T.     Room #:  4517/0412-J  Diagnosis:  L shoulder dislocation, fell at home on L side  Imaging: X-ray shows possible L elbow fx  Procedure: L shoulder reduction 20  Precautions:  FALLS, confusion, sling LUE, 6 L HF NC     SUBJECTIVE:  Per chart:   Pt lives alone in a 1 floor ground level apt with no stairs and no rail to codes:     Therapeutic activities 18384 12 minutes   Therapeutic exercises 24614 17 minutes       Pt is making fair progress toward established Physical Therapy goals as per brief functional mobility performed and exercise  participation. Continue with physical therapy current plan of care promoting increased functional mobility as able.     Abelino Garcia PTA   License Number: [id number]

## 2020-12-06 NOTE — PROGRESS NOTES
Date: 12/5/2020    Time: 10:31 PM    Patient Placed On BIPAP/CPAP/ Non-Invasive Ventilation? Yes    If no must comment. Facial area red/color change? No           If YES are Blister/Lesion present? No   If yes must notify nursing staff  BIPAP/CPAP skin barrier?   Yes    Skin barrier type:mepilexlite       Comments:        Maria Luz Vergara

## 2020-12-06 NOTE — PROGRESS NOTES
Subjective:    Patient seen and examined at bedside, still complaining of left rib pain, but improving. Down to 3L via NC. No other complaints. Phlegm in throat that she cannot expectorate    Objective:    BP (!) 98/58 Comment: MANUAL  Pulse 66   Temp 98 °F (36.7 °C) (Oral)   Resp 18   Ht 5' 4\" (1.626 m)   Wt 183 lb (83 kg)   SpO2 94%   BMI 31.41 kg/m²     Current medications that patient is taking have been reviewed. Heart:  RRR, no murmurs, gallops, or rubs. Lungs:  CTA bilaterally, no wheeze, rales or rhonchi  Abd: bowel sounds present, soft, nontender, nondistended, no masses  Extrem:  No cyanosis or edema, left shoulder in sling.     CBC:   Lab Results   Component Value Date    WBC 24.7 12/06/2020    RBC 3.89 12/06/2020    HGB 12.0 12/06/2020    HCT 37.9 12/06/2020    MCV 97.4 12/06/2020    MCH 30.8 12/06/2020    MCHC 31.7 12/06/2020    RDW 13.6 12/06/2020     12/06/2020    MPV 10.0 12/06/2020     BMP:    Lab Results   Component Value Date     12/06/2020    K 5.2 12/06/2020    K 4.6 11/27/2020    CL 89 12/06/2020    CO2 32 12/06/2020    BUN 21 12/06/2020    LABALBU 3.2 11/30/2020    CREATININE 0.5 12/06/2020    CALCIUM 8.8 12/06/2020    GFRAA >60 12/06/2020    LABGLOM >60 12/06/2020    GLUCOSE 244 12/06/2020    GLUCOSE 106 12/15/2011        Assessment:    Patient Active Problem List   Diagnosis    History of hypertension    HLD (hyperlipidemia)    Tobacco abuse    [facility]    History of DVT (deep vein thrombosis)    Seizure ([facility])    SSS (sick sinus syndrome) ([address]    Pacemaker    Hypothyroid    PVD (peripheral vascular disease) ([address]    Coronary artery disease involving native coronary artery without angina pectoris    Left carotid artery occlusion    Osteoporosis    Bilateral carotid artery stenosis    Atherosclerosis of native artery of both lower extremities with intermittent claudication ([address]    Osteoarthritis    Post-traumatic osteoarthritis of left hip    Trauma    Multiple closed fractures of ribs of left side    Chest wall pain    Fall    Nodule of spleen    CAD in native artery    Shoulder dislocation, left, subsequent encounter    Neuropathy    Lacunar infarction ([address]    CHF (congestive heart failure) (HCC)    Acute hypoxemic respiratory failure (HCC)    Pneumonia       Plan:    1. Left anterior shoulder dislocation              Reduced in ED.             Seen by ortho, NWB to left arm, f/u with ortho in 2-3 weeks              PT/OT and  for placement, discharge to SNF once COVID returns   Pain controlled, in sling. 2. Lower back + hip pain - improved              CT lumbar spine - chronic changes. No hip fractures   Continue pain medication  3. HTN - continue home anti HTN - stable  4. HLD - statin  5. COPD exacerbation + multifocal PNA              Continue with doxy + cefepime, duonebs, solumedrol.               CXR 12/2 slight progression of PNA              Continue with oxycodone              Pulmonary following   Leukocytosis secondary to steroids, patient afebrile   Complaining of phlegm in throat-we will trial Mucomyst to loosen, not responsive to Mucinex   6.  Hyponatremia - discontinue lasix              Fluid restrict 2L    Start NS at 75cc/hr on 12/6-recheck BMP this evening   Check labs in Am  DVT prophalaxis - Lovenox      Dyane Barrier    1:49 PM  12/6/2020     Above reviewed in conjunction with Leyla ARGUETA- I agree  Pt Seen and Examined, questions answered by me   Aniyah Ayon made to HPI/PE/as deemed neccessary by me  Plan formulated in conjunction with me after discussion     Castillo Lewis MD

## 2020-12-07 NOTE — PROGRESS NOTES
Occupational Therapy  OT BEDSIDE TREATMENT NOTE      Date:2020  Patient Name: Michael Garcia  MRN: [id number]  : 1950  Room: [address] [place] [address]-A        Referring Provider: Melissa Valle APRN_CNP        Evaluating OT: Mojgan Howard OTR/L [id number]     AM-PAC Daily Activity Raw Score: 15     Recommended Adaptive Equipment:  TBD      Diagnosis: L shoulder dislocation,L shoulder reduction 20  s/p fall   Per ortho note:   remain immobilized in her immobilizer or a sling, NWB LUE.  2020  Pertinent Medical History:  Multiple closed L side rib fractures,  PVD, CHF, COPD, Closed fracture thoracic vertebra,pacemaker , lumbar stenosis, osteoporosis   Precautions:  Falls, NWB L UE, sling, O2 high flow      Home Living: Pt lives alone, aprtment. No steps. Ambulated with walker       Pain Level: Pt with complaint of rib pain and L UE pain. Cognition: Awake and alert. Poor safety awareness. Poor follow up of restrictions. Impulsive.                      Functional Assessment:    Initial Eval Status  Date: 20 Treatment Status   STGs = LTGs  Time frame: 10/14 days   Feeding Set-up   Upon entry patient set up with lunch try   Patient reporting not feeling  very hungry just thirsty       Grooming Max A,seated  Min  A   Min A   UB Dressing Max A  Mod A to don hospital gown. Max A to reposition sling. Sling constantly sliding out of position. Min A   LB Dressing Dependent  Mod A to don brief. Pt able to thread R foot through brief however assist for LLE. Assisted with clothing management over hips.   max A for balance due to posterior lean   Mod A    Bathing Max  A Max  A liliana hygiene  Min A    Toileting Dependent  Max A hygiene and mod A arrange brief.        Bed Mobility  Max A  Supine <>sit        Functional Transfers NT   Patient observed to have increase pain (R side, L UE) and SOB   Patient with O2 on entire session   Educated on deep breathing tech   O2 sats ranged to low 80s to mid/high 80s (increase O2 to 6 L)     Patient left resting in bed, comfortable position, sling on O2 on 6 L , conversing,   O2 sat 90 %     Therapist informed nurse of patient O2 status     Max A sit to stand. Pt feet sliding forward during static standing. Posterior lean. Increased anxiety when standing. Max cues for nwb to L UE during transfer.    Min  A    Functional Mobility NT   Returned to bed   Min A with fair + tolerance    Balance Sitting:     Static:  Min A - briefly sat EOB - returned to bed for safety and comfort     Poor stand balance for ADL. Marked posterior lean. Supervision sititng balance   with good tolerance   Min A standing    Activity Tolerance Poor with very light activity   poor  Good with ADL activity        Comments:  Pt sitting in the chair. SOB and increased anxiety with standing. O2 saturation 88% after brief periods of standing for ADL. Remained in chair at end of the session. Education/treatment:  ADL retraining with facilitation of movement and instruction of one handed skills due to NWB/sling to L UE. Therapeutic activity to address balance, strength, and endurance for ADL and transfers. Pt education of daily orientation, L UE restrictions and transfer safety. · Pt has made  progress towards set goals.      Plan of Care: 1-3 days/week for 1-2 weeks PRN   [x]? ?ADL retraining/adapted techniques and AE recommendations to increase functional independence within precautions                    [x]? ? Energy conservation techniques to improve tolerance for selfcare routine   [x]? ? Functional transfer/mobility training/DME recommendations for increased independence, safety and fall prevention         [x]? ?Patient/family education to increase safety and functional independence             [x]? ? Environmental modifications for safe mobility and completion of ADLs                             []? ? Cognitive retraining ex's to improve problem solving skills & safe participation in ADLs/IADLs     []??Sensory re-education techniques to improve extremity awareness, maintain skin integrity and improve hand function                             []? ? Visual/Perceptual retraining  to improve body awareness and safety during transfers and ADLs  []? ? Splinting/positioning needs to maintain joint/skin integrity and prevent contractures  [x]? ? Therapeutic activity to improve functional performance during ADLs.                                         [x]? ? Therapeutic exercise to improve tolerance and functional strength for ADLs   [x]? ? Balance retraining/tolerance tasks for facilitation of postural control with dynamic challenges during ADLs .  []?? Neuromuscular re-education: facilitation of righting/equilibrium reactions, midline orientation, scapular stability/mobility, Normalization muscle     tone and facilitation active functional movement/Attention                         []? ? Delirium prevention/treatment    [x]? Positioning to improve functional independence    Time In: 10:45  Time Out: 10:59     Min Units   Therapeutic Ex 40090     Therapeutic Activities 15647 5    ADL/Self Care 51259 9 1   Orthotic Management 43019     Neuro Re-Ed 17957     Non-Billable Time     TOTAL TIMED TREATMENT 14 [address] [facility] ESTEFANI/L [id number]

## 2020-12-07 NOTE — PROGRESS NOTES
Physical Therapy  Facility/Department: [address] MED SURG  Treatment Note  NAME: Gretchen Torres  : 1950  MRN: [id number]    Date of Service: 2020    Patient Diagnosis(es): The primary encounter diagnosis was Contusion of head, initial encounter. A diagnosis of Anterior shoulder dislocation, left, initial encounter was also pertinent to this visit. 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 has a past surgical history that includes Hysterectomy; Appendectomy; Cholecystectomy; Pacemaker insertion (Left, 2014); other surgical history (14); ECHO Limited (12/3/2014); other surgical history (2015); back surgery (10/26/15); other surgical history (2016); Carotid-subclavian Bypass Graft (Left, 2016); eye surgery; pr total hip arthroplasty (Left, 3/13/2018); Fixation Kyphoplasty; joint replacement (Bilateral); fracture surgery (Right); Cardiac catheterization (08/10/2020); and Coronary angioplasty with stent (08/10/2020). Attending Provider:  Jodie Cruz MD     Evaluating PT:  Neville Leone, P.T.     Room #:  9699/9518-B  Diagnosis:  L shoulder dislocation, fell at home on L side  Imaging: X-ray shows possible L elbow fx  Procedure: L shoulder reduction 20  Precautions:  FALLS, confusion, sling LUE, 15 L HF NC, covid rule out, NWB L UE, continuous pulse ox     SUBJECTIVE:  Per chart:   Pt lives alone in a 1 floor instructed in the following treatment:     Therapeutic activities: Pt completed all therapeutic activities noted above. Pt was educated on NWB L UE and proper positioning of arm in sling. Pt was cued for technique during supine to sit transfer and was instructed to refrain from pushing, pulling, or reaching with L UE. Pt was cued for posture and balance as she sat at EOB for 5+ minutes. Pt was cued for technique during sit to stand transfer. Pt was cued for technique with HHA during stand pivot to chair. Vitals and symptoms were monitored with all activity and during extended rest breaks. PLAN:    Patient is making fair progress towards established goals. Will continue with current POC.       Time in: 1005  Time out: 1030    Total Treatment Time 25 minutes     CPT codes:  [] Gait training 67558 0 minutes  [] Manual therapy 26304 0 minutes  [x] Therapeutic activities 40217 25 minutes  [] Therapeutic exercises 87722 0 minutes  [] Neuromuscular reeducation 93320 0 minutes      Kush Asp, PT, DPT   [id number]

## 2020-12-07 NOTE — PATIENT CARE CONFERENCE
P Quality Flow/Interdisciplinary Rounds Progress Note        Quality Flow Rounds held on December 7, 2020    Disciplines Attending:  Bedside Nurse, ,  and Nursing Unit 72 Violette Vaughn was admitted on 11/26/2020  5:48 PM    Anticipated Discharge Date:  Expected Discharge Date: 11/28/20    Disposition:    Aaron Score:  Aaron Scale Score: 18    Readmission Risk              Risk of Unplanned Readmission:        20           Discussed patient goal for the day, patient clinical progression, and barriers to discharge.   The following Goal(s) of the Day/Commitment(s) have been identified:  Discharge - Obtain Order- planning      Ricardo Mcginnis  December 7, 2020

## 2020-12-07 NOTE — PROGRESS NOTES
Subjective:    Patient seen and examined at bedside, improvement in SOB. Still complaining of rib pain  Objective:    /60   Pulse 61   Temp 97.6 °F (36.4 °C) (Oral)   Resp 18   Ht 5' 4\" (1.626 m)   Wt 187 lb (84.8 kg)   SpO2 93%   BMI 32.10 kg/m²     Current medications that patient is taking have been reviewed. Heart:  RRR, no murmurs, gallops, or rubs.   Lungs:  CTA bilaterally, no wheeze, rales or rhonchi  Abd: bowel sounds present, soft, nontender, nondistended, no masses  Extrem:  No cyanosis or edema    CBC:   Lab Results   Component Value Date    WBC 21.5 12/07/2020    RBC 3.44 12/07/2020    HGB 10.7 12/07/2020    HCT 33.6 12/07/2020    MCV 97.7 12/07/2020    MCH 31.1 12/07/2020    MCHC 31.8 12/07/2020    RDW 14.0 12/07/2020     12/07/2020    MPV 9.8 12/07/2020     BMP:    Lab Results   Component Value Date     12/07/2020    K 4.5 12/07/2020    K 4.6 11/27/2020    CL 91 12/07/2020    CO2 30 12/07/2020    BUN 16 12/07/2020    LABALBU 3.2 11/30/2020    CREATININE 0.5 12/07/2020    CALCIUM 8.4 12/07/2020    GFRAA >60 12/07/2020    LABGLOM >60 12/07/2020    GLUCOSE 300 12/07/2020    GLUCOSE 106 12/15/2011        Assessment:    Patient Active Problem List   Diagnosis    History of hypertension    HLD (hyperlipidemia)    Tobacco abuse    COPD (chronic obstructive pulmonary disease) (HCC)    History of DVT (deep vein thrombosis)    [facility]    [facility] syndrome) [facility]    Pacemaker    Hypothyroid    PVD (peripheral vascular disease) ([facility]    Coronary artery disease involving native coronary artery without angina pectoris    Left carotid artery occlusion    Osteoporosis    Bilateral carotid artery stenosis    Atherosclerosis of native artery of both lower extremities with intermittent claudication (HCC)    Osteoarthritis    Post-traumatic osteoarthritis of left hip    Trauma    Multiple closed fractures of ribs of left side    Chest wall pain    Fall    Nodule of spleen    CAD in native artery    Shoulder dislocation, left, subsequent encounter    Neuropathy    [facility]    CHF (congestive heart failure) (HCC)    Acute hypoxemic respiratory failure (HCC)    Pneumonia       Plan:  1. Left anterior shoulder dislocation              Reduced in ED.             Seen by ortho, NWB to left arm, f/u with ortho in 2-3 weeks              PT/OT and  for placement, discharge to SNF once COVID returns              Pain controlled, in sling. 2. Lower back + hip pain - improved              CT lumbar spine - chronic changes. No hip fractures              Continue pain medication  3. HTN - continue home anti HTN - stable  4. HLD - statin  5. COPD exacerbation + multifocal PNA              Continue with doxy + cefepime, duonebs, solumedrol.               CXR 12/2 slight progression of PNA              Continue with oxycodone              Pulmonary following              Leukocytosis secondary to steroids, patient afebrile   6. Hyponatremia - discontinue lasix              Fluid restrict 1.5 L.  Discontinue IVF  DVT prophalaxis - Lovenox    Disposition - awaiting SNF    Ahmet Forbes    2:27 PM  12/7/2020

## 2020-12-07 NOTE — CARE COORDINATION
Message left with Evelina at [facility] regarding acceptance, bed availability and precert need. Await return call. Patient tested negative for Covid-19 on 12/2    1010: Received call from Marsha [address] at [facility]- she can accept and will initiate insurance precert today. Await auth. Spoke with patient and updated her on the above information- pt verbalized understanding and is agreeable to discharge plan. Pt requested that her son be called and updated. Call placed to pt's son Tara Farmer- updated him on current plan of care and discharge plan- he verbalized understanding and is in agreement to D/C plan of [facility] when pt is medically stable.

## 2020-12-07 NOTE — PROGRESS NOTES
Date: 12/6/2020    Time: 11:32 PM    Patient Placed On BIPAP/CPAP/ Non-Invasive Ventilation? No    If no must comment. Facial area red/color change? No           If YES are Blister/Lesion present? No   If yes must notify nursing staff  BIPAP/CPAP skin barrier? Yes    Skin barrier type:mepilexlite       Comments: Pt remains on BiPAP at this time, oxygen weaned to 1L. Mepilex in place.         [facility]      12/06/20 2331   NIV Type   Skin Protection for O2 Device Yes   Orientation Middle   Location Nose   Intervention(s) Skin Barrier   Mode Bilevel   Mask Type Full face mask   Mask Size Medium   Settings/Measurements   IPAP 12 cmH20   CPAP/EPAP 6 cmH2O   Rate Ordered 10   Resp 17   O2 Flow Rate (L/min) 1 L/min   Vt Exhaled 637 mL   Minute Volume 10.4 Liters   Mask Leak (lpm) 32 lpm   Comfort Level Good   Using Accessory Muscles No   SpO2 98

## 2020-12-07 NOTE — PROGRESS NOTES
Date: 12/6/2020    Time: 11:26 PM    Patient Placed On BIPAP/CPAP/ Non-Invasive Ventilation? Yes    If no must comment. Facial area red/color change? no           If YES are Blister/Lesion present? No   If yes must notify nursing staff  BIPAP/CPAP skin barrier?   Yes    Skin barrier type:mepilexlite       Comments:        Phillip Quintana

## 2020-12-07 NOTE — PROGRESS NOTES
Ignacia Griffiths M.D.,[facility]  Severiano Sides, D.O., F.A.C.O.I., Bee Soto M.D. Jan Sena M.D., Geovanny Philip M.D. Michael Cruz D.O. Daily Pulmonary Progress Note    Patient:  Natali Torres 65 y.o. female MRN: [id number]     Date of Service: 12/7/2020      Synopsis     We are following patient for increased oxygen requirements    \"CC\" cough, shortness of breath    Code status: FULL       Subjective      Patient was seen and examined. Up to chair eating lunch. Breathing seems improved  . Oxygen weaned to 3 liters HF. Hacking cough improving, clear white mucus expectorated. Less chest tightness wheeze and dyspnea. No fevers. results of  covid pcr test sent 12/2 is negative. Tolerated Bipap a few hrs last HS. Review of Systems:   Constitutional: Denies fever, weight loss, night sweats, and fatigue  Skin: Denies pigmentation, dark lesions, and rashes   HEENT: Denies hearing loss, tinnitus, ear drainage, epistaxis, sore throat, and hoarseness. Cardiovascular: Denies palpitations, chest pain, and chest pressure. Respiratory: Positive for coughing dyspnea worse with exertion starting to expectorate mucus  No hemoptysis, apnea, and choking.   Gastrointestinal: Denies nausea, vomiting, poor appetite, diarrhea, heartburn or reflux  Genitourinary: Denies dysuria, frequency, urgency or hematuria  Musculoskeletal: Denies myalgias, muscle weakness, and bone pain, left arm pain patient has unsteady gait working with physical therapy at bedside  Neurological: Denies dizziness, vertigo, headache positive lower extremity weakness uses a walker to ambulate, chronic back pain, mid back across scapula with ecchymosis and bruising from previous fall  Psychological: Denies anxiety and depression  Endocrine: Denies heat intolerance and cold intolerance  Hematopoietic/Lymphatic: Denies bleeding problems and blood transfusions    24-hour events:  Improved oxygen levels    Objective   Vitals: /60   Pulse 61   Temp 97.6 °F (36.4 °C) (Oral)   Resp 18   Ht 5' 4\" (1.626 m)   Wt 187 lb (84.8 kg)   SpO2 93%   BMI 32.10 kg/m²     I/O:    Intake/Output Summary (Last 24 hours) at 12/7/2020 1511  Last data filed at 12/7/2020 1385  Gross per 24 hour   Intake 575 ml   Output 1800 ml   Net -1225 ml       Vent Information  Skin Assessment: Clean, dry, & intact  FiO2 : (S) 30 %  SpO2: 93 %  SpO2/FiO2 ratio: 320  I Time/ I Time %: 0.9 s  Mask Type: Full face mask  Mask Size: Medium       IPAP: 12 cmH20  CPAP/EPAP: 6 cmH2O     CURRENT MEDS :  Scheduled Meds:   [START ON 12/8/2020] predniSONE  40 mg Oral Daily    acetylcysteine  400 mg Inhalation BID    budesonide  500 mcg Nebulization BID    Arformoterol Tartrate  15 mcg Nebulization BID    guaiFENesin  400 mg Oral TID    enoxaparin  30 mg Subcutaneous BID    lidocaine  1 patch Transdermal Daily    methocarbamol  500 mg Oral 4x Daily    nicotine  1 patch Transdermal Daily    sodium chloride flush  10 mL Intravenous 2 times per day    aspirin  81 mg Oral Daily    clopidogrel  75 mg Oral Daily    gabapentin  800 mg Oral TID    levothyroxine  100 mcg Oral Daily    metoprolol succinate  25 mg Oral Daily    ranolazine  500 mg Oral BID    folic acid-pyridoxine-cyancobalamin  1 tablet Oral Daily    atorvastatin  10 mg Oral Nightly       Physical Exam:     General: The patient is lying in bed comfortably without any distress. Breathing is not labored  HEENT: Pupils are equal round and reactive to light, there are no oral lesions and no post-nasal drip   Neck: supple without adenopathy  Cardiovascular: regular rate and rhythm without murmur or gallop  Respiratory: less rhonchi  to auscultation   Air entry is symmetric    Abdomen: soft, non-tender, non-distended, normal bowel sounds  Extremities: warm, no edema, no clubbing  Skin: no rash or lesion. Left lateral chest under arm and Back with ecchymosis and bruising between scapula region. Tender . Left arm in sling  Neurologic: CN II-XII grossly intact, no focal deficits  Pertinent/ New Labs and Imaging Studies     Imaging Personally Reviewed:   CTA chest  Pulmonary Arteries: Pulmonary arteries are adequately opacified for    evaluation.  No evidence of intraluminal filling defect to suggest pulmonary    embolism.  Main pulmonary artery is normal in caliber. Mediastinum: Multiple calcified hilar and mediastinal nodes. Atherosclerotic calcification along the thoracic aorta with no evidence of    aneurysmal dilatation or dissection. Nonspecific mediastinal nodes and hilar nodes, right greater than left. No pericardial effusion. Coronary atherosclerotic calcifications. Lungs/pleura: Multifocal ground-glass and interstitial airspace disease in    the lungs bilaterally. More confluent airspace disease in the posterobasal segment of the lower    lobes bilaterally. No evidence of pleural effusions. Upper Abdomen: Perigastric surgical changes. Status post cholecystectomy. Prominent atherosclerotic peripheral vascular disease. Soft Tissues/Bones: Pacemaker in the left pectoral region. No acute bony pathology.  Status post multiple thoracic and lumbar    kyphoplasties.         Impression    No evidence of pulmonary emboli. Multifocal ground-glass and interstitial airspace disease in the lungs    bilaterally and more confluent airspace disease in the posterobasal segment    of the lower lobes bilaterally consistent with multifocal pneumonia. Nonspecific mediastinal nodes and hilar nodes, which may be reactive.                    12/1/2020 chest x-ray         Chest x-ray 12/2/2020  Pacemaker device is stable.  Redemonstration bilateral interstitial changes    with confluent appearing airspace opacity within the left mid and lower lung    and right lower lobe.  The right lower lobe airspace disease appears    marginally progressed.  Stable heart size and mediastinal contours.      Impression    Slight progression of the airspace disease within the lower lobes.  Otherwise    no change.               Echo:  July 2020  EF 60 to 65%, RVSP is 30 mmHg, normal left ventricle size and systolic function. Stage I diastolic dysfunction. Labs:  Lab Results   Component Value Date    WBC 21.5 12/07/2020    HGB 10.7 12/07/2020    HCT 33.6 12/07/2020    MCV 97.7 12/07/2020    MCH 31.1 12/07/2020    MCHC 31.8 12/07/2020    RDW 14.0 12/07/2020     12/07/2020    MPV 9.8 12/07/2020     Lab Results   Component Value Date     12/07/2020    K 4.5 12/07/2020    K 4.6 11/27/2020    CL 91 12/07/2020    CO2 30 12/07/2020    BUN 16 12/07/2020    CREATININE 0.5 12/07/2020    LABALBU 3.2 11/30/2020    CALCIUM 8.4 12/07/2020    GFRAA >60 12/07/2020    LABGLOM >60 12/07/2020     Lab Results   Component Value Date    PROTIME 10.9 08/04/2020    PROTIME 11.9 12/15/2011    INR 1.0 08/04/2020     No results for input(s): PROBNP in the last 72 hours. No results for input(s): PROCAL in the last 72 hours. This SmartLink has not been configured with any valid records. Micro:  No results for input(s): CULTRESP in the last 72 hours. No results for input(s): LABGRAM in the last 72 hours. No results for input(s): LEGUR in the last 72 hours. No results for input(s): STREPNEUMAGU in the last 72 hours. No results for input(s): LP1UAG in the last 72 hours.        Assessment:    Acute respiratory failure with hypoxia  Severe Gold stage III COPD with acute exacerbation  Multifocal pneumonia with groundglass opacities bilaterally  Leukocytosis -on corticosteroids  Status post recent fall at home with dislocation on left anterior shoulder status post reduction  History of DVT 2006  Ongoing nicotine dependence, 10 pack years   HFpEF stage I DD  History of sick sinus syndrome with pacemaker in situ  Osteoarthritis  History of falls  History of kyphoplasty  History of left hip surgery status post fracture      Plan:   Oxygen therapy down to 3 L nasal cannula wean to keep greater than 92%  Will need oxygen testing prior to dc   Noninvasive ventilation in the form of BiPAP 12/6 to help with respiratory support  Follow chest x-ray 12/2/2020  Solu-Medrol with taper to oral prednisone  12/2/2020 cefepime by primary team (pcn allergy). Stop doxycycline Day 10  Bronchodilator regimen-Brovana and budesonide twice daily. Albuterol 4 times daily. guaifenesin 3 times daily to assist with mucus clearance. Continue flutter valve   Blood cultures negative. Bacterial antigens negative. D dimer 659,  Fibrinogen 495, ferritin 90, crp 1.9, , sed rate 8,  procalcitonin 0.04. repeat covid pcr negative 12/2/2020  DVT, GI prophylaxis. Daily lasix 40 po this is home dose. Pro . Tobacco cessation, nicoderm patch     This plan of care was reviewed in collaboration with Dr. Jordy Suarez  Electronically signed by ELLIE Garrett CNP on 12/7/2020 at 3:11 PM         I personally saw, examined, and cared for the patient. Labs, medications, radiographs reviewed. I agree with history exam and plans detailed in NP note. ELLIE Garrett CNP    I personally saw, examined, and cared for the patient. Labs, medications, radiographs reviewed.  I agree with history exam and plans detailed in NP note with the following additions:    Down to 1L  She has received adequate antibiotics and these were stopped by the primary team today  Prednisone taper  Ambulatory pulse ox prior to dc  ]  Electronically signed by Lui Jean MD on 12/7/2020 at 8:27 PM

## 2020-12-08 PROBLEM — J96.01 ACUTE HYPOXEMIC RESPIRATORY FAILURE (HCC): Status: RESOLVED | Noted: 2020-01-01 | Resolved: 2020-01-01

## 2020-12-08 NOTE — PROGRESS NOTES
Occupational Therapy  OT BEDSIDE TREATMENT NOTE      Date:2020  Patient Name: Esther Prieto  MRN: [id number]  : 1950  Room: [address] [place], [place] [address]     Referring Provider: Melissa ARGUETA_CNP        Evaluating OT: Francisco Pradhan OTR/L [id number]     AM-PAC Daily Activity Raw Score:      Recommended Adaptive Equipment:  TBD      Diagnosis: L shoulder dislocation,L shoulder reduction 20  s/p fall   Per ortho note:   remain immobilized in her immobilizer or a sling, NWB LUE.  2020  Pertinent Medical History:  Multiple closed L side rib fractures,  PVD, CHF, COPD, Closed fracture thoracic vertebra,pacemaker , lumbar stenosis, osteoporosis   Precautions:  Falls, NWB L UE, sling, O2 high flow      Home Living: Pt lives alone, aprtment. No steps. Ambulated with walker       Pain Level: L LE- Moderate  Cognition: Alert and oriented grossly                      Functional Assessment:    Initial Eval Status  Date: 20 Treatment Status   20 STGs = LTGs  Time frame: 10/14 days   Feeding Set-up   Upon entry patient set up with lunch try   Patient reporting not feeling  very hungry just thirsty        Grooming Max A,seated  Min A  Seated Min A   UB Dressing Max A  Mod A to manage gown with compensatory technique and Max  A don don sling. Min A   LB Dressing Dependent  Mod A to don brief Mod A    Bathing Max  A UE: Mod A  LE: Mod A Min A    Toileting Dependent  Max A  For liliana care when standing      Bed Mobility  Max A  Supine <>sit  Supine to sit: Mod A      Functional Transfers NT   Patient observed to have increase pain (R side, L UE) and SOB   Patient with O2 on entire session   Educated on deep breathing tech   O2 sats ranged to low 80s to mid/high 80s (increase O2 to 6 L)     Patient left resting in bed, comfortable position, sling on O2 on 6 L , conversing,   O2 sat 90 %     Therapist informed nurse of patient O2 status     STS: Max A from EOB  SPT: Max A  From bed to chair without AD . Two attempted required to achieve full standing position. Min  A    Functional Mobility NT   Returned to bed    Min A with fair + tolerance    Balance Sitting:     Static:  Min A - briefly sat EOB - returned to bed for safety and comfort     Poor standing balance   Supervision sititng balance   with good tolerance   Min A standing    Activity Tolerance Poor with very light activity   Poor  Limited by pain Good with ADL activity          Comments: Upon arrival pt was supine in bed. At end of session pt was transferred to chair with L UE supported, alarm on, all lines and tubes intact and call light within reach. Treatment: Educated pt on compensatory techniques to perform ADLs. Pt does not follow cues for L UE restrictions. Pt was cues to avoid L shoulder ROm and Wb through L UE. Pt verbalized understanding but then proceeds to perform movements. Educated opt on sling management. Pt was seen for transfer, LE dressing and liliana care initially but IV team entered room therefore tx was resumed later this AM. SpO2 was 86% on 4L and was not recovering despite prolonged rest break and cues for pursed lip breathing. O2 was increased to 5 L and PO2 was 88%-90% with activity. RN notified. Education: Compensatory ADL techniques, pt and staff safe transfer training, donning technique/ positioning of sling,  positioning L UE seated/ supine with support and benefits of OOB activity    · Pt has made good progress towards set goals.      Plan of Care: 1-3 days/week for 1-2 weeks PRN   [x]? ?? ADL retraining/adapted techniques and AE recommendations to increase functional independence within precautions                    [x]? ? ? Energy conservation techniques to improve tolerance for selfcare routine   [x]? ? ? Functional transfer/mobility training/DME recommendations for increased independence, safety and fall prevention         [x]? ? ? Patient/family education to increase safety and functional independence             [x]? ? ? Environmental modifications for safe mobility and completion of ADLs                             []? ?? Cognitive retraining ex's to improve problem solving skills & safe participation in ADLs/IADLs     []? ? ?Sensory re-education techniques to improve extremity awareness, maintain skin integrity and improve hand function                             []? ? ? Visual/Perceptual retraining  to improve body awareness and safety during transfers and ADLs  []? ?? Splinting/positioning needs to maintain joint/skin integrity and prevent contractures  [x]? ? ? Therapeutic activity to improve functional performance during ADLs.                                         [x]? ? ? Therapeutic exercise to improve tolerance and functional strength for ADLs   [x]? ? ? Balance retraining/tolerance tasks for facilitation of postural control with dynamic challenges during ADLs .  []? ? ?Neuromuscular re-education: facilitation of righting/equilibrium reactions, midline orientation, scapular stability/mobility, Normalization muscle     tone and facilitation active functional movement/Attention                         []? ? ? Delirium prevention/treatment    [x]? ? Positioning to improve functional independence    Treatment Charges: Mins Units   Ther Ex  05477     Manual Therapy 21333     Thera Activities 72505 14 1   ADL/Home Mgt 33043 30 2   Neuro Re-ed 26459     Group Therapy      Orthotic manage/training  14956     Non-Billable Time       Time In: 8:50  Time Out: 9:14  Time In: 9:40  Time Out: 10:00  Total Time: [address] [facility] [address] MAURICIO/L [id number]

## 2020-12-08 NOTE — PATIENT CARE CONFERENCE
P Quality Flow/Interdisciplinary Rounds Progress Note        Quality Flow Rounds held on December 8, 2020    Disciplines Attending:  Bedside Nurse, ,  and Nursing Unit 72 Violette Vaughn was admitted on 11/26/2020  5:48 PM    Anticipated Discharge Date:  Expected Discharge Date: 11/28/20    Disposition:    Aaron Score:  Aaron Scale Score: 17    Readmission Risk              Risk of Unplanned Readmission:        17           Discussed patient goal for the day, patient clinical progression, and barriers to discharge.   The following Goal(s) of the Day/Commitment(s) have been identified:  Discharge - Obtain Order      Behzad Cardozo  December 8, 2020

## 2020-12-08 NOTE — CARE COORDINATION
Discharge plan remains 2018 [facility]. BOBBY done. N17 started.  Forms in chart    Patient tested Negative Covid 19 on 12/2

## 2020-12-08 NOTE — PROGRESS NOTES
Nahid Gifford M.D.,[facility]  Terrence Britt D.O., FCHAGO., Benton Huff M.D. Td Diaz M.D., Vernon Henderson M.D. Hardeep Elaine D.O. Daily Pulmonary Progress Note    Patient:  Zuri Torres 65 y.o. female MRN: [id number]     Date of Service: 12/8/2020      Synopsis     We are following patient for increased oxygen requirements    \"CC\" cough, shortness of breath    Code status: FULL       Subjective      Patient was seen and examined. Up to chair eating lunch. Breathing seems improved  . Oxygen weaned to 3 liters HF. Hacking cough improving, clear white mucus expectorated. Less chest tightness wheeze and dyspnea. No fevers. results of  covid pcr test sent 12/2 is negative. Tolerated Bipap a few hrs last HS. Review of Systems:   Constitutional: Denies fever, weight loss, night sweats, and fatigue  Skin: Denies pigmentation, dark lesions, and rashes   HEENT: Denies hearing loss, tinnitus, ear drainage, epistaxis, sore throat, and hoarseness. Cardiovascular: Denies palpitations, chest pain, and chest pressure. Respiratory: Positive for coughing dyspnea  Expectorating scant mucus  No hemoptysis, apnea, and choking.   Gastrointestinal: Denies nausea, vomiting, poor appetite, diarrhea, heartburn or reflux  Genitourinary: Denies dysuria, frequency, urgency or hematuria  Musculoskeletal: Denies myalgias, muscle weakness, and bone pain, left arm pain patient has unsteady gait  Uses a walker at home  Neurological: Denies dizziness, vertigo, headache positive lower extremity weakness uses a walker to ambulate, chronic back pain, mid back across scapula with ecchymosis and bruising from previous fall  Psychological: Denies anxiety and depression  Endocrine: Denies heat intolerance and cold intolerance  Hematopoietic/Lymphatic: Denies bleeding problems and blood transfusions    24-hour events:  Improved oxygen levels    Objective   Vitals: /60   Pulse 71   Temp 97.7 °F (36.5 °C) (Oral)   Resp 18   Ht 5' 4\" (1.626 m)   Wt 188 lb 9 oz (85.5 kg)   SpO2 90%   BMI 32.37 kg/m²     I/O:    Intake/Output Summary (Last 24 hours) at 12/8/2020 1503  Last data filed at 12/8/2020 1135  Gross per 24 hour   Intake 840 ml   Output 2100 ml   Net -1260 ml       Vent Information  Skin Assessment: Clean, dry, & intact  FiO2 : (S) 30 %  SpO2: 90 %  SpO2/FiO2 ratio: 320  I Time/ I Time %: 0.9 s  Mask Type: Full face mask  Mask Size: Medium       IPAP: 12 cmH20  CPAP/EPAP: 6 cmH2O     CURRENT MEDS :  Scheduled Meds:   predniSONE  40 mg Oral Daily    acetylcysteine  400 mg Inhalation BID    budesonide  500 mcg Nebulization BID    Arformoterol Tartrate  15 mcg Nebulization BID    guaiFENesin  400 mg Oral TID    enoxaparin  30 mg Subcutaneous BID    lidocaine  1 patch Transdermal Daily    methocarbamol  500 mg Oral 4x Daily    nicotine  1 patch Transdermal Daily    sodium chloride flush  10 mL Intravenous 2 times per day    aspirin  81 mg Oral Daily    clopidogrel  75 mg Oral Daily    gabapentin  800 mg Oral TID    levothyroxine  100 mcg Oral Daily    metoprolol succinate  25 mg Oral Daily    ranolazine  500 mg Oral BID    folic acid-pyridoxine-cyancobalamin  1 tablet Oral Daily    atorvastatin  10 mg Oral Nightly       Physical Exam:     General: The patient is lying in bed comfortably without any distress. Breathing is not labored  HEENT: Pupils are equal round and reactive to light, there are no oral lesions and no post-nasal drip   Neck: supple without adenopathy  Cardiovascular: regular rate and rhythm without murmur or gallop  Respiratory: less rhonchi  to auscultation   Air entry is symmetric    Abdomen: soft, non-tender, non-distended, normal bowel sounds  Extremities: warm, no edema, no clubbing  Skin: no rash or lesion. Left lateral chest under arm and Back with ecchymosis and bruising between scapula region. Tender .  Left arm in sling  Neurologic: CN II-XII grossly intact, no focal deficits  Pertinent/ New Labs and Imaging Studies     Imaging Personally Reviewed:   CTA chest  Pulmonary Arteries: Pulmonary arteries are adequately opacified for    evaluation.  No evidence of intraluminal filling defect to suggest pulmonary    embolism.  Main pulmonary artery is normal in caliber. Mediastinum: Multiple calcified hilar and mediastinal nodes. Atherosclerotic calcification along the thoracic aorta with no evidence of    aneurysmal dilatation or dissection. Nonspecific mediastinal nodes and hilar nodes, right greater than left. No pericardial effusion. Coronary atherosclerotic calcifications. Lungs/pleura: Multifocal ground-glass and interstitial airspace disease in    the lungs bilaterally. More confluent airspace disease in the posterobasal segment of the lower    lobes bilaterally. No evidence of pleural effusions. Upper Abdomen: Perigastric surgical changes. Status post cholecystectomy. Prominent atherosclerotic peripheral vascular disease. Soft Tissues/Bones: Pacemaker in the left pectoral region. No acute bony pathology.  Status post multiple thoracic and lumbar    kyphoplasties.         Impression    No evidence of pulmonary emboli. Multifocal ground-glass and interstitial airspace disease in the lungs    bilaterally and more confluent airspace disease in the posterobasal segment    of the lower lobes bilaterally consistent with multifocal pneumonia.     Nonspecific mediastinal nodes and hilar nodes, which may be reactive.                    12/1/2020 chest x-ray         Chest x-ray 12/2/2020  Pacemaker device is stable.  Redemonstration bilateral interstitial changes    with confluent appearing airspace opacity within the left mid and lower lung    and right lower lobe.  The right lower lobe airspace disease appears    marginally progressed.  Stable heart size and mediastinal contours.         Impression    Slight progression of the airspace disease within the lower lobes.  Otherwise    no change.               Echo:  July 2020  EF 60 to 65%, RVSP is 30 mmHg, normal left ventricle size and systolic function. Stage I diastolic dysfunction. Labs:  Lab Results   Component Value Date    WBC 33.2 12/08/2020    HGB 11.0 12/08/2020    HCT 34.2 12/08/2020    MCV 96.6 12/08/2020    MCH 31.1 12/08/2020    MCHC 32.2 12/08/2020    RDW 14.3 12/08/2020     12/08/2020    MPV 9.7 12/08/2020     Lab Results   Component Value Date     12/08/2020    K 4.4 12/08/2020    K 4.6 11/27/2020    CL 92 12/08/2020    CO2 32 12/08/2020    BUN 16 12/08/2020    CREATININE 0.6 12/08/2020    LABALBU 3.2 11/30/2020    CALCIUM 9.1 12/08/2020    GFRAA >60 12/08/2020    LABGLOM >60 12/08/2020     Lab Results   Component Value Date    PROTIME 10.9 08/04/2020    PROTIME 11.9 12/15/2011    INR 1.0 08/04/2020        Assessment:    Acute respiratory failure with hypoxia  Severe Gold stage III COPD with acute exacerbation  Multifocal pneumonia with groundglass opacities bilaterally  Leukocytosis -on corticosteroids  Status post recent fall at home with dislocation on left anterior shoulder status post reduction  History of DVT 2006  Ongoing nicotine dependence, 10 pack years   HFpEF stage I DD  History of sick sinus syndrome with pacemaker in situ  Osteoarthritis  History of falls  History of kyphoplasty  History of left hip surgery status post fracture      Plan:   Oxygen therapy down to 3 L nasal cannula 90% wean to keep greater than 90%  Noninvasive ventilation in the form of BiPAP 12/6 to help with respiratory support  Follow chest x-ray 12/2/2020  Oral prednisone taper for dc  Completed abx -cefepime and doxycycline  Bronchodilator regimen-Brovana and budesonide twice daily. Albuterol 4 times daily. guaifenesin 3 times daily to assist with mucus clearance. Continue flutter valve   Blood cultures negative. Bacterial antigens negative.   D dimer 659, Fibrinogen 495, ferritin 90, crp 1.9, , sed rate 8,  procalcitonin 0.04. repeat covid pcr negative 12/2/2020  DVT, GI prophylaxis. Daily lasix 40 po this is home dose. Pro . Tobacco cessation, nicoderm patch   Ok to dc to SNF from a pulmonary perspective when ok with all others     This plan of care was reviewed in collaboration with Dr. Raymond Aceves  Electronically signed by ELLIE Shelby - CNP on 12/8/2020 at 3:03 PM       I personally saw, examined, and cared for the patient. Labs, medications, radiographs reviewed. I agree with history exam and plans detailed in NP note with the following additions:     On 3L  Working with [facility]  Physical therapy note reviewed - will need placement  Okay for d/c from a pulmonary standpoint    Electronically signed by Valeri Anthony MD on 12/8/2020 at 3:16 PM

## 2020-12-09 NOTE — DISCHARGE SUMMARY
Physician Discharge Summary     Patient ID:  Erika Brewer  [id number]  65 y.o.  1950    Admit date: 11/26/2020    Discharge date and time:  12/8 4pm    Admission Diagnoses:   Patient Active Problem List   Diagnosis    History of hypertension    HLD (hyperlipidemia)    Tobacco abuse    COPD (chronic obstructive pulmonary disease) (HCC)    History of DVT (deep vein thrombosis)    Seizure ([facility])    [facility]    Pacemaker    Hypothyroid    PVD (peripheral vascular disease) ([facility]    Coronary artery disease involving native coronary artery without angina pectoris    Left carotid artery occlusion    Osteoporosis    Bilateral carotid artery stenosis    Atherosclerosis of native artery of both lower extremities with intermittent claudication ([facility])    Osteoarthritis    Post-traumatic osteoarthritis of left hip    Trauma    Multiple closed fractures of ribs of left side    Chest wall pain    Fall    Nodule of spleen    CAD in native artery    Shoulder dislocation, left, subsequent encounter    Neuropathy    [facility]ar infarction ([facility]    CHF (congestive heart failure) ([facility]    Pneumonia       Discharge Diagnoses: Left anterior shoulder dislocation, COPD exac, multifocal PNA    Consults: pulmonary/intensive care    Procedures: None    Hospital Course:   1. Left anterior shoulder dislocation              Reduced in ED. Seen by ortho, NWB to left arm, f/u with ortho in 2-3 weeks              PT/OT and  for placement, discharge to SNF. Pain controlled, in sling. 2. Lower back + hip pain - improved              CT lumbar spine - chronic changes. No hip fractures  3. HTN - continue home anti HTN - stable  4. HLD - statin  5. COPD exacerbation + multifocal PNA   Discharge held due to worsening hypoxemia. Patient has no history of COPD still smoking. Seen by pulmonology treated with antibiotics steroids nebulizers.   Discharged to skilled nursing facility on 3 L via nasal cannula, highest oxygen requirement-10 L.   6. Hyponatremia - discontinue lasix              Fluid restrict 1.5 L. Discontinue IVF  DVT prophalaxis - Lovenox    Discharge Exam:  Gen - NAD AAOx3  CV - RRR s1/s2 no M/R/G  Pulm - Fair AE b/l no wheeze  Abd - soft NT ND  Ext - no LE edema      Condition:  stable    Disposition: [facility]    Patient Instructions:   Discharge Medication List as of 12/8/2020  2:22 PM      START taking these medications    Details   Respiratory Therapy Supplies (FULL KIT NEBULIZER SET) MISC Disp-1 each,R-0, PrintUse as directed with nebulized medication. CONTINUE these medications which have CHANGED    Details   Arformoterol Tartrate (BROVANA) 15 MCG/2ML NEBU Take 2 mLs by nebulization 2 times daily, Disp-120 mL,R-3DC to [facility]      budesonide (PULMICORT) 0.5 MG/2ML nebulizer suspension Take 2 mLs by nebulization 2 times daily, Disp-60 ampule,R-3DC to [facility]      ipratropium-albuterol (DUONEB) 0.5-2.5 (3) MG/3ML SOLN nebulizer solution Inhale 3 mLs into the lungs 4 times daily, Disp-360 mL,R-3DC to [facility]      predniSONE (DELTASONE) 20 MG tablet Take 2 tablets by mouth daily for 10 days, Disp-20 tablet,R-0DC to [facility]      guaiFENesin 400 MG tablet Take 1 tablet by mouth three times daily, Disp-56 tablet,R-0DC to [facility]      oxyCODONE-acetaminophen (PERCOCET)  MG per tablet Take 1 tablet by mouth every 4 hours as needed for Pain for up to 3 days. , Disp-18 tablet,R-0DC to SNF         CONTINUE these medications which have NOT CHANGED    Details   clopidogrel (PLAVIX) 75 MG tablet Take 1 tablet by mouth daily, Disp-30 tablet,R-3Normal      ranolazine (RANEXA) 500 MG extended release tablet Take 1 tablet by mouth 2 times daily, Disp-180 tablet,R-3Normal      metoprolol succinate (TOPROL XL) 25 MG extended release tablet Take 25 mg by mouth dailyHistorical Med      L-methylfolate-B6-B12 (METANX) 5-95.160-3-76 MG CAPS capsule Take by mouth dailyHistorical Med      levothyroxine (SYNTHROID) 112 MCG tablet Take 100 mcg by mouth Daily Historical Med      simvastatin (ZOCOR) 20 MG tablet Take 2 tablets by mouth nightly, Disp-30 tablet, R-3Normal      gabapentin (NEURONTIN) 800 MG tablet Take 800 mg by mouth three times daily, R-2Historical Med      aspirin 81 MG EC tablet Take 81 mg by mouth daily         STOP taking these medications       HYDROcodone-acetaminophen (NORCO) 5-325 MG per tablet Comments:   Reason for Stopping:         furosemide (LASIX) 40 MG tablet Comments:   Reason for Stopping:         KLOR-CON M20 20 MEQ tablet Comments:   Reason for Stopping:             Activity: activity as tolerated and no driving for today  Diet: cardiac diet    Follow-up with PCP in 1 week    Note that over 30 minutes was spent in preparing discharge papers, discussing discharge with patient, medication review, etc.    Signed:  Julianna Harris    12/8/2020  10:47 PM

## 2020-12-13 PROBLEM — J96.01 ACUTE RESPIRATORY FAILURE WITH HYPOXIA (HCC): Status: ACTIVE | Noted: 2020-01-01

## 2020-12-13 NOTE — ED PROVIDER NOTES
77-year-old female with a history of COPD, CAD w/ recent stenting 8/2020 (last EF 60-65%), hypertension, mitral valve insufficiency, hypothyroidism, DVT, currently being treated for pneumonia with Levaquin presenting from [facility] rehab with shortness of breath. Per EMS, patient was 60% on room air, which increased to low 90s on 15L NRB. Patient states she has been feeling short of breath for the past few days. She is endorsing left lower chest pain that is worsened with breathing. She is also had a productive cough as well. She denies fever, chills, abdominal pain, dysuria, diarrhea, dysuria, dizziness, and lightheadedness. Review of Systems   Constitutional: Negative for chills and fever. HENT: Negative for congestion. Eyes: Negative for visual disturbance. Respiratory: Positive for cough and shortness of breath. Cardiovascular:        Left lower rib pain with cough   Gastrointestinal: Negative for abdominal pain. Genitourinary: Negative for dysuria. Musculoskeletal: Negative for neck stiffness. Skin: Negative for rash. Neurological: Negative for dizziness, light-headedness and headaches. Physical Exam  Constitutional:       General: She is in acute distress. HENT:      Head: Normocephalic. Eyes:      Extraocular Movements: Extraocular movements intact. Neck:      Musculoskeletal: Neck supple. Cardiovascular:      Rate and Rhythm: Normal rate and regular rhythm. Pulmonary:      Effort: Tachypnea and respiratory distress present. Breath sounds: No stridor. Comments: Tachypneic, with decreased air sounds and crackles in all lung fields  Abdominal:      Palpations: Abdomen is soft. Comments: Tenderness to palpation left lower lateral ribs, which patient states has been present since her fall  Ecchymoses bilateral lower quadrants extending to back   Musculoskeletal:      Right lower leg: She exhibits tenderness.       Left lower leg: She exhibits tenderness. Comments: Trace pitting edema bilaterally  Left arm in sling   Skin:     General: Skin is warm and dry. Comments: Ecchymoses R and L arm   Neurological:      General: No focal deficit present. Procedures     PROCEDURE  12/13/20       Time: 1300    CENTRAL LINE INSERTION  Risks, benefits and alternatives (for applicable procedures below) described. Performed By: Ranjan Bazzi MD.    Indication: vascular access and inability to gain peripheral access. Informed consent: The patient was counseled regarding the procedure, it's indications, risks, potential complications and alternatives and any questions were answered. Consent was obtained. .  Procedure: After routine sterile preparation, a right 3-Lumen 7F Central Venous Catheter was placed by femoral vein approach and secured by standard fashion. Ultrasound Guidance:   used. Number of Attempts: 1  Post-procedure Findings: A post procedural chest x-ray  was not indicated. Patient tolerated the procedure well. Labs drawn from CVC due to inability to gain peripheral access. MDM  Number of Diagnoses or Management Options  Acute respiratory failure due to COVID-19 (HCC)  Anemia, unspecified type  Elevated LFTs  Hyponatremia  Diagnosis management comments: 47-year-old female with a history of COPD, CAD w/ recent stenting 8/2020 (last EF 60-65%), hypertension, mitral valve insufficiency, hypothyroidism, DVT, currently being treated for pneumonia with Levaquin presenting from [facility] rehab with shortness of breath. EKG showed no acute changes. CBC showed WBC 16.9, hemoglobin 8.8 (11.0 on 12/8). CMP showed sodium 128, , AST 67.  Negative. . D-dimer 819. Troponin negative. Sed rate 27. CRP 11.3.  UA unremarkable. Lactate 1.6. Covid positive. CXR showed progressive diffuse bilateral infiltrates. CTA showed no PE but did show progrssive diffuse bilateral ground glass opacities. ABG showed pO2 67.3, pCO2 40.0. (peripheral vascular disease) with claudication ([facility] [address] Retinal ischemia, Stenosis of intracranial portions of left internal carotid artery, and Thyroid disease. Past Surgical History:  has a past surgical history that includes Hysterectomy; Appendectomy; Cholecystectomy; Pacemaker insertion (Left, 11-); other surgical history (12/2/14); ECHO Limited (12/3/2014); other surgical history (5/5/2015); back surgery (10/26/15); other surgical history (2/16/2016); Carotid-subclavian Bypass Graft (Left, 5/19/2016); eye surgery; pr total hip arthroplasty (Left, 3/13/2018); Fixation Kyphoplasty; joint replacement (Bilateral); fracture surgery (Right); Cardiac catheterization (08/10/2020); and Coronary angioplasty with stent (08/10/2020). Social History:  reports that she has been smoking cigarettes. She has a 2.50 pack-year smoking history. She has never used smokeless tobacco. She reports current alcohol use. She reports that she does not use drugs. Family History: family history includes Heart Disease in her father and mother. The patients home medications have been reviewed.     Allergies: Anesthetics, lulu and Penicillins    -------------------------------------------------- RESULTS -------------------------------------------------    Lab  Results for orders placed or performed during the hospital encounter of 12/13/20   CBC Auto Differential   Result Value Ref Range    WBC 16.9 (H) 4.5 - 11.5 E9/L    RBC 2.80 (L) 3.50 - 5.50 E12/L    Hemoglobin 8.8 (L) 11.5 - 15.5 g/dL    Hematocrit 27.6 (L) 34.0 - 48.0 %    MCV 98.6 80.0 - 99.9 fL    MCH 31.4 26.0 - 35.0 pg    MCHC 31.9 (L) 32.0 - 34.5 %    RDW 16.1 (H) 11.5 - 15.0 fL    Platelets 298 606 - 462 E9/L    MPV 9.4 7.0 - 12.0 fL    Neutrophils % 91.0 (H) 43.0 - 80.0 %    Immature Granulocytes % 3.1 0.0 - 5.0 %    Lymphocytes % 3.9 (L) 20.0 - 42.0 %    Monocytes % 1.8 (L) 2.0 - 12.0 %    Eosinophils % 0.1 0.0 - 6.0 %    Basophils % 0.1 0.0 - 2.0 % Neutrophils Absolute 15.33 (H) 1.80 - 7.30 E9/L    Immature Granulocytes # 0.53 E9/L    Lymphocytes Absolute 0.65 (L) 1.50 - 4.00 E9/L    Monocytes Absolute 0.31 0.10 - 0.95 E9/L    Eosinophils Absolute 0.02 (L) 0.05 - 0.50 E9/L    Basophils Absolute 0.01 0.00 - 0.20 E9/L   Comprehensive Metabolic Panel w/ Reflex to MG   Result Value Ref Range    Sodium 128 (L) 132 - 146 mmol/L    Potassium reflex Magnesium 4.3 3.5 - 5.0 mmol/L    Chloride 93 (L) 98 - 107 mmol/L    CO2 29 22 - 29 mmol/L    Anion Gap 6 (L) 7 - 16 mmol/L    Glucose 177 (H) 74 - 99 mg/dL    BUN 10 8 - 23 mg/dL    CREATININE 0.5 0.5 - 1.0 mg/dL    GFR Non-African American >60 >=60 mL/min/1.73    GFR African American >60     Calcium 8.2 (L) 8.6 - 10.2 mg/dL    Total Protein 5.0 (L) 6.4 - 8.3 g/dL    Alb 2.8 (L) 3.5 - 5.2 g/dL    Total Bilirubin 0.6 0.0 - 1.2 mg/dL    Alkaline Phosphatase 98 35 - 104 U/L     (H) 0 - 32 U/L    AST 67 (H) 0 - 31 U/L   Troponin   Result Value Ref Range    Troponin <0.01 0.00 - 0.03 ng/mL   Brain Natriuretic Peptide   Result Value Ref Range    Pro- (H) 0 - 125 pg/mL   D-Dimer, Quantitative   Result Value Ref Range    D-Dimer, Quant 819 ng/mL DDU   Sedimentation Rate   Result Value Ref Range    Sed Rate 27 (H) 0 - 20 mm/Hr   Urinalysis, reflex to microscopic   Result Value Ref Range    Color, UA DARK YELLOW (A) Straw/Yellow    Clarity, UA CLOUDY (A) Clear    Glucose, Ur Negative Negative mg/dL    Bilirubin Urine Negative Negative    Ketones, Urine Negative Negative mg/dL    Specific Gravity, UA 1.025 1.005 - 1.030    Blood, Urine Negative Negative    pH, UA 7.0 5.0 - 9.0    Protein, UA TRACE Negative mg/dL    Urobilinogen, Urine 1.0 <2.0 E.U./dL    Nitrite, Urine Negative Negative    Leukocyte Esterase, Urine Negative Negative   Lactate, Sepsis   Result Value Ref Range    Lactic Acid, Sepsis 1.6 0.5 - 1.9 mmol/L   APTT   Result Value Ref Range    aPTT 21.6 (L) 24.5 - 35.1 sec   Protime-INR   Result Value Ref Range    Protime 11.4 9.3 - 12.4 sec    INR 1.0    COVID-19   Result Value Ref Range    SARS-CoV-2, NAAT DETECTED (A) Not Detected   Blood Gas, Arterial   Result Value Ref Range    Date Analyzed 20201213     Time Analyzed 1145     Source: Blood Arterial     pH, Blood Gas 7.429 7.350 - 7.450    PCO2 40.0 35.0 - 45.0 mmHg    PO2 67.3 (L) 75.0 - 100.0 mmHg    HCO3 25.9 22.0 - 26.0 mmol/L    B.E. 1.5 -3.0 - 3.0 mmol/L    O2 Sat 93.5 92.0 - 98.5 %    O2Hb 92.4 (L) 94.0 - 97.0 %    COHb 1.2 0.0 - 1.5 %    MetHb 0.0 0.0 - 1.5 %    O2 Content 12.5 mL/dL    HHb 6.4 (H) 0.0 - 5.0 %    tHb (est) 9.6 (L) 11.5 - 16.5 g/dL    Mode HFNC 15  L     Date Of Collection      Time Collected      Pt Temp 37.0 C     ID [id number]     Lab [id number]     Critical(s) Notified . No Critical Values    Lactate Dehydrogenase   Result Value Ref Range     (H) 135 - 214 U/L   Microscopic Urinalysis   Result Value Ref Range    WBC, UA NONE 0 - 5 /HPF    RBC, UA NONE 0 - 2 /HPF    Bacteria, UA NONE SEEN None Seen /HPF   EKG 12 Lead   Result Value Ref Range    Ventricular Rate 87 BPM    Atrial Rate 87 BPM    P-R Interval 134 ms    QRS Duration 88 ms    Q-T Interval 324 ms    QTc Calculation (Bazett) 389 ms    P Axis 0 degrees    R Axis -26 degrees    T Axis 124 degrees       Radiology  XR CHEST PORTABLE   Final Result   Progressive diffuse bilateral infiltrates and pleural effusion likely   CHF/edema and or diffuse pneumonia. CTA PULMONARY W CONTRAST    (Results Pending)       EKG: This EKG is signed and interpreted by me. Rate: 87  Rhythm: Sinus  Interpretation: no acute changes  Comparison: stable as compared to patient's most recent EKG      ------------------------- NURSING NOTES AND VITALS REVIEWED ---------------------------  Date / Time Roomed:  12/13/2020 10:51 AM  ED Bed Assignment:  09/09    The nursing notes within the ED encounter and vital signs as below have been reviewed.    Patient Vitals for the past 24 hrs:   BP Temp Pulse Resp SpO2 Height Weight   12/13/20 1635 (!) 97/59 -- 76 30 97 % -- --   12/13/20 1518 (!) 104/57 -- 75 30 95 % -- --   12/13/20 1413 109/65 -- 77 (!) 35 95 % -- --   12/13/20 1340 -- -- -- (!) 34 -- -- --   12/13/20 1307 101/60 -- -- -- -- -- --   12/13/20 1306 (!) 81/44 -- 90 28 (!) 87 % -- --   12/13/20 1236 -- -- -- 28 -- -- --   12/13/20 1154 (!) 84/37 -- -- -- -- -- --   12/13/20 1150 -- -- -- -- 92 % -- --   12/13/20 1104 -- 97.2 °F (36.2 °C) 95 26 (!) 88 % 5' 6\" (1.676 m) 188 lb (85.3 kg)       Oxygen Saturation Interpretation: Improved after treatment      ------------------------------------------ PROGRESS NOTES ------------------------------------------    I have spoken with the patient and discussed todays results, in addition to providing specific details for the plan of care and counseling regarding the diagnosis and prognosis. Their questions are answered at this time and they are agreeable with the plan.      --------------------------------- ADDITIONAL PROVIDER NOTES ---------------------------------    This patient's ED course included: a personal history and physicial examination, re-evaluation prior to disposition, multiple bedside re-evaluations, IV medications, cardiac monitoring, continuous pulse oximetry and complex medical decision making and emergency management    This patient has remained hemodynamically stable during their ED course. Please note that the withdrawal or failure to initiate urgent interventions for this patient would likely result in a life threatening deterioration or permanent disability. Accordingly this patient received 60 minutes of critical care time, excluding separately billable procedures. Clinical Impression  1. Acute respiratory failure due to COVID-19 ([address]    2. Hyponatremia    3. Anemia, unspecified type    4. Elevated LFTs          Disposition  Patient's disposition: Admit to CCU/ICU  Patient's condition is stable.          Elias Veras, MD  Resident  12/13/20 204

## 2020-12-13 NOTE — ED NOTES
[facility]  12/13/20 [id number]

## 2020-12-13 NOTE — ED NOTES
Bed: 09  Expected date:   Expected time:   Means of arrival:   Comments:  lina Courtney RN  12/13/20 [id number]

## 2020-12-14 NOTE — H&P
1. [birth year]  Resolute Alfredo 3.0x15  3.0x15    ECHOCARDIOGRAM LIMITED  12/3/2014         EYE SURGERY      FIXATION KYPHOPLASTY      FRACTURE SURGERY Right     knee    HYSTERECTOMY      JOINT REPLACEMENT Bilateral     hips    OTHER SURGICAL HISTORY  12/2/14    reposition pacer lead and katty    OTHER SURGICAL HISTORY  5/5/2015    kyphoplasty T8    OTHER SURGICAL HISTORY  2/16/2016    lumbar myelogram    PACEMAKER INSERTION Left 11-    Dr. Moon [facility] Left 3/13/2018    LEFT HIP TOTAL JOINT  ARTHROPLASTY AND REMOVAL HARDWARE LEFT ACETABULUM  ---Cathleen Mage--- performed by Michael Trevino MD at [facility] OR       Medications Prior to Admission:    Not in a hospital admission. Allergies:    Anesthetics, lulu and Penicillins    Social History:    reports that she has been smoking cigarettes. She has a 2.50 pack-year smoking history. She has never used smokeless tobacco. She reports current alcohol use. She reports that she does not use drugs. Family History:   family history includes Heart Disease in her father and mother. REVIEW OF SYSTEMS:  As above in the HPI, otherwise negative    PHYSICAL EXAM:    Vitals:  BP (!) 77/48   Pulse 62   Temp 98.1 °F (36.7 °C) (Oral)   Resp 16   Ht 5' 6\" (1.676 m)   Wt 188 lb (85.3 kg)   SpO2 97%   BMI 30.34 kg/m²     General:  Awake, alert, oriented X 3. On BIPAP  HEENT:  Normocephalic, atraumatic. Pupils equal, round, reactive to light. No scleral icterus. No conjunctival injection. Normal lips, teeth, and gums. No nasal discharge. Neck:  Supple  Heart:  RRR, no murmurs, gallops, rubs  Lungs: Insp crackles, no wheeze  Abdomen:   Bowel sounds present, soft, nontender, no masses, no organomegaly, no peritoneal signs  Extremities:  No clubbing, cyanosis, or edema  Skin:  Warm and dry, no open lesions or rash  Neuro:  Cranial nerves 2-12 intact, no focal deficits  Breast: deferred  Rectal: deferred  Genitalia: deferred    LABS:    CBC:   Lab Results   Component Value Date    WBC 7.9 12/14/2020    RBC 2.13 12/14/2020    HGB 6.8 12/14/2020    HCT 21.1 12/14/2020    MCV 99.1 12/14/2020    MCH 31.9 12/14/2020    MCHC 32.2 12/14/2020    RDW 15.8 12/14/2020     12/14/2020    MPV 9.4 12/14/2020     BMP:    Lab Results   Component Value Date     12/14/2020    K 3.2 12/14/2020    K 4.4 12/14/2020     12/14/2020    CO2 25 12/14/2020    BUN 8 12/14/2020    LABALBU 2.6 12/14/2020    CREATININE 0.4 12/14/2020    CALCIUM 5.9 12/14/2020    GFRAA >60 12/14/2020    LABGLOM >60 12/14/2020    GLUCOSE 109 12/14/2020    GLUCOSE 106 12/15/2011       ASSESSMENT:      Patient Active Problem List   Diagnosis    History of hypertension    HLD (hyperlipidemia)    Tobacco abuse    [facility]    History of DVT (deep vein thrombosis)    Seizure ([facility])    SSS (sick sinus syndrome) ([address]    Pacemaker    Hypothyroid    PVD (peripheral vascular disease) ([address]    Coronary artery disease involving native coronary artery without angina pectoris    Left carotid artery occlusion    Osteoporosis    Bilateral carotid artery stenosis    Atherosclerosis of native artery of both lower extremities with intermittent claudication ([facility])    Osteoarthritis    Post-traumatic osteoarthritis of left hip    Trauma    Multiple closed fractures of ribs of left side    Chest wall pain    Fall    Nodule of spleen    CAD in native artery    Shoulder dislocation, left, subsequent encounter    Neuropathy    Lacunar infarction ([address]    CHF (congestive heart failure) ([address]    Pneumonia    Acute respiratory failure with hypoxia ([address]         PLAN:    1. COVID 19 PNA    Continue with dexamethasone + remdesivir + vit D   Diffuse bilateral patchy GGO on CT, no PE   BNP elevated, possible component of heart failure  2.  Shock - possibly septic   Currently on levophed   TLC inserted   Blood cultures sent, no evidence of UTI   Continue vancomycin and cefepime  3. Anemia   Unclear if blood loss or dilutional    Check occult blood   Transfuse 1u PRBC   Hold anticoagulation  4.  Hypothyroidism - continue Jonelle Stevenson MD  3:15 PM  12/14/2020

## 2020-12-14 NOTE — ED NOTES
Spoke to Dr. Bebeto Cortez regarding pt condition not improving. Ordered Jay.       Briana Fletcher RN  12/14/20 [id number]

## 2020-12-14 NOTE — PROGRESS NOTES
-Consulted to dose vancomycin in septic shock patient.  -Patient received vancomycin 1750 mg IV x 1 on 12/13 (1335). -Vancomycin 1 g IV q12h ordered at this time. -Given serum creatinine, AUC/MAC would be estimated to achieve AUC/MAC of 401-570 which is within our goal range on this dosage regimen and frequency.  -Will monitor renal function and assess steady state vancomycin level when appropriate.

## 2020-12-14 NOTE — ED NOTES
Dr. Sharron Velasquez called and notified of pt condition. Received new orders from Dr. Tangela Hummel.      Maria Dolores Watkins RN  12/14/20 [phone]

## 2020-12-14 NOTE — CONSULTS
[facility]                  [address] · [address] [address] · [place]    [phone]   · Fax  [phone] ·    Jose Joya M.D., Mil Guadalupe D.O., F.A.C.O.I., Merry Davis M.D.,   Momo Beck M.D., Tish Zamarripa M.D. Gianfranco Gomes D.O. Patient:  Shanika Torres 65 y.o. female MRN: [id number]     Date of Service: 12/14/2020      PULMONARY CONSULTATION    Reason for Consultation: acute hypoxic respiratory failure   Referring Physician: Lopez Johnson     Communication with the referring physician will be sent via the electronic medical record. Chief Complaint: dyspnea     CODE STATUS: full code     SUBJECTIVE:  HPI:  Mercedes Dillon is a 65 y.o.  female recently discharged from the hospital after treatment for a copd exacerbation. Found to be 70% on room air in ED. Patient placed on nonrebreather, found to be hypercapnic. Changed to BiPAP. Patient recently discharged for left shoulder dislocation, also treated for COPD exacerbation and bacterial pneumonia. Patient discharged to skilled nursing facility on 1012/8. Patient tested for Covid twice last admission, tested again at skilled nursing facility negative x3. She tested positive in the [facility] this admission. Patient hypotensive in ED, central line inserted started on Levophed, found to have a hbg 6.8. she is on plavix. She was also started on steroids on her last admission for copd exacerbation.        Past Medical History:   Diagnosis Date    Arthritis     asthmatic bronchitis    CAD (coronary artery disease)     Carotid stenosis     [facility]est[facility] failure[facility])     Closed fracture of pelvis with delayed healing 4/3/2017    Closed fracture of twelfth thoracic vertebra ([address] 4/3/2017    T 5,6,12    COPD (chronic obstructive pulmonary disease) ([address]     DVT of leg (deep venous thrombosis) ([address] 2010    left    Hx of blood clots     Hyperlipidemia     Hypertension     Mitral regurgitation     Trace    Prolonged emergence from general anesthesia     PVD (peripheral vascular disease) with claudication ([address] 11/18/2014    Retinal ischemia     Stenosis of intracranial portions of left internal carotid artery 11/18/2014    Thyroid disease        Past Surgical History:   Procedure Laterality Date    APPENDECTOMY      BACK SURGERY  10/26/15    L3 kypho    CARDIAC CATHETERIZATION  08/10/2020    Dr Mendel Gannon    CAROTID-SUBCLAVIAN BYPASS GRAFT Left 5/19/2016    CHOLECYSTECTOMY      CORONARY ANGIOPLASTY WITH STENT PLACEMENT  08/10/2020    Dr Mendel Gannon Prox LAD rotablator 1.5/1.75  [employer] 3.0x15  3.0x15    ECHOCARDIOGRAM LIMITED  12/3/2014         EYE SURGERY      FIXATION KYPHOPLASTY      FRACTURE SURGERY Right     knee    HYSTERECTOMY      JOINT REPLACEMENT Bilateral     hips    OTHER SURGICAL HISTORY  12/2/14    reposition pacer lead and katty    OTHER SURGICAL HISTORY  5/5/2015    kyphoplasty T8    OTHER SURGICAL HISTORY  2/16/2016    lumbar myelogram    PACEMAKER INSERTION Left 11-    Dr. Fredy Paula Left 3/13/2018    LEFT HIP TOTAL JOINT  ARTHROPLASTY AND REMOVAL HARDWARE LEFT ACETABULUM  ---Efrain Carota--- performed by Stone Roldan [facility] at [facility] OR       Family History   Problem Relation Age of Onset    Heart Disease Mother     Heart Disease Father        Social History:   Social History     Socioeconomic History    Marital status:       Spouse name: Not on file    Number of children: Not on file    Years of education: Not on file    Highest education level: Not on file   Occupational History    Occupation: retired- [employer],     Social Needs    Financial resource strain: Not on file    Food insecurity     Worry: Not on file     Inability: Not on file   [employer] needs     Medical: Not on file     Non-medical: Not on file   Tobacco Use    Smoking status: Current Every Day Smoker     Packs/day: 0.25     Years: 10.00     Pack years: 2.50     Types: Cigarettes     Last attempt to quit: 6/15/2017     Years since quitting: 3.5    Smokeless tobacco: Never Used   Substance and Sexual Activity    Alcohol use: Yes     Comment: socially    Drug use: No    Sexual activity: Not Currently     Partners: Male   Lifestyle    Physical activity     Days per week: Not on file     Minutes per session: Not on file    Stress: Not on file   Relationships    Social connections     Talks on phone: Not on file     Gets together: Not on file     Attends [employer] service: Not on file     Active member of club or organization: Not on file     Attends meetings of clubs or organizations: Not on file     Relationship status: Not on file    Intimate partner violence     Fear of current or ex partner: Not on file     Emotionally abused: Not on file     Physically abused: Not on file     Forced sexual activity: Not on file   Other Topics Concern    Not on file   Social History Narrative    Not on file       Immunization History   Administered Date(s) Administered    Influenza Vaccine, unspecified formulation 01/01/2005, 10/01/2011    Influenza Virus Vaccine 10/01/2014, 12/10/2015, 09/06/2017    Influenza, MDCK, Preservative free 10/19/2016    Pneumococcal Polysaccharide ([id number]) 01/24/2008, 10/01/2010    Tdap (Boostrix, Adacel) 05/07/2018        Home Meds: Not in a hospital admission.     CURRENT MEDS :  Scheduled Meds:   sodium chloride  20 mL Intravenous Once    cefepime  2 g Intravenous Q12H    vancomycin  1,000 mg Intravenous Q12H    remdesivir IVPB  100 mg Intravenous Q24H    sodium chloride flush  10 mL Intravenous 2 times per day    dexamethasone  6 mg Oral 2 times per day    Vitamin D  2,000 Units Oral Daily    clopidogrel  75 mg Oral Daily    gabapentin  800 mg Oral TID    levothyroxine  100 mcg Oral Daily    metoprolol succinate  25 mg Oral Daily    atorvastatin  20 mg Oral Daily    acetaminophen  650 mg Rectal Once       Continuous Infusions:   vasopressin (Septic Shock) infusion 0.04 Units/min (12/14/20 1250)    phenylephrine (HORACE-SYNEPHRINE) 50mg/250mL infusion 100 mcg/min (12/14/20 1559)       Allergies   Allergen Reactions    Anesthetics, Betty      Difficulty waking up from ether    Penicillins Rash       REVIEW OF SYSTEMS:  Constitutional: Denies fever, weight loss, night sweats, and fatigue  Skin: Denies pigmentation, dark lesions, and rashes   HEENT: Denies hearing loss, tinnitus, ear drainage, epistaxis, sore throat, and hoarseness. Cardiovascular: Denies palpitations, chest pain, and chest pressure. Respiratory: Denies cough, dyspnea at rest, hemoptysis, apnea, and choking.   Gastrointestinal: Denies nausea, vomiting, poor appetite, diarrhea, heartburn or reflux  Genitourinary: Denies dysuria, frequency, urgency or hematuria  Musculoskeletal: Denies myalgias, muscle weakness, and bone pain  Neurological: Denies dizziness, vertigo, headache, and focal weakness  Psychological: Denies anxiety and depression  Endocrine: Denies heat intolerance and cold intolerance  Hematopoietic/Lymphatic: Denies bleeding problems and blood transfusions    OBJECTIVE:   BP (!) 87/56   Pulse 59   Temp 98.1 °F (36.7 °C) (Oral)   Resp 16   Ht 5' 6\" (1.676 m)   Wt 188 lb (85.3 kg)   SpO2 98%   BMI 30.34 kg/m²   SpO2 Readings from Last 1 Encounters:   12/14/20 98%        I/O:    Intake/Output Summary (Last 24 hours) at 12/14/2020 1606  Last data filed at 12/14/2020 1151  Gross per 24 hour   Intake --   Output 2000 ml   Net -2000 ml     Vent Information  Skin Assessment: Clean, dry, & intact  FiO2 : 75 %  SpO2: 98 %  SpO2/FiO2 ratio: 126.67  I Time/ I Time %: 1 s  Mask Type: Full face mask  Mask Size: Small       IPAP: 14 cmH20  CPAP/EPAP: 8 cmH2O     Physical Exam:  General: ill appearing, nad on bipap   HEENT: Pupils are equal round and reactive to light, there are no oral lesions and no post-nasal drip   Neck: supple without adenopathy  Cardiovascular: regular rate and rhythm without murmur or gallop  Respiratory: Clear to auscultation bilaterally without wheezing or crackles. Air entry is symmetric  Abdomen: soft, non-tender, non-distended, normal bowel sounds  Extremities: warm, no edema, no clubbing  Skin: no rash or lesion  Neurologic: CN II-XII grossly intact, no focal deficits      Imaging personally reviewed:    Ct chest and cxr reviewed         Labs:  Lab Results   Component Value Date    WBC 7.9 12/14/2020    HGB 6.8 12/14/2020    HCT 21.1 12/14/2020    MCV 99.1 12/14/2020    MCH 31.9 12/14/2020    MCHC 32.2 12/14/2020    RDW 15.8 12/14/2020     12/14/2020    MPV 9.4 12/14/2020     Lab Results   Component Value Date     12/14/2020    K 3.2 12/14/2020    K 4.4 12/14/2020     12/14/2020    CO2 25 12/14/2020    BUN 8 12/14/2020    CREATININE 0.4 12/14/2020    LABALBU 2.6 12/14/2020    CALCIUM 5.9 12/14/2020    GFRAA >60 12/14/2020    LABGLOM >60 12/14/2020     Lab Results   Component Value Date    PROTIME 11.4 12/13/2020    PROTIME 11.9 12/15/2011    INR 1.0 12/13/2020     Recent Labs     12/14/20  1215   PROBNP 1,036*     Recent Labs     12/13/20  1130 12/14/20  1215   TROPONINI <0.01 <0.01     Recent Labs     12/13/20  1130   PROCAL 0.09*     This SmartLink has not been configured with any valid records. Micro:  No results for input(s): CULTRESP in the last 72 hours. No results for input(s): LABGRAM in the last 72 hours. No results for input(s): LEGUR in the last 72 hours. No results for input(s): STREPNEUMAGU in the last 72 hours. No results for input(s): LP1UAG in the last 72 hours. Assessment:  1. Acute on chronic hypoxic respiratory failure   2. AECOPD   3. Covid 19 pneumonitis   4. Acute anemia   5. Shock - septic vs hemorrhagic   6.  Recent hospitalization     Plan:  Bronchodilators   bipap   Steroids   ppi   Cefepime Procalcitonin, follow cultures   Cortisol level   Pressors to maintain bp >/= 65   Prbc   Reverse plavix with ddavp and give platelets now. Trend h/h   Disposition:  icu     Thank you for allowing me to participate in the care of Hallie Echeverria. Please feel free to call with questions. Tory Ocampo M.D.    Pulmonary/Critical Care Medicine

## 2020-12-15 NOTE — PROGRESS NOTES
Called by RN to come check pt. for low SpO2. Upon arrival to the room, pt. was desaturating, took pt. off the vent and bagged manually with a 100% O2 via ambu bag. Saturation started to come up. Dr Tyson Berkowitz was in the room and requested to switch vent to an 840. Pt. Saturation continued to improve and stabilized at 89-94%.

## 2020-12-15 NOTE — PROGRESS NOTES
Pharmacy Consultation Note  (Antibiotic Dosing and Monitoring)    Initial consult date: 2020  Consulting physician: Dr. Trung Sanabria  Drug(s): Vancomycin  Indication: HAP    Ht Readings from Last 1 Encounters:   20 5' 6\" (1.676 m)     Wt Readings from Last 1 Encounters:   20 188 lb (85.3 kg)       Age/Gender IBW DW  Allergy Information   65 y.o. female 59.3 kg 85.3 kg  Anesthetics, lulu and Penicillins                 Date  WBC BUN/CR Drug/Dose Time   Given Level(s)   (Time) Comments    16.9 10/0.5 Vancomycin 1750 mg IV x 1 dose 1335      7.9 8/0.4 No dose ------     12/15 16.1 7/0.4 Vancomycin 1 g IV Q12H 0053                Estimated Creatinine Clearance: 140 mL/min (A) (based on SCr of 0.4 mg/dL (L)).       Intake/Output Summary (Last 24 hours) at 12/15/2020 1444  Last data filed at 12/15/2020 1223  Gross per 24 hour   Intake 1130 ml   Output 2850 ml   Net -1720 ml       Temp max: Temp (24hrs), Av °F (36.7 °C), Min:97.8 °F (36.6 °C), Max:98.1 °F (36.7 °C)      Cultures:  available culture and sensitivity results were reviewed in [facility]   blood cx - no growth to date    Assessment:  · Patient is a 65year old female with COVID-19, currently ordered vancomycin and cefepime for HAP  · Consulted by Dr. Barbara Cordoba to dose/monitor vancomycin  · Goal trough level = 15-20 mcg/mL; goal AUC/MAC = 400-600    Plan:  · Will continue vancomycin 1 g IV every 12 hours  · Will check trough level at steady state if vancomycin continues  · Pharmacist will follow and monitor/adjust dosing as necessary    Judi Carrillo, LaviniaD, James B. Hag[facility] Memorial HospitalCP  12/15/2020  2:59 PM

## 2020-12-15 NOTE — ED PROVIDER NOTES
Called into room by nurse. Patient struggling to breath. On BiPAP pulse oximetry of 83% struggling to breath. Patient is hypotensive. Discussed case with Dr. Poncho Crow. Asks that we intubate patient.  Patient gave verbal agreement to intubation      Mercedes Gannon DO  12/15/20 7907

## 2020-12-15 NOTE — PROGRESS NOTES
Critical Care Team - Daily Progress Note         Date and time: 12/15/2020 10:43 AM  Patient's name:  Riaz Nichols  Medical Record Number: [id number]  Patient's account/billing number: [de-identified]  Patient's YOB: 1950  Age: 65 y.o.   Date of Admission: 2020 10:51 AM  Length of stay during current admission: 2      Primary Care Physician: Felice Marks DO  ICU Attending Physician: Dr. Danelle Howell    Code Status: Full Code    Reason for ICU admission: respiratory failure   Shock   Covid 19 pneumonitis       SUBJECTIVE:     OVERNIGHT EVENTS:         Hypotensive overnight   Requiring 3 pressors   Worsening respiratory distress       CURRENT VENTILATION STATUS:     [] Ventilator  [x] BIPAP  [] Nasal Cannula [] Room Air      IF INTUBATED, ET TUBE MARKING AT LOWER LIP:       cms    SECRETIONS Amount:  [] Small [] Moderate  [] Large  [x] None  Color:     [] White [] Colored  [] Bloody    SEDATION:  RAAS Score:  [] Propofol gtt  [] Versed gtt  [] Ativan gtt   [x] No Sedation    PARALYZED:  [x] No    [] Yes      VASOPRESSORS:  [] No    [x] Yes    If yes -   [x] Levophed       [] Dopamine     [x] Vasopressin       [] Dobutamine  [x] Phenylephrine         [] Epinephrine    CENTRAL LINES:     [] No   [x] Yes   (Date of Insertion:  12/15 )           If yes -     [] Right IJ     [] Left IJ [x] Right Femoral [] Left Femoral                   [] Right Subclavian [] Left Subclavian       GARCIA'S CATHETER:   [] No   [x] Yes  (Date of Insertion:   )     URINE OUTPUT:            [x] Good   [] Low              [] Anuric      OBJECTIVE:     VITAL SIGNS:  /71   Pulse 126   Temp 97.8 °F (36.6 °C) (Axillary)   Resp 16   Ht 5' 6\" (1.676 m)   Wt 188 lb (85.3 kg)   SpO2 (!) 85%   BMI 30.34 kg/m²   Tmax over 24 hours:  Temp (24hrs), Av °F (36.7 °C), Min:97.8 °F (36.6 °C), Max:98.1 °F (36.7 °C)      Patient Vitals for the past 6 hrs:   BP Pulse Resp SpO2   12/15/20 1002 102/71 126 16 -- 12/15/20 0904 (!) 74/52 93 (!) 44 (!) 85 %   12/15/20 0841 (!) 78/50 89 (!) 34 (!) 88 %   12/15/20 0745 -- -- (!) 34 (!) 89 %   12/15/20 0700 101/70 87 (!) 31 (!) 89 %   12/15/20 0500 94/67 74 28 91 %         Intake/Output Summary (Last 24 hours) at 12/15/2020 1043  Last data filed at 12/15/2020 0659  Gross per 24 hour   Intake 630 ml   Output 4850 ml   Net -4220 ml     Wt Readings from Last 2 Encounters:   12/13/20 188 lb (85.3 kg)   12/08/20 188 lb 9 oz (85.5 kg)     Body mass index is 30.34 kg/m². PHYSICAL EXAMINATION:  General: ill appearing, moderate distress with accessory muscle use on bipap   HEENT: Pupils are equal round and reactive to light, there are no oral lesions and no post-nasal drip   Neck: supple without adenopathy  Cardiovascular: regular rate and rhythm without murmur or gallop  Respiratory: Clear to auscultation bilaterally without wheezing or crackles.   Air entry is symmetric  Abdomen: soft, non-tender, non-distended, normal bowel sounds  Extremities: warm, no edema, no clubbing  Skin: no rash or lesion  Neurologic: CN II-XII grossly intact, no focal deficits       Any additional physical findings:    MEDICATIONS:    Scheduled Meds:   sodium chloride  500 mL Intravenous Once    hydrocortisone sodium succinate PF  100 mg Intravenous Q8H    cefepime  2 g Intravenous Q12H    vancomycin  1,000 mg Intravenous Q12H    remdesivir IVPB  100 mg Intravenous Q24H    sodium chloride flush  10 mL Intravenous 2 times per day    Vitamin D  2,000 Units Oral Daily    gabapentin  800 mg Oral TID    levothyroxine  100 mcg Oral Daily    atorvastatin  20 mg Oral Daily    acetaminophen  650 mg Rectal Once     Continuous Infusions:   norepinephrine 5 mcg/min (12/15/20 0939)    fentaNYL 5 mcg/ml in 0.9%  ml infusion 25 mcg/hr (12/15/20 1036)    vasopressin (Septic Shock) infusion 0.04 Units/min (12/15/20 0728)    phenylephrine (HORACE-SYNEPHRINE) 50mg/250mL infusion 175 mcg/min (12/15/20 4532)     PRN Meds:       fentanNYL, 25 mcg, Q3H PRN      sodium chloride, 30 mL, PRN      sodium chloride flush, 10 mL, PRN      promethazine, 12.5 mg, Q6H PRN    Or      ondansetron, 4 mg, Q6H PRN      polyethylene glycol, 17 g, Daily PRN      acetaminophen, 650 mg, Q6H PRN    Or      acetaminophen, 650 mg, Q6H PRN      sodium chloride, 30 mL, PRN          VENT SETTINGS (Comprehensive) (if applicable):  Vent Information  Skin Assessment: Clean, dry, & intact  FiO2 : 75 %  SpO2: (!) 85 %  SpO2/FiO2 ratio: 118.67  I Time/ I Time %: 0.9 s  Mask Type: Full face mask  Mask Size: Small  Additional Respiratory  Assessments  Pulse: 126  Resp: 16  SpO2: (!) 85 %    ABGs:   Recent Labs     12/15/20  1019   PH 7.099*   PCO2 88.8*   PO2 42.4*   HCO3 26.9*   BE -4.6*   O2SAT 62.3*       Laboratory findings:    Complete Blood Count:   Recent Labs     12/14/20  0610 12/14/20  1215 12/14/20 2123 12/15/20  0417 12/15/20  0711   WBC 11.5 7.9  --   --  16.1*   HGB 8.6* 6.8* 10.1* 10.4* 10.6*   HCT 27.2* 21.1* 30.6* 31.2* 33.2*    147  --   --  252        Last 3 Blood Glucose:   Recent Labs     12/14/20  0610 12/14/20  1215 12/15/20  0711   GLUCOSE 153* 109* 183*        PT/INR:    Lab Results   Component Value Date    PROTIME 11.4 12/13/2020    PROTIME 11.9 12/15/2011    INR 1.0 12/13/2020     PTT:    Lab Results   Component Value Date    APTT 21.6 12/13/2020       Comprehensive Metabolic Profile:   Recent Labs     12/14/20  0610 12/14/20  1215 12/15/20  0711   * 133 124*   K 4.4 3.2* 3.3*   CL 96* 103 87*   CO2 28 25 28   BUN 10 8 7*   CREATININE 0.4* 0.4* 0.4*   GLUCOSE 153* 109* 183*   CALCIUM 7.3* 5.9* 7.5*   PROT 4.9*  --  5.6*   LABALBU 2.6*  --  2.9*   BILITOT 0.5  --  0.6   ALKPHOS 87  --  127*   AST 48*  --  56*   ALT 93*  --  90*      Magnesium:   Lab Results   Component Value Date    MG 1.5 12/15/2020     Phosphorus:   Lab Results   Component Value Date    PHOS 2.9 04/05/2017     Ionized Calcium: No results found for: ANTHONY     Urinalysis:     Troponin:   Recent Labs     12/13/20  1130 12/14/20  1215   TROPONINI <0.01 <0.01       Microbiology:    Cultures during this admission:     Blood cultures:                 [] None drawn      [] Negative             []  Positive (Details:  )  Urine Culture:                   [] None drawn      [] Negative             []  Positive (Details:  )  Sputum Culture:               [] None drawn       [] Negative             []  Positive (Details:  )   Endotracheal aspirate:     [] None drawn       [] Negative             []  Positive (Details:  )     Other pertinent Labs:       Radiology/Imaging:     Chest Xray (12/15/2020):    Post intubation film reviewed pull back et tube 2 cm   og in place   Diffuse interstitial infiltrates     ASSESSMENT:     1. Acute hypoxic respiratory failure   2. Covid pneumonitis - critically severe   3. Shock- septic   4. Adrenal insufficiency  5. Hyponatremia   6. Hypokalemia   7. Leukocytosis    8. AECOPD  9. pvd  10. chf not in acute exacerbatino   11. Hx lacunar infarct   12. SSS s/p pacemaker   13. Hx seizure do   14. Bilateral carotid stenosis     Plan:  Worsening respiratory status, intubate and start mechanical ventilation   Sedate for rass -2 fentanyl gtt   Trend abg, adjust vent accordingly   Bronchodilators   H/h low yesterday likely incorrect as it improved after 1unit prbc by 4 gm. Start dvt prophylaxis and gi prophylaxis   og to lis, hold tube feeds for now. Start hydrocortisone   Bmp, cbc w diff daily   Cxr abg daily   Insert geoffrey, titrate pressors   icu telemetry   Antivirals  Hap abx   Dvt/gi prophylaxis   Vitamin cocktail   Replace K+, check urine lytes, serum and urine osm   Appreciate id salonis     Mac Morgan M.D.    Pulmonary/Critical Care Medicine   42 min cct excluding procedures

## 2020-12-15 NOTE — CARE COORDINATION
Social Work/Transition of Care:    Pt moved to the ICU, SW contacted pt son Enrique Flores to update and provide [place] number.     Electronically signed by Ermelinda Bryant on 55/72/9094 at 2:21 PM

## 2020-12-15 NOTE — PROGRESS NOTES
Subjective:    Patient seen and examined at bedside, currently intubated and sedated. On 2 pressors. High vent settings, FiO2 100% PEEP of 12. Objective:    /81   Pulse 52   Temp 98 °F (36.7 °C) (Axillary)   Resp 20   Ht 5' 6\" (1.676 m)   Wt 188 lb (85.3 kg)   SpO2 (!) 89%   BMI 30.34 kg/m²     Current medications that patient is taking have been reviewed. Heart:  RRR, no murmurs, gallops, or rubs.   Lungs: Scattered crackles, no wheeze  Abd: bowel sounds present, soft, nontender, nondistended, no masses  Extrem:  No cyanosis or edema    CBC:   Lab Results   Component Value Date    WBC 16.1 12/15/2020    RBC 3.46 12/15/2020    HGB 10.6 12/15/2020    HCT 33.2 12/15/2020    MCV 96.0 12/15/2020    MCH 30.6 12/15/2020    MCHC 31.9 12/15/2020    RDW 15.9 12/15/2020     12/15/2020    MPV 8.9 12/15/2020     BMP:    Lab Results   Component Value Date     12/15/2020    K 3.3 12/15/2020    CL 87 12/15/2020    CO2 28 12/15/2020    BUN 7 12/15/2020    LABALBU 2.9 12/15/2020    CREATININE 0.4 12/15/2020    CALCIUM 7.5 12/15/2020    GFRAA >60 12/15/2020    LABGLOM >60 12/15/2020    GLUCOSE 183 12/15/2020    GLUCOSE 106 12/15/2011        Assessment:    Patient Active Problem List   Diagnosis    History of hypertension    HLD (hyperlipidemia)    Tobacco abuse    COPD (chronic obstructive pulmonary disease) (HCC)    History of DVT (deep vein thrombosis)    [facility]    [facility] (sick [facility] syndrome) [facility]    Pacemaker    Hypothyroid    PVD (peripheral vascular disease) ([facility]    Coronary artery disease involving native coronary artery without angina pectoris    Left carotid artery occlusion    Osteoporosis    Bilateral carotid artery stenosis    Atherosclerosis of native artery of both lower extremities with intermittent claudication (HCC)    Osteoarthritis    Post-traumatic osteoarthritis of left hip    Trauma    Multiple closed fractures of ribs of left side    Chest wall pain    Fall    Nodule of spleen    CAD in native artery    Shoulder dislocation, left, subsequent encounter    Neuropathy    [facility]ar infarction ([facility]    CHF (congestive heart failure) (HCC)    Pneumonia    Acute respiratory failure with hypoxia ([facility]       Plan:    1. Ventilator dependent resp failure - COVID 19 PNA / bacterial/ HF              Continue with dexamethasone + remdesivir + vit D              Diffuse bilateral patchy GGO on CT, no PE              BNP elevated, possible component of heart failure   Continue with vancomycin + cefepime   ID consulted   Fentanyl + versed + nimbex  2. Shock - possibly septic              Currently on levophed + vasopressin              TLC inserted              Blood cultures sent, no evidence of UTI              Continue vancomycin and cefepime   Continue hydrocortisone  3. Anemia - resolved   Likely false lab value  4.  Hypothyroidism - continue synthroid         Charlane Salazar    3:58 PM  12/15/2020

## 2020-12-15 NOTE — ED NOTES
Intubation Procedure Note    Indication: Respiratory failure and airway protection    Consent: The patient provided verbal consent for this procedure. Medications Used: etomidate intravenously and rocuronium intravenously    Procedure: The patient was placed in the appropriate position. Cricoid pressure was not required. Intubation was performed using video laryngoscopy and a 7.5 cuffed endotracheal tube. The cuff was then inflated and the tube was secured appropriately at a distance of 23 cm to the dental ridge. Initial confirmation of placement included bilateral breath sounds, an end tidal CO2 detector, absence of sounds over the stomach, tube fogging, adequate chest rise and adequate pulse oximetry reading. A chest x-ray to verify correct placement of the tube has been ordered but is still pending. The patient tolerated the procedure well.      Complications: None    Electronically signed by Francisca Fernandez DO PGY3 on 12/15/2020 at 9:57 AM         Amita Galeano DO  Resident  12/15/20 [id number]

## 2020-12-15 NOTE — ED NOTES
Assumed care of patient. Pt lying in bed in no apparent distress.      Jovan Solomon RN  12/15/20 [id number]

## 2020-12-16 NOTE — PATIENT CARE CONFERENCE
Intensive Care Daily Quality Rounding Checklist      ICU Team Members: Dr. Miriam King, Thomas Fields (residents), clinical pharmacist, charge nurse, bedside nurse, respiratory therapist    ICU Day #: NUMBER: 2    Intubation Date: December 15    Ventilator Day #: NUMBER: 2    Central Line Insertion Date: December 15        Day #: NUMBER: 2     Arterial Line Insertion Date: December 15      Day #: NUMBER: 2    Temporary Hemodialysis Catheter Insertion Date: N/A        Day # N/A    DVT Prophylaxis: Lovenox    GI Prophylaxis: Protonix    Allen Catheter Insertion Date: December 15       Day #: 2      Continued need (if yes, reason documented and discussed with physician): yes, need for accurate I+O's    Skin Issues/ Wounds and ordered treatment discussed on rounds: numerous ecchymoses, SOS precautions    Goals/ Plans for the Day: Daily labs, wean vent as able, replace electrolytes, consult Nephrology

## 2020-12-16 NOTE — PROGRESS NOTES
Subjective:    Patient seen and examined at bedside, remains intubated and sedated. Vent settings improving    Objective:    BP (!) 115/56   Pulse 59   Temp 97.4 °F (36.3 °C) (Axillary)   Resp 27   Ht 5' 6\" (1.676 m)   Wt 184 lb 11.9 oz (83.8 kg)   SpO2 97%   BMI 29.82 kg/m²     Current medications that patient is taking have been reviewed. Heart:  RRR, no murmurs, gallops, or rubs. Lungs:   No wheeze, decreased air entry bilaterally  Abd: bowel sounds present, soft, nontender, nondistended, no masses  Extrem:  No cyanosis or edema    CBC:   Lab Results   Component Value Date    WBC 10.7 12/16/2020    RBC 3.07 12/16/2020    HGB 9.4 12/16/2020    HCT 28.7 12/16/2020    MCV 93.5 12/16/2020    MCH 30.6 12/16/2020    MCHC 32.8 12/16/2020    RDW 15.4 12/16/2020     12/16/2020    MPV 8.9 12/16/2020     BMP:    Lab Results   Component Value Date     12/16/2020    K 3.7 12/16/2020    K 3.3 12/15/2020    CL 90 12/16/2020    CO2 24 12/16/2020    BUN 9 12/16/2020    LABALBU 2.4 12/16/2020    CREATININE 0.4 12/16/2020    CALCIUM 6.3 12/16/2020    GFRAA >60 12/16/2020    LABGLOM >60 12/16/2020    GLUCOSE 216 12/16/2020    GLUCOSE 106 12/15/2011        Assessment:    Patient Active Problem List   Diagnosis    History of hypertension    HLD (hyperlipidemia)    Tobacco abuse    COPD (chronic obstructive pulmonary disease) (HCC)    History of DVT (deep vein thrombosis)    [facility]    [facility] syndrome) [facility]    Pacemaker    Hypothyroid    PVD (peripheral vascular disease) ([facility]    Coronary artery disease involving native coronary artery without angina pectoris    Left carotid artery occlusion    Osteoporosis    Bilateral carotid artery stenosis    Atherosclerosis of native artery of both lower extremities with intermittent claudication (HCC)    Osteoarthritis    Post-traumatic osteoarthritis of left hip    Trauma    Multiple closed fractures of ribs of left side    Chest wall pain    Fall    Nodule of spleen    CAD in native artery    Shoulder dislocation, left, subsequent encounter    Neuropathy    [facility]unar infarction ([facility]    CHF (congestive heart failure) (HCC)    Pneumonia    Acute respiratory failure with hypoxia ([facility]       Plan:    1. Ventilator dependent resp failure - COVID 19 PNA / bacterial/ HF              Continue with dexamethasone + remdesivir + vit D              Diffuse bilateral patchy GGO on CT, no PE              BNP elevated, possible component of heart failure              Continue with vancomycin + cefepime              ID consulted - received tocilizumab              Fentanyl + versed  2. Shock - possibly septic              Currently on levophed + vasopressin              TLC inserted              Blood cultures sent, no evidence of UTI              Continue vancomycin and cefepime              Continue hydrocortisone  3. Anemia - resolved              Likely false lab value  4. Hypothyroidism - continue synthroid  5.  Hyponatremia - seen by nephrology   IVF changed from D5 to [address]    3:29 PM  12/16/2020

## 2020-12-16 NOTE — CONSULTS
[facility] MD Zoya Guerrero, MD Lou Dewitt, MD Michael White, BOBBY Ashby  Consultation  Patient's Name: Antoni Jackson  3:06 PM  12/16/2020    Nephrologist: Zoya Hui MD    Reason for Consult: Hyponatremia  Requesting Physician:  Adriel Dick DO    Chief Complaint: Dyspnea    History Obtained From: Chart    History of Present Ilness:    Ms. Ashley Hernandez is a 66-year-old woman with a history of COPD admitted 12/14 through the emergency department from an F with progressive dyspnea, hypoxemia on presentation to the ED. No fever or chill, intermittent cough, though was wheezing. Recent has had 3 - COVID-19 tests. She was hypotensive, as well, received IV normal saline bolus, was placed on Levophed after placement of central line. Changed to vasopressin as tachycardia evolves. In the ER respiratory status declined until she developed respiratory failure. BP decline, developed shock. Required intubation and ventilation. Admitted to the ICU subsequently. She has a history of chronic mild hyponatremia dating back at least until this past August, though during her recent hospitalization in late November/early December serum sodium dipped into the high 120s, at the time of discharge 128 mmol/L (12/8), remaining at 128 mmol/L on follow-up laboratory studies (12/13). On presentation sodium was 129 mmol/L increasing to 133 mmol/L 6 hours later on recheck studies. By yesterday morning sodium had declined to 124 mmol/L, declining still further to 119 mmol/L as of this morning. She is being treated with a number of drips, many of which are mixed in D5 water, though for the most part the IV fluid volume that she is received has been in the form of normal saline, except for the IV potassium supplement mixed in D5W. Repeat sodium this late a.m. was 121 mmol/L.       Past Medical History:   Diagnosis Date    Arthritis asthmatic bronchitis    CAD (coronary artery disease)     Carotid stenosis     CH[facility]est[facility] failure) [facility]     Closed fracture of pelvis with delayed healing 4/3/2017    Closed fracture of twelfth thoracic vertebra ([facility] 4/3/2017    T 5,6,12    COPD (chronic obstructive pulmonary disease) ([facility]     DVT of leg (deep venous thrombosis) ([address] 2010    left    Hx of blood clots     Hyperlipidemia     Hypertension     Mitral regurgitation     Trace    Prolonged emergence from general anesthesia     PVD (peripheral vascular disease) with claudication ([address] 11/18/2014    Retinal ischemia     Stenosis of intracranial portions of left internal carotid artery 11/18/2014    Thyroid disease        Past Surgical History:   Procedure Laterality Date    APPENDECTOMY      BACK SURGERY  10/26/15    L3 kypho    CARDIAC CATHETERIZATION  08/10/2020    Dr Cristy Baez    CAROTID-SUBCLAVIAN BYPASS GRAFT Left 5/19/2016    CHOLECYSTECTOMY      CORONARY ANGIOPLASTY WITH STENT PLACEMENT  08/10/2020    Dr Cristy Baez Prox LAD rotablator 1.5/1.75  [employer] Alfredo 3.0x15  3.0x15    ECHOCARDIOGRAM LIMITED  12/3/2014         EYE SURGERY      FIXATION KYPHOPLASTY      FRACTURE SURGERY Right     knee    HYSTERECTOMY      JOINT REPLACEMENT Bilateral     hips    OTHER SURGICAL HISTORY  12/2/14    reposition pacer lead and katty    OTHER SURGICAL HISTORY  5/5/2015    kyphoplasty T8    OTHER SURGICAL HISTORY  2/16/2016    lumbar myelogram    PACEMAKER INSERTION Left 11-    Dr. Kiran Connor Left 3/13/2018    LEFT HIP TOTAL JOINT  ARTHROPLASTY AND REMOVAL HARDWARE LEFT ACETABULUM  ---Dwight Nichols--- performed by Brian Dobson MD at [facility] OR       Family History   Problem Relation Age of Onset    Heart Disease Mother     Heart Disease Father         reports that she has been smoking cigarettes. She has a 2.50 pack-year smoking history.  She has never used smokeless tobacco. She reports current alcohol use. She reports that she does not use drugs.     Allergies:  Anesthetics, lulu and Penicillins    Current Medications:        fentaNYL 5 mcg/ml in 0.9%  ml infusion, Continuous      vasopressin 20 Units in sodium chloride 0.9 % 100 mL infusion, Continuous      norepinephrine (LEVOPHED) 16 mg in sodium chloride 0.9 % 250 mL IVPB, Continuous      insulin lispro (HUMALOG) injection vial 0-6 Units, Q6H      0.9 % sodium chloride infusion, Continuous      pantoprazole (PROTONIX) injection 40 mg, Daily    And      sodium chloride (PF) 0.9 % injection 10 mL, Daily      hydrocortisone sodium succinate PF (SOLU-CORTEF) injection 100 mg, Q8H      potassium chloride 20 mEq/50 mL IVPB (Central Line), PRN      budesonide (PULMICORT) nebulizer suspension 500 mcg, BID      Arformoterol Tartrate (BROVANA) nebulizer solution 15 mcg, BID      enoxaparin (LOVENOX) injection 40 mg, Daily      albuterol-ipratropium (COMBIVENT RESPIMAT)  MCG/ACT inhaler 2 puff, Q4H WA      midazolam (VERSED) 1 mg/mL in D5W infusion, Continuous      cisatracurium besylate (NIMBEX) 200 mg in sodium chloride 0.9 % 100 mL infusion, Continuous      clopidogrel (PLAVIX) tablet 75 mg, Daily      chlorhexidine (PERIDEX) 0.12 % solution 15 mL, BID      cefepime (MAXIPIME) 2 g IVPB extended (mini-bag), Q12H      vancomycin 1000 mg IVPB in 250 mL D5W addavial, Q12H      fentaNYL (SUBLIMAZE) injection 25 mcg, Q3H PRN      remdesivir 100 mg in sodium chloride 0.9 % 250 mL IVPB, Q24H      0.9 % sodium chloride bolus, PRN      sodium chloride flush 0.9 % injection 10 mL, 2 times per day      sodium chloride flush 0.9 % injection 10 mL, PRN      promethazine (PHENERGAN) tablet 12.5 mg, Q6H PRN    Or      ondansetron (ZOFRAN) injection 4 mg, Q6H PRN      polyethylene glycol (GLYCOLAX) packet 17 g, Daily PRN      acetaminophen (TYLENOL) tablet 650 mg, Q6H PRN    Or      acetaminophen (TYLENOL) suppository 650 mg, Q6H PRN      Vitamin D (CHOLECALCIFEROL) tablet 2,000 Units, Daily      gabapentin (NEURONTIN) capsule 800 mg, TID      levothyroxine (SYNTHROID) tablet 100 mcg, Daily      atorvastatin (LIPITOR) tablet 20 mg, Daily      0.9 % sodium chloride bolus, PRN      acetaminophen (TYLENOL) suppository 650 mg, Once        Review of Systems:   Review of systems not obtained due to patient factors. Physical exam:   BP (!) 115/56   Pulse 60   Temp 97.4 °F (36.3 °C) (Axillary)   Resp 27   Ht 5' 6\" (1.676 m)   Wt 184 lb 11.9 oz (83.8 kg)   SpO2 96%   BMI 29.82 kg/m²   Elderly white woman intubated, ventilated, sedated. Per COVID-19 isolation protocol, I did not perform a physical examination, though discussed her physical examination with bedside RN and her MICU physicians.     Data:   Labs:  CBC with Differential:    Lab Results   Component Value Date    WBC 10.7 12/16/2020    RBC 3.07 12/16/2020    HGB 9.4 12/16/2020    HCT 28.7 12/16/2020     12/16/2020    MCV 93.5 12/16/2020    MCH 30.6 12/16/2020    MCHC 32.8 12/16/2020    RDW 15.4 12/16/2020    NRBC 0.9 12/08/2020    SEGSPCT 89 03/10/2014    BANDSPCT 1 05/19/2016    METASPCT 2.6 12/08/2020    LYMPHOPCT 4.7 12/16/2020    MONOPCT 1.0 12/16/2020    MYELOPCT 1.8 12/08/2020    BASOPCT 0.0 12/16/2020    MONOSABS 0.11 12/16/2020    LYMPHSABS 0.50 12/16/2020    EOSABS 0.00 12/16/2020    BASOSABS 0.00 12/16/2020     CMP:    Lab Results   Component Value Date     12/16/2020    K 3.7 12/16/2020    K 3.3 12/15/2020    CL 90 12/16/2020    CO2 24 12/16/2020    BUN 9 12/16/2020    CREATININE 0.4 12/16/2020    GFRAA >60 12/16/2020    LABGLOM >60 12/16/2020    GLUCOSE 216 12/16/2020    GLUCOSE 106 12/15/2011    PROT 4.8 12/16/2020    LABALBU 2.4 12/16/2020    CALCIUM 6.3 12/16/2020    BILITOT 0.4 12/16/2020    ALKPHOS 100 12/16/2020    AST 31 12/16/2020    ALT 50 12/16/2020     Ionized Calcium:  No results found for: IONCA  Magnesium:    Lab Results Component Value Date    MG 2.1 12/16/2020     Phosphorus:    Lab Results   Component Value Date    PHOS 1.5 12/16/2020     U/A:    Lab Results   Component Value Date    COLORU Yellow 12/16/2020    PHUR 6.0 12/16/2020    WBCUA 0-1 12/16/2020    WBCUA 0-1 12/15/2011    RBCUA 0-1 12/16/2020    RBCUA 1-3 08/19/2013    YEAST Present 12/16/2020    BACTERIA FEW 12/16/2020    CLARITYU SL CLOUDY 12/16/2020    SPECGRAV >=1.030 12/16/2020    LEUKOCYTESUR Negative 12/16/2020    UROBILINOGEN 0.2 12/16/2020    BILIRUBINUR SMALL 12/16/2020    BILIRUBINUR NEGATIVE 12/15/2011    BLOODU Negative 12/16/2020    GLUCOSEU 100 12/16/2020    GLUCOSEU NEGATIVE 12/15/2011    AMORPHOUS MODERATE 12/16/2020     Microalbumen/Creatinine ratio:  No components found for: RUCREAT  Iron Saturation:  No components found for: PERCENTFE  TIBC:  No results found for: TIBC  FERRITIN:    Lab Results   Component Value Date    FERRITIN 349 12/13/2020        Imaging:  XR CHEST PORTABLE   Final Result   Endotracheal tube in low position, can be pulled back about 3 cm. NG tube in good position. Pattern of bilateral interstitial infiltrate in the perihilar regions or the   periphery of the lung as above commented. The pulmonary edema versus   bilateral viral pneumonia will be part of the differential diagnosis. Please   correlate clinically. T   Preliminary report given to Dr. Lane Monterroso at the time   of the interpretation. XR ABDOMEN FOR NG/OG/NE TUBE PLACEMENT   Final Result   Enteric feeding tube partially imaged below the diaphragm and likely within   the stomach. XR CHEST PORTABLE   Final Result   Increased interstitial markings throughout both lungs along with   patchy/consolidative airspace opacities noted bilaterally as above. Findings   are suspicious for multifocal pneumonia or pulmonary edema. CTA PULMONARY W CONTRAST   Final Result   No central pulmonary embolism or aortic dissection.    Progressive

## 2020-12-16 NOTE — PROGRESS NOTES
Pharmacy Consultation Note  (Antibiotic Dosing and Monitoring)    Initial consult date: 12/15/2020  Consulting physician: Dr. Kristie Naqvi  Drug(s): Vancomycin  Indication: HAP    Ht Readings from Last 1 Encounters:   20 5' 6\" (1.676 m)     Wt Readings from Last 1 Encounters:   12/15/20 184 lb 11.9 oz (83.8 kg)       Age/Gender IBW DW  Allergy Information   65 y.o. female 59.3 kg 85.3 kg  Anesthetics, lulu and Penicillins                 Date  WBC BUN/CR Drug/Dose Time   Given Level(s)   (Time) Comments    16.9 10/0.5 Vancomycin 1750 mg IV x 1 dose 1335      7.9 8/0.4 No dose ------     12/15 16.1 7/0.4 Vancomycin 1 g IV Q12H 0053  1629      10.7 8/0.4 Vancomycin 1 g IV Q12H 0134       Estimated Creatinine Clearance: 139 mL/min (A) (based on SCr of 0.4 mg/dL (L)).       Intake/Output Summary (Last 24 hours) at 2020 1125  Last data filed at 2020 1000  Gross per 24 hour   Intake 3065 ml   Output 940 ml   Net 2125 ml       Temp max: Temp (24hrs), Av.1 °F (36.7 °C), Min:97.3 °F (36.3 °C), Max:98.6 °F (37 °C)      Cultures:  available culture and sensitivity results were reviewed [facility]   blood cx - no growth to date  12/15 MRSA nares - pending  12/15 respiratory cx - no organisms seen    Assessment:  · Patient is a 65year old female with COVID-19, currently ordered vancomycin and cefepime for HAP  · Consulted by Dr. Jamila Chirinos to dose/monitor vancomycin  · Goal trough level = 15-20 mcg/mL; goal AUC/MAC = 400-600    Plan:  · Will continue vancomycin 1 g IV every 12 hours  · Will check trough level at steady state if vancomycin continues  · Pharmacist will follow and monitor/adjust dosing as necessary    Enrique Robles, PharmD, BCCCP  2020  11:25 AM

## 2020-12-16 NOTE — CONSULTS
impaired respiratory function(2/2 Covid PNA) as evidenced by NPO or clear liquid status due to medical condition, intubation      Nutrition Interventions:   Food and/or Nutrient Delivery:  Continue NPO(Will provide TF rec)  Nutrition Education/Counseling:  Education not indicated     Goals:  Nutrition progression       Nutrition Monitoring and Evaluation:   Behavioral-Environmental Outcomes:  None Identified   Food/Nutrient Intake Outcomes:  Diet Advancement/Tolerance  Physical Signs/Symptoms Outcomes:  Biochemical Data, GI Status, Fluid Status or Edema, Hemodynamic Status, Weight, Skin, Nutrition Focused Physical Findings     Discharge Planning:     Too soon to determine     Electronically signed by Cristian Petit RD, CNSC, LD on 12/16/20 at 12:17 PM EST    Contact: [phone]

## 2020-12-16 NOTE — FLOWSHEET NOTE
Patient admitted from ER to 206, with the following belongings one silver watch, upper and lower dentures, and gold hoop earrings (one in each ear), placed on monitor, patient oriented to room and unit visiting hours. Patient guide at bedside, reviewed patient rights and responsibilities. MRSA nasal swab obtained. Bed alarm on. Call light within reach.

## 2020-12-16 NOTE — PLAN OF CARE
Problem: Skin Integrity:  Goal: Will show no infection signs and symptoms  Description: Will show no infection signs and symptoms  Outcome: Ongoing  Goal: Absence of new skin breakdown  Description: Absence of new skin breakdown  Outcome: Ongoing     Problem: Airway Clearance - Ineffective  Goal: Achieve or maintain patent airway  Outcome: Ongoing     Problem: Gas Exchange - Impaired  Goal: Absence of hypoxia  Outcome: Ongoing  Goal: Promote optimal lung function  Outcome: Ongoing     Problem: Breathing Pattern - Ineffective  Goal: Ability to achieve and maintain a regular respiratory rate  Outcome: Ongoing     Problem:  Body Temperature -  Risk of, Imbalanced  Goal: Ability to maintain a body temperature within defined limits  Outcome: Ongoing  Goal: Will regain or maintain usual level of consciousness  Outcome: Ongoing  Goal: Complications related to the disease process, condition or treatment will be avoided or minimized  Outcome: Ongoing     Problem: Isolation Precautions - Risk of Spread of Infection  Goal: Prevent transmission of infection  Outcome: Ongoing     Problem: Nutrition Deficits  Goal: Optimize nutrtional status  Outcome: Ongoing     Problem: Risk for Fluid Volume Deficit  Goal: Maintain normal heart rhythm  Outcome: Ongoing  Goal: Maintain absence of muscle cramping  Outcome: Ongoing  Goal: Maintain normal serum potassium, sodium, calcium, phosphorus, and pH  Outcome: Ongoing     Problem: Loneliness or Risk for Loneliness  Goal: Demonstrate positive use of time alone when socialization is not possible  Outcome: Ongoing     Problem: Fatigue  Goal: Verbalize increase energy and improved vitality  Outcome: Ongoing     Problem: Patient Education: Go to Patient Education Activity  Goal: Patient/Family Education  Outcome: Ongoing     Problem: Restraint Use - Nonviolent/Non-Self-Destructive Behavior:  Goal: Absence of restraint indications  Description: Absence of restraint indications  Outcome: Ongoing  Goal: Absence of restraint-related injury  Description: Absence of restraint-related injury  Outcome: Ongoing     Problem: Falls - Risk of:  Goal: Will remain free from falls  Description: Will remain free from falls  Outcome: Ongoing  Goal: Absence of physical injury  Description: Absence of physical injury  Outcome: Ongoing

## 2020-12-16 NOTE — CARE COORDINATION
Social Work/Discharge Planning:  Chart reviewed. Patient was intubated on 12/15. COVID positive 12/13. Patient on day 4 of Remdesivir. Called patient son Fawn Williamson (ph: [phone]) and left a message in regards to discharge planning. Patient admitted from [facility] at [facility] LOC. Called liadeya Lynch at [facility] and confirmed patient was skilled and not a bed hold. Will continue to follow and assist with discharge planning.   Electronically signed by DAIVD Moses on 12/16/2020 at 1:31 PM

## 2020-12-16 NOTE — PROGRESS NOTES
Critical Care Team - Daily Progress Note         Date and time: 12/16/2020 1:34 PM  Patient's name:  Terry Agarwal  Medical Record Number: [id number]  Patient's account/billing number: [de-identified]  Patient's YOB: 1950  Age: 65 y.o. Date of Admission: 12/13/2020 10:51 AM  Length of stay during current admission: 3      Primary Care Physician: Hina Del Castillo DO  ICU Attending Physician: Dr. Ramona Garcia Status: Full Code    Reason for ICU admission: respiratory failure   Shock   Covid 19 pneumonitis       SUBJECTIVE:     OVERNIGHT EVENTS:         12/16: Overnight able to titrate down some of the vasopressors though still intermittently hypotensive and requiring vasopressin as well as Levophed. Still sedated with fentanyl and Versed. Hyponatremia notable at 119 new today.       CURRENT VENTILATION STATUS:     [x] Ventilator  [] BIPAP  [] Nasal Cannula [] Room Air      IF INTUBATED, ET TUBE MARKING AT LOWER LIP:       cms    SECRETIONS Amount:  [] Small [] Moderate  [] Large  [x] None  Color:     [] White [] Colored  [] Bloody    SEDATION:  RAAS Score:  [x] Propofol gtt  [x] Versed gtt  [] Ativan gtt   [x] No Sedation    PARALYZED:  [x] No    [] Yes      VASOPRESSORS:  [] No    [x] Yes    If yes -   [x] Levophed       [] Dopamine     [x] Vasopressin       [] Dobutamine  [] Phenylephrine         [] Epinephrine    CENTRAL LINES:     [] No   [x] Yes   (Date of Insertion:  12/15 )           If yes -     [] Right IJ     [] Left IJ [x] Right Femoral [] Left Femoral                   [] Right Subclavian [] Left Subclavian       GARCIA'S CATHETER:   [] No   [x] Yes  (Date of Insertion: 12/15  )     URINE OUTPUT:            [x] Good   [] Low              [] Anuric      OBJECTIVE:     VITAL SIGNS:  BP (!) 115/56   Pulse 60   Temp 98.6 °F (37 °C) (Axillary)   Resp 27   Ht 5' 6\" (1.676 m)   Wt 184 lb 11.9 oz (83.8 kg)   SpO2 96%   BMI 29.82 kg/m²   Tmax over 24 hours:  Temp (24hrs), Av.1 °F (36.7 °C), Min:97.3 °F (36.3 °C), Max:98.6 °F (37 °C)      Patient Vitals for the past 6 hrs:   Temp Temp src Pulse Resp SpO2 Height   20 1300 -- -- 60 27 96 % --   20 1253 -- -- 60 28 97 % --   20 1200 -- -- 60 27 97 % --   20 1124 -- -- -- -- -- 5' 6\" (1.676 m)   20 1116 -- -- 61 28 96 % --   20 1115 -- -- 62 28 96 % --   20 1114 -- -- 62 27 96 % --   20 1113 -- -- 63 27 96 % --   20 1112 -- -- 63 28 96 % --   20 1100 -- -- 64 28 97 % --   20 1000 -- -- 60 28 97 % --   20 0900 -- -- 60 28 97 % --   20 0835 -- -- 63 28 97 % --   20 0834 -- -- -- 28 97 % --   20 0800 98.6 °F (37 °C) Axillary 66 27 98 % --         Intake/Output Summary (Last 24 hours) at 2020 1334  Last data filed at 2020 1300  Gross per 24 hour   Intake 2565 ml   Output 1060 ml   Net 1505 ml     Wt Readings from Last 2 Encounters:   12/15/20 184 lb 11.9 oz (83.8 kg)   20 188 lb 9 oz (85.5 kg)     Body mass index is 29.82 kg/m². PHYSICAL EXAMINATION:  General: ill appearing, paralyzed   HEENT: Pupils are equal round and reactive to light, there are no oral lesions and no post-nasal drip   Neck: supple without adenopathy  Cardiovascular: regular rate and rhythm without murmur or gallop  Respiratory:  Decreased breath sounds bilaterally without wheezing or crackles.   Air entry is symmetric  Abdomen: soft, non-tender, non-distended, normal bowel sounds  Extremities: warm, no edema, no clubbing  Skin: no rash or lesion  Neurologic:  Sedated, intubated, paralyzed, pupils equal round reactive       Any additional physical findings:    MEDICATIONS:    Scheduled Meds:   potassium phosphate IVPB  30 mmol Intravenous Once    insulin lispro  0-6 Units Subcutaneous Q6H    pantoprazole  40 mg Intravenous Daily    And    sodium chloride (PF)  10 mL Intravenous Daily    hydrocortisone sodium succinate PF  100 mg Intravenous Q8H    budesonide  500 mcg Nebulization BID    Arformoterol Tartrate  15 mcg Nebulization BID    enoxaparin  40 mg Subcutaneous Daily    albuterol-ipratropium  2 puff Inhalation Q4H WA    clopidogrel  75 mg Oral Daily    chlorhexidine  15 mL Mouth/Throat BID    cefepime  2 g Intravenous Q12H    vancomycin  1,000 mg Intravenous Q12H    remdesivir IVPB  100 mg Intravenous Q24H    sodium chloride flush  10 mL Intravenous 2 times per day    Vitamin D  2,000 Units Oral Daily    gabapentin  800 mg Oral TID    levothyroxine  100 mcg Oral Daily    atorvastatin  20 mg Oral Daily    acetaminophen  650 mg Rectal Once     Continuous Infusions:   fentaNYL 5 mcg/ml in 0.9%  ml infusion 150 mcg/hr (12/16/20 0918)    vasopressin (Septic Shock) infusion 0.04 Units/min (12/16/20 1032)    IVPB builder 3.75 mL/hr at 12/16/20 1311    sodium chloride 100 mL/hr at 12/16/20 1331    midazolam 5 mg/hr (12/16/20 1106)    cisatracurium (NIMBEX) infusion 2 mcg/kg/min (12/16/20 0928)     PRN Meds:       potassium chloride, 20 mEq, PRN      fentanNYL, 25 mcg, Q3H PRN      sodium chloride, 30 mL, PRN      sodium chloride flush, 10 mL, PRN      promethazine, 12.5 mg, Q6H PRN    Or      ondansetron, 4 mg, Q6H PRN      polyethylene glycol, 17 g, Daily PRN      acetaminophen, 650 mg, Q6H PRN    Or      acetaminophen, 650 mg, Q6H PRN      sodium chloride, 30 mL, PRN          VENT SETTINGS (Comprehensive) (if applicable):  Vent Information  $Ventilation: $Subsequent Day  Skin Assessment: Clean, dry, & intact  Equipment ID: [id number]  Vent Type: [phone]  Vent Mode: AC/VC  Vt Ordered: 450 mL  Rate Set: 28 bmp  Peak Flow: 65 L/min  Pressure Support: 0 cmH20  FiO2 : 50 %  SpO2: 96 %  SpO2/FiO2 ratio: 192  Sensitivity: 3  PEEP/CPAP: 12  I Time/ I Time %: 0 s  Humidification Source: HME  Mask Type: Full face mask  Mask Size: Small  Additional Respiratory  Assessments  Pulse: 60  Resp: 27  SpO2: 96 %  Humidification Source: HME  Oral Care: Mouth swabbed, Mouth moisturizer, Mouth suctioned  Subglottic Suction Done?: Yes  Cuff Pressure (cm H2O): 29 cm H2O    ABGs:   Recent Labs     12/16/20  0549   PH 7.374   PCO2 41.3   PO2 155.3*   HCO3 23.6   BE -1.6   O2SAT 98.9*       Laboratory findings:    Complete Blood Count:   Recent Labs     12/14/20  1215 12/14/20  1215 12/15/20  0417 12/15/20  0711 12/16/20  0545   WBC 7.9  --   --  16.1* 10.7   HGB 6.8*   < > 10.4* 10.6* 9.4*   HCT 21.1*   < > 31.2* 33.2* 28.7*     --   --  252 152    < > = values in this interval not displayed.         Last 3 Blood Glucose:   Recent Labs     12/15/20  0711 12/16/20  0545 12/16/20  1045   GLUCOSE 183* 237* 216*        PT/INR:    Lab Results   Component Value Date    PROTIME 11.4 12/13/2020    PROTIME 11.9 12/15/2011    INR 1.0 12/13/2020     PTT:    Lab Results   Component Value Date    APTT 21.6 12/13/2020       Comprehensive Metabolic Profile:   Recent Labs     12/15/20  0711 12/16/20  0545 12/16/20  1045   * 119* 121*   K 3.3* 3.8 3.7   CL 87* 88* 90*   CO2 28 23 24   BUN 7* 8 9   CREATININE 0.4* 0.4* 0.4*   GLUCOSE 183* 237* 216*   CALCIUM 7.5* 6.5* 6.3*   PROT 5.6* 4.8*  --    LABALBU 2.9* 2.4*  --    BILITOT 0.6 0.4  --    ALKPHOS 127* 100  --    AST 56* 31  --    ALT 90* 50*  --       Magnesium:   Lab Results   Component Value Date    MG 2.1 12/16/2020     Phosphorus:   Lab Results   Component Value Date    PHOS 1.5 12/16/2020     Ionized Calcium: No results found for: CAION     Urinalysis:     Troponin:   Recent Labs     12/14/20  1215   TROPONINI <0.01       Microbiology:    Cultures during this admission:     Blood cultures:                 [] None drawn      [x] Negative             []  Positive (Details:  )  Urine Culture:                   [] None drawn      [] Negative             []  Positive (Details:  )  Sputum Culture:               [] None drawn       [x] Negative             []  Positive (Details:  )   Endotracheal aspirate:     [] None drawn       [] Negative             []  Positive (Details:  )     Other pertinent Labs:   COVID-19 + December 13  MRSA negative screening   radiology/Imaging:     Chest Xray (12/16/2020):    Post intubation film reviewed pull back et tube 2 cm   og in place   Diffuse interstitial infiltrates     ASSESSMENT:      1. Acute hypoxic respiratory failure   2. Covid pneumonitis - critically severe   3. Shock- septic   4. Adrenal insufficiency  5. Hyponatremia   6. Hypokalemia   7. Leukocytosis    8. AECOPD  9. pvd  10. chf not in acute exacerbatino   11. Hx lacunar infarct   12. SSS s/p pacemaker   13. Hx seizure do   14. Bilateral carotid stenosis       SYSTEMS ASSESSMENT    Neuro   Intubated, sedated on Versed and fentanyl  Paralyzed  Previous history of lacunar stroke  CT head on admission showed no acute abnormalities    Respiratory   Acute respiratory failure with hypoxia secondary to COVID-19 viral pneumonia  Intubated, wean sedation as tolerated  Paralyzed, likely stop paralytics tomorrow  Cefepime for possible superimposed bacterial pneumonia  Wean oxygen as tolerated.  Keep O2 sat 90-92%  abg daily    Cardiovascular   Shock, septic  Still requiring Levophed and vasopressin  Wean vasopressors as tolerated  Previous atrial flutter now in sinus rhythm    Gastrointestinal   GI prophylaxis with Protonix    Renal   Hyponatremia   -Likely hypovolemic   -Serum osmolality, urine electrolytes osmolality   -Nephrology consulted   -Normal saline 100 cc an hour   -BMP every 6 hours     Infectious Disease   COVID-19 viral pneumonia   Received remdesivir, Toci, vit D  Septic shock  Possible superimposed bacterial pneumonia  Patient on cefepime  Blood cultures and respiratory culture negative thus far    Hematology/Oncology   Anemia on admission, trend H&H, improving  CBC daily    Endocrine   Low-dose sliding scale Humalog for hyperglycemia  Monitor growth closely  Cortisol on admission 1.42  High-dose Solucortef    Social/Spiritual/DNR/Other   Full code    ASSESSMENT/ PLAN     1. Acute respiratory failure with hypoxia, continue paralytic today, wean FiO2 as tolerated  2. Hyponatremia, normal saline at 100 cc/h per nephrology recommendation, every 6 hours metabolic panel  3. Wean pressors as tolerated  4. Continue ICU level of care      Bernadine Moraes DO, PGY2  12/16/20 1:34 PM     I personally saw, examined and provided care for the patient. Radiographs, labs and medication list were reviewed by me independently. I spoke with bedside nursing, therapists and consultants. Critical care services and times documented are independent of procedures and multidisciplinary rounds with Residents. Additionally comprehensive, multidisciplinary rounds were conducted with the MICU team. The case was discussed in detail and plans for care were established. Review of Residents documentation was conducted and revisions were made as appropriate. I agree with the above documented exam, problem list and plan of care with the following additions:    Remains critically ill on vasopressors and mechanical ventilation  Vent weaning as able  Stop paralytic today  Wean vasopressors  Progressive hyponatremia - discussed with Dr. Nadine Staples - place on normal saline for hypovolemic hyponatremia  Empiric antibiotics  Remdesivir  Stress steroids  S/p Tocilizumab x 1 12/15/2020  Monitor H/H  Need to continue plavix for recent SYLWIA    40 minutes of CCT spent with the patient, reviewing the chart including imaging studies, and discussing the case with other health care professionals. This time excludes procedures.      Rodolfo Medina MD

## 2020-12-17 NOTE — PROGRESS NOTES
Pharmacy Consultation Note  (Antibiotic Dosing and Monitoring)    Initial consult date: 12/15/2020  Consulting physician: Dr. Herschell Runner  Drug(s): Vancomycin  Indication: HAP    Note vancomycin discontinued. Clinical pharmacy will sign-off. Please re-consult if we can be of further assistance.     Nishant Tong, PharmD, [facility]  12/17/2020  12:22 PM

## 2020-12-17 NOTE — PATIENT CARE CONFERENCE
Intensive Care Daily Quality Rounding Checklist      ICU Team Members: Dr. Miriam King, bedside RN TL    ICU Day #: NUMBER: 3    Intubation Date: December 15    Ventilator Day #: NUMBER: 3    Central Line Insertion Date: December 15        Day #: NUMBER: 3     Arterial Line Insertion Date: December 15      Day #: NUMBER: 3    Temporary Hemodialysis Catheter Insertion Date: N/A        Day # N/A    DVT Prophylaxis: Lovenox    GI Prophylaxis: Protonix    Allen Catheter Insertion Date: December 15       Day #: 2      Continued need (if yes, reason documented and discussed with physician): yes, need for accurate I+O's    Skin Issues/ Wounds and ordered treatment discussed on rounds: numerous ecchymoses, SOS precautions    Goals/ Plans for the Day: wean vent as able, wean pressors, replace electrolytes, increase sliding scale, start midodrine, restraints, continue critical care management.

## 2020-12-17 NOTE — PROGRESS NOTES
Subjective:    Patient seen and examined at bedside, remains intubated and sedated. Comfortable on the vent. Objective:    BP (!) 94/49   Pulse 75   Temp 97.5 °F (36.4 °C) (Axillary)   Resp 27   Ht 5' 6\" (1.676 m)   Wt 184 lb 11.9 oz (83.8 kg)   SpO2 (!) 88%   BMI 29.82 kg/m²     Current medications that patient is taking have been reviewed. Heart:  RRR, no murmurs, gallops, or rubs.   Lungs:  CTA bilaterally, no wheeze, rales or rhonchi  Abd: bowel sounds present, soft, nontender, nondistended, no masses  Extrem:  No cyanosis or edema    CBC:   Lab Results   Component Value Date    WBC 6.2 12/17/2020    RBC 2.81 12/17/2020    HGB 8.5 12/17/2020    HCT 26.0 12/17/2020    MCV 92.5 12/17/2020    MCH 30.2 12/17/2020    MCHC 32.7 12/17/2020    RDW 15.2 12/17/2020     12/17/2020    MPV 9.8 12/17/2020     CMP:    Lab Results   Component Value Date     12/17/2020    K 3.8 12/17/2020    K 3.3 12/15/2020    CL 92 12/17/2020    CO2 23 12/17/2020    BUN 11 12/17/2020    CREATININE 0.4 12/17/2020    GFRAA >60 12/17/2020    LABGLOM >60 12/17/2020    GLUCOSE 227 12/17/2020    GLUCOSE 106 12/15/2011    PROT 4.3 12/17/2020    LABALBU 2.0 12/17/2020    CALCIUM 5.9 12/17/2020    BILITOT 0.4 12/17/2020    ALKPHOS 80 12/17/2020    AST 22 12/17/2020    ALT 38 12/17/2020        Assessment:    Patient Active Problem List   Diagnosis    History of hypertension    HLD (hyperlipidemia)    Tobacco abuse    COPD (chronic obstructive pulmonary disease) (HCC)    History of DVT (deep vein thrombosis)    Seizure (HCC)    [facility]ick [facility] syndrome) [facility]    Pacemaker    Hypothyroid    PVD (peripheral vascular disease) ([facility]    Coronary artery disease involving native coronary artery without angina pectoris    Left carotid artery occlusion    Osteoporosis    Bilateral carotid artery stenosis    Atherosclerosis of native artery of both lower extremities with intermittent claudication ([address]    Osteoarthritis  Post-traumatic osteoarthritis of left hip    Trauma    Multiple closed fractures of ribs of left side    Chest wall pain    Fall    Nodule of spleen    CAD in native artery    Shoulder dislocation, left, subsequent encounter    Neuropathy    [facility]ar infarction ([facility] [address]    CHF (congestive heart failure) (HCC)    Pneumonia    Acute respiratory failure with hypoxia (HCC)       Plan:    1. Ventilator dependent resp failure - COVID 19 PNA / bacterial/ HF              Continue with dexamethasone + remdesivir + vit D              Diffuse bilateral patchy GGO on CT, no PE              BNP elevated, possible component of heart failure              Continue with vancomycin + cefepime              ID consulted - received tocilizumab              Fentanyl + versed  2. Shock - possibly septic              Currently on levophed + vasopressin              TLC inserted              Blood cultures sent, no evidence of UTI              Continue vancomycin and cefepime              Continue hydrocortisone + midodrine  3. Anemia - resolved              [id number] false lab value  4. Hypothyroidism - continue synthroid  5.  Hyponatremia - seen by nephrology              IVF changed from D5 to NS   Trending down      Denver Heckle    11:13 AM  12/17/2020

## 2020-12-17 NOTE — PLAN OF CARE
Patient reaches towards ETT and NG when unrestrained. B/L soft wrist restraints to remain in place for patient safety.        Problem: Restraint Use - Nonviolent/Non-Self-Destructive Behavior:  Goal: Absence of restraint-related injury  Description: Absence of restraint-related injury  Outcome: Met This Shift     Problem: Restraint Use - Nonviolent/Non-Self-Destructive Behavior:  Goal: Absence of restraint indications  Description: Absence of restraint indications  Outcome: Not Met This Shift

## 2020-12-17 NOTE — PROGRESS NOTES
[facility] MD Felix Cohen MD Alphia Kearns, MD Nicanor Gasser, MD Chow Pac, CNP   Shaniqua Evans, BOBBY  Progress Note    12/17/2020    SUBJECTIVE:   12/17: Hypotensive, requiring pressors. Comfortable on vent. PROBLEM LIST:    Active Problems:    Acute respiratory failure with hypoxia (HCC)  Resolved Problems:    * No resolved hospital problems. *         DIET:    DIET TUBE FEED CONTINUOUS/CYCLIC NPO; Immune Enhancing (PIVOT 1.5);  Nasogastric; 25; 45     MEDS (scheduled):    midodrine  10 mg Oral TID    insulin lispro  0-18 Units Subcutaneous Q6H    pantoprazole  40 mg Intravenous Daily    And    sodium chloride (PF)  10 mL Intravenous Daily    ipratropium-albuterol  1 ampule Inhalation Q4H WA    hydrocortisone sodium succinate PF  100 mg Intravenous Q8H    budesonide  500 mcg Nebulization BID    Arformoterol Tartrate  15 mcg Nebulization BID    enoxaparin  40 mg Subcutaneous Daily    clopidogrel  75 mg Oral Daily    chlorhexidine  15 mL Mouth/Throat BID    cefepime  2 g Intravenous Q12H    sodium chloride flush  10 mL Intravenous 2 times per day    Vitamin D  2,000 Units Oral Daily    gabapentin  800 mg Oral TID    levothyroxine  100 mcg Oral Daily    atorvastatin  20 mg Oral Daily    acetaminophen  650 mg Rectal Once       MEDS (infusions):   fentaNYL 5 mcg/ml in 0.9%  ml infusion 150 mcg/hr (12/17/20 1254)    vasopressin (Septic Shock) infusion 0.04 Units/min (12/17/20 1054)    IVPB builder 1.88 mL/hr at 12/17/20 1619    sodium chloride 125 mL/hr at 12/17/20 1426    midazolam 5 mg/hr (12/17/20 0823)       MEDS (prn):  potassium chloride, fentanNYL, sodium chloride, sodium chloride flush, promethazine **OR** ondansetron, polyethylene glycol, acetaminophen **OR** acetaminophen, sodium chloride    PHYSICAL EXAM:     Patient Vitals for the past 24 hrs:   BP Temp Temp src Pulse Resp SpO2   12/17/20 1642 -- -- -- -- 27 96 % 12/17/20 1500 -- -- -- 71 28 93 %   12/17/20 1400 -- -- -- 66 27 94 %   12/17/20 1300 -- -- -- 69 27 95 %   12/17/20 1227 -- -- -- 73 27 95 %   12/17/20 1224 -- -- -- -- 27 95 %   12/17/20 1200 -- 97.7 °F (36.5 °C) Axillary 72 28 94 %   12/17/20 1100 -- -- -- 72 27 (!) 89 %   12/17/20 1023 -- -- -- 75 27 (!) 88 %   12/17/20 1022 -- -- -- 79 20 (!) 87 %   12/17/20 1000 -- -- -- 81 28 (!) 87 %   12/17/20 0934 -- -- -- -- 27 94 %   12/17/20 0924 -- -- -- -- 27 90 %   12/17/20 0922 -- -- -- -- 28 90 %   12/17/20 0920 -- -- -- 69 28 90 %   12/17/20 0900 -- -- -- 72 27 (!) 89 %   12/17/20 0848 -- -- -- -- 28 91 %   12/17/20 0800 -- 97.5 °F (36.4 °C) Axillary 68 28 (!) 89 %   12/17/20 0700 -- -- -- 66 27 91 %   12/17/20 0600 (!) 94/49 -- -- 66 28 93 %   12/17/20 0500 (!) 92/49 96.6 °F (35.9 °C) Axillary 66 27 93 %   12/17/20 0400 (!) 93/47 96.1 °F (35.6 °C) Axillary 66 28 93 %   12/17/20 0335 -- 95.9 °F (35.5 °C) -- 67 28 93 %   12/17/20 0300 (!) 91/54 -- -- 64 28 93 %   12/17/20 0200 (!) 101/50 95.2 °F (35.1 °C) Axillary 61 28 91 %   12/17/20 0127 -- -- -- 63 -- --   12/17/20 0100 (!) 120/57 -- -- 63 28 92 %   12/17/20 0035 -- -- -- 63 28 98 %   12/17/20 0000 (!) 115/52 94.8 °F (34.9 °C) Axillary 63 28 97 %   12/16/20 2300 (!) 117/51 -- -- 68 28 95 %   12/16/20 2200 (!) 96/47 -- -- 65 27 95 %   12/16/20 2100 (!) 100/47 -- -- 73 27 92 %   12/16/20 2048 -- -- -- 75 -- --   12/16/20 2014 -- -- -- 59 27 97 %   12/16/20 2000 (!) 116/54 95.9 °F (35.5 °C) Axillary 59 27 97 %   12/16/20 1900 -- -- -- 59 28 95 %   12/16/20 1800 -- -- -- 61 27 95 %   12/16/20 1700 -- -- -- 64 27 94 %   @      Intake/Output Summary (Last 24 hours) at 12/17/2020 1653  Last data filed at 12/17/2020 1500  Gross per 24 hour   Intake 3763.3 ml   Output 880 ml   Net 2883.3 ml         Wt Readings from Last 3 Encounters:   12/15/20 184 lb 11.9 oz (83.8 kg)   12/08/20 188 lb 9 oz (85.5 kg)   10/09/20 163 lb 9.6 oz (74.2 kg)       PE:  COVID-19 isolation protocol, I did not conduct a physical examination but discussed her physical exam findings with her bedside RN and the MICU team    DATA:    Recent Labs     12/15/20  0711 12/16/20  0545 12/17/20  0600   WBC 16.1* 10.7 6.2   HGB 10.6* 9.4* 8.5*   HCT 33.2* 28.7* 26.0*   MCV 96.0 93.5 92.5    152 138     Recent Labs     12/15/20  0711 12/16/20  0545 12/16/20  0545 12/17/20  0025 12/17/20  0600 12/17/20  1220   * 119*   < > 121* 122* 124*   K 3.3* 3.8   < > 3.4* 3.8 4.0   CL 87* 88*   < > 92* 92* 93*   CO2 28 23   < > 21* 23 23   MG 1.5* 2.1  --   --  1.9  --    PHOS  --  1.5*  --   --  1.3*  --    BUN 7* 8   < > 10 11 12   CREATININE 0.4* 0.4*   < > 0.4* 0.4* 0.4*   ALT 90* 50*  --   --  38*  --    AST 56* 31  --   --  22  --    BILITOT 0.6 0.4  --   --  0.4  --    ALKPHOS 127* 100  --   --  80  --     < > = values in this interval not displayed. Lab Results   Component Value Date    LABPROT 1.1 (H) 12/16/2020    LABPROT 1.1 12/16/2020       Assessment  1. 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 Administration of desmopressin in the ER likely also contributed to the initial drop in her sodium     Recommendations  1. IV normal saline -- incr to 125 cc per hr  2. Every 6 hours BMP, adjust drip as warranted  3. Follow labs, UO  4. Mix all drips in NS  5.  Avoid nephrotoxins         Electronically signed by Shannon De Oliveira MD on 12/17/2020

## 2020-12-17 NOTE — PLAN OF CARE
Patient reaches towards ETT and NG when unrestrained. B/L soft wrist restraints to remain in place for patient safety.      Problem: Restraint Use - Nonviolent/Non-Self-Destructive Behavior:  Goal: Absence of restraint-related injury  Description: Absence of restraint-related injury  12/17/2020 0532 by Dana Adair RN  Outcome: Met This Shift  12/16/2020 2301 by Dana Adair RN  Outcome: Met This Shift     Problem: Restraint Use - Nonviolent/Non-Self-Destructive Behavior:  Goal: Absence of restraint indications  Description: Absence of restraint indications  12/17/2020 0532 by Dana Adair RN  Outcome: Not Met This Shift  12/16/2020 2301 by Dana Adair RN  Outcome: Not Met This Shift

## 2020-12-17 NOTE — CARE COORDINATION
COVID positive, cont vent support, Fio2 65%. Remdesivir day 5, paralytics stopped. Cont Fentanyl and versed. Cont iv antibiotics.  Pt from [facility], will continue to follow-O

## 2020-12-17 NOTE — PROGRESS NOTES
Critical Care Team - Daily Progress Note         Date and time: 12/17/2020 1:51 PM  Patient's name:  Nae Clark  Medical Record Number: [id number]  Patient's account/billing number: [de-identified]  Patient's YOB: 1950  Age: 65 y.o. Date of Admission: 12/13/2020 10:51 AM  Length of stay during current admission: 4      Primary Care Physician: Figueroa Yeh DO  ICU Attending Physician: Dr. Mamadou Sepulveda Status: Full Code    Reason for ICU admission: respiratory failure   Shock   Covid 19 pneumonitis       SUBJECTIVE:     OVERNIGHT EVENTS:         12/16: Overnight able to titrate down some of the vasopressors though still intermittently hypotensive and requiring vasopressin as well as Levophed. Still sedated with fentanyl and Versed. Hyponatremia notable at 119 new today. 12/17: Patient was able to be titrated off the vasopressors. Is now on midodrine. Minimally hypothermic overnight now on Melissa hugger with improvement in temperature. Continue sedation with fentanyl and Versed.       CURRENT VENTILATION STATUS:     [x] Ventilator  [] BIPAP  [] Nasal Cannula [] Room Air      IF INTUBATED, ET TUBE MARKING AT LOWER LIP:       cms    SECRETIONS Amount:  [] Small [] Moderate  [] Large  [x] None  Color:     [] White [] Colored  [] Bloody    SEDATION:  RAAS Score:  [x] Propofol gtt  [x] Versed gtt  [] Ativan gtt   [x] No Sedation    PARALYZED:  [x] No    [] Yes      VASOPRESSORS:  [x] No    [x] Yes    If yes -   [] Levophed       [] Dopamine     [x] Vasopressin       [] Dobutamine  [] Phenylephrine         [] Epinephrine    CENTRAL LINES:     [] No   [x] Yes   (Date of Insertion:  12/15 )           If yes -     [] Right IJ     [] Left IJ [x] Right Femoral [] Left Femoral                   [] Right Subclavian [] Left Subclavian       GARCIA'S CATHETER:   [] No   [x] Yes  (Date of Insertion: 12/15  )     URINE OUTPUT:            [x] Good   [] Low              [] Anuric      OBJECTIVE: VITAL SIGNS:  BP (!) 94/49   Pulse 69   Temp 97.7 °F (36.5 °C) (Axillary)   Resp 27   Ht 5' 6\" (1.676 m)   Wt 184 lb 11.9 oz (83.8 kg)   SpO2 95%   BMI 29.82 kg/m²   Tmax over 24 hours:  Temp (24hrs), Av.3 °F (35.7 °C), Min:94.8 °F (34.9 °C), Max:97.7 °F (36.5 °C)      Patient Vitals for the past 6 hrs:   Temp Temp src Pulse Resp SpO2   20 1300 -- -- 69 27 95 %   20 1227 -- -- 73 27 95 %   20 1224 -- -- -- 27 95 %   20 1200 97.7 °F (36.5 °C) Axillary 72 28 94 %   20 1100 -- -- 72 27 (!) 89 %   20 1023 -- -- 75 27 (!) 88 %   20 1022 -- -- 79 20 (!) 87 %   20 1000 -- -- 81 28 (!) 87 %   20 0934 -- -- -- 27 94 %   20 0924 -- -- -- 27 90 %   20 0922 -- -- -- 28 90 %   20 0920 -- -- 69 28 90 %   20 0900 -- -- 72 27 (!) 89 %   20 0848 -- -- -- 28 91 %   20 0800 97.5 °F (36.4 °C) Axillary 68 28 (!) 89 %         Intake/Output Summary (Last 24 hours) at 2020 1351  Last data filed at 2020 1200  Gross per 24 hour   Intake 3728.3 ml   Output 880 ml   Net 2848. 3 ml     Wt Readings from Last 2 Encounters:   12/15/20 184 lb 11.9 oz (83.8 kg)   20 188 lb 9 oz (85.5 kg)     Body mass index is 29.82 kg/m². PHYSICAL EXAMINATION:  General: ill appearing, sedated, bear hugger on patient  HEENT: Pupils are equal round and reactive to light, there are no oral lesions and no post-nasal drip   Neck: supple without adenopathy  Cardiovascular: regular rate and rhythm without murmur or gallop  Respiratory:  Decreased breath sounds bilaterally without wheezing or crackles.   Air entry is symmetric  Abdomen: soft, non-tender, non-distended, normal bowel sounds  Extremities: warm, no edema, no clubbing  Skin: no rash or lesion  Neurologic:  Sedated, intubated, pupils equal round reactive       Any additional physical findings:    MEDICATIONS:    Scheduled Meds:   potassium phosphate IVPB  30 mmol Intravenous Once    calcium gluconate IVPB  2 g Intravenous Once    midodrine  10 mg Oral TID    insulin lispro  0-18 Units Subcutaneous Q6H    pantoprazole  40 mg Intravenous Daily    And    sodium chloride (PF)  10 mL Intravenous Daily    ipratropium-albuterol  1 ampule Inhalation Q4H WA    hydrocortisone sodium succinate PF  100 mg Intravenous Q8H    budesonide  500 mcg Nebulization BID    Arformoterol Tartrate  15 mcg Nebulization BID    enoxaparin  40 mg Subcutaneous Daily    clopidogrel  75 mg Oral Daily    chlorhexidine  15 mL Mouth/Throat BID    cefepime  2 g Intravenous Q12H    remdesivir IVPB  100 mg Intravenous Q24H    sodium chloride flush  10 mL Intravenous 2 times per day    Vitamin D  2,000 Units Oral Daily    gabapentin  800 mg Oral TID    levothyroxine  100 mcg Oral Daily    atorvastatin  20 mg Oral Daily    acetaminophen  650 mg Rectal Once     Continuous Infusions:   fentaNYL 5 mcg/ml in 0.9%  ml infusion 150 mcg/hr (12/17/20 1254)    vasopressin (Septic Shock) infusion 0.04 Units/min (12/17/20 1054)    IVPB builder 0.94 mL/hr at 12/17/20 1319    sodium chloride 100 mL/hr at 12/17/20 0711    midazolam 5 mg/hr (12/17/20 0823)     PRN Meds:       potassium chloride, 20 mEq, PRN      fentanNYL, 25 mcg, Q3H PRN      sodium chloride, 30 mL, PRN      sodium chloride flush, 10 mL, PRN      promethazine, 12.5 mg, Q6H PRN    Or      ondansetron, 4 mg, Q6H PRN      polyethylene glycol, 17 g, Daily PRN      acetaminophen, 650 mg, Q6H PRN    Or      acetaminophen, 650 mg, Q6H PRN      sodium chloride, 30 mL, PRN          VENT SETTINGS (Comprehensive) (if applicable):  Vent Information  $Ventilation: $Subsequent Day  Skin Assessment: Clean, dry, & intact  Equipment ID: [id number]  Vent Type: [phone]  Vent Mode: AC/VC  Vt Ordered: 450 mL  Rate Set: 28 bmp  Peak Flow: 65 L/min  Pressure Support: 0 cmH20  FiO2 : (S) 60 %  SpO2: 95 %  SpO2/FiO2 ratio: 146.15  Sensitivity: 3  PEEP/CPAP: 10  I Time/ I Time %: 0 s  Humidification Source: HME  Mask Type: Full face mask  Mask Size: Small  Additional Respiratory  Assessments  Pulse: 69  Resp: 27  SpO2: 95 %  Position: Semi-Marrero's  Humidification Source: HME  Oral Care: Mouth swabbed  Subglottic Suction Done?: Yes  Cuff Pressure (cm H2O): 29 cm H2O    ABGs:   Recent Labs     12/17/20  0648   PH 7.353   PCO2 40.0   PO2 89.6   HCO3 21.7*   BE -3.5*   O2SAT 96.5       Laboratory findings:    Complete Blood Count:   Recent Labs     12/15/20  0711 12/16/20  0545 12/17/20  0600   WBC 16.1* 10.7 6.2   HGB 10.6* 9.4* 8.5*   HCT 33.2* 28.7* 26.0*    152 138        Last 3 Blood Glucose:   Recent Labs     12/17/20  0025 12/17/20  0600 12/17/20  1220   GLUCOSE 234* 227* 203*        PT/INR:    Lab Results   Component Value Date    PROTIME 11.4 12/13/2020    PROTIME 11.9 12/15/2011    INR 1.0 12/13/2020     PTT:    Lab Results   Component Value Date    APTT 21.6 12/13/2020       Comprehensive Metabolic Profile:   Recent Labs     12/17/20  0025 12/17/20  0600 12/17/20  1220   * 122* 124*   K 3.4* 3.8 4.0   CL 92* 92* 93*   CO2 21* 23 23   BUN 10 11 12   CREATININE 0.4* 0.4* 0.4*   GLUCOSE 234* 227* 203*   CALCIUM 6.0* 5.9* 6.1*   PROT  --  4.3*  --    LABALBU  --  2.0*  --    BILITOT  --  0.4  --    ALKPHOS  --  80  --    AST  --  22  --    ALT  --  38*  --       Magnesium:   Lab Results   Component Value Date    MG 1.9 12/17/2020     Phosphorus:   Lab Results   Component Value Date    PHOS 1.3 12/17/2020     Ionized Calcium: No results found for: CAION     Urinalysis:     Troponin:   No results for input(s): TROPONINI in the last 72 hours.     Microbiology:    Cultures during this admission:     Blood cultures:                 [] None drawn      [x] Negative             []  Positive (Details:  )  Urine Culture:                   [] None drawn      [] Negative             []  Positive (Details:  )  Sputum Culture:               [] None drawn       [x] Negative []  Positive (Details:  )   Endotracheal aspirate:     [] None drawn       [] Negative             []  Positive (Details:  )     Other pertinent Labs:   COVID-19 + December 13  MRSA negative screening   radiology/Imaging:     Chest Xray (12/17/2020): Diffuse infiltrate, improved in the upper lungs as compared to prior. There is new consolidation in the left lower lobe with possible left pleural   effusion. ASSESSMENT:      1. Acute hypoxic respiratory failure   2. Covid pneumonitis - critically severe   3. Shock- septic   4. Adrenal insufficiency  5. Hyponatremia   6. Hypokalemia   7. Leukocytosis    8. AECOPD  9. pvd  10. chf not in acute exacerbatino   11. Hx lacunar infarct   12. SSS s/p pacemaker   13. Hx seizure do   14. Bilateral carotid stenosis       SYSTEMS ASSESSMENT    Neuro   Intubated, sedated on Versed and fentanyl - wean as tolerated  No longer paralyzed  Previous history of lacunar stroke  CT head on admission showed no acute abnormalities    Respiratory   Acute respiratory failure with hypoxia secondary to COVID-19 viral pneumonia  Intubated, wean sedation as tolerated  Cefepime for possible superimposed bacterial pneumonia  Wean oxygen as tolerated.  Keep O2 sat 90-92%  abg daily    Cardiovascular   Shock, septic  Started midodrine, off vasopressors  Previous atrial flutter now in sinus rhythm  Continue plavix    Gastrointestinal   GI prophylaxis with Protonix  Diet per dietary recs       Renal   Hyponatremia - slowly improving   -Likely hypovolemic   -Serum osmolality, urine electrolytes osmolality   -Nephrology consulted   -Normal saline 100 cc an hour   -BMP every 6 hours     Infectious Disease   COVID-19 viral pneumonia   Received remdesivir, Toci, vit D  Septic shock - improving  Possible superimposed bacterial pneumonia  Patient on cefepime  Blood cultures and respiratory culture negative thus far    Hematology/Oncology   Anemia on admission, trend H&H, improving  CBC daily    Endocrine   Low-dose sliding scale Humalog for hyperglycemia  Monitor growth closely  Cortisol on admission 1.42  High-dose Solucortef    Social/Spiritual/DNR/Other   Full code    ASSESSMENT/ PLAN     1. Acute respiratory failure with hypoxia, no longer paralyzed, wean FiO2 as tolerated  2. Hyponatremia, normal saline at 100 cc/h per nephrology recommendation, every 6 hours metabolic panel - improving  3. Start midodrine - now off levophed  4. Continue ICU level of care      Jina Santiago DO, PGY2  12/17/20 1:51 PM     I personally saw, examined and provided care for the patient. Radiographs, labs and medication list were reviewed by me independently. I spoke with bedside nursing, therapists and consultants. Critical care services and times documented are independent of procedures and multidisciplinary rounds with Residents. Additionally comprehensive, multidisciplinary rounds were conducted with the MICU team. The case was discussed in detail and plans for care were established. Review of Residents documentation was conducted and revisions were made as appropriate. I agree with the above documented exam, problem list and plan of care with the following additions:    35 minutes of CCT spent with the patient, reviewing the chart including imaging studies, and discussing the case with other health care professionals. This time excludes procedures.      Travon Sheehan MD

## 2020-12-17 NOTE — PLAN OF CARE
Problem: Skin Integrity:  Goal: Will show no infection signs and symptoms  Description: Will show no infection signs and symptoms  Outcome: Ongoing  Goal: Absence of new skin breakdown  Description: Absence of new skin breakdown  Outcome: Ongoing     Problem: Airway Clearance - Ineffective  Goal: Achieve or maintain patent airway  Outcome: Ongoing     Problem: Gas Exchange - Impaired  Goal: Absence of hypoxia  Outcome: Ongoing  Goal: Promote optimal lung function  Outcome: Ongoing     Problem: Breathing Pattern - Ineffective  Goal: Ability to achieve and maintain a regular respiratory rate  Outcome: Ongoing     Problem:  Body Temperature -  Risk of, Imbalanced  Goal: Ability to maintain a body temperature within defined limits  Outcome: Ongoing  Goal: Will regain or maintain usual level of consciousness  Outcome: Ongoing  Goal: Complications related to the disease process, condition or treatment will be avoided or minimized  Outcome: Ongoing     Problem: Isolation Precautions - Risk of Spread of Infection  Goal: Prevent transmission of infection  Outcome: Ongoing     Problem: Nutrition Deficits  Goal: Optimize nutrtional status  Outcome: Ongoing     Problem: Risk for Fluid Volume Deficit  Goal: Maintain normal heart rhythm  Outcome: Ongoing  Goal: Maintain absence of muscle cramping  Outcome: Ongoing  Goal: Maintain normal serum potassium, sodium, calcium, phosphorus, and pH  Outcome: Ongoing     Problem: Loneliness or Risk for Loneliness  Goal: Demonstrate positive use of time alone when socialization is not possible  Outcome: Ongoing     Problem: Fatigue  Goal: Verbalize increase energy and improved vitality  Outcome: Ongoing     Problem: Patient Education: Go to Patient Education Activity  Goal: Patient/Family Education  Outcome: Ongoing     Problem: Restraint Use - Nonviolent/Non-Self-Destructive Behavior:  Goal: Absence of restraint indications  Description: Absence of restraint indications  12/17/2020 1441 by Gray Hummingbird  Outcome: Ongoing  12/17/2020 0532 by Mary Laird RN  Outcome: Not Met This Shift  Goal: Absence of restraint-related injury  Description: Absence of restraint-related injury  12/17/2020 1441 by Isaiah Rothman  Outcome: Ongoing  12/17/2020 0532 by Mary Laird RN  Outcome: Met This Shift     Problem: Falls - Risk of:  Goal: Will remain free from falls  Description: Will remain free from falls  Outcome: Ongoing  Goal: Absence of physical injury  Description: Absence of physical injury  Outcome: Ongoing

## 2020-12-18 NOTE — PROGRESS NOTES
This note also relates to the following rows which could not be included:  Vt Ordered - Cannot attach notes to unvalidated device data  Rate Set - Cannot attach notes to unvalidated device data  Peak Flow - Cannot attach notes to unvalidated device data  Pressure Support - Cannot attach notes to unvalidated device data  Sensitivity - Cannot attach notes to unvalidated device data  PEEP/CPAP - Cannot attach notes to unvalidated device data  I Time/ I Time % - Cannot attach notes to unvalidated device data  High Peep/I Pressure - Cannot attach notes to unvalidated device data  Peak Inspiratory Pressure - Cannot attach notes to unvalidated device data  Mean Airway Pressure - Cannot attach notes to unvalidated device data  Rate Measured - Cannot attach notes to unvalidated device data  Vt Exhaled - Cannot attach notes to unvalidated device data  Minute Volume - Cannot attach notes to unvalidated device data  I:E Ratio - Cannot attach notes to unvalidated device data  Pulse - Cannot attach notes to unvalidated device data  Resp - Cannot attach notes to unvalidated device data  High Pressure Alarm - Cannot attach notes to unvalidated device data       12/17/20 2026   Vent Information   FiO2  60 %   SpO2 90 %   SpO2/FiO2 ratio 150

## 2020-12-18 NOTE — PROGRESS NOTES
Subjective:    Patient seen and examined at bedside, intubated sedated. Comfortable on the ventilator    Objective:    BP (!) 104/50   Pulse 64   Temp 97.5 °F (36.4 °C) (Axillary)   Resp 28   Ht 5' 6\" (1.676 m)   Wt 207 lb 0.2 oz (93.9 kg)   SpO2 97%   BMI 33.41 kg/m²     Current medications that patient is taking have been reviewed. Heart:  RRR, no murmurs, gallops, or rubs.   Lungs:  Decreased air entry, coarse breath sounds  Abd: bowel sounds present, soft, nontender, nondistended, no masses  Extrem:  No cyanosis or edema    CBC:   Lab Results   Component Value Date    WBC 8.8 12/18/2020    RBC 2.81 12/18/2020    HGB 8.6 12/18/2020    HCT 26.4 12/18/2020    MCV 94.0 12/18/2020    MCH 30.6 12/18/2020    MCHC 32.6 12/18/2020    RDW 15.9 12/18/2020     12/18/2020    MPV 9.7 12/18/2020     BMP:    Lab Results   Component Value Date     12/18/2020    K 4.4 12/18/2020    K 3.3 12/15/2020    CL 98 12/18/2020    CO2 23 12/18/2020    BUN 11 12/18/2020    LABALBU 2.3 12/18/2020    CREATININE 0.4 12/18/2020    CALCIUM 6.0 12/18/2020    GFRAA >60 12/18/2020    LABGLOM >60 12/18/2020    GLUCOSE 278 12/18/2020    GLUCOSE 106 12/15/2011        Assessment:    Patient Active Problem List   Diagnosis    History of hypertension    HLD (hyperlipidemia)    Tobacco abuse    COPD (chronic obstructive pulmonary disease) (HCC)    History of DVT (deep vein thrombosis)    [facility]    [facility] syndrome) [facility] [address]    Pacemaker    Hypothyroid    PVD (peripheral vascular disease) ([facility] [address]    Coronary artery disease involving native coronary artery without angina pectoris    Left carotid artery occlusion    Osteoporosis    Bilateral carotid artery stenosis    Atherosclerosis of native artery of both lower extremities with intermittent claudication (HCC)    Osteoarthritis    Post-traumatic osteoarthritis of left hip    Trauma    Multiple closed fractures of ribs of left side    Chest wall pain    Fall    Nodule of spleen    CAD in native artery    Shoulder dislocation, left, subsequent encounter    Neuropathy    [facility] infarction [facility] [address]    CHF (congestive heart failure) (HCC)    Pneumonia    Acute respiratory failure with hypoxia (HCC)       Plan:    1. Ventilator dependent resp failure - COVID 19 PNA / bacterial/ HF. CXR improved 12/17              Continue with dexamethasone + vit D              Diffuse bilateral patchy GGO on CT, no PE              BNP elevated, possible component of heart failure              Continue with cefepime              Fentanyl + versed  2. Shock - possibly septic              Currently on levophed + vasopressin              TLC inserted              Blood cultures sent, no evidence of UTI              Continue cefepime              Continue hydrocortisone + midodrine  3. Anemia - resolved              AFKTDA false lab value  4. Hypothyroidism - continue synthroid  5.  Hyponatremia - seen by nephrology              IVF changed from D5 to NS              Improving         Sofie Mcintosh    11:16 AM  12/18/2020

## 2020-12-18 NOTE — PLAN OF CARE
Problem: Restraint Use - Nonviolent/Non-Self-Destructive Behavior:  Goal: Absence of restraint indications  Description: Absence of restraint indications  12/18/2020 1855 by Armaan Denney RN  Outcome: Completed  12/18/2020 0953 by Armaan Denney RN  Outcome: Not Met This Shift  Goal: Absence of restraint-related injury  Description: Absence of restraint-related injury  12/18/2020 1855 by Armaan Denney RN  Outcome: Completed  12/18/2020 0953 by Armaan Denney RN  Outcome: Met This Shift

## 2020-12-18 NOTE — PLAN OF CARE
Problem: Skin Integrity:  Goal: Will show no infection signs and symptoms  Description: Will show no infection signs and symptoms  12/18/2020 0145 by Hai Larsen RN  Outcome: Met This Shift  12/17/2020 1441 by Nikolay Jefferson  Outcome: Ongoing  Goal: Absence of new skin breakdown  Description: Absence of new skin breakdown  12/18/2020 0145 by Hai Larsen RN  Outcome: Met This Shift  12/17/2020 1441 by Nikolay Jefferson  Outcome: Ongoing     Problem: Airway Clearance - Ineffective  Goal: Achieve or maintain patent airway  12/18/2020 0145 by Hai Larsen RN  Outcome: Met This Shift  12/17/2020 1441 by Nikolay Jefferson  Outcome: Ongoing     Problem: Gas Exchange - Impaired  Goal: Absence of hypoxia  12/18/2020 0145 by Hai Larsen RN  Outcome: Met This Shift  12/17/2020 1441 by Nikolay Jefferson  Outcome: Ongoing  Goal: Promote optimal lung function  12/18/2020 0145 by Hai Larsen RN  Outcome: Met This Shift  12/17/2020 1441 by Nikolay Jefferson  Outcome: Ongoing     Problem: Breathing Pattern - Ineffective  Goal: Ability to achieve and maintain a regular respiratory rate  12/18/2020 0145 by Hai Larsen RN  Outcome: Met This Shift  12/17/2020 1441 by Nikolay Jefferson  Outcome: Ongoing     Problem:  Body Temperature -  Risk of, Imbalanced  Goal: Ability to maintain a body temperature within defined limits  12/18/2020 0145 by Hai Larsen RN  Outcome: Met This Shift  12/17/2020 1441 by Nikolay Jefferson  Outcome: Ongoing  Goal: Will regain or maintain usual level of consciousness  12/18/2020 0145 by Hai Larsen RN  Outcome: Met This Shift  12/17/2020 1441 by Nikolay Reason  Outcome: Ongoing  Goal: Complications related to the disease process, condition or treatment will be avoided or minimized  12/18/2020 0145 by Hai Larsen RN  Outcome: Met This Shift  12/17/2020 1441 by Nikolay Reason  Outcome: Ongoing     Problem: Isolation Precautions - Risk of Spread of Infection  Goal: Prevent transmission of infection  12/18/2020 0145 by Shira Perez RN  Outcome: Met This Shift  12/17/2020 1441 by Sergio Colorado  Outcome: Ongoing     Problem: Nutrition Deficits  Goal: Optimize nutrtional status  12/18/2020 0145 by Shira Perez RN  Outcome: Met This Shift  12/17/2020 1441 by Sergio Colorado  Outcome: Ongoing     Problem: Risk for Fluid Volume Deficit  Goal: Maintain normal heart rhythm  12/18/2020 0145 by Shira Perez RN  Outcome: Met This Shift  12/17/2020 1441 by Sergio Colorado  Outcome: Ongoing  Goal: Maintain absence of muscle cramping  12/18/2020 0145 by Shira Perez RN  Outcome: Met This Shift  12/17/2020 1441 by Sergio Colorado  Outcome: Ongoing  Goal: Maintain normal serum potassium, sodium, calcium, phosphorus, and pH  12/18/2020 0145 by Shira Perez RN  Outcome: Met This Shift  12/17/2020 1441 by Sergio Colorado  Outcome: Ongoing     Problem: Loneliness or Risk for Loneliness  Goal: Demonstrate positive use of time alone when socialization is not possible  12/18/2020 0145 by Shira Perez RN  Outcome: Met This Shift  12/17/2020 1441 by Sergio Colorado  Outcome: Ongoing     Problem: Fatigue  Goal: Verbalize increase energy and improved vitality  12/18/2020 0145 by Shira Perez RN  Outcome: Met This Shift  12/17/2020 1441 by Sergio Colorado  Outcome: Ongoing     Problem: Patient Education: Go to Patient Education Activity  Goal: Patient/Family Education  12/18/2020 0145 by Shira Perez RN  Outcome: Met This Shift  12/17/2020 1441 by Sergio Colorado  Outcome: Ongoing     Problem: Restraint Use - Nonviolent/Non-Self-Destructive Behavior:  Goal: Absence of restraint indications  Description: Absence of restraint indications  12/18/2020 0145 by Shira Perez RN  Outcome: Met This Shift  12/17/2020 1441 by Sergio Colorado  Outcome: Ongoing  Goal: Absence of restraint-related injury  Description: Absence of restraint-related injury  12/18/2020 0145 by Irving Mckeon RN  Outcome: Met This Shift  12/17/2020 1441 by Brooke Covington  Outcome: Ongoing     Problem: Falls - Risk of:  Goal: Will remain free from falls  Description: Will remain free from falls  12/18/2020 0145 by Irving Mckeon RN  Outcome: Met This Shift  12/17/2020 1441 by Brooke Covington  Outcome: Ongoing  Goal: Absence of physical injury  Description: Absence of physical injury  12/18/2020 0145 by Irving Mckeon RN  Outcome: Met This Shift  12/17/2020 1441 by Brooke Covington  Outcome: Ongoing

## 2020-12-18 NOTE — PROGRESS NOTES
Critical Care Team - Daily Progress Note         Date and time: 12/18/2020 12:32 PM  Patient's name:  Pat Montanez  Medical Record Number: [id number]  Patient's account/billing number: [de-identified]  Patient's YOB: 1950  Age: 65 y.o. Date of Admission: 12/13/2020 10:51 AM  Length of stay during current admission: 5      Primary Care Physician: Veronica Jeffrey DO  ICU Attending Physician: Dr. Zamorano Cornea Status: Full Code    Reason for ICU admission: respiratory failure   Shock   Covid 19 pneumonitis       SUBJECTIVE:     OVERNIGHT EVENTS:         12/16: Overnight able to titrate down some of the vasopressors though still intermittently hypotensive and requiring vasopressin as well as Levophed. Still sedated with fentanyl and Versed. Hyponatremia notable at 119 new today. 12/17: Patient was able to be titrated off the vasopressors. Is now on midodrine. Minimally hypothermic overnight now on Melissa hugger with improvement in temperature. Continue sedation with fentanyl and Versed. 12/18: Patient continues on the ventilator. Back on small amount of Levophed in addition to the vasopressin. Hoping to wean sedation more today.       CURRENT VENTILATION STATUS:     [x] Ventilator  [] BIPAP  [] Nasal Cannula [] Room Air      IF INTUBATED, ET TUBE MARKING AT LOWER LIP:       cms    SECRETIONS Amount:  [] Small [] Moderate  [] Large  [x] None  Color:     [] White [] Colored  [] Bloody    SEDATION:  RAAS Score:  [x] Propofol gtt  [x] Versed gtt  [] Ativan gtt   [x] No Sedation    PARALYZED:  [x] No    [] Yes      VASOPRESSORS:  [] No    [x] Yes    If yes -   [x] Levophed       [] Dopamine     [x] Vasopressin       [] Dobutamine  [] Phenylephrine         [] Epinephrine    CENTRAL LINES:     [] No   [x] Yes   (Date of Insertion:  12/15 )           If yes -     [] Right IJ     [] Left IJ [x] Right Femoral [] Left Femoral                   [] Right Subclavian [] Left Subclavian GARCIA'S CATHETER:   [] No   [x] Yes  (Date of Insertion: 12/15  )     URINE OUTPUT:            [x] Good   [] Low              [] Anuric      OBJECTIVE:     VITAL SIGNS:  BP (!) 104/50   Pulse 78   Temp 97.3 °F (36.3 °C) (Esophageal)   Resp 28   Ht 5' 6\" (1.676 m)   Wt 207 lb 0.2 oz (93.9 kg)   SpO2 92%   BMI 33.41 kg/m²   Tmax over 24 hours:  Temp (24hrs), Av.7 °F (36.5 °C), Min:96.8 °F (36 °C), Max:98.8 °F (37.1 °C)      Patient Vitals for the past 6 hrs:   Temp Temp src Pulse Resp SpO2 Height   20 1221 -- -- 78 28 92 % --   20 1217 -- -- -- 27 92 % --   20 1200 97.3 °F (36.3 °C) Esophageal 60 28 98 % --   20 1100 -- -- 62 28 98 % --   20 1023 -- -- -- -- -- 5' 6\" (1.676 m)   20 1000 96.8 °F (36 °C) Axillary 64 28 97 % --   20 0900 97.5 °F (36.4 °C) Axillary 65 23 96 % --   20 0829 -- -- 66 28 97 % --   20 0827 -- -- -- 27 96 % --   20 0826 -- -- -- 27 96 % --   20 0824 -- -- -- 27 96 % --   20 0800 97.7 °F (36.5 °C) Axillary 63 28 97 % --   20 0700 -- -- 59 27 97 % --         Intake/Output Summary (Last 24 hours) at 2020 1232  Last data filed at 2020 0900  Gross per 24 hour   Intake 3800 ml   Output 980 ml   Net 2820 ml     Wt Readings from Last 2 Encounters:   20 207 lb 0.2 oz (93.9 kg)   20 188 lb 9 oz (85.5 kg)     Body mass index is 33.41 kg/m². PHYSICAL EXAMINATION:  General: ill appearing, sedated, lying in gurney in no distress  HEENT: Pupils are equal round and reactive to light, there are no oral lesions and no post-nasal drip   Neck: supple without adenopathy  Cardiovascular: regular rate and rhythm without murmur or gallop  Respiratory:  Decreased breath sounds bilaterally without wheezing or crackles.   Air entry is symmetric  Abdomen: soft, non-tender, non-distended, normal bowel sounds  Extremities: warm, no edema, no clubbing   Skin: no rash or lesion  Neurologic:  Sedated, intubated, pupils equal round reactive       Any additional physical findings:    MEDICATIONS:    Scheduled Meds:   midodrine  10 mg Oral TID    insulin lispro  0-18 Units Subcutaneous Q6H    pantoprazole  40 mg Intravenous Daily    And    sodium chloride (PF)  10 mL Intravenous Daily    ipratropium-albuterol  1 ampule Inhalation Q4H WA    hydrocortisone sodium succinate PF  100 mg Intravenous Q8H    budesonide  500 mcg Nebulization BID    Arformoterol Tartrate  15 mcg Nebulization BID    enoxaparin  40 mg Subcutaneous Daily    clopidogrel  75 mg Oral Daily    chlorhexidine  15 mL Mouth/Throat BID    cefepime  2 g Intravenous Q12H    sodium chloride flush  10 mL Intravenous 2 times per day    Vitamin D  2,000 Units Oral Daily    gabapentin  800 mg Oral TID    levothyroxine  100 mcg Oral Daily    atorvastatin  20 mg Oral Daily    acetaminophen  650 mg Rectal Once     Continuous Infusions:   fentaNYL 5 mcg/ml in 0.9%  ml infusion 150 mcg/hr (12/18/20 0654)    vasopressin (Septic Shock) infusion 0.04 Units/min (12/18/20 0640)    IVPB builder 0.9 mL/hr at 12/18/20 1147    sodium chloride 125 mL/hr at 12/17/20 1426    midazolam 5 mg/hr (12/18/20 0523)     PRN Meds:       potassium chloride, 20 mEq, PRN      fentanNYL, 25 mcg, Q3H PRN      sodium chloride, 30 mL, PRN      sodium chloride flush, 10 mL, PRN      promethazine, 12.5 mg, Q6H PRN    Or      ondansetron, 4 mg, Q6H PRN      polyethylene glycol, 17 g, Daily PRN      acetaminophen, 650 mg, Q6H PRN    Or      acetaminophen, 650 mg, Q6H PRN      sodium chloride, 30 mL, PRN          VENT SETTINGS (Comprehensive) (if applicable):  Vent Information  $Ventilation: $Subsequent Day  Skin Assessment: Clean, dry, & intact  Equipment ID: [id number]  Vent Type: [phone]  Vent Mode: AC/VC  Vt Ordered: 450 mL  Rate Set: 28 bmp  Peak Flow: 65 L/min  Pressure Support: 0 cmH20  FiO2 : (S) 50 %  SpO2: 92 %  SpO2/FiO2 ratio: 184  Sensitivity: 3  PEEP/CPAP: 10  I Time/ I Time %: 0 s  Humidification Source: HME  Mask Type: Full face mask  Mask Size: Small  Additional Respiratory  Assessments  Pulse: 78  Resp: 28  SpO2: 92 %  Position: Semi-Marrero's  Humidification Source: HME  Oral Care: Mouth swabbed, Mouth suctioned  Subglottic Suction Done?: Yes  Cuff Pressure (cm H2O): 29 cm H2O    ABGs:   Recent Labs     12/18/20  0555   PH 7.342*   PCO2 42.5   PO2 113.4*   HCO3 22.5   BE -3.0   O2SAT 98.1       Laboratory findings:    Complete Blood Count:   Recent Labs     12/16/20  0545 12/17/20  0600 12/18/20  0545   WBC 10.7 6.2 8.8   HGB 9.4* 8.5* 8.6*   HCT 28.7* 26.0* 26.4*    138 158        Last 3 Blood Glucose:   Recent Labs     12/17/20 2015 12/18/20  0005 12/18/20  0545   GLUCOSE 240* 236* 278*        PT/INR:    Lab Results   Component Value Date    PROTIME 11.4 12/13/2020    PROTIME 11.9 12/15/2011    INR 1.0 12/13/2020     PTT:    Lab Results   Component Value Date    APTT 21.6 12/13/2020       Comprehensive Metabolic Profile:   Recent Labs     12/17/20 2015 12/18/20  0005 12/18/20  0545   * 125* 126*   K 4.2 4.4 4.4   CL 98 96* 98   CO2 22 22 23   BUN 11 11 11   CREATININE 0.4* 0.4* 0.4*   GLUCOSE 240* 236* 278*   CALCIUM 6.0* 6.0* 6.0*   PROT  --   --  4.3*   LABALBU  --   --  2.3*   BILITOT  --   --  0.3   ALKPHOS  --   --  75   AST  --   --  16   ALT  --   --  33*      Magnesium:   Lab Results   Component Value Date    MG 1.8 12/18/2020     Phosphorus:   Lab Results   Component Value Date    PHOS 1.5 12/18/2020     Ionized Calcium: No results found for: CAION     Urinalysis:     Troponin:   No results for input(s): TROPONINI in the last 72 hours.     Microbiology:    Cultures during this admission:     Blood cultures:                 [] None drawn      [x] Negative             []  Positive (Details:  )  Urine Culture:                   [] None drawn      [] Negative             []  Positive (Details:  )  Sputum Culture:               [] None drawn       [x] Negative             []  Positive (Details:  )   Endotracheal aspirate:     [] None drawn       [] Negative             []  Positive (Details:  )     Other pertinent Labs:   COVID-19 + December 13  MRSA negative screening   radiology/Imaging:     Chest Xray (12/18/2020): Diffuse infiltrate, improved in the upper lungs as compared to prior. There is new consolidation in the left lower lobe with possible left pleural   effusion. ASSESSMENT:      1. Acute hypoxic respiratory failure   2. Covid pneumonitis - critically severe   3. Shock- septic   4. Adrenal insufficiency  5. Hyponatremia   6. Hypokalemia   7. Leukocytosis    8. AECOPD  9. pvd  10. chf not in acute exacerbatino   11. Hx lacunar infarct   12. SSS s/p pacemaker   13. Hx seizure do   14. Bilateral carotid stenosis       SYSTEMS ASSESSMENT    Neuro   Intubated, sedated on Versed and fentanyl - wean as tolerated  No longer paralyzed  Previous history of lacunar stroke  CT head on admission showed no acute abnormalities    Respiratory   Acute respiratory failure with hypoxia secondary to COVID-19 viral pneumonia  Intubated, wean sedation as tolerated  Cefepime for possible superimposed bacterial pneumonia  Wean oxygen as tolerated.  Keep O2 sat 90-92%  abg daily    Cardiovascular   Shock, septic  Started midodrine, back on a small amount of Levophed in addition to vasopressin  Previous atrial flutter now in sinus rhythm  Continue plavix    Gastrointestinal   GI prophylaxis with Protonix  Diet per dietary recs       Renal   Hyponatremia - slowly improving   -Likely hypovolemic   -Serum osmolality, urine electrolytes osmolality   -Nephrology consulted   -Maintenance fluids per nephrology   -BMP every 6 hours     Infectious Disease   COVID-19 viral pneumonia   Received remdesivir, Toci, vit D  Septic shock - improving  Possible superimposed bacterial pneumonia  Patient on cefepime  Blood cultures and respiratory culture negative thus far    Hematology/Oncology   Anemia on admission, trend H&H, improving  CBC daily    Endocrine   Low-dose sliding scale Humalog for hyperglycemia  Monitor growth closely  Cortisol on admission 1.42  High-dose Solucortef    Social/Spiritual/DNR/Other   Full code    ASSESSMENT/ PLAN     1. Acute respiratory failure with hypoxia, no longer paralyzed, wean FiO2 as tolerated  2. Hyponatremia, improving, fluids per nephrology  3. On midodrine but back on Levophed and vasopressin  4. Continue ICU level of care      Antelmo Marin DO, PGY2  12/18/20 12:32 PM     I personally saw, examined and provided care for the patient. Radiographs, labs and medication list were reviewed by me independently. I spoke with bedside nursing, therapists and consultants. Critical care services and times documented are independent of procedures and multidisciplinary rounds with Residents. Additionally comprehensive, multidisciplinary rounds were conducted with the MICU team. The case was discussed in detail and plans for care were established. Review of Residents documentation was conducted and revisions were made as appropriate. I agree with the above documented exam, problem list and plan of care with the following additions:    Vent weaning as able  Levo at 1-2mcg/min  Remains on IVF  Sodium level is improving  Stop Cefepime  Will wean hydrocortisone when off vasopressors  Completed Remdesivir   S/p Toci x 1      36 minutes of CCT spent with the patient, reviewing the chart including imaging studies, and discussing the case with other health care professionals. This time excludes procedures.      Nel Velez MD

## 2020-12-18 NOTE — PLAN OF CARE
Problem: Skin Integrity:  Goal: Will show no infection signs and symptoms  Description: Will show no infection signs and symptoms  12/18/2020 0953 by Azalia Chaparro RN  Outcome: Met This Shift  12/18/2020 0145 by Kalli Leahy RN  Outcome: Met This Shift  Goal: Absence of new skin breakdown  Description: Absence of new skin breakdown  12/18/2020 0953 by Azalia Chaparro RN  Outcome: Met This Shift  12/18/2020 0145 by Kalli Leahy RN  Outcome: Met This Shift     Problem: Skin Integrity:  Goal: Absence of new skin breakdown  Description: Absence of new skin breakdown  12/18/2020 0953 by Azalia Chaparro RN  Outcome: Met This Shift  12/18/2020 0145 by Kalli Leahy RN  Outcome: Met This Shift     Problem: Airway Clearance - Ineffective  Goal: Achieve or maintain patent airway  12/18/2020 0953 by Azalia Chaparro RN  Outcome: Met This Shift  12/18/2020 0145 by Kalli Leahy RN  Outcome: Met This Shift     Problem: Gas Exchange - Impaired  Goal: Absence of hypoxia  12/18/2020 0953 by Azalia Chaparro RN  Outcome: Met This Shift  12/18/2020 0145 by Kalli Leahy RN  Outcome: Met This Shift  Goal: Promote optimal lung function  12/18/2020 0953 by Azalia Chaparro RN  Outcome: Ongoing  12/18/2020 0145 by Kalli Leahy RN  Outcome: Met This Shift     Problem: Breathing Pattern - Ineffective  Goal: Ability to achieve and maintain a regular respiratory rate  12/18/2020 0953 by Azalia Chaparro RN  Outcome: Ongoing  12/18/2020 0145 by Kalli Leahy RN  Outcome: Met This Shift     Problem:  Body Temperature -  Risk of, Imbalanced  Goal: Ability to maintain a body temperature within defined limits  12/18/2020 0953 by Azalia Chaparro RN  Outcome: Met This Shift  12/18/2020 0145 by Kalli Leahy RN  Outcome: Met This Shift  Goal: Will regain or maintain usual level of consciousness  12/18/2020 0953 by Azalia Chaparro RN  Outcome: Not Met This Shift  12/18/2020 0145 by Kalli Leahy RN  Outcome: Met This Shift  Goal: Complications related to the disease process, condition or treatment will be avoided or minimized  12/18/2020 0953 by Merary Valiente RN  Outcome: Ongoing  12/18/2020 0145 by Mercedes Gleason RN  Outcome: Met This Shift     Problem: Isolation Precautions - Risk of Spread of Infection  Goal: Prevent transmission of infection  12/18/2020 0953 by Merary Valiente RN  Outcome: Met This Shift  12/18/2020 0145 by Mercedes Gleason RN  Outcome: Met This Shift     Problem: Nutrition Deficits  Goal: Optimize nutrtional status  12/18/2020 0953 by Merary Valiente RN  Outcome: Met This Shift  12/18/2020 0145 by Mercedes Gleason RN  Outcome: Met This Shift     Problem: Risk for Fluid Volume Deficit  Goal: Maintain normal heart rhythm  12/18/2020 0953 by Merary Valiente RN  Outcome: Met This Shift  12/18/2020 0145 by Mercedes Gleason RN  Outcome: Met This Shift  Goal: Maintain absence of muscle cramping  12/18/2020 0953 by Merary Valiente RN  Outcome: Met This Shift  12/18/2020 0145 by Mercedes Gleason RN  Outcome: Met This Shift  Goal: Maintain normal serum potassium, sodium, calcium, phosphorus, and pH  12/18/2020 0953 by Merary Valiente RN  Outcome: Ongoing  12/18/2020 0145 by Mercedes Gleason RN  Outcome: Met This Shift     Problem: Loneliness or Risk for Loneliness  Goal: Demonstrate positive use of time alone when socialization is not possible  12/18/2020 0953 by Merary Valiente RN  Outcome: Not Met This Shift  12/18/2020 0145 by Mercedes Gleason RN  Outcome: Met This Shift     Problem: Fatigue  Goal: Verbalize increase energy and improved vitality  12/18/2020 0953 by Merary Valiente RN  Outcome: Not Met This Shift  12/18/2020 0145 by Mercedes Gleason RN  Outcome: Met This Shift     Problem: Patient Education: Go to Patient Education Activity  Goal: Patient/Family Education  12/18/2020 0953 by Merary Valiente RN  Outcome: Met This Shift  12/18/2020 0145 by Mercedes Gleason RN  Outcome: Met This Shift     Problem: Restraint Use - Nonviolent/Non-Self-Destructive Behavior:  Goal: Absence of restraint indications  Description: Absence of restraint indications  12/18/2020 0953 by Jamila Ramires RN  Outcome: Not Met This Shift  12/18/2020 0145 by Alfonso Kennedy RN  Outcome: Met This Shift  Goal: Absence of restraint-related injury  Description: Absence of restraint-related injury  12/18/2020 0953 by Jamila Ramires RN  Outcome: Met This Shift  12/18/2020 0145 by Alfonso Kennedy RN  Outcome: Met This Shift     Problem: Falls - Risk of:  Goal: Will remain free from falls  Description: Will remain free from falls  12/18/2020 0953 by Jamila Ramires RN  Outcome: Met This Shift  12/18/2020 0145 by Alfonso Kennedy RN  Outcome: Met This Shift  Goal: Absence of physical injury  Description: Absence of physical injury  12/18/2020 0953 by Jamila Ramires RN  Outcome: Met This Shift  12/18/2020 0145 by Alfonso Kennedy RN  Outcome: Met This Shift

## 2020-12-18 NOTE — PROGRESS NOTES
[facility] Roberto A. Ricke Huh, MD Lilian Gitelman, MD Jean Carlos Bruno MD Adonica Phenix, CNP   Shaniqua Evans, BOBBY  Progress Note    12/18/2020    SUBJECTIVE:   12/17: Hypotensive, requiring pressors. Comfortable on vent. 12/18: Remains critically ill. On ventilator via ETT, supine, FiO2 90% PEEP 10. BP still lowish. Pressors weaning. PROBLEM LIST:    Active Problems:    Acute respiratory failure with hypoxia (HCC)  Resolved Problems:    * No resolved hospital problems. *         DIET:    DIET TUBE FEED CONTINUOUS/CYCLIC NPO; Immune Enhancing (PIVOT 1.5);  Nasogastric; 25; 45     MEDS (scheduled):    midodrine  10 mg Oral TID    insulin lispro  0-18 Units Subcutaneous Q6H    pantoprazole  40 mg Intravenous Daily    And    sodium chloride (PF)  10 mL Intravenous Daily    ipratropium-albuterol  1 ampule Inhalation Q4H WA    hydrocortisone sodium succinate PF  100 mg Intravenous Q8H    budesonide  500 mcg Nebulization BID    Arformoterol Tartrate  15 mcg Nebulization BID    enoxaparin  40 mg Subcutaneous Daily    clopidogrel  75 mg Oral Daily    chlorhexidine  15 mL Mouth/Throat BID    sodium chloride flush  10 mL Intravenous 2 times per day    Vitamin D  2,000 Units Oral Daily    gabapentin  800 mg Oral TID    levothyroxine  100 mcg Oral Daily    atorvastatin  20 mg Oral Daily    acetaminophen  650 mg Rectal Once       MEDS (infusions):   fentaNYL 5 mcg/ml in 0.9%  ml infusion 150 mcg/hr (12/18/20 0654)    vasopressin (Septic Shock) infusion 0.04 Units/min (12/18/20 0640)    IVPB builder 0.9 mL/hr at 12/18/20 1147    sodium chloride 125 mL/hr at 12/17/20 1426    midazolam 5 mg/hr (12/18/20 0523)       MEDS (prn):  potassium chloride, fentanNYL, sodium chloride, sodium chloride flush, promethazine **OR** ondansetron, polyethylene glycol, acetaminophen **OR** acetaminophen, sodium chloride    PHYSICAL EXAM:     Patient Vitals for the past 24 hrs:   BP Temp Temp src Pulse Resp SpO2 Height Weight   12/18/20 1400 -- -- -- 60 27 96 % -- --   12/18/20 1300 -- -- -- 76 27 92 % -- --   12/18/20 1221 -- -- -- 78 28 92 % -- --   12/18/20 1217 -- -- -- -- 27 92 % -- --   12/18/20 1200 -- 97.3 °F (36.3 °C) Esophageal 60 28 98 % -- --   12/18/20 1100 -- -- -- 62 28 98 % -- --   12/18/20 1023 -- -- -- -- -- -- 5' 6\" (1.676 m) --   12/18/20 1000 -- 96.8 °F (36 °C) Axillary 64 28 97 % -- --   12/18/20 0900 -- 97.5 °F (36.4 °C) Axillary 65 23 96 % -- --   12/18/20 0829 -- -- -- 66 28 97 % -- --   12/18/20 0827 -- -- -- -- 27 96 % -- --   12/18/20 0826 -- -- -- -- 27 96 % -- --   12/18/20 0824 -- -- -- -- 27 96 % -- --   12/18/20 0800 -- 97.7 °F (36.5 °C) Axillary 63 28 97 % -- --   12/18/20 0700 -- -- -- 59 27 97 % -- --   12/18/20 0600 -- -- -- 60 27 96 % -- --   12/18/20 0552 -- -- -- 64 27 96 % -- --   12/18/20 0500 -- -- -- 65 28 -- -- --   12/18/20 0400 (!) 104/50 98.2 °F (36.8 °C) Axillary 73 28 96 % -- 207 lb 0.2 oz (93.9 kg)   12/18/20 0325 -- -- -- 71 28 96 % -- --   12/18/20 0300 -- -- -- 69 27 -- -- --   12/18/20 0200 -- 98.8 °F (37.1 °C) -- 73 27 95 % -- --   12/18/20 0144 -- -- -- 67 -- -- -- --   12/18/20 0142 -- -- -- 71 -- -- -- --   12/18/20 0100 -- -- -- 69 28 96 % -- --   12/18/20 0003 -- -- -- 71 28 97 % -- --   12/18/20 0000 (!) 99/48 98.6 °F (37 °C) Axillary 70 28 96 % -- --   12/17/20 2300 -- -- -- 77 27 96 % -- --   12/17/20 2200 -- -- -- 73 27 94 % -- --   12/17/20 2100 -- 97.5 °F (36.4 °C) Axillary 85 28 91 % -- --   12/17/20 2027 -- -- -- 83 28 95 % -- --   12/17/20 2026 -- -- -- -- -- 90 % -- --   12/17/20 2025 -- -- -- -- -- (!) 89 % -- --   12/17/20 2006 -- -- -- -- 28 92 % -- --   12/17/20 2000 (!) 124/57 96.8 °F (36 °C) Axillary 82 27 92 % -- --   12/17/20 1945 -- -- -- 66 27 94 % -- --   12/17/20 1942 -- -- -- -- 28 95 % -- --   12/17/20 1941 -- -- -- -- 27 96 % -- --   12/17/20 1940 -- -- -- -- 27 96 % -- --   12/17/20 1939 -- -- -- 62 27 96 % -- --   12/17/20 1900 -- -- -- 69 28 94 % -- --   12/17/20 1800 -- -- -- 71 28 93 % -- --   12/17/20 1700 -- -- -- 77 28 95 % -- --   12/17/20 1642 -- -- -- -- 27 96 % -- --   12/17/20 1600 -- 97.7 °F (36.5 °C) Axillary 79 27 95 % -- --   12/17/20 1500 -- -- -- 71 28 93 % -- --   @      Intake/Output Summary (Last 24 hours) at 12/18/2020 1433  Last data filed at 12/18/2020 1400  Gross per 24 hour   Intake 4119 ml   Output 1085 ml   Net 3034 ml         Wt Readings from Last 3 Encounters:   12/18/20 207 lb 0.2 oz (93.9 kg)   12/08/20 188 lb 9 oz (85.5 kg)   10/09/20 163 lb 9.6 oz (74.2 kg)       PE:  COVID-19 isolation protocol, I did not conduct a physical examination but discussed her physical exam findings with her bedside RN and the MICU team    DATA:    Recent Labs     12/16/20  0545 12/17/20  0600 12/18/20  0545   WBC 10.7 6.2 8.8   HGB 9.4* 8.5* 8.6*   HCT 28.7* 26.0* 26.4*   MCV 93.5 92.5 94.0    138 158     Recent Labs     12/16/20  0545 12/16/20  0545 12/17/20  0600 12/17/20  0600 12/18/20  0005 12/18/20  0545 12/18/20  1218   *   < > 122*   < > 125* 126* 129*   K 3.8   < > 3.8   < > 4.4 4.4 4.1   CL 88*   < > 92*   < > 96* 98 100   CO2 23   < > 23   < > 22 23 24   MG 2.1  --  1.9  --   --  1.8  --    PHOS 1.5*  --  1.3*  --   --  1.5*  --    BUN 8   < > 11   < > 11 11 11   CREATININE 0.4*   < > 0.4*   < > 0.4* 0.4* 0.4*   ALT 50*  --  38*  --   --  33*  --    AST 31  --  22  --   --  16  --    BILITOT 0.4  --  0.4  --   --  0.3  --    ALKPHOS 100  --  80  --   --  75  --     < > = values in this interval not displayed. Lab Results   Component Value Date    LABPROT 1.1 (H) 12/16/2020    LABPROT 1.1 12/16/2020       Assessment  1.  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 Administration of desmopressin in the ER likely also contributed to the initial drop in her sodium  2. Hypophosphatemia, severe, nutritional     [Na+] improving, though slowly      Recommendations  1. IV normal saline gtt  2. Every 6 hours BMP, adjust drip as warranted  3. Supplement phosphorus IV, over 6 hours. Recheck this afternoon, resupplement as warranted  4. Follow labs, UO  5. Mix all drips in NS  6.  Avoid nephrotoxins         Electronically signed by Ashlie Love MD on 12/18/2020

## 2020-12-18 NOTE — PLAN OF CARE
Problem: Inadequate oral food/beverage intake (NI-2.1)  Goal: Food and/or Nutrient Delivery  Pt will ernestine TF at goal rate  Description: Individualized approach for food/nutrient provision.   Outcome: Met This Shift

## 2020-12-18 NOTE — PROGRESS NOTES
12/18/2020  10:36 AM      Comprehensive Nutrition Assessment    Type and Reason for Visit:  Reassess    Nutrition Recommendations/Plan: Recommend continue current TF (Immune Enhancing at 45 ml/hr) and hold 30  Min before and after Synthroid (as per [facility] rec)   This would provide: 1035 ml/d, 1553 nguyen, 97 g pro, 785 ml free water/d      Nutrition Assessment:  Pt remains intubated/sedated and on TF via NGT. Will continue to monitor tolerance and status changes    Malnutrition Assessment:  Malnutrition Status: At risk for malnutrition (Comment)    Context:  Acute Illness     Findings of the 6 clinical characteristics of malnutrition:  Energy Intake:  Mild decrease in energy intake (Comment)(NPO/intubated)  Weight Loss:  No significant weight loss     Body Fat Loss:  Unable to assess(Covid isolation)     Muscle Mass Loss:  Unable to assess(Covid isolation)    Fluid Accumulation:  Unable to assess     Strength:  Not Performed    Estimated Daily Nutrient Needs:  Energy (kcal):  ; Weight Used for Energy Requirements:  Admission     Protein (g):  80-90 (1.3-1.5 g/kg);  Weight Used for Protein Requirements:  Ideal        Fluid (ml/day):  per Critical Care; Method Used for Fluid Requirements:  Other (Comment)      Nutrition Related Findings:  intubated/sedated, pressor (MAP 70), soft abd hypo BS, +I/O 3L, +1 edema, NGT to TF      Wounds:  (abrasions)       Current Nutrition Therapies:    Current Tube Feeding (TF) Orders:  · Feeding Route: Nasogastric  · Formula: Immune Enhancing  · Schedule: Continuous  · Additives/Modulars:    · Water Flushes: Per protocol  · Current TF & Flush Orders Provides: at goal  · Goal TF & Flush Orders Provides: 1080 ml/d, 1620 nguyen, 102 g pro, 820 ml free water      Anthropometric Measures:  · Height: 5' 6\" (167.6 cm)  · Current Body Weight: 207 lb (93.9 kg)(12/18)   · Admission Body Weight: 184 lb (83.5 kg)(12/15 first measured wt)    · Usual Body Weight: 161 lb (73 kg)(per EMR x 6 mo) · Ideal Body Weight: 130 lbs; % Ideal Body Weight 159.2 %   · BMI: 33.4  · BMI Categories: Overweight (BMI 25.0-29. 9)       Nutrition Diagnosis:   · Inadequate oral intake related to impaired respiratory function(2/2 Covid PNA) as evidenced by NPO or clear liquid status due to medical condition, intubation      Nutrition Interventions:   Food and/or Nutrient Delivery:  Continue NPO, Modify Tube Feeding(Recommend continue current TF but Hold TF 30 min before and after Synthroid)  Nutrition Education/Counseling:  Education not indicated   Coordination of Nutrition Care:  Continue to monitor while inpatient    Goals:  Pt ernestine EN at goal rate       Nutrition Monitoring and Evaluation:   Behavioral-Environmental Outcomes:  None Identified   Food/Nutrient Intake Outcomes:  Enteral Nutrition Intake/Tolerance  Physical Signs/Symptoms Outcomes:  Biochemical Data, GI Status, Fluid Status or Edema, Hemodynamic Status, Weight, Skin, Nutrition Focused Physical Findings     Discharge Planning:     Too soon to determine     Electronically signed by Cristian Petit RD, CNSC, LD on 12/18/20 at 10:36 AM EST    Contact: [phone]

## 2020-12-18 NOTE — PROGRESS NOTES
This note also relates to the following rows which could not be included:  Vt Ordered - Cannot attach notes to unvalidated device data  Rate Set - Cannot attach notes to unvalidated device data  Peak Flow - Cannot attach notes to unvalidated device data  Pressure Support - Cannot attach notes to unvalidated device data  Sensitivity - Cannot attach notes to unvalidated device data  PEEP/CPAP - Cannot attach notes to unvalidated device data  I Time/ I Time % - Cannot attach notes to unvalidated device data  High Peep/I Pressure - Cannot attach notes to unvalidated device data  Peak Inspiratory Pressure - Cannot attach notes to unvalidated device data  Mean Airway Pressure - Cannot attach notes to unvalidated device data  Rate Measured - Cannot attach notes to unvalidated device data  Vt Exhaled - Cannot attach notes to unvalidated device data  Minute Volume - Cannot attach notes to unvalidated device data  I:E Ratio - Cannot attach notes to unvalidated device data  Pulse - Cannot attach notes to unvalidated device data  Resp - Cannot attach notes to unvalidated device data  High Pressure Alarm - Cannot attach notes to unvalidated device data       12/17/20 1945   Vent Information   FiO2  50 %   SpO2 94 %   SpO2/FiO2 ratio 188

## 2020-12-18 NOTE — PROGRESS NOTES
This note also relates to the following rows which could not be included:  Vt Ordered - Cannot attach notes to unvalidated device data  Rate Set - Cannot attach notes to unvalidated device data  Peak Flow - Cannot attach notes to unvalidated device data  Pressure Support - Cannot attach notes to unvalidated device data  Sensitivity - Cannot attach notes to unvalidated device data  PEEP/CPAP - Cannot attach notes to unvalidated device data  I Time/ I Time % - Cannot attach notes to unvalidated device data  High Peep/I Pressure - Cannot attach notes to unvalidated device data  Peak Inspiratory Pressure - Cannot attach notes to unvalidated device data  Mean Airway Pressure - Cannot attach notes to unvalidated device data  Rate Measured - Cannot attach notes to unvalidated device data  Vt Exhaled - Cannot attach notes to unvalidated device data  Minute Volume - Cannot attach notes to unvalidated device data  I:E Ratio - Cannot attach notes to unvalidated device data  Pulse - Cannot attach notes to unvalidated device data  Resp - Cannot attach notes to unvalidated device data  High Pressure Alarm - Cannot attach notes to unvalidated device data       12/17/20 2025   Vent Information   FiO2  50 %   SpO2 (!) 89 %   SpO2/FiO2 ratio 178

## 2020-12-18 NOTE — PROGRESS NOTES
Consulted to place piv, patient right arm swollen with edema apparent with ultrasound scan. No veins large enough to attempt access but large amount of edema in arm. Left arm with art line in medial upper arm. Large amt ecchymosis in arm but scanned and no viable vein to attempt to access. RN notified of this. 12/18/2020 2:06 PM BRIANA MARTINES, [facility]

## 2020-12-18 NOTE — PROGRESS NOTES
12/17/20 1939   Vent Information   Vent Mode AC/VC   Vt Ordered 450 mL   Rate Set 28 bmp   Peak Flow 65 L/min   Pressure Support 0 cmH20   FiO2  60 %  (WEANED TO 50%)   SpO2 96 %   SpO2/FiO2 ratio 160   Sensitivity 3   PEEP/CPAP 10   I Time/ I Time % 0 s   Humidification Source HME   Vent Patient Data   High Peep/I Pressure 0   Peak Inspiratory Pressure 29 cmH2O   Mean Airway Pressure 17 cmH20   Rate Measured 28 br/min   Vt Exhaled 433 mL   Minute Volume 12.1 Liters   I:E Ratio 1:1.80   Spontaneous Breathing Trial (SBT) RT Doc   Pulse 62   Additional Respiratory  Assessments   Resp 27   Subglottic Suction Done?  Yes   Cuff Pressure (cm H2O) 29 cm H2O   Alarm Settings   High Pressure Alarm 50 cmH2O   Low Minute Volume Alarm 10 L/min   High Respiratory Rate 30 br/min   Low Exhaled Vt  350 mL   Non-Surgical Airway Endo Tracheal Tube   Placement Date/Time: 12/15/20 2645   Timeout: Patient  Placed By: In ED  Inserted by: dr Abigail Mills  Insertion attempts: 1  Airway Device: Endo Tracheal Tube  Size: 7.5   Secured at 22 cm

## 2020-12-18 NOTE — PROGRESS NOTES
This note also relates to the following rows which could not be included:  Vt Ordered - Cannot attach notes to unvalidated device data  Rate Set - Cannot attach notes to unvalidated device data  Peak Flow - Cannot attach notes to unvalidated device data  Pressure Support - Cannot attach notes to unvalidated device data  Sensitivity - Cannot attach notes to unvalidated device data  PEEP/CPAP - Cannot attach notes to unvalidated device data  I Time/ I Time % - Cannot attach notes to unvalidated device data  High Peep/I Pressure - Cannot attach notes to unvalidated device data  Peak Inspiratory Pressure - Cannot attach notes to unvalidated device data  Mean Airway Pressure - Cannot attach notes to unvalidated device data  Rate Measured - Cannot attach notes to unvalidated device data  Vt Exhaled - Cannot attach notes to unvalidated device data  Minute Volume - Cannot attach notes to unvalidated device data  I:E Ratio - Cannot attach notes to unvalidated device data  Pulse - Cannot attach notes to unvalidated device data  Resp - Cannot attach notes to unvalidated device data  High Pressure Alarm - Cannot attach notes to unvalidated device data       12/17/20 2027   Vent Information   FiO2  60 %   SpO2 95 %   SpO2/FiO2 ratio 158.33

## 2020-12-18 NOTE — CARE COORDINATION
COVID positive. Cont vent support, FiO2 55%. Off paralytics, cont fentanyl. Remdesivir stopped. [facility] [place].  Will continue with discharge planning as necessary once pt stable-MJO

## 2020-12-19 NOTE — PROGRESS NOTES
Called to patient's room by RN d/t desat following fentanyl wean. Patient persistently hypoxic. PEEP and FiO2 increased.

## 2020-12-19 NOTE — PLAN OF CARE
Problem: Skin Integrity:  Goal: Will show no infection signs and symptoms  Description: Will show no infection signs and symptoms  Outcome: Met This Shift  Goal: Absence of new skin breakdown  Description: Absence of new skin breakdown  Outcome: Met This Shift     Problem: Airway Clearance - Ineffective  Goal: Achieve or maintain patent airway  Outcome: Met This Shift     Problem: Gas Exchange - Impaired  Goal: Absence of hypoxia  Outcome: Met This Shift  Goal: Promote optimal lung function  Outcome: Met This Shift     Problem: Breathing Pattern - Ineffective  Goal: Ability to achieve and maintain a regular respiratory rate  Outcome: Met This Shift     Problem:  Body Temperature -  Risk of, Imbalanced  Goal: Ability to maintain a body temperature within defined limits  Outcome: Met This Shift  Goal: Will regain or maintain usual level of consciousness  Outcome: Met This Shift  Goal: Complications related to the disease process, condition or treatment will be avoided or minimized  Outcome: Met This Shift     Problem: Isolation Precautions - Risk of Spread of Infection  Goal: Prevent transmission of infection  Outcome: Met This Shift     Problem: Nutrition Deficits  Goal: Optimize nutrtional status  Outcome: Met This Shift     Problem: Risk for Fluid Volume Deficit  Goal: Maintain normal heart rhythm  Outcome: Met This Shift  Goal: Maintain absence of muscle cramping  Outcome: Met This Shift  Goal: Maintain normal serum potassium, sodium, calcium, phosphorus, and pH  Outcome: Met This Shift     Problem: Loneliness or Risk for Loneliness  Goal: Demonstrate positive use of time alone when socialization is not possible  Outcome: Met This Shift     Problem: Fatigue  Goal: Verbalize increase energy and improved vitality  Outcome: Met This Shift     Problem: Patient Education: Go to Patient Education Activity  Goal: Patient/Family Education  Outcome: Met This Shift     Problem: Falls - Risk of:  Goal: Will remain free from falls  Description: Will remain free from falls  Outcome: Met This Shift  Goal: Absence of physical injury  Description: Absence of physical injury  Outcome: Met This Shift

## 2020-12-19 NOTE — PROGRESS NOTES
Subjective: Intubated sedated mechanically ventilated   Discussed with staff  Objective:    BP (!) 97/59   Pulse 59   Temp 95.7 °F (35.4 °C) (Esophageal)   Resp 23   Ht 5' 6\" (1.676 m)   Wt 207 lb 0.2 oz (93.9 kg)   SpO2 94%   BMI 33.41 kg/m²     24HR INTAKE/OUTPUT:      Intake/Output Summary (Last 24 hours) at 12/19/2020 1034  Last data filed at 12/19/2020 0800  Gross per 24 hour   Intake 1976 ml   Output 1660 ml   Net 316 ml       General appearance: Intubated sedated mechanically ventilated supine  Neck: FROM, supple   Lungs: Clear bilaterally no wheezes, no rhonchi, no crackles  CV: RRR, no MRGs; normal carotid upstroke and amplitude without Bruits  Abdomen: Soft, non-tender; no masses or HSM  Extremities: No edema, no cyanosis, no clubbing  Skin: Intact no rash, no lesions, no ulcers    Psych: Alert and oriented normal affect  Neuro: Nonfocal  In consideration of [facility]- isolation policies and efforts to preserve PPE physical exam is limited. Exam findings shared among providers. Most Recent Labs  Lab Results   Component Value Date    WBC 9.4 12/19/2020    HGB 9.1 (L) 12/19/2020    HCT 28.1 (L) 12/19/2020     12/19/2020     (L) 12/19/2020    K 4.7 12/19/2020    CL 99 12/19/2020    CREATININE 0.4 (L) 12/19/2020    BUN 11 12/19/2020    CO2 24 12/19/2020    GLUCOSE 283 (H) 12/19/2020    ALT 30 12/19/2020    AST 17 12/19/2020    INR 1.0 12/13/2020    TSH 0.381 12/03/2020    LABA1C 5.8 04/05/2017     Recent Labs     12/19/20  0539   MG 1.9     Lab Results   Component Value Date    CALCIUM 6.0 (L) 12/19/2020    PHOS 2.2 (L) 12/19/2020        XR CHEST PORTABLE   Final Result   Mildly waning airspace disease with slight improvement of aeration   bilaterally. XR CHEST PORTABLE   Final Result   Diffuse infiltrate, improved in the upper lungs as compared to prior. There is new consolidation in the left lower lobe with possible left pleural   effusion.       XR CHEST PORTABLE   Final Result Endotracheal tube in low position, can be pulled back about 3 cm. NG tube in good position. Pattern of bilateral interstitial infiltrate in the perihilar regions or the   periphery of the lung as above commented. The pulmonary edema versus   bilateral viral pneumonia will be part of the differential diagnosis. Please   correlate clinically. T   Preliminary report given to Dr. Sourav Louise at the time   of the interpretation. XR ABDOMEN FOR NG/OG/NE TUBE PLACEMENT   Final Result   Enteric feeding tube partially imaged below the diaphragm and likely within   the stomach. XR CHEST PORTABLE   Final Result   Increased interstitial markings throughout both lungs along with   patchy/consolidative airspace opacities noted bilaterally as above. Findings   are suspicious for multifocal pneumonia or pulmonary edema. CTA PULMONARY W CONTRAST   Final Result   No central pulmonary embolism or aortic dissection. Progressive diffuse bilateral patchy and ground-glass infiltrates concerning   for progressive multifocal pneumonia/viral infection. XR CHEST PORTABLE   Final Result   Progressive diffuse bilateral infiltrates and pleural effusion likely   CHF/edema and or diffuse pneumonia. Assessment    Active Problems:    Acute respiratory failure with hypoxia (HCC)  Resolved Problems:    * No resolved hospital problems. *      Plan:     1. Ventilator dependent resp failure - COVID 19 PNA / bacterial/ HF. CXR improved 12/17              Continue with dexamethasone + vit D   Remdesivir completed              Diffuse bilateral patchy GGO on CT, no PE              BNP elevated, possible component of heart failure              cefepime completed              Fentanyl + versed  2.  Shock -likely septic secondary to viral sepsis              Currently on levophed + vasopressin wean as tolerated              TLC inserted              Blood cultures sent, no evidence of UTI              Continue cefepime              Continue hydrocortisone + midodrine  3. Anemia - resolved              PGEIQT false lab value  4. Hypothyroidism - continue synthroid  5.  Hyponatremia - seen by nephrology              IVF changed from D5 to NS              Improving       Electronically signed by Lety Roa MD on 12/19/2020 at 10:34 AM

## 2020-12-19 NOTE — PROGRESS NOTES
Critical Care Team - Daily Progress Note         Date and time: 12/19/2020 7:56 AM  Patient's name:  Hai Dudley  Medical Record Number: [id number]  Patient's account/billing number: [de-identified]  Patient's YOB: 1950  Age: 65 y.o. Date of Admission: 12/13/2020 10:51 AM  Length of stay during current admission: 6      Primary Care Physician: Vincent Wiley DO  ICU Attending Physician: Dr. No Case Status: Full Code    Reason for ICU admission: respiratory failure   Shock   Covid 19 pneumonitis       SUBJECTIVE:     OVERNIGHT EVENTS:         12/16: Overnight able to titrate down some of the vasopressors though still intermittently hypotensive and requiring vasopressin as well as Levophed. Still sedated with fentanyl and Versed. Hyponatremia notable at 119 new today. 12/17: Patient was able to be titrated off the vasopressors. Is now on midodrine. Minimally hypothermic overnight now on Melissa hugger with improvement in temperature. Continue sedation with fentanyl and Versed. 12/18: Patient continues on the ventilator. Back on small amount of Levophed in addition to the vasopressin. Hoping to wean sedation more today. 12/19: Back on levophed overnight. Initially able to titrate fio2 to 40%, became hypoxic and increased to 60%. Apparently their were several episodes of bradycardia overnight. Patient has pacemaker which did not fire per nursing. Follows with Dr. Leslie Caballero for cardiology.        CURRENT VENTILATION STATUS:     [x] Ventilator  [] BIPAP  [] Nasal Cannula [] Room Air      IF INTUBATED, ET TUBE MARKING AT LOWER LIP:       cms    SECRETIONS Amount:  [] Small [] Moderate  [] Large  [x] None  Color:     [] White [] Colored  [] Bloody    SEDATION:  RAAS Score:  [x] Propofol gtt  [x] Versed gtt  [] Ativan gtt   [x] No Sedation    PARALYZED:  [x] No    [] Yes      VASOPRESSORS:  [] No    [x] Yes    If yes -   [x] Levophed       [] Dopamine     [x] Vasopressin       [] Dobutamine  [] Phenylephrine         [] Epinephrine    CENTRAL LINES:     [] No   [x] Yes   (Date of Insertion:  12/15 )           If yes -     [] Right IJ     [] Left IJ [x] Right Femoral [] Left Femoral                   [] Right Subclavian [] Left Subclavian       GARCIA'S CATHETER:   [] No   [x] Yes  (Date of Insertion: 12/15  )     URINE OUTPUT:            [x] Good   [] Low              [] Anuric      OBJECTIVE:     VITAL SIGNS:  BP (!) 97/59   Pulse 66   Temp 99.5 °F (37.5 °C) (Esophageal)   Resp 27   Ht 5' 6\" (1.676 m)   Wt 207 lb 0.2 oz (93.9 kg)   SpO2 94%   BMI 33.41 kg/m²   Tmax over 24 hours:  Temp (24hrs), Av.5 °F (36.4 °C), Min:95.5 °F (35.3 °C), Max:99.5 °F (37.5 °C)      Patient Vitals for the past 6 hrs:   Temp Temp src Pulse Resp SpO2   20 0700 -- -- 66 27 94 %   20 0600 -- -- 68 28 92 %   20 0500 -- -- 70 27 92 %   20 0456 -- -- 69 27 (!) 89 %   20 0426 -- -- (!) 44 28 90 %   20 0400 99.5 °F (37.5 °C) Esophageal 86 20 (!) 87 %   20 0300 -- -- 69 28 92 %   20 0240 -- -- 67 -- --   20 0200 -- -- 65 28 94 %         Intake/Output Summary (Last 24 hours) at 2020 0756  Last data filed at 2020 0600  Gross per 24 hour   Intake 2246 ml   Output 1620 ml   Net 626 ml     Wt Readings from Last 2 Encounters:   20 207 lb 0.2 oz (93.9 kg)   20 188 lb 9 oz (85.5 kg)     Body mass index is 33.41 kg/m². PHYSICAL EXAMINATION:  General: ill appearing, sedated, lying in gurney in no distress  HEENT: Pupils are equal round and reactive to light, there are no oral lesions and no post-nasal drip   Neck: supple without adenopathy  Cardiovascular: regular rate and rhythm without murmur or gallop  Respiratory:  Decreased breath sounds bilaterally without wheezing or crackles.   Air entry is symmetric  Abdomen: soft, non-tender, non-distended, normal bowel sounds  Extremities: warm, no edema, no clubbing   Skin: no rash or lesion  Neurologic:  Sedated, intubated, pupils equal round reactive       Any additional physical findings:    MEDICATIONS:    Scheduled Meds:   midodrine  10 mg Oral TID    insulin lispro  0-18 Units Subcutaneous Q6H    pantoprazole  40 mg Intravenous Daily    And    sodium chloride (PF)  10 mL Intravenous Daily    ipratropium-albuterol  1 ampule Inhalation Q4H WA    hydrocortisone sodium succinate PF  100 mg Intravenous Q8H    budesonide  500 mcg Nebulization BID    Arformoterol Tartrate  15 mcg Nebulization BID    enoxaparin  40 mg Subcutaneous Daily    clopidogrel  75 mg Oral Daily    chlorhexidine  15 mL Mouth/Throat BID    sodium chloride flush  10 mL Intravenous 2 times per day    Vitamin D  2,000 Units Oral Daily    gabapentin  800 mg Oral TID    levothyroxine  100 mcg Oral Daily    atorvastatin  20 mg Oral Daily    acetaminophen  650 mg Rectal Once     Continuous Infusions:   fentaNYL 5 mcg/ml in 0.9%  ml infusion 150 mcg/hr (12/19/20 0656)    vasopressin (Septic Shock) infusion 0.04 Units/min (12/19/20 0028)    IVPB builder 1.9 mL/hr at 12/19/20 0231    sodium chloride 125 mL/hr at 12/19/20 0022    midazolam 5 mg/hr (12/19/20 0311)     PRN Meds:       potassium chloride, 20 mEq, PRN      fentanNYL, 25 mcg, Q3H PRN      sodium chloride, 30 mL, PRN      sodium chloride flush, 10 mL, PRN      promethazine, 12.5 mg, Q6H PRN    Or      ondansetron, 4 mg, Q6H PRN      polyethylene glycol, 17 g, Daily PRN      acetaminophen, 650 mg, Q6H PRN    Or      acetaminophen, 650 mg, Q6H PRN      sodium chloride, 30 mL, PRN          VENT SETTINGS (Comprehensive) (if applicable):  Vent Information  $Ventilation: $Subsequent Day  Skin Assessment: Clean, dry, & intact  Equipment ID: [id number]  Vent Type: [phone]  Vent Mode: AC/VC  Vt Ordered: 450 mL  Rate Set: 28 bmp  Peak Flow: 65 L/min  Pressure Support: 0 cmH20  FiO2 : 60 %  SpO2: 94 %  SpO2/FiO2 ratio: 156.67  Sensitivity: 3  PEEP/CPAP: 10  I Time/ I Time %: 0 s  Humidification Source: HME  Mask Type: Full face mask  Mask Size: Small  Additional Respiratory  Assessments  Pulse: 66  Resp: 27  SpO2: 94 %  Position: Semi-Marrero's  Humidification Source: HME  Oral Care: Mouth moisturizer, Mouth suctioned  Subglottic Suction Done?: Yes  Cuff Pressure (cm H2O): 29 cm H2O    ABGs:   Recent Labs     12/19/20  0552   PH 7.414   PCO2 35.1   PO2 72.4*   HCO3 22.0   BE -2.2   O2SAT 94.6       Laboratory findings:    Complete Blood Count:   Recent Labs     12/17/20  0600 12/18/20  0545 12/19/20  0539   WBC 6.2 8.8 9.4   HGB 8.5* 8.6* 9.1*   HCT 26.0* 26.4* 28.1*    158 177        Last 3 Blood Glucose:   Recent Labs     12/18/20  1218 12/18/20  1840 12/19/20  0539   GLUCOSE 217* 196* 283*        PT/INR:    Lab Results   Component Value Date    PROTIME 11.4 12/13/2020    PROTIME 11.9 12/15/2011    INR 1.0 12/13/2020     PTT:    Lab Results   Component Value Date    APTT 21.6 12/13/2020       Comprehensive Metabolic Profile:   Recent Labs     12/18/20  1218 12/18/20  1840 12/19/20  0539   * 126* 126*   K 4.1 3.4* 4.7    97* 99   CO2 24 24 24   BUN 11 9 11   CREATININE 0.4* 0.3* 0.4*   GLUCOSE 217* 196* 283*   CALCIUM 6.5* 6.2* 6.0*   PROT  --   --  4.3*   LABALBU  --   --  2.3*   BILITOT  --   --  0.4   ALKPHOS  --   --  76   AST  --   --  17   ALT  --   --  30      Magnesium:   Lab Results   Component Value Date    MG 1.9 12/19/2020     Phosphorus:   Lab Results   Component Value Date    PHOS 2.2 12/19/2020     Ionized Calcium: No results found for: CAION     Urinalysis:     Troponin:   No results for input(s): TROPONINI in the last 72 hours.     Microbiology:    Cultures during this admission:     Blood cultures:                 [] None drawn      [x] Negative             []  Positive (Details:  )  Urine Culture:                   [] None drawn      [] Negative             []  Positive (Details:  )  Sputum Culture:               [] None drawn [x] Negative             []  Positive (Details:  )   Endotracheal aspirate:     [] None drawn       [] Negative             []  Positive (Details:  )     Other pertinent Labs:   COVID-19 + December 13  MRSA negative screening   radiology/Imaging:     Chest Xray (12/19/2020): Diffuse infiltrate, improved in the upper lungs as compared to prior. There is new consolidation in the left lower lobe with possible left pleural   effusion. ASSESSMENT:      1. Acute hypoxic respiratory failure   2. Covid pneumonitis - critically severe   3. Shock- septic   4. Adrenal insufficiency  5. Hyponatremia   6. Hypokalemia   7. Leukocytosis    8. AECOPD  9. pvd  10. chf not in acute exacerbatino   11. Hx lacunar infarct   12. SSS s/p pacemaker   13. Hx seizure do   14. Bilateral carotid stenosis       SYSTEMS ASSESSMENT    Neuro   Intubated, sedated on Versed and fentanyl - wean as tolerated  No longer paralyzed  Previous history of lacunar stroke  CT head on admission showed no acute abnormalities  Occasionally becomes agitated with movement. Respiratory   Acute respiratory failure with hypoxia secondary to COVID-19 viral pneumonia  Intubated, wean sedation as tolerated  Course of Cefepime completed for possible superimposed bacterial pneumonia   Wean oxygen as tolerated.  Keep O2 sat 90-92%  abg daily    Cardiovascular   Shock, septic  Increase midodrine to 15 tid  back on a small amount of Levophed in addition to vasopressin - hopefully off vasopressin today  Previous atrial flutter now in sinus rhythm  Pacemaker, but bradycardic overnight - cardiology consulted  Continue plavix    Gastrointestinal   GI prophylaxis with Protonix  Diet per dietary recs   Colace/senna for constipation    Renal   Hyponatremia - slowly improving   -Likely hypovolemic   -Serum osmolality, urine electrolytes osmolality   -Nephrology consulted   -Maintenance fluids stopped per nephrology   -BMP every 6 hours     Infectious Disease COVID-19 viral pneumonia   Received remdesivir, Toci, vit D  Septic shock - improving  Possible superimposed bacterial pneumonia  Patient on cefepime  Blood cultures and respiratory culture negative thus far    Hematology/Oncology   Anemia on admission, trend H&H, improving  CBC daily    Endocrine   high-dose sliding scale Humalog for hyperglycemia  Start lantus  Monitor growth closely  Cortisol on admission 1.42  High-dose Solucortef decreased to 50 q 8 hrs    Social/Spiritual/DNR/Other   Full code    ASSESSMENT/ PLAN     1. Overnight requiring more fio2, plan to try and wean again today  2. Hyponatremia, improving, iv fluids stopped per nephrology  3. Increase midodrine, stop vasopressin, wean levophed  4. decrease solucortef  5. Interrogate pacer  6. Continue ICU level of care      Jina Santiago DO, PGY2  12/19/20 7:56 AM     I personally saw, examined and provided care for the patient. Radiographs, labs and medication list were reviewed by me independently. I spoke with bedside nursing, therapists and consultants. Critical care services and times documented are independent of procedures and multidisciplinary rounds with Residents. Additionally comprehensive, multidisciplinary rounds were conducted with the MICU team. The case was discussed in detail and plans for care were established. Review of Residents documentation was conducted and revisions were made as appropriate.  I agree with the above documented exam, problem list and plan of care with the following additions:    Some worsening today necessitating increase in FiO2  Also with ectopy overnight  Obtain pacer interrogation  Vent weaning  Stop saline - discussed with Dr. Eileen Obando  Monitor Na level  Reduce steroids  Increase midodrine in hopes to stop vasopressors  Start lantus and continue high dose SSI    36 minutes of CCT spent with the patient, reviewing the chart including imaging studies, and discussing the case with other health care professionals. This time excludes procedures.      Thi Eagle MD

## 2020-12-19 NOTE — PROGRESS NOTES
[facility] MD Moo Campos, MD Evert Reyez, MD Theodore Thomas, CNP   Shaniqua Evans, BOBBY  Progress Note    12/19/2020    SUBJECTIVE:   12/17: Hypotensive, requiring pressors. Comfortable on vent. 12/18: Remains critically ill. On ventilator via ETT, supine, FiO2 90% PEEP 10. BP still lowish. Pressors weaning. 12/19: Remains intubated on ventilator, FiO2 60% PEEP 10, somewhat improved. Supine. No new event overnight. Hemodynamically stable. PROBLEM LIST:    Active Problems:    Acute respiratory failure with hypoxia (HCC)  Resolved Problems:    * No resolved hospital problems. *         DIET:    DIET TUBE FEED CONTINUOUS/CYCLIC NPO; Immune Enhancing (PIVOT 1.5);  Nasogastric; 25; 45     MEDS (scheduled):    sodium phosphate IVPB  30 mmol Intravenous Once    midodrine  10 mg Oral TID    insulin lispro  0-18 Units Subcutaneous Q6H    pantoprazole  40 mg Intravenous Daily    And    sodium chloride (PF)  10 mL Intravenous Daily    ipratropium-albuterol  1 ampule Inhalation Q4H WA    hydrocortisone sodium succinate PF  100 mg Intravenous Q8H    budesonide  500 mcg Nebulization BID    Arformoterol Tartrate  15 mcg Nebulization BID    enoxaparin  40 mg Subcutaneous Daily    clopidogrel  75 mg Oral Daily    chlorhexidine  15 mL Mouth/Throat BID    sodium chloride flush  10 mL Intravenous 2 times per day    Vitamin D  2,000 Units Oral Daily    gabapentin  800 mg Oral TID    levothyroxine  100 mcg Oral Daily    atorvastatin  20 mg Oral Daily    acetaminophen  650 mg Rectal Once       MEDS (infusions):   fentaNYL 5 mcg/ml in 0.9%  ml infusion 150 mcg/hr (12/19/20 0656)    vasopressin (Septic Shock) infusion 0.04 Units/min (12/19/20 0028)    IVPB builder Stopped (12/19/20 0857)    midazolam 5 mg/hr (12/19/20 0311)       MEDS (prn):  potassium chloride, fentanNYL, sodium chloride, sodium chloride flush, promethazine **OR** ondansetron, polyethylene glycol, acetaminophen **OR** acetaminophen, sodium chloride    PHYSICAL EXAM:     Patient Vitals for the past 24 hrs:   BP Temp Temp src Pulse Resp SpO2 Height   12/19/20 0854 -- -- -- -- 27 95 % --   12/19/20 0853 -- -- -- -- 28 96 % --   12/19/20 0852 -- -- -- -- 28 96 % --   12/19/20 0842 -- -- -- 63 28 97 % --   12/19/20 0800 -- 95.7 °F (35.4 °C) Esophageal -- -- -- --   12/19/20 0700 -- -- -- 66 27 94 % --   12/19/20 0600 -- -- -- 68 28 92 % --   12/19/20 0500 -- -- -- 70 27 92 % --   12/19/20 0456 -- -- -- 69 27 (!) 89 % --   12/19/20 0426 -- -- -- (!) 44 28 90 % --   12/19/20 0400 -- 99.5 °F (37.5 °C) Esophageal 86 20 (!) 87 % --   12/19/20 0300 -- -- -- 69 28 92 % --   12/19/20 0240 -- -- -- 67 -- -- --   12/19/20 0200 -- -- -- 65 28 94 % --   12/19/20 0100 -- -- -- 61 27 94 % --   12/19/20 0038 -- -- -- 87 28 94 % --   12/19/20 0000 (!) 97/59 98.1 °F (36.7 °C) Esophageal 86 27 94 % --   12/18/20 2300 -- -- -- 73 27 93 % --   12/18/20 2200 -- -- -- 93 27 90 % --   12/18/20 2100 -- -- -- 78 28 90 % --   12/18/20 2027 -- -- -- 61 28 94 % --   12/18/20 2000 (!) 146/73 95.5 °F (35.3 °C) Esophageal 74 27 94 % --   12/18/20 1900 -- -- -- 63 27 93 % --   12/18/20 1800 -- -- -- 59 28 94 % --   12/18/20 1700 -- -- -- 63 28 96 % --   12/18/20 1620 -- -- -- 66 28 96 % --   12/18/20 1618 -- -- -- -- 28 96 % --   12/18/20 1600 -- 97.7 °F (36.5 °C) Esophageal 63 27 97 % --   12/18/20 1500 -- -- -- 66 28 96 % --   12/18/20 1400 -- -- -- 60 27 96 % --   12/18/20 1300 -- -- -- 76 27 92 % --   12/18/20 1221 -- -- -- 78 28 92 % --   12/18/20 1217 -- -- -- -- 27 92 % --   12/18/20 1200 -- 97.3 °F (36.3 °C) Esophageal 60 28 98 % --   12/18/20 1100 -- -- -- 62 28 98 % --   12/18/20 1023 -- -- -- -- -- -- 5' 6\" (1.676 m)   12/18/20 1000 -- 96.8 °F (36 °C) Axillary 64 28 97 % --   @      Intake/Output Summary (Last 24 hours) at 12/19/2020 3622  Last data filed at 12/19/2020 0600  Gross per 24 hour Intake 1976 ml   Output 1510 ml   Net 466 ml         Wt Readings from Last 3 Encounters:   12/18/20 207 lb 0.2 oz (93.9 kg)   12/08/20 188 lb 9 oz (85.5 kg)   10/09/20 163 lb 9.6 oz (74.2 kg)       PE:  COVID-19 isolation protocol, I did not conduct a physical examination but discussed her physical exam findings with her bedside RN and the MICU team    DATA:    Recent Labs     12/17/20  0600 12/18/20  0545 12/19/20  0539   WBC 6.2 8.8 9.4   HGB 8.5* 8.6* 9.1*   HCT 26.0* 26.4* 28.1*   MCV 92.5 94.0 94.0    158 177     Recent Labs     12/17/20  0600 12/17/20  0600 12/18/20  0545 12/18/20  1218 12/18/20  1840 12/19/20  0539   *   < > 126* 129* 126* 126*   K 3.8   < > 4.4 4.1 3.4* 4.7   CL 92*   < > 98 100 97* 99   CO2 23   < > 23 24 24 24   MG 1.9  --  1.8  --   --  1.9   PHOS 1.3*  --  1.5*  --  1.9* 2.2*   BUN 11   < > 11 11 9 11   CREATININE 0.4*   < > 0.4* 0.4* 0.3* 0.4*   ALT 38*  --  33*  --   --  30   AST 22  --  16  --   --  17   BILITOT 0.4  --  0.3  --   --  0.4   ALKPHOS 80  --  75  --   --  76    < > = values in this interval not displayed. Lab Results   Component Value Date    LABPROT 1.1 (H) 12/16/2020    LABPROT 1.1 12/16/2020       Assessment  1. 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 Administration of desmopressin in the ER likely also contributed to the initial drop in her sodium  2. Hypophosphatemia, severe, nutritional     [Na+] improved, though slowly, peaked at 129 mmol/L, though dropped to 126 since last evening. May be due to IV electrolyte supplementation. Hypervolemic      Recommendations  1. Stop IV normal saline gtt. 2. Recheck urine studies  3. Every 6 hours BMP, adjust drip as warranted  4.  Supplement phosphorus IV, over 6 hours. Recheck this afternoon, resupplement as warranted  5. Follow labs, UO  6. Mix all drips in NS  7.  Avoid nephrotoxins         Electronically signed by Donny Brenner MD on 12/19/2020

## 2020-12-19 NOTE — PROGRESS NOTES
Spoke with patient's son Yamilex Arthur in regards to updates on his mother's present medical condition and current treatment plan. All questions were discussed and answered.

## 2020-12-19 NOTE — PROGRESS NOTES
.  Intensive Care Daily Quality Rounding Checklist      ICU Team Members:  Dr. Precious Navarrete, resident, charge nurse, bedside nurse and respiratory nurse     ICU Day #: NUMBER: 5    Intubation Date: December 15,2020    Ventilator Day #: NUMBER: 5    Central Line Insertion Date: December 15,2020        Day #: NUMBER: 5     Arterial Line Insertion Date: December 15,2020      Day #: NUMBER: 5    Temporary Hemodialysis Catheter Insertion Date:  N/A      Day # N/A    DVT Prophylaxis: Lovenox    GI Prophylaxis: Protonix    Allen Catheter Insertion Date: December 15,2020       Day #: 5      Continued need (if yes, reason documented and discussed with physician): yes, strict I&O in critical patient    Skin Issues/ Wounds and ordered treatment discussed on rounds: yes, multiple bruised rickey and ecchymosis, SOS precautions in place    Goals/ Plans for the Day: wean vent as able,wean pressors, continue Tube Feed as tolerated, continue q 6hr BMP's, replacement medications as needed, continue critical care management. Decrease solu-medrol and start bowel regimen.

## 2020-12-20 NOTE — PROGRESS NOTES
Subjective: Intubated sedated mechanically ventilated   Discussed with staff  Objective:    BP (!) 97/59   Pulse 74   Temp 97.6 °F (36.4 °C) (Axillary)   Resp 23   Ht 5' 6\" (1.676 m)   Wt 207 lb 0.2 oz (93.9 kg)   SpO2 93%   BMI 33.41 kg/m²     24HR INTAKE/OUTPUT:      Intake/Output Summary (Last 24 hours) at 12/20/2020 1015  Last data filed at 12/20/2020 0700  Gross per 24 hour   Intake 956 ml   Output 5700 ml   Net -4744 ml       General appearance: Intubated sedated mechanically ventilated supine  Neck: FROM, supple   Lungs: Clear bilaterally no wheezes, no rhonchi, no crackles  CV: RRR, no MRGs; normal carotid upstroke and amplitude without Bruits  Abdomen: Soft, non-tender; no masses or HSM  Extremities: No edema, no cyanosis, no clubbing  Skin: Intact no rash, no lesions, no ulcers    Psych: Alert and oriented normal affect  Neuro: Nonfocal  [facility] and efforts to preserve PPE physical exam is limited. Exam findings shared among providers. Most Recent Labs  Lab Results   Component Value Date    WBC 9.3 12/20/2020    HGB 9.4 (L) 12/20/2020    HCT 29.7 (L) 12/20/2020     12/20/2020     12/20/2020    K 4.3 12/20/2020     (H) 12/20/2020    CREATININE 0.4 (L) 12/20/2020    BUN 12 12/20/2020    CO2 29 12/20/2020    GLUCOSE 208 (H) 12/20/2020    ALT 27 12/20/2020    AST 15 12/20/2020    INR 1.0 12/13/2020    TSH 0.381 12/03/2020    LABA1C 5.8 04/05/2017     Recent Labs     12/20/20  0521   MG 2.3     Lab Results   Component Value Date    CALCIUM 6.6 (L) 12/20/2020    PHOS 2.0 (L) 12/20/2020        XR CHEST PORTABLE   Final Result   Mildly waning airspace disease with slight improvement of aeration   bilaterally. XR CHEST PORTABLE   Final Result   Diffuse infiltrate, improved in the upper lungs as compared to prior. There is new consolidation in the left lower lobe with possible left pleural   effusion.       XR CHEST PORTABLE   Final Result Endotracheal tube in low position, can be pulled back about 3 cm. NG tube in good position. Pattern of bilateral interstitial infiltrate in the perihilar regions or the   periphery of the lung as above commented. The pulmonary edema versus   bilateral viral pneumonia will be part of the differential diagnosis. Please   correlate clinically. T   Preliminary report given to Dr. Andrey Echols at the time   of the interpretation. XR ABDOMEN FOR NG/OG/NE TUBE PLACEMENT   Final Result   Enteric feeding tube partially imaged below the diaphragm and likely within   the stomach. XR CHEST PORTABLE   Final Result   Increased interstitial markings throughout both lungs along with   patchy/consolidative airspace opacities noted bilaterally as above. Findings   are suspicious for multifocal pneumonia or pulmonary edema. CTA PULMONARY W CONTRAST   Final Result   No central pulmonary embolism or aortic dissection. Progressive diffuse bilateral patchy and ground-glass infiltrates concerning   for progressive multifocal pneumonia/viral infection. XR CHEST PORTABLE   Final Result   Progressive diffuse bilateral infiltrates and pleural effusion likely   CHF/edema and or diffuse pneumonia. XR CHEST PORTABLE    (Results Pending)       Assessment    Active Problems:    Acute respiratory failure with hypoxia (HCC)  Resolved Problems:    * No resolved hospital problems. *      Plan:     1. Ventilator dependent resp failure - COVID 19 PNA / bacterial/ HF. CXR improved 12/17              Continue with dexamethasone + vit D   Remdesivir completed              Diffuse bilateral patchy GGO on CT, no PE              BNP elevated, possible component of heart failure              cefepime completed              Fentanyl + versed    2.  Shock -likely septic secondary to viral sepsis              Currently on levophed, vasopressin off wean as  tolerated              TLC inserted              Blood cultures sent, no evidence of UTI   Completed cefepime              Wean hydrocortisone, increase midodrine    3. Anemia - resolved              [id number] false lab value    4. Hypothyroidism - continue synthroid    5.  Hyponatremia - seen by nephrology              IVF resuscitation              Continue to monitor labs closely       Electronically signed by Nayan Maddox MD on 12/20/2020 at 10:15 AM

## 2020-12-20 NOTE — PROGRESS NOTES
Critical Care Team - Daily Progress Note         Date and time: 12/20/2020 1:10 PM  Patient's name:  Marielle Lane  Medical Record Number: [id number]  Patient's account/billing number: [de-identified]  Patient's YOB: 1950  Age: 65 y.o. Date of Admission: 12/13/2020 10:51 AM  Length of stay during current admission: 7      Primary Care Physician: Shayna Zapien DO  ICU Attending Physician: Dr. Mireya Modoy Status: Full Code    Reason for ICU admission: respiratory failure   Shock   Covid 19 pneumonitis       SUBJECTIVE:     OVERNIGHT EVENTS:         12/16: Overnight able to titrate down some of the vasopressors though still intermittently hypotensive and requiring vasopressin as well as Levophed. Still sedated with fentanyl and Versed. Hyponatremia notable at 119 new today. 12/17: Patient was able to be titrated off the vasopressors. Is now on midodrine. Minimally hypothermic overnight now on Melissa hugger with improvement in temperature. Continue sedation with fentanyl and Versed. 12/18: Patient continues on the ventilator. Back on small amount of Levophed in addition to the vasopressin. Hoping to wean sedation more today. 12/19: Back on levophed overnight. Initially able to titrate fio2 to 40%, became hypoxic and increased to 60%. Apparently their were several episodes of bradycardia overnight. Patient has pacemaker which did not fire per nursing. Follows with Dr. Patel Covert for cardiology.     12/20: Patient: 6 L overnight, nephrology ordered K-Phos, and half-normal saline, will CT her head today to ensure she does not have an intracranial cause for what appears to be diabetes insipidus  CURRENT VENTILATION STATUS:     [x] Ventilator  [] BIPAP  [] Nasal Cannula [] Room Air      IF INTUBATED, ET TUBE MARKING AT LOWER LIP:       cms    SECRETIONS Amount:  [] Small [] Moderate  [] Large  [x] None  Color:     [] White [] Colored  [] Bloody    SEDATION:  RAAS Score:  [x] Propofol gtt  [x] Versed gtt  [] Ativan gtt   [x] No Sedation    PARALYZED:  [x] No    [] Yes      VASOPRESSORS:  [] No    [x] Yes    If yes -   [x] Levophed       [] Dopamine     [x] Vasopressin       [] Dobutamine  [] Phenylephrine         [] Epinephrine    CENTRAL LINES:     [] No   [x] Yes   (Date of Insertion:  12/15 )           If yes -     [] Right IJ     [] Left IJ [x] Right Femoral [] Left Femoral                   [] Right Subclavian [] Left Subclavian       GARCIA'S CATHETER:   [] No   [x] Yes  (Date of Insertion: 12/15  )     URINE OUTPUT:            [x] Good   [] Low              [] Anuric      OBJECTIVE:     VITAL SIGNS:  BP (!) 97/59   Pulse 81   Temp 97.6 °F (36.4 °C) (Axillary)   Resp 24   Ht 5' 6\" (1.676 m)   Wt 207 lb 0.2 oz (93.9 kg)   SpO2 96%   BMI 33.41 kg/m²   Tmax over 24 hours:  Temp (24hrs), Av.1 °F (36.7 °C), Min:95.4 °F (35.2 °C), Max:99 °F (37.2 °C)      Patient Vitals for the past 6 hrs:   Temp Temp src Pulse Resp SpO2   20 1237 -- -- -- 24 96 %   20 1236 -- -- 81 23 96 %   20 0825 -- -- -- 23 93 %   20 0824 -- -- 74 23 94 %   20 0818 -- -- -- 23 93 %   20 0800 97.6 °F (36.4 °C) Axillary -- -- --         Intake/Output Summary (Last 24 hours) at 2020 1310  Last data filed at 2020 0700  Gross per 24 hour   Intake 956 ml   Output 5700 ml   Net -4744 ml     Wt Readings from Last 2 Encounters:   20 207 lb 0.2 oz (93.9 kg)   20 188 lb 9 oz (85.5 kg)     Body mass index is 33.41 kg/m². PHYSICAL EXAMINATION:  General: ill appearing, sedated, lying in gurney in no distress  HEENT: Pupils are equal round and reactive to light, there are no oral lesions and no post-nasal drip   Neck: supple without adenopathy  Cardiovascular: regular rate and rhythm without murmur or gallop  Respiratory:  Decreased breath sounds bilaterally without wheezing or crackles.   Air entry is symmetric  Abdomen: soft, non-tender, non-distended, normal bowel sounds  Extremities: warm, no edema, no clubbing   Skin: no rash or lesion  Neurologic:  Sedated, intubated, pupils equal round reactive       Any additional physical findings:    MEDICATIONS:    Scheduled Meds:   potassium phosphate IVPB  30 mmol Intravenous Once    lidocaine PF  5 mL Intradermal Once    heparin flush  3 mL Intravenous 2 times per day    [START ON 12/21/2020] insulin glargine  25 Units Subcutaneous Daily    hydrocortisone sodium succinate PF  50 mg Intravenous Q8H    insulin glargine  10 Units Subcutaneous Daily    docusate  100 mg Oral Daily    senna  5 mL Oral Nightly    midodrine  10 mg Oral TID    insulin lispro  0-18 Units Subcutaneous Q6H    pantoprazole  40 mg Intravenous Daily    And    sodium chloride (PF)  10 mL Intravenous Daily    ipratropium-albuterol  1 ampule Inhalation Q4H WA    budesonide  500 mcg Nebulization BID    Arformoterol Tartrate  15 mcg Nebulization BID    enoxaparin  40 mg Subcutaneous Daily    clopidogrel  75 mg Oral Daily    chlorhexidine  15 mL Mouth/Throat BID    sodium chloride flush  10 mL Intravenous 2 times per day    Vitamin D  2,000 Units Oral Daily    gabapentin  800 mg Oral TID    levothyroxine  100 mcg Oral Daily    atorvastatin  20 mg Oral Daily    acetaminophen  650 mg Rectal Once     Continuous Infusions:   sodium chloride 100 mL/hr at 12/20/20 0731    norepinephrine 1 mcg/min (12/20/20 1124)    fentaNYL 5 mcg/ml in 0.9%  ml infusion 125 mcg/hr (12/20/20 0914)    midazolam 3 mg/hr (12/20/20 1024)     PRN Meds:       sodium chloride flush, 10 mL, PRN      heparin flush, 3 mL, PRN      potassium chloride, 20 mEq, PRN      fentanNYL, 25 mcg, Q3H PRN      sodium chloride, 30 mL, PRN      sodium chloride flush, 10 mL, PRN      promethazine, 12.5 mg, Q6H PRN    Or      ondansetron, 4 mg, Q6H PRN      polyethylene glycol, 17 g, Daily PRN      acetaminophen, 650 mg, Q6H PRN    Or      acetaminophen, 650 mg, Q6H PRN      sodium chloride, 30 mL, PRN          VENT SETTINGS (Comprehensive) (if applicable):  Vent Information  $Ventilation: $Subsequent Day  Skin Assessment: Clean, dry, & intact  Equipment ID: [id number]  Vent Type: [phone]  Vent Mode: AC/VC+  Vt Ordered: 450 mL  Rate Set: 24 bmp  Peak Flow: 0 L/min  Pressure Support: 0 cmH20  FiO2 : 70 %  SpO2: 96 %  SpO2/FiO2 ratio: 137.14  PaO2/FiO2 ratio: 102  Sensitivity: 3  PEEP/CPAP: 10  I Time/ I Time %: 0.9 s  Humidification Source: HME  Mask Type: Full face mask  Mask Size: Small  Additional Respiratory  Assessments  Pulse: 81  Resp: 24  SpO2: 96 %  Position: Semi-Marrero's  Humidification Source: HME  Oral Care: Lip moisturizer applied, Mouth swabbed, Mouth moisturizer, Mouth suctioned  Subglottic Suction Done?: Yes  Cuff Pressure (cm H2O): 29 cm H2O    ABGs:   Recent Labs     12/20/20  0530   PH 7.386   PCO2 48.7*   PO2 68.4*   HCO3 28.6*   BE 3.0   O2SAT 93.3       Laboratory findings:    Complete Blood Count:   Recent Labs     12/18/20  0545 12/19/20  0539 12/20/20  0521   WBC 8.8 9.4 9.3   HGB 8.6* 9.1* 9.4*   HCT 26.4* 28.1* 29.7*    177 208        Last 3 Blood Glucose:   Recent Labs     12/19/20  2357 12/20/20  0521 12/20/20  1210   GLUCOSE 310* 208* 190*        PT/INR:    Lab Results   Component Value Date    PROTIME 11.4 12/13/2020    PROTIME 11.9 12/15/2011    INR 1.0 12/13/2020     PTT:    Lab Results   Component Value Date    APTT 21.6 12/13/2020       Comprehensive Metabolic Profile:   Recent Labs     12/19/20  2357 12/20/20  0521 12/20/20  1210    146 149*   K 4.2 4.3 4.0   * 113* 115*   CO2 28 29 30*   BUN 13 12 13   CREATININE 0.4* 0.4* 0.4*   GLUCOSE 310* 208* 190*   CALCIUM 6.6* 6.6* 6.3*   PROT  --  4.5*  --    LABALBU  --  2.6*  --    BILITOT  --  0.3  --    ALKPHOS  --  76  --    AST  --  15  --    ALT  --  27  --       Magnesium:   Lab Results   Component Value Date    MG 2.3 12/20/2020     Phosphorus:   Lab Results   Component Value Date    PHOS 2.0 12/20/2020     Ionized Calcium: No results found for: CAION     Urinalysis:     Troponin:   No results for input(s): TROPONINI in the last 72 hours. Microbiology:    Cultures during this admission:     Blood cultures:                 [] None drawn      [x] Negative             []  Positive (Details:  )  Urine Culture:                   [] None drawn      [] Negative             []  Positive (Details:  )  Sputum Culture:               [] None drawn       [x] Negative             []  Positive (Details:  )   Endotracheal aspirate:     [] None drawn       [] Negative             []  Positive (Details:  )     Other pertinent Labs:   COVID-19 + December 13  MRSA negative screening   radiology/Imaging:     Chest Xray (12/20/2020): Diffuse infiltrate, improved in the upper lungs as compared to prior. There is new consolidation in the left lower lobe with possible left pleural   effusion. ASSESSMENT:      1. Acute hypoxic respiratory failure   2. Covid pneumonitis - critically severe   3. Shock- septic   4. Adrenal insufficiency  5. Hyponatremia   6. Hypokalemia   7. Leukocytosis    8. AECOPD  9. pvd  10. chf not in acute exacerbatino   11. Hx lacunar infarct   12. SSS s/p pacemaker   13. Hx seizure do   14. Bilateral carotid stenosis       SYSTEMS ASSESSMENT    Neuro   Intubated, sedated on Versed and fentanyl - wean as tolerated  No longer paralyzed  Previous history of lacunar stroke  CT head on admission showed no acute abnormalities  Occasionally becomes agitated with movement. Respiratory   Acute respiratory failure with hypoxia secondary to COVID-19 viral pneumonia  Intubated, wean sedation as tolerated  Course of Cefepime completed for possible superimposed bacterial pneumonia   Wean oxygen as tolerated.  Keep O2 sat 90-92%  abg daily    Cardiovascular   Shock, septic  Increase midodrine to 15 tid  back on a small amount of Levophed in addition to vasopressin - hopefully off vasopressin today  Previous atrial flutter now in sinus rhythm  Pacemaker, but bradycardic overnight - cardiology consulted  Continue plavix    Gastrointestinal   GI prophylaxis with Protonix  Diet per dietary recs   Colace/senna for constipation    Renal   Hyponatremia - slowly improving   -Likely hypovolemic   -Serum osmolality, urine electrolytes osmolality   -Nephrology consulted   -Maintenance fluids stopped per nephrology   -BMP every 6 hours   Increased Urine Output 12/20   -CT head to ensure no central intracranial cause of diuresis   -Nephrology consulted, starting patient on K-Phos, half-normal saline, recs appreciated  Infectious Disease   COVID-19 viral pneumonia   Received remdesivir, Toci, vit D  Septic shock - improving  Possible superimposed bacterial pneumonia  Patient on cefepime  Blood cultures and respiratory culture negative thus far    Hematology/Oncology   Anemia on admission, trend H&H, improving  CBC daily    Endocrine   high-dose sliding scale Humalog for hyperglycemia  Start lantus  Monitor growth closely  Cortisol on admission 1.42  High-dose Solucortef decreased to 50 q 8 hrs    Social/Spiritual/DNR/Other   Full code    ASSESSMENT/ PLAN     1. Wean vent as tolerated  2. Developing hypernatremia, increased urine output-nephrology following, starting on half-normal saline, will CT head to ensure she does not have an intracranial cause for her diuresis  3. Increase midodrine, stop vasopressin, wean levophed  4. decrease solucortef  5. Interrogate pacer  6. Increased to 15 of Lantus, will start on 25 Lantus tomorrow  7. Continue ICU level of care  8. Place PICC line today      Amita Carr MD PGY-1  12/20/2020 1:15 PM    I personally saw, examined and provided care for the patient. Radiographs, labs and medication list were reviewed by me independently. I spoke with bedside nursing, therapists and consultants.  Critical care services and times documented are independent of procedures and multidisciplinary rounds with Residents. Additionally comprehensive, multidisciplinary rounds were conducted with the MICU team. The case was discussed in detail and plans for care were established. Review of Residents documentation was conducted and revisions were made as appropriate. I agree with the above documented exam, problem list and plan of care with the following additions: Worsening status today with worsening hypoxia  Vent adjusted  Sodium has also been rising quickly - discussed with Dr. Tucker Carrillo and fluids adjusted  Attempted head CT today but she was too unstable and hypoxic  Adjust insulin  Prognosis guarded    35 minutes of CCT spent with the patient, reviewing the chart including imaging studies, and discussing the case with other health care professionals. This time excludes procedures.      Erich Bae MD

## 2020-12-20 NOTE — PROGRESS NOTES
Attempted trip to ct scan unsuccessfully. Once on ct table, hr in the 140-170s and sa02 dropped to 76. Patient taken off vent and bagged with bvm on 15liters continuously till back in 204. Placed on 840 at previous settings and 100%. Dr. Miriam King informed.

## 2020-12-20 NOTE — PATIENT CARE CONFERENCE
..  Intensive Care Daily Quality Rounding Checklist      ICU Team Members: Dr. Rebekah Barraza, resident, charge nurse, bedside nurse and respiratory therpay    ICU Day #: NUMBER: 6    Intubation Date: December 15    Ventilator Day #: NUMBER: 6    Central Line Insertion Date: December 15        Day #: NUMBER: 6     Arterial Line Insertion Date: December 15      Day #: NUMBER: 6    Temporary Hemodialysis Catheter Insertion Date:  N/A      Day # N/A    DVT Prophylaxis: Lovenox    GI Prophylaxis: Protonix    Allen Catheter Insertion Date: December 15       Day #: 5      Continued need (if yes, reason documented and discussed with physician): yes, Strict I&O in critical patient    Skin Issues/ Wounds and ordered treatment discussed on rounds: Yes, generalized ecchymosis, SOS precautions in place    Goals/ Plans for the Day: Monitor labs and vitals. Wean levo if able.  PICC line ordered

## 2020-12-20 NOTE — PROGRESS NOTES
Daily    gabapentin  800 mg Oral TID    levothyroxine  100 mcg Oral Daily    atorvastatin  20 mg Oral Daily    acetaminophen  650 mg Rectal Once       MEDS (infusions):   sodium chloride 200 mL/hr at 12/20/20 1421    norepinephrine 1 mcg/min (12/20/20 1124)    fentaNYL 5 mcg/ml in 0.9%  ml infusion 125 mcg/hr (12/20/20 0914)    midazolam 3 mg/hr (12/20/20 1024)       MEDS (prn):  sodium chloride flush, heparin flush, potassium chloride, fentanNYL, sodium chloride, sodium chloride flush, promethazine **OR** ondansetron, polyethylene glycol, acetaminophen **OR** acetaminophen, sodium chloride    PHYSICAL EXAM:     Patient Vitals for the past 24 hrs:   BP Temp Temp src Pulse Resp SpO2   12/20/20 1418 -- -- -- 132 24 94 %   12/20/20 1400 99/64 -- -- 122 28 (!) 79 %   12/20/20 1300 (!) 121/47 -- -- 66 23 97 %   12/20/20 1237 -- -- -- -- 24 96 %   12/20/20 1236 -- -- -- 81 23 96 %   12/20/20 1200 (!) 99/40 97.8 °F (36.6 °C) Axillary 80 24 95 %   12/20/20 1100 (!) 127/51 -- -- 89 24 94 %   12/20/20 1000 (!) 138/56 -- -- 85 25 91 %   12/20/20 0900 (!) 156/59 -- -- 87 24 (!) 89 %   12/20/20 0825 -- -- -- -- 23 93 %   12/20/20 0824 -- -- -- 74 23 94 %   12/20/20 0818 -- -- -- -- 23 93 %   12/20/20 0800 (!) 85/41 97.6 °F (36.4 °C) Axillary 81 24 91 %   12/20/20 0700 -- 98.8 °F (37.1 °C) Esophageal 72 27 94 %   12/20/20 0600 -- 98.6 °F (37 °C) Esophageal 75 23 92 %   12/20/20 0500 -- 98.6 °F (37 °C) Esophageal 73 27 93 %   12/20/20 0400 -- 99 °F (37.2 °C) Esophageal 74 24 93 %   12/20/20 0339 -- -- -- 77 -- --   12/20/20 0326 -- -- -- 75 20 93 %   12/20/20 0323 -- -- -- 76 21 93 %   12/20/20 0300 -- 99 °F (37.2 °C) Esophageal 75 23 95 %   12/20/20 0200 -- 97.3 °F (36.3 °C) -- 74 19 94 %   12/20/20 0142 -- -- -- 75 -- --   12/20/20 0100 -- 98.8 °F (37.1 °C) Esophageal 75 23 94 %   12/20/20 0012 -- -- -- 75 -- --   12/20/20 0010 -- -- -- 73 21 94 %   12/19/20 2315 -- -- -- 69 23 96 %   12/19/20 2300 -- 97.9 °F (36.6 °C) Esophageal 73 (!) 37 96 %   12/19/20 2200 -- 98.1 °F (36.7 °C) Esophageal 79 24 96 %   12/19/20 2100 -- 97.9 °F (36.6 °C) Esophageal 76 23 95 %   12/19/20 2000 -- 98.4 °F (36.9 °C) Esophageal 72 24 94 %   12/19/20 1959 -- -- -- 84 -- --   12/19/20 1957 -- -- -- 87 23 94 %   12/19/20 1956 -- 98.6 °F (37 °C) Esophageal 75 24 94 %   12/19/20 1940 -- -- -- 65 22 97 %   12/19/20 1800 -- -- Esophageal 69 24 95 %   12/19/20 1700 -- -- -- 65 23 93 %   12/19/20 1600 -- 95.4 °F (35.2 °C) Esophageal 87 30 93 %   12/19/20 1542 -- -- -- -- 24 94 %   12/19/20 1541 -- -- -- (!) 41 24 91 %   @      Intake/Output Summary (Last 24 hours) at 12/20/2020 1506  Last data filed at 12/20/2020 0700  Gross per 24 hour   Intake 956 ml   Output 5700 ml   Net -4744 ml         Wt Readings from Last 3 Encounters:   12/18/20 207 lb 0.2 oz (93.9 kg)   12/08/20 188 lb 9 oz (85.5 kg)   10/09/20 163 lb 9.6 oz (74.2 kg)       PE:  COVID-19 isolation protocol, I did not conduct a physical examination but discussed her physical exam findings with her bedside RN and the MICU team    DATA:    Recent Labs     12/18/20  0545 12/19/20  0539 12/20/20  0521   WBC 8.8 9.4 9.3   HGB 8.6* 9.1* 9.4*   HCT 26.4* 28.1* 29.7*   MCV 94.0 94.0 97.4    177 208     Recent Labs     12/18/20  0545 12/18/20  0545 12/19/20  0539 12/19/20  0539 12/19/20  1744 12/19/20  2357 12/20/20  0521 12/20/20  1210   *   < > 126*   < > 132 142 146 149*   K 4.4   < > 4.7   < > 4.1 4.2 4.3 4.0   CL 98   < > 99   < > 100 110* 113* 115*   CO2 23   < > 24   < > 26 28 29 30*   MG 1.8  --  1.9  --   --   --  2.3  --    PHOS 1.5*   < > 2.2*  --  2.3*  --  2.0*  --    BUN 11   < > 11   < > 14 13 12 13   CREATININE 0.4*   < > 0.4*   < > 0.4* 0.4* 0.4* 0.4*   ALT 33*  --  30  --   --   --  27  --    AST 16  --  17  --   --   --  15  --    BILITOT 0.3  --  0.4  --   --   --  0.3  --    ALKPHOS 75  --  76  --   --   --  76  --     < > = values in this interval not displayed.        Lab Results   Component Value Date    LABPROT 1.1 (H) 12/16/2020    LABPROT 1.1 12/16/2020       Assessment  1. 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 Administration of desmopressin in the ER likely also contributed to the initial drop in her sodium  2. Hypophosphatemia, severe, nutritional     [Na+] improved, though slowly, peaked at 129 mmol/L, though dropped to 126 since last evening. May be due to IV electrolyte supplementation. Hypervolemic. NS stopped. Yesterday evening UO picked up markedly, 2600 cc on evening shift, 3100 cc on night shift, continuing at > 150 cc/h on day shift. No diuretic therapy. Question of DI. Frankly, doubt the desmopressin administered 5 days ago would still have been effective as of yesterday. Solu-Cortef started yesterday      Recommendations  1. 1/2 ns started this am -- incr to 200 cc / hr  2. urine studies: na, cl, K, osm  3. Every 6 hours BMP, adjust drip as warranted  4. Check s osm  5. Supplement phosphorus IV, over 6 hours. Recheck this afternoon, resupplement as warranted  6. Follow labs, UO  7.  Avoid nephrotoxins     Discussed with Dr. Houston Javed    Electronically signed by Gadiel Zamorano MD on 12/20/2020

## 2020-12-21 NOTE — PROGRESS NOTES
Critical Care Team - Daily Progress Note         Date and time: 12/21/2020 1:13 PM  Patient's name:  Palma Ledezma  Medical Record Number: [id number]  Patient's account/billing number: [de-identified]  Patient's YOB: 1950  Age: 65 y.o. Date of Admission: 12/13/2020 10:51 AM  Length of stay during current admission: 8      Primary Care Physician: Joss Garcia DO  ICU Attending Physician: Dr. Chata Ambrose    Code Status: Full Code    Reason for ICU admission: respiratory failure   Shock   Covid 19 pneumonitis       SUBJECTIVE:     OVERNIGHT EVENTS:         12/16: Overnight able to titrate down some of the vasopressors though still intermittently hypotensive and requiring vasopressin as well as Levophed. Still sedated with fentanyl and Versed. Hyponatremia notable at 119 new today. 12/17: Patient was able to be titrated off the vasopressors. Is now on midodrine. Minimally hypothermic overnight now on Melissa hugger with improvement in temperature. Continue sedation with fentanyl and Versed. 12/18: Patient continues on the ventilator. Back on small amount of Levophed in addition to the vasopressin. Hoping to wean sedation more today. 12/19: Back on levophed overnight. Initially able to titrate fio2 to 40%, became hypoxic and increased to 60%. Apparently their were several episodes of bradycardia overnight. Patient has pacemaker which did not fire per nursing. Follows with Dr. Alec Jeffers for cardiology. 12/20: Patient: 6 L overnight, nephrology ordered K-Phos, and half-normal saline, will CT her head today. 12/21: Patient tolerated being supine overnight. Plan for PICC line today. Pacemaker interrogated with no runs of V. tach or other rhythm needing acute intervention.         CURRENT VENTILATION STATUS:     [x] Ventilator  [] BIPAP  [] Nasal Cannula [] Room Air      IF INTUBATED, ET TUBE MARKING AT LOWER LIP:       cms    SECRETIONS Amount:  [] Small [] Moderate  [] Large  [x] None  Color:     [] White [] Colored  [] Bloody    SEDATION:  RAAS Score:  [x] Propofol gtt  [x] Versed gtt  [] Ativan gtt   [x] No Sedation    PARALYZED:  [x] No    [] Yes      VASOPRESSORS:  [] No    [x] Yes    If yes -   [x] Levophed       [] Dopamine     [x] Vasopressin       [] Dobutamine  [] Phenylephrine         [] Epinephrine    CENTRAL LINES:     [] No   [x] Yes   (Date of Insertion:  12/15 )           If yes -     [] Right IJ     [] Left IJ [x] Right Femoral [] Left Femoral                   [] Right Subclavian [] Left Subclavian       GARCIA'S CATHETER:   [] No   [x] Yes  (Date of Insertion: 12/15  )     URINE OUTPUT:            [x] Good   [] Low              [] Anuric      OBJECTIVE:     VITAL SIGNS:  BP (!) 102/50   Pulse 90   Temp 98.8 °F (37.1 °C) (Esophageal)   Resp 23   Ht 5' 6\" (1.676 m)   Wt 207 lb 0.2 oz (93.9 kg)   SpO2 93%   BMI 33.41 kg/m²   Tmax over 24 hours:  Temp (24hrs), Av °F (36.7 °C), Min:97.2 °F (36.2 °C), Max:99.1 °F (37.3 °C)      Patient Vitals for the past 6 hrs:   BP Temp Temp src Pulse Resp SpO2   20 1300 -- -- -- 90 23 93 %   20 1200 (!) 102/50 98.8 °F (37.1 °C) Esophageal 85 28 96 %   20 1152 -- -- -- 66 -- 97 %   20 1100 -- -- -- 98 22 97 %   20 1000 -- -- -- 105 12 96 %   20 0900 -- -- -- 91 27 94 %   20 0833 -- -- -- 89 24 95 %   20 0831 -- -- -- -- 24 96 %   20 0829 -- -- -- -- 23 96 %   20 0827 -- -- -- -- 24 97 %   20 0800 127/65 99.1 °F (37.3 °C) Esophageal 88 24 96 %         Intake/Output Summary (Last 24 hours) at 2020 1313  Last data filed at 2020 1200  Gross per 24 hour   Intake 3297 ml   Output 2265 ml   Net 1032 ml     Wt Readings from Last 2 Encounters:   20 207 lb 0.2 oz (93.9 kg)   20 188 lb 9 oz (85.5 kg)     Body mass index is 33.41 kg/m².         PHYSICAL EXAMINATION:  General: ill appearing, sedated, lying in gurney in no distress supine  HEENT: Pupils are mEq, PRN      fentanNYL, 25 mcg, Q3H PRN      promethazine, 12.5 mg, Q6H PRN    Or      ondansetron, 4 mg, Q6H PRN      polyethylene glycol, 17 g, Daily PRN      acetaminophen, 650 mg, Q6H PRN    Or      acetaminophen, 650 mg, Q6H PRN          VENT SETTINGS (Comprehensive) (if applicable):  Vent Information  $Ventilation: $Subsequent Day  Skin Assessment: Clean, dry, & intact  Equipment ID: [id number]  Equipment Changed: (S) HME(changed)  Vent Type: [id number]  Vent Mode: AC/VC+  Vt Ordered: 450 mL  Rate Set: 24 bmp  Peak Flow: 0 L/min  Pressure Support: 0 cmH20  FiO2 : 70 %  SpO2: 93 %  SpO2/FiO2 ratio: 132.86  PaO2/FiO2 ratio: 102  Sensitivity: 3  PEEP/CPAP: 10  I Time/ I Time %: 0.9 s  Humidification Source: HME  Mask Type: Full face mask  Mask Size: Small  Additional Respiratory  Assessments  Pulse: 90  Resp: 23  SpO2: 93 %  Position: Semi-Marrero's  Humidification Source: HME  Oral Care: Mouth suctioned, Mouth swabbed  Subglottic Suction Done?: Yes  Cuff Pressure (cm H2O): 29 cm H2O    ABGs:   Recent Labs     12/21/20  0536   PH 7.386   PCO2 48.7*   PO2 87.2   HCO3 28.6*   BE 3.0   O2SAT 96.3       Laboratory findings:    Complete Blood Count:   Recent Labs     12/19/20  0539 12/20/20  0521 12/21/20  0553   WBC 9.4 9.3 8.7   HGB 9.1* 9.4* 9.3*   HCT 28.1* 29.7* 31.1*    208 180        Last 3 Blood Glucose:   Recent Labs     12/20/20  1825 12/20/20  2348 12/21/20  0553   GLUCOSE 308* 212* 214*        PT/INR:    Lab Results   Component Value Date    PROTIME 11.4 12/13/2020    PROTIME 11.9 12/15/2011    INR 1.0 12/13/2020     PTT:    Lab Results   Component Value Date    APTT 21.6 12/13/2020       Comprehensive Metabolic Profile:   Recent Labs     12/20/20  1825 12/20/20  2348 12/21/20  0553    142 143   K 4.4 4.3 4.4   * 108* 110*   CO2 29 30* 30*   BUN 14 13 12   CREATININE 0.4* 0.3* 0.3*   GLUCOSE 308* 212* 214*   CALCIUM 6.3* 6.1* 6.4*   PROT  --   --  4.3*   LABALBU  --   --  2.5*   BILITOT  -- --  0.4   ALKPHOS  --   --  79   AST  --   --  25   ALT  --   --  25      Magnesium:   Lab Results   Component Value Date    MG 1.9 12/21/2020     Phosphorus:   Lab Results   Component Value Date    PHOS 2.1 12/21/2020     Ionized Calcium: No results found for: CAION     Urinalysis:     Troponin:   No results for input(s): TROPONINI in the last 72 hours. Microbiology:    Cultures during this admission:     Blood cultures:                 [] None drawn      [x] Negative             []  Positive (Details:  )  Urine Culture:                   [] None drawn      [] Negative             []  Positive (Details:  )  Sputum Culture:               [] None drawn       [x] Negative             []  Positive (Details:  )   Endotracheal aspirate:     [] None drawn       [] Negative             []  Positive (Details:  )     Other pertinent Labs:   COVID-19 + December 13  MRSA negative screening   radiology/Imaging:     Chest Xray (12/21/2020):       Right PICC line with tip at the junction of the subclavian and SVC   Extensive bilateral pulmonary infiltrates unchanged   ET and NG tubes appear in satisfactory position             ASSESSMENT:      1. Acute hypoxic respiratory failure   2. Covid pneumonitis - critically severe   3. Shock- septic   4. Adrenal insufficiency  5. Hyponatremia   6. Hypokalemia   7. Leukocytosis    8. AECOPD  9. pvd  10. chf not in acute exacerbatino   11. Hx lacunar infarct   12. SSS s/p pacemaker   13. Hx seizure do   14. Bilateral carotid stenosis       SYSTEMS ASSESSMENT    Neuro   Intubated, sedated on Versed and fentanyl - wean as tolerated  No longer paralyzed  Previous history of lacunar stroke  CT head on admission showed no acute abnormalities  Occasionally becomes agitated with movement.      Respiratory   Acute respiratory failure with hypoxia secondary to COVID-19 viral pneumonia  Intubated, wean sedation as tolerated  Course of Cefepime completed for possible superimposed bacterial pneumonia Wean oxygen as tolerated. Keep O2 sat 90-92%  abg daily    Cardiovascular   Shock, septic  Increase midodrine to 15 tid   -Off intravenous vasopressors  Previous atrial fib/flutter -amiodarone drip  Pacemaker interrogated that showed several episodes of supraventricular tachycardia over the last couple of months but no persistent shockable rhythm  Continue plavix    Gastrointestinal   GI prophylaxis with Protonix  Diet per dietary recs   Colace/senna for constipation    Renal   Hyponatremia -resolved   -Continue to trend metabolic panel   -Nephrology following for fluid recommendations intravenously      Infectious Disease   COVID-19 viral pneumonia   Received remdesivir, Toci, vit D, vit c, thiamine   Septic shock - improving  Possible superimposed bacterial pneumonia   -Completed course of cefepime  Blood cultures and respiratory culture negative thus far    Hematology/Oncology   Anemia on admission, trend H&H, improving  CBC daily    Endocrine   high-dose sliding scale Humalog for hyperglycemia  Start lantus  Monitor growth closely  Cortisol on admission 1.42  High-dose Solucortef decreased to 50 q 8 hrs    Social/Spiritual/DNR/Other   Full code. Palliative consulted    ASSESSMENT/ PLAN     1. Wean vent as tolerated  2. Nephrology following for hyponatremia  3. Able to wean off vasopressors, on midodrine  4. decrease solucortef  5. Amiodarone drip for A. fib with RVR  6. PICC line placed today  7. Palliative consult made  8. Continue ICU level of care      Zuleyma Esparza MD PGY-2  12/21/2020 1:13 PM    Attending Physician Attestation: Dr. Robbie Barbosa    Thank you very much for allowing me to see this patient in consultation and follow up. I personally saw, examined and provided care for the patient. Radiographs, labs and medication list were reviewed by me independently. I spoke with bedside nursing, respiratory therapists and consultants.  Critical care services and times documented are independent of procedures and multidisciplinary rounds with Residents. Additionally comprehensive, multidisciplinary rounds were conducted with the MICU team. The case was discussed in detail and plans for care were established. Review of Residents documentation was conducted and revisions were made as appropriate. I agree with the the above documented information. Physical Examination:  Vitals:   Vitals:    12/21/20 1200 12/21/20 1300 12/21/20 1400 12/21/20 1500   BP: (!) 102/50      Pulse: 85 90 85 72   Resp: 28 23 24 24   Temp: 98.8 °F (37.1 °C)      TempSrc: Esophageal      SpO2: 96% 93% 94% 98%   Weight:       Height:          General: No Acute Distress  HEENT: normocephalic, atruamatic  CVS: RRR, S1, S2, No S3 or S4  Respiratory: decreased breath sounds at lung bases, no focal wheezes noted  Extremities: no clubbing/cyanosis/edema    - arterial line  - supportive care    Thank you for allowing me to participate in the care of this patient. Care reviewed with nursing staff, medical and surgical specialty care, primary care and the patient's family as available. Restraints are ordered when the patient can do harm to him/herself by pulling out devices. Critical Care Time: > 35 minutes excluding procedures    María Patricio M.D.     María Patricio  12/21/2020  4:26 PM

## 2020-12-21 NOTE — PROGRESS NOTES
Inhalation Q4H WA    budesonide  500 mcg Nebulization BID    Arformoterol Tartrate  15 mcg Nebulization BID    enoxaparin  40 mg Subcutaneous Daily    clopidogrel  75 mg Oral Daily    chlorhexidine  15 mL Mouth/Throat BID    sodium chloride flush  10 mL Intravenous 2 times per day    Vitamin D  2,000 Units Oral Daily    gabapentin  800 mg Oral TID    levothyroxine  100 mcg Oral Daily    atorvastatin  20 mg Oral Daily    acetaminophen  650 mg Rectal Once       MEDS (infusions):   sodium chloride 100 mL/hr at 12/21/20 1736    amiodarone 0.5 mg/min (12/21/20 1743)    norepinephrine Stopped (12/21/20 0242)    fentaNYL 5 mcg/ml in 0.9%  ml infusion 125 mcg/hr (12/21/20 0915)    midazolam 5 mg/hr (12/21/20 1000)       MEDS (prn):  sodium chloride flush, heparin flush, potassium chloride, fentanNYL, promethazine **OR** ondansetron, polyethylene glycol, acetaminophen **OR** acetaminophen    PHYSICAL EXAM:     Patient Vitals for the past 24 hrs:   BP Temp Temp src Pulse Resp SpO2   12/21/20 1800 -- -- -- 106 24 95 %   12/21/20 1707 -- -- -- 92 24 99 %   12/21/20 1700 -- -- -- 85 24 99 %   12/21/20 1600 (!) 168/81 98.1 °F (36.7 °C) Esophageal 91 24 100 %   12/21/20 1500 -- -- -- 72 24 98 %   12/21/20 1400 -- -- -- 85 24 94 %   12/21/20 1300 -- -- -- 90 23 93 %   12/21/20 1200 (!) 102/50 98.8 °F (37.1 °C) Esophageal 85 28 96 %   12/21/20 1152 -- -- -- 66 -- 97 %   12/21/20 1100 -- -- -- 98 22 97 %   12/21/20 1000 -- -- -- 105 12 96 %   12/21/20 0900 -- -- -- 91 27 94 %   12/21/20 0833 -- -- -- 89 24 95 %   12/21/20 0831 -- -- -- -- 24 96 %   12/21/20 0829 -- -- -- -- 23 96 %   12/21/20 0827 -- -- -- -- 24 97 %   12/21/20 0800 127/65 99.1 °F (37.3 °C) Esophageal 88 24 96 %   12/21/20 0700 -- -- -- 94 27 97 %   12/21/20 0600 -- -- -- 98 23 98 %   12/21/20 0500 -- -- -- 85 23 97 %   12/21/20 0400 -- -- -- 99 27 94 %   12/21/20 0330 -- 98.1 °F (36.7 °C) Esophageal 93 21 96 %   12/21/20 0249 -- -- -- 103 24 96 %   12/21/20 0200 -- 98.1 °F (36.7 °C) Esophageal 93 24 91 %   12/21/20 0100 -- 98.1 °F (36.7 °C) Esophageal 81 27 93 %   12/21/20 0000 -- 98.1 °F (36.7 °C) Esophageal 81 23 91 %   12/20/20 2317 -- -- -- 73 21 98 %   12/20/20 2300 -- 98.1 °F (36.7 °C) Esophageal 70 24 98 %   12/20/20 2230 -- 98.1 °F (36.7 °C) Esophageal 76 24 98 %   12/20/20 2200 -- 97.2 °F (36.2 °C) Esophageal 72 24 97 %   12/20/20 2100 -- 97.5 °F (36.4 °C) Esophageal 75 24 98 %   12/20/20 2022 -- -- -- 74 23 99 %   12/20/20 2000 -- 97.9 °F (36.6 °C) Esophageal 69 23 99 %   @      Intake/Output Summary (Last 24 hours) at 12/21/2020 1840  Last data filed at 12/21/2020 1800  Gross per 24 hour   Intake 2752 ml   Output 2770 ml   Net -18 ml         Wt Readings from Last 3 Encounters:   12/18/20 207 lb 0.2 oz (93.9 kg)   12/08/20 188 lb 9 oz (85.5 kg)   10/09/20 163 lb 9.6 oz (74.2 kg)       PE:  COVID-19 isolation protocol, I did not conduct a physical examination but discussed her physical exam findings with her bedside RN and the MICU team    DATA:    Recent Labs     12/19/20  0539 12/20/20  0521 12/21/20  0553   WBC 9.4 9.3 8.7   HGB 9.1* 9.4* 9.3*   HCT 28.1* 29.7* 31.1*   MCV 94.0 97.4 100.6*    208 180     Recent Labs     12/19/20  0539 12/19/20  0539 12/19/20  1744 12/19/20  1744 12/20/20  0521 12/20/20  0521 12/20/20  2348 12/21/20  0553 12/21/20  1205   *   < > 132   < > 146   < > 142 143 140   K 4.7   < > 4.1   < > 4.3   < > 4.3 4.4 4.2   CL 99   < > 100   < > 113*   < > 108* 110* 108*   CO2 24   < > 26   < > 29   < > 30* 30* 30*   MG 1.9  --   --   --  2.3  --   --  1.9  --    PHOS 2.2*  --  2.3*  --  2.0*  --   --  2.1*  --    BUN 11   < > 14   < > 12   < > 13 12 12   CREATININE 0.4*   < > 0.4*   < > 0.4*   < > 0.3* 0.3* 0.4*   ALT 30  --   --   --  27  --   --  25  --    AST 17  --   --   --  15  --   --  25  --    BILITOT 0.4  --   --   --  0.3  --   --  0.4  --    ALKPHOS 76  --   --   --  76  --   --  79  --     < > = values in this interval not displayed. Lab Results   Component Value Date    LABPROT 1.1 (H) 12/16/2020    LABPROT 1.1 12/16/2020       Assessment  1. 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 Administration of desmopressin in the ER likely also contributed to the initial drop in her sodium  2. Hypophosphatemia, severe, nutritional     [Na+] improved, though slowly, peaked at 129 mmol/L, though dropped to 126 since last evening. May be due to IV electrolyte supplementation. Hypervolemic. NS stopped. 12/19 evening UO picked up markedly, 2600 cc on evening shift, 3100 cc on night shift, continuing at > 150 cc/h on day shift. No diuretic therapy. Question of DI. Frankly, doubt the desmopressin administered 5 days ago would still have been effective as of yesterday. Solu-Cortef started 12/19 -- uo has since slowed down, appropriate for volume and IVF administered. Urine / serum studies 12/20 6 AM resembled DI, those drawn in the early afternoon did not. Recommendations  1. Continue 1/2 NS --decrease to 100 cc/h; further decrease tomorrow as warranted  2. Every 12 hours BMP, adjust drip as warranted  3. Check s osm  4. Supplement phosphorus IV, over 6 hours. Recheck this afternoon, resupplement as warranted  5. Follow labs, UO  6.  Avoid nephrotoxins     Discussed with Dr. Precious Navarrete    Electronically signed by Khushbu Olivera MD on 12/21/2020

## 2020-12-21 NOTE — PATIENT CARE CONFERENCE
Intensive Care Daily Quality Rounding Checklist        ICU Team Members:Dr. Oleg Fitzgerald, Thomas Dalton (residents), clinical pharmacist, charge nurse, bedside nurse    ICU Day #: NUMBER: 7     Intubation Date: December 15     Ventilator Day #: NUMBER: 7     Central Line Insertion Date: December 15                                                    Day #: NUMBER: 7      Arterial Line Insertion Date: December 15                             Day #: NUMBER: 7     DVT Prophylaxis: Lovenox    GI Prophylaxis: Protonix     Allen Catheter Insertion Date: December 13                                        Day #: 9                             Continued need (if yes, reason documented and discussed with physician): yes, Strict I&O in critical patient     Skin Issues/ Wounds and ordered treatment discussed on rounds: Yes, generalized ecchymosis, SOS precautions in place     Goals/ Plans for the Day: Daily labs, place PICC line today, wean sedation as able, wean vent as able, start vitamins

## 2020-12-21 NOTE — CARE COORDINATION
COVID POSITIVE 12/13. Vent/ intubated since 12/15-FIO2 70%. [facility]-not a bedhold. Phone conversation w/ son Rashaad Hutchison [phone]- plan at present is for pt to return to [place] on discharge if extubated. Per [facility], pt can return to [place] w/ positive COVID - pt DOES NOT need a repeat COVID test on discharge.  Will follow Kin Salcedo

## 2020-12-21 NOTE — PLAN OF CARE
Problem: Skin Integrity:  Goal: Will show no infection signs and symptoms  Description: Will show no infection signs and symptoms  Outcome: Met This Shift  Goal: Absence of new skin breakdown  Description: Absence of new skin breakdown  Outcome: Met This Shift     Problem: Airway Clearance - Ineffective  Goal: Achieve or maintain patent airway  Outcome: Ongoing     Problem: Gas Exchange - Impaired  Goal: Absence of hypoxia  Outcome: Ongoing  Goal: Promote optimal lung function  Outcome: Met This Shift     Problem: Breathing Pattern - Ineffective  Goal: Ability to achieve and maintain a regular respiratory rate  Outcome: Ongoing

## 2020-12-21 NOTE — CONSULTS
Palliative Care Department  [phone]  Palliative Care Initial Consult  Provider Chadwick Castleman, MD      PATIENT: Roxanne Pierce  : 1950  MRN: [id number]  ADMISSION DATE: 2020 10:51 AM  Referring Provider: Agustina Marquez DO     Palliative Medicine was consulted on hospital day 8 for assistance with Goals of care, Code Status Discussion    HPI:     Ahmet Pagan is a 65 y.o. 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 who presented to [facility]) on 2020 with shortness of breath. She was found to have COVID, she was intubated and currently in ICU. ASSESSMENT/PLAN:     Pertinent Hospital Diagnoses      Covid-19   Intubated    Palliative Care Encounter / Counseling Regarding Goals of Care  Please see detailed goals of care discussion as below   At this time, Roxanne Pierce, Does Not have capacity for medical decision-making. Capacity is time limited and situation/question specific   Outcome of goals of care meeting: Spoke to son Ciarra Lewis, had a lengthy discussion about his mother's CODE STATUS. He does not want to change it, I discussed with him the eventuality of needing a tracheostomy and PEG tube. That is something that he does not want, but currently he is hoping for her to change.   I told him it is not likely, but we will continue to monitoring   Code status Full Code   Advanced Directives: no POA or living will in [facility]   Surrogate/Legal NOK:Donnie Torres, [phone], son      Thank you for the opportunity to participate in the care of Hallie Echeverria. Destiny Polk MD  Palliative Medicine     SUBJECTIVE:       Details of Conversation: Spoke with son Savage Peña,  I introduced myself and palliative medicine. we had a very lengthy discussion about his mother and her condition. She has been affected by Covid, along with having COPD and multiple other comorbidities. We spoke about her wishes, she did not make them apparent to him. I told him that if she remains intubated she may need a tracheostomy in the near future, as well as a PEG tube. After discussing this, it became more apparent that he may not want to pursue with these measure. He has not decided on changing her code status at this point, he wants to discuss this with his other sibling. They do not want her to suffer, and he realizes she wont have much quality of life if kept on ventilator. Prognosis: Guarded    OBJECTIVE:     BP (!) 102/50   Pulse 90   Temp 98.8 °F (37.1 °C) (Esophageal)   Resp 23   Ht 5' 6\" (1.676 m)   Wt 207 lb 0.2 oz (93.9 kg)   SpO2 93%   BMI 33.41 kg/m²     Due to the current efforts to prevent transmission of COVID-19 and also the need to preserve PPE for other caregivers, a face-to-face encounter with the patient was not performed. That being said, all relevant records and diagnostic tests were reviewed, including laboratory results and imaging. Please reference any relevant documentation elsewhere. Care will be coordinated with the primary service and other specialties as appropriate. Time/Communication  Greater than 50% of time spent, total 45 minutes in counseling and coordination of care at the bedside regarding goals of care. Thank you for allowing Palliative Medicine to participate in the care of Hallie Echeverria. Note: This report was completed using computer[employer]ON [employer]ge voiced recognition software.   Every effort has been made to ensure accuracy; however, inadvertent computerized transcription errors may be present.

## 2020-12-21 NOTE — PROGRESS NOTES
Bridger Dillard M.D.,[facility]  Meng Bradshaw D.O., JEN., Justin Glez M.D. Ian Madsen M.D., Francisca Bird M.D. Denton Felty, D.O. Daily Pulmonary Progress Note    Patient:  Sylvie Torres 65 y.o. female MRN: [id number]     Date of Service: 12/21/2020        Subjective      Patient was seen and examined. Desaturates easily with movement. Went into A. fib with RVR overnight and was placed on amnio drip with improvement in heart rate. Blood pressure is improved and is now off pressors.     Objective   Vitals: /65   Pulse 105   Temp 99.1 °F (37.3 °C) (Esophageal)   Resp 12   Ht 5' 6\" (1.676 m)   Wt 207 lb 0.2 oz (93.9 kg)   SpO2 96%   BMI 33.41 kg/m²     I/O:    Intake/Output Summary (Last 24 hours) at 12/21/2020 1130  Last data filed at 12/21/2020 1000  Gross per 24 hour   Intake 3197 ml   Output 2335 ml   Net 862 ml       CURRENT MEDS :  Scheduled Meds:   vitamin C  500 mg Oral BID    thiamine  100 mg Oral Daily    zinc sulfate  50 mg Oral Daily    heparin flush  3 mL Intravenous 2 times per day    insulin glargine  25 Units Subcutaneous Daily    hydrocortisone sodium succinate PF  50 mg Intravenous Q8H    docusate  100 mg Oral Daily    senna  5 mL Oral Nightly    midodrine  10 mg Oral TID    insulin lispro  0-18 Units Subcutaneous Q6H    pantoprazole  40 mg Intravenous Daily    And    sodium chloride (PF)  10 mL Intravenous Daily    ipratropium-albuterol  1 ampule Inhalation Q4H WA    budesonide  500 mcg Nebulization BID    Arformoterol Tartrate  15 mcg Nebulization BID    enoxaparin  40 mg Subcutaneous Daily    clopidogrel  75 mg Oral Daily    chlorhexidine  15 mL Mouth/Throat BID    sodium chloride flush  10 mL Intravenous 2 times per day    Vitamin D  2,000 Units Oral Daily    gabapentin  800 mg Oral TID    levothyroxine  100 mcg Oral Daily    atorvastatin  20 mg Oral Daily    acetaminophen  650 mg Rectal Once       Continuous Infusions:   sodium chloride 150 mL/hr at 12/21/20 2154    amiodarone 0.5 mg/min (12/21/20 0916)    norepinephrine Stopped (12/21/20 0242)    fentaNYL 5 mcg/ml in 0.9%  ml infusion 125 mcg/hr (12/21/20 0915)    midazolam 5 mg/hr (12/21/20 1000)       PRN Meds:  sodium chloride flush, heparin flush, potassium chloride, fentanNYL, promethazine **OR** ondansetron, polyethylene glycol, acetaminophen **OR** acetaminophen      Physical Exam:  Due to the current efforts to prevent transmission of COVID-19 and also the need to preserve PPE for other caregivers, a face-to-face encounter with the patient was not performed. That being said, all relevant records and diagnostic tests were reviewed, including laboratory results and imaging. Please reference any relevant documentation elsewhere. Care will be coordinated with the primary service. Pertinent/ New Labs and Imaging Studies     Pulmonary Function Testing personally reviewed and interpreted. PERTINENT LAB RESULTS: Labs reviewed. DIAGNOSTICS: Pertinent imaging reviewed. Assessment:      1. Acute hypoxic respiratory failure intubated 12/15  2. Severe Covid 19 pneumonia  3. Possible superimposed bacterial pneumonia  4. A. fib with RVR  5. Shock -resolved  6. Adrenal insufficiency  7. Acute exacerbation of COPD  8. CHF  9.  A. Fib w/ RVR currently on amio gtt  10. SSS status post pacemaker  11. History of seizure disorder      Plan:      Continue ventilator support, weaning FiO2 as tolerated   Follow chest imaging   Aerosol bronchodilator   IV steroids, currently on Solu-Cortef   CT head when able   GI/DVT -Lovenox      Thank you for allowing me to participate in the care of Hallie Echeverria. Please feel free to call with questions.      Electronically signed by Joana Antony DO on 12/21/2020 at 11:30 AM

## 2020-12-21 NOTE — PROCEDURES
PICC   Catheter insertion date 12/21/2020     Product Number:  [id number]   Lot No: [id number]   Gauge: 5FR   Lumen: DUAL   RIGHT BRACHIAL VEIN    Vein Diameter : 0.48CM   Upper arm circumference (10CM ABOVE AC): 38CM   Catheter Length : 32CM   Internal Length: 32CM   External Catheter Length: 0CM   Ultrasound Used:YES  Unable to get [facility] [place] to confirm PICC tip placement. Chest Xray ordered. : Iona Peterson RN, [facility], [facility]  12/21/20  4829 CHEST XRAY  REPORTS PICC TIP IS IN THE SVC. FLOOR NURSE NOTIFIED.     WAGNER GOMEZ RN

## 2020-12-21 NOTE — PROGRESS NOTES
Subjective: Intubated sedated mechanically ventilated   Discussed with staff  Objective:    BP (!) 85/49   Pulse 93   Temp 98.1 °F (36.7 °C) (Esophageal)   Resp 21   Ht 5' 6\" (1.676 m)   Wt 207 lb 0.2 oz (93.9 kg)   SpO2 96%   BMI 33.41 kg/m²     24HR INTAKE/OUTPUT:      Intake/Output Summary (Last 24 hours) at 12/21/2020 4147  Last data filed at 12/20/2020 2200  Gross per 24 hour   Intake 4033 ml   Output 2235 ml   Net 1798 ml       General appearance: Intubated sedated mechanically ventilated supine  Neck: FROM, supple   Lungs: Clear bilaterally no wheezes, no rhonchi, no crackles  CV: RRR, no MRGs; normal carotid upstroke and amplitude without Bruits  Abdomen: Soft, non-tender; no masses or HSM  Extremities: No edema, no cyanosis, no clubbing  Skin: Intact no rash, no lesions, no ulcers    Psych: not Alert and oriented normal affect  Neuro: Nonfocal  In consideration of [facility]- isolation policies and efforts to preserve PPE physical exam is limited. Exam findings shared among providers. Most Recent Labs  Lab Results   Component Value Date    WBC 9.3 12/20/2020    HGB 9.4 (L) 12/20/2020    HCT 29.7 (L) 12/20/2020     12/20/2020     12/20/2020    K 4.3 12/20/2020     (H) 12/20/2020    CREATININE 0.3 (L) 12/20/2020    BUN 13 12/20/2020    CO2 30 (H) 12/20/2020    GLUCOSE 212 (H) 12/20/2020    ALT 27 12/20/2020    AST 15 12/20/2020    INR 1.0 12/13/2020    TSH 0.381 12/03/2020    LABA1C 5.8 04/05/2017     Recent Labs     12/20/20  0521   MG 2.3     Lab Results   Component Value Date    CALCIUM 6.1 (L) 12/20/2020    PHOS 2.0 (L) 12/20/2020        XR CHEST PORTABLE   Final Result   Mildly progressive bilateral infiltrates. Improving left pleural effusion      XR CHEST PORTABLE   Final Result   Mildly waning airspace disease with slight improvement of aeration   bilaterally. XR CHEST PORTABLE   Final Result   Diffuse infiltrate, improved in the upper lungs as compared to prior. versed    2. Shock -likely septic secondary to viral sepsis              Currently on levophed, vasopressin off wean as  tolerated              TLC inserted              Blood cultures sent, no evidence of UTI   Completed cefepime              Wean hydrocortisone, increase midodrine    3. Anemia - resolved              PDMVFY false lab value    4. Hypothyroidism - continue synthroid    5.  Hyponatremia - seen by nephrology              IVF resuscitation              Continue to monitor labs closely       Electronically signed by Selvin Ridley MD on 12/21/2020 at 6:05 AM

## 2020-12-22 NOTE — PROGRESS NOTES
[facility] MD Ruthann Franks, MD Robert Zamudio, MD Tacho Mcdonnell, CNP   Shaniqua Evans, BOBBY  Progress Note    12/22/2020    SUBJECTIVE:   12/17: Hypotensive, requiring pressors. Comfortable on vent. 12/18: Remains critically ill. On ventilator via ETT, supine, FiO2 90% PEEP 10. BP still lowish. Pressors weaning. 12/19: Remains intubated on ventilator, FiO2 60% PEEP 10, somewhat improved. Supine. No new event overnight. Hemodynamically stable. 12/20: Urine picked up substantially yesterday afternoon, increasing from 350 cc on day shift to 2600 cc on evening, 3100 cc on night shift. No diuretic therapy administered. Hypotensive this morning. Improved with IV fluid bolus. Remains critically ill, on ventilator via ETT. 12/21: Remains critically ill. Intubated via ETT, FiO2 65%, PEEP 10.  UO has in fact slowed somewhat, appropriate given IV fluid administration. Remains on amnio, levo, fentanyl. 1/2 NS at 150 cc/h. Attempt at lightening sedation resulted in stacked breathing, agitation. Remains unresponsive to verbal and tactile stimuli   12/22: Remains critically ill. Intubated via ETT. Supine now. Vent weaning. TF at 45 cc/h after UF 30 cc every 6 hours. 1/2 NS at 100 cc/h. BP stable. PROBLEM LIST:    Active Problems:    Acute respiratory failure with hypoxia (HCC)  Resolved Problems:    * No resolved hospital problems. *         DIET:    DIET TUBE FEED CONTINUOUS/CYCLIC NPO; Immune Enhancing (PIVOT 1.5);  Nasogastric; 25; 45     MEDS (scheduled):    vitamin C  500 mg Oral BID    thiamine  100 mg Oral Daily    zinc sulfate  50 mg Oral Daily    heparin flush  3 mL Intravenous 2 times per day    insulin glargine  25 Units Subcutaneous Daily    hydrocortisone sodium succinate PF  50 mg Intravenous Q8H    docusate  100 mg Oral Daily    senna  5 mL Oral Nightly    midodrine  10 mg Oral TID    insulin lispro  0-18 Units Subcutaneous Q6H    pantoprazole  40 mg Intravenous Daily    And    sodium chloride (PF)  10 mL Intravenous Daily    ipratropium-albuterol  1 ampule Inhalation Q4H WA    budesonide  500 mcg Nebulization BID    Arformoterol Tartrate  15 mcg Nebulization BID    enoxaparin  40 mg Subcutaneous Daily    clopidogrel  75 mg Oral Daily    chlorhexidine  15 mL Mouth/Throat BID    sodium chloride flush  10 mL Intravenous 2 times per day    Vitamin D  2,000 Units Oral Daily    gabapentin  800 mg Oral TID    levothyroxine  100 mcg Oral Daily    atorvastatin  20 mg Oral Daily    acetaminophen  650 mg Rectal Once       MEDS (infusions):   sodium chloride 100 mL/hr at 12/21/20 1736    amiodarone 0.5 mg/min (12/22/20 0538)    fentaNYL 5 mcg/ml in 0.9%  ml infusion 100 mcg/hr (12/22/20 1320)    midazolam 3 mg/hr (12/22/20 1534)       MEDS (prn):  sodium chloride flush, heparin flush, potassium chloride, fentanNYL, promethazine **OR** ondansetron, polyethylene glycol, acetaminophen **OR** acetaminophen    PHYSICAL EXAM:     Patient Vitals for the past 24 hrs:   BP Temp Temp src Pulse Resp SpO2 Height   12/22/20 1600 -- 98.4 °F (36.9 °C) Esophageal 103 18 91 % --   12/22/20 1546 -- -- -- 73 23 94 % --   12/22/20 1500 -- -- -- 86 24 97 % --   12/22/20 1400 -- -- -- 81 26 95 % --   12/22/20 1352 -- -- -- -- -- -- 5' 6\" (1.676 m)   12/22/20 1300 -- -- -- 85 23 94 % --   12/22/20 1200 -- 99 °F (37.2 °C) Esophageal 97 23 95 % --   12/22/20 1133 -- -- -- 103 23 96 % --   12/22/20 1100 -- -- -- 105 21 (!) 87 % --   12/22/20 1000 -- -- -- 103 24 91 % --   12/22/20 0915 -- -- -- -- -- (!) 89 % --   12/22/20 0900 -- -- -- 104 24 91 % --   12/22/20 0842 -- -- -- 94 24 91 % --   12/22/20 0800 -- 98.8 °F (37.1 °C) Esophageal 84 24 92 % --   12/22/20 0700 -- -- -- 98 28 91 % --   12/22/20 0600 133/66 98.6 °F (37 °C) Esophageal 99 23 90 % --   12/22/20 0500 -- -- -- 102 22 (!) 88 % --   12/22/20 0400 (!) 131/58 97.8 °F (36.6 °C) Esophageal 92 21 (!) 88 % --   12/22/20 0325 -- -- -- 82 23 91 % --   12/22/20 0200 (!) 143/61 97.9 °F (36.6 °C) Esophageal 93 24 (!) 88 % --   12/22/20 0100 -- -- -- 70 24 95 % --   12/22/20 0010 -- -- -- 69 24 97 % --   12/22/20 0000 (!) 109/48 97.3 °F (36.3 °C) Esophageal 80 23 96 % --   12/21/20 2300 -- -- -- 85 24 95 % --   12/21/20 2200 (!) 104/48 97.2 °F (36.2 °C) Esophageal 77 24 96 % --   12/21/20 2100 -- -- -- 88 24 95 % --   12/21/20 2047 -- -- -- 81 24 96 % --   12/21/20 2000 (!) 134/56 97.2 °F (36.2 °C) Esophageal 61 23 100 % --   12/21/20 1900 -- -- -- 70 23 99 % --   12/21/20 1800 -- -- -- 106 24 95 % --   12/21/20 1707 -- -- -- 92 24 99 % --   12/21/20 1700 -- -- -- 85 24 99 % --   @      Intake/Output Summary (Last 24 hours) at 12/22/2020 1659  Last data filed at 12/22/2020 1555  Gross per 24 hour   Intake 3776.31 ml   Output 2615 ml   Net 1161.31 ml         Wt Readings from Last 3 Encounters:   12/18/20 207 lb 0.2 oz (93.9 kg)   12/08/20 188 lb 9 oz (85.5 kg)   10/09/20 163 lb 9.6 oz (74.2 kg)       PE:  COVID-19 isolation protocol, I did not conduct a physical examination but discussed her physical exam findings with her bedside RN and the MICU team    DATA:    Recent Labs     12/20/20  0521 12/21/20  0553 12/22/20  0450   WBC 9.3 8.7 9.9   HGB 9.4* 9.3* 10.1*   HCT 29.7* 31.1* 33.6*   MCV 97.4 100.6* 100.6*    180 177     Recent Labs     12/20/20  0521 12/20/20  0521 12/21/20  0553 12/21/20  1205 12/21/20  1805 12/22/20  0450      < > 143 140 140 138   K 4.3   < > 4.4 4.2 3.6 3.9   *   < > 110* 108* 110* 104   CO2 29   < > 30* 30* 27 31*   MG 2.3  --  1.9  --   --  2.0   PHOS 2.0*  --  2.1*  --   --  1.8*   BUN 12   < > 12 12 11 12   CREATININE 0.4*   < > 0.3* 0.4* 0.3* 0.3*   ALT 27  --  25  --   --  23   AST 15  --  25  --   --  22   BILITOT 0.3  --  0.4  --   --  0.4   ALKPHOS 76  --  79  --   --  91    < > = values in this interval not displayed.        Lab Results Component Value Date    LABPROT 1.1 (H) 12/16/2020    LABPROT 1.1 12/16/2020       Assessment  1. 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 Administration of desmopressin in the ER likely also contributed to the initial drop in her sodium  2. Hypophosphatemia, severe, nutritional     [Na+] improved, though slowly, peaked at 129 mmol/L, though dropped to 126 since last evening. May be due to IV electrolyte supplementation. Hypervolemic. NS stopped. 12/19 evening UO picked up markedly, 2600 cc on evening shift, 3100 cc on night shift, continuing at > 150 cc/h on day shift. No diuretic therapy. Question of DI. Frankly, doubt the desmopressin administered 5 days ago would still have been effective as of yesterday. Solu-Cortef started 12/19 -- uo has since slowed down, appropriate for volume and IVF administered. Urine / serum studies 12/20 6 AM resembled DI, those drawn in the early afternoon did not. Recommendations  1. Stop IV D5 1/2 NS  2. Continue tube feed at current rate  3. Increase free water flush to 200 cc every 4 hours, further adjustments as warranted over the next several days  4. Supplement phosphorus IV, over 6 hours (not 4)  5. Follow labs, UO  6.  Avoid nephrotoxins     Discussed with Dr. Shaye Orellana    Electronically signed by Yifan Mcqueen MD on 12/22/2020

## 2020-12-22 NOTE — PLAN OF CARE
Problem: Skin Integrity:  Goal: Will show no infection signs and symptoms  Description: Will show no infection signs and symptoms  Outcome: Met This Shift  Goal: Absence of new skin breakdown  Description: Absence of new skin breakdown  Outcome: Met This Shift     Problem: Airway Clearance - Ineffective  Goal: Achieve or maintain patent airway  Outcome: Met This Shift     Problem: Gas Exchange - Impaired  Goal: Absence of hypoxia  Outcome: Not Met This Shift  Goal: Promote optimal lung function  Outcome: Not Met This Shift     Problem: Breathing Pattern - Ineffective  Goal: Ability to achieve and maintain a regular respiratory rate  Outcome: Not Met This Shift     Problem:  Body Temperature -  Risk of, Imbalanced  Goal: Ability to maintain a body temperature within defined limits  Outcome: Not Met This Shift  Goal: Will regain or maintain usual level of consciousness  Outcome: Not Met This Shift  Goal: Complications related to the disease process, condition or treatment will be avoided or minimized  Outcome: Not Met This Shift     Problem: Isolation Precautions - Risk of Spread of Infection  Goal: Prevent transmission of infection  Outcome: Met This Shift     Problem: Nutrition Deficits  Goal: Optimize nutrtional status  Outcome: Ongoing     Problem: Risk for Fluid Volume Deficit  Goal: Maintain normal heart rhythm  Outcome: Not Met This Shift  Goal: Maintain absence of muscle cramping  Outcome: Not Met This Shift  Goal: Maintain normal serum potassium, sodium, calcium, phosphorus, and pH  Outcome: Not Met This Shift     Problem: Loneliness or Risk for Loneliness  Goal: Demonstrate positive use of time alone when socialization is not possible  Outcome: Not Met This Shift     Problem: Fatigue  Goal: Verbalize increase energy and improved vitality  Outcome: Not Met This Shift     Problem: Patient Education: Go to Patient Education Activity  Goal: Patient/Family Education  Outcome: Ongoing     Problem: Falls - Risk of:  Goal: Will remain free from falls  Description: Will remain free from falls  Outcome: Met This Shift  Goal: Absence of physical injury  Description: Absence of physical injury  Outcome: Met This Shift     Problem: Pain:  Goal: Pain level will decrease  Description: Pain level will decrease  Outcome: Met This Shift  Goal: Control of acute pain  Description: Control of acute pain  Outcome: Met This Shift  Goal: Control of chronic pain  Description: Control of chronic pain  Outcome: Met This Shift

## 2020-12-22 NOTE — PLAN OF CARE
hypoxia  12/22/2020 1044 by Rohith Dixon RN  Outcome: Ongoing  12/22/2020 0659 by Shaquille Lund RN  Outcome: Not Met This Shift  Goal: Promote optimal lung function  12/22/2020 1044 by Rohith Dixon RN  Outcome: Ongoing  12/22/2020 0659 by Shaquille Lund RN  Outcome: Not Met This Shift     Problem: Breathing Pattern - Ineffective  Goal: Ability to achieve and maintain a regular respiratory rate  12/22/2020 1044 by Rohith Dixon RN  Outcome: Ongoing  12/22/2020 0659 by Shaquille Lund RN  Outcome: Not Met This Shift     Problem: Body Temperature -  Risk of, Imbalanced  Goal: Ability to maintain a body temperature within defined limits  12/22/2020 1044 by Rohith Dixon RN  Outcome: Ongoing  12/22/2020 0659 by Shaquille Lund RN  Outcome: Not Met This Shift     Problem: Isolation Precautions - Risk of Spread of Infection  Goal: Prevent transmission of infection  12/22/2020 1044 by Rohith Dixon RN  Outcome: Ongoing  12/22/2020 0659 by Shaquille Lund RN  Outcome: Met This Shift     Problem: Risk for Fluid Volume Deficit  Goal: Maintain normal heart rhythm  12/22/2020 1044 by Rohith Dixon RN  Outcome: Ongoing  12/22/2020 0659 by Shaquille Lund RN  Outcome: Not Met This Shift     Problem: Patient Education: Go to Patient Education Activity  Goal: Patient/Family Education  12/22/2020 0659 by Shaquille Lund RN  Outcome: Ongoing     Problem:  Body Temperature -  Risk of, Imbalanced  Goal: Will regain or maintain usual level of consciousness  12/22/2020 1044 by Rohith Dixon RN  Outcome: Not Met This Shift  12/22/2020 0659 by Shaquille Lund RN  Outcome: Not Met This Shift  Goal: Complications related to the disease process, condition or treatment will be avoided or minimized  12/22/2020 0659 by Shaquille Lund RN  Outcome: Not Met This Shift     Problem: Risk for Fluid Volume Deficit  Goal: Maintain absence of muscle cramping  12/22/2020 0659 by Shaquille Lund RN  Outcome: Not Met This Shift  Goal: Maintain normal serum potassium, sodium, calcium, phosphorus, and pH  12/22/2020 0659 by Yoko Wilson RN  Outcome: Not Met This Shift     Problem: Loneliness or Risk for Loneliness  Goal: Demonstrate positive use of time alone when socialization is not possible  12/22/2020 0659 by Yoko Wilson RN  Outcome: Not Met This Shift     Problem: Fatigue  Goal: Verbalize increase energy and improved vitality  12/22/2020 0659 by Yoko Wilson RN  Outcome: Not Met This Shift

## 2020-12-22 NOTE — PROGRESS NOTES
Critical Care Team - Daily Progress Note         Date and time: 12/22/2020 1:20 PM  Patient's name:  Lisa Souza  Medical Record Number: [id number]  Patient's account/billing number: [de-identified]  Patient's YOB: 1950  Age: 65 y.o. Date of Admission: 12/13/2020 10:51 AM  Length of stay during current admission: 9      Primary Care Physician: Tereso Watkins DO  ICU Attending Physician: Dr. Marco A De León    Code Status: Full Code    Reason for ICU admission: respiratory failure   Shock   Covid 19 pneumonitis       SUBJECTIVE:     OVERNIGHT EVENTS:         12/16: Overnight able to titrate down some of the vasopressors though still intermittently hypotensive and requiring vasopressin as well as Levophed. Still sedated with fentanyl and Versed. Hyponatremia notable at 119 new today. 12/17: Patient was able to be titrated off the vasopressors. Is now on midodrine. Minimally hypothermic overnight now on Melissa hugger with improvement in temperature. Continue sedation with fentanyl and Versed. 12/18: Patient continues on the ventilator. Back on small amount of Levophed in addition to the vasopressin. Hoping to wean sedation more today. 12/19: Back on levophed overnight. Initially able to titrate fio2 to 40%, became hypoxic and increased to 60%. Apparently their were several episodes of bradycardia overnight. Patient has pacemaker which did not fire per nursing. Follows with Dr. Bro Lyon for cardiology. 12/20: Patient: 6 L overnight, nephrology ordered K-Phos, and half-normal saline, will CT her head today. 12/21: Patient tolerated being supine overnight. Plan for PICC line today. Pacemaker interrogated with no runs of V. tach or other rhythm needing acute intervention. 12/22: Patient remained supine without difficulty. Continuing to try to wean down her FiO2. Her sodium has improved.         CURRENT VENTILATION STATUS:     [x] Ventilator  [] BIPAP  [] Nasal Cannula [] Room Air      IF INTUBATED, ET TUBE MARKING AT LOWER LIP:       cms    SECRETIONS Amount:  [] Small [] Moderate  [] Large  [x] None  Color:     [] White [] Colored  [] Bloody    SEDATION:  RAAS Score:  [x] Propofol gtt  [x] Versed gtt  [] Ativan gtt   [x] No Sedation    PARALYZED:  [x] No    [] Yes      VASOPRESSORS:  [] No    [x] Yes    If yes -   [x] Levophed       [] Dopamine     [x] Vasopressin       [] Dobutamine  [] Phenylephrine         [] Epinephrine    CENTRAL LINES:     [] No   [x] Yes   (Date of Insertion:  12/15 )           If yes -     [] Right IJ     [] Left IJ [x] Right Femoral [] Left Femoral                   [] Right Subclavian [] Left Subclavian       GARCIA'S CATHETER:   [] No   [x] Yes  (Date of Insertion: 12/15  )     URINE OUTPUT:            [x] Good   [] Low              [] Anuric      OBJECTIVE:     VITAL SIGNS:  /66   Pulse 97   Temp 99 °F (37.2 °C) (Esophageal)   Resp 23   Ht 5' 6\" (1.676 m)   Wt 207 lb 0.2 oz (93.9 kg)   SpO2 95%   BMI 33.41 kg/m²   Tmax over 24 hours:  Temp (24hrs), Av °F (36.7 °C), Min:97.2 °F (36.2 °C), Max:99 °F (37.2 °C)      Patient Vitals for the past 6 hrs:   Temp Temp src Pulse Resp SpO2   20 1200 99 °F (37.2 °C) Esophageal 97 23 95 %   20 1133 -- -- 103 23 96 %   20 1100 -- -- 105 21 (!) 87 %   20 1000 -- -- 103 24 91 %   20 0915 -- -- -- -- (!) 89 %   20 0900 -- -- 104 24 91 %   20 0842 -- -- 94 24 91 %   20 0800 98.8 °F (37.1 °C) Esophageal 84 24 92 %         Intake/Output Summary (Last 24 hours) at 2020 1320  Last data filed at 2020 1117  Gross per 24 hour   Intake 4756.31 ml   Output 2670 ml   Net 2086.31 ml     Wt Readings from Last 2 Encounters:   20 207 lb 0.2 oz (93.9 kg)   20 188 lb 9 oz (85.5 kg)     Body mass index is 33.41 kg/m².         PHYSICAL EXAMINATION:  General: ill appearing, sedated, lying in gurney in no distress supine  HEENT: Pupils are equal round and reactive to light, there are no oral lesions and no post-nasal drip   Neck: supple without adenopathy  Cardiovascular: regular rate and rhythm without murmur or gallop  Respiratory:  Decreased breath sounds bilaterally without wheezing or crackles.   Air entry is symmetric  Abdomen: soft, non-tender, non-distended, normal bowel sounds  Extremities: warm, no edema, no clubbing   Skin: no rash or lesion  Neurologic:  Sedated, intubated, pupils equal round reactive       Any additional physical findings:    MEDICATIONS:    Scheduled Meds:   potassium phosphate IVPB  30 mmol Intravenous Once    vitamin C  500 mg Oral BID    thiamine  100 mg Oral Daily    zinc sulfate  50 mg Oral Daily    heparin flush  3 mL Intravenous 2 times per day    insulin glargine  25 Units Subcutaneous Daily    hydrocortisone sodium succinate PF  50 mg Intravenous Q8H    docusate  100 mg Oral Daily    senna  5 mL Oral Nightly    midodrine  10 mg Oral TID    insulin lispro  0-18 Units Subcutaneous Q6H    pantoprazole  40 mg Intravenous Daily    And    sodium chloride (PF)  10 mL Intravenous Daily    ipratropium-albuterol  1 ampule Inhalation Q4H WA    budesonide  500 mcg Nebulization BID    Arformoterol Tartrate  15 mcg Nebulization BID    enoxaparin  40 mg Subcutaneous Daily    clopidogrel  75 mg Oral Daily    chlorhexidine  15 mL Mouth/Throat BID    sodium chloride flush  10 mL Intravenous 2 times per day    Vitamin D  2,000 Units Oral Daily    gabapentin  800 mg Oral TID    levothyroxine  100 mcg Oral Daily    atorvastatin  20 mg Oral Daily    acetaminophen  650 mg Rectal Once     Continuous Infusions:   sodium chloride 100 mL/hr at 12/21/20 1736    amiodarone 0.5 mg/min (12/22/20 0538)    fentaNYL 5 mcg/ml in 0.9%  ml infusion 150 mcg/hr (12/22/20 0952)    midazolam 4 mg/hr (12/22/20 0400)     PRN Meds:       sodium chloride flush, 10 mL, PRN      heparin flush, 3 mL, PRN      potassium chloride, 20 mEq, PRN      fentanNYL, 25 mcg, Q3H PRN      promethazine, 12.5 mg, Q6H PRN    Or      ondansetron, 4 mg, Q6H PRN      polyethylene glycol, 17 g, Daily PRN      acetaminophen, 650 mg, Q6H PRN    Or      acetaminophen, 650 mg, Q6H PRN          VENT SETTINGS (Comprehensive) (if applicable):  Vent Information  $Ventilation: $Subsequent Day  Skin Assessment: Clean, dry, & intact  Equipment ID: [id number]  Equipment Changed: (S) HME(changed)  Vent Type: [id number]  Vent Mode: AC/VC+  Vt Ordered: 450 mL  Rate Set: 24 bmp  Peak Flow: 0 L/min  Pressure Support: 0 cmH20  FiO2 : 55 %  SpO2: 95 %  SpO2/FiO2 ratio: 172.73  PaO2/FiO2 ratio: 102  Sensitivity: 3  PEEP/CPAP: 10  I Time/ I Time %: 0 s  Humidification Source: HME  Mask Type: Full face mask  Mask Size: Small  Additional Respiratory  Assessments  Pulse: 97  Resp: 23  SpO2: 95 %  Position: Semi-Marrero's  Humidification Source: HME  Oral Care: Mouth swabbed, Mouth suctioned, Mouth moisturizer  Subglottic Suction Done?: Yes  Cuff Pressure (cm H2O): 29 cm H2O    ABGs:   Recent Labs     12/22/20  0504   PH 7.385   PCO2 47.5*   PO2 56.5*   HCO3 27.8*   BE 2.2   O2SAT 88.5*       Laboratory findings:    Complete Blood Count:   Recent Labs     12/20/20  0521 12/21/20  0553 12/22/20  0450   WBC 9.3 8.7 9.9   HGB 9.4* 9.3* 10.1*   HCT 29.7* 31.1* 33.6*    180 177        Last 3 Blood Glucose:   Recent Labs     12/21/20  1205 12/21/20  1805 12/22/20  0450   GLUCOSE 196* 224* 143*        PT/INR:    Lab Results   Component Value Date    PROTIME 11.4 12/13/2020    PROTIME 11.9 12/15/2011    INR 1.0 12/13/2020     PTT:    Lab Results   Component Value Date    APTT 21.6 12/13/2020       Comprehensive Metabolic Profile:   Recent Labs     12/21/20  1205 12/21/20  1805 12/22/20  0450    140 138   K 4.2 3.6 3.9   * 110* 104   CO2 30* 27 31*   BUN 12 11 12   CREATININE 0.4* 0.3* 0.3*   GLUCOSE 196* 224* 143*   CALCIUM 6.3* 5.5* 6.4*   PROT  --   --  4.8*   LABALBU  --   --  2.8* BILITOT  --   --  0.4   ALKPHOS  --   --  91   AST  --   --  22   ALT  --   --  23      Magnesium:   Lab Results   Component Value Date    MG 2.0 12/22/2020     Phosphorus:   Lab Results   Component Value Date    PHOS 1.8 12/22/2020     Ionized Calcium: No results found for: CAION     Urinalysis:     Troponin:   No results for input(s): TROPONINI in the last 72 hours. Microbiology:    Cultures during this admission:     Blood cultures:                 [] None drawn      [x] Negative             []  Positive (Details:  )  Urine Culture:                   [] None drawn      [] Negative             []  Positive (Details:  )  Sputum Culture:               [] None drawn       [x] Negative             []  Positive (Details:  )   Endotracheal aspirate:     [] None drawn       [] Negative             []  Positive (Details:  )     Other pertinent Labs:   COVID-19 + December 13  MRSA negative screening   radiology/Imaging:     Chest Xray (12/22/2020):       Right PICC line with tip at the junction of the subclavian and SVC   Extensive bilateral pulmonary infiltrates unchanged   ET and NG tubes appear in satisfactory position             ASSESSMENT:      1. Acute hypoxic respiratory failure   2. Covid pneumonitis - critically severe   3. Shock- septic   4. Adrenal insufficiency  5. Hyponatremia   6. Hypokalemia   7. Leukocytosis    8. AECOPD  9. pvd  10. chf not in acute exacerbatino   11. Hx lacunar infarct   12. SSS s/p pacemaker   13. Hx seizure do   14. Bilateral carotid stenosis       SYSTEMS ASSESSMENT    Neuro   Intubated, sedated on Versed and fentanyl - wean as tolerated  No longer paralyzed  Previous history of lacunar stroke  CT head on admission showed no acute abnormalities  Occasionally becomes agitated with movement.      Respiratory   Acute respiratory failure with hypoxia secondary to COVID-19 viral pneumonia  Intubated, wean sedation as tolerated  Course of Cefepime completed for possible superimposed bacterial pneumonia   Wean oxygen as tolerated. Keep O2 sat 90-92%  abg daily    Cardiovascular   Shock, septic  Increase midodrine to 15 tid   -occasionally requires levophed still  Previous atrial fib/flutter -amiodarone drip  Pacemaker interrogated that showed several episodes of supraventricular tachycardia over the last couple of months but no persistent shockable rhythm  Continue plavix    Gastrointestinal   GI prophylaxis with Protonix  Diet per dietary recs   Colace/senna for constipation    Renal   Hyponatremia -resolved   -Continue to trend metabolic panel   -Nephrology following for fluid recommendations intravenously      Infectious Disease   COVID-19 viral pneumonia   Received remdesivir, Toci, vit D, vit c, thiamine   Septic shock - improving  Possible superimposed bacterial pneumonia   -Completed course of cefepime  Blood cultures and respiratory culture negative thus far    Hematology/Oncology   Anemia on admission, trend H&H, improving  CBC daily    Endocrine   high-dose sliding scale Humalog for hyperglycemia  Start lantus  Monitor growth closely  Cortisol on admission 1.42  High-dose Solucortef decreased to 50 q 8 hrs    Social/Spiritual/DNR/Other   Full code. Palliative consulted    ASSESSMENT/ PLAN     1. Wean vent as tolerated  2. Nephrology following for hyponatremia  3. Occasionally requiring levophed, on midodrine TID  4. decrease solucortef  5. Amiodarone drip for A. fib with RVR  6. PICC line placed today  7. Palliative consult made  8. Continue ICU level of care      Jazlyn Flores DO, PGY-2  12/22/2020 1:20 PM    Attending Physician Attestation: Dr. Pedro Hernandez    Thank you very much for allowing me to see this patient in consultation and follow up. I personally saw, examined and provided care for the patient. Radiographs, labs and medication list were reviewed by me independently. I spoke with bedside nursing, respiratory therapists and consultants.  Critical care services and times documented are independent of procedures and multidisciplinary rounds with Residents. Additionally comprehensive, multidisciplinary rounds were conducted with the MICU team. The case was discussed in detail and plans for care were established. Review of Residents documentation was conducted and revisions were made as appropriate. I agree with the the above documented information. Physical Examination:  Vitals:   Vitals:    12/22/20 1200 12/22/20 1300 12/22/20 1352 12/22/20 1400   BP:       Pulse: 97 85  81   Resp: 23 23  26   Temp: 99 °F (37.2 °C)      TempSrc: Esophageal      SpO2: 95% 94%  95%   Weight:       Height:   5' 6\" (1.676 m)       General: No Acute Distress  HEENT: normocephalic, atruamatic  CVS: RRR, S1, S2, No S3 or S4  Respiratory: decreased breath sounds at lung bases, no focal wheezes noted  Extremities: no clubbing/cyanosis/edema    ASSESSMENT:  1.) SARS-CoV2 Viral Pneumonia, COVID-19 Pneumonia   2.) Acute Respiratory Failure with Hypoxia   3.) Macrocytosis   4.) Superimposed Bacterial Pneumonia   5.) Sick Sinus Syndrome - s/p pacemaker insertion   6.) Atrial Fibrillation with RVR - amiodarone drip  7.) Severe Sepsis with Septic Shock - resolved     In addition the following applies:    Check: N/A  Medication Alterations: N/A   Procedures: N/A  Imaging: reviewed  New Consultations: N/A  VENT: ACVC (DAY7) 24/450/55/10    Access: Right PICC, Left Brachial Arterial Line   Consults: Nephrology, Pulmonary    Drips: Amiodarone, Fentanyl, Versed  Nutrition: Tube Feeds  ABX: N/A    - addition of thiamine, vitamin C, PO ZINC  - check D-dimer  - check Fibrinogen if platelets < 781B    Thank you for allowing me to participate in the care of this patient. Care reviewed with nursing staff, medical and surgical specialty care, primary care and the patient's family as available. Restraints are ordered when the patient can do harm to him/herself by pulling out devices.     Critical Care Time: > 35 minutes excluding procedures    JAMES Garcia  12/22/2020  2:50 PM

## 2020-12-22 NOTE — PROGRESS NOTES
12/22/2020  2:01 PM      Comprehensive Nutrition Assessment    Type and Reason for Visit:  Reassess    Nutrition Recommendations/Plan: Continue current TF while EN needed    Nutrition Assessment:  Pt remains intubated/sedated and ernestine supine/off paralytic. Recommend continue TF via NGT while intubated and EN needed for nutrition support    Malnutrition Assessment:  Malnutrition Status: At risk for malnutrition (Comment)    Context:  Acute Illness     Findings of the 6 clinical characteristics of malnutrition:  Energy Intake:  Mild decrease in energy intake (Comment)(NPO/intubated)  Weight Loss:  No significant weight loss     Body Fat Loss:  Unable to assess(Covid isolation)     Muscle Mass Loss:  Unable to assess(Covid isolation)    Fluid Accumulation:  Unable to assess     Strength:  Not Performed    Estimated Daily Nutrient Needs:  Energy (kcal):  ; Weight Used for Energy Requirements:  Admission     Protein (g):  80-90 (1.3-1.5 g/kg);  Weight Used for Protein Requirements:  Ideal        Fluid (ml/day):  per Critical Care; Method Used for Fluid Requirements:  Other (Comment)      Nutrition Related Findings:  intubated/sedated, pressor needed at times (MAP 91), supine, NGT to TF, hypo BS, +2 edema, I/O WNL      Wounds:  (abrasions)       Current Nutrition Therapies:    Current Tube Feeding (TF) Orders:  · Feeding Route: Nasogastric  · Formula: Immune Enhancing  · Schedule: Continuous  · Additives/Modulars:    · Water Flushes: per protocol = 180 ml/d  · Current TF & Flush Orders Provides: at goal  · Goal TF & Flush Orders Provides: 1080 ml/d, 1620 nguyen, 102 g pro, 1000 ml total free water      Anthropometric Measures:  · Height: 5' 6\" (167.6 cm)  · Current Body Weight: 207 lb (93.9 kg)(12/18 no update avail)   · Admission Body Weight: 184 lb (83.5 kg)(12/15 first measured wt)    · Usual Body Weight: 161 lb (73 kg)(per EMR x 6 mo)     · Ideal Body Weight: 130 lbs; % Ideal Body Weight 159.2 %   · BMI:

## 2020-12-22 NOTE — PATIENT CARE CONFERENCE
Intensive Care Daily Quality Rounding Checklist        ICU Team Members: Dr. Fer Galvez, Thomas Horta (residents), clinical pharmacist, charge nurse, bedside nurse     ICU Day #: NUMBER: 8     Intubation Date: December 15     Ventilator Day #: NUMBER: [address] Insertion Asa Hurst                                                    Day #: NUMBER: 2      Arterial Line Insertion Date: Yesy Borges                             [facility] #: NUMBER: 8     DVT Prophylaxis: Lovenox    GI Prophylaxis: Protonix     Allen Catheter Insertion Date: December 13                                        Day #: 10                             Continued need (if yes, reason documented and discussed with physician): yes, Strict I&O in critical patient     Skin Issues/ Wounds and ordered treatment discussed on rounds: Yes, generalized ecchymosis, SOS precautions in place     Goals/ Plans for the Day: Daily labs, wean sedation as able, wean vent as able, replace electrolytes

## 2020-12-22 NOTE — PROGRESS NOTES
Beverly Lee M.D.,[facility]  Ari Salcedo D.O., F.AANDERSON.O.I., Jamal [place] M.D. Momo Beck M.D., Tish Zamarripa M.D. Gianfranco Gomes D.O.           Daily Pulmonary Progress Note    Patient:  Shanika Torres 65 y.o. female MRN: [id number]     Date of Service: 12/22/2020        Subjective      No major issues overnight  FiO2 60% on volume control with saturations 86%    Objective   Vitals: /66   Pulse 103   Temp 98.8 °F (37.1 °C) (Esophageal)   Resp 23   Ht 5' 6\" (1.676 m)   Wt 207 lb 0.2 oz (93.9 kg)   SpO2 96%   BMI 33.41 kg/m²     I/O:    Intake/Output Summary (Last 24 hours) at 12/22/2020 1140  Last data filed at 12/22/2020 1117  Gross per 24 hour   Intake 4856.31 ml   Output 2920 ml   Net 1936.31 ml       CURRENT MEDS :  Scheduled Meds:   potassium phosphate IVPB  30 mmol Intravenous Once    vitamin C  500 mg Oral BID    thiamine  100 mg Oral Daily    zinc sulfate  50 mg Oral Daily    heparin flush  3 mL Intravenous 2 times per day    insulin glargine  25 Units Subcutaneous Daily    hydrocortisone sodium succinate PF  50 mg Intravenous Q8H    docusate  100 mg Oral Daily    senna  5 mL Oral Nightly    midodrine  10 mg Oral TID    insulin lispro  0-18 Units Subcutaneous Q6H    pantoprazole  40 mg Intravenous Daily    And    sodium chloride (PF)  10 mL Intravenous Daily    ipratropium-albuterol  1 ampule Inhalation Q4H WA    budesonide  500 mcg Nebulization BID    Arformoterol Tartrate  15 mcg Nebulization BID    enoxaparin  40 mg Subcutaneous Daily    clopidogrel  75 mg Oral Daily    chlorhexidine  15 mL Mouth/Throat BID    sodium chloride flush  10 mL Intravenous 2 times per day    Vitamin D  2,000 Units Oral Daily    gabapentin  800 mg Oral TID    levothyroxine  100 mcg Oral Daily    atorvastatin  20 mg Oral Daily    acetaminophen  650 mg Rectal Once       Continuous Infusions:   sodium chloride 100 mL/hr at 12/21/20 1736    amiodarone 0.5 mg/min (12/22/20 5917)    fentaNYL 5 mcg/ml in 0.9%  ml infusion 150 mcg/hr (12/22/20 4223)    midazolam 4 mg/hr (12/22/20 4463)       PRN Meds:  sodium chloride flush, heparin flush, potassium chloride, fentanNYL, promethazine **OR** ondansetron, polyethylene glycol, acetaminophen **OR** acetaminophen      Physical Exam:  Due to the current efforts to prevent transmission of COVID-19 and also the need to preserve PPE for other caregivers, a face-to-face encounter with the patient was not performed. That being said, all relevant records and diagnostic tests were reviewed, including laboratory results and imaging. Please reference any relevant documentation elsewhere. Care will be coordinated with the primary service. Pertinent/ New Labs and Imaging Studies     Pulmonary Function Testing personally reviewed and interpreted. PERTINENT LAB RESULTS: Labs reviewed. DIAGNOSTICS: Pertinent imaging reviewed. CXR     1.  Good position right-sided PICC line. 2.  Otherwise stable findings.  Bilateral airspace infiltrates/edema, worse   on the right.                Assessment:      1. Acute hypoxic respiratory failure intubated 12/15  2. Severe Covid 19 pneumonia  3. Possible superimposed bacterial pneumonia  4. A. fib with RVR  5. Shock -resolved  6. Adrenal insufficiency  7. Acute exacerbation of COPD  8. CHF  9.  A. Fib w/ RVR currently on amio gtt  10. SSS status post pacemaker  11. History of seizure disorder      Plan:      Vent weaning as able    Follow chest imaging   Aerosol bronchodilator   IV steroids, currently on Solu-Cortef - wean   CT head when able if mentation does not ipmrove    GI/DVT -Lovenox   Prognosis guarded       Thank you for allowing me to participate in the care of Hallie Echeverria. Please feel free to call with questions.      Electronically signed by Juanell Closs, MD on 12/22/2020 at 11:40 AM

## 2020-12-22 NOTE — PROGRESS NOTES
Subjective: Intubated sedated mechanically ventilated   Discussed with staff  Objective:    /66   Pulse 104   Temp 98.8 °F (37.1 °C) (Esophageal)   Resp 24   Ht 5' 6\" (1.676 m)   Wt 207 lb 0.2 oz (93.9 kg)   SpO2 (!) 89%   BMI 33.41 kg/m²     24HR INTAKE/OUTPUT:      Intake/Output Summary (Last 24 hours) at 12/22/2020 0959  Last data filed at 12/22/2020 1206  Gross per 24 hour   Intake 4856.31 ml   Output 2795 ml   Net 2061.31 ml       General appearance: Intubated sedated mechanically ventilated supine  Neck: FROM, supple   Lungs: Clear bilaterally no wheezes, no rhonchi, no crackles  CV: RRR, no MRGs; normal carotid upstroke and amplitude without Bruits  Abdomen: Soft, non-tender; no masses or HSM  Extremities: No edema, no cyanosis, no clubbing  Skin: Intact no rash, no lesions, no ulcers    Psych: not Alert and oriented normal affect  Neuro: Nonfocal  [facility] and efforts to preserve PPE physical exam is limited. Exam findings shared among providers. Most Recent Labs  Lab Results   Component Value Date    WBC 9.9 12/22/2020    HGB 10.1 (L) 12/22/2020    HCT 33.6 (L) 12/22/2020     12/22/2020     12/22/2020    K 3.9 12/22/2020     12/22/2020    CREATININE 0.3 (L) 12/22/2020    BUN 12 12/22/2020    CO2 31 (H) 12/22/2020    GLUCOSE 143 (H) 12/22/2020    ALT 23 12/22/2020    AST 22 12/22/2020    INR 1.0 12/13/2020    TSH 0.381 12/03/2020    LABA1C 5.8 04/05/2017     Recent Labs     12/22/20  0450   MG 2.0     Lab Results   Component Value Date    CALCIUM 6.4 (L) 12/22/2020    PHOS 1.8 (L) 12/22/2020        XR CHEST PORTABLE   Final Result   1. Good position right-sided PICC line. 2.  Otherwise stable findings. Bilateral airspace infiltrates/edema, worse   on the right.       XR CHEST PORTABLE   Final Result   Right PICC line with tip at the junction of the subclavian and SVC   Extensive bilateral pulmonary infiltrates unchanged   ET and NG CHEST PORTABLE    (Results Pending)       Assessment    Active Problems:    Acute respiratory failure with hypoxia (HCC)  Resolved Problems:    * No resolved hospital problems. *      Plan:     1. Ventilator dependent resp failure - COVID 19 PNA / bacterial/ HF. CXR improved 12/17              Continue with dexamethasone + vit D   Remdesivir completed              Diffuse bilateral patchy GGO on CT, no PE              BNP elevated, possible component of heart failure              cefepime completed              Fentanyl + versed    2. Shock -likely septic secondary to viral sepsis              Currently offlevophed, vasopressin off wean as  Tolerated   Midodrine              TLC inserted              Blood cultures sent, no evidence of UTI   Completed cefepime              Wean hydrocortisone, increase midodrine    3. Anemia - resolved              LTVUVP false lab value    4. Hypothyroidism - continue synthroid    5.  Hyponatremia - seen by nephrology improved              IVF resuscitation              Continue to monitor labs closely     A. fib RVR amiodarone drip  Electronically signed by Martha Hernandez MD on 12/22/2020 at 9:59 AM

## 2020-12-22 NOTE — CARE COORDINATION
COVID POSITIVE 12/13. Vent/ intubated since 12/15-FIO2 45%. From [facility] P[facility]-not a bedhold. Plan remains to return to [place] on discharge if extubated- DOES NOT need a repeat COVID test on discharge per [facility] @ [place].  Will follow Anabella Castillo

## 2020-12-23 PROBLEM — I48.91 ATRIAL FIBRILLATION WITH RVR (HCC): Status: ACTIVE | Noted: 2020-01-01

## 2020-12-23 PROBLEM — A41.9 SEPTIC SHOCK (HCC): Status: ACTIVE | Noted: 2020-01-01

## 2020-12-23 PROBLEM — J12.82 PNEUMONIA DUE TO COVID-19 VIRUS: Status: ACTIVE | Noted: 2020-01-01

## 2020-12-23 PROBLEM — R65.21 SEPTIC SHOCK (HCC): Status: ACTIVE | Noted: 2020-01-01

## 2020-12-23 PROBLEM — U07.1 PNEUMONIA DUE TO COVID-19 VIRUS: Status: ACTIVE | Noted: 2020-01-01

## 2020-12-23 NOTE — PROGRESS NOTES
Subjective: Intubated sedated mechanically ventilated   Discussed with staff  Objective:    /66   Pulse 96   Temp 99.9 °F (37.7 °C) (Esophageal)   Resp 23   Ht 5' 6\" (1.676 m)   Wt 207 lb 0.2 oz (93.9 kg)   SpO2 (!) 88%   BMI 33.41 kg/m²     24HR INTAKE/OUTPUT:      Intake/Output Summary (Last 24 hours) at 12/23/2020 1025  Last data filed at 12/23/2020 0600  Gross per 24 hour   Intake 3004 ml   Output 2665 ml   Net 339 ml       General appearance: Intubated sedated mechanically ventilated supine  Neck: FROM, supple   Lungs: Clear bilaterally no wheezes, no rhonchi, no crackles  CV: RRR, no MRGs; normal carotid upstroke and amplitude without Bruits  Abdomen: Soft, non-tender; no masses or HSM  Extremities: No edema, no cyanosis, no clubbing  Skin: Intact no rash, no lesions, no ulcers    Psych: not Alert and oriented normal affect  Neuro: Nonfocal  In consideration of [facility] and efforts to preserve PPE physical exam is limited. Exam findings shared among providers. Most Recent Labs  Lab Results   Component Value Date    WBC 8.8 12/23/2020    HGB 10.1 (L) 12/23/2020    HCT 33.5 (L) 12/23/2020     12/23/2020     12/23/2020    K 3.9 12/23/2020     12/23/2020    CREATININE 0.3 (L) 12/23/2020    BUN 12 12/23/2020    CO2 33 (H) 12/23/2020    GLUCOSE 163 (H) 12/23/2020    ALT 20 12/23/2020    AST 19 12/23/2020    INR 1.0 12/13/2020    TSH 0.381 12/03/2020    LABA1C 5.8 04/05/2017     Recent Labs     12/23/20  0535   MG 2.0     Lab Results   Component Value Date    CALCIUM 6.4 (L) 12/23/2020    PHOS 2.2 (L) 12/23/2020        XR CHEST PORTABLE   Final Result   Stable bilateral airspace disease which may represent pulmonary edema. XR CHEST PORTABLE   Final Result   1. Good position right-sided PICC line. 2.  Otherwise stable findings. Bilateral airspace infiltrates/edema, worse   on the right.       XR CHEST PORTABLE   Final Result   Right PICC line with tip at the junction of the subclavian and SVC   Extensive bilateral pulmonary infiltrates unchanged   ET and NG tubes appear in satisfactory position      XR CHEST PORTABLE   Final Result   Bilateral airspace disease which is not appreciably changed and is likely   related to pulmonary edema. XR CHEST PORTABLE   Final Result   Mildly progressive bilateral infiltrates. Improving left pleural effusion      XR CHEST PORTABLE   Final Result   Mildly waning airspace disease with slight improvement of aeration   bilaterally. XR CHEST PORTABLE   Final Result   Diffuse infiltrate, improved in the upper lungs as compared to prior. There is new consolidation in the left lower lobe with possible left pleural   effusion. XR CHEST PORTABLE   Final Result   Endotracheal tube in low position, can be pulled back about 3 cm. NG tube in good position. Pattern of bilateral interstitial infiltrate in the perihilar regions or the   periphery of the lung as above commented. The pulmonary edema versus   bilateral viral pneumonia will be part of the differential diagnosis. Please   correlate clinically. T   Preliminary report given to Dr. Petra Lamas at the time   of the interpretation. XR ABDOMEN FOR NG/OG/NE TUBE PLACEMENT   Final Result   Enteric feeding tube partially imaged below the diaphragm and likely within   the stomach. XR CHEST PORTABLE   Final Result   Increased interstitial markings throughout both lungs along with   patchy/consolidative airspace opacities noted bilaterally as above. Findings   are suspicious for multifocal pneumonia or pulmonary edema. CTA PULMONARY W CONTRAST   Final Result   No central pulmonary embolism or aortic dissection. Progressive diffuse bilateral patchy and ground-glass infiltrates concerning   for progressive multifocal pneumonia/viral infection.       XR CHEST PORTABLE   Final Result   Progressive diffuse bilateral infiltrates and pleural effusion likely   CHF/edema and or diffuse pneumonia. CT HEAD WO CONTRAST    (Results Pending)       Assessment    Active Problems:    Acute respiratory failure with hypoxia (HCC)    Pneumonia due to COVID-19 virus    Septic shock (HCC)    Atrial fibrillation with RVR (HCC)  Resolved Problems:    * No resolved hospital problems. *      Plan:     1.  Acute respiratory failure with hypoxia  - COVID 19 PNA / bacterial/ HF. CXR improved 12/17  -Intubated sedated mechanically ventilated   -Prone not paralyzed             Hydrocortisone IV + vit D   Remdesivir completed              Diffuse bilateral patchy GGO on CT, no PE              BNP elevated, possible component of heart failure              cefepime completed              Fentanyl + versed    2. Shock -likely septic secondary to viral sepsis              Currently off   levophed, vasopressin off wean off   Midodrine              TLC inserted              Blood cultures sent, no evidence of UTI   Completed cefepime              Wean hydrocortisone, increase midodrine    3. Anemia - resolved              QQPGPF false lab value    4. Hypothyroidism - continue synthroid    5.  Hyponatremia -improved    nephrology consult appreciated              IVF resuscitation              Continue to monitor labs closely     A. fib RVR amiodarone drip  Electronically signed by Fely Gomes MD on 12/23/2020 at 10:25 AM

## 2020-12-23 NOTE — PROGRESS NOTES
Gerardo Bray M.D.,[facility]  Johnathan Ramirez D.O., F.A.C.OTrinidadI., Delvin Cervantes M.D. Lyudmila Nuno M.D., Jeremiah William M.D. John cMkeon D.O.           Daily Pulmonary Progress Note    Patient:  Lisa Torres 65 y.o. female MRN: [id number]     Date of Service: 12/23/2020        Subjective      No major issues overnight  Vent settings: 50%, 24, 450cc, 10      Objective   Vitals: /66   Pulse 96   Temp 99.9 °F (37.7 °C) (Esophageal)   Resp 23   Ht 5' 6\" (1.676 m)   Wt 207 lb 0.2 oz (93.9 kg)   SpO2 (!) 88%   BMI 33.41 kg/m²     I/O:    Intake/Output Summary (Last 24 hours) at 12/23/2020 1103  Last data filed at 12/23/2020 0600  Gross per 24 hour   Intake 3004 ml   Output 2665 ml   Net 339 ml       CURRENT MEDS :  Scheduled Meds:   potassium phosphate IVPB  30 mmol Intravenous Once    calcium gluconate IVPB  2 g Intravenous Once    hydrocortisone sodium succinate PF  50 mg Intravenous Q12H    vitamin C  500 mg Oral BID    thiamine  100 mg Oral Daily    zinc sulfate  50 mg Oral Daily    heparin flush  3 mL Intravenous 2 times per day    insulin glargine  25 Units Subcutaneous Daily    docusate  100 mg Oral Daily    senna  5 mL Oral Nightly    midodrine  10 mg Oral TID    insulin lispro  0-18 Units Subcutaneous Q6H    pantoprazole  40 mg Intravenous Daily    And    sodium chloride (PF)  10 mL Intravenous Daily    ipratropium-albuterol  1 ampule Inhalation Q4H WA    budesonide  500 mcg Nebulization BID    Arformoterol Tartrate  15 mcg Nebulization BID    enoxaparin  40 mg Subcutaneous Daily    clopidogrel  75 mg Oral Daily    chlorhexidine  15 mL Mouth/Throat BID    sodium chloride flush  10 mL Intravenous 2 times per day    Vitamin D  2,000 Units Oral Daily    gabapentin  800 mg Oral TID    levothyroxine  100 mcg Oral Daily    atorvastatin  20 mg Oral Daily    acetaminophen  650 mg Rectal Once       Continuous Infusions:   fentaNYL 5 mcg/ml in 0.9%  ml infusion 100 mcg/hr (12/22/20 8472)    midazolam 3 mg/hr (12/22/20 2229)       PRN Meds:  metoprolol, sodium chloride flush, heparin flush, potassium chloride, fentanNYL, promethazine **OR** ondansetron, polyethylene glycol, acetaminophen **OR** acetaminophen      Physical Exam:  Due to the current efforts to prevent transmission of COVID-19 and also the need to preserve PPE for other caregivers, a face-to-face encounter with the patient was not performed. That being said, all relevant records and diagnostic tests were reviewed, including laboratory results and imaging. Please reference any relevant documentation elsewhere. Care will be coordinated with the primary service. Pertinent/ New Labs and Imaging Studies     Pulmonary Function Testing personally reviewed and interpreted. PERTINENT LAB RESULTS: Labs reviewed. DIAGNOSTICS: Pertinent imaging reviewed. CXR     Impression   Stable bilateral airspace disease which may represent pulmonary edema. Assessment:      1. Acute hypoxic respiratory failure intubated 12/15  2. Severe Covid 19 pneumonia  3. Possible superimposed bacterial pneumonia  4. A. fib with RVR  5. Shock -resolved  6. Adrenal insufficiency  7. Acute exacerbation of COPD  8. CHF  9.  A. Fib w/ RVR currently on amio gtt  10. SSS status post pacemaker  11. History of seizure disorder      Plan:      Vent weaning as able    Follow chest imaging - no change todya   Aerosol bronchodilator   IV steroids, currently on Solu-Cortef - continue wean    CT head when able if mentation does not improve    GI/DVT -Lovenox   Off IV amiodarone. Consider scheduled beta blocker (currently on lopressor IV prn)   Prognosis guarded       Thank you for allowing me to participate in the care of Hallie Echeverria. Please feel free to call with questions.      Electronically signed by Thi Eagle MD on 12/23/2020 at 11:03 AM

## 2020-12-23 NOTE — PROGRESS NOTES
Critical Care Team - Daily Progress Note         Date and time: 12/23/2020 8:57 AM  Patient's name:  Davy Gerber  Medical Record Number: [id number]  Patient's account/billing number: [de-identified]  Patient's YOB: 1950  Age: 65 y.o. Date of Admission: 12/13/2020 10:51 AM  Length of stay during current admission: 10      Primary Care Physician: Lisa Castillo DO  ICU Attending Physician: Dr. Kevin Mayberry    Code Status: Full Code    Reason for ICU admission: respiratory failure   Shock   Covid 19 pneumonitis       SUBJECTIVE:     OVERNIGHT EVENTS:         12/16: Overnight able to titrate down some of the vasopressors though still intermittently hypotensive and requiring vasopressin as well as Levophed. Still sedated with fentanyl and Versed. Hyponatremia notable at 119 new today. 12/17: Patient was able to be titrated off the vasopressors. Is now on midodrine. Minimally hypothermic overnight now on Melissa hugger with improvement in temperature. Continue sedation with fentanyl and Versed. 12/18: Patient continues on the ventilator. Back on small amount of Levophed in addition to the vasopressin. Hoping to wean sedation more today. 12/19: Back on levophed overnight. Initially able to titrate fio2 to 40%, became hypoxic and increased to 60%. Apparently their were several episodes of bradycardia overnight. Patient has pacemaker which did not fire per nursing. Follows with Dr. Yoel Emerson for cardiology. 12/20: Patient: 6 L overnight, nephrology ordered K-Phos, and half-normal saline, will CT her head today. 12/21: Patient tolerated being supine overnight. Plan for PICC line today. Pacemaker interrogated with no runs of V. tach or other rhythm needing acute intervention. 12/22: Patient remained supine without difficulty. Continuing to try to wean down her FiO2. Her sodium has improved. 12/23: Patient continues to tolerate the vent. She is waking up more.   Sodium is much 33.41 kg/m². PHYSICAL EXAMINATION:  General: ill appearing, sedated, lying in gurney in no distress supine  HEENT: Pupils are equal round and reactive to light, there are no oral lesions and no post-nasal drip   Neck: supple without adenopathy  Cardiovascular: regular rate and rhythm without murmur or gallop  Respiratory:  Decreased breath sounds bilaterally without wheezing or crackles. Air entry is symmetric  Abdomen: soft, non-tender, non-distended, normal bowel sounds  Extremities: warm, no edema, no clubbing   Skin: no rash or lesion  Neurologic:  Sedated, intubated, pupils equal round reactive, looking around the room occasionally.         Any additional physical findings:    MEDICATIONS:    Scheduled Meds:   potassium phosphate IVPB  30 mmol Intravenous Once    calcium gluconate IVPB  2 g Intravenous Once    vitamin C  500 mg Oral BID    thiamine  100 mg Oral Daily    zinc sulfate  50 mg Oral Daily    heparin flush  3 mL Intravenous 2 times per day    insulin glargine  25 Units Subcutaneous Daily    hydrocortisone sodium succinate PF  50 mg Intravenous Q8H    docusate  100 mg Oral Daily    senna  5 mL Oral Nightly    midodrine  10 mg Oral TID    insulin lispro  0-18 Units Subcutaneous Q6H    pantoprazole  40 mg Intravenous Daily    And    sodium chloride (PF)  10 mL Intravenous Daily    ipratropium-albuterol  1 ampule Inhalation Q4H WA    budesonide  500 mcg Nebulization BID    Arformoterol Tartrate  15 mcg Nebulization BID    enoxaparin  40 mg Subcutaneous Daily    clopidogrel  75 mg Oral Daily    chlorhexidine  15 mL Mouth/Throat BID    sodium chloride flush  10 mL Intravenous 2 times per day    Vitamin D  2,000 Units Oral Daily    gabapentin  800 mg Oral TID    levothyroxine  100 mcg Oral Daily    atorvastatin  20 mg Oral Daily    acetaminophen  650 mg Rectal Once     Continuous Infusions:   amiodarone 0.5 mg/min (12/22/20 3578)    fentaNYL 5 mcg/ml in 0.9%  ml infusion 100 mcg/hr (12/22/20 2322)    midazolam 3 mg/hr (12/22/20 2228)     PRN Meds:       sodium chloride flush, 10 mL, PRN      heparin flush, 3 mL, PRN      potassium chloride, 20 mEq, PRN      fentanNYL, 25 mcg, Q3H PRN      promethazine, 12.5 mg, Q6H PRN    Or      ondansetron, 4 mg, Q6H PRN      polyethylene glycol, 17 g, Daily PRN      acetaminophen, 650 mg, Q6H PRN    Or      acetaminophen, 650 mg, Q6H PRN          VENT SETTINGS (Comprehensive) (if applicable):  Vent Information  $Ventilation: $Subsequent Day  Skin Assessment: Clean, dry, & intact  Equipment ID: [id number]  Equipment Changed: (S) HME(changed)  Vent Type: 840  Vent Mode: AC/VC+  Vt Ordered: 450 mL  Rate Set: 24 bmp  Peak Flow: 0 L/min  Pressure Support: 0 cmH20  FiO2 : 50 %  SpO2: (!) 88 %  SpO2/FiO2 ratio: 176  PaO2/FiO2 ratio: 102  Sensitivity: 3  PEEP/CPAP: 10  I Time/ I Time %: 0.9 s  Humidification Source: HME  Mask Type: Full face mask  Mask Size: Small  Additional Respiratory  Assessments  Pulse: 109  Resp: 26  SpO2: (!) 88 %  Position: Semi-Marrero's  Humidification Source: HME  Oral Care: Mouth suctioned, Mouth moisturizer, Mouth swabbed  Subglottic Suction Done?: Yes  Cuff Pressure (cm H2O): 29 cm H2O    ABGs:   Recent Labs     12/23/20  0545   PH 7.407   PCO2 48.0*   PO2 72.5*   HCO3 29.5*   BE 4.2*   O2SAT 94.4       Laboratory findings:    Complete Blood Count:   Recent Labs     12/21/20  0553 12/22/20  0450 12/23/20  0535   WBC 8.7 9.9 8.8   HGB 9.3* 10.1* 10.1*   HCT 31.1* 33.6* 33.5*    177 175        Last 3 Blood Glucose:   Recent Labs     12/22/20  0450 12/22/20  1755 12/23/20  0535   GLUCOSE 143* 146* 163*        PT/INR:    Lab Results   Component Value Date    PROTIME 11.4 12/13/2020    PROTIME 11.9 12/15/2011    INR 1.0 12/13/2020     PTT:    Lab Results   Component Value Date    APTT 21.6 12/13/2020       Comprehensive Metabolic Profile:   Recent Labs     12/22/20  0450 12/22/20  1755 12/23/20  0535   NA respiratory failure with hypoxia secondary to COVID-19 viral pneumonia  Intubated, wean sedation as tolerated  Course of Cefepime completed for possible superimposed bacterial pneumonia   Wean oxygen as tolerated. Keep O2 sat 90-92%  abg daily    Cardiovascular   Shock, septic  Increase midodrine to 15 tid   -now off of levophed  Previous atrial fib/flutter -amiodarone drip  Pacemaker interrogated that showed several episodes of supraventricular tachycardia over the last couple of months but no persistent shockable rhythm  Continue plavix      Gastrointestinal   GI prophylaxis with Protonix  Diet per dietary recs   Colace/senna for constipation    Renal   Hyponatremia -resolved   -Continue to trend metabolic panel   -Nephrology following for fluid recommendations intravenously      Infectious Disease   COVID-19 viral pneumonia   Received remdesivir, Toci, vit D, vit c, thiamine   Septic shock - improving  Possible superimposed bacterial pneumonia   -Completed course of cefepime  Blood cultures and respiratory culture negative thus far    Hematology/Oncology   Anemia on admission, trend H&H, improving  CBC daily    Endocrine   high-dose sliding scale Humalog for hyperglycemia  Start lantus   -better glycemic control today  Monitor growth closely  Cortisol on admission 1.42  High-dose Solucortef decreased to 50 q 8 hrs    Social/Spiritual/DNR/Other   Full code. Palliative consulted    ASSESSMENT/ PLAN     1. Wean vent as tolerated; vent day 9 - hopefully able to extubate at some point  2. Hyponatremia - improved - nephrology following  3.   midodrine TID - off vasopressors  4. decrease solucortef  5. Amiodarone drip for A. fib with RVR  6. Palliative following  7. Continue ICU level of care      Anabela Smith DO, PGY-2  12/23/2020 8:57 AM      Attending Physician Attestation: Dr. Gus Hartman    Thank you very much for allowing me to see this patient in consultation and follow up.     I personally saw, examined and provided care for the patient. Radiographs, labs and medication list were reviewed by me independently. I spoke with bedside nursing, respiratory therapists and consultants. Critical care services and times documented are independent of procedures and multidisciplinary rounds with Residents. Additionally comprehensive, multidisciplinary rounds were conducted with the MICU team. The case was discussed in detail and plans for care were established. Review of Residents documentation was conducted and revisions were made as appropriate. I agree with the the above documented information.      Physical Examination:  Vitals:   Vitals:    12/23/20 1100 12/23/20 1200 12/23/20 1223 12/23/20 1300   BP:       Pulse: 89 94 101 116   Resp: 24 24 22 (!) 31   Temp:  99.1 °F (37.3 °C)     TempSrc:  Esophageal     SpO2: (!) 89% 90% 91% 90%   Weight:       Height:          General: No Acute Distress  HEENT: normocephalic, atruamatic  CVS: RRR, S1, S2, No S3 or S4  Respiratory: decreased breath sounds at lung bases, no focal wheezes noted  Extremities: no clubbing/cyanosis/edema     ASSESSMENT:  1.) SARS-CoV2 Viral Pneumonia, COVID-19 Pneumonia   2.) Acute Respiratory Failure with Hypoxia   3.) Macrocytosis   4.) Superimposed Bacterial Pneumonia   5.) Sick Sinus Syndrome - s/p pacemaker insertion   6.) Atrial Fibrillation with RVR - amiodarone drip  7.) Severe Sepsis with Septic Shock - resolved      In addition the following applies:     Check: N/A  Medication Alterations: D/C amiodarone, allow for prn lopressors, 1 mg IV Bumex  Procedures: N/A  Imaging: reviewed  New Consultations: N/A  VENT: ACVC (DAY8) 24/450/55/10     Access: Right PICC, Left Brachial Arterial Line   Consults: Nephrology, Pulmonary    Drips: Amiodarone, Fentanyl, Versed  Nutrition: Tube Feeds  ABX: N/A     - addition of thiamine, vitamin C, PO ZINC  - check D-dimer  - check Fibrinogen if platelets < 342W    - wean vent as able, patient not ready as of 12/23/2020    Thank you for allowing me to participate in the care of this patient. Care reviewed with nursing staff, medical and surgical specialty care, primary care and the patient's family as available. Restraints are ordered when the patient can do harm to him/herself by pulling out devices. Critical Care Time: > 35 minutes excluding procedures    Marek Lobo M.D.     Marek Lobo  12/23/2020  1:42 PM

## 2020-12-23 NOTE — CARE COORDINATION
COVID POSITIVE 12/13. Vent/ intubated since 12/15-FIO2 50%. From [facility]-not a bedhold. Plan remains to return to [place] on discharge if extubated- DOES NOT need a repeat COVID test per [facility]. Has [employer]- may need Vent [facility] facility choice that accepts COVID positive patients pending progress-mentation is improving per medical note.   Will follow Kishore Vyas

## 2020-12-23 NOTE — PROGRESS NOTES
[facility] MD Lisandra Oneil, MD Luan Tavera, MD Yves Gaston, MD Erin Eaton, CNP   Shaniqua Evans, BOBBY  Progress Note    12/23/2020    SUBJECTIVE:   12/17: Hypotensive, requiring pressors. Comfortable on vent. 12/18: Remains critically ill. On ventilator via ETT, supine, FiO2 90% PEEP 10. BP still lowish. Pressors weaning. 12/19: Remains intubated on ventilator, FiO2 60% PEEP 10, somewhat improved. Supine. No new event overnight. Hemodynamically stable. 12/20: Urine picked up substantially yesterday afternoon, increasing from 350 cc on day shift to 2600 cc on evening, 3100 cc on night shift. No diuretic therapy administered. Hypotensive this morning. Improved with IV fluid bolus. Remains critically ill, on ventilator via ETT. 12/21: Remains critically ill. Intubated via ETT, FiO2 65%, PEEP 10.  UO has in fact slowed somewhat, appropriate given IV fluid administration. Remains on amnio, levo, fentanyl. 1/2 NS at 150 cc/h. Attempt at lightening sedation resulted in stacked breathing, agitation. Remains unresponsive to verbal and tactile stimuli   12/22: Remains critically ill. Intubated via ETT. Supine now. Vent weaning. TF at 45 cc/h after UF 30 cc every 6 hours. 1/2 NS at 100 cc/h. BP stable. 12/22: Remains in critical condition but overall starting to improve in terms of urine output and other necessary conditions, case discussed thoroughly with its nurse for any questions concerns regarding the different problems that she has instructions were given accordingly, patient got Bumex for heart failure has dramatically increased urine output    PROBLEM LIST:    Active Problems:    Acute respiratory failure with hypoxia ([address]    Pneumonia due to COVID-19 virus    Septic shock ([address]    Atrial fibrillation with RVR ([address]  Resolved Problems:    * No resolved hospital problems. *         DIET:    DIET TUBE FEED CONTINUOUS/CYCLIC NPO;  Immune Enhancing (PIVOT 1.5);  Nasogastric; 25; 45     MEDS (scheduled):    potassium phosphate IVPB  30 mmol Intravenous Once    calcium gluconate IVPB  2 g Intravenous Once    hydrocortisone sodium succinate PF  50 mg Intravenous Q12H    vitamin C  500 mg Oral BID    thiamine  100 mg Oral Daily    zinc sulfate  50 mg Oral Daily    heparin flush  3 mL Intravenous 2 times per day    insulin glargine  25 Units Subcutaneous Daily    docusate  100 mg Oral Daily    senna  5 mL Oral Nightly    midodrine  10 mg Oral TID    insulin lispro  0-18 Units Subcutaneous Q6H    pantoprazole  40 mg Intravenous Daily    And    sodium chloride (PF)  10 mL Intravenous Daily    ipratropium-albuterol  1 ampule Inhalation Q4H WA    budesonide  500 mcg Nebulization BID    Arformoterol Tartrate  15 mcg Nebulization BID    enoxaparin  40 mg Subcutaneous Daily    clopidogrel  75 mg Oral Daily    chlorhexidine  15 mL Mouth/Throat BID    sodium chloride flush  10 mL Intravenous 2 times per day    Vitamin D  2,000 Units Oral Daily    gabapentin  800 mg Oral TID    levothyroxine  100 mcg Oral Daily    atorvastatin  20 mg Oral Daily    acetaminophen  650 mg Rectal Once       MEDS (infusions):   fentaNYL 5 mcg/ml in 0.9%  ml infusion 100 mcg/hr (12/22/20 2322)    midazolam 3 mg/hr (12/22/20 2228)       MEDS (prn):  metoprolol, sodium chloride flush, heparin flush, potassium chloride, fentanNYL, promethazine **OR** ondansetron, polyethylene glycol, acetaminophen **OR** acetaminophen    PHYSICAL EXAM:     Patient Vitals for the past 24 hrs:   BP Temp Temp src Pulse Resp SpO2 Height   12/23/20 1000 -- -- -- 96 23 (!) 88 % --   12/23/20 0900 -- -- -- 102 21 (!) 88 % --   12/23/20 0829 -- -- -- 109 26 (!) 88 % --   12/23/20 0800 -- 99.9 °F (37.7 °C) Esophageal 113 26 (!) 88 % --   12/23/20 0600 134/66 99.2 °F (37.3 °C) Esophageal 95 27 92 % --   12/23/20 0500 -- -- -- 83 24 92 % --   12/23/20 0424 -- -- -- 94 24 92 % -- 12/23/20 0400 (!) 144/64 99.7 °F (37.6 °C) Esophageal 100 21 93 % --   12/23/20 0300 -- -- -- 95 23 94 % --   12/23/20 0200 (!) 146/64 98.8 °F (37.1 °C) Esophageal 96 19 95 % --   12/23/20 0100 -- -- -- 96 24 93 % --   12/23/20 0000 (!) 142/57 99 °F (37.2 °C) Esophageal 96 16 94 % --   12/22/20 2300 -- -- -- 91 24 94 % --   12/22/20 2200 (!) 97/43 98.8 °F (37.1 °C) Esophageal 81 23 93 % --   12/22/20 2100 -- -- -- 75 24 94 % --   12/22/20 2034 -- -- -- 79 21 94 % --   12/22/20 2000 (!) 137/56 98.6 °F (37 °C) Esophageal 79 24 93 % --   12/22/20 1900 -- -- -- 82 24 94 % --   12/22/20 1800 -- -- -- 95 21 93 % --   12/22/20 1700 -- -- -- 100 23 92 % --   12/22/20 1600 -- 98.4 °F (36.9 °C) Esophageal 103 18 91 % --   12/22/20 1546 -- -- -- 73 23 94 % --   12/22/20 1500 -- -- -- 86 24 97 % --   12/22/20 1400 -- -- -- 81 26 95 % --   12/22/20 1352 -- -- -- -- -- -- 5' 6\" (1.676 m)   12/22/20 1300 -- -- -- 85 23 94 % --   12/22/20 1200 -- 99 °F (37.2 °C) Esophageal 97 23 95 % --   12/22/20 1133 -- -- -- 103 23 96 % --   12/22/20 1100 -- -- -- 105 21 (!) 87 % --   @      Intake/Output Summary (Last 24 hours) at 12/23/2020 1038  Last data filed at 12/23/2020 0600  Gross per 24 hour   Intake 3004 ml   Output 2665 ml   Net 339 ml         Wt Readings from Last 3 Encounters:   12/18/20 207 lb 0.2 oz (93.9 kg)   12/08/20 188 lb 9 oz (85.5 kg)   10/09/20 163 lb 9.6 oz (74.2 kg)       PE:  COVID-19 isolation protocol, I did not conduct a physical examination but discussed her physical exam findings with her bedside RN and the MICU team    DATA:    Recent Labs     12/21/20  0553 12/22/20  0450 12/23/20  0535   WBC 8.7 9.9 8.8   HGB 9.3* 10.1* 10.1*   HCT 31.1* 33.6* 33.5*   .6* 100.6* 100.3*    177 175     Recent Labs     12/21/20  0553 12/21/20  0553 12/22/20  0450 12/22/20  1755 12/23/20  0535      < > 138 136 140   K 4.4   < > 3.9 4.0 3.9   *   < > 104 103 102   CO2 30*   < > 31* 31* 33*   MG 1.9  -- 2.0  --  2.0   PHOS 2.1*  --  1.8* 2.5 2.2*   BUN 12   < > 12 13 12   CREATININE 0.3*   < > 0.3* 0.3* 0.3*   ALT 25  --  23  --  20   AST 25  --  22  --  19   BILITOT 0.4  --  0.4  --  0.4   ALKPHOS 79  --  91  --  83    < > = values in this interval not displayed. Lab Results   Component Value Date    LABPROT 1.1 (H) 12/16/2020    LABPROT 1.1 12/16/2020       Assessment  1. 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 Administration of desmopressin in the ER likely also contributed to the initial drop in her sodium  2. Hypophosphatemia, severe, nutritional     [Na+] improved, though slowly, peaked at 129 mmol/L, though dropped to 126 since last evening. May be due to IV electrolyte supplementation. Hypervolemic. NS stopped. 12/19 evening UO picked up markedly, 2600 cc on evening shift, 3100 cc on night shift, continuing at > 150 cc/h on day shift. No diuretic therapy. Question of DI. Frankly, doubt the desmopressin administered 5 days ago would still have been effective as of yesterday. Solu-Cortef started 12/19 -- uo has since slowed down, appropriate for volume and IVF administered. Urine / serum studies 12/20 6 AM resembled DI, those drawn in the early afternoon did not. Recommendations  1. Stop IV D5 1/2 NS  2. Continue tube feed at current rate  3. Increase free water flush to 200 cc every 4 hours, further adjustments as warranted over the next several days  4. Supplement phosphorus IV, over 6 hours (not 4)  5. Follow labs, UO  6.  Avoid nephrotoxins     Discussed with Dr. Yokasta aDvis    Electronically signed by Miles Urbina MD on 12/23/2020

## 2020-12-24 NOTE — CARE COORDINATION
Social work / Discharge Planning:        [address]. Remains vent/ intubated. Patient is from [facility] and can return as covid positive. Awaiting plans. Social work continues to follow to assist with discharge planning.    Electronically signed by DAVID Vyas on 12/24/2020 at 10:33 AM

## 2020-12-24 NOTE — PLAN OF CARE
Problem: Skin Integrity:  Goal: Will show no infection signs and symptoms  Description: Will show no infection signs and symptoms  12/24/2020 1754 by Evangelist Ochoa RN  Outcome: Met This Shift  12/24/2020 0518 by Margarita Martinez RN  Outcome: Met This Shift  Goal: Absence of new skin breakdown  Description: Absence of new skin breakdown  12/24/2020 1754 by Evangelist Ochoa RN  Outcome: Met This Shift  12/24/2020 0518 by Margarita Martinez RN  Outcome: Met This Shift     Problem: Airway Clearance - Ineffective  Goal: Achieve or maintain patent airway  12/24/2020 1754 by Evangelist Ochoa RN  Outcome: Ongoing  12/24/2020 0518 by Margarita Martinez RN  Outcome: Met This Shift     Problem: Gas Exchange - Impaired  Goal: Absence of hypoxia  12/24/2020 1754 by Evangelist Ochoa RN  Outcome: Not Met This Shift  12/24/2020 0518 by Margarita Martinez RN  Outcome: Met This Shift  Goal: Promote optimal lung function  Outcome: Ongoing     Problem: Breathing Pattern - Ineffective  Goal: Ability to achieve and maintain a regular respiratory rate  12/24/2020 1754 by Evangelist Ochoa RN  Outcome: Ongoing  12/24/2020 0518 by Margarita Martinez RN  Outcome: Not Met This Shift     Problem:  Body Temperature -  Risk of, Imbalanced  Goal: Ability to maintain a body temperature within defined limits  Outcome: Met This Shift     Problem: Isolation Precautions - Risk of Spread of Infection  Goal: Prevent transmission of infection  Outcome: Met This Shift     Problem: Nutrition Deficits  Goal: Optimize nutrtional status  Outcome: Met This Shift     Problem: Restraint Use - Nonviolent/Non-Self-Destructive Behavior:  Goal: Absence of restraint indications  Description: Absence of restraint indications  Outcome: Not Met This Shift  Goal: Absence of restraint-related injury  Description: Absence of restraint-related injury  Outcome: Met This Shift     Problem: Falls - Risk of:  Goal: Will remain free from falls  Description: Will remain free from falls  Outcome: Met This Shift  Goal: Absence of physical injury  Description: Absence of physical injury  Outcome: Met This Shift

## 2020-12-24 NOTE — PROGRESS NOTES
Dr Raquel Bergeron called re hypotension, pt received Bumex 1mg tofay and diuresed almost 6 liters, BP now 95-32'N systolic. See orders for NS bolus, albumin, repeat if necessary and levophed if pt not responsive to interventions.  Hattie Leija

## 2020-12-24 NOTE — PLAN OF CARE
Problem: Skin Integrity:  Goal: Will show no infection signs and symptoms  Description: Will show no infection signs and symptoms  Outcome: Met This Shift  Goal: Absence of new skin breakdown  Description: Absence of new skin breakdown  Outcome: Met This Shift     Problem: Airway Clearance - Ineffective  Goal: Achieve or maintain patent airway  Outcome: Met This Shift     Problem: Gas Exchange - Impaired  Goal: Absence of hypoxia  Outcome: Met This Shift     Problem: Breathing Pattern - Ineffective  Goal: Ability to achieve and maintain a regular respiratory rate  Outcome: Not Met This Shift

## 2020-12-24 NOTE — PROGRESS NOTES
Critical Care Team - Daily Progress Note         Date and time: 12/24/2020 8:45 AM  Patient's name:  [facility]lementina [facility] Record Number: [id number]  Patient's account/billing number: [de-identified]  Patient's YOB: 1950  Age: 65 y.o. Date of Admission: 12/13/2020 10:51 AM  Length of stay during current admission: 11      Primary Care Physician: Alesia Keen DO  ICU Attending Physician: Dr. Chelsi Foster    Code Status: Full Code    Reason for ICU admission: respiratory failure   Shock   Covid 19 pneumonitis       SUBJECTIVE:     OVERNIGHT EVENTS:         12/16: Overnight able to titrate down some of the vasopressors though still intermittently hypotensive and requiring vasopressin as well as Levophed. Still sedated with fentanyl and Versed. Hyponatremia notable at 119 new today. 12/17: Patient was able to be titrated off the vasopressors. Is now on midodrine. Minimally hypothermic overnight now on Melissa hugger with improvement in temperature. Continue sedation with fentanyl and Versed. 12/18: Patient continues on the ventilator. Back on small amount of Levophed in addition to the vasopressin. Hoping to wean sedation more today. 12/19: Back on levophed overnight. Initially able to titrate fio2 to 40%, became hypoxic and increased to 60%. Apparently their were several episodes of bradycardia overnight. Patient has pacemaker which did not fire per nursing. Follows with Dr. Mick Demarco for cardiology. 12/20: Patient: 6 L overnight, nephrology ordered K-Phos, and half-normal saline, will CT her head today. 12/21: Patient tolerated being supine overnight. Plan for PICC line today. Pacemaker interrogated with no runs of V. tach or other rhythm needing acute intervention. 12/22: Patient remained supine without difficulty. Continuing to try to wean down her FiO2. Her sodium has improved. 12/23: Patient continues to tolerate the vent. She is waking up more.   Sodium is much better. No acute overnight events. : Failed weaning trial yesterday. Became agitated. Continues to tolerate the ventilator. Day 9 on the ventilator.         CURRENT VENTILATION STATUS:     [x] Ventilator  [] BIPAP  [] Nasal Cannula [] Room Air      IF INTUBATED, ET TUBE MARKING AT LOWER LIP:       cms    SECRETIONS Amount:  [] Small [] Moderate  [] Large  [x] None  Color:     [] White [] Colored  [] Bloody    SEDATION:  RAAS Score:  [x] Propofol gtt  [x] Versed gtt  [] Ativan gtt   [x] No Sedation    PARALYZED:  [x] No    [] Yes      VASOPRESSORS:  [x] No    [] Yes    If yes -   [x] Levophed       [] Dopamine     [] Vasopressin       [] Dobutamine  [] Phenylephrine         [] Epinephrine    CENTRAL LINES:     [] No   [x] Yes   (Date of Insertion:  12/15 )           If yes -     [] Right IJ     [] Left IJ [x] Right Femoral [] Left Femoral                   [] Right Subclavian [] Left Subclavian       GARCIA'S CATHETER:   [] No   [x] Yes  (Date of Insertion: 12/15  )     URINE OUTPUT:            [x] Good   [] Low              [] Anuric      OBJECTIVE:     VITAL SIGNS:  /66   Pulse 89   Temp 97.9 °F (36.6 °C) (Esophageal)   Resp 21   Ht 5' 6\" (1.676 m)   Wt 224 lb 3.3 oz (101.7 kg)   SpO2 90%   BMI 36.19 kg/m²   Tmax over 24 hours:  Temp (24hrs), Av.4 °F (36.9 °C), Min:97.7 °F (36.5 °C), Max:99.1 °F (37.3 °C)      Patient Vitals for the past 6 hrs:   Temp Temp src Pulse Resp SpO2 Weight   20 0757 -- -- 89 21 90 % --   20 0700 -- -- 73 23 -- --   20 0600 -- -- 71 23 -- --   20 0500 -- -- 63 23 96 % --   20 0415 -- -- 71 23 94 % --   20 0400 97.9 °F (36.6 °C) Esophageal 68 22 94 % --   20 0300 -- -- 93 25 (!) 89 % 224 lb 3.3 oz (101.7 kg)         Intake/Output Summary (Last 24 hours) at 2020 0845  Last data filed at 2020 0600  Gross per 24 hour   Intake 3733 ml   Output 7475 ml   Net -3742 ml     Wt Readings from Last 2 Encounters: 12/24/20 224 lb 3.3 oz (101.7 kg)   12/08/20 188 lb 9 oz (85.5 kg)     Body mass index is 36.19 kg/m². PHYSICAL EXAMINATION:  General: ill appearing, sedated, lying in gurney in no distress supine  HEENT: Pupils are equal round and reactive to light, there are no oral lesions and no post-nasal drip   Neck: supple without adenopathy  Cardiovascular: regular rate and rhythm without murmur or gallop  Respiratory:  Decreased breath sounds bilaterally without wheezing or crackles. Air entry is symmetric  Abdomen: soft, non-tender, non-distended, normal bowel sounds  Extremities: warm, no edema, no clubbing   Skin: no rash or lesion  Neurologic:  Sedated, intubated, pupils equal round reactive, looking around the room occasionally.         Any additional physical findings:    MEDICATIONS:    Scheduled Meds:   hydrocortisone sodium succinate PF  50 mg Intravenous Q12H    vitamin C  500 mg Oral BID    thiamine  100 mg Oral Daily    zinc sulfate  50 mg Oral Daily    heparin flush  3 mL Intravenous 2 times per day    insulin glargine  25 Units Subcutaneous Daily    docusate  100 mg Oral Daily    senna  5 mL Oral Nightly    midodrine  10 mg Oral TID    insulin lispro  0-18 Units Subcutaneous Q6H    pantoprazole  40 mg Intravenous Daily    And    sodium chloride (PF)  10 mL Intravenous Daily    ipratropium-albuterol  1 ampule Inhalation Q4H WA    budesonide  500 mcg Nebulization BID    Arformoterol Tartrate  15 mcg Nebulization BID    enoxaparin  40 mg Subcutaneous Daily    clopidogrel  75 mg Oral Daily    chlorhexidine  15 mL Mouth/Throat BID    sodium chloride flush  10 mL Intravenous 2 times per day    Vitamin D  2,000 Units Oral Daily    gabapentin  800 mg Oral TID    levothyroxine  100 mcg Oral Daily    atorvastatin  20 mg Oral Daily    acetaminophen  650 mg Rectal Once     Continuous Infusions:   norepinephrine Stopped (12/24/20 0121)    fentaNYL 5 mcg/ml in 0.9%  ml infusion 100 12/23/20  1745 12/24/20  0615    139 139   K 3.9 3.5 3.2*    100 102   CO2 33* 34* 35*   BUN 12 12 13   CREATININE 0.3* 0.4* 0.3*   GLUCOSE 163* 159* 166*   CALCIUM 6.4* 6.6* 6.6*   PROT 4.6*  --  4.2*   LABALBU 2.8*  --  2.9*   BILITOT 0.4  --  0.5   ALKPHOS 83  --  56   AST 19  --  16   ALT 20  --  14      Magnesium:   Lab Results   Component Value Date    MG 1.9 12/24/2020     Phosphorus:   Lab Results   Component Value Date    PHOS 2.5 12/24/2020     Ionized Calcium: No results found for: CAION     Urinalysis:     Troponin:   No results for input(s): TROPONINI in the last 72 hours. Microbiology:    Cultures during this admission:     Blood cultures:                 [] None drawn      [x] Negative             []  Positive (Details:  )  Urine Culture:                   [] None drawn      [] Negative             []  Positive (Details:  )  Sputum Culture:               [] None drawn       [x] Negative             []  Positive (Details:  )   Endotracheal aspirate:     [] None drawn       [] Negative             []  Positive (Details:  )     Other pertinent Labs:   COVID-19 + December 13  MRSA negative screening   radiology/Imaging:     Chest Xray (12/24/2020):       Right PICC line with tip at the junction of the subclavian and SVC   Extensive bilateral pulmonary infiltrates unchanged   ET and NG tubes appear in satisfactory position             ASSESSMENT:      1. Acute hypoxic respiratory failure   2. Covid pneumonitis - critically severe   3. Shock- septic   4. Adrenal insufficiency  5. Hyponatremia   6. Hypokalemia   7. Leukocytosis    8. AECOPD  9. pvd  10. chf not in acute exacerbatino   11. Hx lacunar infarct   12. SSS s/p pacemaker   13. Hx seizure do   14.  Bilateral carotid stenosis       SYSTEMS ASSESSMENT    Neuro   Intubated, sedated on Versed and fentanyl - wean as tolerated  No longer paralyzed  Previous history of lacunar stroke  CT head on admission showed no acute abnormalities  Waking up more  Agitated with sedation vacation    Respiratory   Acute respiratory failure with hypoxia secondary to COVID-19 viral pneumonia  Intubated, wean sedation as tolerated  Course of Cefepime completed for possible superimposed bacterial pneumonia   Wean oxygen as tolerated. Keep O2 sat 90-92%  abg daily  Failed ventilator wean yesterday attempt again today    Cardiovascular   Shock, septic  Increase midodrine to 15 tid   -now off of levophed  Previous atrial fib/flutter -amiodarone drip  Pacemaker interrogated that showed several episodes of supraventricular tachycardia over the last couple of months but no persistent shockable rhythm  Continue plavix      Gastrointestinal   GI prophylaxis with Protonix  Diet per dietary recs   Colace/senna for constipation    Renal   Hyponatremia -resolved   -Continue to trend metabolic panel   -Nephrology following for fluid recommendations intravenously  Replete potassium as needed      Infectious Disease   COVID-19 viral pneumonia   Received remdesivir, Toci, vit D, vit c, thiamine   Septic shock - improving  Possible superimposed bacterial pneumonia   -Completed course of cefepime  Blood cultures and respiratory culture negative thus far    Hematology/Oncology   Anemia on admission, trend H&H, improving  CBC daily    Endocrine   high-dose sliding scale Humalog for hyperglycemia  Start lantus   -better glycemic control today  Monitor growth closely  Cortisol on admission 1.42  High-dose Solucortef decreased to 50 q 8 hrs    Social/Spiritual/DNR/Other   Full code. Palliative consulted    ASSESSMENT/ PLAN     1. Wean ventilator again today, pressure support trials if awake enough, hopefully extubate at some point versus trach and PEG  2. Hyponatremia -resolved- nephrology following  3.   midodrine TID - off vasopressors  4. decrease solucortef  5. Amiodarone drip for A. fib with RVR  6.   Palliative following -day 9 on the ventilator, if unable to liberate from ventilator may need trach and PEG  7. Continue ICU level of care      Rodríguez Mccarty DO, PGY-2  12/24/2020 8:45 AM      Attending Physician Attestation: Dr. Unique Lee    Thank you very much for allowing me to see this patient in consultation and follow up. I personally saw, examined and provided care for the patient. Radiographs, labs and medication list were reviewed by me independently. I spoke with bedside nursing, respiratory therapists and consultants. Critical care services and times documented are independent of procedures and multidisciplinary rounds with Residents. Additionally comprehensive, multidisciplinary rounds were conducted with the MICU team. The case was discussed in detail and plans for care were established. Review of Residents documentation was conducted and revisions were made as appropriate. I agree with the the above documented information.      Physical Examination:  Vitals:   Vitals:    12/24/20 0958 12/24/20 1000 12/24/20 1200 12/24/20 1214   BP:       Pulse: 93 92  74   Resp: 23 24  24   Temp:   97.9 °F (36.6 °C)    TempSrc:   Esophageal    SpO2: (!) 89% 91%  95%   Weight:       Height:          General: No Acute Distress, intubated  HEENT: normocephalic, atruamatic  CVS: RRR, S1, S2, No S3 or S4  Respiratory: decreased breath sounds at lung bases, no focal wheezes noted  Extremities: no clubbing/cyanosis/edema     ASSESSMENT:  1.) SARS-CoV2 Viral Pneumonia, COVID-19 Pneumonia   2.) Acute Respiratory Failure with Hypoxia   3.) Macrocytosis   4.) Superimposed Bacterial Pneumonia   5.) Sick Sinus Syndrome - s/p pacemaker insertion   6.) Atrial Fibrillation with RVR - amiodarone drip  7.) Severe Sepsis with Septic Shock - resolved      In addition the following applies:     Check: N/A  Medication Alterations: N/A  Procedures: N/A  Imaging: reviewed  New Consultations: N/A  VENT: [facility] (DAY 9) 24/450/50/10     Access: Right PICC, Left Brachial Arterial Line   Consults: Nephrology, Pulmonary    Drips: Fentanyl, Versed  Nutrition: Tube Feeds  ABX: N/A     - wean vent as able, patient not ready as of 12/24/2020  - likely for trach/PEG placement      Thank you for allowing me to participate in the care of this patient. Care reviewed with nursing staff, medical and surgical specialty care, primary care and the patient's family as available. Restraints are ordered when the patient can do harm to him/herself by pulling out devices. Critical Care Time: > 35 minutes excluding procedures    María Patricio M.D.     María Patricio  12/24/2020  1:25 PM

## 2020-12-24 NOTE — PROGRESS NOTES
Subjective: Intubated sedated mechanically ventilated   Discussed with staff  Objective:    /66   Pulse 89   Temp 97.9 °F (36.6 °C) (Esophageal)   Resp 21   Ht 5' 6\" (1.676 m)   Wt 224 lb 3.3 oz (101.7 kg)   SpO2 90%   BMI 36.19 kg/m²     24HR INTAKE/OUTPUT:      Intake/Output Summary (Last 24 hours) at 12/24/2020 0817  Last data filed at 12/24/2020 0600  Gross per 24 hour   Intake 3733 ml   Output 7475 ml   Net -3742 ml       General appearance: Intubated sedated mechanically ventilated supine  Neck: FROM, supple   Lungs: Clear bilaterally no wheezes, no rhonchi, no crackles  CV: RRR, no MRGs; normal carotid upstroke and amplitude without Bruits  Abdomen: Soft, non-tender; no masses or HSM  Extremities: No edema, no cyanosis, no clubbing  Skin: Intact no rash, no lesions, no ulcers    Psych: not Alert and oriented normal affect  Neuro: Nonfocal  In consideration of DGI-17 isolation policies and efforts to preserve PPE physical exam is limited. Exam findings shared among providers. Most Recent Labs  Lab Results   Component Value Date    WBC 5.5 12/24/2020    HGB 8.1 (L) 12/24/2020    HCT 27.1 (L) 12/24/2020     (L) 12/24/2020     12/24/2020    K 3.2 (L) 12/24/2020     12/24/2020    CREATININE 0.3 (L) 12/24/2020    BUN 13 12/24/2020    CO2 35 (H) 12/24/2020    GLUCOSE 166 (H) 12/24/2020    ALT 14 12/24/2020    AST 16 12/24/2020    INR 1.0 12/13/2020    TSH 0.381 12/03/2020    LABA1C 5.8 04/05/2017     Recent Labs     12/24/20  0615   MG 1.9     Lab Results   Component Value Date    CALCIUM 6.6 (L) 12/24/2020    PHOS 2.5 12/24/2020        XR CHEST PORTABLE   Final Result   Stable bilateral airspace disease which may represent pulmonary edema. XR CHEST PORTABLE   Final Result   1. Good position right-sided PICC line. 2.  Otherwise stable findings. Bilateral airspace infiltrates/edema, worse   on the right.       XR CHEST PORTABLE   Final Result   Right PICC line with tip at the junction of the subclavian and SVC   Extensive bilateral pulmonary infiltrates unchanged   ET and NG tubes appear in satisfactory position      XR CHEST PORTABLE   Final Result   Bilateral airspace disease which is not appreciably changed and is likely   related to pulmonary edema. XR CHEST PORTABLE   Final Result   Mildly progressive bilateral infiltrates. Improving left pleural effusion      XR CHEST PORTABLE   Final Result   Mildly waning airspace disease with slight improvement of aeration   bilaterally. XR CHEST PORTABLE   Final Result   Diffuse infiltrate, improved in the upper lungs as compared to prior. There is new consolidation in the left lower lobe with possible left pleural   effusion. XR CHEST PORTABLE   Final Result   Endotracheal tube in low position, can be pulled back about 3 cm. NG tube in good position. Pattern of bilateral interstitial infiltrate in the perihilar regions or the   periphery of the lung as above commented. The pulmonary edema versus   bilateral viral pneumonia will be part of the differential diagnosis. Please   correlate clinically. T   Preliminary report given to Dr. Domenica Muñoz at the time   of the interpretation. XR ABDOMEN FOR NG/OG/NE TUBE PLACEMENT   Final Result   Enteric feeding tube partially imaged below the diaphragm and likely within   the stomach. XR CHEST PORTABLE   Final Result   Increased interstitial markings throughout both lungs along with   patchy/consolidative airspace opacities noted bilaterally as above. Findings   are suspicious for multifocal pneumonia or pulmonary edema. CTA PULMONARY W CONTRAST   Final Result   No central pulmonary embolism or aortic dissection. Progressive diffuse bilateral patchy and ground-glass infiltrates concerning   for progressive multifocal pneumonia/viral infection.       XR CHEST PORTABLE   Final Result   Progressive diffuse bilateral infiltrates and pleural

## 2020-12-24 NOTE — PROGRESS NOTES
COVID-19 virus    Septic shock ([facility] [address]    Atrial fibrillation with RVR ([facility] [address]  Resolved Problems:    * No resolved hospital problems. *         DIET:    DIET TUBE FEED CONTINUOUS/CYCLIC NPO; Immune Enhancing (PIVOT 1.5);  Nasogastric; 25; 45     MEDS (scheduled):    hydrocortisone sodium succinate PF  50 mg Intravenous Q12H    vitamin C  500 mg Oral BID    thiamine  100 mg Oral Daily    zinc sulfate  50 mg Oral Daily    heparin flush  3 mL Intravenous 2 times per day    insulin glargine  25 Units Subcutaneous Daily    docusate  100 mg Oral Daily    senna  5 mL Oral Nightly    midodrine  10 mg Oral TID    insulin lispro  0-18 Units Subcutaneous Q6H    pantoprazole  40 mg Intravenous Daily    And    sodium chloride (PF)  10 mL Intravenous Daily    ipratropium-albuterol  1 ampule Inhalation Q4H WA    budesonide  500 mcg Nebulization BID    Arformoterol Tartrate  15 mcg Nebulization BID    enoxaparin  40 mg Subcutaneous Daily    clopidogrel  75 mg Oral Daily    chlorhexidine  15 mL Mouth/Throat BID    sodium chloride flush  10 mL Intravenous 2 times per day    Vitamin D  2,000 Units Oral Daily    gabapentin  800 mg Oral TID    levothyroxine  100 mcg Oral Daily    atorvastatin  20 mg Oral Daily    acetaminophen  650 mg Rectal Once       MEDS (infusions):   norepinephrine Stopped (12/24/20 0121)    fentaNYL 5 mcg/ml in 0.9%  ml infusion 100 mcg/hr (12/24/20 0334)    midazolam 3 mg/hr (12/24/20 0645)       MEDS (prn):  metoprolol, sodium chloride flush, heparin flush, potassium chloride, fentanNYL, promethazine **OR** ondansetron, polyethylene glycol, acetaminophen **OR** acetaminophen    PHYSICAL EXAM:     Patient Vitals for the past 24 hrs:   Temp Temp src Pulse Resp SpO2 Weight   12/24/20 0800 97.5 °F (36.4 °C) Esophageal -- -- -- --   12/24/20 0757 -- -- 89 21 90 % --   12/24/20 0700 -- -- 73 23 -- --   12/24/20 0600 -- -- 71 23 -- --   12/24/20 0500 -- -- 63 23 96 % --   12/24/20 0415 -- -- 71 23 94 % --   12/24/20 0400 97.9 °F (36.6 °C) Esophageal 68 22 94 % --   12/24/20 0300 -- -- 93 25 (!) 89 % 224 lb 3.3 oz (101.7 kg)   12/24/20 0200 97.7 °F (36.5 °C) Esophageal 88 24 93 % --   12/24/20 0100 -- -- 93 24 93 % --   12/24/20 0007 -- -- 70 24 94 % --   12/24/20 0000 97.9 °F (36.6 °C) Esophageal 69 24 94 % --   12/23/20 2300 -- -- 70 24 95 % --   12/23/20 2200 -- -- 80 23 93 % --   12/23/20 2100 -- -- 78 24 93 % --   12/23/20 2041 -- -- 94 24 93 % --   12/23/20 2000 98.6 °F (37 °C) Esophageal 95 28 92 % --   12/23/20 1945 -- -- 74 23 92 % --   12/23/20 1900 -- -- 89 23 94 % --   12/23/20 1800 -- -- 92 23 93 % --   12/23/20 1741 -- -- -- 20 92 % --   12/23/20 1700 -- -- 105 21 (!) 88 % --   12/23/20 1638 -- -- 103 22 92 % --   12/23/20 1630 -- -- 103 25 93 % --   12/23/20 1600 98.9 °F (37.2 °C) Esophageal 95 24 95 % --   12/23/20 1500 -- -- 104 23 92 % --   12/23/20 1400 -- -- 109 19 (!) 89 % --   12/23/20 1300 -- -- 116 (!) 31 90 % --   12/23/20 1223 -- -- 101 22 91 % --   12/23/20 1200 99.1 °F (37.3 °C) Esophageal 94 24 90 % --   12/23/20 1100 -- -- 89 24 (!) 89 % --   12/23/20 1000 -- -- 96 23 (!) 88 % --   @      Intake/Output Summary (Last 24 hours) at 12/24/2020 0957  Last data filed at 12/24/2020 0919  Gross per 24 hour   Intake 3733 ml   Output 8475 ml   Net -4742 ml         Wt Readings from Last 3 Encounters:   12/24/20 224 lb 3.3 oz (101.7 kg)   12/08/20 188 lb 9 oz (85.5 kg)   10/09/20 163 lb 9.6 oz (74.2 kg)       PE:  COVID-19 isolation protocol, I did not conduct a physical examination but discussed her physical exam findings with her bedside RN and the MICU team    DATA:    Recent Labs     12/22/20  0450 12/23/20  0535 12/24/20  0615   WBC 9.9 8.8 5.5   HGB 10.1* 10.1* 8.1*   HCT 33.6* 33.5* 27.1*   .6* 100.3* 101.1*    175 129*     Recent Labs     12/22/20  0450 12/22/20  1755 12/23/20  0535 12/23/20  1745 12/24/20  0615    136 140 139 139   K 3.9 4.0 3.9 3.5 3.2*  103 102 100 102   CO2 31* 31* 33* 34* 35*   MG 2.0  --  2.0  --  1.9   PHOS 1.8* 2.5 2.2*  --  2.5   BUN 12 13 12 12 13   CREATININE 0.3* 0.3* 0.3* 0.4* 0.3*   ALT 23  --  20  --  14   AST 22  --  19  --  16   BILITOT 0.4  --  0.4  --  0.5   ALKPHOS 91  --  83  --  56       Lab Results   Component Value Date    LABPROT 1.1 (H) 12/16/2020    LABPROT 1.1 12/16/2020       Assessment  1. 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 Administration of desmopressin in the ER likely also contributed to the initial drop in her sodium  2. Hypophosphatemia, severe, nutritional     [Na+] improved, though slowly, peaked at 129 mmol/L, though dropped to 126 since last evening. May be due to IV electrolyte supplementation. Hypervolemic. NS stopped. 12/19 evening UO picked up markedly, 2600 cc on evening shift, 3100 cc on night shift, continuing at > 150 cc/h on day shift. No diuretic therapy. Question of DI. Frankly, doubt the desmopressin administered 5 days ago would still have been effective as of yesterday. Solu-Cortef started 12/19 -- uo has since slowed down, appropriate for volume and IVF administered. Urine / serum studies 12/20 6 AM resembled DI, those drawn in the early afternoon did not. Recommendations  1. Stop IV D5 1/2 NS  2. Continue tube feed at current rate  3. Increase free water flush to 200 cc every 4 hours, further adjustments as warranted over the next several days  4. Supplement phosphorus IV, over 6 hours (not 4)  5. Follow labs, UO  6.  Avoid nephrotoxins     Discussed with Dr. Tyson Berkowitz    Electronically signed by Raquel Bedolla MD on 12/24/2020

## 2020-12-24 NOTE — PATIENT CARE CONFERENCE
Intensive Care Daily Quality Rounding Checklist        ICU Team Members: Dr. Shira Carlos, Thomas Parekh (residents), clinical pharmacist, charge nurse, bedside nurse     ICU Day #: NUMBER: 10     Intubation Date: December 15     Ventilator Day #: NUMBER: 10     Central Line Insertion Darvin Deiters                                                    Day #: NUMBER: 4      Arterial Line Insertion Date: Cinthia Drones                             Day #: NUMBER: 10     DVT Prophylaxis: Lovenox    GI Prophylaxis: Protonix     Allen Catheter Insertion Date: December 13                                        Day #: 12                             Continued need (if yes, reason documented and discussed with physician): yes, Strict I&O in critical patient     Skin Issues/ Wounds and ordered treatment discussed on rounds: Yes, generalized ecchymosis, SOS precautions in place     Goals/ Plans for the Day: Daily labs, wean sedation as able, wean vent as able, daily restraint order.

## 2020-12-24 NOTE — PROGRESS NOTES
Spoke to son Silver Gimenez, he is still praying and hoping for a miracle. I explained the state his mother is in. We spoke about trach and PEG, he wants to wait and see how she does and how she is tolerating her ventilator. At that time we will make the decision, on whether to trach and peg her. Comfort and support was provided.     Shalini Reza MD  Palliative medicine

## 2020-12-25 NOTE — PROGRESS NOTES
[facility] MD Marie Naylor, MD Zandra Dyer, MD Redd Simmons, MD Garland Caceres, CNP   Shaniqua Evans, BOBBY  Progress Note    12/25/2020    SUBJECTIVE:   12/17: Hypotensive, requiring pressors. Comfortable on vent. 12/18: Remains critically ill. On ventilator via ETT, supine, FiO2 90% PEEP 10. BP still lowish. Pressors weaning. 12/19: Remains intubated on ventilator, FiO2 60% PEEP 10, somewhat improved. Supine. No new event overnight. Hemodynamically stable. 12/20: Urine picked up substantially yesterday afternoon, increasing from 350 cc on day shift to 2600 cc on evening, 3100 cc on night shift. No diuretic therapy administered. Hypotensive this morning. Improved with IV fluid bolus. Remains critically ill, on ventilator via ETT. 12/21: Remains critically ill. Intubated via ETT, FiO2 65%, PEEP 10.  UO has in fact slowed somewhat, appropriate given IV fluid administration. Remains on amnio, levo, fentanyl. 1/2 NS at 150 cc/h. Attempt at lightening sedation resulted in stacked breathing, agitation. Remains unresponsive to verbal and tactile stimuli   12/22: Remains critically ill. Intubated via ETT. Supine now. Vent weaning. TF at 45 cc/h after UF 30 cc every 6 hours. 1/2 NS at 100 cc/h. BP stable. 12/22: Remains in critical condition but overall starting to improve in terms of urine output and other necessary conditions, case discussed thoroughly with its nurse for any questions concerns regarding the different problems that she has instructions were given accordingly, patient got Bumex for heart failure has dramatically increased urine output  12/25: Doing better today patient is awake and alert, still on the vent with FiO2 of 60% and PEEP of 10 overall improving as per clinical management responding appropriately to Bumex with good urine output.     PROBLEM LIST:    Active Problems:    Acute respiratory failure with hypoxia (HCC) (37.2 °C) Esophageal 73 24 92 %   12/25/20 0700 -- -- -- 84 24 92 %   12/25/20 0600 -- -- -- 96 (!) 32 91 %   12/25/20 0500 (!) 145/66 -- -- 93 27 92 %   12/25/20 0400 (!) 127/57 97.7 °F (36.5 °C) Esophageal 80 24 95 %   12/25/20 0330 -- -- -- 81 14 97 %   12/25/20 0300 -- -- -- 61 24 95 %   12/25/20 0008 -- -- -- 77 23 95 %   12/25/20 0000 (!) 122/55 98.6 °F (37 °C) Esophageal 79 24 95 %   12/24/20 2200 (!) 108/48 -- -- 74 24 (!) 89 %   12/24/20 2100 -- -- -- 84 25 92 %   12/24/20 2000 (!) 89/50 96.6 °F (35.9 °C) Esophageal 64 24 92 %   12/24/20 1945 -- -- -- 69 24 92 %   12/24/20 1700 -- -- -- 94 24 90 %   12/24/20 1602 -- -- -- 80 23 96 %   12/24/20 1600 -- 97.7 °F (36.5 °C) Esophageal 83 22 99 %   12/24/20 1500 -- -- -- 96 20 93 %   12/24/20 1400 -- -- -- 97 23 (!) 89 %   12/24/20 1300 -- -- -- 103 27 92 %   12/24/20 1214 -- -- -- 74 24 95 %   12/24/20 1200 -- 97.9 °F (36.6 °C) Esophageal 76 13 98 %   @      Intake/Output Summary (Last 24 hours) at 12/25/2020 1102  Last data filed at 12/25/2020 0700  Gross per 24 hour   Intake 3596.38 ml   Output 3105 ml   Net 491.38 ml         Wt Readings from Last 3 Encounters:   12/24/20 224 lb 3.3 oz (101.7 kg)   12/08/20 188 lb 9 oz (85.5 kg)   10/09/20 163 lb 9.6 oz (74.2 kg)       PE:  COVID-19 isolation protocol, I did not conduct a physical examination but discussed her physical exam findings with her bedside RN and the MICU team    DATA:    Recent Labs     12/23/20  0535 12/24/20  0615 12/25/20  0610   WBC 8.8 5.5 6.6   HGB 10.1* 8.1* 9.5*   HCT 33.5* 27.1* 30.9*   .3* 101.1* 100.0*    129* 148     Recent Labs     12/23/20  0535 12/23/20  0535 12/24/20  0615 12/24/20  1856 12/25/20  0610      < > 139 139 140   K 3.9   < > 3.2* 3.6 3.7      < > 102 103 103   CO2 33*   < > 35* 32* 33*   MG 2.0  --  1.9  --  1.9   PHOS 2.2*  --  2.5  --  2.0*   BUN 12   < > 13 13 13   CREATININE 0.3*   < > 0.3* 0.3* 0.3*   ALT 20  --  14  --  16   AST 19  --  16

## 2020-12-25 NOTE — PATIENT CARE CONFERENCE
Intensive Care Daily Quality Rounding Checklist        ICU Team Members: Dr. Nkia Guaman, Dr. Jennifer Chew (residents), charge nurse, bedside nurse     ICU Day #: NUMBER: 11     Intubation Date: December 15     Ventilator Day #: NUMBER: 11     Central Line Insertion Efrain [facility]                                                    Day #: NUMBER: 5      Arterial Line Insertion Date: Daphniekeny Camdenkylee HAAS #: NUMBER: 11     DVT Prophylaxis: Lovenox    GI Prophylaxis: Protonix     Allen Catheter Insertion Date: December 13                                        Day #: 13                             Continued need (if yes, reason documented and discussed with physician): yes, Strict I&O in critical patient     Skin Issues/ Wounds and ordered treatment discussed on rounds: Yes, generalized ecchymosis, SOS precautions in place     Goals/ Plans for the Day: Bumex, lovenox, wean vent as able, plan to consult surgery for trach/peg eventually. Continue critical care management.

## 2020-12-25 NOTE — PROGRESS NOTES
[facility] Roberto A. Ricke Huh, MD Lilian Gitelman, MD Roberto Jones, MD Kathrin Silvestreix, CNP   Shaniqua Evans, BOBBY  Progress Note    12/24/20  SUBJECTIVE:   12/17: Hypotensive, requiring pressors. Comfortable on vent. 12/18: Remains critically ill. On ventilator via ETT, supine, FiO2 90% PEEP 10. BP still lowish. Pressors weaning. 12/19: Remains intubated on ventilator, FiO2 60% PEEP 10, somewhat improved. Supine. No new event overnight. Hemodynamically stable. 12/20: Urine picked up substantially yesterday afternoon, increasing from 350 cc on day shift to 2600 cc on evening, 3100 cc on night shift. No diuretic therapy administered. Hypotensive this morning. Improved with IV fluid bolus. Remains critically ill, on ventilator via ETT. 12/21: Remains critically ill. Intubated via ETT, FiO2 65%, PEEP 10.  UO has in fact slowed somewhat, appropriate given IV fluid administration. Remains on amnio, levo, fentanyl. 1/2 NS at 150 cc/h. Attempt at lightening sedation resulted in stacked breathing, agitation. Remains unresponsive to verbal and tactile stimuli   12/22: Remains critically ill. Intubated via ETT. Supine now. Vent weaning. TF at 45 cc/h after UF 30 cc every 6 hours. 1/2 NS at 100 cc/h. BP stable.   12/22: Remains in critical condition but overall starting to improve in terms of urine output and other necessary conditions, case discussed thoroughly with its nurse for any questions concerns regarding the different problems that she has instructions were given accordingly, patient got Bumex for heart failure has dramatically increased urine output  12/23: Patient remains critically ill she is on the vent via ETT no major change in clinical status her output is still stable, discussed this case at length with the nurse to give the patient  12/24: Patient remains critically ill with no major change in clinical status attempting to wean FiO2.    PROBLEM LIST:    Active Problems:    Acute respiratory failure with hypoxia (HCC)    Pneumonia due to COVID-19 virus    Septic shock (HCC)    Atrial fibrillation with RVR (HCC)  Resolved Problems:    * No resolved hospital problems. *         DIET:    DIET TUBE FEED CONTINUOUS/CYCLIC NPO; Immune Enhancing (PIVOT 1.5);  Nasogastric; 25; 45     MEDS (scheduled):    bumetanide  0.5 mg Intravenous Once    enoxaparin  1 mg/kg Subcutaneous BID    hydrocortisone sodium succinate PF  50 mg Intravenous Q12H    vitamin C  500 mg Oral BID    thiamine  100 mg Oral Daily    zinc sulfate  50 mg Oral Daily    heparin flush  3 mL Intravenous 2 times per day    insulin glargine  25 Units Subcutaneous Daily    docusate  100 mg Oral Daily    senna  5 mL Oral Nightly    midodrine  10 mg Oral TID    insulin lispro  0-18 Units Subcutaneous Q6H    pantoprazole  40 mg Intravenous Daily    And    sodium chloride (PF)  10 mL Intravenous Daily    ipratropium-albuterol  1 ampule Inhalation Q4H WA    budesonide  500 mcg Nebulization BID    Arformoterol Tartrate  15 mcg Nebulization BID    clopidogrel  75 mg Oral Daily    chlorhexidine  15 mL Mouth/Throat BID    sodium chloride flush  10 mL Intravenous 2 times per day    Vitamin D  2,000 Units Oral Daily    gabapentin  800 mg Oral TID    levothyroxine  100 mcg Oral Daily    atorvastatin  20 mg Oral Daily    acetaminophen  650 mg Rectal Once       MEDS (infusions):   fentaNYL 5 mcg/ml in 0.9%  ml infusion 75 mcg/hr (12/25/20 0745)    midazolam 3 mg/hr (12/25/20 0747)       MEDS (prn):  metoprolol, sodium chloride flush, heparin flush, potassium chloride, fentanNYL, promethazine **OR** ondansetron, polyethylene glycol, acetaminophen **OR** acetaminophen    PHYSICAL EXAM:     Patient Vitals for the past 24 hrs:   BP Temp Temp src Pulse Resp SpO2   12/25/20 1000 -- -- -- 101 25 91 %   12/25/20 0900 -- -- -- 100 28 92 %   12/25/20 0823 -- -- -- 87 23 94 % PHOS 2.2*  --  2.5  --  2.0*   BUN 12   < > 13 13 13   CREATININE 0.3*   < > 0.3* 0.3* 0.3*   ALT 20  --  14  --  16   AST 19  --  16  --  20   BILITOT 0.4  --  0.5  --  0.5   ALKPHOS 83  --  56  --  71    < > = values in this interval not displayed. Lab Results   Component Value Date    LABPROT 1.1 (H) 12/16/2020    LABPROT 1.1 12/16/2020       Assessment  1. 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 Administration of desmopressin in the ER likely also contributed to the initial drop in her sodium  2. Hypophosphatemia, severe, nutritional     [Na+] improved, though slowly, peaked at 129 mmol/L, though dropped to 126 since last evening. May be due to IV electrolyte supplementation. Hypervolemic. NS stopped. 12/19 evening UO picked up markedly, 2600 cc on evening shift, 3100 cc on night shift, continuing at > 150 cc/h on day shift. No diuretic therapy. Question of DI. Frankly, doubt the desmopressin administered 5 days ago would still have been effective as of yesterday. Solu-Cortef started 12/19 -- uo has since slowed down, appropriate for volume and IVF administered. Urine / serum studies 12/20 6 AM resembled DI, those drawn in the early afternoon did not. Recommendations  1. Stop IV D5 1/2 NS  2. Continue tube feed at current rate  3. Increase free water flush to 200 cc every 4 hours, further adjustments as warranted over the next several days  4. Supplement phosphorus IV, over 6 hours (not 4)  5. Follow labs, UO  6.  Avoid nephrotoxins     Discussed with Dr. Ashley Michelle    Electronically signed by Pat Lewis MD on 12/25/2020

## 2020-12-25 NOTE — PROGRESS NOTES
Chart reviewed. Palliative spoke with patient's son yesterday. Family wanting to continue current management. She remains mechanically ventilated on 60% FiO2 and sedated. Palliative care will continue to follow.      Glenis Hagen APRN-[facility]  Palliative Care

## 2020-12-25 NOTE — PATIENT CARE CONFERENCE
Intensive Care Daily Quality Rounding Checklist        ICU Team Members: Dr. Yifan Ames, charge nurse, bedside nurse     ICU Day #: NUMBER: 13     Intubation Date: December 15     Ventilator Day #: NUMBER: 11     Central Line Insertion Date: December 21 PICC                                                    Day #: NUMBER: 5      Arterial Line Insertion Date: Yair Youssef                             Day #: NUMBER: 10     DVT Prophylaxis: Lovenox    GI Prophylaxis: Protonix     Allen Catheter Insertion Date: December 13                                        Day #: 12                             Continued need (if yes, reason documented and discussed with physician): yes, Strict I&O in critical patient     Skin Issues/ Wounds and ordered treatment discussed on rounds: Yes, generalized ecchymosis, SOS precautions in place     Goals/ Plans for the Day: Daily labs, wean sedation as able, wean vent as able, daily restraint order.

## 2020-12-25 NOTE — PROGRESS NOTES
This note also relates to the following rows which could not be included:  Peak Flow - Cannot attach notes to unvalidated device data  Sensitivity - Cannot attach notes to unvalidated device data  I Time/ I Time % - Cannot attach notes to unvalidated device data  High Peep/I Pressure - Cannot attach notes to unvalidated device data  Mean Airway Pressure - Cannot attach notes to unvalidated device data  Minute Volume - Cannot attach notes to unvalidated device data  I:E Ratio - Cannot attach notes to unvalidated device data  High Pressure Alarm - Cannot attach notes to unvalidated device data       12/25/20 0400   Vent Information   Skin Assessment Clean, dry, & intact   Vent Mode AC/VC+   Vt Ordered 450 mL   Rate Set 24 bmp   Pressure Support 0 cmH20   FiO2  60 %   SpO2 95 %   SpO2/FiO2 ratio 158.33   PEEP/CPAP 10   Vent Patient Data   Peak Inspiratory Pressure 29 cmH2O   Rate Measured 24 br/min   Vt Exhaled 400 mL   Plateau Pressure 25 MGC90   Static Compliance 30 mL/cmH2O   Spontaneous Breathing Trial (SBT) RT Doc   Pulse 80   Additional Respiratory  Assessments   Resp 24   Non-Surgical Airway Endo Tracheal Tube   Placement Date/Time: 12/15/20 0989   Timeout: Patient  Placed By: In ED  Inserted by: dr Chante Parker  Insertion attempts: 1  Airway Device: Endo Tracheal Tube  Size: 7.5   Secured at 22 cm   Measured From Lips   Secured Location Left   Secured By Commercial tube perry   Site Condition Dry

## 2020-12-25 NOTE — PROGRESS NOTES
In to assess pt. Pt. Opens eyes when name is called and follows commands. Hand grasps are weak due to swelling. Pedal push pulls are weak as well. She is able to nod her head appropriately to yes and no questions. Color pale skin warm and dry. She denies any pain at this time.

## 2020-12-25 NOTE — PROGRESS NOTES
better. No acute overnight events. : Failed weaning trial yesterday. Became agitated. Continues to tolerate the ventilator. Day 9 on the ventilator. : The patient occasionally opens her eyes. She occasionally follows commands. She gets extremely anxious when sedation is weaned further. Continues on Versed and fentanyl.         CURRENT VENTILATION STATUS:     [x] Ventilator  [] BIPAP  [] Nasal Cannula [] Room Air      IF INTUBATED, ET TUBE MARKING AT LOWER LIP:       cms    SECRETIONS Amount:  [] Small [] Moderate  [] Large  [x] None  Color:     [] White [] Colored  [] Bloody    SEDATION:  RAAS Score:  [x] Propofol gtt  [x] Versed gtt  [] Ativan gtt   [x] No Sedation    PARALYZED:  [x] No    [] Yes      VASOPRESSORS:  [x] No    [] Yes    If yes -   [x] Levophed       [] Dopamine     [] Vasopressin       [] Dobutamine  [] Phenylephrine         [] Epinephrine    CENTRAL LINES:     [] No   [x] Yes   (Date of Insertion:  12/15 )           If yes -     [] Right IJ     [] Left IJ [x] Right Femoral [] Left Femoral                   [] Right Subclavian [] Left Subclavian       GARCIA'S CATHETER:   [] No   [x] Yes  (Date of Insertion: 12/15  )     URINE OUTPUT:            [x] Good   [] Low              [] Anuric      OBJECTIVE:     VITAL SIGNS:  BP (!) 145/66   Pulse 87   Temp 97.7 °F (36.5 °C) (Esophageal)   Resp 23   Ht 5' 6\" (1.676 m)   Wt 224 lb 3.3 oz (101.7 kg)   SpO2 94%   BMI 36.19 kg/m²   Tmax over 24 hours:  Temp (24hrs), Av.7 °F (36.5 °C), Min:96.6 °F (35.9 °C), Max:98.6 °F (37 °C)      Patient Vitals for the past 6 hrs:   BP Temp Temp src Pulse Resp SpO2   20 0823 -- -- -- 87 23 94 %   20 0822 -- -- -- -- 23 95 %   20 0821 -- -- -- -- 23 94 %   20 0700 -- -- -- 84 24 92 %   20 0600 -- -- -- 96 (!) 32 91 %   20 0500 (!) 145/66 -- -- 93 27 92 %   20 0400 (!) 127/57 97.7 °F (36.5 °C) Esophageal 80 24 95 %   20 0330 -- -- -- 81 14 97 % 12/25/20 0300 -- -- -- 61 24 95 %         Intake/Output Summary (Last 24 hours) at 12/25/2020 0834  Last data filed at 12/25/2020 0700  Gross per 24 hour   Intake 3596.38 ml   Output 4105 ml   Net -508.62 ml     Wt Readings from Last 2 Encounters:   12/24/20 224 lb 3.3 oz (101.7 kg)   12/08/20 188 lb 9 oz (85.5 kg)     Body mass index is 36.19 kg/m². PHYSICAL EXAMINATION:  General: ill appearing, sedated, lying in gurney in no distress supine  HEENT: Pupils are equal round and reactive to light, there are no oral lesions and no post-nasal drip   Neck: supple without adenopathy  Cardiovascular: regular rate and rhythm without murmur or gallop  Respiratory:  Decreased breath sounds bilaterally without wheezing or crackles. Air entry is symmetric  Abdomen: soft, non-tender, non-distended, normal bowel sounds  Extremities: warm, no edema, no clubbing   Skin: no rash or lesion  Neurologic:  Sedated, intubated, pupils equal round reactive, looking around the room occasionally.         Any additional physical findings:    MEDICATIONS:    Scheduled Meds:   hydrocortisone sodium succinate PF  50 mg Intravenous Q12H    vitamin C  500 mg Oral BID    thiamine  100 mg Oral Daily    zinc sulfate  50 mg Oral Daily    heparin flush  3 mL Intravenous 2 times per day    insulin glargine  25 Units Subcutaneous Daily    docusate  100 mg Oral Daily    senna  5 mL Oral Nightly    midodrine  10 mg Oral TID    insulin lispro  0-18 Units Subcutaneous Q6H    pantoprazole  40 mg Intravenous Daily    And    sodium chloride (PF)  10 mL Intravenous Daily    ipratropium-albuterol  1 ampule Inhalation Q4H WA    budesonide  500 mcg Nebulization BID    Arformoterol Tartrate  15 mcg Nebulization BID    enoxaparin  40 mg Subcutaneous Daily    clopidogrel  75 mg Oral Daily    chlorhexidine  15 mL Mouth/Throat BID    sodium chloride flush  10 mL Intravenous 2 times per day    Vitamin D  2,000 Units Oral Daily    gabapentin 800 mg Oral TID    levothyroxine  100 mcg Oral Daily    atorvastatin  20 mg Oral Daily    acetaminophen  650 mg Rectal Once     Continuous Infusions:   fentaNYL 5 mcg/ml in 0.9%  ml infusion 75 mcg/hr (12/25/20 0745)    midazolam 3 mg/hr (12/25/20 0747)     PRN Meds:       metoprolol, 5 mg, Q6H PRN      sodium chloride flush, 10 mL, PRN      heparin flush, 3 mL, PRN      potassium chloride, 20 mEq, PRN      fentanNYL, 25 mcg, Q3H PRN      promethazine, 12.5 mg, Q6H PRN    Or      ondansetron, 4 mg, Q6H PRN      polyethylene glycol, 17 g, Daily PRN      acetaminophen, 650 mg, Q6H PRN    Or      acetaminophen, 650 mg, Q6H PRN          VENT SETTINGS (Comprehensive) (if applicable):  Vent Information  $Ventilation: $Subsequent Day  Skin Assessment: Clean, dry, & intact  Equipment ID: [id number]  Equipment Changed: (S) HME(changed)  Vent Type: [id number]  Vent Mode: AC/VC+  Vt Ordered: 450 mL  Rate Set: 24 bmp  Peak Flow: 0 L/min  Pressure Support: 0 cmH20  FiO2 : 60 %  SpO2: 94 %  SpO2/FiO2 ratio: 156.67  PaO2/FiO2 ratio: 102  Sensitivity: 2  PEEP/CPAP: 10  I Time/ I Time %: 0 s  Humidification Source: HME  Mask Type: Full face mask  Mask Size: Small  Additional Respiratory  Assessments  Pulse: 87  Resp: 23  SpO2: 94 %  Position: Semi-Marrero's  Humidification Source: HME  Oral Care: Mouth suctioned, Mouth moisturizer  Subglottic Suction Done?: Yes  Cuff Pressure (cm H2O): 29 cm H2O    ABGs:   Recent Labs     12/25/20  0624   PH 7.465*   PCO2 41.2   PO2 76.8   HCO3 29.0*   BE 4.8*   O2SAT 95.4       Laboratory findings:    Complete Blood Count:   Recent Labs     12/23/20  0535 12/24/20  0615 12/25/20  0610   WBC 8.8 5.5 6.6   HGB 10.1* 8.1* 9.5*   HCT 33.5* 27.1* 30.9*    129* 148        Last 3 Blood Glucose:   Recent Labs     12/24/20  0615 12/24/20  1856 12/25/20  0610   GLUCOSE 166* 148* 170*        PT/INR:    Lab Results   Component Value Date    PROTIME 11.4 12/13/2020    PROTIME 11.9 12/15/2011 INR 1.0 12/13/2020     PTT:    Lab Results   Component Value Date    APTT 21.6 12/13/2020       Comprehensive Metabolic Profile:   Recent Labs     12/24/20  0615 12/24/20  1856 12/25/20  0610    139 140   K 3.2* 3.6 3.7    103 103   CO2 35* 32* 33*   BUN 13 13 13   CREATININE 0.3* 0.3* 0.3*   GLUCOSE 166* 148* 170*   CALCIUM 6.6* 6.6* 7.0*   PROT 4.2*  --  4.8*   LABALBU 2.9*  --  3.1*   BILITOT 0.5  --  0.5   ALKPHOS 56  --  71   AST 16  --  20   ALT 14  --  16      Magnesium:   Lab Results   Component Value Date    MG 1.9 12/25/2020     Phosphorus:   Lab Results   Component Value Date    PHOS 2.0 12/25/2020     Ionized Calcium: No results found for: CAION     Urinalysis:     Troponin:   No results for input(s): TROPONINI in the last 72 hours. Microbiology:    Cultures during this admission:     Blood cultures:                 [] None drawn      [x] Negative             []  Positive (Details:  )  Urine Culture:                   [] None drawn      [] Negative             []  Positive (Details:  )  Sputum Culture:               [] None drawn       [x] Negative             []  Positive (Details:  )   Endotracheal aspirate:     [] None drawn       [] Negative             []  Positive (Details:  )     Other pertinent Labs:   COVID-19 + December 13  MRSA negative screening   radiology/Imaging:     Chest Xray (12/25/2020):       Right PICC line with tip at the junction of the subclavian and SVC   Extensive bilateral pulmonary infiltrates unchanged   ET and NG tubes appear in satisfactory position             ASSESSMENT:      1. Acute hypoxic respiratory failure   2. Covid pneumonitis - critically severe   3. Shock- septic   4. Adrenal insufficiency  5. Hyponatremia   6. Hypokalemia   7. Leukocytosis    8. COPD  9. pvd  10. chf not in acute exacerbatino   11. Hx lacunar infarct   12. SSS s/p pacemaker   13. Hx seizure do   14.  Bilateral carotid stenosis       SYSTEMS ASSESSMENT    Neuro   Intubated, sedated on Versed and fentanyl - wean as tolerated  No longer paralyzed  Previous history of lacunar stroke  CT head on admission showed no acute abnormalities  Waking up more  Agitated with sedation vacation    Respiratory   Acute respiratory failure with hypoxia secondary to COVID-19 viral pneumonia  Intubated, wean sedation as tolerated  Course of Cefepime completed for possible superimposed bacterial pneumonia   Wean oxygen as tolerated. Keep O2 sat 90-92%  abg daily  Day 11 on ventilator trending toward trach and peg if unable to wean. Cardiovascular   Shock, septic  Increase midodrine to 15 tid   -now off of levophed  Previous atrial fib/flutter -amiodarone drip  Pacemaker interrogated that showed several episodes of supraventricular tachycardia over the last couple of months but no persistent shockable rhythm  Continue plavix      Gastrointestinal   GI prophylaxis with Protonix  Diet per dietary recs   Colace/senna for constipation    Renal   Hyponatremia -resolved   -Continue to trend metabolic panel   -Nephrology following for fluid recommendations intravenously  Replete potassium as needed  bumex . 5mg iv diuresis     Infectious Disease   COVID-19 viral pneumonia   Received remdesivir, Toci, vit D, vit c, thiamine   Septic shock - improving  Possible superimposed bacterial pneumonia   -Completed course of cefepime  Blood cultures and respiratory culture negative thus far    Hematology/Oncology   Anemia on admission, trend H&H, improving  CBC daily  lovenox 1mg.kg bid - rising ddimer    Endocrine   high-dose sliding scale Humalog for hyperglycemia  Start lantus   -better glycemic control today  Monitor growth closely  Cortisol on admission 1.42  High-dose Solucortef decreased to 50 q 8 hrs    Social/Spiritual/DNR/Other   Full code. Palliative consulted. ASSESSMENT/ PLAN     1. Try weaning sedation and off vent again today; hopefully extubate at some point versus trach and PEG  2.   Hyponatremia -resolved- nephrology following  3.   midodrine TID - off vasopressors  4. decrease solucortef  5. Amiodarone drip for A. fib with RVR  6. Palliative following -day 11 on the ventilator, if unable to liberate from ventilator may need trach and PEG  7. Rising ddimer, full dose lovenox bid  7. Continue ICU level of care      Buck Madison DO, PGY-2  12/25/2020 8:34 AM      Attending Physician Attestation: Dr. Abbey Tsai    Thank you very much for allowing me to see this patient in consultation and follow up. I personally saw, examined and provided care for the patient. Radiographs, labs and medication list were reviewed by me independently. I spoke with bedside nursing, respiratory therapists and consultants. Critical care services and times documented are independent of procedures and multidisciplinary rounds with Residents. Additionally comprehensive, multidisciplinary rounds were conducted with the MICU team. The case was discussed in detail and plans for care were established. Review of Residents documentation was conducted and revisions were made as appropriate. I agree with the the above documented information.      Physical Examination:  Vitals:   Vitals:    12/25/20 1100 12/25/20 1200 12/25/20 1215 12/25/20 1216   BP:       Pulse: 100 119  110   Resp: 30 28 27 26   Temp:  98.4 °F (36.9 °C)     TempSrc:  Esophageal     SpO2: 91% 90% 92% 91%   Weight:       Height:          General: No Acute Distress, intubated  HEENT: normocephalic, atruamatic  CVS: RRR, S1, S2, No S3 or S4  Respiratory: decreased breath sounds at lung bases, no focal wheezes noted  Extremities: no clubbing/cyanosis/edema     ASSESSMENT:  1.) SARS-CoV2 Viral Pneumonia, COVID-19 Pneumonia   2.) Acute Respiratory Failure with Hypoxia   3.) Macrocytosis   4.) Superimposed Bacterial Pneumonia   5.) Sick Sinus Syndrome - s/p pacemaker insertion   6.) Atrial Fibrillation with RVR - amiodarone drip  7.) Severe Sepsis with Septic Shock - resolved      In addition the following applies:     Check: N/A  Medication Alterations: N/A  Procedures: N/A  Imaging: reviewed  New Consultations: N/A  VENT: ACVC (DAY 10) 24/450/60/10     Access: Right PICC, Left Brachial Arterial Line   Consults: Nephrology, Pulmonary    Drips: Fentanyl, Versed  Nutrition: Tube Feeds  ABX: N/A     - wean vent as able, patient not ready as of 12/24/2020 and 12/25/2020  - likely for trach/PEG placement   - lovenox to 1mg/kg BID    Thank you for allowing me to participate in the care of this patient. Care reviewed with nursing staff, medical and surgical specialty care, primary care and the patient's family as available. Restraints are ordered when the patient can do harm to him/herself by pulling out devices. Critical Care Time: > 35 minutes excluding procedures    Abbey Tsai M.D.     Abbey Tsai  12/25/2020  12:17 PM

## 2020-12-26 NOTE — PROGRESS NOTES
Subjective: Intubated sedated mechanically ventilated   Discussed with staff  Objective:    BP (!) 96/46   Pulse 69   Temp 96.3 °F (35.7 °C) (Esophageal)   Resp 23   Ht 5' 6\" (1.676 m)   Wt 224 lb 3.3 oz (101.7 kg)   SpO2 94%   BMI 36.19 kg/m²     24HR INTAKE/OUTPUT:      Intake/Output Summary (Last 24 hours) at 12/25/2020 2058  Last data filed at 12/25/2020 1400  Gross per 24 hour   Intake 3143.9 ml   Output 4725 ml   Net -1581.1 ml       General appearance: Intubated sedated mechanically ventilated supine  Neck: FROM, supple   Lungs: Clear bilaterally no wheezes, no rhonchi, no crackles  CV: RRR, no MRGs; normal carotid upstroke and amplitude without Bruits  Abdomen: Soft, non-tender; no masses or HSM  Extremities: No edema, no cyanosis, no clubbing  Skin: Intact no rash, no lesions, no ulcers    Psych: not Alert and oriented normal affect  Neuro: Nonfocal  In consideration of [id number] isolation policies and efforts to preserve PPE physical exam is limited. Exam findings shared among providers. Most Recent Labs  Lab Results   Component Value Date    WBC 6.6 12/25/2020    HGB 9.5 (L) 12/25/2020    HCT 30.9 (L) 12/25/2020     12/25/2020     12/25/2020    K 2.8 (L) 12/25/2020     12/25/2020    CREATININE 0.3 (L) 12/25/2020    BUN 12 12/25/2020    CO2 33 (H) 12/25/2020    GLUCOSE 134 (H) 12/25/2020    ALT 16 12/25/2020    AST 20 12/25/2020    INR 1.0 12/13/2020    TSH 0.381 12/03/2020    LABA1C 5.8 04/05/2017     Recent Labs     12/25/20  0610   MG 1.9     Lab Results   Component Value Date    CALCIUM 6.6 (L) 12/25/2020    PHOS 2.0 (L) 12/25/2020        XR CHEST PORTABLE   Final Result   ET tube in satisfactory position   Bilateral pulmonary infiltrates are unchanged is      XR CHEST PORTABLE   Final Result   Stable bilateral airspace disease which may represent pulmonary edema. XR CHEST PORTABLE   Final Result   1. Good position right-sided PICC line.    2.  Otherwise stable findings. Bilateral airspace infiltrates/edema, worse   on the right. XR CHEST PORTABLE   Final Result   Right PICC line with tip at the junction of the subclavian and SVC   Extensive bilateral pulmonary infiltrates unchanged   ET and NG tubes appear in satisfactory position      XR CHEST PORTABLE   Final Result   Bilateral airspace disease which is not appreciably changed and is likely   related to pulmonary edema. XR CHEST PORTABLE   Final Result   Mildly progressive bilateral infiltrates. Improving left pleural effusion      XR CHEST PORTABLE   Final Result   Mildly waning airspace disease with slight improvement of aeration   bilaterally. XR CHEST PORTABLE   Final Result   Diffuse infiltrate, improved in the upper lungs as compared to prior. There is new consolidation in the left lower lobe with possible left pleural   effusion. XR CHEST PORTABLE   Final Result   Endotracheal tube in low position, can be pulled back about 3 cm. NG tube in good position. Pattern of bilateral interstitial infiltrate in the perihilar regions or the   periphery of the lung as above commented. The pulmonary edema versus   bilateral viral pneumonia will be part of the differential diagnosis. Please   correlate clinically. T   Preliminary report given to Dr. Andrey Echols at the time   of the interpretation. XR ABDOMEN FOR NG/OG/NE TUBE PLACEMENT   Final Result   Enteric feeding tube partially imaged below the diaphragm and likely within   the stomach. XR CHEST PORTABLE   Final Result   Increased interstitial markings throughout both lungs along with   patchy/consolidative airspace opacities noted bilaterally as above. Findings   are suspicious for multifocal pneumonia or pulmonary edema. CTA PULMONARY W CONTRAST   Final Result   No central pulmonary embolism or aortic dissection.    Progressive diffuse bilateral patchy and ground-glass infiltrates concerning   for progressive multifocal pneumonia/viral infection. XR CHEST PORTABLE   Final Result   Progressive diffuse bilateral infiltrates and pleural effusion likely   CHF/edema and or diffuse pneumonia. CT HEAD WO CONTRAST    (Results Pending)       Assessment    Active Problems:    Acute respiratory failure with hypoxia (HCC)    Pneumonia due to COVID-19 virus    Septic shock (HCC)    Atrial fibrillation with RVR (HCC)  Resolved Problems:    * No resolved hospital problems. *      Plan:     1.  Acute respiratory failure with hypoxia  - COVID 19 PNA / bacterial/ HF. CXR improved 12/17  -Intubated sedated mechanically ventilated   -Prone not paralyzed             Hydrocortisone IV + vit D   Remdesivir completed              Diffuse bilateral patchy GGO on CT, no PE                         cefepime completed              Fentanyl + versed    2. Shock -likely septic secondary to viral sepsis              Currently off   levophed, vasopressin off wean off   Midodrine              TLC inserted              Blood cultures sent, no evidence of UTI   Completed cefepime              Wean hydrocortisone, increase midodrine    3. Anemia - resolved              ZARSRX false lab value    4. Hypothyroidism - continue synthroid    5.  Hyponatremia -improved    nephrology consult appreciated              IVF resuscitation              Continue to monitor labs closely       A. fib RVR amiodarone drip  Electronically signed by Merrick Kamara MD on 12/25/2020 at 8:58 PM

## 2020-12-26 NOTE — PLAN OF CARE
Intensive Care Daily Quality Rounding Checklist      ICU Team Members: Dr. Kevin Mayberry, resident, charge nurse, bedside nurse and respiratory therapy    ICU Day #: Number 12    Intubation Date: Dec 15  Ventilator Day #:  Number 12    Central Line Insertion Date:  PICC DEC 21        Day #: Number 6   Arterial Line Insertion Date:  Dec 15      Day #: Number 12    Temporary Hemodialysis Catheter Insertion Date:       Day #     DVT Prophylaxis: Lovenox    GI Prophylaxis: Protonix    Allen Catheter Insertion Date:  Dec 13       Day #:  Number 14      Continued need (if yes, reason documented and discussed with physician):   Skin Issues/ Wounds and ordered treatment discussed on rounds:     Goals/ Plans for the Day: Monitor labs and vitals. Vent changes and bumex.  Restraints ordered

## 2020-12-26 NOTE — PROGRESS NOTES
[facility] MD Deanne Ruffin, MD Kristine Liang, MD Rex Peterson, MD Ruperto Kwon, CNP   Shaniqua Evans, BOBBY  Progress Note    12/26/2020    SUBJECTIVE:   12/17: Hypotensive, requiring pressors. Comfortable on vent. 12/18: Remains critically ill. On ventilator via ETT, supine, FiO2 90% PEEP 10. BP still lowish. Pressors weaning. 12/19: Remains intubated on ventilator, FiO2 60% PEEP 10, somewhat improved. Supine. No new event overnight. Hemodynamically stable. 12/20: Urine picked up substantially yesterday afternoon, increasing from 350 cc on day shift to 2600 cc on evening, 3100 cc on night shift. No diuretic therapy administered. Hypotensive this morning. Improved with IV fluid bolus. Remains critically ill, on ventilator via ETT. 12/21: Remains critically ill. Intubated via ETT, FiO2 65%, PEEP 10.  UO has in fact slowed somewhat, appropriate given IV fluid administration. Remains on amnio, levo, fentanyl. 1/2 NS at 150 cc/h. Attempt at lightening sedation resulted in stacked breathing, agitation. Remains unresponsive to verbal and tactile stimuli   12/22: Remains critically ill. Intubated via ETT. Supine now. Vent weaning. TF at 45 cc/h after UF 30 cc every 6 hours. 1/2 NS at 100 cc/h. BP stable. 12/22: Remains in critical condition but overall starting to improve in terms of urine output and other necessary conditions, case discussed thoroughly with its nurse for any questions concerns regarding the different problems that she has instructions were given accordingly, patient got Bumex for heart failure has dramatically increased urine output  12/25: Doing better today patient is awake and alert, still on the vent with FiO2 of 60% and PEEP of 10 overall improving as per clinical management responding appropriately to Bumex with good urine output. 12/26: Doing better no new complaints.   PATRICK resolved with BUN= 11,Cr= 0.3    PROBLEM LIST:    Active Problems:    Acute respiratory failure with hypoxia (HCC)    Pneumonia due to COVID-19 virus    Septic shock (HCC)    Atrial fibrillation with RVR (HCC)  Resolved Problems:    * No resolved hospital problems. *         DIET:    DIET TUBE FEED CONTINUOUS/CYCLIC NPO; Immune Enhancing (PIVOT 1.5);  Nasogastric; 25; 45     MEDS (scheduled):    bumetanide  0.5 mg Intravenous Once    enoxaparin  1 mg/kg Subcutaneous BID    hydrocortisone sodium succinate PF  50 mg Intravenous Q12H    vitamin C  500 mg Oral BID    thiamine  100 mg Oral Daily    zinc sulfate  50 mg Oral Daily    heparin flush  3 mL Intravenous 2 times per day    insulin glargine  25 Units Subcutaneous Daily    docusate  100 mg Oral Daily    senna  5 mL Oral Nightly    midodrine  10 mg Oral TID    insulin lispro  0-18 Units Subcutaneous Q6H    pantoprazole  40 mg Intravenous Daily    And    sodium chloride (PF)  10 mL Intravenous Daily    ipratropium-albuterol  1 ampule Inhalation Q4H WA    budesonide  500 mcg Nebulization BID    Arformoterol Tartrate  15 mcg Nebulization BID    clopidogrel  75 mg Oral Daily    chlorhexidine  15 mL Mouth/Throat BID    sodium chloride flush  10 mL Intravenous 2 times per day    Vitamin D  2,000 Units Oral Daily    gabapentin  800 mg Oral TID    levothyroxine  100 mcg Oral Daily    atorvastatin  20 mg Oral Daily    acetaminophen  650 mg Rectal Once       MEDS (infusions):   norepinephrine 3 mcg/min (12/26/20 1201)    fentaNYL 5 mcg/ml in 0.9%  ml infusion 150 mcg/hr (12/26/20 1059)    midazolam Stopped (12/26/20 1059)       MEDS (prn):  metoprolol, sodium chloride flush, heparin flush, potassium chloride, fentanNYL, promethazine **OR** ondansetron, polyethylene glycol, acetaminophen **OR** acetaminophen    PHYSICAL EXAM:     Patient Vitals for the past 24 hrs:   BP Temp Temp src Pulse Resp SpO2 Weight   12/26/20 1226 -- -- -- 88 24 96 % --   12/26/20 1200 -- 98.1 °F (36.7 °C) Bladder -- -- -- --   12/26/20 1005 -- -- -- 78 24 91 % --   12/26/20 0900 -- -- -- 84 25 94 % --   12/26/20 0845 -- -- -- 86 23 93 % --   12/26/20 0830 -- -- -- 74 23 (!) 89 % --   12/26/20 0815 -- -- -- 68 24 (!) 89 % --   12/26/20 0800 -- 98.6 °F (37 °C) -- 90 27 (!) 89 % --   12/26/20 0755 -- -- -- 91 25 (!) 89 % --   12/26/20 0752 -- -- -- -- 29 (!) 89 % --   12/26/20 0751 -- -- -- -- (!) 31 (!) 89 % --   12/26/20 0749 -- -- -- -- 29 90 % --   12/26/20 0700 -- -- -- 96 (!) 35 92 % --   12/26/20 0600 (!) 168/85 -- -- 101 (!) 32 91 % --   12/26/20 0500 (!) 185/93 -- -- 108 (!) 31 (!) 89 % --   12/26/20 0400 (!) 166/75 97.5 °F (36.4 °C) Esophageal 95 28 91 % --   12/26/20 0320 -- -- -- 97 21 93 % --   12/26/20 0300 (!) 162/73 -- -- 100 28 93 % --   12/26/20 0200 (!) 165/82 -- -- 94 25 90 % --   12/26/20 0100 (!) 160/79 -- -- 97 30 91 % --   12/26/20 0044 -- -- -- 93 28 91 % --   12/26/20 0000 (!) 123/58 97.5 °F (36.4 °C) Esophageal 86 23 93 % 216 lb 0.8 oz (98 kg)   12/25/20 2340 -- -- -- 79 22 91 % --   12/25/20 2300 (!) 142/68 -- -- 82 25 93 % --   12/25/20 2244 -- -- -- 75 -- -- --   12/25/20 2200 (!) 122/58 -- -- 82 23 91 % --   12/25/20 2100 (!) 90/50 -- -- 72 24 93 % --   12/25/20 2000 (!) 96/46 96.3 °F (35.7 °C) Esophageal 69 23 94 % --   12/25/20 1926 -- -- -- 77 25 96 % --   12/25/20 1923 -- -- -- -- 23 97 % --   12/25/20 1922 -- -- -- -- 24 97 % --   12/25/20 1921 -- -- -- -- 24 97 % --   12/25/20 1900 -- -- -- 66 24 -- --   12/25/20 1800 -- -- -- 81 24 91 % --   12/25/20 1700 -- -- -- 102 23 94 % --   12/25/20 1600 -- 97 °F (36.1 °C) Esophageal 100 23 93 % --   12/25/20 1550 -- -- -- 113 29 91 % --   12/25/20 1549 -- -- -- -- 22 92 % --   12/25/20 1500 -- -- -- 105 27 91 % --   12/25/20 1400 -- -- -- 107 24 95 % --   12/25/20 1300 -- -- -- 116 26 91 % --   @      Intake/Output Summary (Last 24 hours) at 12/26/2020 1250  Last data filed at 12/26/2020 0910  Gross per 24 hour   Intake 3585 ml   Output 6860 ml   Net -3275 ml         Wt Readings from Last 3 Encounters:   12/26/20 216 lb 0.8 oz (98 kg)   12/08/20 188 lb 9 oz (85.5 kg)   10/09/20 163 lb 9.6 oz (74.2 kg)       PE:  COVID-19 isolation protocol, I did not conduct a physical examination but discussed her physical exam findings with her bedside RN and the MICU team    DATA:    Recent Labs     12/24/20  0615 12/25/20  0610 12/26/20  0530   WBC 5.5 6.6 10.9   HGB 8.1* 9.5* 10.7*   HCT 27.1* 30.9* 35.0   .1* 100.0* 99.4   * 148 170     Recent Labs     12/24/20  0615 12/24/20  0615 12/25/20  0610 12/25/20  1825 12/26/20  0530      < > 140 138 137   K 3.2*   < > 3.7 2.8* 3.5      < > 103 102 100   CO2 35*   < > 33* 33* 31*   MG 1.9  --  1.9  --  2.0   PHOS 2.5  --  2.0*  --  2.1*   BUN 13   < > 13 12 11   CREATININE 0.3*   < > 0.3* 0.3* 0.3*   ALT 14  --  16  --  19   AST 16  --  20  --  25   BILITOT 0.5  --  0.5  --  0.6   ALKPHOS 56  --  71  --  85    < > = values in this interval not displayed. Lab Results   Component Value Date    LABPROT 1.1 (H) 12/16/2020    LABPROT 1.1 12/16/2020       Assessment  1. 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 Administration of desmopressin in the ER likely also contributed to the initial drop in her sodium  2. Hypophosphatemia, severe, nutritional     [Na+] improved, though slowly, peaked at 129 mmol/L, though dropped to 126 since last evening. May be due to IV electrolyte supplementation. Hypervolemic. NS stopped. 12/19 evening UO picked up markedly, 2600 cc on evening shift, 3100 cc on night shift, continuing at > 150 cc/h on day shift. No diuretic therapy. Question of DI.   Frankly, doubt the desmopressin administered 5 days ago would still have been effective as of yesterday. Solu-Cortef started 12/19 -- uo has since slowed down, appropriate for volume and IVF administered. Urine / serum studies 12/20 6 AM resembled DI, those drawn in the early afternoon did not. Recommendations  1. Stop IV D5 1/2 NS  2. Continue tube feed at current rate  3. Increase free water flush to 200 cc every 4 hours, further adjustments as warranted over the next several days  4. Supplement phosphorus IV, over 6 hours (not 4)  5. Follow labs, UO  6.  Avoid nephrotoxins     Discussed with Dr. Christiane Catherine    Electronically signed by King Bain MD on 12/26/2020

## 2020-12-26 NOTE — FLOWSHEET NOTE
Spoke with pt son Mariel Navarro . Update on condition given.  Son has a question for the Dr. In the morning reguarding Vit  D meds

## 2020-12-26 NOTE — PROGRESS NOTES
Critical Care Team - Daily Progress Note         Date and time: 12/26/2020 8:17 AM  Patient's name:  Henry Burris  Medical Record Number: [id number]  Patient's account/billing number: [de-identified]  Patient's YOB: 1950  Age: 65 y.o. Date of Admission: 12/13/2020 10:51 AM  Length of stay during current admission: 13      Primary Care Physician: Andrew Rojas DO  ICU Attending Physician: Dr. Karin Medel    Code Status: Full Code    Reason for ICU admission: respiratory failure   Shock   Covid 19 pneumonitis       SUBJECTIVE:     OVERNIGHT EVENTS:         12/16: Overnight able to titrate down some of the vasopressors though still intermittently hypotensive and requiring vasopressin as well as Levophed. Still sedated with fentanyl and Versed. Hyponatremia notable at 119 new today. 12/17: Patient was able to be titrated off the vasopressors. Is now on midodrine. Minimally hypothermic overnight now on Melissa hugger with improvement in temperature. Continue sedation with fentanyl and Versed. 12/18: Patient continues on the ventilator. Back on small amount of Levophed in addition to the vasopressin. Hoping to wean sedation more today. 12/19: Back on levophed overnight. Initially able to titrate fio2 to 40%, became hypoxic and increased to 60%. Apparently their were several episodes of bradycardia overnight. Patient has pacemaker which did not fire per nursing. Follows with Dr. Prerna Schneider for cardiology. 12/20: Patient: 6 L overnight, nephrology ordered K-Phos, and half-normal saline, will CT her head today. 12/21: Patient tolerated being supine overnight. Plan for PICC line today. Pacemaker interrogated with no runs of V. tach or other rhythm needing acute intervention. 12/22: Patient remained supine without difficulty. Continuing to try to wean down her FiO2. Her sodium has improved. 12/23: Patient continues to tolerate the vent. She is waking up more.   Sodium is much 29 90 %   12/26/20 0700 -- -- -- 96 (!) 35 --   12/26/20 0600 (!) 168/85 -- -- 101 (!) 32 91 %   12/26/20 0500 (!) 185/93 -- -- 108 (!) 31 (!) 89 %   12/26/20 0400 (!) 166/75 97.5 °F (36.4 °C) Esophageal 95 28 91 %   12/26/20 0320 -- -- -- 97 21 93 %   12/26/20 0300 (!) 162/73 -- -- 100 28 93 %         Intake/Output Summary (Last 24 hours) at 12/26/2020 0817  Last data filed at 12/26/2020 0600  Gross per 24 hour   Intake 3585 ml   Output 5760 ml   Net -2175 ml     Wt Readings from Last 2 Encounters:   12/26/20 216 lb 0.8 oz (98 kg)   12/08/20 188 lb 9 oz (85.5 kg)     Body mass index is 34.87 kg/m². PHYSICAL EXAMINATION:  General: ill appearing, sedated, lying in gurney in no distress supine  HEENT: Pupils are equal round and reactive to light, there are no oral lesions and no post-nasal drip   Neck: supple without adenopathy  Cardiovascular: regular rate and rhythm without murmur or gallop  Respiratory:  Decreased breath sounds bilaterally without wheezing or crackles. Air entry is symmetric  Abdomen: soft, non-tender, non-distended, normal bowel sounds  Extremities: warm, no edema, no clubbing   Skin: no rash or lesion  Neurologic:  Sedated, intubated, pupils equal round reactive, looking around the room occasionally.         Any additional physical findings:    MEDICATIONS:    Scheduled Meds:   enoxaparin  1 mg/kg Subcutaneous BID    hydrocortisone sodium succinate PF  50 mg Intravenous Q12H    vitamin C  500 mg Oral BID    thiamine  100 mg Oral Daily    zinc sulfate  50 mg Oral Daily    heparin flush  3 mL Intravenous 2 times per day    insulin glargine  25 Units Subcutaneous Daily    docusate  100 mg Oral Daily    senna  5 mL Oral Nightly    midodrine  10 mg Oral TID    insulin lispro  0-18 Units Subcutaneous Q6H    pantoprazole  40 mg Intravenous Daily    And    sodium chloride (PF)  10 mL Intravenous Daily    ipratropium-albuterol  1 ampule Inhalation Q4H WA    budesonide  500 mcg Nebulization BID    Arformoterol Tartrate  15 mcg Nebulization BID    clopidogrel  75 mg Oral Daily    chlorhexidine  15 mL Mouth/Throat BID    sodium chloride flush  10 mL Intravenous 2 times per day    Vitamin D  2,000 Units Oral Daily    gabapentin  800 mg Oral TID    levothyroxine  100 mcg Oral Daily    atorvastatin  20 mg Oral Daily    acetaminophen  650 mg Rectal Once     Continuous Infusions:   fentaNYL 5 mcg/ml in 0.9%  ml infusion 100 mcg/hr (12/26/20 0580)    midazolam 3 mg/hr (12/25/20 2950)     PRN Meds:       metoprolol, 5 mg, Q6H PRN      sodium chloride flush, 10 mL, PRN      heparin flush, 3 mL, PRN      potassium chloride, 20 mEq, PRN      fentanNYL, 25 mcg, Q3H PRN      promethazine, 12.5 mg, Q6H PRN    Or      ondansetron, 4 mg, Q6H PRN      polyethylene glycol, 17 g, Daily PRN      acetaminophen, 650 mg, Q6H PRN    Or      acetaminophen, 650 mg, Q6H PRN          VENT SETTINGS (Comprehensive) (if applicable):  Vent Information  $Ventilation: $Subsequent Day  Skin Assessment: Clean, dry, & intact  Equipment ID: [id number]  Equipment Changed: (S) HME(changed)  Vent Type: [id number]  Vent Mode: AC/VC+  Vt Ordered: 450 mL  Rate Set: 24 bmp  Peak Flow: 0 L/min  Pressure Support: 0 cmH20  FiO2 : (S) 55 %  SpO2: (!) 89 %  SpO2/FiO2 ratio: 161.82  PaO2/FiO2 ratio: 102  Sensitivity: 2  PEEP/CPAP: 10  I Time/ I Time %: 0 s  Humidification Source: HME  Mask Type: Full face mask  Mask Size: Small  Additional Respiratory  Assessments  Pulse: 91  Resp: 25  SpO2: (!) 89 %  Position: Semi-Marrero's  Humidification Source: HME  Oral Care: Mouth swabbed, Mouth moisturizer, Mouth suctioned  Subglottic Suction Done?: Yes  Cuff Pressure (cm H2O): 29 cm H2O    ABGs:   Recent Labs     12/26/20  0556   PH 7.437   PCO2 43.4   PO2 61.9*   HCO3 28.6*   BE 4.0*   O2SAT 91.9*       Laboratory findings:    Complete Blood Count:   Recent Labs     12/24/20  0615 12/25/20  0610 12/26/20  0530   WBC 5.5 6.6 10.9 HGB 8.1* 9.5* 10.7*   HCT 27.1* 30.9* 35.0   * 148 170        Last 3 Blood Glucose:   Recent Labs     12/25/20  0610 12/25/20  1825 12/26/20  0530   GLUCOSE 170* 134* 169*        PT/INR:    Lab Results   Component Value Date    PROTIME 11.4 12/13/2020    PROTIME 11.9 12/15/2011    INR 1.0 12/13/2020     PTT:    Lab Results   Component Value Date    APTT 21.6 12/13/2020       Comprehensive Metabolic Profile:   Recent Labs     12/25/20  0610 12/25/20  1825 12/26/20  0530    138 137   K 3.7 2.8* 3.5    102 100   CO2 33* 33* 31*   BUN 13 12 11   CREATININE 0.3* 0.3* 0.3*   GLUCOSE 170* 134* 169*   CALCIUM 7.0* 6.6* 7.2*   PROT 4.8*  --  5.4*   LABALBU 3.1*  --  3.5   BILITOT 0.5  --  0.6   ALKPHOS 71  --  85   AST 20  --  25   ALT 16  --  19      Magnesium:   Lab Results   Component Value Date    MG 2.0 12/26/2020     Phosphorus:   Lab Results   Component Value Date    PHOS 2.1 12/26/2020     Ionized Calcium: No results found for: CAION     Urinalysis:     Troponin:   No results for input(s): TROPONINI in the last 72 hours. Microbiology:    Cultures during this admission:     Blood cultures:                 [] None drawn      [x] Negative             []  Positive (Details:  )  Urine Culture:                   [] None drawn      [] Negative             []  Positive (Details:  )  Sputum Culture:               [] None drawn       [x] Negative             []  Positive (Details:  )   Endotracheal aspirate:     [] None drawn       [] Negative             []  Positive (Details:  )     Other pertinent Labs:   COVID-19 + December 13  MRSA negative screening   radiology/Imaging:     Chest Xray (12/26/2020):       Right PICC line with tip at the junction of the subclavian and SVC   Extensive bilateral pulmonary infiltrates unchanged   ET and NG tubes appear in satisfactory position             ASSESSMENT:      1. Acute hypoxic respiratory failure   2. Covid pneumonitis - critically severe   3.  Shock- septic 4. Adrenal insufficiency  5. Hyponatremia   6. Hypokalemia   7. Leukocytosis    8. COPD  9. pvd  10. chf not in acute exacerbatino   11. Hx lacunar infarct   12. SSS s/p pacemaker   13. Hx seizure do   14. Bilateral carotid stenosis       SYSTEMS ASSESSMENT    Neuro   Intubated, sedated on Versed and fentanyl - wean as tolerated  No longer paralyzed  Previous history of lacunar stroke  CT head on admission showed no acute abnormalities  Agitated with sedation vacation. Not following commands    Respiratory   Acute respiratory failure with hypoxia secondary to COVID-19 viral pneumonia  Intubated, wean sedation as tolerated  Course of Cefepime completed for possible superimposed bacterial pneumonia   Wean oxygen as tolerated. Keep O2 sat 90-92%  abg daily  Day 11 on ventilator trending toward trach and peg if unable to wean. Cardiovascular   Shock, septic  Increase midodrine to 15 tid   -now off of levophed  Previous atrial fib/flutter -amiodarone drip  Pacemaker interrogated that showed several episodes of supraventricular tachycardia over the last couple of months but no persistent shockable rhythm  Continue plavix, hx of CAD with stent      Gastrointestinal   GI prophylaxis with Protonix  Diet per dietary recs   Colace/senna for constipation    Renal   Hyponatremia -resolved   -Continue to trend metabolic panel   -Nephrology following for fluid recommendations intravenously  Replete potassium as needed  bumex . 5mg iv diuresis     Infectious Disease   COVID-19 viral pneumonia   Received remdesivir, Toci, vit D, vit c, thiamine   Septic shock - improving  Possible superimposed bacterial pneumonia   -Completed course of cefepime  Blood cultures and respiratory culture negative thus far    Hematology/Oncology   Anemia on admission, trend H&H, improving  CBC daily  lovenox 1mg/kg bid - rising ddimer    Endocrine   high-dose sliding scale Humalog for hyperglycemia  Start lantus   -better glycemic control noted  Extremities: no clubbing/cyanosis/edema     ASSESSMENT:  1.) SARS-CoV2 Viral Pneumonia, COVID-19 Pneumonia   2.) Acute Respiratory Failure with Hypoxia   3.) Macrocytosis   4.) Superimposed Bacterial Pneumonia   5.) Sick Sinus Syndrome - s/p pacemaker insertion   6.) Atrial Fibrillation with RVR - amiodarone drip  7.) Severe Sepsis with Septic Shock - resolved      In addition the following applies:     Check: N/A  Medication Alterations: bumex 0.5 mg IV x 1  Procedures: N/A  Imaging: reviewed  New Consultations: N/A  VENT: ACVC (DAY 11) 24/450/55/10 (FiO2 to 70%, PEEP to 12)     Access: Right PICC, Left Brachial Arterial Line   Consults: Nephrology, Pulmonary    Drips: Fentanyl, Versed  Nutrition: Tube Feeds  ABX: N/A     - wean vent as able, patient not ready as of 12/24/2020 and 12/25/2020  - likely for trach/PEG placement   - lovenox to 1mg/kg BID     Thank you for allowing me to participate in the care of this patient. Care reviewed with nursing staff, medical and surgical specialty care, primary care and the patient's family as available. Restraints are ordered when the patient can do harm to him/herself by pulling out devices. Critical Care Time: > 35 minutes excluding procedures    Yuriy Morgan M.D.     Yuriy Morgan  12/26/2020  12:45 PM

## 2020-12-27 NOTE — PROGRESS NOTES
Subjective: Intubated sedated mechanically ventilated more alert  Discussed with staff    Objective:    BP (!) 145/62   Pulse 94   Temp 98.2 °F (36.8 °C) (Esophageal)   Resp 22   Ht 5' 6\" (1.676 m)   Wt 220 lb 3.8 oz (99.9 kg)   SpO2 92%   BMI 35.55 kg/m²     24HR INTAKE/OUTPUT:      Intake/Output Summary (Last 24 hours) at 12/27/2020 1115  Last data filed at 12/27/2020 0800  Gross per 24 hour   Intake 4395.15 ml   Output 2680 ml   Net 1715.15 ml       General appearance: Intubated sedated more alert mechanically ventilated supine  Neck: FROM, supple   Lungs: Clear bilaterally no wheezes, no rhonchi, no crackles  CV: RRR, no MRGs; normal carotid upstroke and amplitude without Bruits  Abdomen: Soft, non-tender; no masses or HSM  Extremities: No edema, no cyanosis, no clubbing  Skin: Intact no rash, no lesions, no ulcers    Psych: not Alert and oriented normal affect  Neuro: Nonfocal  In consideration of [facility] isolation policies and efforts to preserve PPE physical exam is limited. Exam findings shared among providers. Most Recent Labs  Lab Results   Component Value Date    WBC 8.4 12/27/2020    HGB 10.3 (L) 12/27/2020    HCT 32.9 (L) 12/27/2020     12/27/2020     12/27/2020    K 3.4 (L) 12/27/2020     12/27/2020    CREATININE 0.3 (L) 12/27/2020    BUN 11 12/27/2020    CO2 32 (H) 12/27/2020    GLUCOSE 145 (H) 12/27/2020    ALT 17 12/27/2020    AST 21 12/27/2020    INR 1.0 12/13/2020    TSH 0.381 12/03/2020    LABA1C 5.8 04/05/2017     Recent Labs     12/27/20  0612   MG 2.0     Lab Results   Component Value Date    CALCIUM 6.9 (L) 12/27/2020    PHOS 1.9 (L) 12/27/2020        XR CHEST PORTABLE   Final Result   1. Extensive bilateral airspace disease and small left pleural effusion,   unchanged. 2.  Stable position of support tubes and right-sided PICC line.       XR CHEST PORTABLE   Final Result   Extensive bilateral pulmonary infiltrates unchanged   ET and other life support devices appear in satisfactory position      XR CHEST PORTABLE   Final Result   ET tube in satisfactory position   Bilateral pulmonary infiltrates are unchanged is      XR CHEST PORTABLE   Final Result   Stable bilateral airspace disease which may represent pulmonary edema. XR CHEST PORTABLE   Final Result   1. Good position right-sided PICC line. 2.  Otherwise stable findings. Bilateral airspace infiltrates/edema, worse   on the right. XR CHEST PORTABLE   Final Result   Right PICC line with tip at the junction of the subclavian and SVC   Extensive bilateral pulmonary infiltrates unchanged   ET and NG tubes appear in satisfactory position      XR CHEST PORTABLE   Final Result   Bilateral airspace disease which is not appreciably changed and is likely   related to pulmonary edema. XR CHEST PORTABLE   Final Result   Mildly progressive bilateral infiltrates. Improving left pleural effusion      XR CHEST PORTABLE   Final Result   Mildly waning airspace disease with slight improvement of aeration   bilaterally. XR CHEST PORTABLE   Final Result   Diffuse infiltrate, improved in the upper lungs as compared to prior. There is new consolidation in the left lower lobe with possible left pleural   effusion. XR CHEST PORTABLE   Final Result   Endotracheal tube in low position, can be pulled back about 3 cm. NG tube in good position. Pattern of bilateral interstitial infiltrate in the perihilar regions or the   periphery of the lung as above commented. The pulmonary edema versus   bilateral viral pneumonia will be part of the differential diagnosis. Please   correlate clinically. T   Preliminary report given to Dr. Jhonny Marie at the time   of the interpretation. XR ABDOMEN FOR NG/OG/NE TUBE PLACEMENT   Final Result   Enteric feeding tube partially imaged below the diaphragm and likely within   the stomach.       XR CHEST PORTABLE   Final Result   Increased interstitial markings throughout both lungs along with   patchy/consolidative airspace opacities noted bilaterally as above. Findings   are suspicious for multifocal pneumonia or pulmonary edema. CTA PULMONARY W CONTRAST   Final Result   No central pulmonary embolism or aortic dissection. Progressive diffuse bilateral patchy and ground-glass infiltrates concerning   for progressive multifocal pneumonia/viral infection. XR CHEST PORTABLE   Final Result   Progressive diffuse bilateral infiltrates and pleural effusion likely   CHF/edema and or diffuse pneumonia. CT HEAD WO CONTRAST    (Results Pending)       Assessment    Active Problems:    Acute respiratory failure with hypoxia (HCC)    Pneumonia due to COVID-19 virus    Septic shock (HCC)    Atrial fibrillation with RVR (HCC)  Resolved Problems:    * No resolved hospital problems. *      Plan:     1.  Acute respiratory failure with hypoxia  - COVID 19 PNA / bacterial/ HF. CXR improved 12/17  -Intubated sedated mechanically ventilated   -Prone not paralyzed             Hydrocortisone IV + vit D   Remdesivir completed              Diffuse bilateral patchy GGO on CT, no PE                         cefepime completed              Fentanyl   Likely PEG and trach    2. Shock -likely septic secondary to viral sepsis              Currently off   levophed, vasopressin off wean off   Midodrine              TLC inserted              Blood cultures sent, no evidence of UTI   Completed cefepime              Wean hydrocortisone,cont midodrine    3. Anemia - resolved              NDWOKV false lab value    4. Hypothyroidism - continue synthroid    5.  Hyponatremia -improved    nephrology consult appreciated              IVF resuscitation              Continue to monitor labs closely       A. fib RVR amiodarone drip    Palliative care consult appreciated    Electronically signed by Genny Mcgee MD on 12/27/2020 at 11:15 AM

## 2020-12-27 NOTE — PROGRESS NOTES
Attending Physician Attestation: Dr. Vira Mcdonough    Thank you very much for allowing me to see this patient in consultation and follow up. I personally saw, examined and provided care for the patient. Radiographs, labs and medication list were reviewed by me independently. I spoke with bedside nursing, respiratory therapists and consultants. Critical care services and times documented are independent of procedures and multidisciplinary rounds with Residents. Additionally comprehensive, multidisciplinary rounds were conducted with the MICU team. The case was discussed in detail and plans for care were established. Review of Residents documentation was conducted and revisions were made as appropriate. I agree with the the above documented information.      Physical Examination:  Vitals:   Vitals:    12/27/20 1025 12/27/20 1100 12/27/20 1200 12/27/20 1209   BP:       Pulse: 94 101 95 112   Resp: 22 20 13 (!) 40   Temp:   98.1 °F (36.7 °C)    TempSrc:   Esophageal    SpO2: 92% 92% 91% 92%   Weight:       Height:          General: No Acute Distress, intubated  HEENT: normocephalic, atruamatic  CVS: RRR, S1, S2, No S3 or S4  Abdomen: soft, NT, ND  Respiratory: decreased breath sounds at lung bases, no focal wheezes noted  Extremities: no clubbing/cyanosis/edema     ASSESSMENT:  1.) SARS-CoV2 Viral Pneumonia, COVID-19 Pneumonia   2.) Acute Respiratory Failure with Hypoxia   3.) Macrocytosis   4.) Superimposed Bacterial Pneumonia   5.) Sick Sinus Syndrome - s/p pacemaker insertion   6.) Atrial Fibrillation with RVR - amiodarone drip  7.) Severe Sepsis with Septic Shock - resolved      In addition the following applies:     Check: N/A  Medication Alterations: bumex 0.5 mg IV x 1  Procedures: N/A  Imaging: reviewed  New Consultations: N/A  VENT: ACVC (GQZ 43) to [address] [facility] [address] PICC, Left Brachial Arterial Line   Consults: Nephrology, Pulmonary, Cardiology     Drips: Fentanyl, Versed, Levophed  Nutrition: Tube Feeds  ABX: N/A     - wean vent as able, patient not ready as of 12/24/2020 and 12/25/2020  - likely for trach/PEG placement   - lovenox to 1mg/kg BID   - cardiology consultation for atrial fibrillation     Thank you for allowing me to participate in the care of this patient. Care reviewed with nursing staff, medical and surgical specialty care, primary care and the patient's family as available. Restraints are ordered when the patient can do harm to him/herself by pulling out devices. Critical Care Time: > 35 minutes excluding procedures    Amanuel Edouard M.D.     Amanuel Edouard  12/27/2020  2:15 PM

## 2020-12-27 NOTE — PROGRESS NOTES
This note also relates to the following rows which could not be included:  Vt Ordered - Cannot attach notes to unvalidated device data  Rate Set - Cannot attach notes to unvalidated device data  Peak Flow - Cannot attach notes to unvalidated device data  Pressure Support - Cannot attach notes to unvalidated device data  Sensitivity - Cannot attach notes to unvalidated device data  PEEP/CPAP - Cannot attach notes to unvalidated device data  I Time/ I Time % - Cannot attach notes to unvalidated device data  High Peep/I Pressure - Cannot attach notes to unvalidated device data  Peak Inspiratory Pressure - Cannot attach notes to unvalidated device data  Mean Airway Pressure - Cannot attach notes to unvalidated device data  Rate Measured - Cannot attach notes to unvalidated device data  Vt Exhaled - Cannot attach notes to unvalidated device data  Minute Volume - Cannot attach notes to unvalidated device data  I:E Ratio - Cannot attach notes to unvalidated device data  Pulse - Cannot attach notes to unvalidated device data  Resp - Cannot attach notes to unvalidated device data  High Pressure Alarm - Cannot attach notes to unvalidated device data       12/27/20 0022   Vent Information   FiO2  45 %   SpO2 94 %   SpO2/FiO2 ratio 208.89

## 2020-12-27 NOTE — PROGRESS NOTES
Subjective: Intubated sedated mechanically ventilated more alert  Discussed with staff  Objective:    BP (!) 168/85   Pulse 67   Temp 97 °F (36.1 °C) (Esophageal)   Resp 19   Ht 5' 6\" (1.676 m)   Wt 216 lb 0.8 oz (98 kg)   SpO2 95%   BMI 34.87 kg/m²     24HR INTAKE/OUTPUT:      Intake/Output Summary (Last 24 hours) at 12/26/2020 2019  Last data filed at 12/26/2020 1800  Gross per 24 hour   Intake 4571.15 ml   Output 4790 ml   Net -218.85 ml       General appearance: Intubated sedated more alert mechanically ventilated supine  Neck: FROM, supple   Lungs: Clear bilaterally no wheezes, no rhonchi, no crackles  CV: RRR, no MRGs; normal carotid upstroke and amplitude without Bruits  Abdomen: Soft, non-tender; no masses or HSM  Extremities: No edema, no cyanosis, no clubbing  Skin: Intact no rash, no lesions, no ulcers    Psych: not Alert and oriented normal affect  Neuro: Nonfocal  In consideration of [facility] isolation policies and efforts to preserve PPE physical exam is limited. Exam findings shared among providers.   Most Recent Labs  Lab Results   Component Value Date    WBC 10.9 12/26/2020    HGB 10.7 (L) 12/26/2020    HCT 35.0 12/26/2020     12/26/2020     12/26/2020    K 3.7 12/26/2020     12/26/2020    CREATININE 0.3 (L) 12/26/2020    BUN 12 12/26/2020    CO2 31 (H) 12/26/2020    GLUCOSE 153 (H) 12/26/2020    ALT 19 12/26/2020    AST 25 12/26/2020    INR 1.0 12/13/2020    TSH 0.381 12/03/2020    LABA1C 5.8 04/05/2017     Recent Labs     12/26/20  0530   MG 2.0     Lab Results   Component Value Date    CALCIUM 6.8 (L) 12/26/2020    PHOS 2.1 (L) 12/26/2020        XR CHEST PORTABLE   Final Result   Extensive bilateral pulmonary infiltrates unchanged   ET and other life support devices appear in satisfactory position      XR CHEST PORTABLE   Final Result   ET tube in satisfactory position   Bilateral pulmonary infiltrates are unchanged is      XR CHEST PORTABLE   Final Result   Stable bilateral airspace disease which may represent pulmonary edema. XR CHEST PORTABLE   Final Result   1. Good position right-sided PICC line. 2.  Otherwise stable findings. Bilateral airspace infiltrates/edema, worse   on the right. XR CHEST PORTABLE   Final Result   Right PICC line with tip at the junction of the subclavian and SVC   Extensive bilateral pulmonary infiltrates unchanged   ET and NG tubes appear in satisfactory position      XR CHEST PORTABLE   Final Result   Bilateral airspace disease which is not appreciably changed and is likely   related to pulmonary edema. XR CHEST PORTABLE   Final Result   Mildly progressive bilateral infiltrates. Improving left pleural effusion      XR CHEST PORTABLE   Final Result   Mildly waning airspace disease with slight improvement of aeration   bilaterally. XR CHEST PORTABLE   Final Result   Diffuse infiltrate, improved in the upper lungs as compared to prior. There is new consolidation in the left lower lobe with possible left pleural   effusion. XR CHEST PORTABLE   Final Result   Endotracheal tube in low position, can be pulled back about 3 cm. NG tube in good position. Pattern of bilateral interstitial infiltrate in the perihilar regions or the   periphery of the lung as above commented. The pulmonary edema versus   bilateral viral pneumonia will be part of the differential diagnosis. Please   correlate clinically. T   Preliminary report given to Dr. Fabiola Lundberg at the time   of the interpretation. XR ABDOMEN FOR NG/OG/NE TUBE PLACEMENT   Final Result   Enteric feeding tube partially imaged below the diaphragm and likely within   the stomach. XR CHEST PORTABLE   Final Result   Increased interstitial markings throughout both lungs along with   patchy/consolidative airspace opacities noted bilaterally as above. Findings   are suspicious for multifocal pneumonia or pulmonary edema.       CTA PULMONARY W CONTRAST   Final Result   No central pulmonary embolism or aortic dissection. Progressive diffuse bilateral patchy and ground-glass infiltrates concerning   for progressive multifocal pneumonia/viral infection. XR CHEST PORTABLE   Final Result   Progressive diffuse bilateral infiltrates and pleural effusion likely   CHF/edema and or diffuse pneumonia. CT HEAD WO CONTRAST    (Results Pending)   XR CHEST PORTABLE    (Results Pending)       Assessment    Active Problems:    Acute respiratory failure with hypoxia (HCC)    Pneumonia due to COVID-19 virus    Septic shock (HCC)    Atrial fibrillation with RVR (HCC)  Resolved Problems:    * No resolved hospital problems. *      Plan:     1.  Acute respiratory failure with hypoxia  - COVID 19 PNA / bacterial/ HF. CXR improved 12/17  -Intubated sedated mechanically ventilated   -Prone not paralyzed             Hydrocortisone IV + vit D   Remdesivir completed              Diffuse bilateral patchy GGO on CT, no PE                         cefepime completed              Fentanyl     2. Shock -likely septic secondary to viral sepsis              Currently off   levophed, vasopressin off wean off   Midodrine              TLC inserted              Blood cultures sent, no evidence of UTI   Completed cefepime              Wean hydrocortisone,cont midodrine    3. Anemia - resolved              RDVXAT false lab value    4. Hypothyroidism - continue synthroid    5.  Hyponatremia -improved    nephrology consult appreciated              IVF resuscitation              Continue to monitor labs closely       A. fib RVR amiodarone drip    Palliative care consult appreciated    Electronically signed by Ericka Alves MD on 12/26/2020 at 8:19 PM

## 2020-12-27 NOTE — PROGRESS NOTES
Critical Care Team - Daily Progress Note         Date and time: 12/27/2020 8:52 AM  Patient's name:  Nimco Zabala  Medical Record Number: [id number]  Patient's account/billing number: [de-identified]  Patient's YOB: 1950  Age: 65 y.o. Date of Admission: 12/13/2020 10:51 AM  Length of stay during current admission: 14      Primary Care Physician: Betty Martinez DO  ICU Attending Physician: Dr. Jason Lai    Code Status: Full Code    Reason for ICU admission: respiratory failure   Shock   Covid 19 pneumonitis       SUBJECTIVE:     OVERNIGHT EVENTS:         12/16: Overnight able to titrate down some of the vasopressors though still intermittently hypotensive and requiring vasopressin as well as Levophed. Still sedated with fentanyl and Versed. Hyponatremia notable at 119 new today. 12/17: Patient was able to be titrated off the vasopressors. Is now on midodrine. Minimally hypothermic overnight now on Melissa hugger with improvement in temperature. Continue sedation with fentanyl and Versed. 12/18: Patient continues on the ventilator. Back on small amount of Levophed in addition to the vasopressin. Hoping to wean sedation more today. 12/19: Back on levophed overnight. Initially able to titrate fio2 to 40%, became hypoxic and increased to 60%. Apparently their were several episodes of bradycardia overnight. Patient has pacemaker which did not fire per nursing. Follows with Dr. Wendy De La Rosa for cardiology. 12/20: Patient: 6 L overnight, nephrology ordered K-Phos, and half-normal saline, will CT her head today. 12/21: Patient tolerated being supine overnight. Plan for PICC line today. Pacemaker interrogated with no runs of V. tach or other rhythm needing acute intervention. 12/22: Patient remained supine without difficulty. Continuing to try to wean down her FiO2. Her sodium has improved. 12/23: Patient continues to tolerate the vent. She is waking up more.   Sodium is much better. No acute overnight events. : Failed weaning trial yesterday. Became agitated. Continues to tolerate the ventilator. Day 9 on the ventilator. : The patient occasionally opens her eyes. She occasionally follows commands. She gets extremely anxious when sedation is weaned further. Continues on Versed and fentanyl. : Patient continues on the ventilator. Opens eyes with sedation vacation, not really following commands. Day  on the ventilator. No other acute overnight events. : Continues on the vent. Awake on sedation, does follow commands. Day  on the ventilator.       CURRENT VENTILATION STATUS:     [x] Ventilator  [] BIPAP  [] Nasal Cannula [] Room Air      IF INTUBATED, ET TUBE MARKING AT LOWER LIP:       cms    SECRETIONS Amount:  [] Small [] Moderate  [] Large  [x] None  Color:     [] White [] Colored  [] Bloody    SEDATION:  RAAS Score:  [x] Fentanyl gtt  [x] Versed gtt  [] Ativan gtt   [] No Sedation    PARALYZED:  [x] No    [] Yes      VASOPRESSORS:  [x] No    [] Yes    If yes -   [] Levophed       [] Dopamine     [] Vasopressin       [] Dobutamine  [] Phenylephrine         [] Epinephrine    CENTRAL LINES:     [x] No   [] Yes   (Date of Insertion:  12/15 )           If yes -     [] Right IJ     [] Left IJ [] Right Femoral [] Left Femoral                   [] Right Subclavian [] Left Subclavian   PICC line    GARCIA'S CATHETER:   [] No   [x] Yes  (Date of Insertion: 12/15  )     URINE OUTPUT:            [x] Good   [] Low              [] Anuric      OBJECTIVE:     VITAL SIGNS:  BP (!) 145/62   Pulse 80   Temp 98.2 °F (36.8 °C) (Esophageal)   Resp 18   Ht 5' 6\" (1.676 m)   Wt 220 lb 3.8 oz (99.9 kg)   SpO2 91%   BMI 35.55 kg/m²   Tmax over 24 hours:  Temp (24hrs), Av.6 °F (36.4 °C), Min:97 °F (36.1 °C), Max:98.2 °F (36.8 °C)      Patient Vitals for the past 6 hrs:   BP Temp Temp src Pulse Resp SpO2   20 0800 -- 98.2 °F (36.8 °C) Esophageal 80 18 91 %   12/27/20 0753 -- -- -- 61 23 92 %   12/27/20 0752 -- -- -- -- 17 92 %   12/27/20 0750 -- -- -- -- 16 93 %   12/27/20 0749 -- -- -- -- 19 92 %   12/27/20 0700 -- -- -- 59 23 --   12/27/20 0600 -- -- -- 65 24 96 %   12/27/20 0500 -- -- -- 59 24 94 %   12/27/20 0413 -- -- -- 59 24 96 %   12/27/20 0400 (!) 145/62 97.6 °F (36.4 °C) Esophageal 59 24 96 %   12/27/20 0300 (!) 101/49 -- -- 60 23 96 %         Intake/Output Summary (Last 24 hours) at 12/27/2020 0852  Last data filed at 12/27/2020 0800  Gross per 24 hour   Intake 4395.15 ml   Output 3780 ml   Net 615.15 ml     Wt Readings from Last 2 Encounters:   12/27/20 220 lb 3.8 oz (99.9 kg)   12/08/20 188 lb 9 oz (85.5 kg)     Body mass index is 35.55 kg/m². PHYSICAL EXAMINATION:  General: ill appearing, sedated, lying in gurney in no distress supine  HEENT: Pupils are equal round and reactive to light, there are no oral lesions and no post-nasal drip   Neck: supple without adenopathy  Cardiovascular: regular rate and rhythm without murmur or gallop  Respiratory:  Decreased breath sounds bilaterally without wheezing or crackles. Air entry is symmetric  Abdomen: soft, non-tender, non-distended, normal bowel sounds, lateral bruising in the flanks, nontender to palpation in the area of the Lovenox injections  Extremities: warm, no edema, no clubbing   Skin:  Large bilateral ecchymoses on the flanks, and lower quadrants of the abdomen  Neurologic:  Sedated, intubated, pupils equal round reactive, looking around the room occasionally.         Any additional physical findings:    MEDICATIONS:    Scheduled Meds:   bumetanide  0.5 mg Intravenous Once    enoxaparin  1 mg/kg Subcutaneous BID    hydrocortisone sodium succinate PF  50 mg Intravenous Q12H    vitamin C  500 mg Oral BID    thiamine  100 mg Oral Daily    zinc sulfate  50 mg Oral Daily    heparin flush  3 mL Intravenous 2 times per day    insulin glargine  25 Units Subcutaneous Daily    docusate  100 mg Oral Daily    senna  5 mL Oral Nightly    midodrine  10 mg Oral TID    insulin lispro  0-18 Units Subcutaneous Q6H    pantoprazole  40 mg Intravenous Daily    And    sodium chloride (PF)  10 mL Intravenous Daily    ipratropium-albuterol  1 ampule Inhalation Q4H WA    budesonide  500 mcg Nebulization BID    Arformoterol Tartrate  15 mcg Nebulization BID    clopidogrel  75 mg Oral Daily    chlorhexidine  15 mL Mouth/Throat BID    sodium chloride flush  10 mL Intravenous 2 times per day    Vitamin D  2,000 Units Oral Daily    gabapentin  800 mg Oral TID    levothyroxine  100 mcg Oral Daily    atorvastatin  20 mg Oral Daily    acetaminophen  650 mg Rectal Once     Continuous Infusions:   norepinephrine 4 mcg/min (12/26/20 9640)    fentaNYL 5 mcg/ml in 0.9%  ml infusion 200 mcg/hr (12/27/20 0313)    midazolam 2 mg/hr (12/27/20 0149)     PRN Meds:       metoprolol, 5 mg, Q6H PRN      sodium chloride flush, 10 mL, PRN      heparin flush, 3 mL, PRN      potassium chloride, 20 mEq, PRN      fentanNYL, 25 mcg, Q3H PRN      promethazine, 12.5 mg, Q6H PRN    Or      ondansetron, 4 mg, Q6H PRN      polyethylene glycol, 17 g, Daily PRN      acetaminophen, 650 mg, Q6H PRN    Or      acetaminophen, 650 mg, Q6H PRN          VENT SETTINGS (Comprehensive) (if applicable):  Vent Information  $Ventilation: $Subsequent Day  Skin Assessment: Clean, dry, & intact  Equipment ID: [id number]  Equipment Changed: (S) HME(changed)  Vent Type: [id number]  Vent Mode: AC/VC+  Vt Ordered: 450 mL  Rate Set: 24 bmp  Peak Flow: 0 L/min  Pressure Support: 0 cmH20  FiO2 : 45 %  SpO2: 91 %  SpO2/FiO2 ratio: 204.44  PaO2/FiO2 ratio: 102  Sensitivity: 2  PEEP/CPAP: 12  I Time/ I Time %: 0 s  Humidification Source: HME  Mask Type: Full face mask  Mask Size: Small  Additional Respiratory  Assessments  Pulse: 80  Resp: 18  SpO2: 91 %  Position: Semi-Marrero's  Humidification Source: HME  Oral Care: Lip moisturizer applied, Mouth swabbed, Mouth moisturizer, Mouth suctioned  Subglottic Suction Done?: Yes  Cuff Pressure (cm H2O): 29 cm H2O    ABGs:   Recent Labs     12/27/20 0620   PH 7.452*   PCO2 42.4   PO2 62.3*   HCO3 28.9*   BE 4.5*   O2SAT 92.0       Laboratory findings:    Complete Blood Count:   Recent Labs     12/25/20  0610 12/26/20  0530 12/27/20  0612   WBC 6.6 10.9 8.4   HGB 9.5* 10.7* 10.3*   HCT 30.9* 35.0 32.9*    170 174        Last 3 Blood Glucose:   Recent Labs     12/26/20  0530 12/26/20  1816 12/27/20  0612   GLUCOSE 169* 153* 145*        PT/INR:    Lab Results   Component Value Date    PROTIME 11.4 12/13/2020    PROTIME 11.9 12/15/2011    INR 1.0 12/13/2020     PTT:    Lab Results   Component Value Date    APTT 21.6 12/13/2020       Comprehensive Metabolic Profile:   Recent Labs     12/26/20  0530 12/26/20 1816 12/27/20 0612    137 138   K 3.5 3.7 3.4*    102 103   CO2 31* 31* 32*   BUN 11 12 11   CREATININE 0.3* 0.3* 0.3*   GLUCOSE 169* 153* 145*   CALCIUM 7.2* 6.8* 6.9*   PROT 5.4*  --  4.9*   LABALBU 3.5  --  3.2*   BILITOT 0.6  --  0.6   ALKPHOS 85  --  78   AST 25  --  21   ALT 19  --  17      Magnesium:   Lab Results   Component Value Date    MG 2.0 12/27/2020     Phosphorus:   Lab Results   Component Value Date    PHOS 1.9 12/27/2020     Ionized Calcium: No results found for: CAION     Urinalysis:     Troponin:   Recent Labs     12/26/20  0845   TROPONINI <0.01       Microbiology:    Cultures during this admission:     Blood cultures:                 [] None drawn      [x] Negative             []  Positive (Details:  )  Urine Culture:                   [] None drawn      [] Negative             []  Positive (Details:  )  Sputum Culture:               [] None drawn       [x] Negative             []  Positive (Details:  )   Endotracheal aspirate:     [] None drawn       [] Negative             []  Positive (Details:  )     Other pertinent Labs:   COVID-19 + December 13  MRSA negative screening radiology/Imaging:     Chest Xray (12/27/2020):       Right PICC line with tip at the junction of the subclavian and SVC   Extensive bilateral pulmonary infiltrates unchanged   ET and NG tubes appear in satisfactory position             ASSESSMENT:      1. Acute hypoxic respiratory failure   2. Covid pneumonitis - critically severe   3. Shock- septic   4. Adrenal insufficiency  5. Hyponatremia   6. Hypokalemia   7. Leukocytosis    8. COPD  9. pvd  10. chf not in acute exacerbatino   11. Hx lacunar infarct   12. SSS s/p pacemaker   13. Hx seizure do   14. Bilateral carotid stenosis       SYSTEMS ASSESSMENT    Neuro   Intubated, sedated on Versed and fentanyl - wean as tolerated  No longer paralyzed  Previous history of lacunar stroke  CT head on admission showed no acute abnormalities  Agitated with sedation vacation. Not following commands    Respiratory   Acute respiratory failure with hypoxia secondary to COVID-19 viral pneumonia  Intubated, wean sedation as tolerated  Course of Cefepime completed for possible superimposed bacterial pneumonia   Wean oxygen as tolerated. Keep O2 sat 90-92%  abg daily  Day 11 on ventilator trending toward trach and peg if unable to wean. Cardiovascular   Shock, septic  Increase midodrine to 15 tid   -now off of levophed  Previous atrial fib/flutter -amiodarone drip  Pacemaker interrogated that showed several episodes of supraventricular tachycardia over the last couple of months but no persistent shockable rhythm  Continue plavix, hx of CAD with stent      Gastrointestinal   GI prophylaxis with Protonix  Diet per dietary recs   Colace/senna for constipation    Renal   Hyponatremia -resolved   -Continue to trend metabolic panel   -Nephrology following for fluid recommendations intravenously  Replete potassium as needed  bumex . 5mg iv diuresis     Infectious Disease   COVID-19 viral pneumonia   Received remdesivir, Toci, vit D, vit c, thiamine   Septic shock - improving  Possible superimposed bacterial pneumonia   -Completed course of cefepime  Blood cultures and respiratory culture negative thus far    Hematology/Oncology   Anemia on admission, trend H&H, improving  CBC daily  lovenox 1mg/kg bid - rising ddimer    Endocrine   high-dose sliding scale Humalog for hyperglycemia  Start lantus   -better glycemic control today  Monitor growth closely  Cortisol on admission 1.42  High-dose Solucortef decreased to 50 q 8 hrs    Social/Spiritual/DNR/Other   Full code. Palliative consulted. ASSESSMENT/ PLAN     1. Try weaning sedation and off vent again today; hopefully extubate at some point versus trach and PEG  2. Hyponatremia -resolved- nephrology following  3.   midodrine TID -started Levophed last night  4. decrease solucortef  5. Amiodarone drip for A. fib with RVR  6. Palliative following -day 11 on the ventilator, if unable to liberate from ventilator may need trach and PEG  7. Rising ddimer, full dose lovenox bid  8. bumex iv diuresis again today  9. Flank and abdominal bruising at Lovenox injection sites, continue to monitor blood counts-May hold Lovenox if bruising worsens  10. Continue ICU level of care      Samantha Bruce MD PGY-1  12/27/2020 1:58 PM    Attending Physician Attestation: Dr. María Patricio     Thank you very much for allowing me to see this patient in consultation and follow up.     I personally saw, examined and provided care for the patient. Radiographs, labs and medication list were reviewed by me independently. I spoke with bedside nursing, respiratory therapists and consultants. Critical care services and times documented are independent of procedures and multidisciplinary rounds with Residents. Additionally comprehensive, multidisciplinary rounds were conducted with the MICU team. The case was discussed in detail and plans for care were established. Review of Residents documentation was conducted and revisions were made as appropriate.  I agree with the the above documented information.      Physical Examination:  Vitals:   Vitals          Vitals:     12/27/20 1025 12/27/20 1100 12/27/20 1200 12/27/20 1209   BP:           Pulse: 94 101 95 112   Resp: 22 20 13 (!) 40   Temp:     98.1 °F (36.7 °C)     TempSrc:     Esophageal     SpO2: 92% 92% 91% 92%   Weight:           Height:                 General: No Acute Distress, intubated  HEENT: normocephalic, atruamatic  CVS: RRR, S1, S2, No S3 or S4  Abdomen: soft, NT, ND  Respiratory: decreased breath sounds at lung bases, no focal wheezes noted  Extremities: no clubbing/cyanosis/edema     ASSESSMENT:  1.) SARS-CoV2 Viral Pneumonia, COVID-19 Pneumonia   2.) Acute Respiratory Failure with Hypoxia   3.) Macrocytosis   4.) Superimposed Bacterial Pneumonia   5.) Sick Sinus Syndrome - s/p pacemaker insertion   6.) Atrial Fibrillation with RVR - amiodarone drip  7.) Severe Sepsis with Septic Shock - resolved      In addition the following applies:     Check: N/A  Medication Alterations: bumex 0.5 mg IV x 1  Procedures: N/A  Imaging: reviewed  New Consultations: N/A  VENT: ACVC (EPA 76) to [address], Left Brachial Arterial Line   Consults: Nephrology, Pulmonary, Cardiology     Drips: Fentanyl, Versed, Levophed  Nutrition: Tube Feeds  ABX: N/A     - wean vent as able, patient not ready as of 12/24/2020 and 12/25/2020  - likely for trach/PEG placement   - lovenox to 1mg/kg BID   - cardiology consultation for atrial fibrillation      Thank you for allowing me to participate in the care of this patient.     Care reviewed with nursing staff, medical and surgical specialty care, primary care and the patient's family as available. Restraints are ordered when the patient can do harm to him/herself by pulling out devices.     Critical Care Time: > 35 minutes excluding procedures    Layne Chavarria M.D.     Layne Chavarria  12/27/2020  2:15 PM

## 2020-12-27 NOTE — PROGRESS NOTES
Placed patient on pressure support to adapt to patient synchrony with the ventilator. Tidal volumes and respiratory rate appropriate. Will try Pressure control mode when patient needs to rest, per Dr Ilia Lopez.

## 2020-12-28 NOTE — ANESTHESIA PRE PROCEDURE
Department of Anesthesiology  Preprocedure Note       Name:  Delfino Ordaz   Age:  65 y.o.  :  1950                                          MRN:  [id number]         Date:  2020      Surgeon: Lashell Lou):  DO Peggy Garza DO    Procedure: Procedure(s): TRACHEOSTOMY / PEG  Medications prior to admission:   Prior to Admission medications    Medication Sig Start Date End Date Taking? Authorizing Provider   zinc gluconate 50 MG tablet Take 50 mg by mouth daily    Historical Provider, MD   zinc oxide 20 % ointment Apply topically as needed for Dry Skin Apply topically as needed to buttocks    Historical Provider, MD   acetaminophen (TYLENOL) 325 MG tablet Take 650 mg by mouth every 6 hours as needed for Pain or Fever    Historical Provider, MD   pantoprazole (PROTONIX) 40 MG tablet Take 40 mg by mouth daily    Historical Provider, MD   oxyCODONE-acetaminophen (PERCOCET)  MG per tablet Take 1 tablet by mouth every 4 hours as needed for Pain. Historical Provider, MD   magnesium hydroxide (MILK OF MAGNESIA) 400 MG/5ML suspension Take 30 mLs by mouth daily as needed for Constipation    Historical Provider, MD   levoFLOXacin (LEVAQUIN) 750 MG tablet Take 750 mg by mouth daily    Historical Provider, MD   L-Methylfolate-B6-B12 3-35-2 MG TABS Take by mouth daily 3-35-2 mg    Historical Provider, MD   atorvastatin (LIPITOR) 20 MG tablet Take 20 mg by mouth nightly    Historical Provider, MD   acetylcysteine (MUCOMYST) 20 % nebulizer solution Take 70 mg/kg by mouth 3 times daily as needed    Historical Provider, MD   Respiratory Therapy Supplies (FULL KIT NEBULIZER SET) MISC Use as directed with nebulized medication.  20   ELLIE Villarreal - CNP   Arformoterol Tartrate (BROVANA) 15 MCG/2ML NEBU Take 2 mLs by nebulization 2 times daily 20   ELLIE Villarreal - GERONIMO budesonide (PULMICORT) 0.5 MG/2ML nebulizer suspension Take 2 mLs by nebulization 2 times daily 12/8/20   Helen Manifold, APRN - CNP   ipratropium-albuterol (DUONEB) 0.5-2.5 (3) MG/3ML SOLN nebulizer solution Inhale 3 mLs into the lungs 4 times daily 12/8/20   Helen Manifold, APRN - CNP   guaiFENesin 400 MG tablet Take 1 tablet by mouth three times daily 12/8/20   Helen Manifold, APRN - CNP   clopidogrel (PLAVIX) 75 MG tablet Take 1 tablet by mouth daily 8/12/20   Angela Page MD   ranolazine (RANEXA) 500 MG extended release tablet Take 1 tablet by mouth 2 times daily 7/19/20   Jacinto Palacios DO   metoprolol succinate (TOPROL XL) 25 MG extended release tablet Take 25 mg by mouth daily    Historical Provider, MD   K-syydkvvyxnvp-P5-B12 Eddemetra Jeancarlos) 2-60.940-1-65 MG CAPS capsule Take by mouth daily    Historical Provider, MD   levothyroxine (SYNTHROID) 112 MCG tablet Take 100 mcg by mouth Daily     Historical Provider, MD   gabapentin (NEURONTIN) 800 MG tablet Take 800 mg by mouth three times daily 11/14/16   Historical Provider, MD   aspirin 81 MG EC tablet Take 81 mg by mouth daily    Historical Provider, MD       Current medications:    No current facility-administered medications for this visit. No current outpatient medications on file.      Facility-Administered Medications Ordered in Other Visits   Medication Dose Route Frequency Provider Last Rate Last Admin    norepinephrine (LEVOPHED) 16 mg in dextrose 5 % 250 mL infusion  2 mcg/min Intravenous Continuous Zack Cassidy DO 4.7 mL/hr at 12/28/20 1117 5 mcg/min at 12/28/20 1117    bumetanide (BUMEX) injection 0.5 mg  0.5 mg Intravenous Once Priscilla Johnston MD        [Held by provider] enoxaparin (LOVENOX) injection 100 mg  1 mg/kg Subcutaneous BID Priscilla Johnston MD   100 mg at 12/27/20 0844    hydrocortisone sodium succinate PF (SOLU-CORTEF) injection 50 mg  50 mg Intravenous Q12H Priscilla Johnston MD   50 mg at 12/28/20 0901  metoprolol (LOPRESSOR) injection 5 mg  5 mg Intravenous Q6H PRN Roxana Barrera MD   5 mg at 12/27/20 1257    ascorbic acid (VITAMIN C) tablet 500 mg  500 mg Oral BID Roxana Barrera MD   500 mg at 12/28/20 0857    thiamine tablet 100 mg  100 mg Oral Daily Roxana Barrera MD   100 mg at 12/28/20 6527    zinc sulfate (ZINCATE) capsule 50 mg  50 mg Oral Daily Roxana Barrera MD   50 mg at 12/28/20 5422    sodium chloride flush 0.9 % injection 10 mL  10 mL Intravenous PRN Shahrzad Breen MD   40 mL at 12/21/20 1000    heparin flush 100 UNIT/ML injection 300 Units  3 mL Intravenous 2 times per day Shahrzad Breen MD   300 Units at 12/27/20 0845    heparin flush 100 UNIT/ML injection 300 Units  3 mL Intracatheter PRN Shahrzad Breen MD        insulin glargine (LANTUS) injection vial 25 Units  25 Units Subcutaneous Daily Shahrzad Breen MD   25 Units at 12/28/20 0913    docusate (COLACE) 50 MG/5ML liquid 100 mg  100 mg Oral Daily Shahrzad Breen MD   100 mg at 12/28/20 1003    senna (SENOKOT) 8.8 MG/5ML syrup 8.8 mg  5 mL Oral Nightly Shahrzad Breen MD   8.8 mg at 12/27/20 2127    midodrine (PROAMATINE) tablet 10 mg  10 mg Oral TID Shahrzad Breen MD   10 mg at 12/28/20 1456    insulin lispro (HUMALOG) injection vial 0-18 Units  0-18 Units Subcutaneous Q6H Shahrzad Breen MD   3 Units at 12/27/20 0031    fentaNYL 5 mcg/ml in 0.9%  ml infusion  150 mcg/hr Intravenous Continuous Roxana Barrera MD 40 mL/hr at 12/28/20 1222 200 mcg/hr at 12/28/20 1222    pantoprazole (PROTONIX) injection 40 mg  40 mg Intravenous Daily Margarite Aw, DO   40 mg at 12/28/20 0766    And    sodium chloride (PF) 0.9 % injection 10 mL  10 mL Intravenous Daily Margarite Aw, DO   10 mL at 12/28/20 0908    ipratropium-albuterol (DUONEB) nebulizer solution 1 ampule  1 ampule Inhalation Q4H Madhu Tucker MD   1 ampule at 12/28/20 1112  potassium chloride 20 mEq/50 mL IVPB (Central Line)  20 mEq Intravenous PRN Adelaida aHrgrove MD 50 mL/hr at 12/28/20 1058 20 mEq at 12/28/20 1058    budesonide (PULMICORT) nebulizer suspension 500 mcg  500 mcg Nebulization BID Adelaida Hargrove MD   500 mcg at 12/28/20 0854    Arformoterol Tartrate (BROVANA) nebulizer solution 15 mcg  15 mcg Nebulization BID Adelaida Hargrove MD   15 mcg at 12/28/20 1463    midazolam (VERSED) 1 mg/mL in D5W infusion  5 mg/hr Intravenous Continuous Keven Tejada MD 1 mL/hr at 12/27/20 2257 1 mg/hr at 12/27/20 2257    [Held by provider] clopidogrel (PLAVIX) tablet 75 mg  75 mg Oral Daily Lisseth Meredith DO   75 mg at 12/27/20 0844    chlorhexidine (PERIDEX) 0.12 % solution 15 mL  15 mL Mouth/Throat BID Favian Sánchez MD   15 mL at 12/28/20 0858    fentaNYL (SUBLIMAZE) injection 25 mcg  25 mcg Intravenous Q3H PRN Favian Sánchez MD   25 mcg at 12/15/20 1117    sodium chloride flush 0.9 % injection 10 mL  10 mL Intravenous 2 times per day Ambrocio Olson MD   10 mL at 12/28/20 0908    promethazine (PHENERGAN) tablet 12.5 mg  12.5 mg Oral Q6H PRN Ambrocio Olson MD        Or    ondansetron (ZOFRAN) injection 4 mg  4 mg Intravenous Q6H PRN Ambrocio Olson MD        polyethylene glycol (GLYCOLAX) packet 17 g  17 g Oral Daily PRN Ambrocio Olson MD   17 g at 12/19/20 0858    acetaminophen (TYLENOL) tablet 650 mg  650 mg Oral Q6H PRN Ambrocio Olson MD   650 mg at 12/28/20 4365    Or    acetaminophen (TYLENOL) suppository 650 mg  650 mg Rectal Q6H PRN Ambrocio Olson MD        Vitamin D (CHOLECALCIFEROL) tablet 2,000 Units  2,000 Units Oral Daily Ambrocio Olson MD   2,000 Units at 12/28/20 8839    gabapentin (NEURONTIN) capsule 800 mg  800 mg Oral TID Ambrocio Olson MD   800 mg at 12/28/20 1451    levothyroxine (SYNTHROID) tablet 100 mcg  100 mcg Oral Daily Ambrocio Olson MD   100 mcg at 12/28/20 8776  atorvastatin (LIPITOR) tablet 20 mg  20 mg Oral Daily Kathrine Nick MD   20 mg at 12/28/20 0858    acetaminophen (TYLENOL) suppository 650 mg  650 mg Rectal Once Matt Graham DO           Allergies: Allergies   Allergen Reactions    Anesthetics, Betty      Difficulty waking up from ether    Penicillins Rash       Problem List:    Patient Active Problem List   Diagnosis Code    History of hypertension Z86.79    HLD (hyperlipidemia) E78.5    Tobacco abuse Z72.0    COPD (chronic obstructive pulmonary disease) ([facility]) J44.9    History of DVT (deep vein thrombosis) Z86.718    Seizure ([facility]) R56.9    SSS (sick sinus syndrome) ([facility] [address] I49.5    Pacemaker Z95.0    Hypothyroid E03.9    PVD (peripheral vascular disease) ([facility] I73.9    Coronary artery disease involving native coronary artery without angina pectoris I25.10    Left carotid artery occlusion I65.22    Osteoporosis M81.0    Bilateral carotid artery stenosis I65.23    Atherosclerosis of native artery of both lower extremities with intermittent claudication ([facility]) I70.213    Osteoarthritis M19.90    Post-traumatic osteoarthritis of left hip M16.52    Trauma T14.90XA    Multiple closed fractures of ribs of left side S22.42XA    Chest wall pain R07.89    Fall W19. XXXA    Nodule of spleen D73.89    CAD in native artery I25.10    Shoulder dislocation, left, subsequent encounter S43.005D    Neuropathy G62.9    Lacunar infarction ([facility]) I63.81    CHF (congestive heart failure) ([facility]) I50.9    Pneumonia J18.9    Acute respiratory failure with hypoxia ([facility]) J96.01    Pneumonia due to COVID-19 virus U07.1, J12.89    Septic shock ([facility]) A41.9, R65.21    Atrial fibrillation with RVR ([facility]) I48.91       Past Medical History:        Diagnosis Date    Arthritis     asthmatic bronchitis    CAD (coronary artery disease)     Carotid stenosis     CHF (congestive heart failure) ([facility])     Closed fracture of pelvis with delayed healing 4/3/2017  Closed fracture of twelfth thoracic vertebra ([facility] [address] 4/3/2017    T 5,6,12    COPD (chronic obstructive pulmonary disease) ([facility])     DVT of leg (deep venous thrombosis) ([address] 2010    left    Hx of blood clots     Hyperlipidemia     Hypertension     Mitral regurgitation     Trace    Prolonged emergence from general anesthesia     PVD (peripheral vascular disease) with claudication ([address] 11/18/2014    Retinal ischemia     Stenosis of intracranial portions of left internal carotid artery 11/18/2014    Thyroid disease        Past Surgical History:        Procedure Laterality Date    APPENDECTOMY      BACK SURGERY  10/26/15    L3 kypho    CARDIAC CATHETERIZATION  08/10/2020    Dr Loco Cam    CAROTID-SUBCLAVIAN BYPASS GRAFT Left 5/19/2016    CHOLECYSTECTOMY      CORONARY ANGIOPLASTY WITH STENT PLACEMENT  08/10/2020    Dr Loco Cam Prox LAD rotablator 1.5/1.75  [employer] 3.0x15  3.0x15    ECHOCARDIOGRAM LIMITED  12/3/2014         EYE SURGERY      FIXATION KYPHOPLASTY      FRACTURE SURGERY Right     knee    HYSTERECTOMY      JOINT REPLACEMENT Bilateral     hips    OTHER SURGICAL HISTORY  12/2/14    reposition pacer lead and katty    OTHER SURGICAL HISTORY  5/5/2015    kyphoplasty T8    OTHER SURGICAL HISTORY  2/16/2016    lumbar myelogram    PACEMAKER INSERTION Left 11-    Dr. Grisel Gaston  12/21/2020         OH TOTAL HIP ARTHROPLASTY Left 3/13/2018    LEFT HIP TOTAL JOINT  ARTHROPLASTY AND REMOVAL HARDWARE LEFT ACETABULUM  ---[employer]--- performed by Adela Shah MD at [facility] OR       Social History:    Social History     Tobacco Use    Smoking status: Current Every Day Smoker     Packs/day: 0.25     Years: 10.00     Pack years: 2.50     Types: Cigarettes     Last attempt to quit: 6/15/2017     Years since quitting: 3.5    Smokeless tobacco: Never Used   Substance Use Topics    Alcohol use: Yes     Comment: socially Ready to quit: Not Answered  Counseling given: Not Answered      Vital Signs (Current): There were no vitals filed for this visit. BP Readings from Last 3 Encounters:   12/28/20 (!) 136/44   12/08/20 104/62   10/09/20 118/70       NPO Status:                                                                                 BMI:   Wt Readings from Last 3 Encounters:   12/27/20 220 lb 3.8 oz (99.9 kg)   12/08/20 188 lb 9 oz (85.5 kg)   10/09/20 163 lb 9.6 oz (74.2 kg)     There is no height or weight on file to calculate BMI.    CBC:   Lab Results   Component Value Date    WBC 10.6 12/28/2020    RBC 3.20 12/28/2020    HGB 9.7 12/28/2020    HCT 32.7 12/28/2020    .2 12/28/2020    RDW 19.0 12/28/2020     12/28/2020       CMP:   Lab Results   Component Value Date     12/28/2020    K 3.7 12/28/2020    K 3.3 12/15/2020     12/28/2020    CO2 29 12/28/2020    BUN 8 12/28/2020    CREATININE 0.3 12/28/2020    GFRAA >60 12/28/2020    LABGLOM >60 12/28/2020    GLUCOSE 91 12/28/2020    GLUCOSE 106 12/15/2011    PROT 5.0 12/28/2020    CALCIUM 7.4 12/28/2020    BILITOT 0.5 12/28/2020    ALKPHOS 86 12/28/2020    AST 35 12/28/2020    ALT 21 12/28/2020       POC Tests: No results for input(s): POCGLU, POCNA, POCK, POCCL, POCBUN, POCHEMO, POCHCT in the last 72 hours.     Coags:   Lab Results   Component Value Date    PROTIME 11.4 12/13/2020    PROTIME 11.9 12/15/2011    INR 1.0 12/13/2020    APTT 21.6 12/13/2020       HCG (If Applicable): No results found for: PREGTESTUR, PREGSERUM, HCG, HCGQUANT     ABGs: No results found for: PHART, PO2ART, WSQ0IOR, BPF5FPX, BEART, O3OHHTPE     Type & Screen (If Applicable):  No results found for: LABABO, [address]    Anesthesia Evaluation  Patient summary reviewed no history of anesthetic complications:   Airway: Mallampati: Unable to assess / NA       Comment: PT IS INTUBATED ON VENTILATOR   Dental: (+) upper dentures and lower dentures      Pulmonary:   (+) pneumonia: unresolved,  COPD (previously on oxygen, not currently):  decreased breath sounds,  current smoker                          ROS comment: Quit smoking 1-2 years ago  Covid +   Cardiovascular:    (+) hypertension:, valvular problems/murmurs: MR, pacemaker (h/o SSS): pacemaker, CAD:, CHF:, hyperlipidemia        Rhythm: regular  Rate: normal           Beta Blocker:  Dose within 24 Hrs      ROS comment: On levophed     Neuro/Psych:   (+) neuromuscular disease:,              ROS comment: h/o back surgery with rods and hardware present    Peripheral neuropathy of feet GI/Hepatic/Renal:             Endo/Other:    (+) Diabetesusing insulin, hypothyroidism, blood dyscrasia: anticoagulation therapy, arthritis:., .          Pt had no PAT visit        ROS comment: Prior h/o alcoholism Abdominal:   (+) obese,         Vascular:   + PVD, aortic or cerebral, . ROS comment: Left carotid subclavian bypass. Anesthesia Plan      MAC and general     ASA 4     (On versed/fentanyl drips)  Induction: intravenous. Anesthetic plan and risks discussed with child/children (spoke with son Danny Marsh on phone).                   Chante Rogers MD   12/28/2020

## 2020-12-28 NOTE — CARE COORDINATION
COVID POSITIVE 12/13. Vent/ intubated since 12/15-FIO2 60%. Requiring iv pressor. From [facility]- can return if extubated-no repeat COVID test is needed. Patient has [address]. Insurance- may need vent/ covid positive IZABELA facility choice if unable to wean off vent.  Will follow Connie Meredith

## 2020-12-28 NOTE — PROGRESS NOTES
Palliative Care Department  [phone]  Palliative Care Progress Note  Provider Ethan Mistry DO      PATIENT: Ramiro Torres  : 1950  MRN: [id number]  ADMISSION DATE: 2020 10:51 AM  Referring Provider: Mercedez Meng DO     Palliative Medicine was consulted on hospital day 14 for assistance with Goals of care, Code Status Discussion    HPI:     Maggi Clark is a 65 y.o. y/o female with a history of arthritis, falls, CAD with ptca/stenting by Dr Uday Recinos, carotid stenosis, CHF, closed fracture of the pelvis with total hip arthroplasty on the left  with delayed healing, DVT of the leg, history of lacunar infarct, seizure, hypertension, hyperlipidemia, peripheral vascular disease with claudication, mitral regurgitation, retinal ischemia, stenosis of intracranial portions of the left internal carotid artery, thyroid disease, permanent pacemaker in situ , carotid subclavian bypass graft , and fixed kyphoplasty. who presented to [facility]) on 2020 with shortness of breath. She was found to have COVID, she was intubated and currently in ICU. ASSESSMENT/PLAN:     Pertinent Hospital Diagnoses      Covid-19   Intubated    Palliative Care Encounter / Counseling Regarding Goals of Care  Please see detailed goals of care discussion as below   At this time, Gloria Aden, Does Not have capacity for medical decision-making. Capacity is time limited and situation/question specific   Outcome of goals of care meeting: Roddy Kapoor wants trach/PEG per primary team   Code status Full Code   Advanced Directives: no POA or living will in [facility]   Surrogate/Legal NOKOreljitendra Lira, [phone], son       SUBJECTIVE:       Details of Conversation: Spoke with ICU team and bedside nurse. Roddy Kapoor requesting trach/PEG and continue full code.     Prognosis: Guarded    OBJECTIVE:     BP (!) 145/62   Pulse 74   Temp 97.9 °F (36.6 °C) (Esophageal)   Resp 12   Ht 5' 6\" (1.676 m)   Wt 220 lb 3.8 oz (99.9 kg)   SpO2 93%   BMI 35.55 kg/m²     Due to the current efforts to prevent transmission of COVID-19 and also the need to preserve PPE for other caregivers, a face-to-face encounter with the patient was not performed. That being said, all relevant records and diagnostic tests were reviewed, including laboratory results and imaging. Please reference any relevant documentation elsewhere. Care will be coordinated with the primary service and other specialties as appropriate. Time/Communication  Greater than 50% of time spent, total 15 minutes in counseling and coordination of care at the bedside regarding goals of care. Thank you for allowing Palliative Medicine to participate in the care of Hallie Echeverria.

## 2020-12-28 NOTE — PATIENT CARE CONFERENCE
Intensive Care Daily Quality Rounding Checklist        ICU Team Members: Dr. Wanda Coyle, Francis Wells, charge nurse, bedside nurse and respiratory therapy,clincial pharmacist     ICU Day #: Number 14     Intubation Date: Dec 15    Ventilator Day #:  Number 14     Central Line Insertion Date:  PICC DEC 21                                                    Day #: Number 8   Arterial Line Insertion Date:  Dec 15                               Day #: Number 14     Temporary Hemodialysis Catheter Insertion Date:                              Day #      DVT Prophylaxis: Lovenox    GI Prophylaxis: Protonix     Allen Catheter Insertion Date:  Dec 13                                        Day #:  Number 16                             Continued need (if yes, reason documented and discussed with physician): Yes, strict intake and output    Skin Issues/ Wounds and ordered treatment discussed on rounds:  Yes     Goals/ Plans for the Day: vent management, restraint order for patient safety, labs

## 2020-12-28 NOTE — PLAN OF CARE
Problem: Skin Integrity:  Goal: Will show no infection signs and symptoms  Description: Will show no infection signs and symptoms  Outcome: Met This Shift  Goal: Absence of new skin breakdown  Description: Absence of new skin breakdown  Outcome: Met This Shift     Problem: Airway Clearance - Ineffective  Goal: Achieve or maintain patent airway  Outcome: Met This Shift     Problem: Gas Exchange - Impaired  Goal: Absence of hypoxia  Outcome: Met This Shift     Problem: Breathing Pattern - Ineffective  Goal: Ability to achieve and maintain a regular respiratory rate  Outcome: Met This Shift     Problem: Body Temperature -  Risk of, Imbalanced  Goal: Ability to maintain a body temperature within defined limits  Outcome: Met This Shift     Problem: Isolation Precautions - Risk of Spread of Infection  Goal: Prevent transmission of infection  Outcome: Met This Shift     Problem: Nutrition Deficits  Goal: Optimize nutrtional status  Outcome: Met This Shift     Problem: Risk for Fluid Volume Deficit  Goal: Maintain normal heart rhythm  Outcome: Met This Shift  Goal: Maintain absence of muscle cramping  Outcome: Met This Shift  Goal: Maintain normal serum potassium, sodium, calcium, phosphorus, and pH  Outcome: Met This Shift     Problem: Restraint Use - Nonviolent/Non-Self-Destructive Behavior:  Goal: Absence of restraint-related injury  Description: Absence of restraint-related injury  Outcome: Met This Shift     Problem: Falls - Risk of:  Goal: Will remain free from falls  Description: Will remain free from falls  Outcome: Met This Shift  Goal: Absence of physical injury  Description: Absence of physical injury  Outcome: Met This Shift     Problem: Pain:  Goal: Pain level will decrease  Description: Pain level will decrease  Outcome: Met This Shift  Goal: Control of acute pain  Description: Control of acute pain  Outcome: Met This Shift  Goal: Control of chronic pain  Description: Control of chronic pain  Outcome:  Met This Shift     Problem: Restraint Use - Nonviolent/Non-Self-Destructive Behavior:  Goal: Absence of restraint indications  Description: Absence of restraint indications  Outcome: Not Met This Shift

## 2020-12-28 NOTE — PROGRESS NOTES
much better. No acute overnight events. 12/24: Failed weaning trial yesterday. Became agitated. Continues to tolerate the ventilator. Day 9 on the ventilator. 12/25: The patient occasionally opens her eyes. She occasionally follows commands. She gets extremely anxious when sedation is weaned further. Continues on Versed and fentanyl. 12/26: Patient continues on the ventilator. Opens eyes with sedation vacation, not really following commands. Day 11 on the ventilator. No other acute overnight events. 12:27: Continues on the vent. Awake on sedation, does follow commands. Day 12 on the ventilator. 12/28: Patient continues on the ventilator. Still occasionally becomes agitated when attempting to wean sedation. Day 13 on the ventilator. Occasionally hypotensive requiring Levophed.       CURRENT VENTILATION STATUS:     [x] Ventilator  [] BIPAP  [] Nasal Cannula [] Room Air      IF INTUBATED, ET TUBE MARKING AT LOWER LIP:       cms    SECRETIONS Amount:  [] Small [] Moderate  [] Large  [x] None  Color:     [] White [] Colored  [] Bloody    SEDATION:  RAAS Score:  [x] Fentanyl gtt  [x] Versed gtt  [] Ativan gtt   [] No Sedation    PARALYZED:  [x] No    [] Yes      VASOPRESSORS:  [] No    [x] Yes    If yes -   [x] Levophed       [] Dopamine     [] Vasopressin       [] Dobutamine  [] Phenylephrine         [] Epinephrine    CENTRAL LINES:     [x] No   [] Yes   (Date of Insertion:  12/15 )           If yes -     [] Right IJ     [] Left IJ [] Right Femoral [] Left Femoral                   [] Right Subclavian [] Left Subclavian   PICC line    GARCIA'S CATHETER:   [] No   [x] Yes  (Date of Insertion: 12/15  )     URINE OUTPUT:            [x] Good   [] Low              [] Anuric      OBJECTIVE:     VITAL SIGNS:  BP (!) 136/44   Pulse 101   Temp 99 °F (37.2 °C) (Esophageal)   Resp (!) 37   Ht 5' 6\" (1.676 m)   Wt 220 lb 3.8 oz (99.9 kg)   SpO2 90%   BMI 35.55 kg/m²   Tmax over 24 hours:  Temp (24hrs), Av.2 °F (36.8 °C), Min:97.9 °F (36.6 °C), Max:99 °F (37.2 °C)      Patient Vitals for the past 6 hrs:   BP Temp Temp src Pulse Resp SpO2   20 0700 -- -- -- 101 (!) 37 90 %   20 0600 -- -- -- 103 (!) 33 90 %   20 0500 -- -- -- 105 18 91 %   20 0406 -- -- -- 102 22 90 %   20 0400 (!) 136/44 99 °F (37.2 °C) Esophageal 109 21 91 %   20 0300 -- -- -- 74 25 94 %   20 0200 -- -- -- 99 24 96 %         Intake/Output Summary (Last 24 hours) at 2020 0759  Last data filed at 2020 0700  Gross per 24 hour   Intake 3291.56 ml   Output 4550 ml   Net -1258.44 ml     Wt Readings from Last 2 Encounters:   20 220 lb 3.8 oz (99.9 kg)   20 188 lb 9 oz (85.5 kg)     Body mass index is 35.55 kg/m². PHYSICAL EXAMINATION:  General: ill appearing, sedated, lying in gurney in no distress supine  HEENT: Pupils are equal round and reactive to light, there are no oral lesions and no post-nasal drip   Neck: supple without adenopathy  Cardiovascular: regular rate and rhythm without murmur or gallop  Respiratory:  Decreased breath sounds bilaterally without wheezing or crackles. Air entry is symmetric  Abdomen: soft, non-tender, non-distended, normal bowel sounds, lateral bruising in the flanks, nontender to palpation in the area of the Lovenox injections  Extremities: warm, no edema, no clubbing   Skin:  Large bilateral ecchymoses on the flanks, and lower quadrants of the abdomen  Neurologic:  Sedated, intubated, pupils equal round reactive, looking around the room occasionally.         Any additional physical findings:    MEDICATIONS:    Scheduled Meds:   bumetanide  0.5 mg Intravenous Once    [Held by provider] enoxaparin  1 mg/kg Subcutaneous BID    hydrocortisone sodium succinate PF  50 mg Intravenous Q12H    vitamin C  500 mg Oral BID    thiamine  100 mg Oral Daily    zinc sulfate  50 mg Oral Daily    heparin flush  3 mL Intravenous 2 times per day    insulin glargine  25 Units Subcutaneous Daily    docusate  100 mg Oral Daily    senna  5 mL Oral Nightly    midodrine  10 mg Oral TID    insulin lispro  0-18 Units Subcutaneous Q6H    pantoprazole  40 mg Intravenous Daily    And    sodium chloride (PF)  10 mL Intravenous Daily    ipratropium-albuterol  1 ampule Inhalation Q4H WA    budesonide  500 mcg Nebulization BID    Arformoterol Tartrate  15 mcg Nebulization BID    clopidogrel  75 mg Oral Daily    chlorhexidine  15 mL Mouth/Throat BID    sodium chloride flush  10 mL Intravenous 2 times per day    Vitamin D  2,000 Units Oral Daily    gabapentin  800 mg Oral TID    levothyroxine  100 mcg Oral Daily    atorvastatin  20 mg Oral Daily    acetaminophen  650 mg Rectal Once     Continuous Infusions:   norepinephrine 1 mcg/min (12/27/20 8738)    fentaNYL 5 mcg/ml in 0.9%  ml infusion 200 mcg/hr (12/28/20 9805)    midazolam 1 mg/hr (12/27/20 4300)     PRN Meds:       metoprolol, 5 mg, Q6H PRN      sodium chloride flush, 10 mL, PRN      heparin flush, 3 mL, PRN      potassium chloride, 20 mEq, PRN      fentanNYL, 25 mcg, Q3H PRN      promethazine, 12.5 mg, Q6H PRN    Or      ondansetron, 4 mg, Q6H PRN      polyethylene glycol, 17 g, Daily PRN      acetaminophen, 650 mg, Q6H PRN    Or      acetaminophen, 650 mg, Q6H PRN          VENT SETTINGS (Comprehensive) (if applicable):  Vent Information  $Ventilation: $Subsequent Day  Skin Assessment: Clean, dry, & intact  Equipment ID: [id number]  Equipment Changed: (S) HME(changed)  Vent Type: [id number]  Vent Mode: AC/PC  Vt Ordered: 0 mL  Rate Set: 14 bmp  Peak Flow: 0 L/min  Pressure Support: 0 cmH20  FiO2 : 60 %  SpO2: 90 %  SpO2/FiO2 ratio: 150  PaO2/FiO2 ratio: 102  Sensitivity: 2  PEEP/CPAP: 12  I Time/ I Time %: 0 s  Humidification Source: HME  Mask Type: Full face mask  Mask Size: Small  Additional Respiratory  Assessments  Pulse: 101  Resp: (!) 37  SpO2: 90 %  Position: Semi-Marrero's  Humidification Source: HME  Oral Care: Mouth swabbed, Mouth suctioned, Mouth moisturizer  Subglottic Suction Done?: Yes  Cuff Pressure (cm H2O): 29 cm H2O    ABGs:   Recent Labs     12/28/20  0603   PH 7.410   PCO2 42.9   PO2 64.4*   HCO3 26.6*   BE 1.7   O2SAT 92.2       Laboratory findings:    Complete Blood Count:   Recent Labs     12/26/20  0530 12/27/20 0612 12/28/20  0530   WBC 10.9 8.4 10.6   HGB 10.7* 10.3* 9.7*   HCT 35.0 32.9* 32.7*    174 176        Last 3 Blood Glucose:   Recent Labs     12/27/20  0612 12/27/20 1735 12/28/20  0530   GLUCOSE 145* 76 91        PT/INR:    Lab Results   Component Value Date    PROTIME 11.4 12/13/2020    PROTIME 11.9 12/15/2011    INR 1.0 12/13/2020     PTT:    Lab Results   Component Value Date    APTT 21.6 12/13/2020       Comprehensive Metabolic Profile:   Recent Labs     12/27/20  0612 12/27/20 1735 12/28/20  0530    140 140   K 3.4* 3.5 3.7    106 104   CO2 32* 30* 29   BUN 11 10 8   CREATININE 0.3* 0.3* 0.3*   GLUCOSE 145* 76 91   CALCIUM 6.9* 6.9* 7.4*   PROT 4.9*  --  5.0*   LABALBU 3.2*  --  3.3*   BILITOT 0.6  --  0.5   ALKPHOS 78  --  86   AST 21  --  35*   ALT 17  --  21      Magnesium:   Lab Results   Component Value Date    MG 2.1 12/28/2020     Phosphorus:   Lab Results   Component Value Date    PHOS 2.8 12/28/2020     Ionized Calcium: No results found for: CAION     Urinalysis:     Troponin:   Recent Labs     12/26/20  0845   TROPONINI <0.01       Microbiology:    Cultures during this admission:     Blood cultures:                 [] None drawn      [x] Negative             []  Positive (Details:  )  Urine Culture:                   [] None drawn      [] Negative             []  Positive (Details:  )  Sputum Culture:               [] None drawn       [x] Negative             []  Positive (Details:  )   Endotracheal aspirate:     [] None drawn       [] Negative             []  Positive (Details:  )     Other pertinent Labs: COVID-19 + December 13  MRSA negative screening   radiology/Imaging:     Chest Xray (12/28/2020):       Right PICC line with tip at the junction of the subclavian and SVC   Extensive bilateral pulmonary infiltrates unchanged   ET and NG tubes appear in satisfactory position             ASSESSMENT:      1. Acute hypoxic respiratory failure   2. Covid pneumonitis - critically severe   3. Shock- septic   4. Adrenal insufficiency  5. Hyponatremia   6. Hypokalemia   7. Leukocytosis    8. COPD  9. pvd  10. chf not in acute exacerbatino   11. Hx lacunar infarct   12. SSS s/p pacemaker   13. Hx seizure do   14. Bilateral carotid stenosis       SYSTEMS ASSESSMENT    Neuro   Intubated, sedated on Versed and fentanyl - wean as tolerated  No longer paralyzed  Previous history of lacunar stroke  CT head on admission showed no acute abnormalities  Agitated with sedation vacation. Not following commands    Respiratory   Acute respiratory failure with hypoxia secondary to COVID-19 viral pneumonia  Intubated, wean sedation as tolerated  Course of Cefepime completed for possible superimposed bacterial pneumonia   Wean peep and fio2 as tolerated. Keep O2 sat 90-92%  abg daily  Day 13 on ventilator trending toward trach and peg if unable to wean. Cardiovascular   Shock, septic  Increase midodrine to 15 tid   -continues to require intermittent levophed for hypotension   intermittent atrial fib/flutter  Pacemaker interrogated that showed several episodes of supraventricular tachycardia over the last couple of months but no persistent shockable rhythm  Continue plavix, hx of CAD with stent      Gastrointestinal   GI prophylaxis with Protonix  Diet per dietary recs   Colace/senna for constipation  Ct abdomen pelvis 12/27 showed no acute intraabdominal etiology     Renal   Hyponatremia -resolved   -Continue to trend metabolic panel   -Nephrology following for fluid recommendations intravenously  Replete potassium as needed  bumex . 5mg iv diuresis     Infectious Disease   COVID-19 viral pneumonia   Received remdesivir, Toci, vit D, vit c, thiamine   Septic shock - improving  Possible superimposed bacterial pneumonia   -Completed course of cefepime  Blood cultures and respiratory culture negative thus far    Hematology/Oncology   Anemia on admission, trend H&H, improving  CBC daily  lovenox 1mg/kg bid - rising ddimer    Endocrine   high-dose sliding scale Humalog for hyperglycemia  Start lantus   -better glycemic control today  Monitor growth closely  Cortisol on admission 1.42  High-dose Solucortef decreased to 50 q 8 hrs    Social/Spiritual/DNR/Other   Full code. Palliative consulted. ASSESSMENT/ PLAN     1. Try weaning sedation and wean peep and fio2 again today  2. Hyponatremia -resolved- nephrology following  3.   midodrine TID - intermittent levophed for hypotension  4. decrease solucortef  5. Palliative following   6. Rising ddimer- full dose lovenox bid  7. bumex iv diuresis again today  8. General surgery consulted for trach and peg  9. Continue ICU level of care      Rory Rivera DO, PGY2  12/28/2020 7:59 AM        I personally saw, examined and provided care for the patient. Radiographs, labs and medication list were reviewed by me independently. I spoke with bedside nursing, therapists and consultants. Critical care services and times documented are independent of procedures and multidisciplinary rounds with Residents. Additionally comprehensive, multidisciplinary rounds were conducted with the MICU team. The case was discussed in detail and plans for care were established. Review of Residents documentation was conducted and revisions were made as appropriate. I agree with the above documented exam, problem list and plan of care. Trach and peg   Liborio Cuevas M.D.    Pulmonary/Critical Care Medicine   35 min cct excluding procedures

## 2020-12-28 NOTE — PROGRESS NOTES
This note also relates to the following rows which could not be included:  Vt Ordered - Cannot attach notes to unvalidated device data  Rate Set - Cannot attach notes to unvalidated device data  Peak Flow - Cannot attach notes to unvalidated device data  Pressure Support - Cannot attach notes to unvalidated device data  FiO2  - Cannot attach notes to unvalidated device data  Sensitivity - Cannot attach notes to unvalidated device data  PEEP/CPAP - Cannot attach notes to unvalidated device data  I Time/ I Time % - Cannot attach notes to unvalidated device data  High Peep/I Pressure - Cannot attach notes to unvalidated device data  Peak Inspiratory Pressure - Cannot attach notes to unvalidated device data  Mean Airway Pressure - Cannot attach notes to unvalidated device data  Rate Measured - Cannot attach notes to unvalidated device data  Vt Exhaled - Cannot attach notes to unvalidated device data  Minute Volume - Cannot attach notes to unvalidated device data  I:E Ratio - Cannot attach notes to unvalidated device data  High Pressure Alarm - Cannot attach notes to unvalidated device data       12/27/20 2028   Vent Information   Vent Mode AC/PC   Humidification Source HME   Patient Observation   Observations BACK FROM CT SCAN TO ROOM VENT

## 2020-12-28 NOTE — PLAN OF CARE
Intensive Care Daily Quality Rounding Checklist      ICU Team Members: Dr. Kris Herrera, resident, charge nurse, bedside nurse and respiratory therapy    ICU Day #: Number 12    Intubation Date: Dec 15  Ventilator Day #:  Number 12    Central Line Insertion Date:  PICC DEC 21        Day #: Number 6   Arterial Line Insertion Date:  Dec 15      Day #: Number 12    Temporary Hemodialysis Catheter Insertion Date:       Day #     DVT Prophylaxis: Lovenox    GI Prophylaxis: Protonix    Allen Catheter Insertion Date:  Dec 13       Day #:  Number 14      Continued need (if yes, reason documented and discussed with physician):   Skin Issues/ Wounds and ordered treatment discussed on rounds:     Goals/ Plans for the Day: Monitor labs and vitals. Vent changes and bumex. Restraints ordered.  Electrolyte replacement

## 2020-12-28 NOTE — PROGRESS NOTES
This note also relates to the following rows which could not be included:  Vt Ordered - Cannot attach notes to unvalidated device data  Rate Set - Cannot attach notes to unvalidated device data  Peak Flow - Cannot attach notes to unvalidated device data  Pressure Support - Cannot attach notes to unvalidated device data  FiO2  - Cannot attach notes to unvalidated device data  Sensitivity - Cannot attach notes to unvalidated device data  PEEP/CPAP - Cannot attach notes to unvalidated device data  I Time/ I Time % - Cannot attach notes to unvalidated device data  High Peep/I Pressure - Cannot attach notes to unvalidated device data  Peak Inspiratory Pressure - Cannot attach notes to unvalidated device data  Mean Airway Pressure - Cannot attach notes to unvalidated device data  Rate Measured - Cannot attach notes to unvalidated device data  Vt Exhaled - Cannot attach notes to unvalidated device data  Minute Volume - Cannot attach notes to unvalidated device data  I:E Ratio - Cannot attach notes to unvalidated device data  High Pressure Alarm - Cannot attach notes to unvalidated device data       12/27/20 2000   Patient Observation   Observations ONTO CROSS VENT FOR CT SCAN AT THIS TIME

## 2020-12-29 PROBLEM — J96.00 ACUTE RESPIRATORY FAILURE DUE TO COVID-19 (HCC): Status: ACTIVE | Noted: 2020-01-01

## 2020-12-29 NOTE — OP NOTE
Operative Note      Patient: Terry Agarwal  YOB: 1950  MRN: [id number]    Date of Procedure: 12/29/2020    Pre-Op Diagnosis: -Respiratory failure    Post-Op Diagnosis: Same       Procedure(s):  Flexible bronchoscopy  Attempted percutaneous tracheostomy tube placement  Open tracheostomy tube placement  EGD,  PEG TUBE PLACEMENT    Surgeon(s):  DO Saleem Low,     Assistant:   * No surgical staff found *    Anesthesia: Monitor Anesthesia Care    Estimated Blood Loss (mL): Minimal    Complications: None    Specimens:   * No specimens in log *    Implants:  * No implants in log *      Drains:   NG/OG/NJ/NE Tube Nasogastric 16 fr Right nostril (Active)   Surrounding Skin Dry; Intact 12/29/20 0400   Securement device Yes 12/29/20 0400   Status Clamped 12/29/20 0400   Placement Verified by Respiratory Status 12/29/20 0400   NG/OG/NJ/NE External Measurement (cm) 60 cm 12/29/20 0400   Drainage Appearance Tan 12/29/20 0000   Tube Feeding Immune Enhancing 12/29/20 0000   Tube Feeding Status Stopped 12/29/20 0000   Rate/Schedule 0 mL/hr 12/29/20 0000   Tube Feeding Intake (mL) 0 ml 12/29/20 0600   Free Water Flush (mL) 60 mL 12/28/20 2000   Free Water Rate [id number] 12/28/20 2000   Residual Volume (ml) 0 ml 12/29/20 0400   Output (mL) 0 ml 12/17/20 0600       Gastrostomy/Enterostomy/Jejunostomy Percutaneous Endoscopic Gastrostomy (PEG) LUQ 20 fr (Active)       Urethral Catheter 16 fr (Active)   Catheter Indications Need for fluid management in critically ill patients in a critical care setting not able to be managed by other means such as BSC with hat, bedpan, urinal, condom catheter, or short term intermittent urethral catherization 12/29/20 1200   Securement Device Date Changed 12/15/20 12/18/20 2100   Site Assessment No urethral drainage 12/29/20 1200   Urine Color Oksana 12/29/20 1200   Urine Appearance Cloudy 12/29/20 1200   Output (mL) 500 mL 12/29/20 1100       [REMOVED] External Urinary Catheter (Removed)   Catheter changed  Yes 12/08/20 0236   Urine Color Oksana 12/08/20 1637   Urine Appearance Clear 12/08/20 1637   Urine Odor Malodorous 12/07/20 1559   Output (mL) 400 mL 12/08/20 1637   Suction- Female Only 40 mmgHg continuous 12/02/20 1230   Placement Replaced 12/02/20 1230   Skin Assessment No Injury 12/02/20 1230       Findings: Unable to advance tracheostomy tube into the trachea. Procedure converted to open. Detailed Description of Procedure:   Procedures form the operating room under general anesthesia. Patient is prepped and draped in sterile fashion. Began with flexible fiberoptic bronchoscopy through the patient's endotracheal tube confirming the palpable site for location of the percutaneous tracheostomy. The anterior trachea is in cannulated initially with a 25-gauge needle. Soft tissues were infiltrated with lidocaine 1% with epinephrine. Following a small vertical incision was made with a 15 blade. Large 18-gauge needle was then passed percutaneously into the trachea is visualized from the bronchoscope. Guidewire was advanced through this into the trachea. Needle was removed. Trachea is dilated successfully up to a #8 Shiley diameter. Dilator is then removed and the tracheostomy tube is attempted to be placed over the guidewire in to the tracheotomy opening into the trachea. However, this was unsuccessful despite several efforts. Procedure was then converted to open. Incision was further long elongated using electrocautery. Soft tissue was further divided down to the anterior surface of the trachea including the thyroid tissue using electrocautery. Trachea was then elevated superiorly with tracheal hook. The tracheotomy was then further dilated with a tracheal . An 8 Shiley endotracheal tube was in place into the lumen of the trachea. Cuff was inflated. Ventilator was attached there was good end-tidal CO2 CO2 and good ventilation.   Trachea was then sutured in place with interrupted 3-0 nylon sutures. Dressing was applied as well as tracheal collar. Next, esophagogastro scope was placed into the oropharynx and then advanced into the esophagus without difficulty. Within the stomach the anterior abdominal wall was then palpated and able to identify site within the lumen of the stomach. The intra-abdominal wall was then prepped and draped standard fashion. Lidocaine 1% was then injected for local anesthetic. Following this a small incision made with a 11 blade. 18-gauge needle was then passed transabdominally into the lumen of the stomach. Needle was removed guidewire was passed through the catheter and grasped with snare. This was then withdrawn from the patient's mouth. The gastrostomy tube is attached to the guidewire and pulled in antegrade fashion and brought out transabdominally. Endoscope was then reintubated into the stomach confirming correct position of the button. Scope was then removed. Tube was trimmed to the appropriate length and attached abdominal wall with T-bar. Dressing was applied. At the end the procedure all sponge, needle, instrument counts were correct.     Electronically signed by Debby Fletcher DO on 12/29/2020 at 3:09 PM

## 2020-12-29 NOTE — PROGRESS NOTES
Son Stanley Mitchell called and ok with trach and peg. States, \"She has problems with anesthesia and BP drops. \"  Dr. Ramsey Lowry anesthesiology made aware.

## 2020-12-29 NOTE — PROGRESS NOTES
Dr. John Ardon made aware of low urinary output, Left pupil irregular oval shape and sluggish. No new orders noted.

## 2020-12-29 NOTE — PLAN OF CARE
Problem: Skin Integrity:  Goal: Will show no infection signs and symptoms  Description: Will show no infection signs and symptoms  Outcome: Met This Shift  Goal: Absence of new skin breakdown  Description: Absence of new skin breakdown  Outcome: Met This Shift     Problem: Airway Clearance - Ineffective  Goal: Achieve or maintain patent airway  Outcome: Met This Shift     Problem: Gas Exchange - Impaired  Goal: Absence of hypoxia  Outcome: Met This Shift     Problem: Breathing Pattern - Ineffective  Goal: Ability to achieve and maintain a regular respiratory rate  Outcome: Met This Shift     Problem:  Body Temperature -  Risk of, Imbalanced  Goal: Ability to maintain a body temperature within defined limits  Outcome: Met This Shift  Goal: Will regain or maintain usual level of consciousness  Outcome: Met This Shift  Goal: Complications related to the disease process, condition or treatment will be avoided or minimized  Outcome: Met This Shift     Problem: Isolation Precautions - Risk of Spread of Infection  Goal: Prevent transmission of infection  Outcome: Met This Shift     Problem: Nutrition Deficits  Goal: Optimize nutrtional status  Outcome: Not Met This Shift     Problem: Risk for Fluid Volume Deficit  Goal: Maintain normal heart rhythm  Outcome: Met This Shift     Problem: Fatigue  Goal: Verbalize increase energy and improved vitality  Outcome: Not Met This Shift     Problem: Patient Education: Go to Patient Education Activity  Goal: Patient/Family Education  Outcome: Met This Shift     Problem: Restraint Use - Nonviolent/Non-Self-Destructive Behavior:  Goal: Absence of restraint indications  Description: Absence of restraint indications  Outcome: Not Met This Shift  Goal: Absence of restraint-related injury  Description: Absence of restraint-related injury  Outcome: Met This Shift     Problem: Falls - Risk of:  Goal: Will remain free from falls  Description: Will remain free from falls  Outcome: Met This Shift  Goal: Absence of physical injury  Description: Absence of physical injury  Outcome: Met This Shift     Problem: Pain:  Goal: Pain level will decrease  Description: Pain level will decrease  Outcome: Met This Shift  Goal: Control of acute pain  Description: Control of acute pain  Outcome: Met This Shift  Goal: Control of chronic pain  Description: Control of chronic pain  Outcome: Met This Shift

## 2020-12-29 NOTE — CONSULTS
Consult Note    Dear Amy Willis DO, thank you for referring [address] [place] [address] for evaluation. Reason for Consult: Tracheostomy and PEG tube placement    HISTORY OF PRESENT ILLNESS:    The patient is a 65 y.o. female who presents with admission for acute respiratory failure secondary to Covid pneumonia. Covid test was positive on December 13. Tracheostomy tube placement and PEG tube were requested for continuing care.       Past Medical History:   Diagnosis Date    Arthritis     asthmatic bronchitis    CAD (coronary artery disease)     Carotid stenosis     CH[facility]congestive heart failure) ([facility])     Closed fracture of pelvis with delayed healing 4/3/2017    Closed fracture of twelfth thoracic vertebra ([address] 4/3/2017    T 5,6,12    COPD (chronic obstructive pulmonary disease) ([address]     DVT of leg (deep venous thrombosis) ([address] 2010    left    Hx of blood clots     Hyperlipidemia     Hypertension     Mitral regurgitation     Trace    Prolonged emergence from general anesthesia     PVD (peripheral vascular disease) with claudication ([address] 11/18/2014    Retinal ischemia     Stenosis of intracranial portions of left internal carotid artery 11/18/2014    Thyroid disease        Past Surgical History:   Procedure Laterality Date    APPENDECTOMY      BACK SURGERY  10/26/15    L3 kypho    CARDIAC CATHETERIZATION  08/10/2020    Dr Steve Mancini    CAROTID-SUBCLAVIAN BYPASS GRAFT Left 5/19/2016    CHOLECYSTECTOMY      CORONARY ANGIOPLASTY WITH STENT PLACEMENT  08/10/2020    Dr Steve Mancini Prox LAD rotablator 1.5/1.75  [employer]olute Alfredo 3.0x15  3.0x15    ECHOCARDIOGRAM LIMITED  12/3/2014         EYE SURGERY      FIXATION KYPHOPLASTY      FRACTURE SURGERY Right     knee    HYSTERECTOMY      JOINT REPLACEMENT Bilateral     hips    OTHER SURGICAL HISTORY  12/2/14    reposition pacer lead and katty    OTHER SURGICAL HISTORY  5/5/2015    kyphoplasty T8    OTHER SURGICAL HISTORY  2/16/2016    lumbar - CNP   ipratropium-albuterol (DUONEB) 0.5-2.5 (3) MG/3ML SOLN nebulizer solution Inhale 3 mLs into the lungs 4 times daily 12/8/20   ELLIE Ariza CNP   guaiFENesin 400 MG tablet Take 1 tablet by mouth three times daily 12/8/20   ELLIE Ariza CNP   clopidogrel (PLAVIX) 75 MG tablet Take 1 tablet by mouth daily 8/12/20   Oskar Queen MD   ranolazine (RANEXA) 500 MG extended release tablet Take 1 tablet by mouth 2 times daily 7/19/20   Jamila Becerra DO   metoprolol succinate (TOPROL XL) 25 MG extended release tablet Take 25 mg by mouth daily    Historical Provider, MD   L-methylfolate-B6-B12 Vamshi Bravo) 0-07.338-0-24 MG CAPS capsule Take by mouth daily    Historical Provider, MD   levothyroxine (SYNTHROID) 112 MCG tablet Take 100 mcg by mouth Daily     Historical Provider, MD   gabapentin (NEURONTIN) 800 MG tablet Take 800 mg by mouth three times daily 11/14/16   Historical Provider, MD   aspirin 81 MG EC tablet Take 81 mg by mouth daily    Historical Provider, MD       Scheduled Meds:   hydrocortisone sodium succinate PF  50 mg Intravenous Q6H    furosemide  40 mg Intravenous Daily    ceFAZolin  2 g Intravenous See Admin Instructions    [Held by provider] enoxaparin  1 mg/kg Subcutaneous BID    vitamin C  500 mg Oral BID    thiamine  100 mg Oral Daily    zinc sulfate  50 mg Oral Daily    heparin flush  3 mL Intravenous 2 times per day    insulin glargine  25 Units Subcutaneous Daily    docusate  100 mg Oral Daily    senna  5 mL Oral Nightly    midodrine  10 mg Oral TID    insulin lispro  0-18 Units Subcutaneous Q6H    pantoprazole  40 mg Intravenous Daily    And    sodium chloride (PF)  10 mL Intravenous Daily    ipratropium-albuterol  1 ampule Inhalation Q4H WA    budesonide  500 mcg Nebulization BID    Arformoterol Tartrate  15 mcg Nebulization BID    [Held by provider] clopidogrel  75 mg Oral Daily    chlorhexidine  15 mL Mouth/Throat BID    sodium chloride flush  10 mL Intravenous 2 times per day    Vitamin D  2,000 Units Oral Daily    gabapentin  800 mg Oral TID    levothyroxine  100 mcg Oral Daily    atorvastatin  20 mg Oral Daily    acetaminophen  650 mg Rectal Once     ContinuousInfusions:   norepinephrine 12 mcg/min (12/29/20 1022)    fentaNYL 5 mcg/ml in 0.9%  ml infusion 200 mcg/hr (12/29/20 0937)    midazolam 2 mg/hr (12/29/20 1202)     PRN Meds:metoprolol, sodium chloride flush, heparin flush, potassium chloride, fentanNYL, promethazine **OR** ondansetron, polyethylene glycol, acetaminophen **OR** acetaminophen    Allergies   Allergen Reactions    Anesthetics, Betty      Difficulty waking up from ether    Penicillins Rash       Social History     Socioeconomic History    Marital status:       Spouse name: Not on file    Number of children: Not on file    Years of education: Not on file    Highest education level: Not on file   Occupational History    Occupation: retired- [employer],     Social Needs    Financial resource strain: Not on file    Food insecurity     Worry: Not on file     Inability: Not on file   "[employer] needs     Medical: Not on file     Non-medical: Not on file   Tobacco Use    Smoking status: Current Every Day Smoker     Packs/day: 0.25     Years: 10.00     Pack years: 2.50     Types: Cigarettes     Last attempt to quit: 6/15/2017     Years since quitting: 3.5    Smokeless tobacco: Never Used   Substance and Sexual Activity    Alcohol use: Yes     Comment: socially    Drug use: No    Sexual activity: Not Currently     Partners: Male   Lifestyle    Physical activity     Days per week: Not on file     Minutes per session: Not on file    Stress: Not on file   Relationships    Social connections     Talks on phone: Not on file     Gets together: Not on file     Attends Faith service: Not on file     Active member of club or organization: Not on file     Attends meetings of clubs or organizations: Not on file     Relationship status: Not on file    Intimate partner violence     Fear of current or ex partner: Not on file     Emotionally abused: Not on file     Physically abused: Not on file     Forced sexual activity: Not on file   Other Topics Concern    Not on file   Social History Narrative    Not on file       Family History   Problem Relation Age of Onset    Heart Disease Mother     Heart Disease Father        Review Of Systems:   Review of Systems   Unable to perform ROS: Intubated        Physical Exam:  Vitals:    12/29/20 1000 12/29/20 1039 12/29/20 1100 12/29/20 1200   BP:       Pulse: 95 96 105 107   Resp: 24 26 25 21   Temp: 99.7 °F (37.6 °C)   100.2 °F (37.9 °C)   TempSrc: Esophageal   Axillary   SpO2: 93% (!) 89% (!) 84%    Weight:       Height:           Due to the current efforts to prevent transmission of COVID-19 and also the need to preserve PPE for other caregivers, a face-to-face encounter with the patient was not performed. That being said, all relevant records and diagnostic tests were reviewed, including laboratory results and imaging. Please reference any relevant documentation elsewhere. Care will be coordinated with the primary service. Ct Abdomen Pelvis Wo Contrast Additional Contrast? None    Result Date: 12/28/2020  EXAMINATION: CT OF THE ABDOMEN AND PELVIS WITHOUT CONTRAST 12/27/2020 8:16 pm TECHNIQUE: CT of the abdomen and pelvis was performed without the administration of intravenous contrast. Multiplanar reformatted images are provided for review. Dose modulation, iterative reconstruction, and/or weight based adjustment of the mA/kV was utilized to reduce the radiation dose to as low as reasonably achievable.  COMPARISON: 03/11/2009, 12/13/2020 HISTORY: ORDERING SYSTEM PROVIDED HISTORY: bilateral flank and abdominal bruising, ro hematoma TECHNOLOGIST PROVIDED HISTORY: Reason for exam:->bilateral flank and abdominal bruising, ro hematoma Additional Contrast?->None FINDINGS: Lower Chest: Dense airspace consolidation in the left lower lobe. Right lower lobe patchy opacities, worsened from 12/13/2020. Bilateral pleural effusions with adjacent atelectasis. No pericardial effusion. Organs: Evaluation of the solid organs is compromised by lack of IV contrast. Liver demonstrates hepatic surface contour nodularity suggesting hepatic cirrhosis. The unenhanced spleen, pancreas, and right adrenal gland are unremarkable. Left adrenal thickening is stable. Multiple bilateral nonobstructive renal calculi present. No obstructive uropathy. The gallbladder is surgically absent. GI/Bowel: Postsurgical changes in the GE junction. Enteric catheter is in the stomach. The unopacified small bowel loops are grossly unremarkable. No small bowel obstruction. Colonic diverticulosis without associated acute inflammatory changes. Pelvis: Streak artifact from left hip arthroplasty partially obscures the pelvis. Bladder decompressed by Allen catheter. Peritoneum/Retroperitoneum: Extensive aortic atherosclerotic disease. Simple fluid attenuation in the left pericolic gutter with fluid extending into the pelvis. No free air. No enlarged lymph nodes. Bones/Soft Tissues: Diffuse body wall edema. Left more than right lateral abdominal wall fluid. This has intermediate attenuation, density 25 HU. No destructive osseous lesions. 1. Dense airspace consolidation in the left lower lobe and increase in right basilar lung opacities compatible with multifocal pneumonia. At least small to moderate size bilateral pleural effusions with adjacent atelectasis. 2. Diffuse body wall edema. There is intermediate attenuation fluid in the left lateral abdominal wall, and a component of hemorrhage is possible as well. 3. Small to moderate amount of fluid in the abdomen and pelvis with simple fluid attenuation. 4. Hepatic surface nodularity consistent with hepatic cirrhosis.  5. Nonobstructive renal calculi bilaterally. 6. Colonic diverticulosis without acute inflammatory changes. Xr Chest Portable    Result Date: 12/29/2020  EXAMINATION: ONE XRAY VIEW OF THE CHEST 12/29/2020 7:30 am COMPARISON: 12/27/2020 HISTORY: ORDERING SYSTEM PROVIDED HISTORY: resp failure TECHNOLOGIST PROVIDED HISTORY: Reason for exam:->resp failure FINDINGS: Considerable bilateral infiltrates are unchanged. Heart size is the upper limits of normal.  Lines/tubes appear appropriate. There are multilevel vertebroplasties. Small left effusion is suspected. No interval change    Xr Chest Portable    Result Date: 12/27/2020  EXAMINATION: ONE XRAY VIEW OF THE CHEST portable upright 12/27/2020 6:30 am COMPARISON: 12/26/2020 HISTORY: ORDERING SYSTEM PROVIDED HISTORY: resp failure TECHNOLOGIST PROVIDED HISTORY: Reason for exam:->resp failure FINDINGS: Medical devices: Stable position of the endotracheal tube, enteric tube, and right-sided PICC line. The heart is unchanged in size. Extensive bilateral airspace opacities without significant change. Small left pleural effusion. Left subclavian dual-chamber transvenous pacemaker. Multilevel kyphoplasty of the lower dorsal spine. No pneumothorax. 1.  Extensive bilateral airspace disease and small left pleural effusion, unchanged. 2.  Stable position of support tubes and right-sided PICC line. Xr Chest Portable    Result Date: 12/26/2020  EXAMINATION: ONE XRAY VIEW OF THE CHEST 12/26/2020 8:49 am COMPARISON: Comparison is dated December 25, 2020 HISTORY: ORDERING SYSTEM PROVIDED HISTORY: hypotension TECHNOLOGIST PROVIDED HISTORY: Reason for exam:->hypotension FINDINGS: ET and NG tubes remain in satisfactory position. Right PICC line present with tip at the SVC. There is a 2 lead pacemaker present. Extensive bilateral pulmonary interstitial and patchy opacities again identified not significantly changed.   Multiple vertebral vertebroplasties again noted    Extensive bilateral pulmonary infiltrates unchanged ET and other life support devices appear in satisfactory position    Xr Chest Portable    Result Date: 12/25/2020  EXAMINATION: ONE XRAY VIEW OF THE CHEST 12/25/2020 7:08 am COMPARISON: Comparison is December 23, 2020 a HISTORY: ORDERING SYSTEM PROVIDED HISTORY: resp failure TECHNOLOGIST PROVIDED HISTORY: Reason for exam:->resp failure FINDINGS: ET tube remains in satisfactory position. Pacemaker again noted. Extensive bilateral pulmonary infiltrates again identified unchanged. No pleural fluid collection seen. Cardiac and mediastinal contours are unchanged. ET tube in satisfactory position Bilateral pulmonary infiltrates are unchanged is    Xr Chest Portable    Result Date: 12/23/2020  EXAMINATION: ONE XRAY VIEW OF THE CHEST 12/23/2020 4:50 am COMPARISON: 21 December 2020 HISTORY: ORDERING SYSTEM PROVIDED HISTORY: resp failure TECHNOLOGIST PROVIDED HISTORY: Reason for exam:->resp failure FINDINGS: Stable pacemaker and multiple support lines. Stable bilateral airspace disease which may represent pulmonary edema. No new abnormal findings. Stable bilateral airspace disease which may represent pulmonary edema. Xr Chest Portable    Result Date: 12/21/2020  EXAMINATION: ONE XRAY VIEW OF THE CHEST 12/21/2020 2:13 pm COMPARISON: 12/20/2020 HISTORY: ORDERING SYSTEM PROVIDED HISTORY: PICC placement TECHNOLOGIST PROVIDED HISTORY: Reason for exam:->PICC placement FINDINGS: Medical devices: Right-sided PICC line with the line tip in the region of the SVC. Stable position of the endotracheal and enteric tubes. Normal heart size. Bilateral widespread airspace opacities, greatest at the right upper lobe and without significant change. An azygos lobe is present. Normal heart size. Left subclavian transvenous pacemaker in place. No significant pleural effusion. No pneumothorax. 1.  Good position right-sided PICC line. 2.  Otherwise stable findings.   Bilateral airspace infiltrates/edema, worse on the right. Xr Chest Portable    Result Date: 12/21/2020  EXAMINATION: ONE XRAY VIEW OF THE CHEST 12/21/2020 10:50 am COMPARISON: Comparison is dated December 21, 2020 HISTORY: ORDERING SYSTEM PROVIDED HISTORY: line placement TECHNOLOGIST PROVIDED HISTORY: Reason for exam:->line placement FINDINGS: ET and NG tubes remain in satisfactory position. There is a right PICC line with placement in the junction of the subclavian vein and SVC Extensive bilateral pulmonary interstitial and airspace opacities remain. No pleural effusion identified Multiple vertebroplasties noted mid to lower thoracic spine Left-sided pacemaker noted    Right PICC line with tip at the junction of the subclavian and SVC Extensive bilateral pulmonary infiltrates unchanged ET and NG tubes appear in satisfactory position    Xr Chest Portable    Result Date: 12/21/2020  EXAMINATION: ONE XRAY VIEW OF THE CHEST 12/21/2020 5:25 am COMPARISON: December 20, 2020 HISTORY: ORDERING SYSTEM PROVIDED HISTORY: resp failure TECHNOLOGIST PROVIDED HISTORY: Reason for exam:->resp failure FINDINGS: Stable transvenous pacemaker, endotracheal and nasogastric tubes. Bilateral airspace disease which is likely related to pulmonary edema is stable. Bilateral airspace disease which is not appreciably changed and is likely related to pulmonary edema. Xr Chest Portable    Result Date: 12/20/2020  EXAMINATION: ONE XRAY VIEW OF THE CHEST 12/20/2020 11:50 am COMPARISON: 12/19/2020 HISTORY: ORDERING SYSTEM PROVIDED HISTORY: hypoxia covid TECHNOLOGIST PROVIDED HISTORY: Reason for exam:->hypoxia covid FINDINGS: Stable pacemaker, ET tube and NG tube. Mildly progressive diffuse bilateral interstitial and alveolar infiltrates. Cardiac size is mildly enlarged. Improving small left pleural effusion. No other significant changes are seen    Mildly progressive bilateral infiltrates.  Improving left pleural effusion    Xr Chest Portable    Result Date: 12/19/2020  EXAMINATION: ONE XRAY VIEW OF THE CHEST 12/19/2020 6:36 am COMPARISON: 17 December 2020 HISTORY: ORDERING SYSTEM PROVIDED HISTORY: resp failure TECHNOLOGIST PROVIDED HISTORY: Reason for exam:->resp failure FINDINGS: Stable pacemaker, endotracheal and nasogastric tubes. Airspace opacity is slightly improving. Multiple thoracic and lumbar kyphoplasties are again noted. Mildly waning airspace disease with slight improvement of aeration bilaterally. Xr Chest Portable    Result Date: 12/17/2020  EXAMINATION: ONE XRAY VIEW OF THE CHEST 12/17/2020 6:39 am COMPARISON: 12/15/2020 HISTORY: ORDERING SYSTEM PROVIDED HISTORY: resp failure TECHNOLOGIST PROVIDED HISTORY: Reason for exam:->resp failure FINDINGS: Patient is rotated. There are bilateral infiltrates which appear improved in the upper lobes but worsened in the lower lungs. Increased density at the left base with obscuration of left hemidiaphragm suggest some left lower lobe consolidation and/or left pleural effusion Endotracheal tube and nasogastric tube are unchanged. Pacemaker is unchanged. There is no pneumothorax. There are multiple kyphoplasties in visualized spine. Diffuse infiltrate, improved in the upper lungs as compared to prior. There is new consolidation in the left lower lobe with possible left pleural effusion. Xr Chest Portable    Result Date: 12/15/2020  EXAMINATION: ONE XRAY VIEW OF THE CHEST 12/15/2020 9:57 am COMPARISON: Studies of November 20 7 December 2014 HISTORY: ORDERING SYSTEM PROVIDED HISTORY: Post intubation TECHNOLOGIST PROVIDED HISTORY: Reason for exam:->Post intubation FINDINGS: Endotracheal tube is in low position at the level of the distal trachea, it is above the aurelio but below the level of the lower margin of the arch of the aorta. The to be at the level of the upper contour of the arch of the aorta can be pulled back about 3 cm. NG tube is in the area of the stomach.  The heart appears to be with borderline size. Patient has a permanent pacemaker with 2 wires placed to the left subclavian vein. There is a pattern of perihilar ground-glass density diffusely. Can represent acute pulmonary edema. The underline bilateral pneumonia on interstitial ground-glass dense bases including viral etiology cannot be excluded however. Delete There is no pleural effusion. Calcified atheromatous changes are seen in the aorta. Patient has multiple levels of vertebroplasty in the thoracolumbar spine. Surgical clips are seen in the upper left epigastric region. Endotracheal tube in low position, can be pulled back about 3 cm. NG tube in good position. Pattern of bilateral interstitial infiltrate in the perihilar regions or the periphery of the lung as above commented. The pulmonary edema versus bilateral viral pneumonia will be part of the differential diagnosis. Please correlate clinically. T Preliminary report given to Dr. Burley Cogan at the time of the interpretation. Xr Chest Portable    Result Date: 12/14/2020  EXAMINATION: ONE XRAY VIEW OF THE CHEST 12/14/2020 12:45 pm COMPARISON: December 13, 2020; December 2, 2020 HISTORY: ORDERING SYSTEM PROVIDED HISTORY: chest pain TECHNOLOGIST PROVIDED HISTORY: Reason for exam:->chest pain FINDINGS: There is a left chest wall battery pack with associated pacer/AICD leads. Heart size is unable to be accurately assessed on this single portable view of the chest, but appears to be stable. Previously described increased reticular/interstitial opacities noted throughout both lungs have increased. There are also consolidative opacities at the left lung base with new obscuration of the left hemidiaphragm. Somewhat nodular/masslike opacity in the right mid lung zone has developed as well. No evidence of a pneumothorax. Multiple left-sided rib fracture deformities are noted.     Increased interstitial markings throughout both lungs along with patchy/consolidative airspace opacities noted bilaterally as above. Findings are suspicious for multifocal pneumonia or pulmonary edema. Xr Chest Portable    Result Date: 12/13/2020  EXAMINATION: ONE XRAY VIEW OF THE CHEST 12/13/2020 11:26 am COMPARISON: December 2, 2020 HISTORY: ORDERING SYSTEM PROVIDED HISTORY: short of breath TECHNOLOGIST PROVIDED HISTORY: Reason for exam:->short of breath FINDINGS: There is cardiomegaly. Left subclavian pacemaker is noted. There is diffuse bilateral infiltrates and pleural effusions. Multiple vertebroplasty are noted. Progressive diffuse bilateral infiltrates and pleural effusion likely CHF/edema and or diffuse pneumonia. Xr Chest Portable    Result Date: 12/3/2020  EXAMINATION: ONE X-RAY VIEW OF THE CHEST 12/2/2020 9:04 am COMPARISON: 11/28/2020, 11/27/2020 HISTORY: ORDERING SYSTEM PROVIDED HISTORY: Increased oxygen requirements. TECHNOLOGIST PROVIDED HISTORY: Reason for exam:   Increased oxygen requirements. FINDINGS: Pacemaker device is stable. Redemonstration bilateral interstitial changes with confluent appearing airspace opacity within the left mid and lower lung and right lower lobe. The right lower lobe airspace disease appears marginally progressed. Stable heart size and mediastinal contours. Slight progression of the airspace disease within the lower lobes. Otherwise no change. Cta Pulmonary W Contrast    Result Date: 12/13/2020  EXAMINATION: CTA OF THE CHEST 12/13/2020 6:45 pm TECHNIQUE: CTA of the chest was performed after the administration of intravenous contrast.  Multiplanar reformatted images are provided for review. MIP images are provided for review. Dose modulation, iterative reconstruction, and/or weight based adjustment of the mA/kV was utilized to reduce the radiation dose to as low as reasonably achievable.  COMPARISON: 11/28/2020 HISTORY: ORDERING SYSTEM PROVIDED HISTORY: r/o PE TECHNOLOGIST PROVIDED HISTORY: Reason for exam:->r/o PE FINDINGS: There is borderline cardiac size with a tiny pericardial effusion. There is coronary artery calcification. Moderately enlarged lymph nodes are identified in the mediastinum and hilum with lymph nodes measuring up to 1.6 cm in the right hilum. There are no filling defects in the main pulmonary artery and the central branches. There is progressive diffuse bilateral patchy and ground-glass infiltrates. The liver is of normal architecture. Multiple compression fractures with vertebroplasty are identified in the thoracic spine    No central pulmonary embolism or aortic dissection. Progressive diffuse bilateral patchy and ground-glass infiltrates concerning for progressive multifocal pneumonia/viral infection. Xr Abdomen For Ng/og/ne Tube Placement    Result Date: 12/15/2020  EXAMINATION: ONE SUPINE XRAY VIEW(S) OF THE ABDOMEN 12/15/2020 10:25 am COMPARISON: None. HISTORY: ORDERING SYSTEM PROVIDED HISTORY: HYpoxic TECHNOLOGIST PROVIDED HISTORY: Reason for exam:->HYpoxic Portable? ->Yes FINDINGS: Enteric feeding tube is partially visualized below the diaphragm likely in the stomach. Multilevel bone cement seen in the lumbar and thoracic spine. Enteric feeding tube partially imaged below the diaphragm and likely within the stomach. Assessment/Plan:  3 68-year-old female with respiratory failure secondary to Covid pneumonia. We will plan for tracheostomy and PEG tube placement. Discussed and answered all questions success satisfactorily with the patient's son.     Consult completed 12/28/2020    Electronically signed by Estella Garcia DO on 12/29/20 at 12:22 PM EST

## 2020-12-29 NOTE — PATIENT CARE CONFERENCE
Intensive Care Daily Quality Rounding Checklist        ICU Team Members: Dr. Kevin Stanton, charge nurse, bedside nurse and respiratory therapy,clincial pharmacist     ICU Day #: Number 14     Intubation Date: Dec 16     Ventilator Day #:  Number 15     Central Line Insertion Date:  PICC DEC 21                                                    Day #: Number 9   Arterial Line Insertion Date:  Dec 15                                Day #: Number 15     Temporary Hemodialysis Catheter Insertion Date:                              Day #      DVT Prophylaxis: Lovenox HOLD pcd's    GI Prophylaxis: Protonix     Allen Catheter Insertion Date:  Dec 13                                        Day #:  Number 17                             Continued need (if yes, reason documented and discussed with physician): Yes, strict intake and output     Skin Issues/ Wounds and ordered treatment discussed on rounds:  Yes     Goals/ Plans for the Day: vent management, restraint order for patient safety, labs, trach and peg today, wean 02 as tolerated, increase steroids today, restart lasix      Revision History

## 2020-12-29 NOTE — PROGRESS NOTES
Chart reviewed. Patient remains intubated in ICU. Plan for trach and PEG. At this time, family wishes to continue full code. Palliative medicine will sign off at this time as goals of care and CODE STATUS have been established. Please reconsult if patient's condition deteriorates or if patient/family requests further discussion with palliative medicine team.  Thank you.

## 2020-12-29 NOTE — CARE COORDINATION
COVID POSITIVE 12/13. Vent/ intubated since 12/15-FIO2 70%. Requiring iv pressor. Patient has [address]. Insurance. For TRACH/PEG today. From [facility]- can return only if extubated-no repeat COVID test is needed. Referral made to [place] [facility](only area [place] accepting vents/ + covid at this time)- awaiting response. Roddy Carreno Nery [phone] updated on discharge plan.   Will follow Elias Miranda

## 2020-12-29 NOTE — PLAN OF CARE
Problem: Skin Integrity:  Goal: Will show no infection signs and symptoms  Description: Will show no infection signs and symptoms  12/29/2020 1026 by Eva Lang RN  Outcome: Met This Shift  12/29/2020 0252 by Som Whitney RN  Outcome: Met This Shift  Goal: Absence of new skin breakdown  Description: Absence of new skin breakdown  12/29/2020 1026 by Eva Lang RN  Outcome: Met This Shift  12/29/2020 0252 by Som Whitney RN  Outcome: Met This Shift     Problem: Airway Clearance - Ineffective  Goal: Achieve or maintain patent airway  12/29/2020 1026 by Eva Lang RN  Outcome: Met This Shift  12/29/2020 0252 by Som Whitney RN  Outcome: Met This Shift     Problem: Gas Exchange - Impaired  Goal: Absence of hypoxia  12/29/2020 1026 by Eva Lang RN  Outcome: Ongoing  12/29/2020 0252 by Som Whitney RN  Outcome: Met This Shift  Goal: Promote optimal lung function  Outcome: Ongoing     Problem: Breathing Pattern - Ineffective  Goal: Ability to achieve and maintain a regular respiratory rate  12/29/2020 1026 by Eva Lang RN  Outcome: Ongoing  12/29/2020 0252 by Som Whitney RN  Outcome: Met This Shift     Problem:  Body Temperature -  Risk of, Imbalanced  Goal: Ability to maintain a body temperature within defined limits  12/29/2020 1026 by Eva Lang RN  Outcome: Ongoing  12/29/2020 0252 by Som Whitney RN  Outcome: Met This Shift  Goal: Will regain or maintain usual level of consciousness  12/29/2020 0252 by Som Whitney RN  Outcome: Met This Shift  Goal: Complications related to the disease process, condition or treatment will be avoided or minimized  12/29/2020 1026 by Eva Lang RN  Outcome: Met This Shift  12/29/2020 0252 by Som Whitney RN  Outcome: Met This Shift     Problem: Isolation Precautions - Risk of Spread of Infection  Goal: Prevent transmission of infection  12/29/2020 1026 by Eva Lang RN  Outcome: Met This Shift  12/29/2020 0198 by Beth Merchant RN  Outcome: Met This Shift     Problem: Nutrition Deficits  Goal: Optimize nutrtional status  12/29/2020 1026 by Jefrfy Holley RN  Outcome: Ongoing  12/29/2020 0252 by Beth Merchant RN  Outcome: Not Met This Shift     Problem: Risk for Fluid Volume Deficit  Goal: Maintain normal heart rhythm  12/29/2020 1026 by Jeffry Holley RN  Outcome: Met This Shift  12/29/2020 0252 by Beth Merchant RN  Outcome: Met This Shift  Goal: Maintain absence of muscle cramping  Outcome: Met This Shift  Goal: Maintain normal serum potassium, sodium, calcium, phosphorus, and pH  Outcome: Ongoing     Problem: Loneliness or Risk for Loneliness  Goal: Demonstrate positive use of time alone when socialization is not possible  Outcome: Met This Shift     Problem: Fatigue  Goal: Verbalize increase energy and improved vitality  12/29/2020 1026 by Jeffry Holley RN  Outcome: Not Met This Shift  12/29/2020 0252 by Beth Merchant RN  Outcome: Not Met This Shift     Problem: Patient Education: Go to Patient Education Activity  Goal: Patient/Family Education  12/29/2020 1026 by Jeffry Holley RN  Outcome: Met This Shift  12/29/2020 0252 by Beth Merchant RN  Outcome: Met This Shift     Problem: Restraint Use - Nonviolent/Non-Self-Destructive Behavior:  Goal: Absence of restraint indications  Description: Absence of restraint indications  12/29/2020 1026 by Jeffry Holley RN  Outcome: Not Met This Shift  12/29/2020 0252 by Beth Merchant RN  Outcome: Not Met This Shift  Goal: Absence of restraint-related injury  Description: Absence of restraint-related injury  12/29/2020 1026 by Jeffry Holley RN  Outcome: Met This Shift  12/29/2020 0252 by Beth Merchant RN  Outcome: Met This Shift     Problem: Falls - Risk of:  Goal: Will remain free from falls  Description: Will remain free from falls  12/29/2020 1026 by Jeffry Holley RN  Outcome: Met This Shift  12/29/2020 0252 by Beth Merchant RN  Outcome: Met This Shift  Goal: Absence of physical injury  Description: Absence of physical injury  12/29/2020 1026 by Tucker Moe RN  Outcome: Met This Shift  12/29/2020 0252 by Javy Foster RN  Outcome: Met This Shift     Problem: Pain:  Goal: Pain level will decrease  Description: Pain level will decrease  12/29/2020 1026 by Tucker Moe RN  Outcome: Met This Shift  12/29/2020 0252 by Javy Foster RN  Outcome: Met This Shift  Goal: Control of acute pain  Description: Control of acute pain  12/29/2020 1026 by Tucker Moe RN  Outcome: Met This Shift  12/29/2020 0252 by Javy Foster RN  Outcome: Met This Shift  Goal: Control of chronic pain  Description: Control of chronic pain  12/29/2020 1026 by Tucker Moe RN  Outcome: Met This Shift  12/29/2020 0252 by Javy Foster RN  Outcome: Met This Shift

## 2020-12-29 NOTE — PROGRESS NOTES
Critical Care Team - Daily Progress Note         Date and time: 12/29/2020 9:32 AM  Patient's name:  Mercedes Dillon  Medical Record Number: [id number]  Patient's account/billing number: [de-identified]  Patient's YOB: 1950  Age: 65 y.o. Date of Admission: 12/13/2020 10:51 AM  Length of stay during current admission: 16      Primary Care Physician: Gayle Montanez DO  ICU Attending Physician: Dr. Bin Arciniega Status: Full Code    Reason for ICU admission: respiratory failure   Shock   Covid 19 pneumonitis       SUBJECTIVE:     OVERNIGHT EVENTS:         12/16: Overnight able to titrate down some of the vasopressors though still intermittently hypotensive and requiring vasopressin as well as Levophed. Still sedated with fentanyl and Versed. Hyponatremia notable at 119 new today. 12/17: Patient was able to be titrated off the vasopressors. Is now on midodrine. Minimally hypothermic overnight now on Melissa hugger with improvement in temperature. Continue sedation with fentanyl and Versed. 12/18: Patient continues on the ventilator. Back on small amount of Levophed in addition to the vasopressin. Hoping to wean sedation more today. 12/19: Back on levophed overnight. Initially able to titrate fio2 to 40%, became hypoxic and increased to 60%. Apparently their were several episodes of bradycardia overnight. Patient has pacemaker which did not fire per nursing. Follows with Dr. Luis Fernando Tarango for cardiology. 12/20: Patient: 6 L overnight, nephrology ordered K-Phos, and half-normal saline, will CT her head today. 12/21: Patient tolerated being supine overnight. Plan for PICC line today. Pacemaker interrogated with no runs of V. tach or other rhythm needing acute intervention. 12/22: Patient remained supine without difficulty. Continuing to try to wean down her FiO2. Her sodium has improved. 12/23: Patient continues to tolerate the vent. She is waking up more.   Sodium is much better. No acute overnight events. 12/24: Failed weaning trial yesterday. Became agitated. Continues to tolerate the ventilator. Day 9 on the ventilator. 12/25: The patient occasionally opens her eyes. She occasionally follows commands. She gets extremely anxious when sedation is weaned further. Continues on Versed and fentanyl. 12/26: Patient continues on the ventilator. Opens eyes with sedation vacation, not really following commands. Day 11 on the ventilator. No other acute overnight events. 12:27: Continues on the vent. Awake on sedation, does follow commands. Day 12 on the ventilator. 12/28: Patient continues on the ventilator. Still occasionally becomes agitated when attempting to wean sedation. Day 13 on the ventilator. Occasionally hypotensive requiring Levophed. 12/29: trach and peg today. Spent 29th on the phone talking to her son who is the power of  yesterday explaining her prognosis and the treatment course going forward. He wants a trach and PEG and everything done at this point. Patient is actually much more awake today and following commands. When asked if she needs the volume turned up on the TV she shakes her head yes. Plan for trach and PEG this afternoon.           CURRENT VENTILATION STATUS:     [x] Ventilator  [] BIPAP  [] Nasal Cannula [] Room Air      IF INTUBATED, ET TUBE MARKING AT LOWER LIP:       cms    SECRETIONS Amount:  [] Small [] Moderate  [] Large  [x] None  Color:     [] White [] Colored  [] Bloody    SEDATION:  RAAS Score:  [x] Fentanyl gtt  [x] Versed gtt  [] Ativan gtt   [] No Sedation    PARALYZED:  [x] No    [] Yes      VASOPRESSORS:  [] No    [x] Yes    If yes -   [x] Levophed       [] Dopamine     [] Vasopressin       [] Dobutamine  [] Phenylephrine         [] Epinephrine    CENTRAL LINES:     [x] No   [] Yes   (Date of Insertion:  12/15 )           If yes -     [] Right IJ     [] Left IJ [] Right Femoral [] Left Femoral [] Right Subclavian [] Left Subclavian   PICC line    GARCIA'S CATHETER:   [] No   [x] Yes  (Date of Insertion: 12/15  )     URINE OUTPUT:            [x] Good   [] Low              [] Anuric      OBJECTIVE:     VITAL SIGNS:  BP (!) 136/44   Pulse 103   Temp 100 °F (37.8 °C) (Esophageal)   Resp 20   Ht 5' 6\" (1.676 m)   Wt 220 lb 3.8 oz (99.9 kg)   SpO2 (!) 89%   BMI 35.55 kg/m²   Tmax over 24 hours:  Temp (24hrs), Av.5 °F (37.5 °C), Min:98.6 °F (37 °C), Max:100 °F (37.8 °C)      Patient Vitals for the past 6 hrs:   Temp Temp src Pulse Resp SpO2   20 0906 -- -- 103 20 (!) 89 %   20 0800 100 °F (37.8 °C) Esophageal 104 27 (!) 88 %   20 0700 -- -- 90 14 (!) 86 %   20 0600 -- -- 82 15 92 %   20 0500 -- -- 76 11 92 %   20 0400 99.7 °F (37.6 °C) Esophageal 83 12 92 %   20 0333 -- -- 104 21 90 %         Intake/Output Summary (Last 24 hours) at 2020 0932  Last data filed at 2020 0900  Gross per 24 hour   Intake 1747.14 ml   Output 1677 ml   Net 70.14 ml     Wt Readings from Last 2 Encounters:   20 220 lb 3.8 oz (99.9 kg)   20 188 lb 9 oz (85.5 kg)     Body mass index is 35.55 kg/m². PHYSICAL EXAMINATION:  General: ill appearing, sedated, lying in gurney in no distress supine  HEENT: Pupils are equal round and reactive to light, there are no oral lesions and no post-nasal drip   Neck: supple without adenopathy  Cardiovascular: regular rate and rhythm without murmur or gallop  Respiratory:  Decreased breath sounds bilaterally without wheezing or crackles.   Air entry is symmetric  Abdomen: soft, non-tender, non-distended, normal bowel sounds, lateral bruising in the flanks, nontender to palpation in the area of the Lovenox injections  Extremities: warm, no edema, no clubbing   Skin:  Large bilateral ecchymoses on the flanks, and lower quadrants of the abdomen  Neurologic:  Sedated, intubated, pupils equal round reactive, watching TV, following commands, occasionally moving extremities.         Any additional physical findings:    MEDICATIONS:    Scheduled Meds:   sterile water        ceFAZolin  2 g Intravenous See Admin Instructions    bumetanide  0.5 mg Intravenous Once    [Held by provider] enoxaparin  1 mg/kg Subcutaneous BID    hydrocortisone sodium succinate PF  50 mg Intravenous Q12H    vitamin C  500 mg Oral BID    thiamine  100 mg Oral Daily    zinc sulfate  50 mg Oral Daily    heparin flush  3 mL Intravenous 2 times per day    insulin glargine  25 Units Subcutaneous Daily    docusate  100 mg Oral Daily    senna  5 mL Oral Nightly    midodrine  10 mg Oral TID    insulin lispro  0-18 Units Subcutaneous Q6H    pantoprazole  40 mg Intravenous Daily    And    sodium chloride (PF)  10 mL Intravenous Daily    ipratropium-albuterol  1 ampule Inhalation Q4H WA    budesonide  500 mcg Nebulization BID    Arformoterol Tartrate  15 mcg Nebulization BID    [Held by provider] clopidogrel  75 mg Oral Daily    chlorhexidine  15 mL Mouth/Throat BID    sodium chloride flush  10 mL Intravenous 2 times per day    Vitamin D  2,000 Units Oral Daily    gabapentin  800 mg Oral TID    levothyroxine  100 mcg Oral Daily    atorvastatin  20 mg Oral Daily    acetaminophen  650 mg Rectal Once     Continuous Infusions:   norepinephrine 15 mcg/min (12/29/20 0432)    fentaNYL 5 mcg/ml in 0.9%  ml infusion 200 mcg/hr (12/29/20 0230)    midazolam 1 mg/hr (12/28/20 3755)     PRN Meds:       metoprolol, 5 mg, Q6H PRN      sodium chloride flush, 10 mL, PRN      heparin flush, 3 mL, PRN      potassium chloride, 20 mEq, PRN      fentanNYL, 25 mcg, Q3H PRN      promethazine, 12.5 mg, Q6H PRN    Or      ondansetron, 4 mg, Q6H PRN      polyethylene glycol, 17 g, Daily PRN      acetaminophen, 650 mg, Q6H PRN    Or      acetaminophen, 650 mg, Q6H PRN          VENT SETTINGS (Comprehensive) (if applicable):  Vent Information  $Ventilation: $Subsequent Day  Skin Assessment: Clean, dry, & intact  Equipment ID: [id number]  Equipment Changed: HME  Vent Type: [id number]  Vent Mode: AC/PC  Vt Ordered: 0 mL  Pressure Ordered: 14  Rate Set: 14 bmp  Peak Flow: 0 L/min  Pressure Support: 0 cmH20  FiO2 : 70 %  SpO2: (!) 89 %  SpO2/FiO2 ratio: 127.14  PaO2/FiO2 ratio: 102  Sensitivity: 2  PEEP/CPAP: 10  I Time/ I Time %: 0 s  Humidification Source: HME  Mask Type: Full face mask  Mask Size: Small  Additional Respiratory  Assessments  Pulse: 103  Resp: 20  SpO2: (!) 89 %  Position: Semi-Marrero's  Humidification Source: HME  Oral Care: Mouth suctioned, Mouth moisturizer, Lip moisturizer applied, Mouth swabbed  Subglottic Suction Done?: Yes  Cuff Pressure (cm H2O): 29 cm H2O    ABGs:   Recent Labs     12/29/20  0625   PH 7.331*   PCO2 55.7*   PO2 62.3*   HCO3 28.8*   BE 2.0   O2SAT 90.4*       Laboratory findings:    Complete Blood Count:   Recent Labs     12/27/20  0612 12/28/20  0530 12/29/20  0620   WBC 8.4 10.6 12.9*   HGB 10.3* 9.7* 10.0*   HCT 32.9* 32.7* 32.9*    176 192        Last 3 Blood Glucose:   Recent Labs     12/27/20  1735 12/28/20  0530 12/29/20  0620   GLUCOSE 76 91 142*        PT/INR:    Lab Results   Component Value Date    PROTIME 11.4 12/13/2020    PROTIME 11.9 12/15/2011    INR 1.0 12/13/2020     PTT:    Lab Results   Component Value Date    APTT 21.6 12/13/2020       Comprehensive Metabolic Profile:   Recent Labs     12/27/20  1735 12/28/20  0530 12/29/20  0620    140 140   K 3.5 3.7 3.7    104 104   CO2 30* 29 33*   BUN 10 8 8   CREATININE 0.3* 0.3* 0.3*   GLUCOSE 76 91 142*   CALCIUM 6.9* 7.4* 7.6*   PROT  --  5.0* 5.3*   LABALBU  --  3.3* 3.2*   BILITOT  --  0.5 0.5   ALKPHOS  --  86 113*   AST  --  35* 37*   ALT  --  21 21      Magnesium:   Lab Results   Component Value Date    MG 2.1 12/29/2020     Phosphorus:   Lab Results   Component Value Date    PHOS 2.9 12/29/2020     Ionized Calcium: No results found for: ANTHONY Urinalysis:     Troponin:   No results for input(s): TROPONINI in the last 72 hours. Microbiology:    Cultures during this admission:     Blood cultures:                 [] None drawn      [x] Negative             []  Positive (Details:  )  Urine Culture:                   [] None drawn      [] Negative             []  Positive (Details:  )  Sputum Culture:               [] None drawn       [x] Negative             []  Positive (Details:  )   Endotracheal aspirate:     [] None drawn       [] Negative             []  Positive (Details:  )     Other pertinent Labs:   COVID-19 + December 13  MRSA negative screening   radiology/Imaging:     Chest Xray (12/29/2020):       Right PICC line with tip at the junction of the subclavian and SVC   Extensive bilateral pulmonary infiltrates unchanged   ET and NG tubes appear in satisfactory position             ASSESSMENT:      1. Acute hypoxic respiratory failure   2. Covid pneumonitis - critically severe   3. Shock- septic   4. Adrenal insufficiency  5. Hyponatremia   6. Hypokalemia   7. Leukocytosis    8. COPD  9. pvd  10. chf not in acute exacerbatino   11. Hx lacunar infarct   12. SSS s/p pacemaker   13. Hx seizure do   14. Bilateral carotid stenosis       SYSTEMS ASSESSMENT    Neuro   Intubated, sedated on Versed and fentanyl - wean as tolerated  No longer paralyzed  Previous history of lacunar stroke  CT head on admission showed no acute abnormalities  Following commands today, agitation is much improved    Respiratory   Acute respiratory failure with hypoxia secondary to COVID-19 viral pneumonia  Intubated, wean sedation as tolerated  Course of Cefepime completed for possible superimposed bacterial pneumonia   Wean peep and fio2 as tolerated.  Keep O2 sat 90-92%  abg daily  Trach and PEG once fio2 and peep improved   General surgery consulted    Cardiovascular   Shock, septic  Increase midodrine to 15 tid   -continues to require intermittent levophed for hypotension of procedures and multidisciplinary rounds with Residents. Additionally comprehensive, multidisciplinary rounds were conducted with the MICU team. The case was discussed in detail and plans for care were established. Review of Residents documentation was conducted and revisions were made as appropriate. I agree with the above documented exam, problem list and plan of care. Trach and peg   Placement   Stephanie Mccann M.D.    Pulmonary/Critical Care Medicine   38 min cct excluding procedures

## 2020-12-29 NOTE — PROGRESS NOTES
Subjective:    Patient seen and examined at bedside, intubated eyes open. Makes eye contact. Vent settings improved  Objective:    BP (!) 136/44   Pulse 86   Temp 99 °F (37.2 °C) (Esophageal)   Resp 28   Ht 5' 6\" (1.676 m)   Wt 220 lb 3.8 oz (99.9 kg)   SpO2 (!) 89%   BMI 35.55 kg/m²     Current medications that patient is taking have been reviewed. Heart:  RRR, no murmurs, gallops, or rubs.   Lungs:  CTA bilaterally, no wheeze, rales or rhonchi  Abd: bowel sounds present, soft, nontender, nondistended, no masses  Extrem:  No cyanosis or edema    CBC:   Lab Results   Component Value Date    WBC 10.6 12/28/2020    RBC 3.20 12/28/2020    HGB 9.7 12/28/2020    HCT 32.7 12/28/2020    .2 12/28/2020    MCH 30.3 12/28/2020    MCHC 29.7 12/28/2020    RDW 19.0 12/28/2020     12/28/2020    MPV 9.9 12/28/2020     BMP:    Lab Results   Component Value Date     12/28/2020    K 3.7 12/28/2020    K 3.3 12/15/2020     12/28/2020    CO2 29 12/28/2020    BUN 8 12/28/2020    LABALBU 3.3 12/28/2020    CREATININE 0.3 12/28/2020    CALCIUM 7.4 12/28/2020    GFRAA >60 12/28/2020    LABGLOM >60 12/28/2020    GLUCOSE 91 12/28/2020    GLUCOSE 106 12/15/2011        Assessment:    Patient Active Problem List   Diagnosis    History of hypertension    HLD (hyperlipidemia)    Tobacco abuse    COPD (chronic obstructive pulmonary disease) (HCC)    History of DVT (deep vein thrombosis)    Seizure (HCC)    [facility]SS [facility]ick [facility] syndrome) [facility] [address]    Pacemaker    Hypothyroid    PVD (peripheral vascular disease) ([facility] [address]    Coronary artery disease involving native coronary artery without angina pectoris    Left carotid artery occlusion    Osteoporosis    Bilateral carotid artery stenosis    Atherosclerosis of native artery of both lower extremities with intermittent claudication (HCC)    Osteoarthritis    Post-traumatic osteoarthritis of left hip    Trauma    Multiple closed fractures of ribs of left side    Chest wall pain    Nodule of spleen    CAD in native artery    Shoulder dislocation, left, subsequent encounter    Neuropathy    Lacunar infarction (HCC)    CHF (congestive heart failure) (HCC)    Pneumonia    Acute respiratory failure with hypoxia (HCC)    Pneumonia due to COVID-19 virus    Septic shock (HCC)    Atrial fibrillation with RVR (HCC)       Plan:  1.  Acute respiratory failure with hypoxia   Remains intubated   COVID +   Failed weaning trial x3   Trach + PEG    Multifocal PNA  2. Shock -likely septic    Continue with levophed   Continue midodrine + hydrocortisone  3. Anemia - resolved              HOYCTK false lab value  4. Hypothyroidism - continue synthroid  5. Hyponatremia -resolved  6.  A. fib RVR amiodarone cordelia Palacios    7:09 PM  12/28/2020

## 2020-12-30 NOTE — PLAN OF CARE
Behavior:  Goal: Absence of restraint indications  Description: Absence of restraint indications  Outcome: Completed  Goal: Absence of restraint-related injury  Description: Absence of restraint-related injury  Outcome: Completed     Problem: Falls - Risk of:  Goal: Will remain free from falls  Description: Will remain free from falls  Outcome: Met This Shift  Goal: Absence of physical injury  Description: Absence of physical injury  Outcome: Met This Shift     Problem: Inadequate oral food/beverage intake (NI-2.1)  Goal: Food and/or Nutrient Delivery  Description: Individualized approach for food/nutrient provision.   12/30/2020 1452 by Pratik Love, MS, RD, LD  Outcome: Ongoing     Problem: Pain:  Goal: Pain level will decrease  Description: Pain level will decrease  Outcome: Met This Shift  Goal: Control of acute pain  Description: Control of acute pain  Outcome: Met This Shift  Goal: Control of chronic pain  Description: Control of chronic pain  Outcome: Met This Shift

## 2020-12-30 NOTE — DISCHARGE INSTR - COC
Continuity of Care Form    Patient Name: Alka Garza   :  1950  MRN:  [id number]    Admit date:  2020  Discharge date:  2021    Code Status Order: Full Code   Advance Directives:   Advance Care Flowsheet Documentation       Date/Time Healthcare Directive Type of Healthcare Directive Copy in [address] Agent's Name Healthcare Agent's Phone Number    12/15/20 2035  No, patient does not have an advance directive for healthcare treatment -- -- -- -- --            Admitting Physician:  Elmer Aburto MD  PCP: Alesia Keen DO    Discharging Nurse: Mady Monique Unit/Room#: 0206/0206-A  Discharging Unit Phone Number: [phone]    Emergency Contact:   Extended Emergency Contact Information  Primary Emergency Contact: Donnie Torres  Address: [address] [place] [address], [address] [place] [address] Phone: [phone]  Relation: Child  Secondary Emergency Contact: Leroy Torres  Address: [address], [address] [place] [address] Phone: [phone]  Work Phone: [phone]  Relation: Child    Past Surgical History:  Past Surgical History:   Procedure Laterality Date    APPENDECTOMY      BACK SURGERY  10/26/15    L3 kypho    CARDIAC CATHETERIZATION  08/10/2020    Dr Cristy Baez    CAROTID-SUBCLAVIAN BYPASS GRAFT Left 2016    CHOLECYSTECTOMY      CORONARY ANGIOPLASTY WITH STENT PLACEMENT  08/10/2020    Dr Cristy Baez Prox LAD rotablator 1.5/1.75  Resolute Alfredo 3.0x15  3.0x15    ECHOCARDIOGRAM LIMITED  12/3/2014         EYE SURGERY      FIXATION KYPHOPLASTY      FRACTURE SURGERY Right     knee    GASTROSTOMY TUBE PLACEMENT N/A 2020    EGD,  PEG TUBE PLACEMENT performed by Mora Maza DO at [address] Bilateral     hips    OTHER SURGICAL HISTORY  14    reposition pacer lead and katty    OTHER SURGICAL HISTORY  2015 kyphoplasty T8    OTHER SURGICAL HISTORY  2/16/2016    lumbar myelogram    PACEMAKER INSERTION Left 11-    Dr. Isaiah Edmonds  12/21/2020         CO TOTAL HIP ARTHROPLASTY Left 3/13/2018    LEFT HIP TOTAL JOINT  ARTHROPLASTY AND REMOVAL HARDWARE LEFT ACETABULUM  ---Chelsea Rimes--- performed by Prabhakar Sands MD at [address] N/A 12/29/2020    PERCUTANEOUS ENDOSCOPIC CONVERTED TO OPEN TRACHEOSTOMY performed by Hannah Mejia DO at [address]       Immunization History:   Immunization History   Administered Date(s) Administered    Influenza Vaccine, unspecified formulation 01/01/2005, 10/01/2011    Influenza Virus Vaccine 10/01/2014, 12/10/2015, 09/06/2017    Influenza, MDCK, Preservative free 10/19/2016    Pneumococcal Polysaccharide (Sscvecpso48) 01/24/2008, 10/01/2010    Tdap (Boostrix, Adacel) 05/07/2018       Active Problems:  Patient Active Problem List   Diagnosis Code    History of hypertension Z86.79    HLD (hyperlipidemia) E78.5    Tobacco abuse Z72.0    COPD (chronic obstructive pulmonary disease) ([facility] J44.9    History of DVT (deep vein thrombosis) Z86.718    Seizure (HCC) R56.9    SSS (sick sinus syndrome) ([facility]) I49.5    Pacemaker Z95.0    Hypothyroid E03.9    PVD (peripheral vascular disease) ([facility] I73.9    Coronary artery disease involving native coronary artery without angina pectoris I25.10    Left carotid artery occlusion I65.22    Osteoporosis M81.0    Bilateral carotid artery stenosis I65.23    Atherosclerosis of native artery of both lower extremities with intermittent claudication ([facility]) I70.213    Osteoarthritis M19.90    Post-traumatic osteoarthritis of left hip M16.52    Trauma T14.90XA    Multiple closed fractures of ribs of left side S22.42XA    Chest wall pain R07.89    Nodule of spleen D73.89    CAD in native artery I25.10    Shoulder dislocation, left, subsequent encounter S43.005D    Neuropathy G62.9  Lacunar infarction ([facility] I63.81    CHF (congestive heart failure) ([facility]) I50.9    Pneumonia J18.9    Acute respiratory failure with hypoxia ([facility]) J96.01    Acute respiratory failure due to COVID-19 ([facility] U07.1, J96.00    Septic shock ([facility]) A41.9, R65.21    Atrial fibrillation with RVR ([facility]) I48.91       Isolation/Infection:   Isolation            Droplet Plus          Patient Infection Status       Infection Onset Added Last Indicated Last Indicated By Review Planned Expiration Resolved Resolved By    COVID-19 12/13/20 12/13/20 12/13/20 COVID-19 12/20/20 01/10/21      Resolved    COVID-19 Rule Out 12/13/20 12/13/20 12/13/20 COVID-19 (Ordered)   12/13/20 Rule-Out Test Resulted    COVID-19 Rule Out 12/02/20 12/02/20 12/02/20 Covid-19 Ambulatory (Ordered)   12/05/20 Rule-Out Test Resulted    COVID-19 Rule Out 11/29/20 11/29/20 11/29/20 Respiratory Panel, Molecular, with COVID-19 (Restricted: peds pts or suitable admitted adults) (Ordered)   11/29/20 Rule-Out Test Resulted    COVID-19 Rule Out 11/27/20 11/27/20 11/27/20 COVID-19 (Ordered)   11/28/20 Rule-Out Test Resulted    COVID-19 Rule Out 08/04/20 08/04/20 08/04/20 COVID-19 Ambulatory (Ordered)   08/06/20 Rule-Out Test Resulted            Nurse Assessment:  Last Vital Signs: BP (!) 143/78   Pulse 106   Temp 98.1 °F (36.7 °C) (Axillary)   Resp 24   Ht 5' 6\" (1.676 m)   Wt 220 lb 3.8 oz (99.9 kg)   SpO2 91%   BMI 35.55 kg/m²     Last documented pain score (0-10 scale): Pain Level: 0  Last Weight:   Wt Readings from Last 1 Encounters:   12/27/20 220 lb 3.8 oz (99.9 kg)     Mental Status:  oriented, alert    IV Access:  - PICC - site  R Upper Arm, insertion date: 12/21/20    Nursing Mobility/ADLs:  Walking   Dependent  Transfer  Dependent  Bathing  Dependent  Dressing  Dependent  Toileting  Dependent  Feeding  Dependent  Med Admin  Dependent  Med Delivery   crushed    Wound Care Documentation and Therapy:        Elimination:  Continence:   · Bowel: no  · Bladder: Allen  Urinary Catheter: Insertion Date: 12/30/2020   Colostomy/Ileostomy/Ileal Conduit: No       Date of Last BM: 1/14/2021    Intake/Output Summary (Last 24 hours) at 12/30/2020 1007  Last data filed at 12/30/2020 0600  Gross per 24 hour   Intake 1756.4 ml   Output 1582 ml   Net 174.4 ml     I/O last 3 completed shifts:   In: 1756.4 [I.V.:1556.4; IV Piggyback:200]  Out: 6554 [Urine:2197; Blood:25]    Safety Concerns:     Aspiration Risk    Impairments/Disabilities:      Speech trach    Nutrition Therapy:  Current Nutrition Therapy:   - Tube Feedings:  Immune Enhancing    Routes of Feeding: Gastrostomy Tube  Liquids: No Liquids  Daily Fluid Restriction: no  Last Modified Barium Swallow with Video (Video Swallowing Test): not done    Treatments at the Time of Hospital Discharge:   Respiratory Treatments:   Oxygen Therapy:  vent  Ventilator:    - Ventilator Settings:    Vt Ordered: 380 mL  Rate Set: 22 bmp  FiO2 : 70 %    PEEP/CPAP: 12  Pressure Support: 0 cmH20    Rehab Therapies: Physical Therapy  Weight Bearing Status/Restrictions: No weight bearing restirctions  Other Medical Equipment (for information only, NOT a DME order):  hospital bed  Other Treatments:     Patient's personal belongings (please select all that are sent with patient):  None    RN SIGNATURE:  Electronically signed by Junior Anusha RN on 1/14/21 at 3:14 PM EST    CASE MANAGEMENT/SOCIAL WORK SECTION    Inpatient Status Date: 12/13/2020    Readmission Risk Assessment Score:  Readmission Risk              Risk of Unplanned Readmission:        46           Discharging to Facility/ Agency   · [facility]  · [address] [facility]  · [address]  · PHONE: [phone]  · FAX: [phone]    Dialysis Facility (if applicable)   · Name:  · Address:  · Dialysis Schedule:  · Phone:  · Fax:    / signature: Electronically signed by Brad Baez RN on 12/31/2020 at 8:53 AM    PHYSICIAN SECTION    Prognosis: Guarded    Condition at Discharge: Stable    Rehab Potential (if transferring to Rehab): Guarded    Recommended Labs or Other Treatments After Discharge: Continue weaning O2 support. BMP/CBC twice weekly. Physician Certification: I certify the above information and transfer of Anne-Marie Horton  is necessary for the continuing treatment of the diagnosis listed and that she requires [place] for greater 30 days.      Update Admission H&P: No change in H&P    PHYSICIAN SIGNATURE:  Electronically signed by Alanna Hastings MD on 1/14/21 at 2:36 PM EST

## 2020-12-30 NOTE — PROGRESS NOTES
Critical Care Team - Daily Progress Note         Date and time: 12/30/2020 7:56 AM  Patient's name:  Davy Gerber  Medical Record Number: [id number]  Patient's account/billing number: [de-identified]  Patient's YOB: 1950  Age: 65 y.o. Date of Admission: 12/13/2020 10:51 AM  Length of stay during current admission: 17      Primary Care Physician: Lisa Castillo DO  ICU Attending Physician: Dr. Ian Montana Status: Full Code    Reason for ICU admission: respiratory failure   Shock   Covid 19 pneumonitis       SUBJECTIVE:     OVERNIGHT EVENTS:         12/16: Overnight able to titrate down some of the vasopressors though still intermittently hypotensive and requiring vasopressin as well as Levophed. Still sedated with fentanyl and Versed. Hyponatremia notable at 119 new today. 12/17: Patient was able to be titrated off the vasopressors. Is now on midodrine. Minimally hypothermic overnight now on Melissa hugger with improvement in temperature. Continue sedation with fentanyl and Versed. 12/18: Patient continues on the ventilator. Back on small amount of Levophed in addition to the vasopressin. Hoping to wean sedation more today. 12/19: Back on levophed overnight. Initially able to titrate fio2 to 40%, became hypoxic and increased to 60%. Apparently their were several episodes of bradycardia overnight. Patient has pacemaker which did not fire per nursing. Follows with Dr. Yoel Emerson for cardiology. 12/20: Patient: 6 L overnight, nephrology ordered K-Phos, and half-normal saline, will CT her head today. 12/21: Patient tolerated being supine overnight. Plan for PICC line today. Pacemaker interrogated with no runs of V. tach or other rhythm needing acute intervention. 12/22: Patient remained supine without difficulty. Continuing to try to wean down her FiO2. Her sodium has improved. 12/23: Patient continues to tolerate the vent. She is waking up more.   Sodium is much better. No acute overnight events. 12/24: Failed weaning trial yesterday. Became agitated. Continues to tolerate the ventilator. Day 9 on the ventilator. 12/25: The patient occasionally opens her eyes. She occasionally follows commands. She gets extremely anxious when sedation is weaned further. Continues on Versed and fentanyl. 12/26: Patient continues on the ventilator. Opens eyes with sedation vacation, not really following commands. Day 11 on the ventilator. No other acute overnight events. 12:27: Continues on the vent. Awake on sedation, does follow commands. Day 12 on the ventilator. 12/28: Patient continues on the ventilator. Still occasionally becomes agitated when attempting to wean sedation. Day 13 on the ventilator. Occasionally hypotensive requiring Levophed. 12/29: trach and peg today. Spent 29th on the phone talking to her son who is the power of  yesterday explaining her prognosis and the treatment course going forward. He wants a trach and PEG and everything done at this point. Patient is actually much more awake today and following commands. When asked if she needs the volume turned up on the TV she shakes her head yes. Plan for trach and PEG this afternoon. 12/30: Trach and PEG yesterday. Had to be placed back on Levophed, currently running at 13 mics. Patient is now awake, has restraints off, is alert and responding to commands. Weaning sedatives.       CURRENT VENTILATION STATUS:     [x] Ventilator  [] BIPAP  [] Nasal Cannula [] Room Air      IF INTUBATED, ET TUBE MARKING AT LOWER LIP:       cms    SECRETIONS Amount:  [] Small [] Moderate  [] Large  [x] None  Color:     [] White [] Colored  [] Bloody    SEDATION:  RAAS Score:  [x] Fentanyl gtt  [x] Versed gtt  [] Ativan gtt   [] No Sedation    PARALYZED:  [x] No    [] Yes      VASOPRESSORS:  [] No    [x] Yes    If yes -   [x] Levophed       [] Dopamine     [] Vasopressin       [] Dobutamine  [] Phenylephrine         [] Epinephrine    CENTRAL LINES:     [x] No   [] Yes   (Date of Insertion:  12/15 )           If yes -     [] Right IJ     [] Left IJ [] Right Femoral [] Left Femoral                   [] Right Subclavian [] Left Subclavian   PICC line    GARCIA'S CATHETER:   [] No   [x] Yes  (Date of Insertion: 12/15  )     URINE OUTPUT:            [x] Good   [] Low              [] Anuric      OBJECTIVE:     VITAL SIGNS:  BP (!) 143/78   Pulse 93   Temp 98.1 °F (36.7 °C) (Axillary)   Resp (!) 33   Ht 5' 6\" (1.676 m)   Wt 220 lb 3.8 oz (99.9 kg)   SpO2 (!) 85%   BMI 35.55 kg/m²   Tmax over 24 hours:  Temp (24hrs), Av.8 °F (37.1 °C), Min:97 °F (36.1 °C), Max:100.2 °F (37.9 °C)      Patient Vitals for the past 6 hrs:   Temp Temp src Pulse Resp SpO2   20 0600 -- -- 93 (!) 33 (!) 85 %   20 0500 -- -- 93 25 (!) 86 %   20 0400 98.1 °F (36.7 °C) Axillary 81 (!) 35 94 %   20 0300 -- -- 87 18 95 %   20 0200 -- -- 103 22 93 %         Intake/Output Summary (Last 24 hours) at 2020 0756  Last data filed at 2020 0600  Gross per 24 hour   Intake 1756.4 ml   Output 2222 ml   Net -465.6 ml     Wt Readings from Last 2 Encounters:   20 220 lb 3.8 oz (99.9 kg)   20 188 lb 9 oz (85.5 kg)     Body mass index is 35.55 kg/m². PHYSICAL EXAMINATION:  General: ill appearing, sedated, lying in gurney in no distress supine  HEENT: Pupils are equal round and reactive to light, there are no oral lesions and no post-nasal drip   Neck: supple without adenopathy, tracheostomy in place  Cardiovascular: regular rate and rhythm without murmur or gallop  Respiratory:  Decreased breath sounds bilaterally without wheezing or crackles.   Air entry is symmetric  Abdomen: soft, non-tender, non-distended, normal bowel sounds, lateral bruising in the flanks, nontender to palpation in the area of the Lovenox injections  Extremities: warm, no edema, no clubbing   Skin: (Comprehensive) (if applicable):  Vent Information  $Ventilation: $Subsequent Day  Skin Assessment: Redness (see comment/note)  Equipment ID: [id number]  Equipment Changed: Humidification  Vent Type: [id number]  Vent Mode: AC/VC  Vt Ordered: 450 mL  Pressure Ordered: 14  Rate Set: 14 bmp  Peak Flow: 75 L/min  Pressure Support: 0 cmH20  FiO2 : 70 %  SpO2: (!) 85 %  SpO2/FiO2 ratio: 121.43  PaO2/FiO2 ratio: 102  Sensitivity: 2  PEEP/CPAP: 10  I Time/ I Time %: 0 s  Humidification Source: Heated wire  Humidification Temp: 37  Mask Type: Full face mask  Mask Size: Small  Additional Respiratory  Assessments  Pulse: 93  Resp: (!) 33  SpO2: (!) 85 %  Position: Semi-Marrero's  Humidification Source: Heated wire  Humidification Temp: 37  Oral Care: Mouth suctioned, Mouth swabbed, Mouth moisturizer  Subglottic Suction Done?: Yes  Cuff Pressure (cm H2O): 29 cm H2O    ABGs:   Recent Labs     12/30/20  0657   PH 7.427   PCO2 42.6   PO2 60.4*   HCO3 27.5*   BE 2.8   O2SAT 92.2       Laboratory findings:    Complete Blood Count:   Recent Labs     12/28/20  0530 12/29/20  0620 12/30/20  0645   WBC 10.6 12.9* 11.2   HGB 9.7* 10.0* 10.0*   HCT 32.7* 32.9* 32.2*    192 179        Last 3 Blood Glucose:   Recent Labs     12/29/20  0620 12/29/20  1823 12/30/20  0645   GLUCOSE 142* 175* 161*        PT/INR:    Lab Results   Component Value Date    PROTIME 11.4 12/13/2020    PROTIME 11.9 12/15/2011    INR 1.0 12/13/2020     PTT:    Lab Results   Component Value Date    APTT 21.6 12/13/2020       Comprehensive Metabolic Profile:   Recent Labs     12/29/20  0620 12/29/20  1823 12/30/20  0645    141 138   K 3.7 3.3* 3.5    102 100   CO2 33* 31* 31*   BUN 8 7* 6*   CREATININE 0.3* 0.3* 0.3*   GLUCOSE 142* 175* 161*   CALCIUM 7.6* 7.2* 7.4*   PROT 5.3*  --  5.5*   LABALBU 3.2*  --  3.4*   BILITOT 0.5  --  0.7   ALKPHOS 113*  --  114*   AST 37*  --  32*   ALT 21  --  22      Magnesium:   Lab Results   Component Value Date    MG 1.9 12/30/2020     Phosphorus:   Lab Results   Component Value Date    PHOS 1.7 12/30/2020     Ionized Calcium: No results found for: CAION     Urinalysis:     Troponin:   No results for input(s): TROPONINI in the last 72 hours. Microbiology:    Cultures during this admission:     Blood cultures:                 [] None drawn      [x] Negative             []  Positive (Details:  )  Urine Culture:                   [] None drawn      [] Negative             []  Positive (Details:  )  Sputum Culture:               [] None drawn       [x] Negative             []  Positive (Details:  )   Endotracheal aspirate:     [] None drawn       [] Negative             []  Positive (Details:  )     Other pertinent Labs:   COVID-19 + December 13  MRSA negative screening   radiology/Imaging:     Chest Xray (12/30/2020):       Right PICC line with tip at the junction of the subclavian and SVC   Extensive bilateral pulmonary infiltrates unchanged   ET and NG tubes appear in satisfactory position             ASSESSMENT:      1. Acute hypoxic respiratory failure   2. Covid pneumonitis - critically severe   3. Shock- septic   4. Adrenal insufficiency  5. Hyponatremia   6. Hypokalemia   7. Leukocytosis    8. COPD  9. pvd  10. chf not in acute exacerbatino   11. Hx lacunar infarct   12. SSS s/p pacemaker   13. Hx seizure do   14. Bilateral carotid stenosis       SYSTEMS ASSESSMENT    Neuro   Intubated, sedated on Versed and fentanyl - wean as tolerated  No longer paralyzed  Previous history of lacunar stroke  CT head on admission showed no acute abnormalities  Following commands today, agitation is much improved    Respiratory   Acute respiratory failure with hypoxia secondary to COVID-19 viral pneumonia  Intubated, wean sedation as tolerated  Course of Cefepime completed for possible superimposed bacterial pneumonia   Wean peep and fio2 as tolerated.  Keep O2 sat 90-92%  abg daily  Trach and PEG once fio2 and peep improved   General surgery consulted    Cardiovascular   Shock, septic  Increase midodrine to 15 tid   -continues to require intermittent levophed for hypotension   intermittent atrial fib/flutter  Pacemaker interrogated that showed several episodes of supraventricular tachycardia over the last couple of months but no persistent shockable rhythm  Continue plavix, hx of CAD with stent      Gastrointestinal   GI prophylaxis with Protonix  Diet per dietary recs   Colace/senna for constipation  Ct abdomen pelvis 12/27 showed no acute intraabdominal etiology     Renal   Hyponatremia -resolved   -Continue to trend metabolic panel   -Nephrology following for fluid recommendations intravenously  Replete potassium as needed  Lasix 20 mg bid diuresis     Infectious Disease   COVID-19 viral pneumonia   Received remdesivir, Toci, vit D, vit c, thiamine   Septic shock - improving  Possible superimposed bacterial pneumonia   -Completed course of cefepime  Blood cultures and respiratory culture negative thus far  ua sent today       Hematology/Oncology   Anemia on admission, trend H&H, improving  CBC daily  lovenox 1mg/kg bid - rising ddimer      Endocrine   high-dose sliding scale Humalog for hyperglycemia  Start lantus   -better glycemic control today  Monitor growth closely  Cortisol on admission 1.42  High-dose Solucortef decreased to 50 q 8 hrs      Social/Spiritual/DNR/Other   Full code. Palliative consulted. ASSESSMENT/ PLAN     1. Trach and peg 12/29  2. Hyponatremia -resolved- nephrology following  3.   midodrine TID - still requiring levophed   4. increased solucortef  5. Palliative following   6. Rising ddimer- full dose lovenox bid- held due to abdominal bleeding, on scds  7. Lasix diuresis   8. Much more awake today, following commands  9. Afib with RVR 12/30, gave amio bolus, start on amio per PEG  10. D/c a line  11.  Continue ICU level of care      Mariam Finn MD PGY-1  12/30/2020 8:17 AM    I personally saw, examined and provided care for the patient. Radiographs, labs and medication list were reviewed by me independently. I spoke with bedside nursing, therapists and consultants. Critical care services and times documented are independent of procedures and multidisciplinary rounds with Residents. Additionally comprehensive, multidisciplinary rounds were conducted with the MICU team. The case was discussed in detail and plans for care were established. Review of Residents documentation was conducted and revisions were made as appropriate. I agree with the above documented exam, problem list and plan of care. Trend sodium   ivf   Hydrocortisone   ltac referral   Wean vent     Stephanie Mccann M.D.    Pulmonary/Critical Care Medicine   37 min cct excluding procedures

## 2020-12-30 NOTE — CARE COORDINATION
COVID POSITIVE 12/13. Vent since 12/15-FIO2 70%. POD #1 trach/PEG. Still requiring iv pressor . Patient has [address]. Insurance. From [facility]- can return only if extubated-no repeat COVID test is needed. [facility](only area [facility] accepting vents/ + covid at this time)- awaiting acceptance. Son Joselyn Andino aware of discharge plan. Will follow Mounika Saul      Per [facility] [employer] [facility] [employer]- pt is accepted-insurance precert is waived until January 15th.  Will follow Mounika Saul

## 2020-12-30 NOTE — PROGRESS NOTES
Stage  CI HR MAP TPRI SVI   Baseline 2.9 92 67 na 32   Challenge 3.8 92 63 na 41   Result (%Ä) 28.9 -0.4  na 29.2     TFC 49.5  49.2  -0.6%

## 2020-12-30 NOTE — CARE COORDINATION
Social Work/Discharge Planning:  Chart reviewed. [facility] is reviewing patient information to determine if they can accept at discharge. Called liadeya Hoyt at [facility] and requested patient PASSAR/HENS. Ambulance form in soft chart. Will continue to follow.   Electronically signed by DAVID Peck on 12/30/2020 at 10:07 AM

## 2020-12-30 NOTE — PATIENT CARE CONFERENCE
..  Intensive Care Daily Quality Rounding Checklist      ICU Team Members: Dr. Jesús Watkins, Dr. Kavita Ayala (resident), clinical pharmacist, charge nurse, bedside nurse, respiratory therapist    ICU Day #: NUMBER: 15    Intubation Date: December 16---- trach--> Dec 29    Ventilator Day #: NUMBER: 16    Central Line Insertion Date: December 21 PICC line        Day #: NUMBER: 10     Arterial Line Insertion Date: December 15      Day #: NUMBER: 16    DVT Prophylaxis: Lovenox    GI Prophylaxis: Protonix    Allen Catheter Insertion Date: December 13       Day #: 18      Continued need (if yes, reason documented and discussed with physician): yes, strict I & O    Skin Issues/ Wounds and ordered treatment discussed on rounds: yes    Goals/ Plans for the Day: Daily labs & abgs, vent changes as able, wean vasopressors & sedation

## 2020-12-30 NOTE — PROGRESS NOTES
Subjective:    Patient seen and examined at bedside, trach + PEG on 12/29. No complaints, following commands, denies pain      Objective:    BP (!) 143/78   Pulse 82   Temp 98.6 °F (37 °C) (Axillary)   Resp 13   Ht 5' 6\" (1.676 m)   Wt 220 lb 3.8 oz (99.9 kg)   SpO2 93%   BMI 35.55 kg/m²     Current medications that patient is taking have been reviewed. Heart:  RRR, no murmurs, gallops, or rubs.   Lungs:  Trach+  Abd: PEG+  Extrem:  No cyanosis or edema    CBC:   Lab Results   Component Value Date    WBC 11.2 12/30/2020    RBC 3.19 12/30/2020    HGB 10.0 12/30/2020    HCT 32.2 12/30/2020    .9 12/30/2020    MCH 31.3 12/30/2020    MCHC 31.1 12/30/2020    RDW 18.9 12/30/2020     12/30/2020    MPV 10.0 12/30/2020     CMP:    Lab Results   Component Value Date     12/30/2020    K 3.5 12/30/2020    K 3.3 12/15/2020     12/30/2020    CO2 31 12/30/2020    BUN 6 12/30/2020    CREATININE 0.3 12/30/2020    GFRAA >60 12/30/2020    LABGLOM >60 12/30/2020    GLUCOSE 161 12/30/2020    GLUCOSE 106 12/15/2011    PROT 5.5 12/30/2020    LABALBU 3.4 12/30/2020    CALCIUM 7.4 12/30/2020    BILITOT 0.7 12/30/2020    ALKPHOS 114 12/30/2020    AST 32 12/30/2020    ALT 22 12/30/2020        Assessment:    Patient Active Problem List   Diagnosis    History of hypertension    HLD (hyperlipidemia)    Tobacco abuse    COPD (chronic obstructive pulmonary disease) (HCC)    History of DVT (deep vein thrombosis)    Seizure (HCC)    [facility]SS [facility] syndrome) [facility] [facility]    Pacemaker    Hypothyroid    PVD (peripheral vascular disease) ([facility]    Coronary artery disease involving native coronary artery without angina pectoris    Left carotid artery occlusion    Osteoporosis    Bilateral carotid artery stenosis    Atherosclerosis of native artery of both lower extremities with intermittent claudication (HCC)    Osteoarthritis    Post-traumatic osteoarthritis of left hip    Trauma    Multiple closed fractures of ribs of left side    Chest wall pain    Nodule of spleen    CAD in native artery    Shoulder dislocation, left, subsequent encounter    Neuropathy    Lacunar infarction (HCC)    CHF (congestive heart failure) (HCC)    Pneumonia    Acute respiratory failure with hypoxia (HCC)    Acute respiratory failure due to COVID-19 ([facility] [address] [facility]    Septic shock (HCC)    Atrial fibrillation with RVR (HCC)       Plan:    1.  Acute respiratory failure with hypoxia   Trach+, on ventilator, FiO2 70%              COVID +              Failed weaning trial x3              Multifocal PNA - XR 12/29 stable  2. Shock -likely septic               Continue with levophed, midodrine + hydrocortisone  3. Anemia - resolved  4. Hypothyroidism - continue synthroid  5. Hyponatremia -resolved    Nephrology consulted  6.  A. fib RVR - amiodarone through PEG      Keven Pack    5:16 PM  12/30/2020

## 2020-12-30 NOTE — PROGRESS NOTES
Comprehensive Nutrition Assessment    Type and Reason for Visit:  Reassess    Nutrition Recommendations/Plan: Continue NPO, Start Tube Feeding when medically appropriate  TF recommendation:   Semi-elemental @ 55 ml/hr x 23 hrs (holding 30 mins before/after synthroid)  Will provide 1265 ml TV, 1518 kcals, 95 gm pro, 1026 ml free water  Regimen will meet 100% est nutrient needs    Nutrition Assessment:  Pt remains on vent 2/2 COVID-19, now s/p trach/PEG w/ ongoing need for EN. Will provide updated TF recs and monitor. Malnutrition Assessment:  Malnutrition Status:   At risk for malnutrition (Comment)    Context:  Acute Illness     Findings of the 6 clinical characteristics of malnutrition:  Energy Intake:  1 - 75% or less of estimated energy requirements for 7 or more days  Weight Loss:  Unable to assess(d/t fluids/fluctuations)     Body Fat Loss:  Unable to assess     Muscle Mass Loss:  Unable to assess    Fluid Accumulation:  No significant fluid accumulation     Strength:  Not Performed    Estimated Daily Nutrient Needs:  Energy (kcal):  PS3B 1571; 8141-0238; Weight Used for Energy Requirements:  Admission     Protein (g):  75-90; Weight Used for Protein Requirements:  Ideal(1.3-1.5)        Fluid (ml/day):  per Critical Care; Method Used for Fluid Requirements:  Other (Comment)      Nutrition Related Findings:  trach/PEG, active BS, soft abd, +2 generalized edema, pressor x1, -I/Os      Wounds:  Surgical Incision(trach/PEG sites)       Current Nutrition Therapies:    Diet NPO, After Midnight    Anthropometric Measures:  · Height: 5' 6\" (167.6 cm)  · Current Body Weight: 184 lb (83.5 kg)(12/27)   · Admission Body Weight: 184 lb (83.5 kg)(12/15 first measured wt)    · Usual Body Weight: 161 lb (73 kg)(per EMR x 6 mo)     · Ideal Body Weight: 130 lbs; % Ideal Body Weight 141.5 %   · BMI: 29.7  · BMI Categories: Overweight (BMI 25.0-29.9)(using admit wt BMI 29.7)       Nutrition Diagnosis:   · Inadequate oral intake related to impaired respiratory function(2/2 COVID-19 PNA) as evidenced by NPO or clear liquid status due to medical condition, intubation    Nutrition Interventions:   Food and/or Nutrient Delivery:  Continue NPO, Start Tube Feeding(TF recommendation: Semi-elemental @ 55 ml/hr x 23 hrs (holding 30 mins before/after synthroid))  Nutrition Education/Counseling:  No recommendation at this time   Coordination of Nutrition Care:  Continue to monitor while inpatient    Goals:  nutrition progression/tolerance to EN at goal rate       Nutrition Monitoring and Evaluation:   Food/Nutrient Intake Outcomes:  Enteral Nutrition Intake/Tolerance  Physical Signs/Symptoms Outcomes:  Biochemical Data, GI Status, Fluid Status or Edema, Hemodynamic Status, Nutrition Focused Physical Findings, Skin, Weight     Discharge Planning:    Enteral Nutrition     Electronically signed by Ankur Grullon, MS, RD, LD on 12/30/20 at 2:52 PM EST    Contact: [phone]

## 2020-12-30 NOTE — PROGRESS NOTES
When released soft wrist restraints patient attempts to pull out ETT and NGT. Soft wrist restraints remain in place.

## 2020-12-31 NOTE — PROGRESS NOTES
Critical Care Team - Daily Progress Note         Date and time: 12/31/2020 8:46 AM  Patient's name:  Madelin Call  Medical Record Number: [id number]  Patient's account/billing number: [de-identified]  Patient's YOB: 1950  Age: 65 y.o. Date of Admission: 12/13/2020 10:51 AM  Length of stay during current admission: 18      Primary Care Physician: Ximena Castro DO  ICU Attending Physician: Dr. Idania Singh Status: Full Code    Reason for ICU admission: respiratory failure   Shock   Covid 19 pneumonitis       SUBJECTIVE:     OVERNIGHT EVENTS:         12/16: Overnight able to titrate down some of the vasopressors though still intermittently hypotensive and requiring vasopressin as well as Levophed. Still sedated with fentanyl and Versed. Hyponatremia notable at 119 new today. 12/17: Patient was able to be titrated off the vasopressors. Is now on midodrine. Minimally hypothermic overnight now on Melissa hugger with improvement in temperature. Continue sedation with fentanyl and Versed. 12/18: Patient continues on the ventilator. Back on small amount of Levophed in addition to the vasopressin. Hoping to wean sedation more today. 12/19: Back on levophed overnight. Initially able to titrate fio2 to 40%, became hypoxic and increased to 60%. Apparently their were several episodes of bradycardia overnight. Patient has pacemaker which did not fire per nursing. Follows with Dr. Vivi Canada for cardiology. 12/20: Patient: 6 L overnight, nephrology ordered K-Phos, and half-normal saline, will CT her head today. 12/21: Patient tolerated being supine overnight. Plan for PICC line today. Pacemaker interrogated with no runs of V. tach or other rhythm needing acute intervention. 12/22: Patient remained supine without difficulty. Continuing to try to wean down her FiO2. Her sodium has improved. 12/23: Patient continues to tolerate the vent. She is waking up more.   Sodium is much better. No acute overnight events. 12/24: Failed weaning trial yesterday. Became agitated. Continues to tolerate the ventilator. Day 9 on the ventilator. 12/25: The patient occasionally opens her eyes. She occasionally follows commands. She gets extremely anxious when sedation is weaned further. Continues on Versed and fentanyl. 12/26: Patient continues on the ventilator. Opens eyes with sedation vacation, not really following commands. Day 11 on the ventilator. No other acute overnight events. 12:27: Continues on the vent. Awake on sedation, does follow commands. Day 12 on the ventilator. 12/28: Patient continues on the ventilator. Still occasionally becomes agitated when attempting to wean sedation. Day 13 on the ventilator. Occasionally hypotensive requiring Levophed. 12/29: trach and peg today. Spent 29th on the phone talking to her son who is the power of  yesterday explaining her prognosis and the treatment course going forward. He wants a trach and PEG and everything done at this point. Patient is actually much more awake today and following commands. When asked if she needs the volume turned up on the TV she shakes her head yes. Plan for trach and PEG this afternoon. 12/30: Trach and PEG yesterday. Had to be placed back on Levophed, currently running at 13 mics. Patient is now awake, has restraints off, is alert and responding to commands. Weaning sedatives. 12/31: No acute events overnight, episode of A. fib with RVR yesterday, changed to needle from Levophed, and started on amiodarone bolus and amiodarone per PEG tube. Off fern today.      CURRENT VENTILATION STATUS:     [x] Ventilator  [] BIPAP  [] Nasal Cannula [] Room Air      IF INTUBATED, ET TUBE MARKING AT LOWER LIP:       cms    SECRETIONS Amount:  [] Small [] Moderate  [] Large  [x] None  Color:     [] White [] Colored  [] Bloody    SEDATION:  RAAS Score:  [x] Fentanyl gtt  [x] Versed gtt  [] Ativan gtt   [] No Sedation    PARALYZED:  [x] No    [] Yes      VASOPRESSORS:  [x] No    [] Yes    If yes -   [] Levophed       [] Dopamine     [] Vasopressin       [] Dobutamine  [] Phenylephrine         [] Epinephrine    CENTRAL LINES:     [x] No   [] Yes   (Date of Insertion:  12/15 )           If yes -     [] Right IJ     [] Left IJ [] Right Femoral [] Left Femoral                   [] Right Subclavian [] Left Subclavian   PICC line    GARCIA'S CATHETER:   [] No   [x] Yes  (Date of Insertion: 12/15  )     URINE OUTPUT:            [x] Good   [] Low              [] Anuric      OBJECTIVE:     VITAL SIGNS:  BP (!) 143/78   Pulse 77   Temp 97.5 °F (36.4 °C) (Axillary)   Resp 21   Ht 5' 6\" (1.676 m)   Wt 220 lb 3.8 oz (99.9 kg)   SpO2 95%   BMI 35.55 kg/m²   Tmax over 24 hours:  Temp (24hrs), Av.3 °F (36.8 °C), Min:97.5 °F (36.4 °C), Max:98.6 °F (37 °C)      Patient Vitals for the past 6 hrs:   Temp Temp src Pulse Resp SpO2   20 0822 -- -- 77 21 95 %   20 0821 -- -- -- 30 95 %   20 0820 -- -- -- 22 95 %   20 0819 -- -- -- 23 95 %   20 0531 -- -- 78 17 93 %   20 0500 -- -- 75 25 91 %   20 0400 97.5 °F (36.4 °C) Axillary 65 14 94 %   20 0300 -- -- 76 25 95 %         Intake/Output Summary (Last 24 hours) at 2020 0846  Last data filed at 2020 0531  Gross per 24 hour   Intake 2721.58 ml   Output 3360 ml   Net -638.42 ml     Wt Readings from Last 2 Encounters:   20 220 lb 3.8 oz (99.9 kg)   20 188 lb 9 oz (85.5 kg)     Body mass index is 35.55 kg/m². PHYSICAL EXAMINATION:    General: More well today, resting comfortably in [facility]  HEENT: Pupils are equal round and reactive to light, there are no oral lesions and no post-nasal drip   Neck: supple without adenopathy, tracheostomy in place  Cardiovascular: regular rate and rhythm without murmur or gallop  Respiratory:  Decreased breath sounds bilaterally without wheezing or crackles.   Air entry is symmetric  Abdomen: soft, non-tender, non-distended, normal bowel sounds, lateral bruising in the flanks, nontender to palpation in the area of the Lovenox injections  Extremities: warm, no edema, no clubbing   Skin:  Large bilateral ecchymoses on the flanks, and lower quadrants of the abdomen  Neurologic: Trached, pupils equal round reactive, watching TV, following commands, moves all extremities       Any additional physical findings:    MEDICATIONS:    Scheduled Meds:   potassium phosphate IVPB  30 mmol Intravenous Once    potassium chloride  40 mEq Intravenous Once    amiodarone  400 mg Oral BID    Followed by   Charlene Yates ON 1/6/2021] amiodarone  200 mg Oral Daily    hydrocortisone sodium succinate PF  50 mg Intravenous Q6H    furosemide  40 mg Intravenous Daily    [Held by provider] enoxaparin  1 mg/kg Subcutaneous BID    vitamin C  500 mg Oral BID    thiamine  100 mg Oral Daily    zinc sulfate  50 mg Oral Daily    heparin flush  3 mL Intravenous 2 times per day    insulin glargine  25 Units Subcutaneous Daily    docusate  100 mg Oral Daily    senna  5 mL Oral Nightly    midodrine  10 mg Oral TID    insulin lispro  0-18 Units Subcutaneous Q6H    pantoprazole  40 mg Intravenous Daily    And    sodium chloride (PF)  10 mL Intravenous Daily    ipratropium-albuterol  1 ampule Inhalation Q4H WA    budesonide  500 mcg Nebulization BID    Arformoterol Tartrate  15 mcg Nebulization BID    [Held by provider] clopidogrel  75 mg Oral Daily    chlorhexidine  15 mL Mouth/Throat BID    sodium chloride flush  10 mL Intravenous 2 times per day    Vitamin D  2,000 Units Oral Daily    gabapentin  800 mg Oral TID    levothyroxine  100 mcg Oral Daily    atorvastatin  20 mg Oral Daily    acetaminophen  650 mg Rectal Once     Continuous Infusions:   phenylephrine (HORACE-SYNEPHRINE) 50mg/250mL infusion 50 mcg/min (12/31/20 0020)    fentaNYL 5 mcg/ml in 0.9%  ml infusion 150 mcg/hr (12/31/20 12/13/2020     PTT:    Lab Results   Component Value Date    APTT 21.6 12/13/2020       Comprehensive Metabolic Profile:   Recent Labs     12/29/20  1823 12/30/20  0645 12/31/20  0530    138 137   K 3.3* 3.5 3.4*    100 99   CO2 31* 31* 32*   BUN 7* 6* 9   CREATININE 0.3* 0.3* 0.3*   GLUCOSE 175* 161* 214*   CALCIUM 7.2* 7.4* 7.2*   PROT  --  5.5* 5.2*   LABALBU  --  3.4* 3.4*   BILITOT  --  0.7 0.6   ALKPHOS  --  114* 104   AST  --  32* 25   ALT  --  22 18      Magnesium:   Lab Results   Component Value Date    MG 2.1 12/31/2020     Phosphorus:   Lab Results   Component Value Date    PHOS 1.5 12/31/2020     Ionized Calcium: No results found for: CAION     Urinalysis:     Troponin:   No results for input(s): TROPONINI in the last 72 hours. Microbiology:    Cultures during this admission:     Blood cultures:                 [] None drawn      [x] Negative             []  Positive (Details:  )  Urine Culture:                   [] None drawn      [] Negative             []  Positive (Details:  )  Sputum Culture:               [] None drawn       [x] Negative             []  Positive (Details:  )   Endotracheal aspirate:     [] None drawn       [] Negative             []  Positive (Details:  )     Other pertinent Labs:   COVID-19 + December 13  MRSA negative screening   radiology/Imaging:     Chest Xray (12/31/2020):       Right PICC line with tip at the junction of the subclavian and SVC   Extensive bilateral pulmonary infiltrates unchanged   ET and NG tubes appear in satisfactory position             ASSESSMENT:      1. Acute hypoxic respiratory failure   2. Covid pneumonitis - critically severe   3. Shock- septic   4. Adrenal insufficiency  5. Hyponatremia   6. Hypokalemia   7. Leukocytosis    8. COPD  9. pvd  10. chf not in acute exacerbatino   11. Hx lacunar infarct   12. SSS s/p pacemaker   13. Hx seizure do   14.  Bilateral carotid stenosis       SYSTEMS ASSESSMENT    Neuro   Intubated, sedated on Versed and fentanyl - wean as tolerated  No longer paralyzed  Previous history of lacunar stroke  CT head on admission showed no acute abnormalities  Following commands today, agitation is much improved    Respiratory   Acute respiratory failure with hypoxia secondary to COVID-19 viral pneumonia  Intubated, wean sedation as tolerated  Course of Cefepime completed for possible superimposed bacterial pneumonia   Wean peep and fio2 as tolerated. Keep O2 sat 90-92%  abg daily  Trach and PEG once fio2 and peep improved   General surgery consulted    Cardiovascular   Shock, septic  Increase midodrine to 15 tid   -Changed Levophed to Jay-Synephrine due to recurrent A. fib with RVR  intermittent atrial fib/flutter  Pacemaker interrogated that showed several episodes of supraventricular tachycardia over the last couple of months but no persistent shockable rhythm  Continue plavix, hx of CAD with stent      Gastrointestinal   GI prophylaxis with Protonix  Diet per dietary recs   Colace/senna for constipation  Ct abdomen pelvis 12/27 showed no acute intraabdominal etiology     Renal   Hyponatremia -resolved   -Continue to trend metabolic panel   -Nephrology following for fluid recommendations intravenously  Replete potassium as needed  Lasix 20 mg bid diuresis     Infectious Disease   COVID-19 viral pneumonia   Received remdesivir, Toci, vit D, vit c, thiamine   Septic shock - improving  Possible superimposed bacterial pneumonia   -Completed course of cefepime  Blood cultures and respiratory culture negative thus far  ua sent today       Hematology/Oncology   Anemia on admission, trend H&H, improving  CBC daily  lovenox 1mg/kg bid - rising ddimer      Endocrine   high-dose sliding scale Humalog for hyperglycemia  Start lantus   -better glycemic control today  Monitor growth closely  Cortisol on admission 1.42  High-dose Solucortef decreased to 50 q 8 hrs      Social/Spiritual/DNR/Other   Full code. Palliative consulted. ASSESSMENT/ PLAN     1. Trach and peg 12/29  2. Hyponatremia -resolved- nephrology following  3.   midodrine TID - still requiring levophed   4. increased solucortef  5. Palliative following   6. Rising ddimer- full dose lovenox bid- held due to abdominal bleeding, on scds  7. Lasix diuresis   8. Much more awake today, following commands  9. Afib with RVR 12/30, gave amio bolus, start on amio per PEG  10. D/c a line, ensure BP is stable off fern  12. Nicotine patch as patient wants  13. Weaning off fentanyl drip, transitioning to 50 derrick fentanyl patch, will have to wean the patch as well  14. Continue ICU level of care      Freddie Rose MD PGY-1  12/31/2020 9:16 AM    I personally saw, examined and provided care for the patient. Radiographs, labs and medication list were reviewed by me independently. I spoke with bedside nursing, therapists and consultants. Critical care services and times documented are independent of procedures and multidisciplinary rounds with Residents. Additionally comprehensive, multidisciplinary rounds were conducted with the MICU team. The case was discussed in detail and plans for care were established. Review of Residents documentation was conducted and revisions were made as appropriate. I agree with the above documented exam, problem list and plan of care. Eloina Echavarria M.D.    Pulmonary/Critical Care Medicine   36 min cct excluding procedures

## 2020-12-31 NOTE — PATIENT CARE CONFERENCE
..  Intensive Care Daily Quality Rounding Checklist      ICU Team Members: Dr. Rodolfo Grider, Dr. Nolan Calderon (resident), clinical pharmacist, charge nurse, bedside nurse, respiratory therapist    ICU Day #: 17    Intubation Date: December 15 trache---> Dec 29    Ventilator Day #: NUMBER: 17    Central Line Insertion Date: December 21 PICC line inserted        Day #: NUMBER: 11     Arterial Line Insertion Date: December 15      Day #: NUMBER: 17    Temporary Hemodialysis Catheter Insertion Date:  n/a      Day # n/a    DVT Prophylaxis: Lovenox    GI Prophylaxis: Protonix    Allen Catheter Insertion Date: December 13       Day #: 19      Continued need (if yes, reason documented and discussed with physician): yes,     Skin Issues/ Wounds and ordered treatment discussed on rounds: SOS precautions    Goals/ Plans for the Day: Daily labs, wean vent as able, wean Fentanyl drip as able, start Nicotine patch

## 2020-12-31 NOTE — PLAN OF CARE
0245 by Rocael Izquierdo RN  Outcome: Met This Shift  12/30/2020 1748 by Beverly Palmer RN  Outcome: Met This Shift     Problem: Nutrition Deficits  Goal: Optimize nutrtional status  12/30/2020 1748 by Beverly Palmer RN  Outcome: Met This Shift     Problem: Risk for Fluid Volume Deficit  Goal: Maintain normal heart rhythm  12/31/2020 0547 by Rocael Izquierdo RN  Outcome: Not Met This Shift  12/30/2020 1748 by Beverly Palmer RN  Outcome: Met This Shift  Goal: Maintain absence of muscle cramping  12/30/2020 1748 by Beverly Palmer RN  Outcome: Met This Shift  Goal: Maintain normal serum potassium, sodium, calcium, phosphorus, and pH  12/30/2020 1748 by Beverly Palmer RN  Outcome: Ongoing     Problem: Loneliness or Risk for Loneliness  Goal: Demonstrate positive use of time alone when socialization is not possible  12/30/2020 1748 by Beverly Palmer RN  Outcome: Met This Shift     Problem: Fatigue  Goal: Verbalize increase energy and improved vitality  12/30/2020 1748 by Beverly Palmer RN  Outcome: Not Met This Shift     Problem: Patient Education: Go to Patient Education Activity  Goal: Patient/Family Education  12/30/2020 1748 by Beverly Palmer RN  Outcome: Met This Shift     Problem: Patient Education: Go to Patient Education Activity  Goal: Patient/Family Education  12/30/2020 1748 by Beverly Palmer RN  Outcome: Met This Shift     Problem: Restraint Use - Nonviolent/Non-Self-Destructive Behavior:  Goal: Absence of restraint indications  Description: Absence of restraint indications  12/30/2020 1748 by Beverly Palmer RN  Outcome: Completed  Goal: Absence of restraint-related injury  Description: Absence of restraint-related injury  12/30/2020 1748 by Beverly Palmer RN  Outcome: Completed     Problem: Falls - Risk of:  Goal: Will remain free from falls  Description: Will remain free from falls  12/31/2020 0547 by Rocael Izquierdo RN  Outcome: Met This Shift  12/30/2020 1748 by Beverly Palmer

## 2020-12-31 NOTE — CARE COORDINATION
COVID POSITIVE 12/13. Vent since 12/15- -FIO2 70%. POD #2trach/PEG. Still requiring iv pressor . Patient has [address]. Insurance. From [facility]- can return only if extubated-no repeat COVID test is needed. Accepted @ [facility] when medically stable(only area [facility] accepting vents/ + covid at this time)- insurance precert waived until 4/41/74. [facility] aware of discharge plan. Will follow.  N-17 initiated  Rufina Bustillo

## 2021-01-01 ENCOUNTER — APPOINTMENT (OUTPATIENT)
Dept: CT IMAGING | Age: 71
DRG: 004 | End: 2021-01-01
Payer: MEDICARE

## 2021-01-01 ENCOUNTER — APPOINTMENT (OUTPATIENT)
Dept: GENERAL RADIOLOGY | Age: 71
DRG: 004 | End: 2021-01-01
Payer: MEDICARE

## 2021-01-01 ENCOUNTER — APPOINTMENT (OUTPATIENT)
Dept: ULTRASOUND IMAGING | Age: 71
DRG: 004 | End: 2021-01-01
Payer: MEDICARE

## 2021-01-01 VITALS
HEART RATE: 62 BPM | TEMPERATURE: 97.2 F | SYSTOLIC BLOOD PRESSURE: 75 MMHG | WEIGHT: 244.9 LBS | BODY MASS INDEX: 39.36 KG/M2 | OXYGEN SATURATION: 41 % | DIASTOLIC BLOOD PRESSURE: 32 MMHG | HEIGHT: 66 IN

## 2021-01-01 LAB
(1,3)-BETA-D-GLUCAN (FUNGITELL) INTERPRETATION: NEGATIVE
(1,3)-BETA-D-GLUCAN (FUNGITELL): <31 PG/ML
AADO2: 326.5 MMHG
AADO2: 347.7 MMHG
AADO2: 349.5 MMHG
AADO2: 353.6 MMHG
AADO2: 362.8 MMHG
AADO2: 384.6 MMHG
AADO2: 429.2 MMHG
AADO2: 445.9 MMHG
AADO2: 450.3 MMHG
AADO2: 460.6 MMHG
AADO2: 504.5 MMHG
AADO2: 506.8 MMHG
AADO2: 562.3 MMHG
AADO2: 585.5 MMHG
ABO/RH: NORMAL
ABO/RH: NORMAL
ADENOVIRUS BY PCR: NOT DETECTED
ALBUMIN SERPL-MCNC: 3 G/DL (ref 3.5–5.2)
ALBUMIN SERPL-MCNC: 3 G/DL (ref 3.5–5.2)
ALBUMIN SERPL-MCNC: 3.3 G/DL (ref 3.5–5.2)
ALP BLD-CCNC: 111 U/L (ref 35–104)
ALP BLD-CCNC: 117 U/L (ref 35–104)
ALP BLD-CCNC: 130 U/L (ref 35–104)
ALT SERPL-CCNC: 14 U/L (ref 0–32)
ALT SERPL-CCNC: 14 U/L (ref 0–32)
ALT SERPL-CCNC: 15 U/L (ref 0–32)
AMMONIA: 37.2 UMOL/L (ref 11–51)
ANION GAP SERPL CALCULATED.3IONS-SCNC: -1 MMOL/L (ref 7–16)
ANION GAP SERPL CALCULATED.3IONS-SCNC: 0 MMOL/L (ref 7–16)
ANION GAP SERPL CALCULATED.3IONS-SCNC: 1 MMOL/L (ref 7–16)
ANION GAP SERPL CALCULATED.3IONS-SCNC: 2 MMOL/L (ref 7–16)
ANION GAP SERPL CALCULATED.3IONS-SCNC: 3 MMOL/L (ref 7–16)
ANION GAP SERPL CALCULATED.3IONS-SCNC: 4 MMOL/L (ref 7–16)
ANION GAP SERPL CALCULATED.3IONS-SCNC: 5 MMOL/L (ref 7–16)
ANION GAP SERPL CALCULATED.3IONS-SCNC: 6 MMOL/L (ref 7–16)
ANION GAP SERPL CALCULATED.3IONS-SCNC: 6 MMOL/L (ref 7–16)
ANION GAP SERPL CALCULATED.3IONS-SCNC: 7 MMOL/L (ref 7–16)
ANISOCYTOSIS: ABNORMAL
ANTIBODY SCREEN: NORMAL
APTT: 22.7 SEC (ref 24.5–35.1)
ASPERGILLUS GALACTO AG: NEGATIVE
ASPERGILLUS GALACTO INDEX: 0.04
AST SERPL-CCNC: 20 U/L (ref 0–31)
AST SERPL-CCNC: 21 U/L (ref 0–31)
AST SERPL-CCNC: 25 U/L (ref 0–31)
B.E.: 12.1 MMOL/L (ref -3–3)
B.E.: 18.6 MMOL/L (ref -3–3)
B.E.: 20 MMOL/L (ref -3–3)
B.E.: 3.1 MMOL/L (ref -3–3)
B.E.: 3.7 MMOL/L (ref -3–3)
B.E.: 4 MMOL/L (ref -3–3)
B.E.: 5.6 MMOL/L (ref -3–3)
B.E.: 5.7 MMOL/L (ref -3–3)
B.E.: 6.2 MMOL/L (ref -3–3)
B.E.: 6.4 MMOL/L (ref -3–3)
B.E.: 7.3 MMOL/L (ref -3–3)
B.E.: 7.3 MMOL/L (ref -3–3)
B.E.: 7.9 MMOL/L (ref -3–3)
B.E.: 9.8 MMOL/L (ref -3–3)
BASOPHILIC STIPPLING: ABNORMAL
BASOPHILS ABSOLUTE: 0 E9/L (ref 0–0.2)
BASOPHILS ABSOLUTE: 0.01 E9/L (ref 0–0.2)
BASOPHILS ABSOLUTE: 0.02 E9/L (ref 0–0.2)
BASOPHILS ABSOLUTE: 0.03 E9/L (ref 0–0.2)
BASOPHILS ABSOLUTE: 0.04 E9/L (ref 0–0.2)
BASOPHILS ABSOLUTE: 0.04 E9/L (ref 0–0.2)
BASOPHILS ABSOLUTE: 0.05 E9/L (ref 0–0.2)
BASOPHILS ABSOLUTE: 0.06 E9/L (ref 0–0.2)
BASOPHILS ABSOLUTE: 0.06 E9/L (ref 0–0.2)
BASOPHILS ABSOLUTE: 0.07 E9/L (ref 0–0.2)
BASOPHILS RELATIVE PERCENT: 0 % (ref 0–2)
BASOPHILS RELATIVE PERCENT: 0.1 % (ref 0–2)
BASOPHILS RELATIVE PERCENT: 0.2 % (ref 0–2)
BASOPHILS RELATIVE PERCENT: 0.3 % (ref 0–2)
BASOPHILS RELATIVE PERCENT: 0.3 % (ref 0–2)
BASOPHILS RELATIVE PERCENT: 0.4 % (ref 0–2)
BILIRUB SERPL-MCNC: 0.6 MG/DL (ref 0–1.2)
BILIRUB SERPL-MCNC: 0.6 MG/DL (ref 0–1.2)
BILIRUB SERPL-MCNC: 0.7 MG/DL (ref 0–1.2)
BLOOD BANK DISPENSE STATUS: NORMAL
BLOOD BANK DISPENSE STATUS: NORMAL
BLOOD BANK PRODUCT CODE: NORMAL
BLOOD BANK PRODUCT CODE: NORMAL
BLOOD CULTURE, ROUTINE: NORMAL
BORDETELLA PARAPERTUSSIS BY PCR: NOT DETECTED
BORDETELLA PERTUSSIS BY PCR: NOT DETECTED
BPU ID: NORMAL
BPU ID: NORMAL
BUN BLDV-MCNC: 11 MG/DL (ref 8–23)
BUN BLDV-MCNC: 12 MG/DL (ref 8–23)
BUN BLDV-MCNC: 12 MG/DL (ref 8–23)
BUN BLDV-MCNC: 13 MG/DL (ref 8–23)
BUN BLDV-MCNC: 13 MG/DL (ref 8–23)
BUN BLDV-MCNC: 14 MG/DL (ref 8–23)
BUN BLDV-MCNC: 16 MG/DL (ref 8–23)
BUN BLDV-MCNC: 16 MG/DL (ref 8–23)
BUN BLDV-MCNC: 17 MG/DL (ref 8–23)
BUN BLDV-MCNC: 18 MG/DL (ref 8–23)
BUN BLDV-MCNC: 19 MG/DL (ref 8–23)
BUN BLDV-MCNC: 19 MG/DL (ref 8–23)
BUN BLDV-MCNC: 20 MG/DL (ref 8–23)
BUN BLDV-MCNC: 21 MG/DL (ref 8–23)
BUN BLDV-MCNC: 22 MG/DL (ref 8–23)
BUN BLDV-MCNC: 22 MG/DL (ref 8–23)
BUN BLDV-MCNC: 23 MG/DL (ref 8–23)
BUN BLDV-MCNC: 30 MG/DL (ref 8–23)
BUN BLDV-MCNC: 9 MG/DL (ref 8–23)
CALCIUM IONIZED: 1.14 MMOL/L (ref 1.15–1.33)
CALCIUM IONIZED: 1.14 MMOL/L (ref 1.15–1.33)
CALCIUM SERPL-MCNC: 7.3 MG/DL (ref 8.6–10.2)
CALCIUM SERPL-MCNC: 7.5 MG/DL (ref 8.6–10.2)
CALCIUM SERPL-MCNC: 7.6 MG/DL (ref 8.6–10.2)
CALCIUM SERPL-MCNC: 7.6 MG/DL (ref 8.6–10.2)
CALCIUM SERPL-MCNC: 7.7 MG/DL (ref 8.6–10.2)
CALCIUM SERPL-MCNC: 7.9 MG/DL (ref 8.6–10.2)
CALCIUM SERPL-MCNC: 8 MG/DL (ref 8.6–10.2)
CALCIUM SERPL-MCNC: 8.1 MG/DL (ref 8.6–10.2)
CALCIUM SERPL-MCNC: 8.2 MG/DL (ref 8.6–10.2)
CALCIUM SERPL-MCNC: 8.3 MG/DL (ref 8.6–10.2)
CALCIUM SERPL-MCNC: 8.3 MG/DL (ref 8.6–10.2)
CALCIUM SERPL-MCNC: 8.4 MG/DL (ref 8.6–10.2)
CALCIUM SERPL-MCNC: 8.4 MG/DL (ref 8.6–10.2)
CALCIUM SERPL-MCNC: 8.9 MG/DL (ref 8.6–10.2)
CALCIUM SERPL-MCNC: 9.2 MG/DL (ref 8.6–10.2)
CALCIUM SERPL-MCNC: 9.6 MG/DL (ref 8.6–10.2)
CHLAMYDOPHILIA PNEUMONIAE BY PCR: NOT DETECTED
CHLORIDE BLD-SCNC: 100 MMOL/L (ref 98–107)
CHLORIDE BLD-SCNC: 101 MMOL/L (ref 98–107)
CHLORIDE BLD-SCNC: 102 MMOL/L (ref 98–107)
CHLORIDE BLD-SCNC: 103 MMOL/L (ref 98–107)
CHLORIDE BLD-SCNC: 104 MMOL/L (ref 98–107)
CHLORIDE BLD-SCNC: 85 MMOL/L (ref 98–107)
CHLORIDE BLD-SCNC: 85 MMOL/L (ref 98–107)
CHLORIDE BLD-SCNC: 86 MMOL/L (ref 98–107)
CHLORIDE BLD-SCNC: 87 MMOL/L (ref 98–107)
CHLORIDE BLD-SCNC: 87 MMOL/L (ref 98–107)
CHLORIDE BLD-SCNC: 89 MMOL/L (ref 98–107)
CHLORIDE BLD-SCNC: 89 MMOL/L (ref 98–107)
CHLORIDE BLD-SCNC: 91 MMOL/L (ref 98–107)
CHLORIDE BLD-SCNC: 93 MMOL/L (ref 98–107)
CHLORIDE BLD-SCNC: 93 MMOL/L (ref 98–107)
CHLORIDE BLD-SCNC: 94 MMOL/L (ref 98–107)
CHLORIDE BLD-SCNC: 96 MMOL/L (ref 98–107)
CHLORIDE BLD-SCNC: 96 MMOL/L (ref 98–107)
CHLORIDE BLD-SCNC: 97 MMOL/L (ref 98–107)
CHLORIDE BLD-SCNC: 98 MMOL/L (ref 98–107)
CHLORIDE BLD-SCNC: 98 MMOL/L (ref 98–107)
CHLORIDE BLD-SCNC: 99 MMOL/L (ref 98–107)
CO2: 31 MMOL/L (ref 22–29)
CO2: 32 MMOL/L (ref 22–29)
CO2: 33 MMOL/L (ref 22–29)
CO2: 34 MMOL/L (ref 22–29)
CO2: 35 MMOL/L (ref 22–29)
CO2: 36 MMOL/L (ref 22–29)
CO2: 40 MMOL/L (ref 22–29)
CO2: 41 MMOL/L (ref 22–29)
CO2: 41 MMOL/L (ref 22–29)
CO2: 42 MMOL/L (ref 22–29)
CO2: 42 MMOL/L (ref 22–29)
CO2: 43 MMOL/L (ref 22–29)
CO2: 45 MMOL/L (ref 22–29)
CO2: 46 MMOL/L (ref 22–29)
CO2: 46 MMOL/L (ref 22–29)
COHB: 0.7 % (ref 0–1.5)
COHB: 0.8 % (ref 0–1.5)
COHB: 0.9 % (ref 0–1.5)
COHB: 0.9 % (ref 0–1.5)
COHB: 1 % (ref 0–1.5)
COHB: 1.1 % (ref 0–1.5)
COHB: 1.2 % (ref 0–1.5)
COHB: 1.2 % (ref 0–1.5)
COHB: 1.3 % (ref 0–1.5)
COHB: 1.6 % (ref 0–1.5)
COHB: 1.7 % (ref 0–1.5)
COHB: 2.2 % (ref 0–1.5)
COMMENT: ABNORMAL
CORONAVIRUS 229E BY PCR: NOT DETECTED
CORONAVIRUS HKU1 BY PCR: NOT DETECTED
CORONAVIRUS NL63 BY PCR: NOT DETECTED
CORONAVIRUS OC43 BY PCR: NOT DETECTED
CORTISOL TOTAL: 19.25 MCG/DL (ref 2.68–18.4)
CREAT SERPL-MCNC: 0.2 MG/DL (ref 0.5–1)
CREAT SERPL-MCNC: 0.3 MG/DL (ref 0.5–1)
CREAT SERPL-MCNC: 0.4 MG/DL (ref 0.5–1)
CRITICAL: ABNORMAL
CULTURE, BLOOD 2: NORMAL
CULTURE, RESPIRATORY: NORMAL
D DIMER: 2012 NG/ML DDU
D DIMER: 2125 NG/ML DDU
D DIMER: 2384 NG/ML DDU
D DIMER: 2985 NG/ML DDU
D DIMER: 3226 NG/ML DDU
D DIMER: 3606 NG/ML DDU
D DIMER: 3629 NG/ML DDU
D DIMER: 4068 NG/ML DDU
D DIMER: 4176 NG/ML DDU
D DIMER: 4245 NG/ML DDU
D DIMER: 806 NG/ML DDU
D DIMER: 820 NG/ML DDU
D DIMER: 938 NG/ML DDU
D DIMER: 950 NG/ML DDU
DATE ANALYZED: ABNORMAL
DATE OF COLLECTION: ABNORMAL
DESCRIPTION BLOOD BANK: NORMAL
DESCRIPTION BLOOD BANK: NORMAL
DIGOXIN LEVEL: 1 NG/ML (ref 0.8–2)
EKG ATRIAL RATE: 83 BPM
EKG P AXIS: 61 DEGREES
EKG P-R INTERVAL: 148 MS
EKG Q-T INTERVAL: 456 MS
EKG QRS DURATION: 80 MS
EKG QTC CALCULATION (BAZETT): 535 MS
EKG R AXIS: -85 DEGREES
EKG T AXIS: -67 DEGREES
EKG VENTRICULAR RATE: 83 BPM
EOSINOPHILS ABSOLUTE: 0 E9/L (ref 0.05–0.5)
EOSINOPHILS ABSOLUTE: 0.01 E9/L (ref 0.05–0.5)
EOSINOPHILS ABSOLUTE: 0.02 E9/L (ref 0.05–0.5)
EOSINOPHILS ABSOLUTE: 0.05 E9/L (ref 0.05–0.5)
EOSINOPHILS ABSOLUTE: 0.05 E9/L (ref 0.05–0.5)
EOSINOPHILS ABSOLUTE: 0.06 E9/L (ref 0.05–0.5)
EOSINOPHILS ABSOLUTE: 0.07 E9/L (ref 0.05–0.5)
EOSINOPHILS ABSOLUTE: 0.1 E9/L (ref 0.05–0.5)
EOSINOPHILS ABSOLUTE: 0.11 E9/L (ref 0.05–0.5)
EOSINOPHILS ABSOLUTE: 0.12 E9/L (ref 0.05–0.5)
EOSINOPHILS ABSOLUTE: 0.12 E9/L (ref 0.05–0.5)
EOSINOPHILS ABSOLUTE: 0.15 E9/L (ref 0.05–0.5)
EOSINOPHILS ABSOLUTE: 0.17 E9/L (ref 0.05–0.5)
EOSINOPHILS ABSOLUTE: 0.17 E9/L (ref 0.05–0.5)
EOSINOPHILS ABSOLUTE: 0.2 E9/L (ref 0.05–0.5)
EOSINOPHILS ABSOLUTE: 0.23 E9/L (ref 0.05–0.5)
EOSINOPHILS RELATIVE PERCENT: 0 % (ref 0–6)
EOSINOPHILS RELATIVE PERCENT: 0.1 % (ref 0–6)
EOSINOPHILS RELATIVE PERCENT: 0.3 % (ref 0–6)
EOSINOPHILS RELATIVE PERCENT: 0.3 % (ref 0–6)
EOSINOPHILS RELATIVE PERCENT: 0.4 % (ref 0–6)
EOSINOPHILS RELATIVE PERCENT: 0.4 % (ref 0–6)
EOSINOPHILS RELATIVE PERCENT: 0.5 % (ref 0–6)
EOSINOPHILS RELATIVE PERCENT: 0.7 % (ref 0–6)
EOSINOPHILS RELATIVE PERCENT: 0.9 % (ref 0–6)
EOSINOPHILS RELATIVE PERCENT: 1 % (ref 0–6)
EOSINOPHILS RELATIVE PERCENT: 1.1 % (ref 0–6)
EOSINOPHILS RELATIVE PERCENT: 1.4 % (ref 0–6)
EOSINOPHILS RELATIVE PERCENT: 1.4 % (ref 0–6)
FIBRINOGEN: 185 MG/DL (ref 225–540)
FIBRINOGEN: 225 MG/DL (ref 225–540)
FIBRINOGEN: 231 MG/DL (ref 225–540)
FIBRINOGEN: 368 MG/DL (ref 225–540)
FIBRINOGEN: 414 MG/DL (ref 225–540)
FIBRINOGEN: 427 MG/DL (ref 225–540)
FIBRINOGEN: 525 MG/DL (ref 225–540)
FIBRINOGEN: 531 MG/DL (ref 225–540)
FIBRINOGEN: 546 MG/DL (ref 225–540)
FIBRINOGEN: 649 MG/DL (ref 225–540)
FIBRINOGEN: 696 MG/DL (ref 225–540)
FIBRINOGEN: >700 MG/DL (ref 225–540)
FIBRINOGEN: >700 MG/DL (ref 225–540)
FIO2: 100 %
FIO2: 65 %
FIO2: 70 %
FIO2: 75 %
FIO2: 80 %
FIO2: 80 %
FIO2: 85 %
FIO2: 85 %
FOLATE: 15.4 NG/ML (ref 4.8–24.2)
GFR AFRICAN AMERICAN: >60
GFR NON-AFRICAN AMERICAN: >60 ML/MIN/1.73
GLUCOSE BLD-MCNC: 109 MG/DL (ref 74–99)
GLUCOSE BLD-MCNC: 117 MG/DL (ref 74–99)
GLUCOSE BLD-MCNC: 119 MG/DL (ref 74–99)
GLUCOSE BLD-MCNC: 126 MG/DL (ref 74–99)
GLUCOSE BLD-MCNC: 131 MG/DL (ref 74–99)
GLUCOSE BLD-MCNC: 132 MG/DL (ref 74–99)
GLUCOSE BLD-MCNC: 140 MG/DL (ref 74–99)
GLUCOSE BLD-MCNC: 146 MG/DL (ref 74–99)
GLUCOSE BLD-MCNC: 151 MG/DL (ref 74–99)
GLUCOSE BLD-MCNC: 153 MG/DL (ref 74–99)
GLUCOSE BLD-MCNC: 155 MG/DL (ref 74–99)
GLUCOSE BLD-MCNC: 156 MG/DL (ref 74–99)
GLUCOSE BLD-MCNC: 156 MG/DL (ref 74–99)
GLUCOSE BLD-MCNC: 158 MG/DL (ref 74–99)
GLUCOSE BLD-MCNC: 158 MG/DL (ref 74–99)
GLUCOSE BLD-MCNC: 160 MG/DL (ref 74–99)
GLUCOSE BLD-MCNC: 164 MG/DL (ref 74–99)
GLUCOSE BLD-MCNC: 173 MG/DL (ref 74–99)
GLUCOSE BLD-MCNC: 178 MG/DL (ref 74–99)
GLUCOSE BLD-MCNC: 178 MG/DL (ref 74–99)
GLUCOSE BLD-MCNC: 180 MG/DL (ref 74–99)
GLUCOSE BLD-MCNC: 184 MG/DL (ref 74–99)
GLUCOSE BLD-MCNC: 184 MG/DL (ref 74–99)
GLUCOSE BLD-MCNC: 195 MG/DL (ref 74–99)
GLUCOSE BLD-MCNC: 199 MG/DL (ref 74–99)
GLUCOSE BLD-MCNC: 205 MG/DL (ref 74–99)
GLUCOSE BLD-MCNC: 205 MG/DL (ref 74–99)
GLUCOSE BLD-MCNC: 221 MG/DL (ref 74–99)
GLUCOSE BLD-MCNC: 223 MG/DL (ref 74–99)
GLUCOSE BLD-MCNC: 228 MG/DL (ref 74–99)
GLUCOSE BLD-MCNC: 237 MG/DL (ref 74–99)
GLUCOSE BLD-MCNC: 240 MG/DL (ref 74–99)
GLUCOSE BLD-MCNC: 39 MG/DL (ref 74–99)
HCO3: 28.4 MMOL/L (ref 22–26)
HCO3: 29.5 MMOL/L (ref 22–26)
HCO3: 30.2 MMOL/L (ref 22–26)
HCO3: 30.7 MMOL/L (ref 22–26)
HCO3: 31.4 MMOL/L (ref 22–26)
HCO3: 32.4 MMOL/L (ref 22–26)
HCO3: 32.6 MMOL/L (ref 22–26)
HCO3: 33.2 MMOL/L (ref 22–26)
HCO3: 33.2 MMOL/L (ref 22–26)
HCO3: 34.5 MMOL/L (ref 22–26)
HCO3: 36.2 MMOL/L (ref 22–26)
HCO3: 39 MMOL/L (ref 22–26)
HCO3: 44.6 MMOL/L (ref 22–26)
HCO3: 49.7 MMOL/L (ref 22–26)
HCT VFR BLD CALC: 23.1 % (ref 34–48)
HCT VFR BLD CALC: 24.3 % (ref 34–48)
HCT VFR BLD CALC: 26.1 % (ref 34–48)
HCT VFR BLD CALC: 26.2 % (ref 34–48)
HCT VFR BLD CALC: 26.8 % (ref 34–48)
HCT VFR BLD CALC: 27 % (ref 34–48)
HCT VFR BLD CALC: 27.6 % (ref 34–48)
HCT VFR BLD CALC: 27.7 % (ref 34–48)
HCT VFR BLD CALC: 27.8 % (ref 34–48)
HCT VFR BLD CALC: 28.2 % (ref 34–48)
HCT VFR BLD CALC: 28.7 % (ref 34–48)
HCT VFR BLD CALC: 28.7 % (ref 34–48)
HCT VFR BLD CALC: 28.9 % (ref 34–48)
HCT VFR BLD CALC: 29 % (ref 34–48)
HCT VFR BLD CALC: 29 % (ref 34–48)
HCT VFR BLD CALC: 29.1 % (ref 34–48)
HCT VFR BLD CALC: 29.2 % (ref 34–48)
HCT VFR BLD CALC: 29.3 % (ref 34–48)
HCT VFR BLD CALC: 29.3 % (ref 34–48)
HCT VFR BLD CALC: 29.4 % (ref 34–48)
HCT VFR BLD CALC: 29.5 % (ref 34–48)
HCT VFR BLD CALC: 29.9 % (ref 34–48)
HCT VFR BLD CALC: 30.2 % (ref 34–48)
HCT VFR BLD CALC: 30.4 % (ref 34–48)
HCT VFR BLD CALC: 31.1 % (ref 34–48)
HCT VFR BLD CALC: 31.2 % (ref 34–48)
HCT VFR BLD CALC: 31.3 % (ref 34–48)
HCT VFR BLD CALC: 31.6 % (ref 34–48)
HCT VFR BLD CALC: 32.5 % (ref 34–48)
HCT VFR BLD CALC: 32.7 % (ref 34–48)
HCT VFR BLD CALC: 33 % (ref 34–48)
HCT VFR BLD CALC: 33.9 % (ref 34–48)
HEMOGLOBIN: 10 G/DL (ref 11.5–15.5)
HEMOGLOBIN: 6.7 G/DL (ref 11.5–15.5)
HEMOGLOBIN: 7.1 G/DL (ref 11.5–15.5)
HEMOGLOBIN: 7.6 G/DL (ref 11.5–15.5)
HEMOGLOBIN: 7.7 G/DL (ref 11.5–15.5)
HEMOGLOBIN: 7.7 G/DL (ref 11.5–15.5)
HEMOGLOBIN: 7.8 G/DL (ref 11.5–15.5)
HEMOGLOBIN: 8.1 G/DL (ref 11.5–15.5)
HEMOGLOBIN: 8.2 G/DL (ref 11.5–15.5)
HEMOGLOBIN: 8.3 G/DL (ref 11.5–15.5)
HEMOGLOBIN: 8.3 G/DL (ref 11.5–15.5)
HEMOGLOBIN: 8.5 G/DL (ref 11.5–15.5)
HEMOGLOBIN: 8.5 G/DL (ref 11.5–15.5)
HEMOGLOBIN: 8.6 G/DL (ref 11.5–15.5)
HEMOGLOBIN: 8.8 G/DL (ref 11.5–15.5)
HEMOGLOBIN: 8.8 G/DL (ref 11.5–15.5)
HEMOGLOBIN: 8.9 G/DL (ref 11.5–15.5)
HEMOGLOBIN: 9 G/DL (ref 11.5–15.5)
HEMOGLOBIN: 9.1 G/DL (ref 11.5–15.5)
HEMOGLOBIN: 9.1 G/DL (ref 11.5–15.5)
HEMOGLOBIN: 9.3 G/DL (ref 11.5–15.5)
HEMOGLOBIN: 9.3 G/DL (ref 11.5–15.5)
HEMOGLOBIN: 9.4 G/DL (ref 11.5–15.5)
HEMOGLOBIN: 9.4 G/DL (ref 11.5–15.5)
HEMOGLOBIN: 9.7 G/DL (ref 11.5–15.5)
HEMOGLOBIN: 9.9 G/DL (ref 11.5–15.5)
HHB: 1.6 % (ref 0–5)
HHB: 10.1 % (ref 0–5)
HHB: 10.1 % (ref 0–5)
HHB: 10.9 % (ref 0–5)
HHB: 13.9 % (ref 0–5)
HHB: 4 % (ref 0–5)
HHB: 4.5 % (ref 0–5)
HHB: 4.6 % (ref 0–5)
HHB: 5.1 % (ref 0–5)
HHB: 6.3 % (ref 0–5)
HHB: 6.6 % (ref 0–5)
HHB: 8.1 % (ref 0–5)
HHB: 9.1 % (ref 0–5)
HHB: 9.4 % (ref 0–5)
HUMAN METAPNEUMOVIRUS BY PCR: NOT DETECTED
HUMAN RHINOVIRUS/ENTEROVIRUS BY PCR: NOT DETECTED
HYPOCHROMIA: ABNORMAL
IMMATURE GRANULOCYTES #: 0.15 E9/L
IMMATURE GRANULOCYTES #: 0.17 E9/L
IMMATURE GRANULOCYTES #: 0.19 E9/L
IMMATURE GRANULOCYTES #: 0.21 E9/L
IMMATURE GRANULOCYTES #: 0.21 E9/L
IMMATURE GRANULOCYTES #: 0.23 E9/L
IMMATURE GRANULOCYTES #: 0.23 E9/L
IMMATURE GRANULOCYTES #: 0.24 E9/L
IMMATURE GRANULOCYTES #: 0.27 E9/L
IMMATURE GRANULOCYTES #: 0.27 E9/L
IMMATURE GRANULOCYTES #: 0.28 E9/L
IMMATURE GRANULOCYTES #: 0.29 E9/L
IMMATURE GRANULOCYTES #: 0.3 E9/L
IMMATURE GRANULOCYTES #: 0.3 E9/L
IMMATURE GRANULOCYTES #: 0.31 E9/L
IMMATURE GRANULOCYTES #: 0.32 E9/L
IMMATURE GRANULOCYTES #: 0.34 E9/L
IMMATURE GRANULOCYTES #: 0.37 E9/L
IMMATURE GRANULOCYTES #: 0.38 E9/L
IMMATURE GRANULOCYTES #: 0.39 E9/L
IMMATURE GRANULOCYTES #: 0.39 E9/L
IMMATURE GRANULOCYTES #: 0.4 E9/L
IMMATURE GRANULOCYTES #: 0.43 E9/L
IMMATURE GRANULOCYTES #: 0.44 E9/L
IMMATURE GRANULOCYTES #: 0.45 E9/L
IMMATURE GRANULOCYTES #: 0.46 E9/L
IMMATURE GRANULOCYTES #: 0.62 E9/L
IMMATURE GRANULOCYTES #: 0.66 E9/L
IMMATURE GRANULOCYTES %: 1.2 % (ref 0–5)
IMMATURE GRANULOCYTES %: 1.3 % (ref 0–5)
IMMATURE GRANULOCYTES %: 1.4 % (ref 0–5)
IMMATURE GRANULOCYTES %: 1.5 % (ref 0–5)
IMMATURE GRANULOCYTES %: 1.5 % (ref 0–5)
IMMATURE GRANULOCYTES %: 1.6 % (ref 0–5)
IMMATURE GRANULOCYTES %: 1.7 % (ref 0–5)
IMMATURE GRANULOCYTES %: 1.9 % (ref 0–5)
IMMATURE GRANULOCYTES %: 2 % (ref 0–5)
IMMATURE GRANULOCYTES %: 2 % (ref 0–5)
IMMATURE GRANULOCYTES %: 2.1 % (ref 0–5)
IMMATURE GRANULOCYTES %: 2.1 % (ref 0–5)
IMMATURE GRANULOCYTES %: 2.2 % (ref 0–5)
IMMATURE GRANULOCYTES %: 2.3 % (ref 0–5)
IMMATURE GRANULOCYTES %: 2.5 % (ref 0–5)
IMMATURE GRANULOCYTES %: 2.6 % (ref 0–5)
IMMATURE GRANULOCYTES %: 2.8 % (ref 0–5)
IMMATURE GRANULOCYTES %: 2.9 % (ref 0–5)
IMMATURE GRANULOCYTES %: 3.2 % (ref 0–5)
IMMATURE GRANULOCYTES %: 3.2 % (ref 0–5)
IMMATURE GRANULOCYTES %: 3.7 % (ref 0–5)
INFLUENZA A BY PCR: NOT DETECTED
INFLUENZA B BY PCR: NOT DETECTED
INR BLD: 1.3
LAB: ABNORMAL
LACTIC ACID: 1 MMOL/L (ref 0.5–2.2)
LACTIC ACID: 1.3 MMOL/L (ref 0.5–2.2)
LACTIC ACID: 1.3 MMOL/L (ref 0.5–2.2)
LACTIC ACID: 1.4 MMOL/L (ref 0.5–2.2)
LYMPHOCYTES ABSOLUTE: 0.32 E9/L (ref 1.5–4)
LYMPHOCYTES ABSOLUTE: 0.38 E9/L (ref 1.5–4)
LYMPHOCYTES ABSOLUTE: 0.41 E9/L (ref 1.5–4)
LYMPHOCYTES ABSOLUTE: 0.42 E9/L (ref 1.5–4)
LYMPHOCYTES ABSOLUTE: 0.43 E9/L (ref 1.5–4)
LYMPHOCYTES ABSOLUTE: 0.47 E9/L (ref 1.5–4)
LYMPHOCYTES ABSOLUTE: 0.49 E9/L (ref 1.5–4)
LYMPHOCYTES ABSOLUTE: 0.5 E9/L (ref 1.5–4)
LYMPHOCYTES ABSOLUTE: 0.52 E9/L (ref 1.5–4)
LYMPHOCYTES ABSOLUTE: 0.54 E9/L (ref 1.5–4)
LYMPHOCYTES ABSOLUTE: 0.55 E9/L (ref 1.5–4)
LYMPHOCYTES ABSOLUTE: 0.56 E9/L (ref 1.5–4)
LYMPHOCYTES ABSOLUTE: 0.59 E9/L (ref 1.5–4)
LYMPHOCYTES ABSOLUTE: 0.59 E9/L (ref 1.5–4)
LYMPHOCYTES ABSOLUTE: 0.61 E9/L (ref 1.5–4)
LYMPHOCYTES ABSOLUTE: 0.62 E9/L (ref 1.5–4)
LYMPHOCYTES ABSOLUTE: 0.62 E9/L (ref 1.5–4)
LYMPHOCYTES ABSOLUTE: 0.64 E9/L (ref 1.5–4)
LYMPHOCYTES ABSOLUTE: 0.64 E9/L (ref 1.5–4)
LYMPHOCYTES ABSOLUTE: 0.65 E9/L (ref 1.5–4)
LYMPHOCYTES ABSOLUTE: 0.67 E9/L (ref 1.5–4)
LYMPHOCYTES ABSOLUTE: 0.71 E9/L (ref 1.5–4)
LYMPHOCYTES ABSOLUTE: 0.71 E9/L (ref 1.5–4)
LYMPHOCYTES ABSOLUTE: 0.75 E9/L (ref 1.5–4)
LYMPHOCYTES ABSOLUTE: 0.76 E9/L (ref 1.5–4)
LYMPHOCYTES ABSOLUTE: 0.77 E9/L (ref 1.5–4)
LYMPHOCYTES ABSOLUTE: 0.79 E9/L (ref 1.5–4)
LYMPHOCYTES ABSOLUTE: 0.82 E9/L (ref 1.5–4)
LYMPHOCYTES ABSOLUTE: 0.84 E9/L (ref 1.5–4)
LYMPHOCYTES ABSOLUTE: 0.85 E9/L (ref 1.5–4)
LYMPHOCYTES ABSOLUTE: 0.93 E9/L (ref 1.5–4)
LYMPHOCYTES ABSOLUTE: 1.67 E9/L (ref 1.5–4)
LYMPHOCYTES RELATIVE PERCENT: 2 % (ref 20–42)
LYMPHOCYTES RELATIVE PERCENT: 2.3 % (ref 20–42)
LYMPHOCYTES RELATIVE PERCENT: 2.4 % (ref 20–42)
LYMPHOCYTES RELATIVE PERCENT: 2.7 % (ref 20–42)
LYMPHOCYTES RELATIVE PERCENT: 2.8 % (ref 20–42)
LYMPHOCYTES RELATIVE PERCENT: 3.2 % (ref 20–42)
LYMPHOCYTES RELATIVE PERCENT: 3.2 % (ref 20–42)
LYMPHOCYTES RELATIVE PERCENT: 3.5 % (ref 20–42)
LYMPHOCYTES RELATIVE PERCENT: 3.8 % (ref 20–42)
LYMPHOCYTES RELATIVE PERCENT: 3.8 % (ref 20–42)
LYMPHOCYTES RELATIVE PERCENT: 3.9 % (ref 20–42)
LYMPHOCYTES RELATIVE PERCENT: 4 % (ref 20–42)
LYMPHOCYTES RELATIVE PERCENT: 4.3 % (ref 20–42)
LYMPHOCYTES RELATIVE PERCENT: 4.3 % (ref 20–42)
LYMPHOCYTES RELATIVE PERCENT: 4.5 % (ref 20–42)
LYMPHOCYTES RELATIVE PERCENT: 4.6 % (ref 20–42)
LYMPHOCYTES RELATIVE PERCENT: 4.6 % (ref 20–42)
LYMPHOCYTES RELATIVE PERCENT: 4.7 % (ref 20–42)
LYMPHOCYTES RELATIVE PERCENT: 4.8 % (ref 20–42)
LYMPHOCYTES RELATIVE PERCENT: 4.8 % (ref 20–42)
LYMPHOCYTES RELATIVE PERCENT: 4.9 % (ref 20–42)
LYMPHOCYTES RELATIVE PERCENT: 5 % (ref 20–42)
LYMPHOCYTES RELATIVE PERCENT: 6.2 % (ref 20–42)
LYMPHOCYTES RELATIVE PERCENT: 6.2 % (ref 20–42)
LYMPHOCYTES RELATIVE PERCENT: 8.8 % (ref 20–42)
Lab: ABNORMAL
MAGNESIUM: 1.5 MG/DL (ref 1.6–2.6)
MAGNESIUM: 1.7 MG/DL (ref 1.6–2.6)
MAGNESIUM: 1.8 MG/DL (ref 1.6–2.6)
MAGNESIUM: 1.9 MG/DL (ref 1.6–2.6)
MAGNESIUM: 2 MG/DL (ref 1.6–2.6)
MAGNESIUM: 2.1 MG/DL (ref 1.6–2.6)
MAGNESIUM: 2.2 MG/DL (ref 1.6–2.6)
MAGNESIUM: 2.3 MG/DL (ref 1.6–2.6)
MAGNESIUM: 2.4 MG/DL (ref 1.6–2.6)
MAGNESIUM: 2.5 MG/DL (ref 1.6–2.6)
MCH RBC QN AUTO: 29.5 PG (ref 26–35)
MCH RBC QN AUTO: 29.7 PG (ref 26–35)
MCH RBC QN AUTO: 29.8 PG (ref 26–35)
MCH RBC QN AUTO: 29.9 PG (ref 26–35)
MCH RBC QN AUTO: 30 PG (ref 26–35)
MCH RBC QN AUTO: 30.1 PG (ref 26–35)
MCH RBC QN AUTO: 30.2 PG (ref 26–35)
MCH RBC QN AUTO: 30.4 PG (ref 26–35)
MCH RBC QN AUTO: 30.6 PG (ref 26–35)
MCH RBC QN AUTO: 30.6 PG (ref 26–35)
MCH RBC QN AUTO: 30.8 PG (ref 26–35)
MCH RBC QN AUTO: 30.9 PG (ref 26–35)
MCH RBC QN AUTO: 31.1 PG (ref 26–35)
MCH RBC QN AUTO: 31.2 PG (ref 26–35)
MCH RBC QN AUTO: 31.3 PG (ref 26–35)
MCH RBC QN AUTO: 31.4 PG (ref 26–35)
MCH RBC QN AUTO: 31.5 PG (ref 26–35)
MCH RBC QN AUTO: 32.2 PG (ref 26–35)
MCH RBC QN AUTO: 32.4 PG (ref 26–35)
MCHC RBC AUTO-ENTMCNC: 28 % (ref 32–34.5)
MCHC RBC AUTO-ENTMCNC: 28 % (ref 32–34.5)
MCHC RBC AUTO-ENTMCNC: 28.2 % (ref 32–34.5)
MCHC RBC AUTO-ENTMCNC: 28.3 % (ref 32–34.5)
MCHC RBC AUTO-ENTMCNC: 28.4 % (ref 32–34.5)
MCHC RBC AUTO-ENTMCNC: 28.4 % (ref 32–34.5)
MCHC RBC AUTO-ENTMCNC: 28.5 % (ref 32–34.5)
MCHC RBC AUTO-ENTMCNC: 28.7 % (ref 32–34.5)
MCHC RBC AUTO-ENTMCNC: 28.7 % (ref 32–34.5)
MCHC RBC AUTO-ENTMCNC: 28.8 % (ref 32–34.5)
MCHC RBC AUTO-ENTMCNC: 28.9 % (ref 32–34.5)
MCHC RBC AUTO-ENTMCNC: 29 % (ref 32–34.5)
MCHC RBC AUTO-ENTMCNC: 29 % (ref 32–34.5)
MCHC RBC AUTO-ENTMCNC: 29.1 % (ref 32–34.5)
MCHC RBC AUTO-ENTMCNC: 29.2 % (ref 32–34.5)
MCHC RBC AUTO-ENTMCNC: 29.3 % (ref 32–34.5)
MCHC RBC AUTO-ENTMCNC: 29.4 % (ref 32–34.5)
MCHC RBC AUTO-ENTMCNC: 29.5 % (ref 32–34.5)
MCHC RBC AUTO-ENTMCNC: 29.5 % (ref 32–34.5)
MCHC RBC AUTO-ENTMCNC: 29.7 % (ref 32–34.5)
MCHC RBC AUTO-ENTMCNC: 29.8 % (ref 32–34.5)
MCHC RBC AUTO-ENTMCNC: 29.8 % (ref 32–34.5)
MCHC RBC AUTO-ENTMCNC: 29.9 % (ref 32–34.5)
MCHC RBC AUTO-ENTMCNC: 30 % (ref 32–34.5)
MCHC RBC AUTO-ENTMCNC: 30.1 % (ref 32–34.5)
MCHC RBC AUTO-ENTMCNC: 30.6 % (ref 32–34.5)
MCHC RBC AUTO-ENTMCNC: 31 % (ref 32–34.5)
MCHC RBC AUTO-ENTMCNC: 31.4 % (ref 32–34.5)
MCV RBC AUTO: 101 FL (ref 80–99.9)
MCV RBC AUTO: 101.9 FL (ref 80–99.9)
MCV RBC AUTO: 101.9 FL (ref 80–99.9)
MCV RBC AUTO: 102 FL (ref 80–99.9)
MCV RBC AUTO: 102.1 FL (ref 80–99.9)
MCV RBC AUTO: 102.4 FL (ref 80–99.9)
MCV RBC AUTO: 102.8 FL (ref 80–99.9)
MCV RBC AUTO: 103 FL (ref 80–99.9)
MCV RBC AUTO: 103 FL (ref 80–99.9)
MCV RBC AUTO: 103.1 FL (ref 80–99.9)
MCV RBC AUTO: 103.7 FL (ref 80–99.9)
MCV RBC AUTO: 103.8 FL (ref 80–99.9)
MCV RBC AUTO: 103.9 FL (ref 80–99.9)
MCV RBC AUTO: 104.3 FL (ref 80–99.9)
MCV RBC AUTO: 104.5 FL (ref 80–99.9)
MCV RBC AUTO: 104.6 FL (ref 80–99.9)
MCV RBC AUTO: 104.7 FL (ref 80–99.9)
MCV RBC AUTO: 105.1 FL (ref 80–99.9)
MCV RBC AUTO: 105.1 FL (ref 80–99.9)
MCV RBC AUTO: 105.2 FL (ref 80–99.9)
MCV RBC AUTO: 105.8 FL (ref 80–99.9)
MCV RBC AUTO: 105.9 FL (ref 80–99.9)
MCV RBC AUTO: 106.5 FL (ref 80–99.9)
MCV RBC AUTO: 107 FL (ref 80–99.9)
MCV RBC AUTO: 107.5 FL (ref 80–99.9)
MCV RBC AUTO: 107.8 FL (ref 80–99.9)
MCV RBC AUTO: 108.5 FL (ref 80–99.9)
MCV RBC AUTO: 108.5 FL (ref 80–99.9)
MCV RBC AUTO: 109.1 FL (ref 80–99.9)
MCV RBC AUTO: 109.4 FL (ref 80–99.9)
METAMYELOCYTES RELATIVE PERCENT: 1 % (ref 0–1)
METAMYELOCYTES RELATIVE PERCENT: 1 % (ref 0–1)
METER GLUCOSE: 100 MG/DL (ref 74–99)
METER GLUCOSE: 102 MG/DL (ref 74–99)
METER GLUCOSE: 104 MG/DL (ref 74–99)
METER GLUCOSE: 105 MG/DL (ref 74–99)
METER GLUCOSE: 106 MG/DL (ref 74–99)
METER GLUCOSE: 110 MG/DL (ref 74–99)
METER GLUCOSE: 112 MG/DL (ref 74–99)
METER GLUCOSE: 115 MG/DL (ref 74–99)
METER GLUCOSE: 116 MG/DL (ref 74–99)
METER GLUCOSE: 116 MG/DL (ref 74–99)
METER GLUCOSE: 119 MG/DL (ref 74–99)
METER GLUCOSE: 121 MG/DL (ref 74–99)
METER GLUCOSE: 121 MG/DL (ref 74–99)
METER GLUCOSE: 123 MG/DL (ref 74–99)
METER GLUCOSE: 125 MG/DL (ref 74–99)
METER GLUCOSE: 126 MG/DL (ref 74–99)
METER GLUCOSE: 128 MG/DL (ref 74–99)
METER GLUCOSE: 128 MG/DL (ref 74–99)
METER GLUCOSE: 130 MG/DL (ref 74–99)
METER GLUCOSE: 131 MG/DL (ref 74–99)
METER GLUCOSE: 132 MG/DL (ref 74–99)
METER GLUCOSE: 133 MG/DL (ref 74–99)
METER GLUCOSE: 134 MG/DL (ref 74–99)
METER GLUCOSE: 135 MG/DL (ref 74–99)
METER GLUCOSE: 135 MG/DL (ref 74–99)
METER GLUCOSE: 136 MG/DL (ref 74–99)
METER GLUCOSE: 137 MG/DL (ref 74–99)
METER GLUCOSE: 137 MG/DL (ref 74–99)
METER GLUCOSE: 138 MG/DL (ref 74–99)
METER GLUCOSE: 138 MG/DL (ref 74–99)
METER GLUCOSE: 139 MG/DL (ref 74–99)
METER GLUCOSE: 140 MG/DL (ref 74–99)
METER GLUCOSE: 140 MG/DL (ref 74–99)
METER GLUCOSE: 141 MG/DL (ref 74–99)
METER GLUCOSE: 142 MG/DL (ref 74–99)
METER GLUCOSE: 143 MG/DL (ref 74–99)
METER GLUCOSE: 144 MG/DL (ref 74–99)
METER GLUCOSE: 144 MG/DL (ref 74–99)
METER GLUCOSE: 145 MG/DL (ref 74–99)
METER GLUCOSE: 146 MG/DL (ref 74–99)
METER GLUCOSE: 146 MG/DL (ref 74–99)
METER GLUCOSE: 148 MG/DL (ref 74–99)
METER GLUCOSE: 148 MG/DL (ref 74–99)
METER GLUCOSE: 149 MG/DL (ref 74–99)
METER GLUCOSE: 150 MG/DL (ref 74–99)
METER GLUCOSE: 152 MG/DL (ref 74–99)
METER GLUCOSE: 152 MG/DL (ref 74–99)
METER GLUCOSE: 153 MG/DL (ref 74–99)
METER GLUCOSE: 154 MG/DL (ref 74–99)
METER GLUCOSE: 156 MG/DL (ref 74–99)
METER GLUCOSE: 156 MG/DL (ref 74–99)
METER GLUCOSE: 157 MG/DL (ref 74–99)
METER GLUCOSE: 158 MG/DL (ref 74–99)
METER GLUCOSE: 158 MG/DL (ref 74–99)
METER GLUCOSE: 159 MG/DL (ref 74–99)
METER GLUCOSE: 160 MG/DL (ref 74–99)
METER GLUCOSE: 162 MG/DL (ref 74–99)
METER GLUCOSE: 163 MG/DL (ref 74–99)
METER GLUCOSE: 163 MG/DL (ref 74–99)
METER GLUCOSE: 164 MG/DL (ref 74–99)
METER GLUCOSE: 164 MG/DL (ref 74–99)
METER GLUCOSE: 165 MG/DL (ref 74–99)
METER GLUCOSE: 166 MG/DL (ref 74–99)
METER GLUCOSE: 166 MG/DL (ref 74–99)
METER GLUCOSE: 171 MG/DL (ref 74–99)
METER GLUCOSE: 172 MG/DL (ref 74–99)
METER GLUCOSE: 173 MG/DL (ref 74–99)
METER GLUCOSE: 174 MG/DL (ref 74–99)
METER GLUCOSE: 177 MG/DL (ref 74–99)
METER GLUCOSE: 178 MG/DL (ref 74–99)
METER GLUCOSE: 182 MG/DL (ref 74–99)
METER GLUCOSE: 182 MG/DL (ref 74–99)
METER GLUCOSE: 185 MG/DL (ref 74–99)
METER GLUCOSE: 185 MG/DL (ref 74–99)
METER GLUCOSE: 190 MG/DL (ref 74–99)
METER GLUCOSE: 190 MG/DL (ref 74–99)
METER GLUCOSE: 191 MG/DL (ref 74–99)
METER GLUCOSE: 192 MG/DL (ref 74–99)
METER GLUCOSE: 193 MG/DL (ref 74–99)
METER GLUCOSE: 197 MG/DL (ref 74–99)
METER GLUCOSE: 201 MG/DL (ref 74–99)
METER GLUCOSE: 201 MG/DL (ref 74–99)
METER GLUCOSE: 202 MG/DL (ref 74–99)
METER GLUCOSE: 203 MG/DL (ref 74–99)
METER GLUCOSE: 205 MG/DL (ref 74–99)
METER GLUCOSE: 205 MG/DL (ref 74–99)
METER GLUCOSE: 207 MG/DL (ref 74–99)
METER GLUCOSE: 207 MG/DL (ref 74–99)
METER GLUCOSE: 208 MG/DL (ref 74–99)
METER GLUCOSE: 209 MG/DL (ref 74–99)
METER GLUCOSE: 214 MG/DL (ref 74–99)
METER GLUCOSE: 214 MG/DL (ref 74–99)
METER GLUCOSE: 216 MG/DL (ref 74–99)
METER GLUCOSE: 219 MG/DL (ref 74–99)
METER GLUCOSE: 219 MG/DL (ref 74–99)
METER GLUCOSE: 220 MG/DL (ref 74–99)
METER GLUCOSE: 221 MG/DL (ref 74–99)
METER GLUCOSE: 226 MG/DL (ref 74–99)
METER GLUCOSE: 228 MG/DL (ref 74–99)
METER GLUCOSE: 231 MG/DL (ref 74–99)
METER GLUCOSE: 231 MG/DL (ref 74–99)
METER GLUCOSE: 236 MG/DL (ref 74–99)
METER GLUCOSE: 238 MG/DL (ref 74–99)
METER GLUCOSE: 239 MG/DL (ref 74–99)
METER GLUCOSE: 239 MG/DL (ref 74–99)
METER GLUCOSE: 242 MG/DL (ref 74–99)
METER GLUCOSE: 242 MG/DL (ref 74–99)
METER GLUCOSE: 243 MG/DL (ref 74–99)
METER GLUCOSE: 243 MG/DL (ref 74–99)
METER GLUCOSE: 249 MG/DL (ref 74–99)
METER GLUCOSE: 251 MG/DL (ref 74–99)
METER GLUCOSE: 255 MG/DL (ref 74–99)
METER GLUCOSE: 260 MG/DL (ref 74–99)
METER GLUCOSE: 65 MG/DL (ref 74–99)
METER GLUCOSE: 86 MG/DL (ref 74–99)
METER GLUCOSE: 90 MG/DL (ref 74–99)
METER GLUCOSE: 92 MG/DL (ref 74–99)
METER GLUCOSE: 93 MG/DL (ref 74–99)
METER GLUCOSE: 95 MG/DL (ref 74–99)
METER GLUCOSE: 98 MG/DL (ref 74–99)
METER GLUCOSE: 98 MG/DL (ref 74–99)
METHB: 0 % (ref 0–1.5)
METHB: 0.1 % (ref 0–1.5)
METHB: 0.2 % (ref 0–1.5)
METHB: 0.3 % (ref 0–1.5)
METHB: 0.5 % (ref 0–1.5)
MODE: AC
MONOCYTES ABSOLUTE: 0 E9/L (ref 0.1–0.95)
MONOCYTES ABSOLUTE: 0.13 E9/L (ref 0.1–0.95)
MONOCYTES ABSOLUTE: 0.15 E9/L (ref 0.1–0.95)
MONOCYTES ABSOLUTE: 0.18 E9/L (ref 0.1–0.95)
MONOCYTES ABSOLUTE: 0.19 E9/L (ref 0.1–0.95)
MONOCYTES ABSOLUTE: 0.24 E9/L (ref 0.1–0.95)
MONOCYTES ABSOLUTE: 0.29 E9/L (ref 0.1–0.95)
MONOCYTES ABSOLUTE: 0.32 E9/L (ref 0.1–0.95)
MONOCYTES ABSOLUTE: 0.33 E9/L (ref 0.1–0.95)
MONOCYTES ABSOLUTE: 0.33 E9/L (ref 0.1–0.95)
MONOCYTES ABSOLUTE: 0.34 E9/L (ref 0.1–0.95)
MONOCYTES ABSOLUTE: 0.35 E9/L (ref 0.1–0.95)
MONOCYTES ABSOLUTE: 0.35 E9/L (ref 0.1–0.95)
MONOCYTES ABSOLUTE: 0.36 E9/L (ref 0.1–0.95)
MONOCYTES ABSOLUTE: 0.36 E9/L (ref 0.1–0.95)
MONOCYTES ABSOLUTE: 0.37 E9/L (ref 0.1–0.95)
MONOCYTES ABSOLUTE: 0.41 E9/L (ref 0.1–0.95)
MONOCYTES ABSOLUTE: 0.41 E9/L (ref 0.1–0.95)
MONOCYTES ABSOLUTE: 0.43 E9/L (ref 0.1–0.95)
MONOCYTES ABSOLUTE: 0.44 E9/L (ref 0.1–0.95)
MONOCYTES ABSOLUTE: 0.46 E9/L (ref 0.1–0.95)
MONOCYTES ABSOLUTE: 0.46 E9/L (ref 0.1–0.95)
MONOCYTES ABSOLUTE: 0.49 E9/L (ref 0.1–0.95)
MONOCYTES ABSOLUTE: 0.5 E9/L (ref 0.1–0.95)
MONOCYTES ABSOLUTE: 0.5 E9/L (ref 0.1–0.95)
MONOCYTES ABSOLUTE: 0.53 E9/L (ref 0.1–0.95)
MONOCYTES ABSOLUTE: 0.61 E9/L (ref 0.1–0.95)
MONOCYTES ABSOLUTE: 0.61 E9/L (ref 0.1–0.95)
MONOCYTES ABSOLUTE: 0.62 E9/L (ref 0.1–0.95)
MONOCYTES ABSOLUTE: 0.73 E9/L (ref 0.1–0.95)
MONOCYTES RELATIVE PERCENT: 0 % (ref 2–12)
MONOCYTES RELATIVE PERCENT: 0.9 % (ref 2–12)
MONOCYTES RELATIVE PERCENT: 1 % (ref 2–12)
MONOCYTES RELATIVE PERCENT: 1.1 % (ref 2–12)
MONOCYTES RELATIVE PERCENT: 1.1 % (ref 2–12)
MONOCYTES RELATIVE PERCENT: 1.7 % (ref 2–12)
MONOCYTES RELATIVE PERCENT: 1.7 % (ref 2–12)
MONOCYTES RELATIVE PERCENT: 2 % (ref 2–12)
MONOCYTES RELATIVE PERCENT: 2 % (ref 2–12)
MONOCYTES RELATIVE PERCENT: 2.1 % (ref 2–12)
MONOCYTES RELATIVE PERCENT: 2.2 % (ref 2–12)
MONOCYTES RELATIVE PERCENT: 2.3 % (ref 2–12)
MONOCYTES RELATIVE PERCENT: 2.3 % (ref 2–12)
MONOCYTES RELATIVE PERCENT: 2.5 % (ref 2–12)
MONOCYTES RELATIVE PERCENT: 2.6 % (ref 2–12)
MONOCYTES RELATIVE PERCENT: 2.7 % (ref 2–12)
MONOCYTES RELATIVE PERCENT: 2.8 % (ref 2–12)
MONOCYTES RELATIVE PERCENT: 2.9 % (ref 2–12)
MONOCYTES RELATIVE PERCENT: 2.9 % (ref 2–12)
MONOCYTES RELATIVE PERCENT: 3 % (ref 2–12)
MONOCYTES RELATIVE PERCENT: 3 % (ref 2–12)
MONOCYTES RELATIVE PERCENT: 3.1 % (ref 2–12)
MONOCYTES RELATIVE PERCENT: 3.1 % (ref 2–12)
MONOCYTES RELATIVE PERCENT: 3.4 % (ref 2–12)
MONOCYTES RELATIVE PERCENT: 3.5 % (ref 2–12)
MONOCYTES RELATIVE PERCENT: 4 % (ref 2–12)
MONOCYTES RELATIVE PERCENT: 4 % (ref 2–12)
MONOCYTES RELATIVE PERCENT: 4.5 % (ref 2–12)
MYCOPLASMA PNEUMONIAE BY PCR: NOT DETECTED
MYELOCYTE PERCENT: 0.9 % (ref 0–0)
MYELOCYTE PERCENT: 2 % (ref 0–0)
NEUTROPHILS ABSOLUTE: 11.95 E9/L (ref 1.8–7.3)
NEUTROPHILS ABSOLUTE: 11.97 E9/L (ref 1.8–7.3)
NEUTROPHILS ABSOLUTE: 12.02 E9/L (ref 1.8–7.3)
NEUTROPHILS ABSOLUTE: 12.41 E9/L (ref 1.8–7.3)
NEUTROPHILS ABSOLUTE: 12.45 E9/L (ref 1.8–7.3)
NEUTROPHILS ABSOLUTE: 12.86 E9/L (ref 1.8–7.3)
NEUTROPHILS ABSOLUTE: 13.12 E9/L (ref 1.8–7.3)
NEUTROPHILS ABSOLUTE: 13.33 E9/L (ref 1.8–7.3)
NEUTROPHILS ABSOLUTE: 13.51 E9/L (ref 1.8–7.3)
NEUTROPHILS ABSOLUTE: 13.72 E9/L (ref 1.8–7.3)
NEUTROPHILS ABSOLUTE: 13.75 E9/L (ref 1.8–7.3)
NEUTROPHILS ABSOLUTE: 14.14 E9/L (ref 1.8–7.3)
NEUTROPHILS ABSOLUTE: 14.17 E9/L (ref 1.8–7.3)
NEUTROPHILS ABSOLUTE: 14.18 E9/L (ref 1.8–7.3)
NEUTROPHILS ABSOLUTE: 14.41 E9/L (ref 1.8–7.3)
NEUTROPHILS ABSOLUTE: 14.67 E9/L (ref 1.8–7.3)
NEUTROPHILS ABSOLUTE: 14.85 E9/L (ref 1.8–7.3)
NEUTROPHILS ABSOLUTE: 14.87 E9/L (ref 1.8–7.3)
NEUTROPHILS ABSOLUTE: 15.14 E9/L (ref 1.8–7.3)
NEUTROPHILS ABSOLUTE: 15.14 E9/L (ref 1.8–7.3)
NEUTROPHILS ABSOLUTE: 15.63 E9/L (ref 1.8–7.3)
NEUTROPHILS ABSOLUTE: 15.67 E9/L (ref 1.8–7.3)
NEUTROPHILS ABSOLUTE: 15.91 E9/L (ref 1.8–7.3)
NEUTROPHILS ABSOLUTE: 16.01 E9/L (ref 1.8–7.3)
NEUTROPHILS ABSOLUTE: 16.43 E9/L (ref 1.8–7.3)
NEUTROPHILS ABSOLUTE: 16.93 E9/L (ref 1.8–7.3)
NEUTROPHILS ABSOLUTE: 16.95 E9/L (ref 1.8–7.3)
NEUTROPHILS ABSOLUTE: 17.61 E9/L (ref 1.8–7.3)
NEUTROPHILS ABSOLUTE: 23.33 E9/L (ref 1.8–7.3)
NEUTROPHILS ABSOLUTE: 9.07 E9/L (ref 1.8–7.3)
NEUTROPHILS ABSOLUTE: 9.21 E9/L (ref 1.8–7.3)
NEUTROPHILS ABSOLUTE: 9.73 E9/L (ref 1.8–7.3)
NEUTROPHILS RELATIVE PERCENT: 88.3 % (ref 43–80)
NEUTROPHILS RELATIVE PERCENT: 88.5 % (ref 43–80)
NEUTROPHILS RELATIVE PERCENT: 88.5 % (ref 43–80)
NEUTROPHILS RELATIVE PERCENT: 88.7 % (ref 43–80)
NEUTROPHILS RELATIVE PERCENT: 88.9 % (ref 43–80)
NEUTROPHILS RELATIVE PERCENT: 89 % (ref 43–80)
NEUTROPHILS RELATIVE PERCENT: 89.2 % (ref 43–80)
NEUTROPHILS RELATIVE PERCENT: 89.2 % (ref 43–80)
NEUTROPHILS RELATIVE PERCENT: 89.4 % (ref 43–80)
NEUTROPHILS RELATIVE PERCENT: 89.8 % (ref 43–80)
NEUTROPHILS RELATIVE PERCENT: 89.8 % (ref 43–80)
NEUTROPHILS RELATIVE PERCENT: 90 % (ref 43–80)
NEUTROPHILS RELATIVE PERCENT: 90.2 % (ref 43–80)
NEUTROPHILS RELATIVE PERCENT: 90.2 % (ref 43–80)
NEUTROPHILS RELATIVE PERCENT: 90.3 % (ref 43–80)
NEUTROPHILS RELATIVE PERCENT: 90.9 % (ref 43–80)
NEUTROPHILS RELATIVE PERCENT: 91 % (ref 43–80)
NEUTROPHILS RELATIVE PERCENT: 91 % (ref 43–80)
NEUTROPHILS RELATIVE PERCENT: 91.5 % (ref 43–80)
NEUTROPHILS RELATIVE PERCENT: 92 % (ref 43–80)
NEUTROPHILS RELATIVE PERCENT: 92.5 % (ref 43–80)
NEUTROPHILS RELATIVE PERCENT: 92.6 % (ref 43–80)
NEUTROPHILS RELATIVE PERCENT: 92.6 % (ref 43–80)
NEUTROPHILS RELATIVE PERCENT: 93 % (ref 43–80)
NEUTROPHILS RELATIVE PERCENT: 93.7 % (ref 43–80)
NEUTROPHILS RELATIVE PERCENT: 93.8 % (ref 43–80)
NEUTROPHILS RELATIVE PERCENT: 94.4 % (ref 43–80)
NEUTROPHILS RELATIVE PERCENT: 95.6 % (ref 43–80)
NUCLEATED RED BLOOD CELLS: 0.9 /100 WBC
NUCLEATED RED BLOOD CELLS: 0.9 /100 WBC
NUCLEATED RED BLOOD CELLS: 1 /100 WBC
O2 CONTENT: 10.9 ML/DL
O2 CONTENT: 10.9 ML/DL
O2 CONTENT: 11 ML/DL
O2 CONTENT: 12 ML/DL
O2 CONTENT: 12 ML/DL
O2 CONTENT: 12.1 ML/DL
O2 CONTENT: 12.3 ML/DL
O2 CONTENT: 12.3 ML/DL
O2 CONTENT: 12.5 ML/DL
O2 CONTENT: 12.7 ML/DL
O2 CONTENT: 13.1 ML/DL
O2 CONTENT: 13.5 ML/DL
O2 CONTENT: 13.5 ML/DL
O2 CONTENT: 13.8 ML/DL
O2 SATURATION: 85.8 % (ref 92–98.5)
O2 SATURATION: 88.9 % (ref 92–98.5)
O2 SATURATION: 89.8 % (ref 92–98.5)
O2 SATURATION: 89.8 % (ref 92–98.5)
O2 SATURATION: 90.5 % (ref 92–98.5)
O2 SATURATION: 90.8 % (ref 92–98.5)
O2 SATURATION: 91.7 % (ref 92–98.5)
O2 SATURATION: 93.3 % (ref 92–98.5)
O2 SATURATION: 93.6 % (ref 92–98.5)
O2 SATURATION: 94.8 % (ref 92–98.5)
O2 SATURATION: 95.3 % (ref 92–98.5)
O2 SATURATION: 95.4 % (ref 92–98.5)
O2 SATURATION: 96 % (ref 92–98.5)
O2 SATURATION: 98.4 % (ref 92–98.5)
O2HB: 84.2 % (ref 94–97)
O2HB: 87.5 % (ref 94–97)
O2HB: 88.7 % (ref 94–97)
O2HB: 89 % (ref 94–97)
O2HB: 89.4 % (ref 94–97)
O2HB: 89.4 % (ref 94–97)
O2HB: 89.9 % (ref 94–97)
O2HB: 91.2 % (ref 94–97)
O2HB: 92.7 % (ref 94–97)
O2HB: 93.7 % (ref 94–97)
O2HB: 94.3 % (ref 94–97)
O2HB: 94.4 % (ref 94–97)
O2HB: 94.9 % (ref 94–97)
O2HB: 97.2 % (ref 94–97)
OPERATOR ID: 2325
OPERATOR ID: 2485
OPERATOR ID: 316
OPERATOR ID: 3342
OPERATOR ID: 46
OPERATOR ID: ABNORMAL
OVALOCYTES: ABNORMAL
PARAINFLUENZA VIRUS 1 BY PCR: NOT DETECTED
PARAINFLUENZA VIRUS 2 BY PCR: NOT DETECTED
PARAINFLUENZA VIRUS 3 BY PCR: NOT DETECTED
PARAINFLUENZA VIRUS 4 BY PCR: NOT DETECTED
PATIENT TEMP: 37 C
PCO2: 105.4 MMHG (ref 35–45)
PCO2: 40.1 MMHG (ref 35–45)
PCO2: 41 MMHG (ref 35–45)
PCO2: 46.5 MMHG (ref 35–45)
PCO2: 49.3 MMHG (ref 35–45)
PCO2: 55.4 MMHG (ref 35–45)
PCO2: 57.9 MMHG (ref 35–45)
PCO2: 60.1 MMHG (ref 35–45)
PCO2: 60.2 MMHG (ref 35–45)
PCO2: 61.3 MMHG (ref 35–45)
PCO2: 62.8 MMHG (ref 35–45)
PCO2: 63.6 MMHG (ref 35–45)
PCO2: 64.7 MMHG (ref 35–45)
PCO2: 66.9 MMHG (ref 35–45)
PDW BLD-RTO: 16.1 FL (ref 11.5–15)
PDW BLD-RTO: 16.1 FL (ref 11.5–15)
PDW BLD-RTO: 16.3 FL (ref 11.5–15)
PDW BLD-RTO: 16.6 FL (ref 11.5–15)
PDW BLD-RTO: 16.6 FL (ref 11.5–15)
PDW BLD-RTO: 16.7 FL (ref 11.5–15)
PDW BLD-RTO: 17.1 FL (ref 11.5–15)
PDW BLD-RTO: 17.1 FL (ref 11.5–15)
PDW BLD-RTO: 17.2 FL (ref 11.5–15)
PDW BLD-RTO: 17.8 FL (ref 11.5–15)
PDW BLD-RTO: 18.6 FL (ref 11.5–15)
PDW BLD-RTO: 19.2 FL (ref 11.5–15)
PDW BLD-RTO: 19.2 FL (ref 11.5–15)
PDW BLD-RTO: 19.4 FL (ref 11.5–15)
PDW BLD-RTO: 19.4 FL (ref 11.5–15)
PDW BLD-RTO: 19.5 FL (ref 11.5–15)
PDW BLD-RTO: 19.6 FL (ref 11.5–15)
PDW BLD-RTO: 19.8 FL (ref 11.5–15)
PDW BLD-RTO: 20 FL (ref 11.5–15)
PDW BLD-RTO: 20 FL (ref 11.5–15)
PDW BLD-RTO: 20.3 FL (ref 11.5–15)
PDW BLD-RTO: 20.8 FL (ref 11.5–15)
PDW BLD-RTO: 21 FL (ref 11.5–15)
PDW BLD-RTO: 21 FL (ref 11.5–15)
PDW BLD-RTO: 21.2 FL (ref 11.5–15)
PEEP/CPAP: 10 CMH2O
PEEP/CPAP: 10 CMH2O
PEEP/CPAP: 12 CMH2O
PEEP/CPAP: 14 CMH2O
PFO2: 0.56 MMHG/%
PFO2: 0.61 MMHG/%
PFO2: 0.73 MMHG/%
PFO2: 0.73 MMHG/%
PFO2: 0.74 MMHG/%
PFO2: 0.76 MMHG/%
PFO2: 0.76 MMHG/%
PFO2: 0.86 MMHG/%
PFO2: 0.86 MMHG/%
PFO2: 0.93 MMHG/%
PFO2: 0.97 MMHG/%
PFO2: 1.04 MMHG/%
PFO2: 1.14 MMHG/%
PFO2: 1.23 MMHG/%
PH BLOOD GAS: 7.29 (ref 7.35–7.45)
PH BLOOD GAS: 7.3 (ref 7.35–7.45)
PH BLOOD GAS: 7.33 (ref 7.35–7.45)
PH BLOOD GAS: 7.34 (ref 7.35–7.45)
PH BLOOD GAS: 7.36 (ref 7.35–7.45)
PH BLOOD GAS: 7.37 (ref 7.35–7.45)
PH BLOOD GAS: 7.38 (ref 7.35–7.45)
PH BLOOD GAS: 7.4 (ref 7.35–7.45)
PH BLOOD GAS: 7.44 (ref 7.35–7.45)
PH BLOOD GAS: 7.47 (ref 7.35–7.45)
PH BLOOD GAS: 7.49 (ref 7.35–7.45)
PH BLOOD GAS: 7.49 (ref 7.35–7.45)
PHOSPHORUS: 1.8 MG/DL (ref 2.5–4.5)
PHOSPHORUS: 1.8 MG/DL (ref 2.5–4.5)
PHOSPHORUS: 2 MG/DL (ref 2.5–4.5)
PHOSPHORUS: 2.3 MG/DL (ref 2.5–4.5)
PHOSPHORUS: 2.4 MG/DL (ref 2.5–4.5)
PHOSPHORUS: 2.6 MG/DL (ref 2.5–4.5)
PHOSPHORUS: 2.6 MG/DL (ref 2.5–4.5)
PHOSPHORUS: 2.7 MG/DL (ref 2.5–4.5)
PHOSPHORUS: 2.8 MG/DL (ref 2.5–4.5)
PHOSPHORUS: 2.9 MG/DL (ref 2.5–4.5)
PHOSPHORUS: 2.9 MG/DL (ref 2.5–4.5)
PHOSPHORUS: 3.2 MG/DL (ref 2.5–4.5)
PHOSPHORUS: 3.3 MG/DL (ref 2.5–4.5)
PHOSPHORUS: 3.3 MG/DL (ref 2.5–4.5)
PHOSPHORUS: 3.4 MG/DL (ref 2.5–4.5)
PHOSPHORUS: 4 MG/DL (ref 2.5–4.5)
PHOSPHORUS: 4 MG/DL (ref 2.5–4.5)
PHOSPHORUS: 5.2 MG/DL (ref 2.5–4.5)
PLATELET # BLD: 117 E9/L (ref 130–450)
PLATELET # BLD: 123 E9/L (ref 130–450)
PLATELET # BLD: 130 E9/L (ref 130–450)
PLATELET # BLD: 160 E9/L (ref 130–450)
PLATELET # BLD: 165 E9/L (ref 130–450)
PLATELET # BLD: 168 E9/L (ref 130–450)
PLATELET # BLD: 177 E9/L (ref 130–450)
PLATELET # BLD: 191 E9/L (ref 130–450)
PLATELET # BLD: 192 E9/L (ref 130–450)
PLATELET # BLD: 192 E9/L (ref 130–450)
PLATELET # BLD: 194 E9/L (ref 130–450)
PLATELET # BLD: 196 E9/L (ref 130–450)
PLATELET # BLD: 199 E9/L (ref 130–450)
PLATELET # BLD: 201 E9/L (ref 130–450)
PLATELET # BLD: 201 E9/L (ref 130–450)
PLATELET # BLD: 202 E9/L (ref 130–450)
PLATELET # BLD: 204 E9/L (ref 130–450)
PLATELET # BLD: 204 E9/L (ref 130–450)
PLATELET # BLD: 209 E9/L (ref 130–450)
PLATELET # BLD: 212 E9/L (ref 130–450)
PLATELET # BLD: 212 E9/L (ref 130–450)
PLATELET # BLD: 223 E9/L (ref 130–450)
PLATELET # BLD: 231 E9/L (ref 130–450)
PLATELET # BLD: 233 E9/L (ref 130–450)
PLATELET # BLD: 235 E9/L (ref 130–450)
PLATELET # BLD: 241 E9/L (ref 130–450)
PLATELET # BLD: 242 E9/L (ref 130–450)
PLATELET # BLD: 253 E9/L (ref 130–450)
PLATELET # BLD: 255 E9/L (ref 130–450)
PLATELET # BLD: 256 E9/L (ref 130–450)
PLATELET # BLD: 262 E9/L (ref 130–450)
PLATELET # BLD: 311 E9/L (ref 130–450)
PMV BLD AUTO: 10 FL (ref 7–12)
PMV BLD AUTO: 10 FL (ref 7–12)
PMV BLD AUTO: 10.1 FL (ref 7–12)
PMV BLD AUTO: 10.2 FL (ref 7–12)
PMV BLD AUTO: 10.3 FL (ref 7–12)
PMV BLD AUTO: 10.5 FL (ref 7–12)
PMV BLD AUTO: 10.5 FL (ref 7–12)
PMV BLD AUTO: 10.6 FL (ref 7–12)
PMV BLD AUTO: 10.6 FL (ref 7–12)
PMV BLD AUTO: 10.7 FL (ref 7–12)
PMV BLD AUTO: 10.8 FL (ref 7–12)
PMV BLD AUTO: 10.8 FL (ref 7–12)
PMV BLD AUTO: 10.9 FL (ref 7–12)
PMV BLD AUTO: 10.9 FL (ref 7–12)
PMV BLD AUTO: 9.7 FL (ref 7–12)
PMV BLD AUTO: 9.8 FL (ref 7–12)
PMV BLD AUTO: 9.8 FL (ref 7–12)
PMV BLD AUTO: 9.9 FL (ref 7–12)
PNEUMOCYSTIS DFA: NORMAL
PO2: 123.3 MMHG (ref 75–100)
PO2: 48 MMHG (ref 75–100)
PO2: 52.9 MMHG (ref 75–100)
PO2: 56 MMHG (ref 75–100)
PO2: 58 MMHG (ref 75–100)
PO2: 58.1 MMHG (ref 75–100)
PO2: 60.5 MMHG (ref 75–100)
PO2: 61 MMHG (ref 75–100)
PO2: 68 MMHG (ref 75–100)
PO2: 72.6 MMHG (ref 75–100)
PO2: 73.1 MMHG (ref 75–100)
PO2: 75.8 MMHG (ref 75–100)
PO2: 78.8 MMHG (ref 75–100)
PO2: 85.6 MMHG (ref 75–100)
POIKILOCYTES: ABNORMAL
POLYCHROMASIA: ABNORMAL
POTASSIUM SERPL-SCNC: 2.8 MMOL/L (ref 3.5–5)
POTASSIUM SERPL-SCNC: 3.2 MMOL/L (ref 3.5–5)
POTASSIUM SERPL-SCNC: 3.2 MMOL/L (ref 3.5–5)
POTASSIUM SERPL-SCNC: 3.3 MMOL/L (ref 3.5–5)
POTASSIUM SERPL-SCNC: 3.3 MMOL/L (ref 3.5–5)
POTASSIUM SERPL-SCNC: 3.6 MMOL/L (ref 3.5–5)
POTASSIUM SERPL-SCNC: 3.6 MMOL/L (ref 3.5–5)
POTASSIUM SERPL-SCNC: 3.7 MMOL/L (ref 3.5–5)
POTASSIUM SERPL-SCNC: 3.7 MMOL/L (ref 3.5–5)
POTASSIUM SERPL-SCNC: 3.8 MMOL/L (ref 3.5–5)
POTASSIUM SERPL-SCNC: 4.1 MMOL/L (ref 3.5–5)
POTASSIUM SERPL-SCNC: 4.2 MMOL/L (ref 3.5–5)
POTASSIUM SERPL-SCNC: 4.3 MMOL/L (ref 3.5–5)
POTASSIUM SERPL-SCNC: 4.3 MMOL/L (ref 3.5–5)
POTASSIUM SERPL-SCNC: 4.4 MMOL/L (ref 3.5–5)
POTASSIUM SERPL-SCNC: 4.4 MMOL/L (ref 3.5–5)
POTASSIUM SERPL-SCNC: 4.5 MMOL/L (ref 3.5–5)
POTASSIUM SERPL-SCNC: 4.5 MMOL/L (ref 3.5–5)
POTASSIUM SERPL-SCNC: 4.6 MMOL/L (ref 3.5–5)
POTASSIUM SERPL-SCNC: 4.7 MMOL/L (ref 3.5–5)
POTASSIUM SERPL-SCNC: 4.7 MMOL/L (ref 3.5–5)
POTASSIUM SERPL-SCNC: 4.8 MMOL/L (ref 3.5–5)
POTASSIUM SERPL-SCNC: 4.9 MMOL/L (ref 3.5–5)
POTASSIUM SERPL-SCNC: 5 MMOL/L (ref 3.5–5)
POTASSIUM SERPL-SCNC: 5 MMOL/L (ref 3.5–5)
POTASSIUM SERPL-SCNC: 5.1 MMOL/L (ref 3.5–5)
POTASSIUM SERPL-SCNC: 5.1 MMOL/L (ref 3.5–5)
POTASSIUM SERPL-SCNC: 5.2 MMOL/L (ref 3.5–5)
POTASSIUM SERPL-SCNC: 5.3 MMOL/L (ref 3.5–5)
PROCALCITONIN: 0.22 NG/ML (ref 0–0.08)
PROMYELOCYTES PERCENT: 1 % (ref 0–0)
PROTHROMBIN TIME: 15.3 SEC (ref 9.3–12.4)
RBC # BLD: 2.17 E12/L (ref 3.5–5.5)
RBC # BLD: 2.26 E12/L (ref 3.5–5.5)
RBC # BLD: 2.56 E12/L (ref 3.5–5.5)
RBC # BLD: 2.57 E12/L (ref 3.5–5.5)
RBC # BLD: 2.58 E12/L (ref 3.5–5.5)
RBC # BLD: 2.58 E12/L (ref 3.5–5.5)
RBC # BLD: 2.6 E12/L (ref 3.5–5.5)
RBC # BLD: 2.63 E12/L (ref 3.5–5.5)
RBC # BLD: 2.65 E12/L (ref 3.5–5.5)
RBC # BLD: 2.68 E12/L (ref 3.5–5.5)
RBC # BLD: 2.68 E12/L (ref 3.5–5.5)
RBC # BLD: 2.69 E12/L (ref 3.5–5.5)
RBC # BLD: 2.7 E12/L (ref 3.5–5.5)
RBC # BLD: 2.73 E12/L (ref 3.5–5.5)
RBC # BLD: 2.74 E12/L (ref 3.5–5.5)
RBC # BLD: 2.78 E12/L (ref 3.5–5.5)
RBC # BLD: 2.79 E12/L (ref 3.5–5.5)
RBC # BLD: 2.8 E12/L (ref 3.5–5.5)
RBC # BLD: 2.83 E12/L (ref 3.5–5.5)
RBC # BLD: 2.86 E12/L (ref 3.5–5.5)
RBC # BLD: 2.88 E12/L (ref 3.5–5.5)
RBC # BLD: 2.89 E12/L (ref 3.5–5.5)
RBC # BLD: 2.92 E12/L (ref 3.5–5.5)
RBC # BLD: 2.97 E12/L (ref 3.5–5.5)
RBC # BLD: 2.99 E12/L (ref 3.5–5.5)
RBC # BLD: 3 E12/L (ref 3.5–5.5)
RBC # BLD: 3.01 E12/L (ref 3.5–5.5)
RBC # BLD: 3.02 E12/L (ref 3.5–5.5)
RBC # BLD: 3.03 E12/L (ref 3.5–5.5)
RBC # BLD: 3.09 E12/L (ref 3.5–5.5)
RBC # BLD: 3.18 E12/L (ref 3.5–5.5)
RBC # BLD: 3.2 E12/L (ref 3.5–5.5)
RESPIRATORY SYNCYTIAL VIRUS BY PCR: NOT DETECTED
RI(T): 1046 %
RI(T): 409 %
RI(T): 424 %
RI(T): 481 %
RI(T): 511 %
RI(T): 566 %
RI(T): 584 %
RI(T): 630 %
RI(T): 669 %
RI(T): 680 %
RI(T): 727 %
RI(T): 739 %
RI(T): 776 %
RI(T): 922 %
RR MECHANICAL: 14 B/MIN
RR MECHANICAL: 14 B/MIN
RR MECHANICAL: 16 B/MIN
RR MECHANICAL: 16 B/MIN
RR MECHANICAL: 20 B/MIN
RR MECHANICAL: 22 B/MIN
RR MECHANICAL: 24 B/MIN
RR MECHANICAL: 24 B/MIN
SARS-COV-2, PCR: DETECTED
SARS-COV-2, PCR: DETECTED
SARS-COV-2, PCR: NOT DETECTED
SARS-COV-2, PCR: NOT DETECTED
SARS-COV-2: NOT DETECTED
SCHISTOCYTES: ABNORMAL
SMEAR, RESPIRATORY: NORMAL
SODIUM BLD-SCNC: 129 MMOL/L (ref 132–146)
SODIUM BLD-SCNC: 129 MMOL/L (ref 132–146)
SODIUM BLD-SCNC: 130 MMOL/L (ref 132–146)
SODIUM BLD-SCNC: 131 MMOL/L (ref 132–146)
SODIUM BLD-SCNC: 131 MMOL/L (ref 132–146)
SODIUM BLD-SCNC: 132 MMOL/L (ref 132–146)
SODIUM BLD-SCNC: 133 MMOL/L (ref 132–146)
SODIUM BLD-SCNC: 134 MMOL/L (ref 132–146)
SODIUM BLD-SCNC: 134 MMOL/L (ref 132–146)
SODIUM BLD-SCNC: 135 MMOL/L (ref 132–146)
SODIUM BLD-SCNC: 135 MMOL/L (ref 132–146)
SODIUM BLD-SCNC: 136 MMOL/L (ref 132–146)
SODIUM BLD-SCNC: 137 MMOL/L (ref 132–146)
SODIUM BLD-SCNC: 138 MMOL/L (ref 132–146)
SODIUM BLD-SCNC: 139 MMOL/L (ref 132–146)
SODIUM BLD-SCNC: 139 MMOL/L (ref 132–146)
SODIUM BLD-SCNC: 140 MMOL/L (ref 132–146)
SODIUM BLD-SCNC: 142 MMOL/L (ref 132–146)
SOURCE, BLOOD GAS: ABNORMAL
SOURCE: NORMAL
STOMATOCYTES: ABNORMAL
T4 FREE: 0.59 NG/DL (ref 0.93–1.7)
TARGET CELLS: ABNORMAL
TEAR DROP CELLS: ABNORMAL
THB: 10.7 G/DL (ref 11.5–16.5)
THB: 10.8 G/DL (ref 11.5–16.5)
THB: 8.5 G/DL (ref 11.5–16.5)
THB: 8.6 G/DL (ref 11.5–16.5)
THB: 8.9 G/DL (ref 11.5–16.5)
THB: 9.2 G/DL (ref 11.5–16.5)
THB: 9.2 G/DL (ref 11.5–16.5)
THB: 9.4 G/DL (ref 11.5–16.5)
THB: 9.5 G/DL (ref 11.5–16.5)
THB: 9.7 G/DL (ref 11.5–16.5)
THB: 9.7 G/DL (ref 11.5–16.5)
THB: 9.8 G/DL (ref 11.5–16.5)
THB: 9.8 G/DL (ref 11.5–16.5)
THB: 9.9 G/DL (ref 11.5–16.5)
TIME ANALYZED: 1212
TIME ANALYZED: 1241
TIME ANALYZED: 514
TIME ANALYZED: 516
TIME ANALYZED: 535
TIME ANALYZED: 539
TIME ANALYZED: 544
TIME ANALYZED: 550
TIME ANALYZED: 556
TIME ANALYZED: 602
TIME ANALYZED: 615
TIME ANALYZED: 648
TIME ANALYZED: 717
TIME ANALYZED: 815
TOTAL PROTEIN: 5 G/DL (ref 6.4–8.3)
TOTAL PROTEIN: 5 G/DL (ref 6.4–8.3)
TOTAL PROTEIN: 5.1 G/DL (ref 6.4–8.3)
TSH SERPL DL<=0.05 MIU/L-ACNC: 26.35 UIU/ML (ref 0.27–4.2)
VITAMIN B-12: 1526 PG/ML (ref 211–946)
VITAMIN D 25-HYDROXY: 15 NG/ML (ref 30–100)
VT MECHANICAL: 360 ML
VT MECHANICAL: 360 ML
VT MECHANICAL: 380 ML
VT MECHANICAL: 400 ML
VT MECHANICAL: 450 ML
VT MECHANICAL: 450 ML
WBC # BLD: 10.3 E9/L (ref 4.5–11.5)
WBC # BLD: 10.4 E9/L (ref 4.5–11.5)
WBC # BLD: 10.5 E9/L (ref 4.5–11.5)
WBC # BLD: 13.4 E9/L (ref 4.5–11.5)
WBC # BLD: 13.6 E9/L (ref 4.5–11.5)
WBC # BLD: 13.6 E9/L (ref 4.5–11.5)
WBC # BLD: 13.7 E9/L (ref 4.5–11.5)
WBC # BLD: 14.1 E9/L (ref 4.5–11.5)
WBC # BLD: 14.7 E9/L (ref 4.5–11.5)
WBC # BLD: 14.8 E9/L (ref 4.5–11.5)
WBC # BLD: 15 E9/L (ref 4.5–11.5)
WBC # BLD: 15 E9/L (ref 4.5–11.5)
WBC # BLD: 15.4 E9/L (ref 4.5–11.5)
WBC # BLD: 15.4 E9/L (ref 4.5–11.5)
WBC # BLD: 15.5 E9/L (ref 4.5–11.5)
WBC # BLD: 15.8 E9/L (ref 4.5–11.5)
WBC # BLD: 16.2 E9/L (ref 4.5–11.5)
WBC # BLD: 16.2 E9/L (ref 4.5–11.5)
WBC # BLD: 16.3 E9/L (ref 4.5–11.5)
WBC # BLD: 16.8 E9/L (ref 4.5–11.5)
WBC # BLD: 16.8 E9/L (ref 4.5–11.5)
WBC # BLD: 17.5 E9/L (ref 4.5–11.5)
WBC # BLD: 17.6 E9/L (ref 4.5–11.5)
WBC # BLD: 17.7 E9/L (ref 4.5–11.5)
WBC # BLD: 17.7 E9/L (ref 4.5–11.5)
WBC # BLD: 17.9 E9/L (ref 4.5–11.5)
WBC # BLD: 18.6 E9/L (ref 4.5–11.5)
WBC # BLD: 19.2 E9/L (ref 4.5–11.5)
WBC # BLD: 19.2 E9/L (ref 4.5–11.5)
WBC # BLD: 24.3 E9/L (ref 4.5–11.5)

## 2021-01-01 PROCEDURE — 36430 TRANSFUSION BLD/BLD COMPNT: CPT

## 2021-01-01 PROCEDURE — 85378 FIBRIN DEGRADE SEMIQUANT: CPT

## 2021-01-01 PROCEDURE — 36415 COLL VENOUS BLD VENIPUNCTURE: CPT

## 2021-01-01 PROCEDURE — 89220 SPUTUM SPECIMEN COLLECTION: CPT

## 2021-01-01 PROCEDURE — 6370000000 HC RX 637 (ALT 250 FOR IP): Performed by: INTERNAL MEDICINE

## 2021-01-01 PROCEDURE — 6360000002 HC RX W HCPCS

## 2021-01-01 PROCEDURE — 6360000002 HC RX W HCPCS: Performed by: INTERNAL MEDICINE

## 2021-01-01 PROCEDURE — 84100 ASSAY OF PHOSPHORUS: CPT

## 2021-01-01 PROCEDURE — 6360000002 HC RX W HCPCS: Performed by: STUDENT IN AN ORGANIZED HEALTH CARE EDUCATION/TRAINING PROGRAM

## 2021-01-01 PROCEDURE — 2580000003 HC RX 258: Performed by: INTERNAL MEDICINE

## 2021-01-01 PROCEDURE — 83735 ASSAY OF MAGNESIUM: CPT

## 2021-01-01 PROCEDURE — 85384 FIBRINOGEN ACTIVITY: CPT

## 2021-01-01 PROCEDURE — 2000000000 HC ICU R&B

## 2021-01-01 PROCEDURE — 94640 AIRWAY INHALATION TREATMENT: CPT

## 2021-01-01 PROCEDURE — 2580000003 HC RX 258: Performed by: SURGERY

## 2021-01-01 PROCEDURE — 36620 INSERTION CATHETER ARTERY: CPT

## 2021-01-01 PROCEDURE — 80048 BASIC METABOLIC PNL TOTAL CA: CPT

## 2021-01-01 PROCEDURE — P9016 RBC LEUKOCYTES REDUCED: HCPCS

## 2021-01-01 PROCEDURE — 6360000002 HC RX W HCPCS: Performed by: EMERGENCY MEDICINE

## 2021-01-01 PROCEDURE — 85025 COMPLETE CBC W/AUTO DIFF WBC: CPT

## 2021-01-01 PROCEDURE — 87281 PNEUMOCYSTIS CARINII AG IF: CPT

## 2021-01-01 PROCEDURE — 94003 VENT MGMT INPAT SUBQ DAY: CPT

## 2021-01-01 PROCEDURE — 2500000003 HC RX 250 WO HCPCS: Performed by: INTERNAL MEDICINE

## 2021-01-01 PROCEDURE — 82805 BLOOD GASES W/O2 SATURATION: CPT

## 2021-01-01 PROCEDURE — 36592 COLLECT BLOOD FROM PICC: CPT

## 2021-01-01 PROCEDURE — 2060000000 HC ICU INTERMEDIATE R&B

## 2021-01-01 PROCEDURE — C9113 INJ PANTOPRAZOLE SODIUM, VIA: HCPCS | Performed by: INTERNAL MEDICINE

## 2021-01-01 PROCEDURE — 71045 X-RAY EXAM CHEST 1 VIEW: CPT

## 2021-01-01 PROCEDURE — 6360000002 HC RX W HCPCS: Performed by: SURGERY

## 2021-01-01 PROCEDURE — 82962 GLUCOSE BLOOD TEST: CPT

## 2021-01-01 PROCEDURE — 2580000003 HC RX 258: Performed by: EMERGENCY MEDICINE

## 2021-01-01 PROCEDURE — 36556 INSERT NON-TUNNEL CV CATH: CPT

## 2021-01-01 PROCEDURE — 99232 SBSQ HOSP IP/OBS MODERATE 35: CPT | Performed by: PHYSICIAN ASSISTANT

## 2021-01-01 PROCEDURE — C9113 INJ PANTOPRAZOLE SODIUM, VIA: HCPCS | Performed by: SURGERY

## 2021-01-01 PROCEDURE — 85730 THROMBOPLASTIN TIME PARTIAL: CPT

## 2021-01-01 PROCEDURE — 84443 ASSAY THYROID STIM HORMONE: CPT

## 2021-01-01 PROCEDURE — 97110 THERAPEUTIC EXERCISES: CPT

## 2021-01-01 PROCEDURE — 6370000000 HC RX 637 (ALT 250 FOR IP): Performed by: SURGERY

## 2021-01-01 PROCEDURE — 0202U NFCT DS 22 TRGT SARS-COV-2: CPT

## 2021-01-01 PROCEDURE — 2700000000 HC OXYGEN THERAPY PER DAY

## 2021-01-01 PROCEDURE — 99222 1ST HOSP IP/OBS MODERATE 55: CPT | Performed by: SURGERY

## 2021-01-01 PROCEDURE — 87040 BLOOD CULTURE FOR BACTERIA: CPT

## 2021-01-01 PROCEDURE — 36600 WITHDRAWAL OF ARTERIAL BLOOD: CPT

## 2021-01-01 PROCEDURE — 99233 SBSQ HOSP IP/OBS HIGH 50: CPT | Performed by: STUDENT IN AN ORGANIZED HEALTH CARE EDUCATION/TRAINING PROGRAM

## 2021-01-01 PROCEDURE — 76937 US GUIDE VASCULAR ACCESS: CPT

## 2021-01-01 PROCEDURE — 02HV33Z INSERTION OF INFUSION DEVICE INTO SUPERIOR VENA CAVA, PERCUTANEOUS APPROACH: ICD-10-PCS | Performed by: INTERNAL MEDICINE

## 2021-01-01 PROCEDURE — 2580000003 HC RX 258

## 2021-01-01 PROCEDURE — 93971 EXTREMITY STUDY: CPT

## 2021-01-01 PROCEDURE — 97530 THERAPEUTIC ACTIVITIES: CPT

## 2021-01-01 PROCEDURE — 93010 ELECTROCARDIOGRAM REPORT: CPT | Performed by: INTERNAL MEDICINE

## 2021-01-01 PROCEDURE — 6370000000 HC RX 637 (ALT 250 FOR IP): Performed by: STUDENT IN AN ORGANIZED HEALTH CARE EDUCATION/TRAINING PROGRAM

## 2021-01-01 PROCEDURE — C1751 CATH, INF, PER/CENT/MIDLINE: HCPCS

## 2021-01-01 PROCEDURE — 82607 VITAMIN B-12: CPT

## 2021-01-01 PROCEDURE — 37799 UNLISTED PX VASCULAR SURGERY: CPT

## 2021-01-01 PROCEDURE — 87081 CULTURE SCREEN ONLY: CPT

## 2021-01-01 PROCEDURE — 73201 CT UPPER EXTREMITY W/DYE: CPT

## 2021-01-01 PROCEDURE — 70450 CT HEAD/BRAIN W/O DYE: CPT

## 2021-01-01 PROCEDURE — 83605 ASSAY OF LACTIC ACID: CPT

## 2021-01-01 PROCEDURE — 84439 ASSAY OF FREE THYROXINE: CPT

## 2021-01-01 PROCEDURE — 43762 RPLC GTUBE NO REVJ TRC: CPT

## 2021-01-01 PROCEDURE — 99291 CRITICAL CARE FIRST HOUR: CPT | Performed by: INTERNAL MEDICINE

## 2021-01-01 PROCEDURE — 93970 EXTREMITY STUDY: CPT

## 2021-01-01 PROCEDURE — 93005 ELECTROCARDIOGRAM TRACING: CPT | Performed by: INTERNAL MEDICINE

## 2021-01-01 PROCEDURE — 87206 SMEAR FLUORESCENT/ACID STAI: CPT

## 2021-01-01 PROCEDURE — 82330 ASSAY OF CALCIUM: CPT

## 2021-01-01 PROCEDURE — 82533 TOTAL CORTISOL: CPT

## 2021-01-01 PROCEDURE — 82306 VITAMIN D 25 HYDROXY: CPT

## 2021-01-01 PROCEDURE — 80053 COMPREHEN METABOLIC PANEL: CPT

## 2021-01-01 PROCEDURE — 2500000003 HC RX 250 WO HCPCS: Performed by: EMERGENCY MEDICINE

## 2021-01-01 PROCEDURE — 84132 ASSAY OF SERUM POTASSIUM: CPT

## 2021-01-01 PROCEDURE — P9047 ALBUMIN (HUMAN), 25%, 50ML: HCPCS | Performed by: INTERNAL MEDICINE

## 2021-01-01 PROCEDURE — 87070 CULTURE OTHR SPECIMN AEROBIC: CPT

## 2021-01-01 PROCEDURE — 84145 PROCALCITONIN (PCT): CPT

## 2021-01-01 PROCEDURE — 97161 PT EVAL LOW COMPLEX 20 MIN: CPT

## 2021-01-01 PROCEDURE — 87449 NOS EACH ORGANISM AG IA: CPT

## 2021-01-01 PROCEDURE — 31502 CHANGE OF WINDPIPE AIRWAY: CPT

## 2021-01-01 PROCEDURE — 97166 OT EVAL MOD COMPLEX 45 MIN: CPT

## 2021-01-01 PROCEDURE — 2500000003 HC RX 250 WO HCPCS

## 2021-01-01 PROCEDURE — 80162 ASSAY OF DIGOXIN TOTAL: CPT

## 2021-01-01 PROCEDURE — 86923 COMPATIBILITY TEST ELECTRIC: CPT

## 2021-01-01 PROCEDURE — 6360000004 HC RX CONTRAST MEDICATION: Performed by: RADIOLOGY

## 2021-01-01 PROCEDURE — 82140 ASSAY OF AMMONIA: CPT

## 2021-01-01 PROCEDURE — 06HY33Z INSERTION OF INFUSION DEVICE INTO LOWER VEIN, PERCUTANEOUS APPROACH: ICD-10-PCS | Performed by: INTERNAL MEDICINE

## 2021-01-01 PROCEDURE — 71250 CT THORAX DX C-: CPT

## 2021-01-01 PROCEDURE — 87305 ASPERGILLUS AG IA: CPT

## 2021-01-01 PROCEDURE — 2580000003 HC RX 258: Performed by: STUDENT IN AN ORGANIZED HEALTH CARE EDUCATION/TRAINING PROGRAM

## 2021-01-01 PROCEDURE — 36569 INSJ PICC 5 YR+ W/O IMAGING: CPT

## 2021-01-01 PROCEDURE — 86900 BLOOD TYPING SEROLOGIC ABO: CPT

## 2021-01-01 PROCEDURE — 82746 ASSAY OF FOLIC ACID SERUM: CPT

## 2021-01-01 PROCEDURE — 86850 RBC ANTIBODY SCREEN: CPT

## 2021-01-01 PROCEDURE — 2500000003 HC RX 250 WO HCPCS: Performed by: STUDENT IN AN ORGANIZED HEALTH CARE EDUCATION/TRAINING PROGRAM

## 2021-01-01 PROCEDURE — 86901 BLOOD TYPING SEROLOGIC RH(D): CPT

## 2021-01-01 PROCEDURE — U0003 INFECTIOUS AGENT DETECTION BY NUCLEIC ACID (DNA OR RNA); SEVERE ACUTE RESPIRATORY SYNDROME CORONAVIRUS 2 (SARS-COV-2) (CORONAVIRUS DISEASE [COVID-19]), AMPLIFIED PROBE TECHNIQUE, MAKING USE OF HIGH THROUGHPUT TECHNOLOGIES AS DESCRIBED BY CMS-2020-01-R: HCPCS

## 2021-01-01 PROCEDURE — 85610 PROTHROMBIN TIME: CPT

## 2021-01-01 RX ORDER — FENTANYL CITRATE 50 UG/ML
100 INJECTION, SOLUTION INTRAMUSCULAR; INTRAVENOUS ONCE
Status: COMPLETED | OUTPATIENT
Start: 2021-01-01 | End: 2021-01-01

## 2021-01-01 RX ORDER — SODIUM CHLORIDE 9 MG/ML
INJECTION, SOLUTION INTRAVENOUS PRN
Status: DISCONTINUED | OUTPATIENT
Start: 2021-01-01 | End: 2021-01-01 | Stop reason: HOSPADM

## 2021-01-01 RX ORDER — DIGOXIN 250 MCG
250 TABLET ORAL DAILY
Status: DISCONTINUED | OUTPATIENT
Start: 2021-01-01 | End: 2021-01-01 | Stop reason: HOSPADM

## 2021-01-01 RX ORDER — METOCLOPRAMIDE HYDROCHLORIDE 5 MG/ML
5 INJECTION INTRAMUSCULAR; INTRAVENOUS EVERY 6 HOURS
Status: DISCONTINUED | OUTPATIENT
Start: 2021-01-01 | End: 2021-01-01

## 2021-01-01 RX ORDER — PROPOFOL 10 MG/ML
INJECTION, EMULSION INTRAVENOUS
Status: COMPLETED
Start: 2021-01-01 | End: 2021-01-01

## 2021-01-01 RX ORDER — FUROSEMIDE 10 MG/ML
40 INJECTION INTRAMUSCULAR; INTRAVENOUS ONCE
Status: COMPLETED | OUTPATIENT
Start: 2021-01-01 | End: 2021-01-01

## 2021-01-01 RX ORDER — MIDODRINE HYDROCHLORIDE 5 MG/1
15 TABLET ORAL 3 TIMES DAILY
Qty: 90 TABLET | Refills: 3 | DISCHARGE
Start: 2021-01-01

## 2021-01-01 RX ORDER — LORAZEPAM 2 MG/ML
1 INJECTION INTRAMUSCULAR
Status: DISCONTINUED | OUTPATIENT
Start: 2021-01-01 | End: 2021-01-01 | Stop reason: HOSPADM

## 2021-01-01 RX ORDER — OXYCODONE HYDROCHLORIDE 15 MG/1
7.5 TABLET ORAL EVERY 6 HOURS
Status: DISCONTINUED | OUTPATIENT
Start: 2021-01-01 | End: 2021-01-01

## 2021-01-01 RX ORDER — METOCLOPRAMIDE 5 MG/1
5 TABLET ORAL EVERY 6 HOURS
Status: DISCONTINUED | OUTPATIENT
Start: 2021-01-01 | End: 2021-01-01

## 2021-01-01 RX ORDER — LORAZEPAM 0.5 MG/1
0.5 TABLET ORAL EVERY 6 HOURS PRN
Qty: 60 TABLET | Refills: 0 | Status: SHIPPED | OUTPATIENT
Start: 2021-01-01 | End: 2021-02-13

## 2021-01-01 RX ORDER — VITAMIN B COMPLEX
1000 TABLET ORAL DAILY
Status: DISCONTINUED | OUTPATIENT
Start: 2021-01-01 | End: 2021-01-01

## 2021-01-01 RX ORDER — LORAZEPAM 2 MG/ML
1 INJECTION INTRAMUSCULAR EVERY 4 HOURS PRN
Status: DISCONTINUED | OUTPATIENT
Start: 2021-01-01 | End: 2021-01-01

## 2021-01-01 RX ORDER — DIGOXIN 250 MCG
250 TABLET ORAL DAILY
Qty: 30 TABLET | Refills: 3 | DISCHARGE
Start: 2021-01-01

## 2021-01-01 RX ORDER — SENNOSIDES 8.8 MG/5ML
10 LIQUID ORAL 2 TIMES DAILY
Status: DISCONTINUED | OUTPATIENT
Start: 2021-01-01 | End: 2021-01-01

## 2021-01-01 RX ORDER — MIDAZOLAM HYDROCHLORIDE 2 MG/2ML
2 INJECTION, SOLUTION INTRAMUSCULAR; INTRAVENOUS ONCE
Status: COMPLETED | OUTPATIENT
Start: 2021-01-01 | End: 2021-01-01

## 2021-01-01 RX ORDER — INSULIN GLARGINE 100 [IU]/ML
5 INJECTION, SOLUTION SUBCUTANEOUS ONCE
Status: COMPLETED | OUTPATIENT
Start: 2021-01-01 | End: 2021-01-01

## 2021-01-01 RX ORDER — SODIUM POLYSTYRENE SULFONATE 15 G/60ML
15 SUSPENSION ORAL; RECTAL ONCE
Status: DISCONTINUED | OUTPATIENT
Start: 2021-01-01 | End: 2021-01-01 | Stop reason: HOSPADM

## 2021-01-01 RX ORDER — POTASSIUM CHLORIDE 29.8 MG/ML
20 INJECTION INTRAVENOUS ONCE
Status: COMPLETED | OUTPATIENT
Start: 2021-01-01 | End: 2021-01-01

## 2021-01-01 RX ORDER — DEXAMETHASONE SODIUM PHOSPHATE 4 MG/ML
6 INJECTION, SOLUTION INTRA-ARTICULAR; INTRALESIONAL; INTRAMUSCULAR; INTRAVENOUS; SOFT TISSUE EVERY 12 HOURS
Status: DISCONTINUED | OUTPATIENT
Start: 2021-01-01 | End: 2021-01-01

## 2021-01-01 RX ORDER — INSULIN GLARGINE 100 [IU]/ML
10 INJECTION, SOLUTION SUBCUTANEOUS ONCE
Status: COMPLETED | OUTPATIENT
Start: 2021-01-01 | End: 2021-01-01

## 2021-01-01 RX ORDER — LORAZEPAM 0.5 MG/1
0.5 TABLET ORAL EVERY 6 HOURS PRN
Refills: 0 | Status: CANCELLED | OUTPATIENT
Start: 2021-01-01 | End: 2021-02-13

## 2021-01-01 RX ORDER — DEXAMETHASONE SODIUM PHOSPHATE 4 MG/ML
4 INJECTION, SOLUTION INTRA-ARTICULAR; INTRALESIONAL; INTRAMUSCULAR; INTRAVENOUS; SOFT TISSUE EVERY 24 HOURS
Status: DISCONTINUED | OUTPATIENT
Start: 2021-01-01 | End: 2021-01-01 | Stop reason: HOSPADM

## 2021-01-01 RX ORDER — SCOLOPAMINE TRANSDERMAL SYSTEM 1 MG/1
1 PATCH, EXTENDED RELEASE TRANSDERMAL
Status: DISCONTINUED | OUTPATIENT
Start: 2021-01-01 | End: 2021-01-01 | Stop reason: HOSPADM

## 2021-01-01 RX ORDER — LORAZEPAM 0.5 MG/1
0.5 TABLET ORAL EVERY 6 HOURS SCHEDULED
Status: DISCONTINUED | OUTPATIENT
Start: 2021-01-01 | End: 2021-01-01

## 2021-01-01 RX ORDER — SODIUM CHLORIDE 9 MG/ML
10 INJECTION INTRAVENOUS 2 TIMES DAILY
Status: DISCONTINUED | OUTPATIENT
Start: 2021-01-01 | End: 2021-01-01 | Stop reason: HOSPADM

## 2021-01-01 RX ORDER — 0.9 % SODIUM CHLORIDE 0.9 %
500 INTRAVENOUS SOLUTION INTRAVENOUS ONCE
Status: COMPLETED | OUTPATIENT
Start: 2021-01-01 | End: 2021-01-01

## 2021-01-01 RX ORDER — DEXTROSE MONOHYDRATE 25 G/50ML
12.5 INJECTION, SOLUTION INTRAVENOUS PRN
Status: DISCONTINUED | OUTPATIENT
Start: 2021-01-01 | End: 2021-01-01 | Stop reason: HOSPADM

## 2021-01-01 RX ORDER — MIDAZOLAM HYDROCHLORIDE 1 MG/ML
INJECTION INTRAMUSCULAR; INTRAVENOUS
Status: COMPLETED
Start: 2021-01-01 | End: 2021-01-01

## 2021-01-01 RX ORDER — PANTOPRAZOLE SODIUM 40 MG/10ML
40 INJECTION, POWDER, LYOPHILIZED, FOR SOLUTION INTRAVENOUS 2 TIMES DAILY
Status: DISCONTINUED | OUTPATIENT
Start: 2021-01-01 | End: 2021-01-01 | Stop reason: HOSPADM

## 2021-01-01 RX ORDER — MIDODRINE HYDROCHLORIDE 2.5 MG/1
20 TABLET ORAL 3 TIMES DAILY
Status: DISCONTINUED | OUTPATIENT
Start: 2021-01-01 | End: 2021-01-01 | Stop reason: HOSPADM

## 2021-01-01 RX ORDER — AMIODARONE HYDROCHLORIDE 200 MG/1
200 TABLET ORAL 2 TIMES DAILY
Status: DISCONTINUED | OUTPATIENT
Start: 2021-01-01 | End: 2021-01-01

## 2021-01-01 RX ORDER — OXYCODONE HYDROCHLORIDE 5 MG/1
10 TABLET ORAL EVERY 6 HOURS
Status: DISCONTINUED | OUTPATIENT
Start: 2021-01-01 | End: 2021-01-01

## 2021-01-01 RX ORDER — INSULIN GLARGINE 100 [IU]/ML
20 INJECTION, SOLUTION SUBCUTANEOUS 2 TIMES DAILY
Status: DISCONTINUED | OUTPATIENT
Start: 2021-01-01 | End: 2021-01-01

## 2021-01-01 RX ORDER — AMIODARONE HYDROCHLORIDE 200 MG/1
200 TABLET ORAL 2 TIMES DAILY
DISCHARGE
Start: 2021-01-01

## 2021-01-01 RX ORDER — MORPHINE SULFATE 2 MG/ML
2 INJECTION, SOLUTION INTRAMUSCULAR; INTRAVENOUS
Status: DISCONTINUED | OUTPATIENT
Start: 2021-01-01 | End: 2021-01-01 | Stop reason: HOSPADM

## 2021-01-01 RX ORDER — SUCRALFATE 1 G/1
1 TABLET ORAL
Status: DISCONTINUED | OUTPATIENT
Start: 2021-01-01 | End: 2021-01-01 | Stop reason: HOSPADM

## 2021-01-01 RX ORDER — ALBUMIN (HUMAN) 12.5 G/50ML
25 SOLUTION INTRAVENOUS ONCE
Status: COMPLETED | OUTPATIENT
Start: 2021-01-01 | End: 2021-01-01

## 2021-01-01 RX ORDER — OXYCODONE HYDROCHLORIDE 5 MG/1
5 TABLET ORAL EVERY 6 HOURS PRN
Qty: 10 TABLET | Refills: 0 | Status: SHIPPED | OUTPATIENT
Start: 2021-01-01 | End: 2021-01-01

## 2021-01-01 RX ORDER — LORAZEPAM 1 MG/1
2 TABLET ORAL EVERY 6 HOURS SCHEDULED
Status: DISCONTINUED | OUTPATIENT
Start: 2021-01-01 | End: 2021-01-01

## 2021-01-01 RX ORDER — INSULIN GLARGINE 100 [IU]/ML
30 INJECTION, SOLUTION SUBCUTANEOUS DAILY
Status: DISCONTINUED | OUTPATIENT
Start: 2021-01-01 | End: 2021-01-01

## 2021-01-01 RX ORDER — SENNOSIDES 8.8 MG/5ML
10 LIQUID ORAL NIGHTLY
Status: DISCONTINUED | OUTPATIENT
Start: 2021-01-01 | End: 2021-01-01 | Stop reason: HOSPADM

## 2021-01-01 RX ORDER — ALPRAZOLAM 0.25 MG/1
0.5 TABLET ORAL 3 TIMES DAILY PRN
Status: DISCONTINUED | OUTPATIENT
Start: 2021-01-01 | End: 2021-01-01 | Stop reason: ALTCHOICE

## 2021-01-01 RX ORDER — VITAMIN B COMPLEX
2000 TABLET ORAL DAILY
Status: DISCONTINUED | OUTPATIENT
Start: 2021-01-01 | End: 2021-01-01 | Stop reason: HOSPADM

## 2021-01-01 RX ORDER — LORAZEPAM 2 MG/ML
INJECTION INTRAMUSCULAR
Status: COMPLETED
Start: 2021-01-01 | End: 2021-01-01

## 2021-01-01 RX ORDER — MORPHINE SULFATE 4 MG/ML
4 INJECTION, SOLUTION INTRAMUSCULAR; INTRAVENOUS
Status: DISCONTINUED | OUTPATIENT
Start: 2021-01-01 | End: 2021-01-01 | Stop reason: HOSPADM

## 2021-01-01 RX ORDER — METOCLOPRAMIDE HYDROCHLORIDE 5 MG/ML
10 INJECTION INTRAMUSCULAR; INTRAVENOUS EVERY 6 HOURS
Status: DISCONTINUED | OUTPATIENT
Start: 2021-01-01 | End: 2021-01-01

## 2021-01-01 RX ORDER — ASCORBIC ACID 500 MG
1000 TABLET ORAL DAILY
Status: DISCONTINUED | OUTPATIENT
Start: 2021-01-01 | End: 2021-01-01 | Stop reason: HOSPADM

## 2021-01-01 RX ORDER — SODIUM CHLORIDE 9 MG/ML
INJECTION, SOLUTION INTRAVENOUS EVERY 8 HOURS
Status: DISCONTINUED | OUTPATIENT
Start: 2021-01-01 | End: 2021-01-01

## 2021-01-01 RX ORDER — LORAZEPAM 0.5 MG/1
0.5 TABLET ORAL EVERY 6 HOURS PRN
Status: DISCONTINUED | OUTPATIENT
Start: 2021-01-01 | End: 2021-01-01 | Stop reason: HOSPADM

## 2021-01-01 RX ORDER — LIDOCAINE HYDROCHLORIDE 10 MG/ML
5 INJECTION, SOLUTION EPIDURAL; INFILTRATION; INTRACAUDAL; PERINEURAL ONCE
Status: COMPLETED | OUTPATIENT
Start: 2021-01-01 | End: 2021-01-01

## 2021-01-01 RX ORDER — MIDODRINE HYDROCHLORIDE 5 MG/1
15 TABLET ORAL 3 TIMES DAILY
Status: DISCONTINUED | OUTPATIENT
Start: 2021-01-01 | End: 2021-01-01

## 2021-01-01 RX ORDER — DIGOXIN 0.25 MG/ML
500 INJECTION INTRAMUSCULAR; INTRAVENOUS ONCE
Status: COMPLETED | OUTPATIENT
Start: 2021-01-01 | End: 2021-01-01

## 2021-01-01 RX ORDER — VECURONIUM BROMIDE 1 MG/ML
INJECTION, POWDER, LYOPHILIZED, FOR SOLUTION INTRAVENOUS
Status: COMPLETED
Start: 2021-01-01 | End: 2021-01-01

## 2021-01-01 RX ORDER — ASCORBIC ACID 500 MG
500 TABLET ORAL DAILY
Status: DISCONTINUED | OUTPATIENT
Start: 2021-01-01 | End: 2021-01-01

## 2021-01-01 RX ORDER — LORAZEPAM 1 MG/1
2 TABLET ORAL EVERY 4 HOURS
Status: DISCONTINUED | OUTPATIENT
Start: 2021-01-01 | End: 2021-01-01

## 2021-01-01 RX ORDER — 0.9 % SODIUM CHLORIDE 0.9 %
1000 INTRAVENOUS SOLUTION INTRAVENOUS ONCE
Status: COMPLETED | OUTPATIENT
Start: 2021-01-01 | End: 2021-01-01

## 2021-01-01 RX ORDER — DEXTROSE MONOHYDRATE 50 MG/ML
100 INJECTION, SOLUTION INTRAVENOUS PRN
Status: DISCONTINUED | OUTPATIENT
Start: 2021-01-01 | End: 2021-01-01 | Stop reason: HOSPADM

## 2021-01-01 RX ORDER — AMIODARONE HYDROCHLORIDE 200 MG/1
200 TABLET ORAL 2 TIMES DAILY
Status: COMPLETED | OUTPATIENT
Start: 2021-01-01 | End: 2021-01-01

## 2021-01-01 RX ORDER — INSULIN GLARGINE 100 [IU]/ML
25 INJECTION, SOLUTION SUBCUTANEOUS 2 TIMES DAILY
Status: DISCONTINUED | OUTPATIENT
Start: 2021-01-01 | End: 2021-01-01

## 2021-01-01 RX ORDER — LANSOPRAZOLE
30 KIT
Qty: 300 ML | DISCHARGE
Start: 2021-01-01

## 2021-01-01 RX ORDER — HEPARIN SODIUM (PORCINE) LOCK FLUSH IV SOLN 100 UNIT/ML 100 UNIT/ML
3 SOLUTION INTRAVENOUS EVERY 12 HOURS SCHEDULED
Status: DISCONTINUED | OUTPATIENT
Start: 2021-01-01 | End: 2021-01-01

## 2021-01-01 RX ORDER — MIDODRINE HYDROCHLORIDE 5 MG/1
5 TABLET ORAL ONCE
Status: COMPLETED | OUTPATIENT
Start: 2021-01-01 | End: 2021-01-01

## 2021-01-01 RX ORDER — DEXAMETHASONE SODIUM PHOSPHATE 4 MG/ML
6 INJECTION, SOLUTION INTRA-ARTICULAR; INTRALESIONAL; INTRAMUSCULAR; INTRAVENOUS; SOFT TISSUE EVERY 24 HOURS
Status: DISCONTINUED | OUTPATIENT
Start: 2021-01-01 | End: 2021-01-01

## 2021-01-01 RX ORDER — LEVOTHYROXINE SODIUM 0.12 MG/1
125 TABLET ORAL DAILY
Status: DISCONTINUED | OUTPATIENT
Start: 2021-01-01 | End: 2021-01-01 | Stop reason: HOSPADM

## 2021-01-01 RX ORDER — FENTANYL CITRATE 50 UG/ML
50 INJECTION, SOLUTION INTRAMUSCULAR; INTRAVENOUS ONCE
Status: COMPLETED | OUTPATIENT
Start: 2021-01-01 | End: 2021-01-01

## 2021-01-01 RX ORDER — SODIUM CHLORIDE 0.9 % (FLUSH) 0.9 %
10 SYRINGE (ML) INJECTION PRN
Status: DISCONTINUED | OUTPATIENT
Start: 2021-01-01 | End: 2021-01-01 | Stop reason: HOSPADM

## 2021-01-01 RX ORDER — LORAZEPAM 1 MG/1
1 TABLET ORAL EVERY 6 HOURS SCHEDULED
Status: DISCONTINUED | OUTPATIENT
Start: 2021-01-01 | End: 2021-01-01

## 2021-01-01 RX ORDER — NICOTINE POLACRILEX 4 MG
15 LOZENGE BUCCAL PRN
Status: DISCONTINUED | OUTPATIENT
Start: 2021-01-01 | End: 2021-01-01 | Stop reason: HOSPADM

## 2021-01-01 RX ORDER — LORAZEPAM 2 MG/ML
1 INJECTION INTRAMUSCULAR ONCE
Status: COMPLETED | OUTPATIENT
Start: 2021-01-01 | End: 2021-01-01

## 2021-01-01 RX ORDER — ACETAMINOPHEN 160 MG/5ML
650 SOLUTION ORAL EVERY 6 HOURS PRN
Status: DISCONTINUED | OUTPATIENT
Start: 2021-01-01 | End: 2021-01-01 | Stop reason: HOSPADM

## 2021-01-01 RX ORDER — OXYCODONE HYDROCHLORIDE 5 MG/1
5 TABLET ORAL EVERY 6 HOURS PRN
Status: DISCONTINUED | OUTPATIENT
Start: 2021-01-01 | End: 2021-01-01

## 2021-01-01 RX ORDER — AMIODARONE HYDROCHLORIDE 200 MG/1
200 TABLET ORAL DAILY
Status: DISCONTINUED | OUTPATIENT
Start: 2021-01-01 | End: 2021-01-01 | Stop reason: HOSPADM

## 2021-01-01 RX ORDER — ACETAZOLAMIDE 250 MG/1
500 TABLET ORAL DAILY
Status: DISCONTINUED | OUTPATIENT
Start: 2021-01-01 | End: 2021-01-01

## 2021-01-01 RX ORDER — LANSOPRAZOLE
30 KIT
Status: DISCONTINUED | OUTPATIENT
Start: 2021-01-01 | End: 2021-01-01

## 2021-01-01 RX ORDER — OXYCODONE HYDROCHLORIDE 5 MG/1
5 TABLET ORAL EVERY 6 HOURS
Status: DISCONTINUED | OUTPATIENT
Start: 2021-01-01 | End: 2021-01-01

## 2021-01-01 RX ORDER — INSULIN GLARGINE 100 [IU]/ML
40 INJECTION, SOLUTION SUBCUTANEOUS DAILY
Status: DISCONTINUED | OUTPATIENT
Start: 2021-01-01 | End: 2021-01-01

## 2021-01-01 RX ORDER — POTASSIUM CHLORIDE 29.8 MG/ML
40 INJECTION INTRAVENOUS ONCE
Status: COMPLETED | OUTPATIENT
Start: 2021-01-01 | End: 2021-01-01

## 2021-01-01 RX ORDER — OXYCODONE HYDROCHLORIDE 5 MG/1
10 TABLET ORAL EVERY 6 HOURS PRN
Status: DISCONTINUED | OUTPATIENT
Start: 2021-01-01 | End: 2021-01-01 | Stop reason: HOSPADM

## 2021-01-01 RX ORDER — LEVOTHYROXINE SODIUM 0.1 MG/1
100 TABLET ORAL DAILY
Qty: 30 TABLET | Refills: 3 | DISCHARGE
Start: 2021-01-01

## 2021-01-01 RX ORDER — 0.9 % SODIUM CHLORIDE 0.9 %
500 INTRAVENOUS SOLUTION INTRAVENOUS ONCE
Status: DISCONTINUED | OUTPATIENT
Start: 2021-01-01 | End: 2021-01-01 | Stop reason: HOSPADM

## 2021-01-01 RX ORDER — HEPARIN SODIUM (PORCINE) LOCK FLUSH IV SOLN 100 UNIT/ML 100 UNIT/ML
3 SOLUTION INTRAVENOUS PRN
Status: DISCONTINUED | OUTPATIENT
Start: 2021-01-01 | End: 2021-01-01

## 2021-01-01 RX ORDER — PROPOFOL 10 MG/ML
20 INJECTION, EMULSION INTRAVENOUS
Status: DISCONTINUED | OUTPATIENT
Start: 2021-01-01 | End: 2021-01-01

## 2021-01-01 RX ORDER — VECURONIUM BROMIDE 1 MG/ML
10 INJECTION, POWDER, LYOPHILIZED, FOR SOLUTION INTRAVENOUS ONCE
Status: COMPLETED | OUTPATIENT
Start: 2021-01-01 | End: 2021-01-01

## 2021-01-01 RX ADMIN — MIDODRINE HYDROCHLORIDE 15 MG: 5 TABLET ORAL at 17:17

## 2021-01-01 RX ADMIN — SENNOSIDES 17.6 MG: 8.8 SYRUP ORAL at 08:22

## 2021-01-01 RX ADMIN — SODIUM CHLORIDE, PRESERVATIVE FREE 10 ML: 5 INJECTION INTRAVENOUS at 11:23

## 2021-01-01 RX ADMIN — IPRATROPIUM BROMIDE AND ALBUTEROL SULFATE 1 AMPULE: 2.5; .5 SOLUTION RESPIRATORY (INHALATION) at 12:08

## 2021-01-01 RX ADMIN — APIXABAN 5 MG: 5 TABLET, FILM COATED ORAL at 08:43

## 2021-01-01 RX ADMIN — LORAZEPAM 2 MG: 1 TABLET ORAL at 14:59

## 2021-01-01 RX ADMIN — GABAPENTIN 800 MG: 400 CAPSULE ORAL at 05:15

## 2021-01-01 RX ADMIN — PANTOPRAZOLE SODIUM 40 MG: 40 INJECTION, POWDER, FOR SOLUTION INTRAVENOUS at 21:00

## 2021-01-01 RX ADMIN — Medication 1000 UNITS: at 09:11

## 2021-01-01 RX ADMIN — DOCUSATE SODIUM 100 MG: 50 LIQUID ORAL at 08:02

## 2021-01-01 RX ADMIN — SUCRALFATE 1 G: 1 TABLET ORAL at 12:06

## 2021-01-01 RX ADMIN — Medication 100 MG: at 07:55

## 2021-01-01 RX ADMIN — CLOPIDOGREL 75 MG: 75 TABLET, FILM COATED ORAL at 09:01

## 2021-01-01 RX ADMIN — BUDESONIDE 500 MCG: 0.5 SUSPENSION RESPIRATORY (INHALATION) at 19:44

## 2021-01-01 RX ADMIN — SODIUM CHLORIDE, PRESERVATIVE FREE 300 UNITS: 5 INJECTION INTRAVENOUS at 08:52

## 2021-01-01 RX ADMIN — HYDROCORTISONE SODIUM SUCCINATE 50 MG: 100 INJECTION, POWDER, FOR SOLUTION INTRAMUSCULAR; INTRAVENOUS at 05:21

## 2021-01-01 RX ADMIN — IPRATROPIUM BROMIDE AND ALBUTEROL SULFATE 1 AMPULE: 2.5; .5 SOLUTION RESPIRATORY (INHALATION) at 08:54

## 2021-01-01 RX ADMIN — ARFORMOTEROL TARTRATE 15 MCG: 15 SOLUTION RESPIRATORY (INHALATION) at 09:45

## 2021-01-01 RX ADMIN — Medication 15 ML: at 20:00

## 2021-01-01 RX ADMIN — DEXAMETHASONE SODIUM PHOSPHATE 6 MG: 4 INJECTION, SOLUTION INTRA-ARTICULAR; INTRALESIONAL; INTRAMUSCULAR; INTRAVENOUS; SOFT TISSUE at 10:03

## 2021-01-01 RX ADMIN — BUDESONIDE 500 MCG: 0.5 SUSPENSION RESPIRATORY (INHALATION) at 19:39

## 2021-01-01 RX ADMIN — MIDODRINE HYDROCHLORIDE 10 MG: 5 TABLET ORAL at 12:19

## 2021-01-01 RX ADMIN — INSULIN LISPRO 6 UNITS: 100 INJECTION, SOLUTION INTRAVENOUS; SUBCUTANEOUS at 12:16

## 2021-01-01 RX ADMIN — INSULIN LISPRO 3 UNITS: 100 INJECTION, SOLUTION INTRAVENOUS; SUBCUTANEOUS at 01:07

## 2021-01-01 RX ADMIN — DIGOXIN 250 MCG: 250 TABLET ORAL at 10:39

## 2021-01-01 RX ADMIN — LEVOTHYROXINE SODIUM 100 MCG: 100 TABLET ORAL at 05:58

## 2021-01-01 RX ADMIN — PANTOPRAZOLE SODIUM 40 MG: 40 INJECTION, POWDER, FOR SOLUTION INTRAVENOUS at 21:17

## 2021-01-01 RX ADMIN — OXYCODONE HYDROCHLORIDE 5 MG: 5 TABLET ORAL at 14:22

## 2021-01-01 RX ADMIN — AMIODARONE HYDROCHLORIDE 200 MG: 200 TABLET ORAL at 20:43

## 2021-01-01 RX ADMIN — SUCRALFATE 1 G: 1 TABLET ORAL at 20:00

## 2021-01-01 RX ADMIN — OXYCODONE HYDROCHLORIDE 10 MG: 5 TABLET ORAL at 09:58

## 2021-01-01 RX ADMIN — GABAPENTIN 800 MG: 400 CAPSULE ORAL at 05:19

## 2021-01-01 RX ADMIN — AMIODARONE HYDROCHLORIDE 200 MG: 200 TABLET ORAL at 10:43

## 2021-01-01 RX ADMIN — MIDODRINE HYDROCHLORIDE 20 MG: 5 TABLET ORAL at 12:05

## 2021-01-01 RX ADMIN — PHENYLEPHRINE HYDROCHLORIDE 225 MCG/MIN: 10 INJECTION INTRAVENOUS at 03:57

## 2021-01-01 RX ADMIN — INSULIN GLARGINE 25 UNITS: 100 INJECTION, SOLUTION SUBCUTANEOUS at 10:30

## 2021-01-01 RX ADMIN — POTASSIUM PHOSPHATE, MONOBASIC AND POTASSIUM PHOSPHATE, DIBASIC 20 MMOL: 224; 236 INJECTION, SOLUTION, CONCENTRATE INTRAVENOUS at 08:00

## 2021-01-01 RX ADMIN — PANTOPRAZOLE SODIUM 40 MG: 40 INJECTION, POWDER, FOR SOLUTION INTRAVENOUS at 07:58

## 2021-01-01 RX ADMIN — Medication 100 MG: at 09:10

## 2021-01-01 RX ADMIN — IPRATROPIUM BROMIDE AND ALBUTEROL SULFATE 1 AMPULE: 2.5; .5 SOLUTION RESPIRATORY (INHALATION) at 12:34

## 2021-01-01 RX ADMIN — BUDESONIDE 500 MCG: 0.5 SUSPENSION RESPIRATORY (INHALATION) at 08:56

## 2021-01-01 RX ADMIN — MIDODRINE HYDROCHLORIDE 15 MG: 5 TABLET ORAL at 12:17

## 2021-01-01 RX ADMIN — Medication 10 ML: at 22:18

## 2021-01-01 RX ADMIN — SENNOSIDES 17.6 MG: 8.8 SYRUP ORAL at 23:35

## 2021-01-01 RX ADMIN — SODIUM CHLORIDE, PRESERVATIVE FREE 300 UNITS: 5 INJECTION INTRAVENOUS at 21:04

## 2021-01-01 RX ADMIN — OXYCODONE HYDROCHLORIDE 10 MG: 5 TABLET ORAL at 05:12

## 2021-01-01 RX ADMIN — GABAPENTIN 800 MG: 400 CAPSULE ORAL at 21:01

## 2021-01-01 RX ADMIN — LORAZEPAM 0.5 MG: 0.5 TABLET ORAL at 02:27

## 2021-01-01 RX ADMIN — INSULIN LISPRO 3 UNITS: 100 INJECTION, SOLUTION INTRAVENOUS; SUBCUTANEOUS at 06:42

## 2021-01-01 RX ADMIN — OXYCODONE HYDROCHLORIDE 7.5 MG: 15 TABLET ORAL at 05:21

## 2021-01-01 RX ADMIN — IPRATROPIUM BROMIDE AND ALBUTEROL SULFATE 1 AMPULE: 2.5; .5 SOLUTION RESPIRATORY (INHALATION) at 20:28

## 2021-01-01 RX ADMIN — LEVOTHYROXINE SODIUM 125 MCG: 125 TABLET ORAL at 07:06

## 2021-01-01 RX ADMIN — GABAPENTIN 800 MG: 400 CAPSULE ORAL at 05:43

## 2021-01-01 RX ADMIN — PROPOFOL 40 MCG/KG/MIN: 10 INJECTION, EMULSION INTRAVENOUS at 14:17

## 2021-01-01 RX ADMIN — IPRATROPIUM BROMIDE AND ALBUTEROL SULFATE 1 AMPULE: 2.5; .5 SOLUTION RESPIRATORY (INHALATION) at 19:50

## 2021-01-01 RX ADMIN — INSULIN GLARGINE 25 UNITS: 100 INJECTION, SOLUTION SUBCUTANEOUS at 21:10

## 2021-01-01 RX ADMIN — IPRATROPIUM BROMIDE AND ALBUTEROL SULFATE 1 AMPULE: 2.5; .5 SOLUTION RESPIRATORY (INHALATION) at 17:21

## 2021-01-01 RX ADMIN — ZINC SULFATE 220 MG (50 MG) CAPSULE 50 MG: CAPSULE at 09:11

## 2021-01-01 RX ADMIN — ALBUMIN (HUMAN) 25 G: 0.25 INJECTION, SOLUTION INTRAVENOUS at 11:17

## 2021-01-01 RX ADMIN — Medication 1000 MG: at 09:03

## 2021-01-01 RX ADMIN — MIDODRINE HYDROCHLORIDE 15 MG: 5 TABLET ORAL at 17:02

## 2021-01-01 RX ADMIN — MIDODRINE HYDROCHLORIDE 20 MG: 5 TABLET ORAL at 10:32

## 2021-01-01 RX ADMIN — ARFORMOTEROL TARTRATE 15 MCG: 15 SOLUTION RESPIRATORY (INHALATION) at 20:07

## 2021-01-01 RX ADMIN — GABAPENTIN 800 MG: 400 CAPSULE ORAL at 13:50

## 2021-01-01 RX ADMIN — Medication 1000 UNITS: at 10:43

## 2021-01-01 RX ADMIN — IPRATROPIUM BROMIDE AND ALBUTEROL SULFATE 1 AMPULE: 2.5; .5 SOLUTION RESPIRATORY (INHALATION) at 19:38

## 2021-01-01 RX ADMIN — ARFORMOTEROL TARTRATE 15 MCG: 15 SOLUTION RESPIRATORY (INHALATION) at 08:37

## 2021-01-01 RX ADMIN — INSULIN LISPRO 6 UNITS: 100 INJECTION, SOLUTION INTRAVENOUS; SUBCUTANEOUS at 17:39

## 2021-01-01 RX ADMIN — AMIODARONE HYDROCHLORIDE 200 MG: 200 TABLET ORAL at 08:45

## 2021-01-01 RX ADMIN — LANSOPRAZOLE 30 MG: KIT at 05:50

## 2021-01-01 RX ADMIN — SUCRALFATE 1 G: 1 TABLET ORAL at 05:15

## 2021-01-01 RX ADMIN — OXYCODONE HYDROCHLORIDE 10 MG: 5 TABLET ORAL at 17:15

## 2021-01-01 RX ADMIN — INSULIN LISPRO 3 UNITS: 100 INJECTION, SOLUTION INTRAVENOUS; SUBCUTANEOUS at 17:40

## 2021-01-01 RX ADMIN — Medication 15 ML: at 10:49

## 2021-01-01 RX ADMIN — Medication 10 ML: at 17:24

## 2021-01-01 RX ADMIN — IPRATROPIUM BROMIDE AND ALBUTEROL SULFATE 1 AMPULE: 2.5; .5 SOLUTION RESPIRATORY (INHALATION) at 12:49

## 2021-01-01 RX ADMIN — POTASSIUM CHLORIDE 20 MEQ: 400 INJECTION, SOLUTION INTRAVENOUS at 11:00

## 2021-01-01 RX ADMIN — ENOXAPARIN SODIUM 40 MG: 40 INJECTION SUBCUTANEOUS at 09:00

## 2021-01-01 RX ADMIN — IPRATROPIUM BROMIDE AND ALBUTEROL SULFATE 1 AMPULE: 2.5; .5 SOLUTION RESPIRATORY (INHALATION) at 08:32

## 2021-01-01 RX ADMIN — AMIODARONE HYDROCHLORIDE 200 MG: 200 TABLET ORAL at 07:54

## 2021-01-01 RX ADMIN — GABAPENTIN 800 MG: 400 CAPSULE ORAL at 14:18

## 2021-01-01 RX ADMIN — GABAPENTIN 800 MG: 400 CAPSULE ORAL at 20:39

## 2021-01-01 RX ADMIN — INSULIN LISPRO 3 UNITS: 100 INJECTION, SOLUTION INTRAVENOUS; SUBCUTANEOUS at 00:07

## 2021-01-01 RX ADMIN — OXYCODONE HYDROCHLORIDE 10 MG: 5 TABLET ORAL at 03:35

## 2021-01-01 RX ADMIN — ENOXAPARIN SODIUM 100 MG: 100 INJECTION SUBCUTANEOUS at 21:22

## 2021-01-01 RX ADMIN — OXYCODONE HYDROCHLORIDE 5 MG: 5 TABLET ORAL at 10:38

## 2021-01-01 RX ADMIN — Medication 15 ML: at 09:20

## 2021-01-01 RX ADMIN — Medication 15 ML: at 22:18

## 2021-01-01 RX ADMIN — Medication 100 MG: at 08:37

## 2021-01-01 RX ADMIN — ENOXAPARIN SODIUM 100 MG: 100 INJECTION SUBCUTANEOUS at 09:06

## 2021-01-01 RX ADMIN — SODIUM CHLORIDE 1000 ML: 9 INJECTION, SOLUTION INTRAVENOUS at 10:45

## 2021-01-01 RX ADMIN — HYDROCORTISONE SODIUM SUCCINATE 50 MG: 100 INJECTION, POWDER, FOR SOLUTION INTRAMUSCULAR; INTRAVENOUS at 21:34

## 2021-01-01 RX ADMIN — PANTOPRAZOLE SODIUM 40 MG: 40 INJECTION, POWDER, FOR SOLUTION INTRAVENOUS at 09:11

## 2021-01-01 RX ADMIN — SUCRALFATE 1 G: 1 TABLET ORAL at 16:22

## 2021-01-01 RX ADMIN — Medication 1000 UNITS: at 08:15

## 2021-01-01 RX ADMIN — LORAZEPAM 2 MG: 1 TABLET ORAL at 10:37

## 2021-01-01 RX ADMIN — SUCRALFATE 1 G: 1 TABLET ORAL at 21:07

## 2021-01-01 RX ADMIN — DOCUSATE SODIUM 100 MG: 50 LIQUID ORAL at 08:23

## 2021-01-01 RX ADMIN — ATORVASTATIN CALCIUM 20 MG: 20 TABLET, FILM COATED ORAL at 11:13

## 2021-01-01 RX ADMIN — HYDROCORTISONE SODIUM SUCCINATE 50 MG: 100 INJECTION, POWDER, FOR SOLUTION INTRAMUSCULAR; INTRAVENOUS at 09:06

## 2021-01-01 RX ADMIN — INSULIN LISPRO 3 UNITS: 100 INJECTION, SOLUTION INTRAVENOUS; SUBCUTANEOUS at 00:38

## 2021-01-01 RX ADMIN — ARFORMOTEROL TARTRATE 15 MCG: 15 SOLUTION RESPIRATORY (INHALATION) at 08:53

## 2021-01-01 RX ADMIN — MIDODRINE HYDROCHLORIDE 10 MG: 5 TABLET ORAL at 12:11

## 2021-01-01 RX ADMIN — SODIUM CHLORIDE, PRESERVATIVE FREE 10 ML: 5 INJECTION INTRAVENOUS at 08:46

## 2021-01-01 RX ADMIN — GABAPENTIN 800 MG: 400 CAPSULE ORAL at 14:59

## 2021-01-01 RX ADMIN — GABAPENTIN 800 MG: 400 CAPSULE ORAL at 15:09

## 2021-01-01 RX ADMIN — GABAPENTIN 800 MG: 400 CAPSULE ORAL at 05:59

## 2021-01-01 RX ADMIN — LEVOTHYROXINE SODIUM 100 MCG: 100 TABLET ORAL at 05:11

## 2021-01-01 RX ADMIN — IPRATROPIUM BROMIDE AND ALBUTEROL SULFATE 1 AMPULE: 2.5; .5 SOLUTION RESPIRATORY (INHALATION) at 16:10

## 2021-01-01 RX ADMIN — ARFORMOTEROL TARTRATE 15 MCG: 15 SOLUTION RESPIRATORY (INHALATION) at 19:50

## 2021-01-01 RX ADMIN — Medication 15 ML: at 10:10

## 2021-01-01 RX ADMIN — AMIODARONE HYDROCHLORIDE 200 MG: 200 TABLET ORAL at 21:05

## 2021-01-01 RX ADMIN — PANTOPRAZOLE SODIUM 40 MG: 40 INJECTION, POWDER, FOR SOLUTION INTRAVENOUS at 20:36

## 2021-01-01 RX ADMIN — Medication 1000 UNITS: at 08:23

## 2021-01-01 RX ADMIN — IPRATROPIUM BROMIDE AND ALBUTEROL SULFATE 1 AMPULE: 2.5; .5 SOLUTION RESPIRATORY (INHALATION) at 11:31

## 2021-01-01 RX ADMIN — LANSOPRAZOLE 30 MG: KIT at 05:34

## 2021-01-01 RX ADMIN — ATORVASTATIN CALCIUM 20 MG: 20 TABLET, FILM COATED ORAL at 08:48

## 2021-01-01 RX ADMIN — AMIODARONE HYDROCHLORIDE 200 MG: 200 TABLET ORAL at 20:15

## 2021-01-01 RX ADMIN — Medication 15 ML: at 08:52

## 2021-01-01 RX ADMIN — OXYCODONE HYDROCHLORIDE AND ACETAMINOPHEN 500 MG: 500 TABLET ORAL at 21:39

## 2021-01-01 RX ADMIN — PANTOPRAZOLE SODIUM 40 MG: 40 INJECTION, POWDER, FOR SOLUTION INTRAVENOUS at 09:14

## 2021-01-01 RX ADMIN — INSULIN LISPRO 6 UNITS: 100 INJECTION, SOLUTION INTRAVENOUS; SUBCUTANEOUS at 12:00

## 2021-01-01 RX ADMIN — LEVOTHYROXINE SODIUM 125 MCG: 125 TABLET ORAL at 06:23

## 2021-01-01 RX ADMIN — SUCRALFATE 1 G: 1 TABLET ORAL at 16:15

## 2021-01-01 RX ADMIN — INSULIN LISPRO 3 UNITS: 100 INJECTION, SOLUTION INTRAVENOUS; SUBCUTANEOUS at 10:04

## 2021-01-01 RX ADMIN — AMIODARONE HYDROCHLORIDE 200 MG: 200 TABLET ORAL at 07:57

## 2021-01-01 RX ADMIN — Medication 1000 MG: at 08:18

## 2021-01-01 RX ADMIN — SODIUM CHLORIDE, PRESERVATIVE FREE 300 UNITS: 5 INJECTION INTRAVENOUS at 08:53

## 2021-01-01 RX ADMIN — CLOPIDOGREL 75 MG: 75 TABLET, FILM COATED ORAL at 08:52

## 2021-01-01 RX ADMIN — PANTOPRAZOLE SODIUM 40 MG: 40 INJECTION, POWDER, FOR SOLUTION INTRAVENOUS at 09:00

## 2021-01-01 RX ADMIN — APIXABAN 5 MG: 5 TABLET, FILM COATED ORAL at 20:56

## 2021-01-01 RX ADMIN — INSULIN LISPRO 3 UNITS: 100 INJECTION, SOLUTION INTRAVENOUS; SUBCUTANEOUS at 06:31

## 2021-01-01 RX ADMIN — SODIUM CHLORIDE, PRESERVATIVE FREE 10 ML: 5 INJECTION INTRAVENOUS at 09:00

## 2021-01-01 RX ADMIN — Medication 10 ML: at 09:25

## 2021-01-01 RX ADMIN — DIGOXIN 250 MCG: 250 TABLET ORAL at 08:03

## 2021-01-01 RX ADMIN — MIDAZOLAM HYDROCHLORIDE 2 MG: 1 INJECTION, SOLUTION INTRAMUSCULAR; INTRAVENOUS at 14:53

## 2021-01-01 RX ADMIN — Medication 15 ML: at 09:13

## 2021-01-01 RX ADMIN — Medication 15 ML: at 08:02

## 2021-01-01 RX ADMIN — IPRATROPIUM BROMIDE AND ALBUTEROL SULFATE 1 AMPULE: 2.5; .5 SOLUTION RESPIRATORY (INHALATION) at 19:42

## 2021-01-01 RX ADMIN — SUCRALFATE 1 G: 1 TABLET ORAL at 05:16

## 2021-01-01 RX ADMIN — ZINC SULFATE 220 MG (50 MG) CAPSULE 50 MG: CAPSULE at 07:55

## 2021-01-01 RX ADMIN — SUCRALFATE 1 G: 1 TABLET ORAL at 21:18

## 2021-01-01 RX ADMIN — ARFORMOTEROL TARTRATE 15 MCG: 15 SOLUTION RESPIRATORY (INHALATION) at 20:28

## 2021-01-01 RX ADMIN — AMIODARONE HYDROCHLORIDE 400 MG: 200 TABLET ORAL at 09:17

## 2021-01-01 RX ADMIN — INSULIN LISPRO 6 UNITS: 100 INJECTION, SOLUTION INTRAVENOUS; SUBCUTANEOUS at 06:23

## 2021-01-01 RX ADMIN — DOCUSATE SODIUM 100 MG: 50 LIQUID ORAL at 09:17

## 2021-01-01 RX ADMIN — GABAPENTIN 800 MG: 400 CAPSULE ORAL at 15:21

## 2021-01-01 RX ADMIN — ENOXAPARIN SODIUM 40 MG: 40 INJECTION SUBCUTANEOUS at 10:45

## 2021-01-01 RX ADMIN — Medication 100 MG: at 08:51

## 2021-01-01 RX ADMIN — MIDODRINE HYDROCHLORIDE 20 MG: 5 TABLET ORAL at 17:50

## 2021-01-01 RX ADMIN — IPRATROPIUM BROMIDE AND ALBUTEROL SULFATE 1 AMPULE: 2.5; .5 SOLUTION RESPIRATORY (INHALATION) at 16:12

## 2021-01-01 RX ADMIN — MIDODRINE HYDROCHLORIDE 20 MG: 5 TABLET ORAL at 08:13

## 2021-01-01 RX ADMIN — INSULIN GLARGINE 25 UNITS: 100 INJECTION, SOLUTION SUBCUTANEOUS at 08:57

## 2021-01-01 RX ADMIN — POTASSIUM CHLORIDE 20 MEQ: 400 INJECTION, SOLUTION INTRAVENOUS at 13:15

## 2021-01-01 RX ADMIN — MIDODRINE HYDROCHLORIDE 20 MG: 5 TABLET ORAL at 18:05

## 2021-01-01 RX ADMIN — SODIUM CHLORIDE, PRESERVATIVE FREE 10 ML: 5 INJECTION INTRAVENOUS at 09:54

## 2021-01-01 RX ADMIN — LORAZEPAM 1 MG: 2 INJECTION INTRAMUSCULAR; INTRAVENOUS at 06:17

## 2021-01-01 RX ADMIN — DOCUSATE SODIUM 100 MG: 50 LIQUID ORAL at 09:25

## 2021-01-01 RX ADMIN — METOCLOPRAMIDE 5 MG: 5 INJECTION, SOLUTION INTRAMUSCULAR; INTRAVENOUS at 23:20

## 2021-01-01 RX ADMIN — Medication 15 ML: at 23:06

## 2021-01-01 RX ADMIN — PROPOFOL 20 MCG/KG/MIN: 10 INJECTION, EMULSION INTRAVENOUS at 11:35

## 2021-01-01 RX ADMIN — Medication 10 ML: at 09:53

## 2021-01-01 RX ADMIN — OXYCODONE HYDROCHLORIDE 5 MG: 5 TABLET ORAL at 05:03

## 2021-01-01 RX ADMIN — ARFORMOTEROL TARTRATE 15 MCG: 15 SOLUTION RESPIRATORY (INHALATION) at 09:21

## 2021-01-01 RX ADMIN — LEVOTHYROXINE SODIUM 100 MCG: 100 TABLET ORAL at 06:07

## 2021-01-01 RX ADMIN — IPRATROPIUM BROMIDE AND ALBUTEROL SULFATE 1 AMPULE: 2.5; .5 SOLUTION RESPIRATORY (INHALATION) at 20:32

## 2021-01-01 RX ADMIN — AMIODARONE HYDROCHLORIDE 200 MG: 200 TABLET ORAL at 08:40

## 2021-01-01 RX ADMIN — ACETAMINOPHEN 650 MG: 650 SOLUTION ORAL at 16:43

## 2021-01-01 RX ADMIN — GABAPENTIN 800 MG: 400 CAPSULE ORAL at 06:22

## 2021-01-01 RX ADMIN — Medication 15 ML: at 08:58

## 2021-01-01 RX ADMIN — Medication 10 ML: at 21:21

## 2021-01-01 RX ADMIN — INSULIN LISPRO 3 UNITS: 100 INJECTION, SOLUTION INTRAVENOUS; SUBCUTANEOUS at 07:47

## 2021-01-01 RX ADMIN — OXYCODONE HYDROCHLORIDE 5 MG: 5 TABLET ORAL at 23:25

## 2021-01-01 RX ADMIN — SUCRALFATE 1 G: 1 TABLET ORAL at 13:50

## 2021-01-01 RX ADMIN — APIXABAN 5 MG: 5 TABLET, FILM COATED ORAL at 22:15

## 2021-01-01 RX ADMIN — SUCRALFATE 1 G: 1 TABLET ORAL at 16:43

## 2021-01-01 RX ADMIN — OXYCODONE HYDROCHLORIDE 7.5 MG: 15 TABLET ORAL at 11:17

## 2021-01-01 RX ADMIN — Medication 1000 MG: at 07:55

## 2021-01-01 RX ADMIN — INSULIN LISPRO 6 UNITS: 100 INJECTION, SOLUTION INTRAVENOUS; SUBCUTANEOUS at 17:57

## 2021-01-01 RX ADMIN — Medication 15 ML: at 21:47

## 2021-01-01 RX ADMIN — GABAPENTIN 800 MG: 400 CAPSULE ORAL at 05:08

## 2021-01-01 RX ADMIN — DOCUSATE SODIUM 100 MG: 50 LIQUID ORAL at 09:10

## 2021-01-01 RX ADMIN — INSULIN LISPRO 3 UNITS: 100 INJECTION, SOLUTION INTRAVENOUS; SUBCUTANEOUS at 05:49

## 2021-01-01 RX ADMIN — LORAZEPAM 1 MG: 2 INJECTION INTRAMUSCULAR; INTRAVENOUS at 21:13

## 2021-01-01 RX ADMIN — SODIUM CHLORIDE, PRESERVATIVE FREE 10 ML: 5 INJECTION INTRAVENOUS at 20:00

## 2021-01-01 RX ADMIN — INSULIN GLARGINE 30 UNITS: 100 INJECTION, SOLUTION SUBCUTANEOUS at 08:21

## 2021-01-01 RX ADMIN — OXYCODONE HYDROCHLORIDE 10 MG: 5 TABLET ORAL at 10:52

## 2021-01-01 RX ADMIN — IPRATROPIUM BROMIDE AND ALBUTEROL SULFATE 1 AMPULE: 2.5; .5 SOLUTION RESPIRATORY (INHALATION) at 16:24

## 2021-01-01 RX ADMIN — PHENYLEPHRINE HYDROCHLORIDE 125 MCG/MIN: 10 INJECTION INTRAVENOUS at 05:55

## 2021-01-01 RX ADMIN — IPRATROPIUM BROMIDE AND ALBUTEROL SULFATE 1 AMPULE: 2.5; .5 SOLUTION RESPIRATORY (INHALATION) at 16:56

## 2021-01-01 RX ADMIN — INSULIN LISPRO 3 UNITS: 100 INJECTION, SOLUTION INTRAVENOUS; SUBCUTANEOUS at 11:42

## 2021-01-01 RX ADMIN — IPRATROPIUM BROMIDE AND ALBUTEROL SULFATE 1 AMPULE: 2.5; .5 SOLUTION RESPIRATORY (INHALATION) at 12:16

## 2021-01-01 RX ADMIN — PANTOPRAZOLE SODIUM 40 MG: 40 INJECTION, POWDER, FOR SOLUTION INTRAVENOUS at 07:55

## 2021-01-01 RX ADMIN — LORAZEPAM 1 MG: 2 INJECTION INTRAMUSCULAR; INTRAVENOUS at 02:12

## 2021-01-01 RX ADMIN — GABAPENTIN 800 MG: 400 CAPSULE ORAL at 14:17

## 2021-01-01 RX ADMIN — Medication 150 MCG/HR: at 12:30

## 2021-01-01 RX ADMIN — IPRATROPIUM BROMIDE AND ALBUTEROL SULFATE 1 AMPULE: 2.5; .5 SOLUTION RESPIRATORY (INHALATION) at 08:30

## 2021-01-01 RX ADMIN — Medication 10 ML: at 21:10

## 2021-01-01 RX ADMIN — INSULIN LISPRO 3 UNITS: 100 INJECTION, SOLUTION INTRAVENOUS; SUBCUTANEOUS at 17:54

## 2021-01-01 RX ADMIN — PANTOPRAZOLE SODIUM 40 MG: 40 INJECTION, POWDER, FOR SOLUTION INTRAVENOUS at 09:10

## 2021-01-01 RX ADMIN — BUDESONIDE 500 MCG: 0.5 SUSPENSION RESPIRATORY (INHALATION) at 20:11

## 2021-01-01 RX ADMIN — BUDESONIDE 500 MCG: 0.5 SUSPENSION RESPIRATORY (INHALATION) at 08:32

## 2021-01-01 RX ADMIN — DIGOXIN 500 MCG: 0.25 INJECTION INTRAMUSCULAR; INTRAVENOUS at 12:14

## 2021-01-01 RX ADMIN — INSULIN LISPRO 3 UNITS: 100 INJECTION, SOLUTION INTRAVENOUS; SUBCUTANEOUS at 12:13

## 2021-01-01 RX ADMIN — APIXABAN 5 MG: 5 TABLET, FILM COATED ORAL at 08:30

## 2021-01-01 RX ADMIN — AMIODARONE HYDROCHLORIDE 200 MG: 200 TABLET ORAL at 08:01

## 2021-01-01 RX ADMIN — BUDESONIDE 500 MCG: 0.5 SUSPENSION RESPIRATORY (INHALATION) at 09:03

## 2021-01-01 RX ADMIN — SUCRALFATE 1 G: 1 TABLET ORAL at 09:10

## 2021-01-01 RX ADMIN — BUDESONIDE 500 MCG: 0.5 SUSPENSION RESPIRATORY (INHALATION) at 21:10

## 2021-01-01 RX ADMIN — DEXAMETHASONE SODIUM PHOSPHATE 6 MG: 4 INJECTION, SOLUTION INTRA-ARTICULAR; INTRALESIONAL; INTRAMUSCULAR; INTRAVENOUS; SOFT TISSUE at 10:52

## 2021-01-01 RX ADMIN — GABAPENTIN 800 MG: 400 CAPSULE ORAL at 05:50

## 2021-01-01 RX ADMIN — MIDODRINE HYDROCHLORIDE 15 MG: 5 TABLET ORAL at 16:28

## 2021-01-01 RX ADMIN — MIDODRINE HYDROCHLORIDE 20 MG: 5 TABLET ORAL at 16:02

## 2021-01-01 RX ADMIN — ARFORMOTEROL TARTRATE 15 MCG: 15 SOLUTION RESPIRATORY (INHALATION) at 19:36

## 2021-01-01 RX ADMIN — DOCUSATE SODIUM 100 MG: 50 LIQUID ORAL at 07:55

## 2021-01-01 RX ADMIN — IPRATROPIUM BROMIDE AND ALBUTEROL SULFATE 1 AMPULE: 2.5; .5 SOLUTION RESPIRATORY (INHALATION) at 20:59

## 2021-01-01 RX ADMIN — AMIODARONE HYDROCHLORIDE 200 MG: 200 TABLET ORAL at 08:51

## 2021-01-01 RX ADMIN — LORAZEPAM 2 MG: 1 TABLET ORAL at 02:28

## 2021-01-01 RX ADMIN — PANTOPRAZOLE SODIUM 40 MG: 40 INJECTION, POWDER, FOR SOLUTION INTRAVENOUS at 09:54

## 2021-01-01 RX ADMIN — GABAPENTIN 800 MG: 400 CAPSULE ORAL at 06:16

## 2021-01-01 RX ADMIN — SUCRALFATE 1 G: 1 TABLET ORAL at 20:11

## 2021-01-01 RX ADMIN — INSULIN LISPRO 3 UNITS: 100 INJECTION, SOLUTION INTRAVENOUS; SUBCUTANEOUS at 20:15

## 2021-01-01 RX ADMIN — Medication 100 MG: at 09:17

## 2021-01-01 RX ADMIN — Medication 15 ML: at 10:52

## 2021-01-01 RX ADMIN — IPRATROPIUM BROMIDE AND ALBUTEROL SULFATE 1 AMPULE: 2.5; .5 SOLUTION RESPIRATORY (INHALATION) at 12:12

## 2021-01-01 RX ADMIN — Medication 10 ML: at 08:53

## 2021-01-01 RX ADMIN — GABAPENTIN 800 MG: 400 CAPSULE ORAL at 05:12

## 2021-01-01 RX ADMIN — SODIUM CHLORIDE, PRESERVATIVE FREE 300 UNITS: 5 INJECTION INTRAVENOUS at 08:48

## 2021-01-01 RX ADMIN — GABAPENTIN 800 MG: 400 CAPSULE ORAL at 21:30

## 2021-01-01 RX ADMIN — IPRATROPIUM BROMIDE AND ALBUTEROL SULFATE 1 AMPULE: 2.5; .5 SOLUTION RESPIRATORY (INHALATION) at 09:03

## 2021-01-01 RX ADMIN — Medication 10 ML: at 07:57

## 2021-01-01 RX ADMIN — Medication 10 ML: at 08:37

## 2021-01-01 RX ADMIN — CLOPIDOGREL 75 MG: 75 TABLET, FILM COATED ORAL at 08:33

## 2021-01-01 RX ADMIN — HYDROCORTISONE SODIUM SUCCINATE 50 MG: 100 INJECTION, POWDER, FOR SOLUTION INTRAMUSCULAR; INTRAVENOUS at 17:00

## 2021-01-01 RX ADMIN — SENNOSIDES 17.6 MG: 8.8 SYRUP ORAL at 20:21

## 2021-01-01 RX ADMIN — PANTOPRAZOLE SODIUM 40 MG: 40 INJECTION, POWDER, FOR SOLUTION INTRAVENOUS at 21:06

## 2021-01-01 RX ADMIN — SODIUM CHLORIDE, PRESERVATIVE FREE 300 UNITS: 5 INJECTION INTRAVENOUS at 08:47

## 2021-01-01 RX ADMIN — SENNOSIDES 17.6 MG: 8.8 SYRUP ORAL at 08:55

## 2021-01-01 RX ADMIN — LORAZEPAM 1 MG: 2 INJECTION INTRAMUSCULAR; INTRAVENOUS at 13:23

## 2021-01-01 RX ADMIN — IPRATROPIUM BROMIDE AND ALBUTEROL SULFATE 1 AMPULE: 2.5; .5 SOLUTION RESPIRATORY (INHALATION) at 19:43

## 2021-01-01 RX ADMIN — SUCRALFATE 1 G: 1 TABLET ORAL at 06:30

## 2021-01-01 RX ADMIN — SODIUM CHLORIDE, PRESERVATIVE FREE 10 ML: 5 INJECTION INTRAVENOUS at 08:58

## 2021-01-01 RX ADMIN — ARFORMOTEROL TARTRATE 15 MCG: 15 SOLUTION RESPIRATORY (INHALATION) at 19:43

## 2021-01-01 RX ADMIN — Medication 10 ML: at 08:20

## 2021-01-01 RX ADMIN — IPRATROPIUM BROMIDE AND ALBUTEROL SULFATE 1 AMPULE: 2.5; .5 SOLUTION RESPIRATORY (INHALATION) at 16:35

## 2021-01-01 RX ADMIN — Medication 10 ML: at 20:20

## 2021-01-01 RX ADMIN — CLOPIDOGREL 75 MG: 75 TABLET, FILM COATED ORAL at 08:15

## 2021-01-01 RX ADMIN — ENOXAPARIN SODIUM 100 MG: 100 INJECTION SUBCUTANEOUS at 09:53

## 2021-01-01 RX ADMIN — BUDESONIDE 500 MCG: 0.5 SUSPENSION RESPIRATORY (INHALATION) at 19:50

## 2021-01-01 RX ADMIN — PANTOPRAZOLE SODIUM 40 MG: 40 INJECTION, POWDER, FOR SOLUTION INTRAVENOUS at 10:45

## 2021-01-01 RX ADMIN — LORAZEPAM 2 MG: 1 TABLET ORAL at 10:54

## 2021-01-01 RX ADMIN — Medication 10 ML: at 21:49

## 2021-01-01 RX ADMIN — PANTOPRAZOLE SODIUM 40 MG: 40 INJECTION, POWDER, FOR SOLUTION INTRAVENOUS at 20:11

## 2021-01-01 RX ADMIN — OXYCODONE HYDROCHLORIDE 5 MG: 5 TABLET ORAL at 12:22

## 2021-01-01 RX ADMIN — INSULIN LISPRO 6 UNITS: 100 INJECTION, SOLUTION INTRAVENOUS; SUBCUTANEOUS at 17:29

## 2021-01-01 RX ADMIN — ARFORMOTEROL TARTRATE 15 MCG: 15 SOLUTION RESPIRATORY (INHALATION) at 08:56

## 2021-01-01 RX ADMIN — SUCRALFATE 1 G: 1 TABLET ORAL at 17:29

## 2021-01-01 RX ADMIN — SODIUM CHLORIDE, PRESERVATIVE FREE 300 UNITS: 5 INJECTION INTRAVENOUS at 20:11

## 2021-01-01 RX ADMIN — AMIODARONE HYDROCHLORIDE 400 MG: 200 TABLET ORAL at 20:40

## 2021-01-01 RX ADMIN — HYDROCORTISONE SODIUM SUCCINATE 50 MG: 100 INJECTION, POWDER, FOR SOLUTION INTRAMUSCULAR; INTRAVENOUS at 05:59

## 2021-01-01 RX ADMIN — METOCLOPRAMIDE 5 MG: 5 INJECTION, SOLUTION INTRAMUSCULAR; INTRAVENOUS at 05:59

## 2021-01-01 RX ADMIN — INSULIN LISPRO 3 UNITS: 100 INJECTION, SOLUTION INTRAVENOUS; SUBCUTANEOUS at 18:37

## 2021-01-01 RX ADMIN — BUDESONIDE 500 MCG: 0.5 SUSPENSION RESPIRATORY (INHALATION) at 09:05

## 2021-01-01 RX ADMIN — SODIUM CHLORIDE, PRESERVATIVE FREE 300 UNITS: 5 INJECTION INTRAVENOUS at 10:00

## 2021-01-01 RX ADMIN — LORAZEPAM 0.5 MG: 0.5 TABLET ORAL at 21:01

## 2021-01-01 RX ADMIN — SODIUM CHLORIDE, PRESERVATIVE FREE 10 ML: 5 INJECTION INTRAVENOUS at 09:17

## 2021-01-01 RX ADMIN — SODIUM CHLORIDE 500 ML: 9 INJECTION, SOLUTION INTRAVENOUS at 14:00

## 2021-01-01 RX ADMIN — LORAZEPAM 0.5 MG: 0.5 TABLET ORAL at 05:47

## 2021-01-01 RX ADMIN — IPRATROPIUM BROMIDE AND ALBUTEROL SULFATE 1 AMPULE: 2.5; .5 SOLUTION RESPIRATORY (INHALATION) at 20:26

## 2021-01-01 RX ADMIN — INSULIN GLARGINE 20 UNITS: 100 INJECTION, SOLUTION SUBCUTANEOUS at 21:06

## 2021-01-01 RX ADMIN — IPRATROPIUM BROMIDE AND ALBUTEROL SULFATE 1 AMPULE: 2.5; .5 SOLUTION RESPIRATORY (INHALATION) at 12:38

## 2021-01-01 RX ADMIN — INSULIN GLARGINE 40 UNITS: 100 INJECTION, SOLUTION SUBCUTANEOUS at 09:15

## 2021-01-01 RX ADMIN — Medication 100 MG: at 08:20

## 2021-01-01 RX ADMIN — LORAZEPAM 1 MG: 2 INJECTION INTRAMUSCULAR; INTRAVENOUS at 12:02

## 2021-01-01 RX ADMIN — IOPAMIDOL 75 ML: 755 INJECTION, SOLUTION INTRAVENOUS at 21:12

## 2021-01-01 RX ADMIN — INSULIN LISPRO 3 UNITS: 100 INJECTION, SOLUTION INTRAVENOUS; SUBCUTANEOUS at 00:42

## 2021-01-01 RX ADMIN — Medication 10 ML: at 21:04

## 2021-01-01 RX ADMIN — AMIODARONE HYDROCHLORIDE 400 MG: 200 TABLET ORAL at 08:42

## 2021-01-01 RX ADMIN — SUCRALFATE 1 G: 1 TABLET ORAL at 20:39

## 2021-01-01 RX ADMIN — LANSOPRAZOLE 30 MG: KIT at 06:27

## 2021-01-01 RX ADMIN — BUDESONIDE 500 MCG: 0.5 SUSPENSION RESPIRATORY (INHALATION) at 19:43

## 2021-01-01 RX ADMIN — Medication 10 ML: at 21:40

## 2021-01-01 RX ADMIN — OXYCODONE HYDROCHLORIDE 5 MG: 5 TABLET ORAL at 06:44

## 2021-01-01 RX ADMIN — PANTOPRAZOLE SODIUM 40 MG: 40 INJECTION, POWDER, FOR SOLUTION INTRAVENOUS at 22:20

## 2021-01-01 RX ADMIN — ARFORMOTEROL TARTRATE 15 MCG: 15 SOLUTION RESPIRATORY (INHALATION) at 20:33

## 2021-01-01 RX ADMIN — INSULIN GLARGINE 25 UNITS: 100 INJECTION, SOLUTION SUBCUTANEOUS at 08:58

## 2021-01-01 RX ADMIN — GABAPENTIN 800 MG: 400 CAPSULE ORAL at 20:36

## 2021-01-01 RX ADMIN — APIXABAN 5 MG: 5 TABLET, FILM COATED ORAL at 08:51

## 2021-01-01 RX ADMIN — ATORVASTATIN CALCIUM 20 MG: 20 TABLET, FILM COATED ORAL at 09:10

## 2021-01-01 RX ADMIN — Medication 10 ML: at 21:02

## 2021-01-01 RX ADMIN — ARFORMOTEROL TARTRATE 15 MCG: 15 SOLUTION RESPIRATORY (INHALATION) at 20:26

## 2021-01-01 RX ADMIN — MIDODRINE HYDROCHLORIDE 20 MG: 5 TABLET ORAL at 11:08

## 2021-01-01 RX ADMIN — AMIODARONE HYDROCHLORIDE 200 MG: 200 TABLET ORAL at 08:13

## 2021-01-01 RX ADMIN — SODIUM CHLORIDE, PRESERVATIVE FREE 10 ML: 5 INJECTION INTRAVENOUS at 21:00

## 2021-01-01 RX ADMIN — HYDROCORTISONE SODIUM SUCCINATE 50 MG: 100 INJECTION, POWDER, FOR SOLUTION INTRAMUSCULAR; INTRAVENOUS at 17:54

## 2021-01-01 RX ADMIN — GABAPENTIN 800 MG: 400 CAPSULE ORAL at 05:58

## 2021-01-01 RX ADMIN — Medication 2000 UNITS: at 09:52

## 2021-01-01 RX ADMIN — IPRATROPIUM BROMIDE AND ALBUTEROL SULFATE 1 AMPULE: 2.5; .5 SOLUTION RESPIRATORY (INHALATION) at 12:11

## 2021-01-01 RX ADMIN — CLOPIDOGREL 75 MG: 75 TABLET, FILM COATED ORAL at 09:02

## 2021-01-01 RX ADMIN — OXYCODONE HYDROCHLORIDE AND ACETAMINOPHEN 500 MG: 500 TABLET ORAL at 09:05

## 2021-01-01 RX ADMIN — APIXABAN 5 MG: 5 TABLET, FILM COATED ORAL at 21:20

## 2021-01-01 RX ADMIN — APIXABAN 5 MG: 5 TABLET, FILM COATED ORAL at 21:47

## 2021-01-01 RX ADMIN — IPRATROPIUM BROMIDE AND ALBUTEROL SULFATE 1 AMPULE: 2.5; .5 SOLUTION RESPIRATORY (INHALATION) at 12:50

## 2021-01-01 RX ADMIN — LEVOTHYROXINE SODIUM 100 MCG: 100 TABLET ORAL at 05:50

## 2021-01-01 RX ADMIN — PHENYLEPHRINE HYDROCHLORIDE 100 MCG/MIN: 10 INJECTION INTRAVENOUS at 16:54

## 2021-01-01 RX ADMIN — MIDODRINE HYDROCHLORIDE 15 MG: 5 TABLET ORAL at 07:54

## 2021-01-01 RX ADMIN — LORAZEPAM 1 MG: 2 INJECTION INTRAMUSCULAR; INTRAVENOUS at 06:30

## 2021-01-01 RX ADMIN — PANTOPRAZOLE SODIUM 40 MG: 40 INJECTION, POWDER, FOR SOLUTION INTRAVENOUS at 10:44

## 2021-01-01 RX ADMIN — HYDROCORTISONE SODIUM SUCCINATE 50 MG: 100 INJECTION, POWDER, FOR SOLUTION INTRAMUSCULAR; INTRAVENOUS at 05:35

## 2021-01-01 RX ADMIN — IPRATROPIUM BROMIDE AND ALBUTEROL SULFATE 1 AMPULE: 2.5; .5 SOLUTION RESPIRATORY (INHALATION) at 17:51

## 2021-01-01 RX ADMIN — MIDODRINE HYDROCHLORIDE 15 MG: 5 TABLET ORAL at 08:40

## 2021-01-01 RX ADMIN — MIDODRINE HYDROCHLORIDE 15 MG: 5 TABLET ORAL at 18:36

## 2021-01-01 RX ADMIN — Medication 10 ML: at 17:56

## 2021-01-01 RX ADMIN — GABAPENTIN 800 MG: 400 CAPSULE ORAL at 06:13

## 2021-01-01 RX ADMIN — GABAPENTIN 800 MG: 400 CAPSULE ORAL at 13:54

## 2021-01-01 RX ADMIN — Medication 200 MCG/HR: at 14:35

## 2021-01-01 RX ADMIN — OXYCODONE HYDROCHLORIDE AND ACETAMINOPHEN 500 MG: 500 TABLET ORAL at 09:52

## 2021-01-01 RX ADMIN — Medication 15 ML: at 09:11

## 2021-01-01 RX ADMIN — PHENYLEPHRINE HYDROCHLORIDE 250 MCG/MIN: 10 INJECTION INTRAVENOUS at 00:27

## 2021-01-01 RX ADMIN — Medication 1000 UNITS: at 09:03

## 2021-01-01 RX ADMIN — OXYCODONE HYDROCHLORIDE 5 MG: 5 TABLET ORAL at 09:01

## 2021-01-01 RX ADMIN — HYDROCORTISONE SODIUM SUCCINATE 100 MG: 100 INJECTION, POWDER, FOR SOLUTION INTRAMUSCULAR; INTRAVENOUS at 21:47

## 2021-01-01 RX ADMIN — MIDODRINE HYDROCHLORIDE 10 MG: 5 TABLET ORAL at 12:07

## 2021-01-01 RX ADMIN — ARFORMOTEROL TARTRATE 15 MCG: 15 SOLUTION RESPIRATORY (INHALATION) at 20:18

## 2021-01-01 RX ADMIN — SODIUM CHLORIDE, PRESERVATIVE FREE 10 ML: 5 INJECTION INTRAVENOUS at 21:18

## 2021-01-01 RX ADMIN — BUDESONIDE 500 MCG: 0.5 SUSPENSION RESPIRATORY (INHALATION) at 20:12

## 2021-01-01 RX ADMIN — ACETAMINOPHEN 650 MG: 650 SOLUTION ORAL at 13:18

## 2021-01-01 RX ADMIN — LORAZEPAM 2 MG: 1 TABLET ORAL at 23:05

## 2021-01-01 RX ADMIN — DOCUSATE SODIUM 100 MG: 50 LIQUID ORAL at 09:20

## 2021-01-01 RX ADMIN — Medication 500 MG: at 08:15

## 2021-01-01 RX ADMIN — HYDROCORTISONE SODIUM SUCCINATE 50 MG: 100 INJECTION, POWDER, FOR SOLUTION INTRAMUSCULAR; INTRAVENOUS at 05:51

## 2021-01-01 RX ADMIN — APIXABAN 5 MG: 5 TABLET, FILM COATED ORAL at 21:39

## 2021-01-01 RX ADMIN — Medication 10 ML: at 20:12

## 2021-01-01 RX ADMIN — MIDODRINE HYDROCHLORIDE 20 MG: 5 TABLET ORAL at 16:54

## 2021-01-01 RX ADMIN — MIDODRINE HYDROCHLORIDE 10 MG: 5 TABLET ORAL at 17:55

## 2021-01-01 RX ADMIN — IPRATROPIUM BROMIDE AND ALBUTEROL SULFATE 1 AMPULE: 2.5; .5 SOLUTION RESPIRATORY (INHALATION) at 12:42

## 2021-01-01 RX ADMIN — LEVOTHYROXINE SODIUM 100 MCG: 100 TABLET ORAL at 05:52

## 2021-01-01 RX ADMIN — Medication 15 ML: at 08:15

## 2021-01-01 RX ADMIN — SODIUM CHLORIDE, PRESERVATIVE FREE 300 UNITS: 5 INJECTION INTRAVENOUS at 21:11

## 2021-01-01 RX ADMIN — Medication 15 ML: at 08:41

## 2021-01-01 RX ADMIN — PANTOPRAZOLE SODIUM 40 MG: 40 INJECTION, POWDER, FOR SOLUTION INTRAVENOUS at 11:13

## 2021-01-01 RX ADMIN — INSULIN LISPRO 3 UNITS: 100 INJECTION, SOLUTION INTRAVENOUS; SUBCUTANEOUS at 23:22

## 2021-01-01 RX ADMIN — MIDODRINE HYDROCHLORIDE 15 MG: 5 TABLET ORAL at 17:36

## 2021-01-01 RX ADMIN — ZINC SULFATE 220 MG (50 MG) CAPSULE 50 MG: CAPSULE at 09:18

## 2021-01-01 RX ADMIN — GABAPENTIN 800 MG: 400 CAPSULE ORAL at 06:23

## 2021-01-01 RX ADMIN — APIXABAN 5 MG: 5 TABLET, FILM COATED ORAL at 20:21

## 2021-01-01 RX ADMIN — POLYETHYLENE GLYCOL 3350 17 G: 17 POWDER, FOR SOLUTION ORAL at 09:02

## 2021-01-01 RX ADMIN — BUDESONIDE 500 MCG: 0.5 SUSPENSION RESPIRATORY (INHALATION) at 08:22

## 2021-01-01 RX ADMIN — ENOXAPARIN SODIUM 40 MG: 40 INJECTION SUBCUTANEOUS at 09:26

## 2021-01-01 RX ADMIN — BUDESONIDE 500 MCG: 0.5 SUSPENSION RESPIRATORY (INHALATION) at 08:14

## 2021-01-01 RX ADMIN — Medication 2000 UNITS: at 11:14

## 2021-01-01 RX ADMIN — CLOPIDOGREL 75 MG: 75 TABLET, FILM COATED ORAL at 10:38

## 2021-01-01 RX ADMIN — DOCUSATE SODIUM 100 MG: 50 LIQUID ORAL at 08:18

## 2021-01-01 RX ADMIN — ACETAMINOPHEN 650 MG: 325 TABLET ORAL at 10:28

## 2021-01-01 RX ADMIN — OXYCODONE HYDROCHLORIDE AND ACETAMINOPHEN 500 MG: 500 TABLET ORAL at 21:10

## 2021-01-01 RX ADMIN — PANTOPRAZOLE SODIUM 40 MG: 40 INJECTION, POWDER, FOR SOLUTION INTRAVENOUS at 20:15

## 2021-01-01 RX ADMIN — INSULIN GLARGINE 25 UNITS: 100 INJECTION, SOLUTION SUBCUTANEOUS at 09:37

## 2021-01-01 RX ADMIN — SODIUM CHLORIDE, PRESERVATIVE FREE 300 UNITS: 5 INJECTION INTRAVENOUS at 21:33

## 2021-01-01 RX ADMIN — MIDAZOLAM HYDROCHLORIDE 2 MG: 2 INJECTION, SOLUTION INTRAMUSCULAR; INTRAVENOUS at 14:53

## 2021-01-01 RX ADMIN — BUDESONIDE 500 MCG: 0.5 SUSPENSION RESPIRATORY (INHALATION) at 19:38

## 2021-01-01 RX ADMIN — SENNOSIDES 17.6 MG: 8.8 SYRUP ORAL at 21:10

## 2021-01-01 RX ADMIN — MIDODRINE HYDROCHLORIDE 15 MG: 5 TABLET ORAL at 13:12

## 2021-01-01 RX ADMIN — GABAPENTIN 800 MG: 400 CAPSULE ORAL at 14:10

## 2021-01-01 RX ADMIN — LANSOPRAZOLE 30 MG: KIT at 05:41

## 2021-01-01 RX ADMIN — APIXABAN 5 MG: 5 TABLET, FILM COATED ORAL at 21:56

## 2021-01-01 RX ADMIN — Medication 15 ML: at 08:44

## 2021-01-01 RX ADMIN — Medication 100 MG: at 08:23

## 2021-01-01 RX ADMIN — FENTANYL CITRATE 25 MCG: 50 INJECTION, SOLUTION INTRAMUSCULAR; INTRAVENOUS at 06:07

## 2021-01-01 RX ADMIN — IPRATROPIUM BROMIDE AND ALBUTEROL SULFATE 1 AMPULE: 2.5; .5 SOLUTION RESPIRATORY (INHALATION) at 12:19

## 2021-01-01 RX ADMIN — SODIUM CHLORIDE, PRESERVATIVE FREE 300 UNITS: 5 INJECTION INTRAVENOUS at 20:42

## 2021-01-01 RX ADMIN — IPRATROPIUM BROMIDE AND ALBUTEROL SULFATE 1 AMPULE: 2.5; .5 SOLUTION RESPIRATORY (INHALATION) at 12:35

## 2021-01-01 RX ADMIN — APIXABAN 5 MG: 5 TABLET, FILM COATED ORAL at 20:31

## 2021-01-01 RX ADMIN — MIDODRINE HYDROCHLORIDE 20 MG: 5 TABLET ORAL at 11:58

## 2021-01-01 RX ADMIN — Medication 100 MG: at 07:54

## 2021-01-01 RX ADMIN — SODIUM CHLORIDE, PRESERVATIVE FREE 10 ML: 5 INJECTION INTRAVENOUS at 08:23

## 2021-01-01 RX ADMIN — METOCLOPRAMIDE 5 MG: 5 INJECTION, SOLUTION INTRAMUSCULAR; INTRAVENOUS at 12:22

## 2021-01-01 RX ADMIN — GABAPENTIN 800 MG: 400 CAPSULE ORAL at 05:52

## 2021-01-01 RX ADMIN — DIGOXIN 250 MCG: 250 TABLET ORAL at 12:17

## 2021-01-01 RX ADMIN — INSULIN LISPRO 3 UNITS: 100 INJECTION, SOLUTION INTRAVENOUS; SUBCUTANEOUS at 06:34

## 2021-01-01 RX ADMIN — CLOPIDOGREL 75 MG: 75 TABLET, FILM COATED ORAL at 09:12

## 2021-01-01 RX ADMIN — OXYCODONE HYDROCHLORIDE 10 MG: 5 TABLET ORAL at 10:37

## 2021-01-01 RX ADMIN — OXYCODONE HYDROCHLORIDE 10 MG: 5 TABLET ORAL at 21:24

## 2021-01-01 RX ADMIN — IPRATROPIUM BROMIDE AND ALBUTEROL SULFATE 1 AMPULE: 2.5; .5 SOLUTION RESPIRATORY (INHALATION) at 22:34

## 2021-01-01 RX ADMIN — DOCUSATE SODIUM 100 MG: 50 LIQUID ORAL at 09:02

## 2021-01-01 RX ADMIN — ARFORMOTEROL TARTRATE 15 MCG: 15 SOLUTION RESPIRATORY (INHALATION) at 20:24

## 2021-01-01 RX ADMIN — LORAZEPAM 2 MG: 1 TABLET ORAL at 18:32

## 2021-01-01 RX ADMIN — Medication 1000 MG: at 09:12

## 2021-01-01 RX ADMIN — Medication 1000 UNITS: at 08:45

## 2021-01-01 RX ADMIN — Medication 10 ML: at 20:45

## 2021-01-01 RX ADMIN — INSULIN LISPRO 3 UNITS: 100 INJECTION, SOLUTION INTRAVENOUS; SUBCUTANEOUS at 23:07

## 2021-01-01 RX ADMIN — DOCUSATE SODIUM 100 MG: 50 LIQUID ORAL at 08:09

## 2021-01-01 RX ADMIN — INSULIN LISPRO 6 UNITS: 100 INJECTION, SOLUTION INTRAVENOUS; SUBCUTANEOUS at 18:40

## 2021-01-01 RX ADMIN — HYDROCORTISONE SODIUM SUCCINATE 100 MG: 100 INJECTION, POWDER, FOR SOLUTION INTRAMUSCULAR; INTRAVENOUS at 21:24

## 2021-01-01 RX ADMIN — Medication 100 MCG/HR: at 07:12

## 2021-01-01 RX ADMIN — INSULIN LISPRO 3 UNITS: 100 INJECTION, SOLUTION INTRAVENOUS; SUBCUTANEOUS at 18:00

## 2021-01-01 RX ADMIN — SUCRALFATE 1 G: 1 TABLET ORAL at 18:39

## 2021-01-01 RX ADMIN — ATORVASTATIN CALCIUM 20 MG: 20 TABLET, FILM COATED ORAL at 08:57

## 2021-01-01 RX ADMIN — Medication 100 MG: at 08:45

## 2021-01-01 RX ADMIN — INSULIN LISPRO 6 UNITS: 100 INJECTION, SOLUTION INTRAVENOUS; SUBCUTANEOUS at 00:00

## 2021-01-01 RX ADMIN — DEXAMETHASONE SODIUM PHOSPHATE 6 MG: 4 INJECTION, SOLUTION INTRA-ARTICULAR; INTRALESIONAL; INTRAMUSCULAR; INTRAVENOUS; SOFT TISSUE at 22:20

## 2021-01-01 RX ADMIN — PHENYLEPHRINE HYDROCHLORIDE 50 MCG/MIN: 10 INJECTION INTRAVENOUS at 02:54

## 2021-01-01 RX ADMIN — Medication 100 MG: at 10:42

## 2021-01-01 RX ADMIN — IPRATROPIUM BROMIDE AND ALBUTEROL SULFATE 1 AMPULE: 2.5; .5 SOLUTION RESPIRATORY (INHALATION) at 19:37

## 2021-01-01 RX ADMIN — OXYCODONE HYDROCHLORIDE AND ACETAMINOPHEN 500 MG: 500 TABLET ORAL at 09:21

## 2021-01-01 RX ADMIN — ZINC SULFATE 220 MG (50 MG) CAPSULE 50 MG: CAPSULE at 09:02

## 2021-01-01 RX ADMIN — HYDROCORTISONE SODIUM SUCCINATE 100 MG: 100 INJECTION, POWDER, FOR SOLUTION INTRAMUSCULAR; INTRAVENOUS at 13:55

## 2021-01-01 RX ADMIN — MIDODRINE HYDROCHLORIDE 10 MG: 5 TABLET ORAL at 12:08

## 2021-01-01 RX ADMIN — BUDESONIDE 500 MCG: 0.5 SUSPENSION RESPIRATORY (INHALATION) at 08:49

## 2021-01-01 RX ADMIN — AMIODARONE HYDROCHLORIDE 200 MG: 200 TABLET ORAL at 10:40

## 2021-01-01 RX ADMIN — Medication 15 ML: at 21:01

## 2021-01-01 RX ADMIN — Medication 150 MCG/HR: at 15:47

## 2021-01-01 RX ADMIN — HYDROCORTISONE SODIUM SUCCINATE 25 MG: 100 INJECTION, POWDER, FOR SOLUTION INTRAMUSCULAR; INTRAVENOUS at 18:24

## 2021-01-01 RX ADMIN — HYDROCORTISONE SODIUM SUCCINATE 50 MG: 100 INJECTION, POWDER, FOR SOLUTION INTRAMUSCULAR; INTRAVENOUS at 09:15

## 2021-01-01 RX ADMIN — SODIUM CHLORIDE, PRESERVATIVE FREE 300 UNITS: 5 INJECTION INTRAVENOUS at 09:15

## 2021-01-01 RX ADMIN — SODIUM CHLORIDE, PRESERVATIVE FREE 10 ML: 5 INJECTION INTRAVENOUS at 09:30

## 2021-01-01 RX ADMIN — IPRATROPIUM BROMIDE AND ALBUTEROL SULFATE 1 AMPULE: 2.5; .5 SOLUTION RESPIRATORY (INHALATION) at 13:48

## 2021-01-01 RX ADMIN — BUDESONIDE 500 MCG: 0.5 SUSPENSION RESPIRATORY (INHALATION) at 08:07

## 2021-01-01 RX ADMIN — Medication 75 MCG/HR: at 07:26

## 2021-01-01 RX ADMIN — ARFORMOTEROL TARTRATE 15 MCG: 15 SOLUTION RESPIRATORY (INHALATION) at 07:36

## 2021-01-01 RX ADMIN — Medication 1000 MG: at 09:00

## 2021-01-01 RX ADMIN — DIGOXIN 250 MCG: 250 TABLET ORAL at 08:40

## 2021-01-01 RX ADMIN — GABAPENTIN 800 MG: 400 CAPSULE ORAL at 21:47

## 2021-01-01 RX ADMIN — SODIUM CHLORIDE, PRESERVATIVE FREE 300 UNITS: 5 INJECTION INTRAVENOUS at 10:43

## 2021-01-01 RX ADMIN — POTASSIUM CHLORIDE 20 MEQ: 400 INJECTION, SOLUTION INTRAVENOUS at 11:59

## 2021-01-01 RX ADMIN — Medication 100 MG: at 08:48

## 2021-01-01 RX ADMIN — IPRATROPIUM BROMIDE AND ALBUTEROL SULFATE 1 AMPULE: 2.5; .5 SOLUTION RESPIRATORY (INHALATION) at 13:16

## 2021-01-01 RX ADMIN — INSULIN GLARGINE 40 UNITS: 100 INJECTION, SOLUTION SUBCUTANEOUS at 08:38

## 2021-01-01 RX ADMIN — MIDODRINE HYDROCHLORIDE 10 MG: 5 TABLET ORAL at 09:52

## 2021-01-01 RX ADMIN — OXYCODONE HYDROCHLORIDE 10 MG: 5 TABLET ORAL at 22:18

## 2021-01-01 RX ADMIN — PHENYLEPHRINE HYDROCHLORIDE 175 MCG/MIN: 10 INJECTION INTRAVENOUS at 20:42

## 2021-01-01 RX ADMIN — AMIODARONE HYDROCHLORIDE 200 MG: 200 TABLET ORAL at 08:11

## 2021-01-01 RX ADMIN — APIXABAN 5 MG: 5 TABLET, FILM COATED ORAL at 08:01

## 2021-01-01 RX ADMIN — INSULIN GLARGINE 40 UNITS: 100 INJECTION, SOLUTION SUBCUTANEOUS at 08:48

## 2021-01-01 RX ADMIN — IPRATROPIUM BROMIDE AND ALBUTEROL SULFATE 1 AMPULE: 2.5; .5 SOLUTION RESPIRATORY (INHALATION) at 08:49

## 2021-01-01 RX ADMIN — INSULIN LISPRO 3 UNITS: 100 INJECTION, SOLUTION INTRAVENOUS; SUBCUTANEOUS at 18:13

## 2021-01-01 RX ADMIN — SENNOSIDES 8.8 MG: 8.8 SYRUP ORAL at 23:38

## 2021-01-01 RX ADMIN — IPRATROPIUM BROMIDE AND ALBUTEROL SULFATE 1 AMPULE: 2.5; .5 SOLUTION RESPIRATORY (INHALATION) at 16:50

## 2021-01-01 RX ADMIN — Medication 15 ML: at 21:17

## 2021-01-01 RX ADMIN — Medication 10 ML: at 09:04

## 2021-01-01 RX ADMIN — BUDESONIDE 500 MCG: 0.5 SUSPENSION RESPIRATORY (INHALATION) at 20:24

## 2021-01-01 RX ADMIN — PHENYLEPHRINE HYDROCHLORIDE 200 MCG/MIN: 10 INJECTION INTRAVENOUS at 05:20

## 2021-01-01 RX ADMIN — SODIUM CHLORIDE, PRESERVATIVE FREE 300 UNITS: 5 INJECTION INTRAVENOUS at 10:46

## 2021-01-01 RX ADMIN — DOCUSATE SODIUM 100 MG: 50 LIQUID ORAL at 10:44

## 2021-01-01 RX ADMIN — INSULIN LISPRO 6 UNITS: 100 INJECTION, SOLUTION INTRAVENOUS; SUBCUTANEOUS at 00:30

## 2021-01-01 RX ADMIN — ARFORMOTEROL TARTRATE 15 MCG: 15 SOLUTION RESPIRATORY (INHALATION) at 21:05

## 2021-01-01 RX ADMIN — IPRATROPIUM BROMIDE AND ALBUTEROL SULFATE 1 AMPULE: 2.5; .5 SOLUTION RESPIRATORY (INHALATION) at 09:54

## 2021-01-01 RX ADMIN — ATORVASTATIN CALCIUM 20 MG: 20 TABLET, FILM COATED ORAL at 08:52

## 2021-01-01 RX ADMIN — ZINC SULFATE 220 MG (50 MG) CAPSULE 50 MG: CAPSULE at 08:57

## 2021-01-01 RX ADMIN — DIGOXIN 250 MCG: 250 TABLET ORAL at 09:26

## 2021-01-01 RX ADMIN — Medication 15 ML: at 20:36

## 2021-01-01 RX ADMIN — OXYCODONE HYDROCHLORIDE 10 MG: 5 TABLET ORAL at 09:44

## 2021-01-01 RX ADMIN — GABAPENTIN 800 MG: 400 CAPSULE ORAL at 13:42

## 2021-01-01 RX ADMIN — Medication 100 MG: at 09:02

## 2021-01-01 RX ADMIN — MIDODRINE HYDROCHLORIDE 15 MG: 5 TABLET ORAL at 17:47

## 2021-01-01 RX ADMIN — OXYCODONE HYDROCHLORIDE 5 MG: 5 TABLET ORAL at 17:35

## 2021-01-01 RX ADMIN — GABAPENTIN 800 MG: 400 CAPSULE ORAL at 22:46

## 2021-01-01 RX ADMIN — SODIUM CHLORIDE, PRESERVATIVE FREE 300 UNITS: 5 INJECTION INTRAVENOUS at 08:09

## 2021-01-01 RX ADMIN — Medication 1000 MG: at 10:43

## 2021-01-01 RX ADMIN — MIDODRINE HYDROCHLORIDE 20 MG: 5 TABLET ORAL at 09:01

## 2021-01-01 RX ADMIN — METOCLOPRAMIDE 5 MG: 5 INJECTION, SOLUTION INTRAMUSCULAR; INTRAVENOUS at 12:14

## 2021-01-01 RX ADMIN — MIDODRINE HYDROCHLORIDE 15 MG: 5 TABLET ORAL at 16:35

## 2021-01-01 RX ADMIN — PIPERACILLIN AND TAZOBACTAM 3.38 G: 3; .375 INJECTION, POWDER, LYOPHILIZED, FOR SOLUTION INTRAVENOUS at 14:31

## 2021-01-01 RX ADMIN — SODIUM CHLORIDE, PRESERVATIVE FREE 300 UNITS: 5 INJECTION INTRAVENOUS at 22:18

## 2021-01-01 RX ADMIN — LORAZEPAM 1 MG: 2 INJECTION INTRAMUSCULAR; INTRAVENOUS at 14:16

## 2021-01-01 RX ADMIN — BUDESONIDE 500 MCG: 0.5 SUSPENSION RESPIRATORY (INHALATION) at 08:58

## 2021-01-01 RX ADMIN — METOCLOPRAMIDE 5 MG: 5 INJECTION, SOLUTION INTRAMUSCULAR; INTRAVENOUS at 11:18

## 2021-01-01 RX ADMIN — ARFORMOTEROL TARTRATE 15 MCG: 15 SOLUTION RESPIRATORY (INHALATION) at 19:51

## 2021-01-01 RX ADMIN — Medication 100 MG: at 09:21

## 2021-01-01 RX ADMIN — Medication 10 ML: at 08:48

## 2021-01-01 RX ADMIN — POTASSIUM CHLORIDE 20 MEQ: 400 INJECTION, SOLUTION INTRAVENOUS at 13:45

## 2021-01-01 RX ADMIN — INSULIN GLARGINE 25 UNITS: 100 INJECTION, SOLUTION SUBCUTANEOUS at 11:12

## 2021-01-01 RX ADMIN — AMIODARONE HYDROCHLORIDE 200 MG: 200 TABLET ORAL at 20:38

## 2021-01-01 RX ADMIN — GABAPENTIN 800 MG: 400 CAPSULE ORAL at 05:20

## 2021-01-01 RX ADMIN — Medication 15 ML: at 09:06

## 2021-01-01 RX ADMIN — INSULIN LISPRO 3 UNITS: 100 INJECTION, SOLUTION INTRAVENOUS; SUBCUTANEOUS at 11:36

## 2021-01-01 RX ADMIN — POTASSIUM CHLORIDE 20 MEQ: 400 INJECTION, SOLUTION INTRAVENOUS at 09:00

## 2021-01-01 RX ADMIN — GABAPENTIN 800 MG: 400 CAPSULE ORAL at 15:02

## 2021-01-01 RX ADMIN — HYDROCORTISONE SODIUM SUCCINATE 50 MG: 100 INJECTION, POWDER, FOR SOLUTION INTRAMUSCULAR; INTRAVENOUS at 04:20

## 2021-01-01 RX ADMIN — BUDESONIDE 500 MCG: 0.5 SUSPENSION RESPIRATORY (INHALATION) at 19:36

## 2021-01-01 RX ADMIN — OXYCODONE HYDROCHLORIDE 10 MG: 5 TABLET ORAL at 11:21

## 2021-01-01 RX ADMIN — IPRATROPIUM BROMIDE AND ALBUTEROL SULFATE 1 AMPULE: 2.5; .5 SOLUTION RESPIRATORY (INHALATION) at 16:47

## 2021-01-01 RX ADMIN — SUCRALFATE 1 G: 1 TABLET ORAL at 21:00

## 2021-01-01 RX ADMIN — POLYETHYLENE GLYCOL 3350 17 G: 17 POWDER, FOR SOLUTION ORAL at 09:20

## 2021-01-01 RX ADMIN — Medication 1000 UNITS: at 10:39

## 2021-01-01 RX ADMIN — GABAPENTIN 800 MG: 400 CAPSULE ORAL at 06:01

## 2021-01-01 RX ADMIN — SODIUM CHLORIDE, PRESERVATIVE FREE 300 UNITS: 5 INJECTION INTRAVENOUS at 00:12

## 2021-01-01 RX ADMIN — SODIUM CHLORIDE 500 ML: 9 INJECTION, SOLUTION INTRAVENOUS at 18:30

## 2021-01-01 RX ADMIN — BUDESONIDE 500 MCG: 0.5 SUSPENSION RESPIRATORY (INHALATION) at 20:59

## 2021-01-01 RX ADMIN — CLOPIDOGREL 75 MG: 75 TABLET, FILM COATED ORAL at 07:55

## 2021-01-01 RX ADMIN — HYDROCORTISONE SODIUM SUCCINATE 50 MG: 100 INJECTION, POWDER, FOR SOLUTION INTRAMUSCULAR; INTRAVENOUS at 06:27

## 2021-01-01 RX ADMIN — Medication 1000 UNITS: at 09:14

## 2021-01-01 RX ADMIN — SODIUM CHLORIDE, PRESERVATIVE FREE 10 ML: 5 INJECTION INTRAVENOUS at 20:11

## 2021-01-01 RX ADMIN — BUDESONIDE 500 MCG: 0.5 SUSPENSION RESPIRATORY (INHALATION) at 20:20

## 2021-01-01 RX ADMIN — INSULIN LISPRO 3 UNITS: 100 INJECTION, SOLUTION INTRAVENOUS; SUBCUTANEOUS at 13:50

## 2021-01-01 RX ADMIN — SODIUM CHLORIDE, PRESERVATIVE FREE 300 UNITS: 5 INJECTION INTRAVENOUS at 20:40

## 2021-01-01 RX ADMIN — AMIODARONE HYDROCHLORIDE 200 MG: 200 TABLET ORAL at 09:01

## 2021-01-01 RX ADMIN — MIDODRINE HYDROCHLORIDE 15 MG: 5 TABLET ORAL at 12:19

## 2021-01-01 RX ADMIN — Medication 1000 MG: at 08:40

## 2021-01-01 RX ADMIN — DOCUSATE SODIUM 100 MG: 50 LIQUID ORAL at 10:38

## 2021-01-01 RX ADMIN — GABAPENTIN 800 MG: 400 CAPSULE ORAL at 05:31

## 2021-01-01 RX ADMIN — GABAPENTIN 800 MG: 400 CAPSULE ORAL at 21:09

## 2021-01-01 RX ADMIN — AMIODARONE HYDROCHLORIDE 400 MG: 200 TABLET ORAL at 21:40

## 2021-01-01 RX ADMIN — LORAZEPAM 1 MG: 2 INJECTION INTRAMUSCULAR; INTRAVENOUS at 19:48

## 2021-01-01 RX ADMIN — SUCRALFATE 1 G: 1 TABLET ORAL at 11:07

## 2021-01-01 RX ADMIN — BUDESONIDE 500 MCG: 0.5 SUSPENSION RESPIRATORY (INHALATION) at 09:59

## 2021-01-01 RX ADMIN — FENTANYL CITRATE 25 MCG: 50 INJECTION, SOLUTION INTRAMUSCULAR; INTRAVENOUS at 01:54

## 2021-01-01 RX ADMIN — CALCIUM GLUCONATE 2000 MG: 98 INJECTION, SOLUTION INTRAVENOUS at 08:42

## 2021-01-01 RX ADMIN — ATORVASTATIN CALCIUM 20 MG: 20 TABLET, FILM COATED ORAL at 08:18

## 2021-01-01 RX ADMIN — METOPROLOL TARTRATE 5 MG: 5 INJECTION INTRAVENOUS at 11:33

## 2021-01-01 RX ADMIN — POTASSIUM CHLORIDE 40 MEQ: 400 INJECTION, SOLUTION INTRAVENOUS at 09:30

## 2021-01-01 RX ADMIN — Medication 15 ML: at 21:08

## 2021-01-01 RX ADMIN — POLYETHYLENE GLYCOL 3350 17 G: 17 POWDER, FOR SOLUTION ORAL at 12:19

## 2021-01-01 RX ADMIN — SENNOSIDES 17.6 MG: 8.8 SYRUP ORAL at 08:24

## 2021-01-01 RX ADMIN — ZINC SULFATE 220 MG (50 MG) CAPSULE 50 MG: CAPSULE at 08:15

## 2021-01-01 RX ADMIN — INSULIN GLARGINE 25 UNITS: 100 INJECTION, SOLUTION SUBCUTANEOUS at 22:24

## 2021-01-01 RX ADMIN — Medication 1000 UNITS: at 09:26

## 2021-01-01 RX ADMIN — MIDODRINE HYDROCHLORIDE 20 MG: 5 TABLET ORAL at 09:11

## 2021-01-01 RX ADMIN — GABAPENTIN 800 MG: 400 CAPSULE ORAL at 13:56

## 2021-01-01 RX ADMIN — CLOPIDOGREL 75 MG: 75 TABLET, FILM COATED ORAL at 08:44

## 2021-01-01 RX ADMIN — WATER 10 ML: 1 INJECTION INTRAMUSCULAR; INTRAVENOUS; SUBCUTANEOUS at 09:14

## 2021-01-01 RX ADMIN — APIXABAN 5 MG: 5 TABLET, FILM COATED ORAL at 08:40

## 2021-01-01 RX ADMIN — IPRATROPIUM BROMIDE AND ALBUTEROL SULFATE 1 AMPULE: 2.5; .5 SOLUTION RESPIRATORY (INHALATION) at 20:20

## 2021-01-01 RX ADMIN — ARFORMOTEROL TARTRATE 15 MCG: 15 SOLUTION RESPIRATORY (INHALATION) at 08:46

## 2021-01-01 RX ADMIN — INSULIN LISPRO 3 UNITS: 100 INJECTION, SOLUTION INTRAVENOUS; SUBCUTANEOUS at 23:25

## 2021-01-01 RX ADMIN — Medication 100 MG: at 09:53

## 2021-01-01 RX ADMIN — GABAPENTIN 800 MG: 400 CAPSULE ORAL at 20:57

## 2021-01-01 RX ADMIN — Medication 1000 MG: at 07:54

## 2021-01-01 RX ADMIN — Medication 15 ML: at 08:23

## 2021-01-01 RX ADMIN — MIDODRINE HYDROCHLORIDE 20 MG: 5 TABLET ORAL at 08:45

## 2021-01-01 RX ADMIN — ARFORMOTEROL TARTRATE 15 MCG: 15 SOLUTION RESPIRATORY (INHALATION) at 08:22

## 2021-01-01 RX ADMIN — Medication 10 ML: at 09:26

## 2021-01-01 RX ADMIN — APIXABAN 5 MG: 5 TABLET, FILM COATED ORAL at 21:09

## 2021-01-01 RX ADMIN — IPRATROPIUM BROMIDE AND ALBUTEROL SULFATE 1 AMPULE: 2.5; .5 SOLUTION RESPIRATORY (INHALATION) at 07:59

## 2021-01-01 RX ADMIN — DOCUSATE SODIUM 100 MG: 50 LIQUID ORAL at 08:15

## 2021-01-01 RX ADMIN — GABAPENTIN 800 MG: 400 CAPSULE ORAL at 05:37

## 2021-01-01 RX ADMIN — DEXAMETHASONE SODIUM PHOSPHATE 6 MG: 4 INJECTION, SOLUTION INTRA-ARTICULAR; INTRALESIONAL; INTRAMUSCULAR; INTRAVENOUS; SOFT TISSUE at 13:17

## 2021-01-01 RX ADMIN — INSULIN LISPRO 6 UNITS: 100 INJECTION, SOLUTION INTRAVENOUS; SUBCUTANEOUS at 12:31

## 2021-01-01 RX ADMIN — DEXAMETHASONE SODIUM PHOSPHATE 6 MG: 4 INJECTION, SOLUTION INTRA-ARTICULAR; INTRALESIONAL; INTRAMUSCULAR; INTRAVENOUS; SOFT TISSUE at 10:04

## 2021-01-01 RX ADMIN — SODIUM CHLORIDE, PRESERVATIVE FREE 10 ML: 5 INJECTION INTRAVENOUS at 21:13

## 2021-01-01 RX ADMIN — APIXABAN 5 MG: 5 TABLET, FILM COATED ORAL at 07:55

## 2021-01-01 RX ADMIN — ACETAMINOPHEN 650 MG: 650 SOLUTION ORAL at 08:02

## 2021-01-01 RX ADMIN — BUDESONIDE 500 MCG: 0.5 SUSPENSION RESPIRATORY (INHALATION) at 12:16

## 2021-01-01 RX ADMIN — IPRATROPIUM BROMIDE AND ALBUTEROL SULFATE 1 AMPULE: 2.5; .5 SOLUTION RESPIRATORY (INHALATION) at 15:35

## 2021-01-01 RX ADMIN — ATORVASTATIN CALCIUM 20 MG: 20 TABLET, FILM COATED ORAL at 08:44

## 2021-01-01 RX ADMIN — LORAZEPAM 1 MG: 2 INJECTION INTRAMUSCULAR; INTRAVENOUS at 01:00

## 2021-01-01 RX ADMIN — SUCRALFATE 1 G: 1 TABLET ORAL at 21:08

## 2021-01-01 RX ADMIN — DOCUSATE SODIUM 100 MG: 50 LIQUID ORAL at 09:53

## 2021-01-01 RX ADMIN — OXYCODONE HYDROCHLORIDE 10 MG: 5 TABLET ORAL at 21:18

## 2021-01-01 RX ADMIN — IPRATROPIUM BROMIDE AND ALBUTEROL SULFATE 1 AMPULE: 2.5; .5 SOLUTION RESPIRATORY (INHALATION) at 19:36

## 2021-01-01 RX ADMIN — MIDODRINE HYDROCHLORIDE 20 MG: 5 TABLET ORAL at 10:04

## 2021-01-01 RX ADMIN — Medication 200 MCG/HR: at 16:00

## 2021-01-01 RX ADMIN — MIDODRINE HYDROCHLORIDE 20 MG: 5 TABLET ORAL at 17:24

## 2021-01-01 RX ADMIN — SODIUM CHLORIDE: 9 INJECTION, SOLUTION INTRAVENOUS at 02:05

## 2021-01-01 RX ADMIN — Medication 1000 UNITS: at 07:56

## 2021-01-01 RX ADMIN — ARFORMOTEROL TARTRATE 15 MCG: 15 SOLUTION RESPIRATORY (INHALATION) at 08:23

## 2021-01-01 RX ADMIN — DEXAMETHASONE SODIUM PHOSPHATE 4 MG: 4 INJECTION, SOLUTION INTRAMUSCULAR; INTRAVENOUS at 11:13

## 2021-01-01 RX ADMIN — SUCRALFATE 1 G: 1 TABLET ORAL at 10:39

## 2021-01-01 RX ADMIN — SUCRALFATE 1 G: 1 TABLET ORAL at 11:35

## 2021-01-01 RX ADMIN — Medication 2000 UNITS: at 09:11

## 2021-01-01 RX ADMIN — NOREPINEPHRINE BITARTRATE 5 MCG/MIN: 1 INJECTION, SOLUTION, CONCENTRATE INTRAVENOUS at 10:55

## 2021-01-01 RX ADMIN — SUCRALFATE 1 G: 1 TABLET ORAL at 17:50

## 2021-01-01 RX ADMIN — Medication 1000 MG: at 08:50

## 2021-01-01 RX ADMIN — Medication 150 MCG/HR: at 02:44

## 2021-01-01 RX ADMIN — GABAPENTIN 800 MG: 400 CAPSULE ORAL at 06:08

## 2021-01-01 RX ADMIN — IPRATROPIUM BROMIDE AND ALBUTEROL SULFATE 1 AMPULE: 2.5; .5 SOLUTION RESPIRATORY (INHALATION) at 08:46

## 2021-01-01 RX ADMIN — LORAZEPAM 2 MG: 1 TABLET ORAL at 18:15

## 2021-01-01 RX ADMIN — Medication 1000 MG: at 11:02

## 2021-01-01 RX ADMIN — Medication 15 ML: at 20:10

## 2021-01-01 RX ADMIN — INSULIN LISPRO 3 UNITS: 100 INJECTION, SOLUTION INTRAVENOUS; SUBCUTANEOUS at 04:18

## 2021-01-01 RX ADMIN — Medication 100 MG: at 09:01

## 2021-01-01 RX ADMIN — Medication 1000 UNITS: at 08:55

## 2021-01-01 RX ADMIN — IPRATROPIUM BROMIDE AND ALBUTEROL SULFATE 1 AMPULE: 2.5; .5 SOLUTION RESPIRATORY (INHALATION) at 16:18

## 2021-01-01 RX ADMIN — LORAZEPAM 1 MG: 1 TABLET ORAL at 08:21

## 2021-01-01 RX ADMIN — Medication 15 ML: at 11:13

## 2021-01-01 RX ADMIN — IPRATROPIUM BROMIDE AND ALBUTEROL SULFATE 1 AMPULE: 2.5; .5 SOLUTION RESPIRATORY (INHALATION) at 09:45

## 2021-01-01 RX ADMIN — HYDROCORTISONE SODIUM SUCCINATE 50 MG: 100 INJECTION, POWDER, FOR SOLUTION INTRAMUSCULAR; INTRAVENOUS at 22:46

## 2021-01-01 RX ADMIN — ZINC SULFATE 220 MG (50 MG) CAPSULE 50 MG: CAPSULE at 08:19

## 2021-01-01 RX ADMIN — MIDODRINE HYDROCHLORIDE 20 MG: 5 TABLET ORAL at 11:35

## 2021-01-01 RX ADMIN — SODIUM CHLORIDE, PRESERVATIVE FREE 300 UNITS: 5 INJECTION INTRAVENOUS at 22:15

## 2021-01-01 RX ADMIN — OXYCODONE HYDROCHLORIDE AND ACETAMINOPHEN 500 MG: 500 TABLET ORAL at 09:18

## 2021-01-01 RX ADMIN — INSULIN GLARGINE 25 UNITS: 100 INJECTION, SOLUTION SUBCUTANEOUS at 01:25

## 2021-01-01 RX ADMIN — GABAPENTIN 800 MG: 400 CAPSULE ORAL at 22:15

## 2021-01-01 RX ADMIN — GABAPENTIN 800 MG: 400 CAPSULE ORAL at 15:43

## 2021-01-01 RX ADMIN — MIDODRINE HYDROCHLORIDE 15 MG: 5 TABLET ORAL at 11:02

## 2021-01-01 RX ADMIN — APIXABAN 5 MG: 5 TABLET, FILM COATED ORAL at 21:03

## 2021-01-01 RX ADMIN — POTASSIUM CHLORIDE 20 MEQ: 400 INJECTION, SOLUTION INTRAVENOUS at 06:50

## 2021-01-01 RX ADMIN — GABAPENTIN 800 MG: 400 CAPSULE ORAL at 13:15

## 2021-01-01 RX ADMIN — SENNOSIDES 17.6 MG: 8.8 SYRUP ORAL at 21:01

## 2021-01-01 RX ADMIN — Medication 2000 UNITS: at 08:42

## 2021-01-01 RX ADMIN — VECURONIUM BROMIDE 10 MG: 1 INJECTION, POWDER, LYOPHILIZED, FOR SOLUTION INTRAVENOUS at 14:14

## 2021-01-01 RX ADMIN — SUCRALFATE 1 G: 1 TABLET ORAL at 06:01

## 2021-01-01 RX ADMIN — CLOPIDOGREL 75 MG: 75 TABLET, FILM COATED ORAL at 08:22

## 2021-01-01 RX ADMIN — INSULIN GLARGINE 25 UNITS: 100 INJECTION, SOLUTION SUBCUTANEOUS at 08:52

## 2021-01-01 RX ADMIN — VANCOMYCIN HYDROCHLORIDE 2000 MG: 10 INJECTION, POWDER, LYOPHILIZED, FOR SOLUTION INTRAVENOUS at 18:30

## 2021-01-01 RX ADMIN — AMIODARONE HYDROCHLORIDE 200 MG: 200 TABLET ORAL at 21:39

## 2021-01-01 RX ADMIN — Medication 2000 UNITS: at 09:17

## 2021-01-01 RX ADMIN — SODIUM CHLORIDE, PRESERVATIVE FREE 300 UNITS: 5 INJECTION INTRAVENOUS at 09:36

## 2021-01-01 RX ADMIN — DOCUSATE SODIUM 100 MG: 50 LIQUID ORAL at 09:36

## 2021-01-01 RX ADMIN — MIDODRINE HYDROCHLORIDE 15 MG: 5 TABLET ORAL at 09:02

## 2021-01-01 RX ADMIN — Medication 15 ML: at 20:41

## 2021-01-01 RX ADMIN — SODIUM CHLORIDE, PRESERVATIVE FREE 300 UNITS: 5 INJECTION INTRAVENOUS at 21:00

## 2021-01-01 RX ADMIN — ATORVASTATIN CALCIUM 20 MG: 20 TABLET, FILM COATED ORAL at 09:05

## 2021-01-01 RX ADMIN — Medication 1000 UNITS: at 08:57

## 2021-01-01 RX ADMIN — MIDODRINE HYDROCHLORIDE 20 MG: 5 TABLET ORAL at 11:31

## 2021-01-01 RX ADMIN — MIDODRINE HYDROCHLORIDE 15 MG: 5 TABLET ORAL at 11:17

## 2021-01-01 RX ADMIN — INSULIN GLARGINE 40 UNITS: 100 INJECTION, SOLUTION SUBCUTANEOUS at 07:56

## 2021-01-01 RX ADMIN — MIDODRINE HYDROCHLORIDE 20 MG: 5 TABLET ORAL at 12:09

## 2021-01-01 RX ADMIN — PANTOPRAZOLE SODIUM 40 MG: 40 INJECTION, POWDER, FOR SOLUTION INTRAVENOUS at 08:11

## 2021-01-01 RX ADMIN — SODIUM CHLORIDE, PRESERVATIVE FREE 10 ML: 5 INJECTION INTRAVENOUS at 08:50

## 2021-01-01 RX ADMIN — BUDESONIDE 500 MCG: 0.5 SUSPENSION RESPIRATORY (INHALATION) at 08:12

## 2021-01-01 RX ADMIN — FENTANYL CITRATE 25 MCG: 50 INJECTION, SOLUTION INTRAMUSCULAR; INTRAVENOUS at 15:40

## 2021-01-01 RX ADMIN — SENNOSIDES 8.8 MG: 8.8 SYRUP ORAL at 20:59

## 2021-01-01 RX ADMIN — INSULIN LISPRO 9 UNITS: 100 INJECTION, SOLUTION INTRAVENOUS; SUBCUTANEOUS at 05:41

## 2021-01-01 RX ADMIN — Medication 15 ML: at 07:55

## 2021-01-01 RX ADMIN — SODIUM CHLORIDE, PRESERVATIVE FREE 10 ML: 5 INJECTION INTRAVENOUS at 09:11

## 2021-01-01 RX ADMIN — PANTOPRAZOLE SODIUM 40 MG: 40 INJECTION, POWDER, FOR SOLUTION INTRAVENOUS at 20:00

## 2021-01-01 RX ADMIN — SUCRALFATE 1 G: 1 TABLET ORAL at 22:16

## 2021-01-01 RX ADMIN — METOCLOPRAMIDE 5 MG: 5 INJECTION, SOLUTION INTRAMUSCULAR; INTRAVENOUS at 17:36

## 2021-01-01 RX ADMIN — DIGOXIN 250 MCG: 250 TABLET ORAL at 08:11

## 2021-01-01 RX ADMIN — Medication 15 ML: at 08:18

## 2021-01-01 RX ADMIN — IPRATROPIUM BROMIDE AND ALBUTEROL SULFATE 1 AMPULE: 2.5; .5 SOLUTION RESPIRATORY (INHALATION) at 17:03

## 2021-01-01 RX ADMIN — AMIODARONE HYDROCHLORIDE 400 MG: 200 TABLET ORAL at 21:20

## 2021-01-01 RX ADMIN — ARFORMOTEROL TARTRATE 15 MCG: 15 SOLUTION RESPIRATORY (INHALATION) at 08:32

## 2021-01-01 RX ADMIN — MIDODRINE HYDROCHLORIDE 20 MG: 5 TABLET ORAL at 13:53

## 2021-01-01 RX ADMIN — SENNOSIDES 17.6 MG: 8.8 SYRUP ORAL at 21:09

## 2021-01-01 RX ADMIN — AMIODARONE HYDROCHLORIDE 200 MG: 200 TABLET ORAL at 20:00

## 2021-01-01 RX ADMIN — Medication 100 MG: at 11:14

## 2021-01-01 RX ADMIN — BUDESONIDE 500 MCG: 0.5 SUSPENSION RESPIRATORY (INHALATION) at 19:37

## 2021-01-01 RX ADMIN — LEVOTHYROXINE SODIUM 125 MCG: 125 TABLET ORAL at 06:02

## 2021-01-01 RX ADMIN — POTASSIUM CHLORIDE 20 MEQ: 400 INJECTION, SOLUTION INTRAVENOUS at 09:56

## 2021-01-01 RX ADMIN — SUCRALFATE 1 G: 1 TABLET ORAL at 13:15

## 2021-01-01 RX ADMIN — Medication 15 ML: at 21:40

## 2021-01-01 RX ADMIN — CLOPIDOGREL 75 MG: 75 TABLET, FILM COATED ORAL at 08:41

## 2021-01-01 RX ADMIN — HYDROCORTISONE SODIUM SUCCINATE 100 MG: 100 INJECTION, POWDER, FOR SOLUTION INTRAMUSCULAR; INTRAVENOUS at 14:36

## 2021-01-01 RX ADMIN — ATORVASTATIN CALCIUM 20 MG: 20 TABLET, FILM COATED ORAL at 08:19

## 2021-01-01 RX ADMIN — Medication 1000 UNITS: at 09:01

## 2021-01-01 RX ADMIN — DIGOXIN 250 MCG: 250 TABLET ORAL at 08:45

## 2021-01-01 RX ADMIN — BUDESONIDE 500 MCG: 0.5 SUSPENSION RESPIRATORY (INHALATION) at 19:42

## 2021-01-01 RX ADMIN — APIXABAN 5 MG: 5 TABLET, FILM COATED ORAL at 20:43

## 2021-01-01 RX ADMIN — CLOPIDOGREL 75 MG: 75 TABLET, FILM COATED ORAL at 08:13

## 2021-01-01 RX ADMIN — IPRATROPIUM BROMIDE AND ALBUTEROL SULFATE 1 AMPULE: 2.5; .5 SOLUTION RESPIRATORY (INHALATION) at 08:55

## 2021-01-01 RX ADMIN — INSULIN LISPRO 6 UNITS: 100 INJECTION, SOLUTION INTRAVENOUS; SUBCUTANEOUS at 12:05

## 2021-01-01 RX ADMIN — INSULIN LISPRO 3 UNITS: 100 INJECTION, SOLUTION INTRAVENOUS; SUBCUTANEOUS at 01:26

## 2021-01-01 RX ADMIN — SODIUM CHLORIDE, PRESERVATIVE FREE 10 ML: 5 INJECTION INTRAVENOUS at 20:15

## 2021-01-01 RX ADMIN — GABAPENTIN 800 MG: 400 CAPSULE ORAL at 14:30

## 2021-01-01 RX ADMIN — IPRATROPIUM BROMIDE AND ALBUTEROL SULFATE 1 AMPULE: 2.5; .5 SOLUTION RESPIRATORY (INHALATION) at 08:58

## 2021-01-01 RX ADMIN — SENNOSIDES 17.6 MG: 8.8 SYRUP ORAL at 21:48

## 2021-01-01 RX ADMIN — Medication 10 ML: at 20:38

## 2021-01-01 RX ADMIN — MIDODRINE HYDROCHLORIDE 10 MG: 5 TABLET ORAL at 17:54

## 2021-01-01 RX ADMIN — Medication 15 ML: at 21:10

## 2021-01-01 RX ADMIN — OXYCODONE HYDROCHLORIDE 5 MG: 5 TABLET ORAL at 21:01

## 2021-01-01 RX ADMIN — MIDODRINE HYDROCHLORIDE 20 MG: 5 TABLET ORAL at 08:01

## 2021-01-01 RX ADMIN — INSULIN LISPRO 6 UNITS: 100 INJECTION, SOLUTION INTRAVENOUS; SUBCUTANEOUS at 17:41

## 2021-01-01 RX ADMIN — Medication 15 ML: at 20:38

## 2021-01-01 RX ADMIN — METOCLOPRAMIDE 5 MG: 5 INJECTION, SOLUTION INTRAMUSCULAR; INTRAVENOUS at 05:34

## 2021-01-01 RX ADMIN — ARFORMOTEROL TARTRATE 15 MCG: 15 SOLUTION RESPIRATORY (INHALATION) at 09:54

## 2021-01-01 RX ADMIN — DEXAMETHASONE SODIUM PHOSPHATE 6 MG: 4 INJECTION, SOLUTION INTRA-ARTICULAR; INTRALESIONAL; INTRAMUSCULAR; INTRAVENOUS; SOFT TISSUE at 22:38

## 2021-01-01 RX ADMIN — LANSOPRAZOLE 30 MG: KIT at 05:04

## 2021-01-01 RX ADMIN — LANSOPRAZOLE 30 MG: KIT at 05:29

## 2021-01-01 RX ADMIN — POTASSIUM CHLORIDE 20 MEQ: 400 INJECTION, SOLUTION INTRAVENOUS at 13:13

## 2021-01-01 RX ADMIN — DIGOXIN 250 MCG: 250 TABLET ORAL at 08:13

## 2021-01-01 RX ADMIN — LORAZEPAM 0.5 MG: 0.5 TABLET ORAL at 15:00

## 2021-01-01 RX ADMIN — OXYCODONE HYDROCHLORIDE 10 MG: 5 TABLET ORAL at 06:09

## 2021-01-01 RX ADMIN — MIDODRINE HYDROCHLORIDE 15 MG: 5 TABLET ORAL at 18:20

## 2021-01-01 RX ADMIN — GABAPENTIN 800 MG: 400 CAPSULE ORAL at 15:08

## 2021-01-01 RX ADMIN — LANSOPRAZOLE 30 MG: KIT at 05:37

## 2021-01-01 RX ADMIN — ARFORMOTEROL TARTRATE 15 MCG: 15 SOLUTION RESPIRATORY (INHALATION) at 12:16

## 2021-01-01 RX ADMIN — DOCUSATE SODIUM 100 MG: 50 LIQUID ORAL at 08:40

## 2021-01-01 RX ADMIN — LORAZEPAM 1 MG: 2 INJECTION INTRAMUSCULAR; INTRAVENOUS at 02:04

## 2021-01-01 RX ADMIN — IPRATROPIUM BROMIDE AND ALBUTEROL SULFATE 1 AMPULE: 2.5; .5 SOLUTION RESPIRATORY (INHALATION) at 15:58

## 2021-01-01 RX ADMIN — Medication 150 MCG/HR: at 19:17

## 2021-01-01 RX ADMIN — OXYCODONE HYDROCHLORIDE 10 MG: 5 TABLET ORAL at 11:03

## 2021-01-01 RX ADMIN — BUDESONIDE 500 MCG: 0.5 SUSPENSION RESPIRATORY (INHALATION) at 20:07

## 2021-01-01 RX ADMIN — LEVOTHYROXINE SODIUM 125 MCG: 125 TABLET ORAL at 06:01

## 2021-01-01 RX ADMIN — SODIUM CHLORIDE, PRESERVATIVE FREE 10 ML: 5 INJECTION INTRAVENOUS at 20:59

## 2021-01-01 RX ADMIN — LORAZEPAM 1 MG: 1 TABLET ORAL at 20:13

## 2021-01-01 RX ADMIN — SENNOSIDES 17.6 MG: 8.8 SYRUP ORAL at 23:06

## 2021-01-01 RX ADMIN — LEVOTHYROXINE SODIUM 100 MCG: 100 TABLET ORAL at 05:16

## 2021-01-01 RX ADMIN — DEXAMETHASONE SODIUM PHOSPHATE 6 MG: 4 INJECTION, SOLUTION INTRA-ARTICULAR; INTRALESIONAL; INTRAMUSCULAR; INTRAVENOUS; SOFT TISSUE at 22:07

## 2021-01-01 RX ADMIN — OXYCODONE HYDROCHLORIDE AND ACETAMINOPHEN 500 MG: 500 TABLET ORAL at 08:43

## 2021-01-01 RX ADMIN — ZINC SULFATE 220 MG (50 MG) CAPSULE 50 MG: CAPSULE at 08:40

## 2021-01-01 RX ADMIN — GABAPENTIN 800 MG: 400 CAPSULE ORAL at 21:24

## 2021-01-01 RX ADMIN — SUCRALFATE 1 G: 1 TABLET ORAL at 06:14

## 2021-01-01 RX ADMIN — SENNOSIDES 8.8 MG: 8.8 SYRUP ORAL at 21:55

## 2021-01-01 RX ADMIN — BUDESONIDE 500 MCG: 0.5 SUSPENSION RESPIRATORY (INHALATION) at 08:23

## 2021-01-01 RX ADMIN — SODIUM CHLORIDE, PRESERVATIVE FREE 300 UNITS: 5 INJECTION INTRAVENOUS at 21:40

## 2021-01-01 RX ADMIN — LEVOTHYROXINE SODIUM 125 MCG: 125 TABLET ORAL at 05:15

## 2021-01-01 RX ADMIN — IPRATROPIUM BROMIDE AND ALBUTEROL SULFATE 1 AMPULE: 2.5; .5 SOLUTION RESPIRATORY (INHALATION) at 16:30

## 2021-01-01 RX ADMIN — CLOPIDOGREL 75 MG: 75 TABLET, FILM COATED ORAL at 09:26

## 2021-01-01 RX ADMIN — SUCRALFATE 1 G: 1 TABLET ORAL at 21:05

## 2021-01-01 RX ADMIN — METOCLOPRAMIDE 5 MG: 5 INJECTION, SOLUTION INTRAMUSCULAR; INTRAVENOUS at 23:47

## 2021-01-01 RX ADMIN — GABAPENTIN 800 MG: 400 CAPSULE ORAL at 13:18

## 2021-01-01 RX ADMIN — IPRATROPIUM BROMIDE AND ALBUTEROL SULFATE 1 AMPULE: 2.5; .5 SOLUTION RESPIRATORY (INHALATION) at 16:57

## 2021-01-01 RX ADMIN — IPRATROPIUM BROMIDE AND ALBUTEROL SULFATE 1 AMPULE: 2.5; .5 SOLUTION RESPIRATORY (INHALATION) at 19:39

## 2021-01-01 RX ADMIN — HYDROCORTISONE SODIUM SUCCINATE 50 MG: 100 INJECTION, POWDER, FOR SOLUTION INTRAMUSCULAR; INTRAVENOUS at 04:00

## 2021-01-01 RX ADMIN — SODIUM CHLORIDE 500 ML: 9 INJECTION, SOLUTION INTRAVENOUS at 05:15

## 2021-01-01 RX ADMIN — MIDODRINE HYDROCHLORIDE 15 MG: 5 TABLET ORAL at 08:22

## 2021-01-01 RX ADMIN — Medication 1000 UNITS: at 08:18

## 2021-01-01 RX ADMIN — INSULIN LISPRO 3 UNITS: 100 INJECTION, SOLUTION INTRAVENOUS; SUBCUTANEOUS at 13:54

## 2021-01-01 RX ADMIN — APIXABAN 5 MG: 5 TABLET, FILM COATED ORAL at 08:57

## 2021-01-01 RX ADMIN — INSULIN GLARGINE 40 UNITS: 100 INJECTION, SOLUTION SUBCUTANEOUS at 08:33

## 2021-01-01 RX ADMIN — SODIUM CHLORIDE, PRESERVATIVE FREE 300 UNITS: 5 INJECTION INTRAVENOUS at 07:56

## 2021-01-01 RX ADMIN — Medication 1000 MG: at 10:38

## 2021-01-01 RX ADMIN — METOPROLOL TARTRATE 5 MG: 5 INJECTION INTRAVENOUS at 15:21

## 2021-01-01 RX ADMIN — LORAZEPAM 0.5 MG: 0.5 TABLET ORAL at 08:51

## 2021-01-01 RX ADMIN — BUDESONIDE 500 MCG: 0.5 SUSPENSION RESPIRATORY (INHALATION) at 21:05

## 2021-01-01 RX ADMIN — PANTOPRAZOLE SODIUM 40 MG: 40 INJECTION, POWDER, FOR SOLUTION INTRAVENOUS at 08:57

## 2021-01-01 RX ADMIN — IPRATROPIUM BROMIDE AND ALBUTEROL SULFATE 1 AMPULE: 2.5; .5 SOLUTION RESPIRATORY (INHALATION) at 08:59

## 2021-01-01 RX ADMIN — ARFORMOTEROL TARTRATE 15 MCG: 15 SOLUTION RESPIRATORY (INHALATION) at 07:53

## 2021-01-01 RX ADMIN — SODIUM CHLORIDE, PRESERVATIVE FREE 10 ML: 5 INJECTION INTRAVENOUS at 07:57

## 2021-01-01 RX ADMIN — MIDODRINE HYDROCHLORIDE 15 MG: 5 TABLET ORAL at 08:51

## 2021-01-01 RX ADMIN — ATORVASTATIN CALCIUM 20 MG: 20 TABLET, FILM COATED ORAL at 09:17

## 2021-01-01 RX ADMIN — Medication 10 ML: at 20:59

## 2021-01-01 RX ADMIN — ENOXAPARIN SODIUM 40 MG: 40 INJECTION SUBCUTANEOUS at 08:10

## 2021-01-01 RX ADMIN — SODIUM CHLORIDE, PRESERVATIVE FREE 10 ML: 5 INJECTION INTRAVENOUS at 10:44

## 2021-01-01 RX ADMIN — INSULIN LISPRO 9 UNITS: 100 INJECTION, SOLUTION INTRAVENOUS; SUBCUTANEOUS at 00:42

## 2021-01-01 RX ADMIN — LORAZEPAM 2 MG: 1 TABLET ORAL at 14:44

## 2021-01-01 RX ADMIN — PHENYLEPHRINE HYDROCHLORIDE 175 MCG/MIN: 10 INJECTION INTRAVENOUS at 18:38

## 2021-01-01 RX ADMIN — Medication 10 ML: at 21:33

## 2021-01-01 RX ADMIN — MIDODRINE HYDROCHLORIDE 10 MG: 5 TABLET ORAL at 09:09

## 2021-01-01 RX ADMIN — LANSOPRAZOLE 30 MG: KIT at 06:47

## 2021-01-01 RX ADMIN — MIDODRINE HYDROCHLORIDE 20 MG: 5 TABLET ORAL at 17:32

## 2021-01-01 RX ADMIN — ATORVASTATIN CALCIUM 20 MG: 20 TABLET, FILM COATED ORAL at 08:10

## 2021-01-01 RX ADMIN — IPRATROPIUM BROMIDE AND ALBUTEROL SULFATE 1 AMPULE: 2.5; .5 SOLUTION RESPIRATORY (INHALATION) at 16:15

## 2021-01-01 RX ADMIN — BUDESONIDE 500 MCG: 0.5 SUSPENSION RESPIRATORY (INHALATION) at 08:55

## 2021-01-01 RX ADMIN — SODIUM CHLORIDE, PRESERVATIVE FREE 10 ML: 5 INJECTION INTRAVENOUS at 23:10

## 2021-01-01 RX ADMIN — PHENYLEPHRINE HYDROCHLORIDE 50 MCG/MIN: 10 INJECTION INTRAVENOUS at 20:46

## 2021-01-01 RX ADMIN — APIXABAN 5 MG: 5 TABLET, FILM COATED ORAL at 09:25

## 2021-01-01 RX ADMIN — LEVOTHYROXINE SODIUM 100 MCG: 100 TABLET ORAL at 05:04

## 2021-01-01 RX ADMIN — BUDESONIDE 500 MCG: 0.5 SUSPENSION RESPIRATORY (INHALATION) at 07:58

## 2021-01-01 RX ADMIN — LEVOTHYROXINE SODIUM 100 MCG: 100 TABLET ORAL at 05:37

## 2021-01-01 RX ADMIN — GABAPENTIN 800 MG: 400 CAPSULE ORAL at 21:00

## 2021-01-01 RX ADMIN — INSULIN LISPRO 3 UNITS: 100 INJECTION, SOLUTION INTRAVENOUS; SUBCUTANEOUS at 11:54

## 2021-01-01 RX ADMIN — LORAZEPAM 2 MG: 1 TABLET ORAL at 06:13

## 2021-01-01 RX ADMIN — Medication 15 ML: at 21:06

## 2021-01-01 RX ADMIN — AMIODARONE HYDROCHLORIDE 200 MG: 200 TABLET ORAL at 08:32

## 2021-01-01 RX ADMIN — AMIODARONE HYDROCHLORIDE 400 MG: 200 TABLET ORAL at 09:21

## 2021-01-01 RX ADMIN — PHENYLEPHRINE HYDROCHLORIDE 100 MCG/MIN: 10 INJECTION INTRAVENOUS at 00:32

## 2021-01-01 RX ADMIN — FENTANYL CITRATE 25 MCG: 50 INJECTION, SOLUTION INTRAMUSCULAR; INTRAVENOUS at 17:22

## 2021-01-01 RX ADMIN — INSULIN LISPRO 6 UNITS: 100 INJECTION, SOLUTION INTRAVENOUS; SUBCUTANEOUS at 00:11

## 2021-01-01 RX ADMIN — ZINC SULFATE 220 MG (50 MG) CAPSULE 50 MG: CAPSULE at 09:14

## 2021-01-01 RX ADMIN — LORAZEPAM 2 MG: 1 TABLET ORAL at 09:03

## 2021-01-01 RX ADMIN — VANCOMYCIN HYDROCHLORIDE 2000 MG: 10 INJECTION, POWDER, LYOPHILIZED, FOR SOLUTION INTRAVENOUS at 05:00

## 2021-01-01 RX ADMIN — Medication 100 MG: at 08:15

## 2021-01-01 RX ADMIN — GABAPENTIN 800 MG: 400 CAPSULE ORAL at 07:07

## 2021-01-01 RX ADMIN — DEXAMETHASONE SODIUM PHOSPHATE 6 MG: 4 INJECTION, SOLUTION INTRA-ARTICULAR; INTRALESIONAL; INTRAMUSCULAR; INTRAVENOUS; SOFT TISSUE at 10:15

## 2021-01-01 RX ADMIN — LORAZEPAM 0.5 MG: 0.5 TABLET ORAL at 14:18

## 2021-01-01 RX ADMIN — AMIODARONE HYDROCHLORIDE 400 MG: 200 TABLET ORAL at 21:31

## 2021-01-01 RX ADMIN — IPRATROPIUM BROMIDE AND ALBUTEROL SULFATE 1 AMPULE: 2.5; .5 SOLUTION RESPIRATORY (INHALATION) at 08:22

## 2021-01-01 RX ADMIN — DEXAMETHASONE SODIUM PHOSPHATE 6 MG: 4 INJECTION, SOLUTION INTRA-ARTICULAR; INTRALESIONAL; INTRAMUSCULAR; INTRAVENOUS; SOFT TISSUE at 22:00

## 2021-01-01 RX ADMIN — ATORVASTATIN CALCIUM 20 MG: 20 TABLET, FILM COATED ORAL at 08:41

## 2021-01-01 RX ADMIN — SUCRALFATE 1 G: 1 TABLET ORAL at 07:08

## 2021-01-01 RX ADMIN — IPRATROPIUM BROMIDE AND ALBUTEROL SULFATE 1 AMPULE: 2.5; .5 SOLUTION RESPIRATORY (INHALATION) at 13:06

## 2021-01-01 RX ADMIN — GABAPENTIN 800 MG: 400 CAPSULE ORAL at 23:06

## 2021-01-01 RX ADMIN — CLOPIDOGREL 75 MG: 75 TABLET, FILM COATED ORAL at 08:19

## 2021-01-01 RX ADMIN — AMIODARONE HYDROCHLORIDE 200 MG: 200 TABLET ORAL at 08:19

## 2021-01-01 RX ADMIN — INSULIN LISPRO 3 UNITS: 100 INJECTION, SOLUTION INTRAVENOUS; SUBCUTANEOUS at 06:51

## 2021-01-01 RX ADMIN — ZINC SULFATE 220 MG (50 MG) CAPSULE 50 MG: CAPSULE at 12:01

## 2021-01-01 RX ADMIN — INSULIN LISPRO 3 UNITS: 100 INJECTION, SOLUTION INTRAVENOUS; SUBCUTANEOUS at 11:13

## 2021-01-01 RX ADMIN — MIDODRINE HYDROCHLORIDE 15 MG: 5 TABLET ORAL at 08:53

## 2021-01-01 RX ADMIN — LORAZEPAM 2 MG: 1 TABLET ORAL at 14:53

## 2021-01-01 RX ADMIN — Medication 10 ML: at 21:09

## 2021-01-01 RX ADMIN — SENNOSIDES 17.6 MG: 8.8 SYRUP ORAL at 21:02

## 2021-01-01 RX ADMIN — INSULIN GLARGINE 25 UNITS: 100 INJECTION, SOLUTION SUBCUTANEOUS at 08:16

## 2021-01-01 RX ADMIN — AMIODARONE HYDROCHLORIDE 200 MG: 200 TABLET ORAL at 08:57

## 2021-01-01 RX ADMIN — SODIUM CHLORIDE, PRESERVATIVE FREE 300 UNITS: 5 INJECTION INTRAVENOUS at 09:00

## 2021-01-01 RX ADMIN — ALPRAZOLAM 0.5 MG: 0.25 TABLET ORAL at 11:23

## 2021-01-01 RX ADMIN — SENNOSIDES 17.6 MG: 8.8 SYRUP ORAL at 20:45

## 2021-01-01 RX ADMIN — DOCUSATE SODIUM 100 MG: 50 LIQUID ORAL at 08:58

## 2021-01-01 RX ADMIN — LORAZEPAM 1 MG: 2 INJECTION INTRAMUSCULAR; INTRAVENOUS at 20:58

## 2021-01-01 RX ADMIN — FENTANYL CITRATE 25 MCG: 50 INJECTION, SOLUTION INTRAMUSCULAR; INTRAVENOUS at 03:15

## 2021-01-01 RX ADMIN — PHENYLEPHRINE HYDROCHLORIDE 100 MCG/MIN: 10 INJECTION INTRAVENOUS at 07:03

## 2021-01-01 RX ADMIN — GABAPENTIN 800 MG: 400 CAPSULE ORAL at 13:03

## 2021-01-01 RX ADMIN — IPRATROPIUM BROMIDE AND ALBUTEROL SULFATE 1 AMPULE: 2.5; .5 SOLUTION RESPIRATORY (INHALATION) at 08:23

## 2021-01-01 RX ADMIN — LORAZEPAM 0.5 MG: 0.5 TABLET ORAL at 11:45

## 2021-01-01 RX ADMIN — INSULIN LISPRO 6 UNITS: 100 INJECTION, SOLUTION INTRAVENOUS; SUBCUTANEOUS at 07:05

## 2021-01-01 RX ADMIN — INSULIN LISPRO 3 UNITS: 100 INJECTION, SOLUTION INTRAVENOUS; SUBCUTANEOUS at 16:00

## 2021-01-01 RX ADMIN — Medication 10 ML: at 09:27

## 2021-01-01 RX ADMIN — LORAZEPAM 2 MG: 1 TABLET ORAL at 05:12

## 2021-01-01 RX ADMIN — GABAPENTIN 800 MG: 400 CAPSULE ORAL at 12:27

## 2021-01-01 RX ADMIN — Medication 100 MG: at 10:39

## 2021-01-01 RX ADMIN — DIGOXIN 250 MCG: 250 TABLET ORAL at 08:32

## 2021-01-01 RX ADMIN — PANTOPRAZOLE SODIUM 40 MG: 40 INJECTION, POWDER, FOR SOLUTION INTRAVENOUS at 22:18

## 2021-01-01 RX ADMIN — ATORVASTATIN CALCIUM 20 MG: 20 TABLET, FILM COATED ORAL at 09:21

## 2021-01-01 RX ADMIN — IPRATROPIUM BROMIDE AND ALBUTEROL SULFATE 1 AMPULE: 2.5; .5 SOLUTION RESPIRATORY (INHALATION) at 19:44

## 2021-01-01 RX ADMIN — OXYCODONE HYDROCHLORIDE 10 MG: 5 TABLET ORAL at 16:02

## 2021-01-01 RX ADMIN — HYDROCORTISONE SODIUM SUCCINATE 50 MG: 100 INJECTION, POWDER, FOR SOLUTION INTRAMUSCULAR; INTRAVENOUS at 22:19

## 2021-01-01 RX ADMIN — MIDODRINE HYDROCHLORIDE 20 MG: 5 TABLET ORAL at 18:27

## 2021-01-01 RX ADMIN — Medication 1000 MG: at 08:20

## 2021-01-01 RX ADMIN — SODIUM CHLORIDE, PRESERVATIVE FREE 10 ML: 5 INJECTION INTRAVENOUS at 21:09

## 2021-01-01 RX ADMIN — AMIODARONE HYDROCHLORIDE 200 MG: 200 TABLET ORAL at 09:26

## 2021-01-01 RX ADMIN — SUCRALFATE 1 G: 1 TABLET ORAL at 16:02

## 2021-01-01 RX ADMIN — Medication 2000 UNITS: at 08:01

## 2021-01-01 RX ADMIN — SODIUM CHLORIDE: 9 INJECTION, SOLUTION INTRAVENOUS at 18:03

## 2021-01-01 RX ADMIN — Medication 2000 UNITS: at 07:56

## 2021-01-01 RX ADMIN — Medication 10 ML: at 20:00

## 2021-01-01 RX ADMIN — BUDESONIDE 500 MCG: 0.5 SUSPENSION RESPIRATORY (INHALATION) at 20:33

## 2021-01-01 RX ADMIN — IPRATROPIUM BROMIDE AND ALBUTEROL SULFATE 1 AMPULE: 2.5; .5 SOLUTION RESPIRATORY (INHALATION) at 20:58

## 2021-01-01 RX ADMIN — CLOPIDOGREL 75 MG: 75 TABLET, FILM COATED ORAL at 08:01

## 2021-01-01 RX ADMIN — Medication 10 ML: at 08:02

## 2021-01-01 RX ADMIN — IPRATROPIUM BROMIDE AND ALBUTEROL SULFATE 1 AMPULE: 2.5; .5 SOLUTION RESPIRATORY (INHALATION) at 12:39

## 2021-01-01 RX ADMIN — Medication 100 MG: at 08:40

## 2021-01-01 RX ADMIN — IPRATROPIUM BROMIDE AND ALBUTEROL SULFATE 1 AMPULE: 2.5; .5 SOLUTION RESPIRATORY (INHALATION) at 07:36

## 2021-01-01 RX ADMIN — MIDODRINE HYDROCHLORIDE 20 MG: 5 TABLET ORAL at 07:56

## 2021-01-01 RX ADMIN — WATER: 1 INJECTION INTRAMUSCULAR; INTRAVENOUS; SUBCUTANEOUS at 15:00

## 2021-01-01 RX ADMIN — IPRATROPIUM BROMIDE AND ALBUTEROL SULFATE 1 AMPULE: 2.5; .5 SOLUTION RESPIRATORY (INHALATION) at 16:14

## 2021-01-01 RX ADMIN — ARFORMOTEROL TARTRATE 15 MCG: 15 SOLUTION RESPIRATORY (INHALATION) at 08:31

## 2021-01-01 RX ADMIN — Medication 10 ML: at 09:15

## 2021-01-01 RX ADMIN — SUCRALFATE 1 G: 1 TABLET ORAL at 18:31

## 2021-01-01 RX ADMIN — GABAPENTIN 800 MG: 400 CAPSULE ORAL at 06:02

## 2021-01-01 RX ADMIN — INSULIN GLARGINE 25 UNITS: 100 INJECTION, SOLUTION SUBCUTANEOUS at 08:23

## 2021-01-01 RX ADMIN — DEXAMETHASONE SODIUM PHOSPHATE 6 MG: 4 INJECTION, SOLUTION INTRA-ARTICULAR; INTRALESIONAL; INTRAMUSCULAR; INTRAVENOUS; SOFT TISSUE at 10:39

## 2021-01-01 RX ADMIN — SENNOSIDES 17.6 MG: 8.8 SYRUP ORAL at 20:16

## 2021-01-01 RX ADMIN — ZINC SULFATE 220 MG (50 MG) CAPSULE 50 MG: CAPSULE at 10:43

## 2021-01-01 RX ADMIN — Medication 10 ML: at 09:54

## 2021-01-01 RX ADMIN — ATORVASTATIN CALCIUM 20 MG: 20 TABLET, FILM COATED ORAL at 08:32

## 2021-01-01 RX ADMIN — LEVOTHYROXINE SODIUM 125 MCG: 125 TABLET ORAL at 05:08

## 2021-01-01 RX ADMIN — ZINC SULFATE 220 MG (50 MG) CAPSULE 50 MG: CAPSULE at 08:37

## 2021-01-01 RX ADMIN — SODIUM CHLORIDE, PRESERVATIVE FREE 10 ML: 5 INJECTION INTRAVENOUS at 11:00

## 2021-01-01 RX ADMIN — SODIUM CHLORIDE, PRESERVATIVE FREE 300 UNITS: 5 INJECTION INTRAVENOUS at 20:57

## 2021-01-01 RX ADMIN — OXYCODONE HYDROCHLORIDE 10 MG: 5 TABLET ORAL at 03:19

## 2021-01-01 RX ADMIN — LORAZEPAM 1 MG: 2 INJECTION INTRAMUSCULAR; INTRAVENOUS at 12:18

## 2021-01-01 RX ADMIN — INSULIN GLARGINE 25 UNITS: 100 INJECTION, SOLUTION SUBCUTANEOUS at 21:32

## 2021-01-01 RX ADMIN — ZINC SULFATE 220 MG (50 MG) CAPSULE 50 MG: CAPSULE at 11:13

## 2021-01-01 RX ADMIN — LEVOTHYROXINE SODIUM 125 MCG: 125 TABLET ORAL at 06:09

## 2021-01-01 RX ADMIN — SUCRALFATE 1 G: 1 TABLET ORAL at 06:09

## 2021-01-01 RX ADMIN — SODIUM CHLORIDE, PRESERVATIVE FREE 10 ML: 5 INJECTION INTRAVENOUS at 08:51

## 2021-01-01 RX ADMIN — ZINC SULFATE 220 MG (50 MG) CAPSULE 50 MG: CAPSULE at 08:00

## 2021-01-01 RX ADMIN — Medication 15 ML: at 08:11

## 2021-01-01 RX ADMIN — OXYCODONE HYDROCHLORIDE 5 MG: 5 TABLET ORAL at 03:08

## 2021-01-01 RX ADMIN — HYDROCORTISONE SODIUM SUCCINATE 50 MG: 100 INJECTION, POWDER, FOR SOLUTION INTRAMUSCULAR; INTRAVENOUS at 13:11

## 2021-01-01 RX ADMIN — DEXTROSE MONOHYDRATE 12.5 G: 25 INJECTION, SOLUTION INTRAVENOUS at 08:30

## 2021-01-01 RX ADMIN — IPRATROPIUM BROMIDE AND ALBUTEROL SULFATE 1 AMPULE: 2.5; .5 SOLUTION RESPIRATORY (INHALATION) at 09:58

## 2021-01-01 RX ADMIN — PANTOPRAZOLE SODIUM 40 MG: 40 INJECTION, POWDER, FOR SOLUTION INTRAVENOUS at 08:16

## 2021-01-01 RX ADMIN — ZINC SULFATE 220 MG (50 MG) CAPSULE 50 MG: CAPSULE at 08:51

## 2021-01-01 RX ADMIN — ENOXAPARIN SODIUM 40 MG: 40 INJECTION SUBCUTANEOUS at 10:43

## 2021-01-01 RX ADMIN — MIDODRINE HYDROCHLORIDE 5 MG: 5 TABLET ORAL at 10:49

## 2021-01-01 RX ADMIN — Medication 1000 MG: at 08:52

## 2021-01-01 RX ADMIN — LORAZEPAM 1 MG: 2 INJECTION INTRAMUSCULAR; INTRAVENOUS at 14:36

## 2021-01-01 RX ADMIN — IPRATROPIUM BROMIDE AND ALBUTEROL SULFATE 1 AMPULE: 2.5; .5 SOLUTION RESPIRATORY (INHALATION) at 08:38

## 2021-01-01 RX ADMIN — SENNOSIDES 17.6 MG: 8.8 SYRUP ORAL at 20:38

## 2021-01-01 RX ADMIN — AMIODARONE HYDROCHLORIDE 200 MG: 200 TABLET ORAL at 08:47

## 2021-01-01 RX ADMIN — INSULIN LISPRO 6 UNITS: 100 INJECTION, SOLUTION INTRAVENOUS; SUBCUTANEOUS at 06:14

## 2021-01-01 RX ADMIN — ENOXAPARIN SODIUM 40 MG: 40 INJECTION SUBCUTANEOUS at 12:01

## 2021-01-01 RX ADMIN — Medication 1000 UNITS: at 08:40

## 2021-01-01 RX ADMIN — POTASSIUM CHLORIDE 20 MEQ: 400 INJECTION, SOLUTION INTRAVENOUS at 11:37

## 2021-01-01 RX ADMIN — SUCRALFATE 1 G: 1 TABLET ORAL at 12:33

## 2021-01-01 RX ADMIN — SUCRALFATE 1 G: 1 TABLET ORAL at 09:26

## 2021-01-01 RX ADMIN — IPRATROPIUM BROMIDE AND ALBUTEROL SULFATE 1 AMPULE: 2.5; .5 SOLUTION RESPIRATORY (INHALATION) at 08:12

## 2021-01-01 RX ADMIN — GABAPENTIN 800 MG: 400 CAPSULE ORAL at 20:15

## 2021-01-01 RX ADMIN — Medication 10 ML: at 09:19

## 2021-01-01 RX ADMIN — MIDODRINE HYDROCHLORIDE 20 MG: 5 TABLET ORAL at 12:33

## 2021-01-01 RX ADMIN — INSULIN GLARGINE 10 UNITS: 100 INJECTION, SOLUTION SUBCUTANEOUS at 10:40

## 2021-01-01 RX ADMIN — DIGOXIN 250 MCG: 250 TABLET ORAL at 09:10

## 2021-01-01 RX ADMIN — APIXABAN 5 MG: 5 TABLET, FILM COATED ORAL at 21:01

## 2021-01-01 RX ADMIN — SODIUM CHLORIDE, PRESERVATIVE FREE 300 UNITS: 5 INJECTION INTRAVENOUS at 21:22

## 2021-01-01 RX ADMIN — LANSOPRAZOLE 30 MG: KIT at 08:19

## 2021-01-01 RX ADMIN — CLOPIDOGREL 75 MG: 75 TABLET, FILM COATED ORAL at 07:56

## 2021-01-01 RX ADMIN — Medication 15 ML: at 09:17

## 2021-01-01 RX ADMIN — METOPROLOL TARTRATE 5 MG: 5 INJECTION INTRAVENOUS at 17:58

## 2021-01-01 RX ADMIN — LEVOTHYROXINE SODIUM 125 MCG: 125 TABLET ORAL at 05:10

## 2021-01-01 RX ADMIN — INSULIN GLARGINE 25 UNITS: 100 INJECTION, SOLUTION SUBCUTANEOUS at 22:29

## 2021-01-01 RX ADMIN — MIDODRINE HYDROCHLORIDE 20 MG: 5 TABLET ORAL at 18:39

## 2021-01-01 RX ADMIN — DEXAMETHASONE SODIUM PHOSPHATE 6 MG: 4 INJECTION, SOLUTION INTRA-ARTICULAR; INTRALESIONAL; INTRAMUSCULAR; INTRAVENOUS; SOFT TISSUE at 11:07

## 2021-01-01 RX ADMIN — LORAZEPAM 0.5 MG: 0.5 TABLET ORAL at 03:20

## 2021-01-01 RX ADMIN — SUCRALFATE 1 G: 1 TABLET ORAL at 17:51

## 2021-01-01 RX ADMIN — INSULIN LISPRO 6 UNITS: 100 INJECTION, SOLUTION INTRAVENOUS; SUBCUTANEOUS at 00:15

## 2021-01-01 RX ADMIN — IPRATROPIUM BROMIDE AND ALBUTEROL SULFATE 1 AMPULE: 2.5; .5 SOLUTION RESPIRATORY (INHALATION) at 07:52

## 2021-01-01 RX ADMIN — PANTOPRAZOLE SODIUM 40 MG: 40 INJECTION, POWDER, FOR SOLUTION INTRAVENOUS at 08:45

## 2021-01-01 RX ADMIN — PANTOPRAZOLE SODIUM 40 MG: 40 INJECTION, POWDER, FOR SOLUTION INTRAVENOUS at 20:41

## 2021-01-01 RX ADMIN — GABAPENTIN 800 MG: 400 CAPSULE ORAL at 14:36

## 2021-01-01 RX ADMIN — Medication 15 ML: at 09:16

## 2021-01-01 RX ADMIN — ZINC SULFATE 220 MG (50 MG) CAPSULE 50 MG: CAPSULE at 08:22

## 2021-01-01 RX ADMIN — MIDODRINE HYDROCHLORIDE 20 MG: 5 TABLET ORAL at 17:29

## 2021-01-01 RX ADMIN — DIGOXIN 250 MCG: 250 TABLET ORAL at 07:54

## 2021-01-01 RX ADMIN — SODIUM CHLORIDE, PRESERVATIVE FREE 10 ML: 5 INJECTION INTRAVENOUS at 22:45

## 2021-01-01 RX ADMIN — LEVOTHYROXINE SODIUM 100 MCG: 100 TABLET ORAL at 06:24

## 2021-01-01 RX ADMIN — APIXABAN 5 MG: 5 TABLET, FILM COATED ORAL at 21:40

## 2021-01-01 RX ADMIN — HYDROCORTISONE SODIUM SUCCINATE 50 MG: 100 INJECTION, POWDER, FOR SOLUTION INTRAMUSCULAR; INTRAVENOUS at 09:52

## 2021-01-01 RX ADMIN — IPRATROPIUM BROMIDE AND ALBUTEROL SULFATE 1 AMPULE: 2.5; .5 SOLUTION RESPIRATORY (INHALATION) at 21:05

## 2021-01-01 RX ADMIN — FUROSEMIDE 40 MG: 10 INJECTION, SOLUTION INTRAMUSCULAR; INTRAVENOUS at 11:29

## 2021-01-01 RX ADMIN — GABAPENTIN 800 MG: 400 CAPSULE ORAL at 06:06

## 2021-01-01 RX ADMIN — Medication 2000 UNITS: at 08:22

## 2021-01-01 RX ADMIN — Medication 100 MG: at 08:22

## 2021-01-01 RX ADMIN — HYDROCORTISONE SODIUM SUCCINATE 50 MG: 100 INJECTION, POWDER, FOR SOLUTION INTRAMUSCULAR; INTRAVENOUS at 17:55

## 2021-01-01 RX ADMIN — LEVOTHYROXINE SODIUM 100 MCG: 100 TABLET ORAL at 06:20

## 2021-01-01 RX ADMIN — SENNOSIDES 17.6 MG: 8.8 SYRUP ORAL at 00:06

## 2021-01-01 RX ADMIN — GABAPENTIN 800 MG: 400 CAPSULE ORAL at 14:54

## 2021-01-01 RX ADMIN — LORAZEPAM 2 MG: 1 TABLET ORAL at 14:57

## 2021-01-01 RX ADMIN — METOCLOPRAMIDE 5 MG: 5 INJECTION, SOLUTION INTRAMUSCULAR; INTRAVENOUS at 00:34

## 2021-01-01 RX ADMIN — SUCRALFATE 1 G: 1 TABLET ORAL at 06:07

## 2021-01-01 RX ADMIN — BUDESONIDE 500 MCG: 0.5 SUSPENSION RESPIRATORY (INHALATION) at 08:46

## 2021-01-01 RX ADMIN — OXYCODONE HYDROCHLORIDE AND ACETAMINOPHEN 500 MG: 500 TABLET ORAL at 20:39

## 2021-01-01 RX ADMIN — Medication 1000 UNITS: at 09:44

## 2021-01-01 RX ADMIN — OXYCODONE HYDROCHLORIDE AND ACETAMINOPHEN 500 MG: 500 TABLET ORAL at 21:32

## 2021-01-01 RX ADMIN — LORAZEPAM 1 MG: 2 INJECTION INTRAMUSCULAR; INTRAVENOUS at 03:35

## 2021-01-01 RX ADMIN — APIXABAN 5 MG: 5 TABLET, FILM COATED ORAL at 08:32

## 2021-01-01 RX ADMIN — BUDESONIDE 500 MCG: 0.5 SUSPENSION RESPIRATORY (INHALATION) at 09:55

## 2021-01-01 RX ADMIN — Medication 15 ML: at 21:56

## 2021-01-01 RX ADMIN — ARFORMOTEROL TARTRATE 15 MCG: 15 SOLUTION RESPIRATORY (INHALATION) at 19:44

## 2021-01-01 RX ADMIN — SENNOSIDES 17.6 MG: 8.8 SYRUP ORAL at 22:22

## 2021-01-01 RX ADMIN — LANSOPRAZOLE 30 MG: KIT at 09:44

## 2021-01-01 RX ADMIN — ARFORMOTEROL TARTRATE 15 MCG: 15 SOLUTION RESPIRATORY (INHALATION) at 20:58

## 2021-01-01 RX ADMIN — ZINC SULFATE 220 MG (50 MG) CAPSULE 50 MG: CAPSULE at 09:26

## 2021-01-01 RX ADMIN — ARFORMOTEROL TARTRATE 15 MCG: 15 SOLUTION RESPIRATORY (INHALATION) at 19:38

## 2021-01-01 RX ADMIN — SENNOSIDES 17.6 MG: 8.8 SYRUP ORAL at 21:17

## 2021-01-01 RX ADMIN — SODIUM CHLORIDE: 9 INJECTION, SOLUTION INTRAVENOUS at 03:35

## 2021-01-01 RX ADMIN — ARFORMOTEROL TARTRATE 15 MCG: 15 SOLUTION RESPIRATORY (INHALATION) at 19:37

## 2021-01-01 RX ADMIN — ZINC SULFATE 220 MG (50 MG) CAPSULE 50 MG: CAPSULE at 08:44

## 2021-01-01 RX ADMIN — IPRATROPIUM BROMIDE AND ALBUTEROL SULFATE 1 AMPULE: 2.5; .5 SOLUTION RESPIRATORY (INHALATION) at 08:14

## 2021-01-01 RX ADMIN — DIGOXIN 250 MCG: 250 TABLET ORAL at 09:13

## 2021-01-01 RX ADMIN — ZINC SULFATE 220 MG (50 MG) CAPSULE 50 MG: CAPSULE at 08:56

## 2021-01-01 RX ADMIN — Medication 10 ML: at 21:07

## 2021-01-01 RX ADMIN — SUCRALFATE 1 G: 1 TABLET ORAL at 06:16

## 2021-01-01 RX ADMIN — WATER 10 ML: 1 INJECTION INTRAMUSCULAR; INTRAVENOUS; SUBCUTANEOUS at 14:17

## 2021-01-01 RX ADMIN — ARFORMOTEROL TARTRATE 15 MCG: 15 SOLUTION RESPIRATORY (INHALATION) at 08:07

## 2021-01-01 RX ADMIN — LORAZEPAM 1 MG: 2 INJECTION INTRAMUSCULAR; INTRAVENOUS at 15:56

## 2021-01-01 RX ADMIN — SUCRALFATE 1 G: 1 TABLET ORAL at 20:38

## 2021-01-01 RX ADMIN — Medication 15 ML: at 21:20

## 2021-01-01 RX ADMIN — BUDESONIDE 500 MCG: 0.5 SUSPENSION RESPIRATORY (INHALATION) at 19:52

## 2021-01-01 RX ADMIN — HYDROCORTISONE SODIUM SUCCINATE 100 MG: 100 INJECTION, POWDER, FOR SOLUTION INTRAMUSCULAR; INTRAVENOUS at 06:13

## 2021-01-01 RX ADMIN — INSULIN LISPRO 3 UNITS: 100 INJECTION, SOLUTION INTRAVENOUS; SUBCUTANEOUS at 23:33

## 2021-01-01 RX ADMIN — Medication 1000 MG: at 09:26

## 2021-01-01 RX ADMIN — MIDODRINE HYDROCHLORIDE 10 MG: 5 TABLET ORAL at 08:14

## 2021-01-01 RX ADMIN — MORPHINE SULFATE 1 MG/HR: 10 INJECTION INTRAVENOUS at 12:13

## 2021-01-01 RX ADMIN — HYDROCORTISONE SODIUM SUCCINATE 50 MG: 100 INJECTION, POWDER, FOR SOLUTION INTRAMUSCULAR; INTRAVENOUS at 15:03

## 2021-01-01 RX ADMIN — CLOPIDOGREL 75 MG: 75 TABLET, FILM COATED ORAL at 10:43

## 2021-01-01 RX ADMIN — LEVOTHYROXINE SODIUM 125 MCG: 125 TABLET ORAL at 05:44

## 2021-01-01 RX ADMIN — OXYCODONE HYDROCHLORIDE 7.5 MG: 15 TABLET ORAL at 21:47

## 2021-01-01 RX ADMIN — WATER: 1 INJECTION INTRAMUSCULAR; INTRAVENOUS; SUBCUTANEOUS at 14:39

## 2021-01-01 RX ADMIN — APIXABAN 5 MG: 5 TABLET, FILM COATED ORAL at 21:07

## 2021-01-01 RX ADMIN — GABAPENTIN 800 MG: 400 CAPSULE ORAL at 05:10

## 2021-01-01 RX ADMIN — OXYCODONE HYDROCHLORIDE 10 MG: 5 TABLET ORAL at 16:05

## 2021-01-01 RX ADMIN — SENNOSIDES 17.6 MG: 8.8 SYRUP ORAL at 11:20

## 2021-01-01 RX ADMIN — BUDESONIDE 500 MCG: 0.5 SUSPENSION RESPIRATORY (INHALATION) at 07:36

## 2021-01-01 RX ADMIN — SODIUM CHLORIDE, PRESERVATIVE FREE 300 UNITS: 5 INJECTION INTRAVENOUS at 09:28

## 2021-01-01 RX ADMIN — LEVOTHYROXINE SODIUM 100 MCG: 100 TABLET ORAL at 05:12

## 2021-01-01 RX ADMIN — ARFORMOTEROL TARTRATE 15 MCG: 15 SOLUTION RESPIRATORY (INHALATION) at 22:34

## 2021-01-01 RX ADMIN — OXYCODONE HYDROCHLORIDE 10 MG: 5 TABLET ORAL at 18:03

## 2021-01-01 RX ADMIN — AMIODARONE HYDROCHLORIDE 200 MG: 200 TABLET ORAL at 08:17

## 2021-01-01 RX ADMIN — SODIUM CHLORIDE, PRESERVATIVE FREE 300 UNITS: 5 INJECTION INTRAVENOUS at 08:58

## 2021-01-01 RX ADMIN — SUCRALFATE 1 G: 1 TABLET ORAL at 12:05

## 2021-01-01 RX ADMIN — MIDODRINE HYDROCHLORIDE 15 MG: 5 TABLET ORAL at 12:14

## 2021-01-01 RX ADMIN — Medication 10 ML: at 10:46

## 2021-01-01 RX ADMIN — METOCLOPRAMIDE HYDROCHLORIDE 5 MG: 5 TABLET ORAL at 12:17

## 2021-01-01 RX ADMIN — Medication 10 ML: at 08:15

## 2021-01-01 RX ADMIN — ZINC SULFATE 220 MG (50 MG) CAPSULE 50 MG: CAPSULE at 08:24

## 2021-01-01 RX ADMIN — ATORVASTATIN CALCIUM 20 MG: 20 TABLET, FILM COATED ORAL at 07:56

## 2021-01-01 RX ADMIN — LORAZEPAM 2 MG: 1 TABLET ORAL at 18:19

## 2021-01-01 RX ADMIN — INSULIN LISPRO 3 UNITS: 100 INJECTION, SOLUTION INTRAVENOUS; SUBCUTANEOUS at 07:27

## 2021-01-01 RX ADMIN — Medication 15 ML: at 22:17

## 2021-01-01 RX ADMIN — ZINC SULFATE 220 MG (50 MG) CAPSULE 50 MG: CAPSULE at 09:01

## 2021-01-01 RX ADMIN — IPRATROPIUM BROMIDE AND ALBUTEROL SULFATE 1 AMPULE: 2.5; .5 SOLUTION RESPIRATORY (INHALATION) at 20:18

## 2021-01-01 RX ADMIN — Medication 15 ML: at 08:13

## 2021-01-01 RX ADMIN — DOCUSATE SODIUM 100 MG: 50 LIQUID ORAL at 08:13

## 2021-01-01 RX ADMIN — BUDESONIDE 500 MCG: 0.5 SUSPENSION RESPIRATORY (INHALATION) at 22:34

## 2021-01-01 RX ADMIN — PANTOPRAZOLE SODIUM 40 MG: 40 INJECTION, POWDER, FOR SOLUTION INTRAVENOUS at 20:58

## 2021-01-01 RX ADMIN — HYDROCORTISONE SODIUM SUCCINATE 50 MG: 100 INJECTION, POWDER, FOR SOLUTION INTRAMUSCULAR; INTRAVENOUS at 05:52

## 2021-01-01 RX ADMIN — Medication 10 ML: at 20:41

## 2021-01-01 RX ADMIN — Medication 15 ML: at 21:24

## 2021-01-01 RX ADMIN — GABAPENTIN 800 MG: 400 CAPSULE ORAL at 06:27

## 2021-01-01 RX ADMIN — DOCUSATE SODIUM 100 MG: 50 LIQUID ORAL at 09:00

## 2021-01-01 RX ADMIN — IPRATROPIUM BROMIDE AND ALBUTEROL SULFATE 1 AMPULE: 2.5; .5 SOLUTION RESPIRATORY (INHALATION) at 16:01

## 2021-01-01 RX ADMIN — SUCRALFATE 1 G: 1 TABLET ORAL at 06:23

## 2021-01-01 RX ADMIN — HYDROCORTISONE SODIUM SUCCINATE 100 MG: 100 INJECTION, POWDER, FOR SOLUTION INTRAMUSCULAR; INTRAVENOUS at 20:21

## 2021-01-01 RX ADMIN — ARFORMOTEROL TARTRATE 15 MCG: 15 SOLUTION RESPIRATORY (INHALATION) at 19:39

## 2021-01-01 RX ADMIN — Medication 15 ML: at 11:02

## 2021-01-01 RX ADMIN — INSULIN LISPRO 6 UNITS: 100 INJECTION, SOLUTION INTRAVENOUS; SUBCUTANEOUS at 23:27

## 2021-01-01 RX ADMIN — INSULIN LISPRO 3 UNITS: 100 INJECTION, SOLUTION INTRAVENOUS; SUBCUTANEOUS at 13:14

## 2021-01-01 RX ADMIN — CLOPIDOGREL 75 MG: 75 TABLET, FILM COATED ORAL at 08:11

## 2021-01-01 RX ADMIN — IPRATROPIUM BROMIDE AND ALBUTEROL SULFATE 1 AMPULE: 2.5; .5 SOLUTION RESPIRATORY (INHALATION) at 20:27

## 2021-01-01 RX ADMIN — GABAPENTIN 800 MG: 400 CAPSULE ORAL at 21:39

## 2021-01-01 RX ADMIN — ARFORMOTEROL TARTRATE 15 MCG: 15 SOLUTION RESPIRATORY (INHALATION) at 08:58

## 2021-01-01 RX ADMIN — PHENYLEPHRINE HYDROCHLORIDE 125 MCG/MIN: 10 INJECTION INTRAVENOUS at 20:56

## 2021-01-01 RX ADMIN — OXYCODONE HYDROCHLORIDE 7.5 MG: 15 TABLET ORAL at 17:47

## 2021-01-01 RX ADMIN — ATORVASTATIN CALCIUM 20 MG: 20 TABLET, FILM COATED ORAL at 10:42

## 2021-01-01 RX ADMIN — MIDODRINE HYDROCHLORIDE 20 MG: 5 TABLET ORAL at 17:51

## 2021-01-01 RX ADMIN — LEVOTHYROXINE SODIUM 100 MCG: 100 TABLET ORAL at 06:47

## 2021-01-01 RX ADMIN — PANTOPRAZOLE SODIUM 40 MG: 40 INJECTION, POWDER, FOR SOLUTION INTRAVENOUS at 08:48

## 2021-01-01 RX ADMIN — GABAPENTIN 800 MG: 400 CAPSULE ORAL at 22:19

## 2021-01-01 RX ADMIN — DIGOXIN 250 MCG: 250 TABLET ORAL at 10:42

## 2021-01-01 RX ADMIN — Medication 100 MG: at 08:11

## 2021-01-01 RX ADMIN — APIXABAN 5 MG: 5 TABLET, FILM COATED ORAL at 08:15

## 2021-01-01 RX ADMIN — LORAZEPAM 1 MG: 1 TABLET ORAL at 13:56

## 2021-01-01 RX ADMIN — APIXABAN 5 MG: 5 TABLET, FILM COATED ORAL at 08:17

## 2021-01-01 RX ADMIN — MIDODRINE HYDROCHLORIDE 10 MG: 5 TABLET ORAL at 08:22

## 2021-01-01 RX ADMIN — INSULIN GLARGINE 30 UNITS: 100 INJECTION, SOLUTION SUBCUTANEOUS at 08:00

## 2021-01-01 RX ADMIN — ATORVASTATIN CALCIUM 20 MG: 20 TABLET, FILM COATED ORAL at 08:13

## 2021-01-01 RX ADMIN — ATORVASTATIN CALCIUM 20 MG: 20 TABLET, FILM COATED ORAL at 08:51

## 2021-01-01 RX ADMIN — OXYCODONE HYDROCHLORIDE 10 MG: 5 TABLET ORAL at 09:25

## 2021-01-01 RX ADMIN — IPRATROPIUM BROMIDE AND ALBUTEROL SULFATE 1 AMPULE: 2.5; .5 SOLUTION RESPIRATORY (INHALATION) at 16:34

## 2021-01-01 RX ADMIN — LEVOTHYROXINE SODIUM 100 MCG: 100 TABLET ORAL at 05:19

## 2021-01-01 RX ADMIN — HYDROCORTISONE SODIUM SUCCINATE 50 MG: 100 INJECTION, POWDER, FOR SOLUTION INTRAMUSCULAR; INTRAVENOUS at 17:56

## 2021-01-01 RX ADMIN — ARFORMOTEROL TARTRATE 15 MCG: 15 SOLUTION RESPIRATORY (INHALATION) at 08:49

## 2021-01-01 RX ADMIN — MIDODRINE HYDROCHLORIDE 15 MG: 5 TABLET ORAL at 12:22

## 2021-01-01 RX ADMIN — ATORVASTATIN CALCIUM 20 MG: 20 TABLET, FILM COATED ORAL at 09:11

## 2021-01-01 RX ADMIN — Medication 100 MG: at 09:26

## 2021-01-01 RX ADMIN — SENNOSIDES 17.6 MG: 8.8 SYRUP ORAL at 09:16

## 2021-01-01 RX ADMIN — GABAPENTIN 800 MG: 400 CAPSULE ORAL at 21:20

## 2021-01-01 RX ADMIN — LORAZEPAM 0.5 MG: 0.5 TABLET ORAL at 04:11

## 2021-01-01 RX ADMIN — Medication 15 ML: at 10:58

## 2021-01-01 RX ADMIN — Medication 100 MG: at 08:19

## 2021-01-01 RX ADMIN — INSULIN LISPRO 3 UNITS: 100 INJECTION, SOLUTION INTRAVENOUS; SUBCUTANEOUS at 12:00

## 2021-01-01 RX ADMIN — IPRATROPIUM BROMIDE AND ALBUTEROL SULFATE 1 AMPULE: 2.5; .5 SOLUTION RESPIRATORY (INHALATION) at 15:53

## 2021-01-01 RX ADMIN — INSULIN LISPRO 3 UNITS: 100 INJECTION, SOLUTION INTRAVENOUS; SUBCUTANEOUS at 07:06

## 2021-01-01 RX ADMIN — MIDODRINE HYDROCHLORIDE 20 MG: 5 TABLET ORAL at 18:31

## 2021-01-01 RX ADMIN — ZINC SULFATE 220 MG (50 MG) CAPSULE 50 MG: CAPSULE at 08:50

## 2021-01-01 RX ADMIN — BUDESONIDE 500 MCG: 0.5 SUSPENSION RESPIRATORY (INHALATION) at 08:15

## 2021-01-01 RX ADMIN — MIDODRINE HYDROCHLORIDE 10 MG: 5 TABLET ORAL at 18:04

## 2021-01-01 RX ADMIN — Medication 100 MCG/HR: at 02:45

## 2021-01-01 RX ADMIN — PHENYLEPHRINE HYDROCHLORIDE 250 MCG/MIN: 10 INJECTION INTRAVENOUS at 07:59

## 2021-01-01 RX ADMIN — INSULIN GLARGINE 5 UNITS: 100 INJECTION, SOLUTION SUBCUTANEOUS at 10:15

## 2021-01-01 RX ADMIN — POTASSIUM BICARBONATE 40 MEQ: 782 TABLET, EFFERVESCENT ORAL at 10:04

## 2021-01-01 RX ADMIN — Medication 2000 UNITS: at 09:21

## 2021-01-01 RX ADMIN — SENNOSIDES 17.6 MG: 8.8 SYRUP ORAL at 21:32

## 2021-01-01 RX ADMIN — SUCRALFATE 1 G: 1 TABLET ORAL at 11:14

## 2021-01-01 RX ADMIN — GABAPENTIN 800 MG: 400 CAPSULE ORAL at 22:17

## 2021-01-01 RX ADMIN — INSULIN LISPRO 6 UNITS: 100 INJECTION, SOLUTION INTRAVENOUS; SUBCUTANEOUS at 00:17

## 2021-01-01 RX ADMIN — FUROSEMIDE 40 MG: 10 INJECTION, SOLUTION INTRAMUSCULAR; INTRAVENOUS at 09:14

## 2021-01-01 RX ADMIN — ATORVASTATIN CALCIUM 20 MG: 20 TABLET, FILM COATED ORAL at 08:40

## 2021-01-01 RX ADMIN — AMIODARONE HYDROCHLORIDE 400 MG: 200 TABLET ORAL at 09:20

## 2021-01-01 RX ADMIN — GABAPENTIN 800 MG: 400 CAPSULE ORAL at 05:11

## 2021-01-01 RX ADMIN — IPRATROPIUM BROMIDE AND ALBUTEROL SULFATE 1 AMPULE: 2.5; .5 SOLUTION RESPIRATORY (INHALATION) at 12:52

## 2021-01-01 RX ADMIN — Medication 100 MG: at 09:25

## 2021-01-01 RX ADMIN — SENNOSIDES 8.8 MG: 8.8 SYRUP ORAL at 21:11

## 2021-01-01 RX ADMIN — Medication 1000 UNITS: at 08:12

## 2021-01-01 RX ADMIN — IPRATROPIUM BROMIDE AND ALBUTEROL SULFATE 1 AMPULE: 2.5; .5 SOLUTION RESPIRATORY (INHALATION) at 16:23

## 2021-01-01 RX ADMIN — ACETAZOLAMIDE 500 MG: 250 TABLET ORAL at 11:42

## 2021-01-01 RX ADMIN — METOCLOPRAMIDE 5 MG: 5 INJECTION, SOLUTION INTRAMUSCULAR; INTRAVENOUS at 06:13

## 2021-01-01 RX ADMIN — SODIUM CHLORIDE, PRESERVATIVE FREE 10 ML: 5 INJECTION INTRAVENOUS at 22:19

## 2021-01-01 RX ADMIN — SUCRALFATE 1 G: 1 TABLET ORAL at 12:18

## 2021-01-01 RX ADMIN — MIDODRINE HYDROCHLORIDE 20 MG: 5 TABLET ORAL at 16:15

## 2021-01-01 RX ADMIN — PHENYLEPHRINE HYDROCHLORIDE 75 MCG/MIN: 10 INJECTION INTRAVENOUS at 10:21

## 2021-01-01 RX ADMIN — Medication 15 ML: at 07:56

## 2021-01-01 RX ADMIN — LORAZEPAM 1 MG: 2 INJECTION INTRAMUSCULAR; INTRAVENOUS at 11:42

## 2021-01-01 RX ADMIN — Medication 125 MCG/HR: at 22:23

## 2021-01-01 RX ADMIN — LEVOTHYROXINE SODIUM 100 MCG: 100 TABLET ORAL at 05:20

## 2021-01-01 RX ADMIN — Medication 1000 MG: at 08:48

## 2021-01-01 RX ADMIN — IPRATROPIUM BROMIDE AND ALBUTEROL SULFATE 1 AMPULE: 2.5; .5 SOLUTION RESPIRATORY (INHALATION) at 20:11

## 2021-01-01 RX ADMIN — INSULIN LISPRO 6 UNITS: 100 INJECTION, SOLUTION INTRAVENOUS; SUBCUTANEOUS at 18:03

## 2021-01-01 RX ADMIN — ARFORMOTEROL TARTRATE 15 MCG: 15 SOLUTION RESPIRATORY (INHALATION) at 08:12

## 2021-01-01 RX ADMIN — INSULIN GLARGINE 30 UNITS: 100 INJECTION, SOLUTION SUBCUTANEOUS at 08:51

## 2021-01-01 RX ADMIN — MIDODRINE HYDROCHLORIDE 10 MG: 5 TABLET ORAL at 17:56

## 2021-01-01 RX ADMIN — ARFORMOTEROL TARTRATE 15 MCG: 15 SOLUTION RESPIRATORY (INHALATION) at 20:12

## 2021-01-01 RX ADMIN — MIDODRINE HYDROCHLORIDE 15 MG: 5 TABLET ORAL at 08:18

## 2021-01-01 RX ADMIN — SUCRALFATE 1 G: 1 TABLET ORAL at 22:18

## 2021-01-01 RX ADMIN — INSULIN LISPRO 6 UNITS: 100 INJECTION, SOLUTION INTRAVENOUS; SUBCUTANEOUS at 07:27

## 2021-01-01 RX ADMIN — LORAZEPAM 2 MG: 1 TABLET ORAL at 22:19

## 2021-01-01 RX ADMIN — LORAZEPAM 0.5 MG: 0.5 TABLET ORAL at 21:18

## 2021-01-01 RX ADMIN — DOCUSATE SODIUM 100 MG: 50 LIQUID ORAL at 08:52

## 2021-01-01 RX ADMIN — SENNOSIDES 17.6 MG: 8.8 SYRUP ORAL at 22:09

## 2021-01-01 RX ADMIN — INSULIN LISPRO 9 UNITS: 100 INJECTION, SOLUTION INTRAVENOUS; SUBCUTANEOUS at 00:45

## 2021-01-01 RX ADMIN — PANTOPRAZOLE SODIUM 40 MG: 40 INJECTION, POWDER, FOR SOLUTION INTRAVENOUS at 20:59

## 2021-01-01 RX ADMIN — INSULIN LISPRO 3 UNITS: 100 INJECTION, SOLUTION INTRAVENOUS; SUBCUTANEOUS at 06:02

## 2021-01-01 RX ADMIN — PIPERACILLIN AND TAZOBACTAM 3.38 G: 3; .375 INJECTION, POWDER, LYOPHILIZED, FOR SOLUTION INTRAVENOUS at 22:00

## 2021-01-01 RX ADMIN — APIXABAN 5 MG: 5 TABLET, FILM COATED ORAL at 11:23

## 2021-01-01 RX ADMIN — AMIODARONE HYDROCHLORIDE 200 MG: 200 TABLET ORAL at 21:01

## 2021-01-01 RX ADMIN — AMIODARONE HYDROCHLORIDE 200 MG: 200 TABLET ORAL at 11:16

## 2021-01-01 RX ADMIN — Medication 10 ML: at 08:41

## 2021-01-01 RX ADMIN — INSULIN LISPRO 3 UNITS: 100 INJECTION, SOLUTION INTRAVENOUS; SUBCUTANEOUS at 22:24

## 2021-01-01 RX ADMIN — AMIODARONE HYDROCHLORIDE 400 MG: 200 TABLET ORAL at 09:52

## 2021-01-01 RX ADMIN — Medication 100 MG: at 08:55

## 2021-01-01 RX ADMIN — PIPERACILLIN AND TAZOBACTAM 3.38 G: 3; .375 INJECTION, POWDER, LYOPHILIZED, FOR SOLUTION INTRAVENOUS at 07:12

## 2021-01-01 RX ADMIN — APIXABAN 5 MG: 5 TABLET, FILM COATED ORAL at 23:06

## 2021-01-01 RX ADMIN — DOCUSATE SODIUM 100 MG: 50 LIQUID ORAL at 09:12

## 2021-01-01 RX ADMIN — APIXABAN 5 MG: 5 TABLET, FILM COATED ORAL at 08:19

## 2021-01-01 RX ADMIN — Medication 1000 UNITS: at 08:11

## 2021-01-01 RX ADMIN — GABAPENTIN 800 MG: 400 CAPSULE ORAL at 17:34

## 2021-01-01 RX ADMIN — ARFORMOTEROL TARTRATE 15 MCG: 15 SOLUTION RESPIRATORY (INHALATION) at 20:11

## 2021-01-01 RX ADMIN — GABAPENTIN 800 MG: 400 CAPSULE ORAL at 06:20

## 2021-01-01 RX ADMIN — SUCRALFATE 1 G: 1 TABLET ORAL at 17:25

## 2021-01-01 RX ADMIN — Medication 10 ML: at 20:16

## 2021-01-01 RX ADMIN — INSULIN GLARGINE 25 UNITS: 100 INJECTION, SOLUTION SUBCUTANEOUS at 10:02

## 2021-01-01 RX ADMIN — IPRATROPIUM BROMIDE AND ALBUTEROL SULFATE 1 AMPULE: 2.5; .5 SOLUTION RESPIRATORY (INHALATION) at 11:54

## 2021-01-01 RX ADMIN — IPRATROPIUM BROMIDE AND ALBUTEROL SULFATE 1 AMPULE: 2.5; .5 SOLUTION RESPIRATORY (INHALATION) at 08:07

## 2021-01-01 RX ADMIN — INSULIN LISPRO 6 UNITS: 100 INJECTION, SOLUTION INTRAVENOUS; SUBCUTANEOUS at 11:42

## 2021-01-01 RX ADMIN — INSULIN LISPRO 3 UNITS: 100 INJECTION, SOLUTION INTRAVENOUS; SUBCUTANEOUS at 06:22

## 2021-01-01 RX ADMIN — METOCLOPRAMIDE 5 MG: 5 INJECTION, SOLUTION INTRAMUSCULAR; INTRAVENOUS at 00:06

## 2021-01-01 RX ADMIN — MIDODRINE HYDROCHLORIDE 20 MG: 5 TABLET ORAL at 08:47

## 2021-01-01 RX ADMIN — INSULIN GLARGINE 25 UNITS: 100 INJECTION, SOLUTION SUBCUTANEOUS at 10:59

## 2021-01-01 RX ADMIN — INSULIN GLARGINE 25 UNITS: 100 INJECTION, SOLUTION SUBCUTANEOUS at 09:30

## 2021-01-01 RX ADMIN — SODIUM CHLORIDE 500 ML: 9 INJECTION, SOLUTION INTRAVENOUS at 15:00

## 2021-01-01 RX ADMIN — IPRATROPIUM BROMIDE AND ALBUTEROL SULFATE 1 AMPULE: 2.5; .5 SOLUTION RESPIRATORY (INHALATION) at 08:15

## 2021-01-01 RX ADMIN — ARFORMOTEROL TARTRATE 15 MCG: 15 SOLUTION RESPIRATORY (INHALATION) at 08:11

## 2021-01-01 RX ADMIN — INSULIN GLARGINE 30 UNITS: 100 INJECTION, SOLUTION SUBCUTANEOUS at 08:09

## 2021-01-01 RX ADMIN — METOCLOPRAMIDE 5 MG: 5 INJECTION, SOLUTION INTRAMUSCULAR; INTRAVENOUS at 18:00

## 2021-01-01 RX ADMIN — AMIODARONE HYDROCHLORIDE 400 MG: 200 TABLET ORAL at 21:10

## 2021-01-01 RX ADMIN — SODIUM CHLORIDE, PRESERVATIVE FREE 300 UNITS: 5 INJECTION INTRAVENOUS at 20:30

## 2021-01-01 RX ADMIN — LORAZEPAM 2 MG: 1 TABLET ORAL at 11:21

## 2021-01-01 RX ADMIN — IPRATROPIUM BROMIDE AND ALBUTEROL SULFATE 1 AMPULE: 2.5; .5 SOLUTION RESPIRATORY (INHALATION) at 13:12

## 2021-01-01 RX ADMIN — ARFORMOTEROL TARTRATE 15 MCG: 15 SOLUTION RESPIRATORY (INHALATION) at 08:15

## 2021-01-01 RX ADMIN — Medication 10 ML: at 23:10

## 2021-01-01 RX ADMIN — INSULIN LISPRO 3 UNITS: 100 INJECTION, SOLUTION INTRAVENOUS; SUBCUTANEOUS at 06:18

## 2021-01-01 RX ADMIN — DEXTROSE MONOHYDRATE 12.5 G: 25 INJECTION, SOLUTION INTRAVENOUS at 05:40

## 2021-01-01 RX ADMIN — INSULIN LISPRO 3 UNITS: 100 INJECTION, SOLUTION INTRAVENOUS; SUBCUTANEOUS at 00:21

## 2021-01-01 RX ADMIN — POTASSIUM CHLORIDE 20 MEQ: 400 INJECTION, SOLUTION INTRAVENOUS at 10:02

## 2021-01-01 RX ADMIN — MIDODRINE HYDROCHLORIDE 10 MG: 5 TABLET ORAL at 15:09

## 2021-01-01 RX ADMIN — SUCRALFATE 1 G: 1 TABLET ORAL at 11:43

## 2021-01-01 RX ADMIN — SODIUM CHLORIDE, PRESERVATIVE FREE 300 UNITS: 5 INJECTION INTRAVENOUS at 09:19

## 2021-01-01 RX ADMIN — IPRATROPIUM BROMIDE AND ALBUTEROL SULFATE 1 AMPULE: 2.5; .5 SOLUTION RESPIRATORY (INHALATION) at 11:45

## 2021-01-01 RX ADMIN — BUDESONIDE 500 MCG: 0.5 SUSPENSION RESPIRATORY (INHALATION) at 20:58

## 2021-01-01 RX ADMIN — GABAPENTIN 800 MG: 400 CAPSULE ORAL at 06:07

## 2021-01-01 RX ADMIN — ARFORMOTEROL TARTRATE 15 MCG: 15 SOLUTION RESPIRATORY (INHALATION) at 19:47

## 2021-01-01 RX ADMIN — LORAZEPAM 2 MG: 1 TABLET ORAL at 11:03

## 2021-01-01 RX ADMIN — DOCUSATE SODIUM 100 MG: 50 LIQUID ORAL at 08:19

## 2021-01-01 RX ADMIN — ACETAMINOPHEN 650 MG: 650 SOLUTION ORAL at 17:24

## 2021-01-01 RX ADMIN — OXYCODONE HYDROCHLORIDE 10 MG: 5 TABLET ORAL at 17:02

## 2021-01-01 RX ADMIN — SODIUM CHLORIDE, PRESERVATIVE FREE 300 UNITS: 5 INJECTION INTRAVENOUS at 09:25

## 2021-01-01 RX ADMIN — POTASSIUM CHLORIDE 20 MEQ: 400 INJECTION, SOLUTION INTRAVENOUS at 12:30

## 2021-01-01 RX ADMIN — INSULIN GLARGINE 25 UNITS: 100 INJECTION, SOLUTION SUBCUTANEOUS at 09:23

## 2021-01-01 RX ADMIN — SODIUM CHLORIDE, PRESERVATIVE FREE 300 UNITS: 5 INJECTION INTRAVENOUS at 20:14

## 2021-01-01 RX ADMIN — SODIUM CHLORIDE: 9 INJECTION, SOLUTION INTRAVENOUS at 10:11

## 2021-01-01 RX ADMIN — Medication 1000 MG: at 07:56

## 2021-01-01 RX ADMIN — OXYCODONE HYDROCHLORIDE 10 MG: 5 TABLET ORAL at 03:27

## 2021-01-01 RX ADMIN — MIDODRINE HYDROCHLORIDE 10 MG: 5 TABLET ORAL at 12:10

## 2021-01-01 RX ADMIN — PANTOPRAZOLE SODIUM 40 MG: 40 INJECTION, POWDER, FOR SOLUTION INTRAVENOUS at 09:26

## 2021-01-01 RX ADMIN — LEVOTHYROXINE SODIUM 100 MCG: 100 TABLET ORAL at 06:16

## 2021-01-01 RX ADMIN — GABAPENTIN 800 MG: 400 CAPSULE ORAL at 13:48

## 2021-01-01 RX ADMIN — Medication 10 ML: at 21:01

## 2021-01-01 RX ADMIN — OXYCODONE HYDROCHLORIDE AND ACETAMINOPHEN 500 MG: 500 TABLET ORAL at 08:22

## 2021-01-01 RX ADMIN — ATORVASTATIN CALCIUM 20 MG: 20 TABLET, FILM COATED ORAL at 08:02

## 2021-01-01 RX ADMIN — SUCRALFATE 1 G: 1 TABLET ORAL at 16:54

## 2021-01-01 RX ADMIN — ENOXAPARIN SODIUM 40 MG: 40 INJECTION SUBCUTANEOUS at 08:48

## 2021-01-01 RX ADMIN — SODIUM CHLORIDE, PRESERVATIVE FREE 300 UNITS: 5 INJECTION INTRAVENOUS at 09:04

## 2021-01-01 RX ADMIN — SUCRALFATE 1 G: 1 TABLET ORAL at 17:19

## 2021-01-01 RX ADMIN — INSULIN LISPRO 5 UNITS: 100 INJECTION, SOLUTION INTRAVENOUS; SUBCUTANEOUS at 12:00

## 2021-01-01 RX ADMIN — IPRATROPIUM BROMIDE AND ALBUTEROL SULFATE 1 AMPULE: 2.5; .5 SOLUTION RESPIRATORY (INHALATION) at 08:56

## 2021-01-01 RX ADMIN — LORAZEPAM 0.5 MG: 0.5 TABLET ORAL at 14:09

## 2021-01-01 RX ADMIN — ATORVASTATIN CALCIUM 20 MG: 20 TABLET, FILM COATED ORAL at 09:25

## 2021-01-01 RX ADMIN — MIDODRINE HYDROCHLORIDE 15 MG: 5 TABLET ORAL at 11:36

## 2021-01-01 RX ADMIN — Medication 100 MG: at 08:57

## 2021-01-01 RX ADMIN — Medication 15 ML: at 20:32

## 2021-01-01 RX ADMIN — PHENYLEPHRINE HYDROCHLORIDE 100 MCG/MIN: 10 INJECTION INTRAVENOUS at 14:56

## 2021-01-01 RX ADMIN — MIDODRINE HYDROCHLORIDE 20 MG: 5 TABLET ORAL at 13:49

## 2021-01-01 RX ADMIN — INSULIN GLARGINE 25 UNITS: 100 INJECTION, SOLUTION SUBCUTANEOUS at 08:19

## 2021-01-01 RX ADMIN — METOCLOPRAMIDE 5 MG: 5 INJECTION, SOLUTION INTRAMUSCULAR; INTRAVENOUS at 05:21

## 2021-01-01 RX ADMIN — PANTOPRAZOLE SODIUM 40 MG: 40 INJECTION, POWDER, FOR SOLUTION INTRAVENOUS at 21:08

## 2021-01-01 RX ADMIN — METOCLOPRAMIDE 5 MG: 5 INJECTION, SOLUTION INTRAMUSCULAR; INTRAVENOUS at 11:48

## 2021-01-01 RX ADMIN — Medication 10 ML: at 11:15

## 2021-01-01 RX ADMIN — MIDODRINE HYDROCHLORIDE 15 MG: 5 TABLET ORAL at 09:11

## 2021-01-01 RX ADMIN — AMIODARONE HYDROCHLORIDE 200 MG: 200 TABLET ORAL at 08:15

## 2021-01-01 RX ADMIN — GABAPENTIN 800 MG: 400 CAPSULE ORAL at 22:00

## 2021-01-01 RX ADMIN — MIDAZOLAM HYDROCHLORIDE 2 MG: 1 INJECTION, SOLUTION INTRAMUSCULAR; INTRAVENOUS at 03:35

## 2021-01-01 RX ADMIN — LORAZEPAM 1 MG: 2 INJECTION INTRAMUSCULAR; INTRAVENOUS at 10:55

## 2021-01-01 RX ADMIN — INSULIN LISPRO 3 UNITS: 100 INJECTION, SOLUTION INTRAVENOUS; SUBCUTANEOUS at 12:23

## 2021-01-01 RX ADMIN — PANTOPRAZOLE SODIUM 40 MG: 40 INJECTION, POWDER, FOR SOLUTION INTRAVENOUS at 23:07

## 2021-01-01 RX ADMIN — SUCRALFATE 1 G: 1 TABLET ORAL at 06:02

## 2021-01-01 RX ADMIN — INSULIN GLARGINE 40 UNITS: 100 INJECTION, SOLUTION SUBCUTANEOUS at 08:54

## 2021-01-01 RX ADMIN — ATORVASTATIN CALCIUM 20 MG: 20 TABLET, FILM COATED ORAL at 09:52

## 2021-01-01 RX ADMIN — Medication 1000 MG: at 11:14

## 2021-01-01 RX ADMIN — INSULIN LISPRO 3 UNITS: 100 INJECTION, SOLUTION INTRAVENOUS; SUBCUTANEOUS at 05:34

## 2021-01-01 RX ADMIN — SUCRALFATE 1 G: 1 TABLET ORAL at 10:52

## 2021-01-01 RX ADMIN — GABAPENTIN 800 MG: 400 CAPSULE ORAL at 14:00

## 2021-01-01 RX ADMIN — ARFORMOTEROL TARTRATE 15 MCG: 15 SOLUTION RESPIRATORY (INHALATION) at 19:42

## 2021-01-01 RX ADMIN — ENOXAPARIN SODIUM 40 MG: 40 INJECTION SUBCUTANEOUS at 07:55

## 2021-01-01 RX ADMIN — INSULIN LISPRO 3 UNITS: 100 INJECTION, SOLUTION INTRAVENOUS; SUBCUTANEOUS at 06:48

## 2021-01-01 RX ADMIN — ATORVASTATIN CALCIUM 20 MG: 20 TABLET, FILM COATED ORAL at 22:15

## 2021-01-01 RX ADMIN — CLOPIDOGREL 75 MG: 75 TABLET, FILM COATED ORAL at 08:50

## 2021-01-01 RX ADMIN — INSULIN LISPRO 3 UNITS: 100 INJECTION, SOLUTION INTRAVENOUS; SUBCUTANEOUS at 16:22

## 2021-01-01 RX ADMIN — LORAZEPAM 2 MG: 1 TABLET ORAL at 14:37

## 2021-01-01 RX ADMIN — ARFORMOTEROL TARTRATE 15 MCG: 15 SOLUTION RESPIRATORY (INHALATION) at 20:32

## 2021-01-01 RX ADMIN — MIDODRINE HYDROCHLORIDE 20 MG: 5 TABLET ORAL at 09:26

## 2021-01-01 RX ADMIN — IPRATROPIUM BROMIDE AND ALBUTEROL SULFATE 1 AMPULE: 2.5; .5 SOLUTION RESPIRATORY (INHALATION) at 13:19

## 2021-01-01 RX ADMIN — LORAZEPAM 1 MG: 2 INJECTION INTRAMUSCULAR; INTRAVENOUS at 12:59

## 2021-01-01 RX ADMIN — DEXAMETHASONE SODIUM PHOSPHATE 6 MG: 4 INJECTION, SOLUTION INTRA-ARTICULAR; INTRALESIONAL; INTRAMUSCULAR; INTRAVENOUS; SOFT TISSUE at 22:45

## 2021-01-01 RX ADMIN — MIDODRINE HYDROCHLORIDE 15 MG: 5 TABLET ORAL at 13:09

## 2021-01-01 RX ADMIN — CLOPIDOGREL 75 MG: 75 TABLET, FILM COATED ORAL at 08:43

## 2021-01-01 RX ADMIN — LANSOPRAZOLE 30 MG: KIT at 06:13

## 2021-01-01 RX ADMIN — ARFORMOTEROL TARTRATE 15 MCG: 15 SOLUTION RESPIRATORY (INHALATION) at 20:59

## 2021-01-01 RX ADMIN — HYDROCORTISONE SODIUM SUCCINATE 50 MG: 100 INJECTION, POWDER, FOR SOLUTION INTRAMUSCULAR; INTRAVENOUS at 18:04

## 2021-01-01 RX ADMIN — AMIODARONE HYDROCHLORIDE 200 MG: 200 TABLET ORAL at 09:02

## 2021-01-01 RX ADMIN — HYDROCORTISONE SODIUM SUCCINATE 50 MG: 100 INJECTION, POWDER, FOR SOLUTION INTRAMUSCULAR; INTRAVENOUS at 21:01

## 2021-01-01 RX ADMIN — SUCRALFATE 1 G: 1 TABLET ORAL at 23:06

## 2021-01-01 RX ADMIN — LORAZEPAM 1 MG: 2 INJECTION INTRAMUSCULAR; INTRAVENOUS at 19:28

## 2021-01-01 RX ADMIN — DIGOXIN 250 MCG: 250 TABLET ORAL at 07:55

## 2021-01-01 RX ADMIN — VANCOMYCIN HYDROCHLORIDE 2000 MG: 10 INJECTION, POWDER, LYOPHILIZED, FOR SOLUTION INTRAVENOUS at 18:02

## 2021-01-01 RX ADMIN — LEVOTHYROXINE SODIUM 100 MCG: 100 TABLET ORAL at 06:27

## 2021-01-01 RX ADMIN — MIDODRINE HYDROCHLORIDE 20 MG: 5 TABLET ORAL at 13:47

## 2021-01-01 RX ADMIN — POTASSIUM CHLORIDE 20 MEQ: 400 INJECTION, SOLUTION INTRAVENOUS at 10:28

## 2021-01-01 RX ADMIN — APIXABAN 5 MG: 5 TABLET, FILM COATED ORAL at 08:13

## 2021-01-01 RX ADMIN — SODIUM CHLORIDE, PRESERVATIVE FREE 10 ML: 5 INJECTION INTRAVENOUS at 22:36

## 2021-01-01 RX ADMIN — LEVOTHYROXINE SODIUM 100 MCG: 100 TABLET ORAL at 06:13

## 2021-01-01 RX ADMIN — ZINC SULFATE 220 MG (50 MG) CAPSULE 50 MG: CAPSULE at 08:01

## 2021-01-01 RX ADMIN — ARFORMOTEROL TARTRATE 15 MCG: 15 SOLUTION RESPIRATORY (INHALATION) at 20:19

## 2021-01-01 RX ADMIN — BUDESONIDE 500 MCG: 0.5 SUSPENSION RESPIRATORY (INHALATION) at 08:31

## 2021-01-01 RX ADMIN — DIGOXIN 250 MCG: 250 TABLET ORAL at 09:01

## 2021-01-01 RX ADMIN — POLYETHYLENE GLYCOL 3350 17 G: 17 POWDER, FOR SOLUTION ORAL at 06:02

## 2021-01-01 RX ADMIN — SODIUM CHLORIDE 500 ML: 9 INJECTION, SOLUTION INTRAVENOUS at 09:30

## 2021-01-01 RX ADMIN — LORAZEPAM 2 MG: 1 TABLET ORAL at 20:21

## 2021-01-01 RX ADMIN — ZINC SULFATE 220 MG (50 MG) CAPSULE 50 MG: CAPSULE at 08:45

## 2021-01-01 RX ADMIN — LORAZEPAM 1 MG: 2 INJECTION INTRAMUSCULAR; INTRAVENOUS at 20:41

## 2021-01-01 RX ADMIN — Medication 10 ML: at 08:11

## 2021-01-01 RX ADMIN — Medication 50 MCG/HR: at 13:37

## 2021-01-01 RX ADMIN — GABAPENTIN 800 MG: 400 CAPSULE ORAL at 14:20

## 2021-01-01 RX ADMIN — VANCOMYCIN HYDROCHLORIDE 2000 MG: 10 INJECTION, POWDER, LYOPHILIZED, FOR SOLUTION INTRAVENOUS at 04:49

## 2021-01-01 RX ADMIN — ACETAZOLAMIDE 500 MG: 250 TABLET ORAL at 08:17

## 2021-01-01 RX ADMIN — LORAZEPAM 2 MG: 1 TABLET ORAL at 09:59

## 2021-01-01 RX ADMIN — GABAPENTIN 800 MG: 400 CAPSULE ORAL at 20:44

## 2021-01-01 RX ADMIN — Medication 15 ML: at 20:21

## 2021-01-01 RX ADMIN — ENOXAPARIN SODIUM 100 MG: 100 INJECTION SUBCUTANEOUS at 22:29

## 2021-01-01 RX ADMIN — SUCRALFATE 1 G: 1 TABLET ORAL at 05:10

## 2021-01-01 RX ADMIN — ACETAMINOPHEN 650 MG: 650 SOLUTION ORAL at 12:09

## 2021-01-01 RX ADMIN — SENNOSIDES 17.6 MG: 8.8 SYRUP ORAL at 23:18

## 2021-01-01 RX ADMIN — INSULIN LISPRO 3 UNITS: 100 INJECTION, SOLUTION INTRAVENOUS; SUBCUTANEOUS at 00:16

## 2021-01-01 RX ADMIN — IPRATROPIUM BROMIDE AND ALBUTEROL SULFATE 1 AMPULE: 2.5; .5 SOLUTION RESPIRATORY (INHALATION) at 16:08

## 2021-01-01 RX ADMIN — LORAZEPAM 1 MG: 2 INJECTION INTRAMUSCULAR; INTRAVENOUS at 14:19

## 2021-01-01 RX ADMIN — INSULIN LISPRO 6 UNITS: 100 INJECTION, SOLUTION INTRAVENOUS; SUBCUTANEOUS at 18:02

## 2021-01-01 RX ADMIN — BUDESONIDE 500 MCG: 0.5 SUSPENSION RESPIRATORY (INHALATION) at 09:21

## 2021-01-01 RX ADMIN — SODIUM CHLORIDE, PRESERVATIVE FREE 10 ML: 5 INJECTION INTRAVENOUS at 20:58

## 2021-01-01 RX ADMIN — INSULIN GLARGINE 25 UNITS: 100 INJECTION, SOLUTION SUBCUTANEOUS at 20:16

## 2021-01-01 RX ADMIN — MIDODRINE HYDROCHLORIDE 20 MG: 5 TABLET ORAL at 10:42

## 2021-01-01 RX ADMIN — INSULIN LISPRO 3 UNITS: 100 INJECTION, SOLUTION INTRAVENOUS; SUBCUTANEOUS at 21:33

## 2021-01-01 RX ADMIN — MIDODRINE HYDROCHLORIDE 10 MG: 5 TABLET ORAL at 17:04

## 2021-01-01 RX ADMIN — SODIUM CHLORIDE, PRESERVATIVE FREE 300 UNITS: 5 INJECTION INTRAVENOUS at 21:25

## 2021-01-01 RX ADMIN — ZINC SULFATE 220 MG (50 MG) CAPSULE 50 MG: CAPSULE at 10:38

## 2021-01-01 RX ADMIN — Medication 1000 UNITS: at 08:37

## 2021-01-01 RX ADMIN — LORAZEPAM 1 MG: 2 INJECTION INTRAMUSCULAR; INTRAVENOUS at 11:02

## 2021-01-01 RX ADMIN — INSULIN LISPRO 6 UNITS: 100 INJECTION, SOLUTION INTRAVENOUS; SUBCUTANEOUS at 05:43

## 2021-01-01 RX ADMIN — CLOPIDOGREL 75 MG: 75 TABLET, FILM COATED ORAL at 07:54

## 2021-01-01 RX ADMIN — Medication 10 ML: at 08:34

## 2021-01-01 RX ADMIN — AMIODARONE HYDROCHLORIDE 200 MG: 200 TABLET ORAL at 09:10

## 2021-01-01 RX ADMIN — Medication 10 ML: at 21:25

## 2021-01-01 RX ADMIN — MIDODRINE HYDROCHLORIDE 15 MG: 5 TABLET ORAL at 09:00

## 2021-01-01 RX ADMIN — ATORVASTATIN CALCIUM 20 MG: 20 TABLET, FILM COATED ORAL at 07:54

## 2021-01-01 RX ADMIN — HYDROCORTISONE SODIUM SUCCINATE 50 MG: 100 INJECTION, POWDER, FOR SOLUTION INTRAMUSCULAR; INTRAVENOUS at 21:09

## 2021-01-01 RX ADMIN — Medication 15 ML: at 20:56

## 2021-01-01 RX ADMIN — METOCLOPRAMIDE 5 MG: 5 INJECTION, SOLUTION INTRAMUSCULAR; INTRAVENOUS at 06:27

## 2021-01-01 RX ADMIN — Medication 10 ML: at 09:23

## 2021-01-01 RX ADMIN — ARFORMOTEROL TARTRATE 15 MCG: 15 SOLUTION RESPIRATORY (INHALATION) at 09:58

## 2021-01-01 RX ADMIN — LORAZEPAM 1 MG: 2 INJECTION INTRAMUSCULAR; INTRAVENOUS at 15:47

## 2021-01-01 RX ADMIN — AMIODARONE HYDROCHLORIDE 200 MG: 200 TABLET ORAL at 21:09

## 2021-01-01 RX ADMIN — HYDROCORTISONE SODIUM SUCCINATE 25 MG: 100 INJECTION, POWDER, FOR SOLUTION INTRAMUSCULAR; INTRAVENOUS at 05:45

## 2021-01-01 RX ADMIN — GABAPENTIN 800 MG: 400 CAPSULE ORAL at 21:56

## 2021-01-01 RX ADMIN — IPRATROPIUM BROMIDE AND ALBUTEROL SULFATE 1 AMPULE: 2.5; .5 SOLUTION RESPIRATORY (INHALATION) at 15:42

## 2021-01-01 RX ADMIN — AMIODARONE HYDROCHLORIDE 200 MG: 200 TABLET ORAL at 08:20

## 2021-01-01 RX ADMIN — CLOPIDOGREL 75 MG: 75 TABLET, FILM COATED ORAL at 09:11

## 2021-01-01 RX ADMIN — HYDROCORTISONE SODIUM SUCCINATE 100 MG: 100 INJECTION, POWDER, FOR SOLUTION INTRAMUSCULAR; INTRAVENOUS at 14:43

## 2021-01-01 RX ADMIN — FUROSEMIDE 40 MG: 10 INJECTION, SOLUTION INTRAMUSCULAR; INTRAVENOUS at 22:35

## 2021-01-01 RX ADMIN — DOCUSATE SODIUM 100 MG: 50 LIQUID ORAL at 09:26

## 2021-01-01 RX ADMIN — MIDODRINE HYDROCHLORIDE 20 MG: 5 TABLET ORAL at 07:55

## 2021-01-01 RX ADMIN — INSULIN GLARGINE 40 UNITS: 100 INJECTION, SOLUTION SUBCUTANEOUS at 08:53

## 2021-01-01 RX ADMIN — MIDODRINE HYDROCHLORIDE 10 MG: 5 TABLET ORAL at 09:17

## 2021-01-01 RX ADMIN — LORAZEPAM 0.5 MG: 0.5 TABLET ORAL at 12:33

## 2021-01-01 RX ADMIN — SENNOSIDES 17.6 MG: 8.8 SYRUP ORAL at 08:52

## 2021-01-01 RX ADMIN — DOCUSATE SODIUM 100 MG: 50 LIQUID ORAL at 08:41

## 2021-01-01 RX ADMIN — Medication 100 MG: at 08:43

## 2021-01-01 RX ADMIN — Medication 1000 MG: at 13:53

## 2021-01-01 RX ADMIN — LORAZEPAM 1 MG: 2 INJECTION INTRAMUSCULAR; INTRAVENOUS at 02:10

## 2021-01-01 RX ADMIN — INSULIN LISPRO 3 UNITS: 100 INJECTION, SOLUTION INTRAVENOUS; SUBCUTANEOUS at 06:28

## 2021-01-01 RX ADMIN — DOCUSATE SODIUM 100 MG: 50 LIQUID ORAL at 11:13

## 2021-01-01 RX ADMIN — PANTOPRAZOLE SODIUM 40 MG: 40 INJECTION, POWDER, FOR SOLUTION INTRAVENOUS at 08:01

## 2021-01-01 RX ADMIN — Medication 125 MCG/HR: at 08:00

## 2021-01-01 RX ADMIN — GABAPENTIN 800 MG: 400 CAPSULE ORAL at 21:40

## 2021-01-01 RX ADMIN — DEXAMETHASONE SODIUM PHOSPHATE 6 MG: 4 INJECTION, SOLUTION INTRA-ARTICULAR; INTRALESIONAL; INTRAMUSCULAR; INTRAVENOUS; SOFT TISSUE at 10:45

## 2021-01-01 RX ADMIN — MIDODRINE HYDROCHLORIDE 20 MG: 5 TABLET ORAL at 11:40

## 2021-01-01 RX ADMIN — Medication 1000 UNITS: at 08:50

## 2021-01-01 RX ADMIN — SODIUM CHLORIDE, PRESERVATIVE FREE 300 UNITS: 5 INJECTION INTRAVENOUS at 09:20

## 2021-01-01 RX ADMIN — Medication 100 MG: at 09:11

## 2021-01-01 RX ADMIN — APIXABAN 5 MG: 5 TABLET, FILM COATED ORAL at 08:41

## 2021-01-01 RX ADMIN — Medication 1000 MG: at 08:02

## 2021-01-01 RX ADMIN — Medication 15 ML: at 20:54

## 2021-01-01 RX ADMIN — Medication 10 ML: at 10:51

## 2021-01-01 RX ADMIN — HYDROCORTISONE SODIUM SUCCINATE 50 MG: 100 INJECTION, POWDER, FOR SOLUTION INTRAMUSCULAR; INTRAVENOUS at 14:00

## 2021-01-01 RX ADMIN — INSULIN GLARGINE 30 UNITS: 100 INJECTION, SOLUTION SUBCUTANEOUS at 09:00

## 2021-01-01 RX ADMIN — BUDESONIDE 500 MCG: 0.5 SUSPENSION RESPIRATORY (INHALATION) at 20:28

## 2021-01-01 RX ADMIN — MIDODRINE HYDROCHLORIDE 10 MG: 5 TABLET ORAL at 08:44

## 2021-01-01 RX ADMIN — DIGOXIN 250 MCG: 250 TABLET ORAL at 08:47

## 2021-01-01 RX ADMIN — SODIUM CHLORIDE, PRESERVATIVE FREE 300 UNITS: 5 INJECTION INTRAVENOUS at 10:10

## 2021-01-01 RX ADMIN — FENTANYL CITRATE 25 MCG: 50 INJECTION, SOLUTION INTRAMUSCULAR; INTRAVENOUS at 10:28

## 2021-01-01 RX ADMIN — Medication 1000 MG: at 08:10

## 2021-01-01 RX ADMIN — SODIUM CHLORIDE, PRESERVATIVE FREE 10 ML: 5 INJECTION INTRAVENOUS at 21:31

## 2021-01-01 RX ADMIN — METOPROLOL TARTRATE 5 MG: 5 INJECTION INTRAVENOUS at 06:06

## 2021-01-01 RX ADMIN — BUDESONIDE 500 MCG: 0.5 SUSPENSION RESPIRATORY (INHALATION) at 09:45

## 2021-01-01 RX ADMIN — INSULIN LISPRO 6 UNITS: 100 INJECTION, SOLUTION INTRAVENOUS; SUBCUTANEOUS at 05:52

## 2021-01-01 RX ADMIN — APIXABAN 5 MG: 5 TABLET, FILM COATED ORAL at 21:24

## 2021-01-01 RX ADMIN — BUDESONIDE 500 MCG: 0.5 SUSPENSION RESPIRATORY (INHALATION) at 07:52

## 2021-01-01 RX ADMIN — MIDODRINE HYDROCHLORIDE 10 MG: 5 TABLET ORAL at 11:35

## 2021-01-01 RX ADMIN — DIGOXIN 250 MCG: 250 TABLET ORAL at 08:57

## 2021-01-01 RX ADMIN — MIDODRINE HYDROCHLORIDE 15 MG: 5 TABLET ORAL at 18:04

## 2021-01-01 RX ADMIN — IPRATROPIUM BROMIDE AND ALBUTEROL SULFATE 1 AMPULE: 2.5; .5 SOLUTION RESPIRATORY (INHALATION) at 12:55

## 2021-01-01 RX ADMIN — MIDODRINE HYDROCHLORIDE 10 MG: 5 TABLET ORAL at 08:21

## 2021-01-01 RX ADMIN — Medication 10 ML: at 21:20

## 2021-01-01 RX ADMIN — Medication 10 ML: at 08:55

## 2021-01-01 RX ADMIN — INSULIN GLARGINE 30 UNITS: 100 INJECTION, SOLUTION SUBCUTANEOUS at 10:57

## 2021-01-01 RX ADMIN — METOCLOPRAMIDE 5 MG: 5 INJECTION, SOLUTION INTRAMUSCULAR; INTRAVENOUS at 23:25

## 2021-01-01 RX ADMIN — IPRATROPIUM BROMIDE AND ALBUTEROL SULFATE 1 AMPULE: 2.5; .5 SOLUTION RESPIRATORY (INHALATION) at 11:52

## 2021-01-01 RX ADMIN — SUCRALFATE 1 G: 1 TABLET ORAL at 12:15

## 2021-01-01 RX ADMIN — HYDROCORTISONE SODIUM SUCCINATE 50 MG: 100 INJECTION, POWDER, FOR SOLUTION INTRAMUSCULAR; INTRAVENOUS at 15:09

## 2021-01-01 RX ADMIN — Medication 200 MCG/HR: at 18:42

## 2021-01-01 RX ADMIN — OXYCODONE HYDROCHLORIDE 5 MG: 5 TABLET ORAL at 08:32

## 2021-01-01 RX ADMIN — IPRATROPIUM BROMIDE AND ALBUTEROL SULFATE 1 AMPULE: 2.5; .5 SOLUTION RESPIRATORY (INHALATION) at 08:11

## 2021-01-01 RX ADMIN — AMIODARONE HYDROCHLORIDE 200 MG: 200 TABLET ORAL at 09:12

## 2021-01-01 RX ADMIN — ARFORMOTEROL TARTRATE 15 MCG: 15 SOLUTION RESPIRATORY (INHALATION) at 08:55

## 2021-01-01 RX ADMIN — IPRATROPIUM BROMIDE AND ALBUTEROL SULFATE 1 AMPULE: 2.5; .5 SOLUTION RESPIRATORY (INHALATION) at 09:05

## 2021-01-01 RX ADMIN — DOCUSATE SODIUM 100 MG: 50 LIQUID ORAL at 07:58

## 2021-01-01 RX ADMIN — CLOPIDOGREL 75 MG: 75 TABLET, FILM COATED ORAL at 08:18

## 2021-01-01 RX ADMIN — Medication 15 ML: at 20:15

## 2021-01-01 RX ADMIN — DEXAMETHASONE SODIUM PHOSPHATE 6 MG: 4 INJECTION, SOLUTION INTRA-ARTICULAR; INTRALESIONAL; INTRAMUSCULAR; INTRAVENOUS; SOFT TISSUE at 22:18

## 2021-01-01 RX ADMIN — SODIUM CHLORIDE, PRESERVATIVE FREE 300 UNITS: 5 INJECTION INTRAVENOUS at 21:17

## 2021-01-01 RX ADMIN — BUDESONIDE 500 MCG: 0.5 SUSPENSION RESPIRATORY (INHALATION) at 08:37

## 2021-01-01 RX ADMIN — GABAPENTIN 800 MG: 400 CAPSULE ORAL at 05:03

## 2021-01-01 RX ADMIN — LANSOPRAZOLE 30 MG: KIT at 09:13

## 2021-01-01 RX ADMIN — MIDODRINE HYDROCHLORIDE 20 MG: 5 TABLET ORAL at 08:11

## 2021-01-01 RX ADMIN — LORAZEPAM 1 MG: 2 INJECTION INTRAMUSCULAR; INTRAVENOUS at 08:42

## 2021-01-01 RX ADMIN — PANTOPRAZOLE SODIUM 40 MG: 40 INJECTION, POWDER, FOR SOLUTION INTRAVENOUS at 09:25

## 2021-01-01 RX ADMIN — MORPHINE SULFATE 4 MG: 4 INJECTION, SOLUTION INTRAMUSCULAR; INTRAVENOUS at 12:02

## 2021-01-01 RX ADMIN — DEXAMETHASONE SODIUM PHOSPHATE 6 MG: 4 INJECTION, SOLUTION INTRA-ARTICULAR; INTRALESIONAL; INTRAMUSCULAR; INTRAVENOUS; SOFT TISSUE at 09:38

## 2021-01-01 RX ADMIN — ARFORMOTEROL TARTRATE 15 MCG: 15 SOLUTION RESPIRATORY (INHALATION) at 21:10

## 2021-01-01 RX ADMIN — SUCRALFATE 1 G: 1 TABLET ORAL at 20:59

## 2021-01-01 RX ADMIN — ARFORMOTEROL TARTRATE 15 MCG: 15 SOLUTION RESPIRATORY (INHALATION) at 09:04

## 2021-01-01 RX ADMIN — DEXAMETHASONE SODIUM PHOSPHATE 6 MG: 4 INJECTION, SOLUTION INTRA-ARTICULAR; INTRALESIONAL; INTRAMUSCULAR; INTRAVENOUS; SOFT TISSUE at 20:57

## 2021-01-01 RX ADMIN — Medication 10 ML: at 15:49

## 2021-01-01 RX ADMIN — HYDROCORTISONE SODIUM SUCCINATE 50 MG: 100 INJECTION, POWDER, FOR SOLUTION INTRAMUSCULAR; INTRAVENOUS at 22:30

## 2021-01-01 RX ADMIN — IPRATROPIUM BROMIDE AND ALBUTEROL SULFATE 1 AMPULE: 2.5; .5 SOLUTION RESPIRATORY (INHALATION) at 12:04

## 2021-01-01 RX ADMIN — AMIODARONE HYDROCHLORIDE 200 MG: 200 TABLET ORAL at 07:55

## 2021-01-01 RX ADMIN — BUDESONIDE 500 MCG: 0.5 SUSPENSION RESPIRATORY (INHALATION) at 08:53

## 2021-01-01 RX ADMIN — IPRATROPIUM BROMIDE AND ALBUTEROL SULFATE 1 AMPULE: 2.5; .5 SOLUTION RESPIRATORY (INHALATION) at 11:50

## 2021-01-01 RX ADMIN — LIDOCAINE HYDROCHLORIDE 0.5 ML: 10 INJECTION, SOLUTION EPIDURAL; INFILTRATION; INTRACAUDAL; PERINEURAL at 16:19

## 2021-01-01 RX ADMIN — MIDODRINE HYDROCHLORIDE 20 MG: 5 TABLET ORAL at 13:00

## 2021-01-01 RX ADMIN — BUDESONIDE 500 MCG: 0.5 SUSPENSION RESPIRATORY (INHALATION) at 08:11

## 2021-01-01 RX ADMIN — ARFORMOTEROL TARTRATE 15 MCG: 15 SOLUTION RESPIRATORY (INHALATION) at 09:05

## 2021-01-01 RX ADMIN — INSULIN LISPRO 3 UNITS: 100 INJECTION, SOLUTION INTRAVENOUS; SUBCUTANEOUS at 13:23

## 2021-01-01 RX ADMIN — HYDROCORTISONE SODIUM SUCCINATE 100 MG: 100 INJECTION, POWDER, FOR SOLUTION INTRAMUSCULAR; INTRAVENOUS at 05:12

## 2021-01-01 RX ADMIN — GABAPENTIN 800 MG: 400 CAPSULE ORAL at 20:21

## 2021-01-01 RX ADMIN — PHENYLEPHRINE HYDROCHLORIDE 125 MCG/MIN: 10 INJECTION INTRAVENOUS at 12:27

## 2021-01-01 RX ADMIN — Medication 10 ML: at 21:00

## 2021-01-01 RX ADMIN — ATORVASTATIN CALCIUM 20 MG: 20 TABLET, FILM COATED ORAL at 07:55

## 2021-01-01 RX ADMIN — Medication 15 ML: at 21:21

## 2021-01-01 RX ADMIN — MIDODRINE HYDROCHLORIDE 20 MG: 5 TABLET ORAL at 09:25

## 2021-01-01 RX ADMIN — SODIUM CHLORIDE, PRESERVATIVE FREE 10 ML: 5 INJECTION INTRAVENOUS at 09:27

## 2021-01-01 RX ADMIN — SODIUM CHLORIDE: 9 INJECTION, SOLUTION INTRAVENOUS at 09:50

## 2021-01-01 RX ADMIN — SODIUM CHLORIDE, PRESERVATIVE FREE 10 ML: 5 INJECTION INTRAVENOUS at 20:38

## 2021-01-01 RX ADMIN — IPRATROPIUM BROMIDE AND ALBUTEROL SULFATE 1 AMPULE: 2.5; .5 SOLUTION RESPIRATORY (INHALATION) at 09:22

## 2021-01-01 RX ADMIN — MIDODRINE HYDROCHLORIDE 20 MG: 5 TABLET ORAL at 11:18

## 2021-01-01 RX ADMIN — SENNOSIDES 17.6 MG: 8.8 SYRUP ORAL at 08:45

## 2021-01-01 RX ADMIN — CLOPIDOGREL 75 MG: 75 TABLET, FILM COATED ORAL at 08:47

## 2021-01-01 RX ADMIN — INSULIN LISPRO 6 UNITS: 100 INJECTION, SOLUTION INTRAVENOUS; SUBCUTANEOUS at 18:11

## 2021-01-01 RX ADMIN — IPRATROPIUM BROMIDE AND ALBUTEROL SULFATE 1 AMPULE: 2.5; .5 SOLUTION RESPIRATORY (INHALATION) at 17:32

## 2021-01-01 RX ADMIN — SENNOSIDES 8.8 MG: 8.8 SYRUP ORAL at 22:46

## 2021-01-01 RX ADMIN — IPRATROPIUM BROMIDE AND ALBUTEROL SULFATE 1 AMPULE: 2.5; .5 SOLUTION RESPIRATORY (INHALATION) at 16:03

## 2021-01-01 RX ADMIN — HYDROCORTISONE SODIUM SUCCINATE 50 MG: 100 INJECTION, POWDER, FOR SOLUTION INTRAMUSCULAR; INTRAVENOUS at 03:52

## 2021-01-01 RX ADMIN — BUDESONIDE 500 MCG: 0.5 SUSPENSION RESPIRATORY (INHALATION) at 20:26

## 2021-01-01 RX ADMIN — MIDODRINE HYDROCHLORIDE 20 MG: 5 TABLET ORAL at 13:15

## 2021-01-01 RX ADMIN — METOPROLOL TARTRATE 5 MG: 5 INJECTION INTRAVENOUS at 14:05

## 2021-01-01 RX ADMIN — LEVOTHYROXINE SODIUM 100 MCG: 100 TABLET ORAL at 05:59

## 2021-01-01 RX ADMIN — OXYCODONE HYDROCHLORIDE 5 MG: 5 TABLET ORAL at 18:36

## 2021-01-01 RX ADMIN — POTASSIUM CHLORIDE 20 MEQ: 400 INJECTION, SOLUTION INTRAVENOUS at 11:24

## 2021-01-01 RX ADMIN — ARFORMOTEROL TARTRATE 15 MCG: 15 SOLUTION RESPIRATORY (INHALATION) at 07:58

## 2021-01-01 RX ADMIN — LEVOTHYROXINE SODIUM 125 MCG: 125 TABLET ORAL at 06:16

## 2021-01-01 RX ADMIN — Medication 15 ML: at 08:49

## 2021-01-01 RX ADMIN — SUCRALFATE 1 G: 1 TABLET ORAL at 18:12

## 2021-01-01 RX ADMIN — PHENYLEPHRINE HYDROCHLORIDE 125 MCG/MIN: 10 INJECTION INTRAVENOUS at 20:50

## 2021-01-01 RX ADMIN — OXYCODONE HYDROCHLORIDE AND ACETAMINOPHEN 500 MG: 500 TABLET ORAL at 21:21

## 2021-01-01 RX ADMIN — OXYCODONE HYDROCHLORIDE 10 MG: 5 TABLET ORAL at 21:58

## 2021-01-01 RX ADMIN — PHENYLEPHRINE HYDROCHLORIDE 100 MCG/MIN: 10 INJECTION INTRAVENOUS at 12:58

## 2021-01-01 RX ADMIN — GABAPENTIN 800 MG: 400 CAPSULE ORAL at 20:11

## 2021-01-01 RX ADMIN — ATORVASTATIN CALCIUM 20 MG: 20 TABLET, FILM COATED ORAL at 22:18

## 2021-01-01 RX ADMIN — ARFORMOTEROL TARTRATE 15 MCG: 15 SOLUTION RESPIRATORY (INHALATION) at 08:14

## 2021-01-01 RX ADMIN — SUCRALFATE 1 G: 1 TABLET ORAL at 05:08

## 2021-01-01 RX ADMIN — ZINC SULFATE 220 MG (50 MG) CAPSULE 50 MG: CAPSULE at 09:12

## 2021-01-01 RX ADMIN — Medication 15 ML: at 21:11

## 2021-01-01 RX ADMIN — DEXAMETHASONE SODIUM PHOSPHATE 6 MG: 4 INJECTION, SOLUTION INTRA-ARTICULAR; INTRALESIONAL; INTRAMUSCULAR; INTRAVENOUS; SOFT TISSUE at 09:25

## 2021-01-01 RX ADMIN — INSULIN LISPRO 3 UNITS: 100 INJECTION, SOLUTION INTRAVENOUS; SUBCUTANEOUS at 12:10

## 2021-01-01 RX ADMIN — LEVOTHYROXINE SODIUM 100 MCG: 100 TABLET ORAL at 05:45

## 2021-01-01 RX ADMIN — MIDODRINE HYDROCHLORIDE 15 MG: 5 TABLET ORAL at 11:48

## 2021-01-01 RX ADMIN — OXYCODONE HYDROCHLORIDE 10 MG: 5 TABLET ORAL at 17:17

## 2021-01-01 RX ADMIN — Medication 200 MCG/HR: at 21:27

## 2021-01-01 RX ADMIN — Medication 15 ML: at 21:00

## 2021-01-01 RX ADMIN — SODIUM PHOSPHATE, MONOBASIC, MONOHYDRATE 20 MMOL: 276; 142 INJECTION, SOLUTION INTRAVENOUS at 16:02

## 2021-01-01 RX ADMIN — IPRATROPIUM BROMIDE AND ALBUTEROL SULFATE 1 AMPULE: 2.5; .5 SOLUTION RESPIRATORY (INHALATION) at 21:11

## 2021-01-01 RX ADMIN — CLOPIDOGREL 75 MG: 75 TABLET, FILM COATED ORAL at 09:25

## 2021-01-01 RX ADMIN — IPRATROPIUM BROMIDE AND ALBUTEROL SULFATE 1 AMPULE: 2.5; .5 SOLUTION RESPIRATORY (INHALATION) at 12:37

## 2021-01-01 RX ADMIN — INSULIN LISPRO 6 UNITS: 100 INJECTION, SOLUTION INTRAVENOUS; SUBCUTANEOUS at 06:18

## 2021-01-01 RX ADMIN — DOCUSATE SODIUM 100 MG: 50 LIQUID ORAL at 09:06

## 2021-01-01 RX ADMIN — SUCRALFATE 1 G: 1 TABLET ORAL at 05:44

## 2021-01-01 RX ADMIN — Medication 15 ML: at 09:00

## 2021-01-01 RX ADMIN — APIXABAN 5 MG: 5 TABLET, FILM COATED ORAL at 09:12

## 2021-01-01 RX ADMIN — INSULIN GLARGINE 30 UNITS: 100 INJECTION, SOLUTION SUBCUTANEOUS at 09:06

## 2021-01-01 RX ADMIN — IPRATROPIUM BROMIDE AND ALBUTEROL SULFATE 1 AMPULE: 2.5; .5 SOLUTION RESPIRATORY (INHALATION) at 08:33

## 2021-01-01 RX ADMIN — DIGOXIN 250 MCG: 250 TABLET ORAL at 11:14

## 2021-01-01 RX ADMIN — GABAPENTIN 800 MG: 400 CAPSULE ORAL at 13:11

## 2021-01-01 RX ADMIN — Medication 100 MG: at 08:01

## 2021-01-01 RX ADMIN — SUCRALFATE 1 G: 1 TABLET ORAL at 18:27

## 2021-01-01 RX ADMIN — SODIUM CHLORIDE, PRESERVATIVE FREE 300 UNITS: 5 INJECTION INTRAVENOUS at 20:37

## 2021-01-01 RX ADMIN — DEXAMETHASONE SODIUM PHOSPHATE 6 MG: 4 INJECTION, SOLUTION INTRA-ARTICULAR; INTRALESIONAL; INTRAMUSCULAR; INTRAVENOUS; SOFT TISSUE at 09:26

## 2021-01-01 RX ADMIN — Medication 15 ML: at 09:26

## 2021-01-01 RX ADMIN — ACETAMINOPHEN 650 MG: 650 SOLUTION ORAL at 15:08

## 2021-01-01 RX ADMIN — SODIUM CHLORIDE, PRESERVATIVE FREE 10 ML: 5 INJECTION INTRAVENOUS at 07:58

## 2021-01-01 RX ADMIN — PHENYLEPHRINE HYDROCHLORIDE 100 MCG/MIN: 10 INJECTION INTRAVENOUS at 22:24

## 2021-01-01 RX ADMIN — INSULIN LISPRO 6 UNITS: 100 INJECTION, SOLUTION INTRAVENOUS; SUBCUTANEOUS at 12:10

## 2021-01-01 RX ADMIN — IPRATROPIUM BROMIDE AND ALBUTEROL SULFATE 1 AMPULE: 2.5; .5 SOLUTION RESPIRATORY (INHALATION) at 20:07

## 2021-01-01 RX ADMIN — PHENYLEPHRINE HYDROCHLORIDE 100 MCG/MIN: 10 INJECTION INTRAVENOUS at 02:00

## 2021-01-01 RX ADMIN — DOCUSATE SODIUM 100 MG: 50 LIQUID ORAL at 08:32

## 2021-01-01 RX ADMIN — ZINC SULFATE 220 MG (50 MG) CAPSULE 50 MG: CAPSULE at 08:11

## 2021-01-01 RX ADMIN — SUCRALFATE 1 G: 1 TABLET ORAL at 20:56

## 2021-01-01 RX ADMIN — ATORVASTATIN CALCIUM 20 MG: 20 TABLET, FILM COATED ORAL at 09:02

## 2021-01-01 RX ADMIN — LANSOPRAZOLE 30 MG: KIT at 08:51

## 2021-01-01 RX ADMIN — Medication 1000 MG: at 08:45

## 2021-01-01 RX ADMIN — IPRATROPIUM BROMIDE AND ALBUTEROL SULFATE 1 AMPULE: 2.5; .5 SOLUTION RESPIRATORY (INHALATION) at 20:33

## 2021-01-01 RX ADMIN — IPRATROPIUM BROMIDE AND ALBUTEROL SULFATE 1 AMPULE: 2.5; .5 SOLUTION RESPIRATORY (INHALATION) at 08:36

## 2021-01-01 RX ADMIN — ATORVASTATIN CALCIUM 20 MG: 20 TABLET, FILM COATED ORAL at 09:26

## 2021-01-01 RX ADMIN — PIPERACILLIN AND TAZOBACTAM 3.38 G: 3; .375 INJECTION, POWDER, LYOPHILIZED, FOR SOLUTION INTRAVENOUS at 21:57

## 2021-01-01 RX ADMIN — LORAZEPAM 1 MG: 2 INJECTION INTRAMUSCULAR; INTRAVENOUS at 10:39

## 2021-01-01 RX ADMIN — MIDODRINE HYDROCHLORIDE 20 MG: 5 TABLET ORAL at 17:25

## 2021-01-01 RX ADMIN — Medication 200 MCG/HR: at 23:54

## 2021-01-01 RX ADMIN — HYDROCORTISONE SODIUM SUCCINATE 50 MG: 100 INJECTION, POWDER, FOR SOLUTION INTRAMUSCULAR; INTRAVENOUS at 03:55

## 2021-01-01 RX ADMIN — METOCLOPRAMIDE 5 MG: 5 INJECTION, SOLUTION INTRAMUSCULAR; INTRAVENOUS at 17:02

## 2021-01-01 RX ADMIN — Medication 10 ML: at 22:14

## 2021-01-01 RX ADMIN — OXYCODONE HYDROCHLORIDE 10 MG: 5 TABLET ORAL at 15:12

## 2021-01-01 RX ADMIN — LEVOTHYROXINE SODIUM 125 MCG: 125 TABLET ORAL at 06:14

## 2021-01-01 RX ADMIN — GABAPENTIN 800 MG: 400 CAPSULE ORAL at 14:19

## 2021-01-01 RX ADMIN — FENTANYL CITRATE 100 MCG: 50 INJECTION, SOLUTION INTRAMUSCULAR; INTRAVENOUS at 14:16

## 2021-01-01 RX ADMIN — APIXABAN 5 MG: 5 TABLET, FILM COATED ORAL at 20:11

## 2021-01-01 RX ADMIN — DEXAMETHASONE SODIUM PHOSPHATE 6 MG: 4 INJECTION, SOLUTION INTRA-ARTICULAR; INTRALESIONAL; INTRAMUSCULAR; INTRAVENOUS; SOFT TISSUE at 10:42

## 2021-01-01 RX ADMIN — IPRATROPIUM BROMIDE AND ALBUTEROL SULFATE 1 AMPULE: 2.5; .5 SOLUTION RESPIRATORY (INHALATION) at 13:04

## 2021-01-01 RX ADMIN — SODIUM CHLORIDE: 9 INJECTION, SOLUTION INTRAVENOUS at 09:58

## 2021-01-01 RX ADMIN — AMIODARONE HYDROCHLORIDE 200 MG: 200 TABLET ORAL at 21:20

## 2021-01-01 RX ADMIN — OXYCODONE HYDROCHLORIDE 10 MG: 5 TABLET ORAL at 12:09

## 2021-01-01 RX ADMIN — IPRATROPIUM BROMIDE AND ALBUTEROL SULFATE 1 AMPULE: 2.5; .5 SOLUTION RESPIRATORY (INHALATION) at 12:20

## 2021-01-01 RX ADMIN — GABAPENTIN 800 MG: 400 CAPSULE ORAL at 21:18

## 2021-01-01 RX ADMIN — INSULIN LISPRO 3 UNITS: 100 INJECTION, SOLUTION INTRAVENOUS; SUBCUTANEOUS at 18:30

## 2021-01-01 RX ADMIN — BUDESONIDE 500 MCG: 0.5 SUSPENSION RESPIRATORY (INHALATION) at 20:18

## 2021-01-01 RX ADMIN — IPRATROPIUM BROMIDE AND ALBUTEROL SULFATE 1 AMPULE: 2.5; .5 SOLUTION RESPIRATORY (INHALATION) at 16:29

## 2021-01-01 RX ADMIN — IPRATROPIUM BROMIDE AND ALBUTEROL SULFATE 1 AMPULE: 2.5; .5 SOLUTION RESPIRATORY (INHALATION) at 20:24

## 2021-01-01 RX ADMIN — ENOXAPARIN SODIUM 100 MG: 100 INJECTION SUBCUTANEOUS at 21:33

## 2021-01-01 RX ADMIN — BUDESONIDE 500 MCG: 0.5 SUSPENSION RESPIRATORY (INHALATION) at 08:36

## 2021-01-01 RX ADMIN — Medication 500 MG: at 08:20

## 2021-01-01 RX ADMIN — INSULIN LISPRO 3 UNITS: 100 INJECTION, SOLUTION INTRAVENOUS; SUBCUTANEOUS at 07:05

## 2021-01-01 RX ADMIN — GABAPENTIN 800 MG: 400 CAPSULE ORAL at 05:45

## 2021-01-01 RX ADMIN — Medication 500 MG: at 08:41

## 2021-01-01 RX ADMIN — LEVOTHYROXINE SODIUM 125 MCG: 125 TABLET ORAL at 05:50

## 2021-01-01 RX ADMIN — IPRATROPIUM BROMIDE AND ALBUTEROL SULFATE 1 AMPULE: 2.5; .5 SOLUTION RESPIRATORY (INHALATION) at 19:47

## 2021-01-01 RX ADMIN — ATORVASTATIN CALCIUM 20 MG: 20 TABLET, FILM COATED ORAL at 08:23

## 2021-01-01 RX ADMIN — Medication 10 ML: at 09:17

## 2021-01-01 RX ADMIN — AMIODARONE HYDROCHLORIDE 200 MG: 200 TABLET ORAL at 20:36

## 2021-01-01 RX ADMIN — GABAPENTIN 800 MG: 400 CAPSULE ORAL at 13:53

## 2021-01-01 RX ADMIN — FENTANYL CITRATE 50 MCG: 50 INJECTION, SOLUTION INTRAMUSCULAR; INTRAVENOUS at 14:52

## 2021-01-01 RX ADMIN — APIXABAN 5 MG: 5 TABLET, FILM COATED ORAL at 20:39

## 2021-01-01 RX ADMIN — ATORVASTATIN CALCIUM 20 MG: 20 TABLET, FILM COATED ORAL at 08:14

## 2021-01-01 RX ADMIN — IPRATROPIUM BROMIDE AND ALBUTEROL SULFATE 1 AMPULE: 2.5; .5 SOLUTION RESPIRATORY (INHALATION) at 12:03

## 2021-01-01 RX ADMIN — Medication 15 ML: at 09:25

## 2021-01-01 RX ADMIN — INSULIN GLARGINE 25 UNITS: 100 INJECTION, SOLUTION SUBCUTANEOUS at 09:21

## 2021-01-01 RX ADMIN — BUDESONIDE 500 MCG: 0.5 SUSPENSION RESPIRATORY (INHALATION) at 19:47

## 2021-01-01 RX ADMIN — MIDODRINE HYDROCHLORIDE 20 MG: 5 TABLET ORAL at 17:19

## 2021-01-01 RX ADMIN — GABAPENTIN 800 MG: 400 CAPSULE ORAL at 06:14

## 2021-01-01 RX ADMIN — ZINC SULFATE 220 MG (50 MG) CAPSULE 50 MG: CAPSULE at 09:52

## 2021-01-01 RX ADMIN — Medication 15 ML: at 20:43

## 2021-01-01 RX ADMIN — Medication 1000 MG: at 09:11

## 2021-01-01 RX ADMIN — Medication 1000 MG: at 08:57

## 2021-01-01 RX ADMIN — INSULIN LISPRO 3 UNITS: 100 INJECTION, SOLUTION INTRAVENOUS; SUBCUTANEOUS at 00:01

## 2021-01-01 RX ADMIN — Medication 15 ML: at 21:30

## 2021-01-01 RX ADMIN — APIXABAN 5 MG: 5 TABLET, FILM COATED ORAL at 09:02

## 2021-01-01 RX ADMIN — Medication 1000 UNITS: at 07:54

## 2021-01-01 RX ADMIN — Medication 10 ML: at 08:46

## 2021-01-01 RX ADMIN — METOCLOPRAMIDE 5 MG: 5 INJECTION, SOLUTION INTRAMUSCULAR; INTRAVENOUS at 17:47

## 2021-01-01 ASSESSMENT — PAIN SCALES - WONG BAKER
WONGBAKER_NUMERICALRESPONSE: 0
WONGBAKER_NUMERICALRESPONSE: 6
WONGBAKER_NUMERICALRESPONSE: 0
WONGBAKER_NUMERICALRESPONSE: 2
WONGBAKER_NUMERICALRESPONSE: 0

## 2021-01-01 ASSESSMENT — PULMONARY FUNCTION TESTS
PIF_VALUE: 36
PIF_VALUE: 42
PIF_VALUE: 36
PIF_VALUE: 31
PIF_VALUE: 30
PIF_VALUE: 40
PIF_VALUE: 43
PIF_VALUE: 39
PIF_VALUE: 42
PIF_VALUE: 30
PIF_VALUE: 30
PIF_VALUE: 39
PIF_VALUE: 29
PIF_VALUE: 27
PIF_VALUE: 44
PIF_VALUE: 26
PIF_VALUE: 28
PIF_VALUE: 30
PIF_VALUE: 30
PIF_VALUE: 27
PIF_VALUE: 38
PIF_VALUE: 36
PIF_VALUE: 40
PIF_VALUE: 40
PIF_VALUE: 37
PIF_VALUE: 29
PIF_VALUE: 36
PIF_VALUE: 34
PIF_VALUE: 42
PIF_VALUE: 30
PIF_VALUE: 44
PIF_VALUE: 29
PIF_VALUE: 36
PIF_VALUE: 30
PIF_VALUE: 29
PIF_VALUE: 40
PIF_VALUE: 45
PIF_VALUE: 36
PIF_VALUE: 29
PIF_VALUE: 47
PIF_VALUE: 29
PIF_VALUE: 32
PIF_VALUE: 37
PIF_VALUE: 44
PIF_VALUE: 38
PIF_VALUE: 29
PIF_VALUE: 30
PIF_VALUE: 30
PIF_VALUE: 41
PIF_VALUE: 36
PIF_VALUE: 38
PIF_VALUE: 38
PIF_VALUE: 0
PIF_VALUE: 39
PIF_VALUE: 44
PIF_VALUE: 35
PIF_VALUE: 36
PIF_VALUE: 29
PIF_VALUE: 30
PIF_VALUE: 31
PIF_VALUE: 35
PIF_VALUE: 32
PIF_VALUE: 30
PIF_VALUE: 36
PIF_VALUE: 42
PIF_VALUE: 26
PIF_VALUE: 36
PIF_VALUE: 30
PIF_VALUE: 25
PIF_VALUE: 39
PIF_VALUE: 39
PIF_VALUE: 43
PIF_VALUE: 35
PIF_VALUE: 36
PIF_VALUE: 30
PIF_VALUE: 42
PIF_VALUE: 47
PIF_VALUE: 40
PIF_VALUE: 28
PIF_VALUE: 26
PIF_VALUE: 38
PIF_VALUE: 31
PIF_VALUE: 32
PIF_VALUE: 27
PIF_VALUE: 30
PIF_VALUE: 29
PIF_VALUE: 44
PIF_VALUE: 37
PIF_VALUE: 29
PIF_VALUE: 36
PIF_VALUE: 35
PIF_VALUE: 47
PIF_VALUE: 46
PIF_VALUE: 32
PIF_VALUE: 29
PIF_VALUE: 31
PIF_VALUE: 34
PIF_VALUE: 39
PIF_VALUE: 30
PIF_VALUE: 41
PIF_VALUE: 34
PIF_VALUE: 30
PIF_VALUE: 38
PIF_VALUE: 30
PIF_VALUE: 30
PIF_VALUE: 43
PIF_VALUE: 35
PIF_VALUE: 38
PIF_VALUE: 46
PIF_VALUE: 27
PIF_VALUE: 35
PIF_VALUE: 31
PIF_VALUE: 38
PIF_VALUE: 27
PIF_VALUE: 40
PIF_VALUE: 29
PIF_VALUE: 34
PIF_VALUE: 41
PIF_VALUE: 38
PIF_VALUE: 35
PIF_VALUE: 32
PIF_VALUE: 37
PIF_VALUE: 39
PIF_VALUE: 28
PIF_VALUE: 34
PIF_VALUE: 36
PIF_VALUE: 30
PIF_VALUE: 29
PIF_VALUE: 35
PIF_VALUE: 33
PIF_VALUE: 38
PIF_VALUE: 29
PIF_VALUE: 40
PIF_VALUE: 33
PIF_VALUE: 32
PIF_VALUE: 39
PIF_VALUE: 27
PIF_VALUE: 29
PIF_VALUE: 29
PIF_VALUE: 38
PIF_VALUE: 36
PIF_VALUE: 30
PIF_VALUE: 26
PIF_VALUE: 30
PIF_VALUE: 38
PIF_VALUE: 34
PIF_VALUE: 46
PIF_VALUE: 43
PIF_VALUE: 47
PIF_VALUE: 32
PIF_VALUE: 35
PIF_VALUE: 40
PIF_VALUE: 38
PIF_VALUE: 30
PIF_VALUE: 36
PIF_VALUE: 30
PIF_VALUE: 36
PIF_VALUE: 38
PIF_VALUE: 42
PIF_VALUE: 40
PIF_VALUE: 26
PIF_VALUE: 32
PIF_VALUE: 33
PIF_VALUE: 35
PIF_VALUE: 38
PIF_VALUE: 35
PIF_VALUE: 46
PIF_VALUE: 38
PIF_VALUE: 32
PIF_VALUE: 40
PIF_VALUE: 36
PIF_VALUE: 28
PIF_VALUE: 32
PIF_VALUE: 30
PIF_VALUE: 30
PIF_VALUE: 37
PIF_VALUE: 29
PIF_VALUE: 39
PIF_VALUE: 28
PIF_VALUE: 38
PIF_VALUE: 35
PIF_VALUE: 42
PIF_VALUE: 31
PIF_VALUE: 35
PIF_VALUE: 39
PIF_VALUE: 40
PIF_VALUE: 40
PIF_VALUE: 27
PIF_VALUE: 34
PIF_VALUE: 43
PIF_VALUE: 32
PIF_VALUE: 36
PIF_VALUE: 31
PIF_VALUE: 39
PIF_VALUE: 44
PIF_VALUE: 27
PIF_VALUE: 31
PIF_VALUE: 26
PIF_VALUE: 40
PIF_VALUE: 39
PIF_VALUE: 35
PIF_VALUE: 34
PIF_VALUE: 34
PIF_VALUE: 43
PIF_VALUE: 32
PIF_VALUE: 41
PIF_VALUE: 33
PIF_VALUE: 43
PIF_VALUE: 31
PIF_VALUE: 27
PIF_VALUE: 33
PIF_VALUE: 30
PIF_VALUE: 34
PIF_VALUE: 30
PIF_VALUE: 44
PIF_VALUE: 27
PIF_VALUE: 43
PIF_VALUE: 38
PIF_VALUE: 36
PIF_VALUE: 39
PIF_VALUE: 33
PIF_VALUE: 36
PIF_VALUE: 40
PIF_VALUE: 39
PIF_VALUE: 38
PIF_VALUE: 37
PIF_VALUE: 38
PIF_VALUE: 36
PIF_VALUE: 33
PIF_VALUE: 44
PIF_VALUE: 34
PIF_VALUE: 31
PIF_VALUE: 34
PIF_VALUE: 34
PIF_VALUE: 37
PIF_VALUE: 27
PIF_VALUE: 36
PIF_VALUE: 42
PIF_VALUE: 32
PIF_VALUE: 28
PIF_VALUE: 36
PIF_VALUE: 33
PIF_VALUE: 27
PIF_VALUE: 35
PIF_VALUE: 34
PIF_VALUE: 45
PIF_VALUE: 42
PIF_VALUE: 37
PIF_VALUE: 37
PIF_VALUE: 43
PIF_VALUE: 42
PIF_VALUE: 37
PIF_VALUE: 40
PIF_VALUE: 42
PIF_VALUE: 46
PIF_VALUE: 35
PIF_VALUE: 38
PIF_VALUE: 39
PIF_VALUE: 32
PIF_VALUE: 36
PIF_VALUE: 37
PIF_VALUE: 27
PIF_VALUE: 32
PIF_VALUE: 35
PIF_VALUE: 34
PIF_VALUE: 39
PIF_VALUE: 41
PIF_VALUE: 38
PIF_VALUE: 32
PIF_VALUE: 24
PIF_VALUE: 37
PIF_VALUE: 54
PIF_VALUE: 27
PIF_VALUE: 30
PIF_VALUE: 37
PIF_VALUE: 32
PIF_VALUE: 30
PIF_VALUE: 33
PIF_VALUE: 36
PIF_VALUE: 30
PIF_VALUE: 33
PIF_VALUE: 37
PIF_VALUE: 41
PIF_VALUE: 41
PIF_VALUE: 40
PIF_VALUE: 37
PIF_VALUE: 41
PIF_VALUE: 39
PIF_VALUE: 38
PIF_VALUE: 35
PIF_VALUE: 31
PIF_VALUE: 35
PIF_VALUE: 30
PIF_VALUE: 27
PIF_VALUE: 30
PIF_VALUE: 33
PIF_VALUE: 36
PIF_VALUE: 35
PIF_VALUE: 33
PIF_VALUE: 39
PIF_VALUE: 38
PIF_VALUE: 27
PIF_VALUE: 28
PIF_VALUE: 27
PIF_VALUE: 35
PIF_VALUE: 29
PIF_VALUE: 29
PIF_VALUE: 30
PIF_VALUE: 35
PIF_VALUE: 29
PIF_VALUE: 45
PIF_VALUE: 37
PIF_VALUE: 32
PIF_VALUE: 41
PIF_VALUE: 35
PIF_VALUE: 43
PIF_VALUE: 31
PIF_VALUE: 30
PIF_VALUE: 35
PIF_VALUE: 36
PIF_VALUE: 31
PIF_VALUE: 27
PIF_VALUE: 32
PIF_VALUE: 38
PIF_VALUE: 28
PIF_VALUE: 40
PIF_VALUE: 42
PIF_VALUE: 42
PIF_VALUE: 32
PIF_VALUE: 36
PIF_VALUE: 36
PIF_VALUE: 42
PIF_VALUE: 38
PIF_VALUE: 31
PIF_VALUE: 43
PIF_VALUE: 36
PIF_VALUE: 38
PIF_VALUE: 40
PIF_VALUE: 44
PIF_VALUE: 39
PIF_VALUE: 38
PIF_VALUE: 36
PIF_VALUE: 39
PIF_VALUE: 47
PIF_VALUE: 30
PIF_VALUE: 46
PIF_VALUE: 44
PIF_VALUE: 31
PIF_VALUE: 40
PIF_VALUE: 38
PIF_VALUE: 28
PIF_VALUE: 27
PIF_VALUE: 30
PIF_VALUE: 35
PIF_VALUE: 40
PIF_VALUE: 30
PIF_VALUE: 26
PIF_VALUE: 42
PIF_VALUE: 38
PIF_VALUE: 41
PIF_VALUE: 36
PIF_VALUE: 27
PIF_VALUE: 28
PIF_VALUE: 28
PIF_VALUE: 30
PIF_VALUE: 35
PIF_VALUE: 40
PIF_VALUE: 33
PIF_VALUE: 43
PIF_VALUE: 37
PIF_VALUE: 42
PIF_VALUE: 29
PIF_VALUE: 33
PIF_VALUE: 27
PIF_VALUE: 36
PIF_VALUE: 29
PIF_VALUE: 28
PIF_VALUE: 28
PIF_VALUE: 38
PIF_VALUE: 45
PIF_VALUE: 29
PIF_VALUE: 30
PIF_VALUE: 41
PIF_VALUE: 31
PIF_VALUE: 41
PIF_VALUE: 36
PIF_VALUE: 30
PIF_VALUE: 36
PIF_VALUE: 42
PIF_VALUE: 26
PIF_VALUE: 31
PIF_VALUE: 29
PIF_VALUE: 33
PIF_VALUE: 44
PIF_VALUE: 34
PIF_VALUE: 41
PIF_VALUE: 36
PIF_VALUE: 39
PIF_VALUE: 26
PIF_VALUE: 30
PIF_VALUE: 35
PIF_VALUE: 29
PIF_VALUE: 40
PIF_VALUE: 35
PIF_VALUE: 31
PIF_VALUE: 28
PIF_VALUE: 42
PIF_VALUE: 42
PIF_VALUE: 38
PIF_VALUE: 41
PIF_VALUE: 33
PIF_VALUE: 36
PIF_VALUE: 34

## 2021-01-01 ASSESSMENT — PAIN SCALES - GENERAL
PAINLEVEL_OUTOF10: 0
PAINLEVEL_OUTOF10: 8
PAINLEVEL_OUTOF10: 0
PAINLEVEL_OUTOF10: 3
PAINLEVEL_OUTOF10: 5
PAINLEVEL_OUTOF10: 0
PAINLEVEL_OUTOF10: 0
PAINLEVEL_OUTOF10: 5
PAINLEVEL_OUTOF10: 0
PAINLEVEL_OUTOF10: 3
PAINLEVEL_OUTOF10: 0
PAINLEVEL_OUTOF10: 0
PAINLEVEL_OUTOF10: 4
PAINLEVEL_OUTOF10: 0
PAINLEVEL_OUTOF10: 6
PAINLEVEL_OUTOF10: 0
PAINLEVEL_OUTOF10: 0
PAINLEVEL_OUTOF10: 3
PAINLEVEL_OUTOF10: 3
PAINLEVEL_OUTOF10: 0
PAINLEVEL_OUTOF10: 3
PAINLEVEL_OUTOF10: 0
PAINLEVEL_OUTOF10: 3
PAINLEVEL_OUTOF10: 0
PAINLEVEL_OUTOF10: 0
PAINLEVEL_OUTOF10: 7
PAINLEVEL_OUTOF10: 0
PAINLEVEL_OUTOF10: 5
PAINLEVEL_OUTOF10: 0
PAINLEVEL_OUTOF10: 4
PAINLEVEL_OUTOF10: 0
PAINLEVEL_OUTOF10: 5
PAINLEVEL_OUTOF10: 7
PAINLEVEL_OUTOF10: 0
PAINLEVEL_OUTOF10: 6
PAINLEVEL_OUTOF10: 0

## 2021-01-01 ASSESSMENT — PAIN DESCRIPTION - DESCRIPTORS
DESCRIPTORS: ACHING;CONSTANT;DISCOMFORT
DESCRIPTORS: ACHING
DESCRIPTORS: ACHING;CONSTANT;DISCOMFORT
DESCRIPTORS: ACHING

## 2021-01-01 ASSESSMENT — PAIN DESCRIPTION - PAIN TYPE
TYPE: ACUTE PAIN
TYPE: CHRONIC PAIN

## 2021-01-01 ASSESSMENT — PAIN DESCRIPTION - FREQUENCY
FREQUENCY: CONTINUOUS

## 2021-01-01 ASSESSMENT — PAIN DESCRIPTION - PROGRESSION: CLINICAL_PROGRESSION: GRADUALLY WORSENING

## 2021-01-01 ASSESSMENT — PAIN DESCRIPTION - ONSET
ONSET: ON-GOING

## 2021-01-01 ASSESSMENT — PAIN DESCRIPTION - ORIENTATION: ORIENTATION: LEFT

## 2021-01-01 ASSESSMENT — PAIN - FUNCTIONAL ASSESSMENT

## 2021-01-01 ASSESSMENT — PAIN DESCRIPTION - LOCATION
LOCATION: BACK
LOCATION: ABDOMEN
LOCATION: BACK

## 2021-01-01 NOTE — PLAN OF CARE
Problem: Skin Integrity:  Goal: Will show no infection signs and symptoms  Description: Will show no infection signs and symptoms  Outcome: Met This Shift  Goal: Absence of new skin breakdown  Description: Absence of new skin breakdown  Outcome: Met This Shift     Problem: Airway Clearance - Ineffective  Goal: Achieve or maintain patent airway  Outcome: Met This Shift     Problem: Gas Exchange - Impaired  Goal: Absence of hypoxia  Outcome: Met This Shift  Goal: Promote optimal lung function  Outcome: Met This Shift     Problem: Breathing Pattern - Ineffective  Goal: Ability to achieve and maintain a regular respiratory rate  Outcome: Met This Shift     Problem:  Body Temperature -  Risk of, Imbalanced  Goal: Ability to maintain a body temperature within defined limits  Outcome: Met This Shift     Problem: Isolation Precautions - Risk of Spread of Infection  Goal: Prevent transmission of infection  Outcome: Met This Shift     Problem: Nutrition Deficits  Goal: Optimize nutrtional status  Outcome: Met This Shift     Problem: Risk for Fluid Volume Deficit  Goal: Maintain normal heart rhythm  Outcome: Met This Shift     Problem: Falls - Risk of:  Goal: Will remain free from falls  Description: Will remain free from falls  Outcome: Met This Shift  Goal: Absence of physical injury  Description: Absence of physical injury  Outcome: Met This Shift     Problem: Pain:  Goal: Pain level will decrease  Description: Pain level will decrease  Outcome: Met This Shift  Goal: Control of acute pain  Description: Control of acute pain  Outcome: Met This Shift  Goal: Control of chronic pain  Description: Control of chronic pain  Outcome: Met This Shift

## 2021-01-01 NOTE — PLAN OF CARE
Problem: Skin Integrity:  Goal: Will show no infection signs and symptoms  Description: Will show no infection signs and symptoms  1/1/2021 0727 by Sunita Shoemaker RN  Outcome: Met This Shift  1/1/2021 0250 by Marilyn Castillo RN  Outcome: Met This Shift  Goal: Absence of new skin breakdown  Description: Absence of new skin breakdown  1/1/2021 0727 by Sunita Shoemaker RN  Outcome: Met This Shift  1/1/2021 0250 by Marilyn Castillo RN  Outcome: Met This Shift     Problem: Airway Clearance - Ineffective  Goal: Achieve or maintain patent airway  1/1/2021 0250 by Marilyn Castillo RN  Outcome: Met This Shift     Problem: Gas Exchange - Impaired  Goal: Absence of hypoxia  1/1/2021 0250 by Marilyn Castillo RN  Outcome: Met This Shift  Goal: Promote optimal lung function  1/1/2021 0250 by Marilyn Castillo RN  Outcome: Met This Shift     Problem: Breathing Pattern - Ineffective  Goal: Ability to achieve and maintain a regular respiratory rate  1/1/2021 0250 by Marilyn Castillo RN  Outcome: Met This Shift     Problem:  Body Temperature -  Risk of, Imbalanced  Goal: Ability to maintain a body temperature within defined limits  1/1/2021 0250 by Marilyn Castillo RN  Outcome: Met This Shift     Problem: Isolation Precautions - Risk of Spread of Infection  Goal: Prevent transmission of infection  1/1/2021 0250 by Marilyn Castillo RN  Outcome: Met This Shift     Problem: Nutrition Deficits  Goal: Optimize nutrtional status  1/1/2021 0250 by Marilyn Castillo RN  Outcome: Met This Shift     Problem: Risk for Fluid Volume Deficit  Goal: Maintain normal heart rhythm  1/1/2021 0250 by Marilyn Castillo RN  Outcome: Met This Shift     Problem: Falls - Risk of:  Goal: Will remain free from falls  Description: Will remain free from falls  1/1/2021 0727 by Sunita Shoemaker RN  Outcome: Met This Shift  1/1/2021 0250 by Marilyn Castillo RN  Outcome: Met This Shift  Goal: Absence of physical injury  Description: Absence of physical injury  1/1/2021 0727 by Lynetta Cabot, RN  Outcome: Met This Shift  1/1/2021 0250 by Shweta Covington RN  Outcome: Met This Shift     Problem: Pain:  Goal: Pain level will decrease  Description: Pain level will decrease  1/1/2021 0250 by Shweta Covington RN  Outcome: Met This Shift  Goal: Control of acute pain  Description: Control of acute pain  1/1/2021 0250 by Shweta Covington RN  Outcome: Met This Shift  Goal: Control of chronic pain  Description: Control of chronic pain  1/1/2021 0250 by Shweta Covington RN  Outcome: Met This Shift

## 2021-01-01 NOTE — PROGRESS NOTES
Critical Care Team - Daily Progress Note         Date and time: 1/1/2021 6:54 AM  Patient's name:  [facility] Record Number: [id number]  Patient's account/billing number: [de-identified]  Patient's YOB: 1950  Age: 65 y.o. Date of Admission: 12/13/2020 10:51 AM  Length of stay during current admission: 19      Primary Care Physician: Otoniel Pichardo DO  ICU Attending Physician: Dr. Marco Coleman Status: Full Code    Reason for ICU admission: respiratory failure   Shock   Covid 19 pneumonitis       SUBJECTIVE:     OVERNIGHT EVENTS:         12/16: Overnight able to titrate down some of the vasopressors though still intermittently hypotensive and requiring vasopressin as well as Levophed. Still sedated with fentanyl and Versed. Hyponatremia notable at 119 new today. 12/17: Patient was able to be titrated off the vasopressors. Is now on midodrine. Minimally hypothermic overnight now on Melissa hugger with improvement in temperature. Continue sedation with fentanyl and Versed. 12/18: Patient continues on the ventilator. Back on small amount of Levophed in addition to the vasopressin. Hoping to wean sedation more today. 12/19: Back on levophed overnight. Initially able to titrate fio2 to 40%, became hypoxic and increased to 60%. Apparently their were several episodes of bradycardia overnight. Patient has pacemaker which did not fire per nursing. Follows with Dr. Khushbu Vela for cardiology. 12/20: Patient: 6 L overnight, nephrology ordered K-Phos, and half-normal saline, will CT her head today. 12/21: Patient tolerated being supine overnight. Plan for PICC line today. Pacemaker interrogated with no runs of V. tach or other rhythm needing acute intervention. 12/22: Patient remained supine without difficulty. Continuing to try to wean down her FiO2. Her sodium has improved. 12/23: Patient continues to tolerate the vent. She is waking up more.   Sodium is much better. No acute overnight events. 12/24: Failed weaning trial yesterday. Became agitated. Continues to tolerate the ventilator. Day 9 on the ventilator. 12/25: The patient occasionally opens her eyes. She occasionally follows commands. She gets extremely anxious when sedation is weaned further. Continues on Versed and fentanyl. 12/26: Patient continues on the ventilator. Opens eyes with sedation vacation, not really following commands. Day 11 on the ventilator. No other acute overnight events. 12:27: Continues on the vent. Awake on sedation, does follow commands. Day 12 on the ventilator. 12/28: Patient continues on the ventilator. Still occasionally becomes agitated when attempting to wean sedation. Day 13 on the ventilator. Occasionally hypotensive requiring Levophed. 12/29: trach and peg today. Spent 29th on the phone talking to her son who is the power of  yesterday explaining her prognosis and the treatment course going forward. He wants a trach and PEG and everything done at this point. Patient is actually much more awake today and following commands. When asked if she needs the volume turned up on the TV she shakes her head yes. Plan for trach and PEG this afternoon. 12/30: Trach and PEG yesterday. Had to be placed back on Levophed, currently running at 13 mics. Patient is now awake, has restraints off, is alert and responding to commands. Weaning sedatives. 12/31: No acute events overnight, episode of A. fib with RVR yesterday, changed to needle from Levophed, and started on amiodarone bolus and amiodarone per PEG tube. Off fern today.      01/01: No acute events, Weaned off fentanyl drip    CURRENT VENTILATION STATUS:     [x] Ventilator  [] BIPAP  [] Nasal Cannula [] Room Air      IF INTUBATED, ET TUBE MARKING AT LOWER LIP:       cms    SECRETIONS Amount:  [] Small [] Moderate  [] Large  [x] None  Color:     [] White [] Colored  [] Bloody    SEDATION: non-distended, normal bowel sounds, lateral bruising in the flanks, nontender to palpation in the area of the Lovenox injections  Extremities: warm, no edema, no clubbing   Skin:  Large bilateral ecchymoses on the flanks, and lower quadrants of the abdomen  Neurologic: Trached, pupils equal round reactive, watching TV, following commands, moves all extremities       Any additional physical findings:    MEDICATIONS:    Scheduled Meds:   nicotine  1 patch Transdermal Daily    fentaNYL  1 patch Transdermal Q72H    amiodarone  400 mg Oral BID    Followed by   Susan Eubanks ON 1/6/2021] amiodarone  200 mg Oral Daily    hydrocortisone sodium succinate PF  50 mg Intravenous Q6H    furosemide  40 mg Intravenous Daily    [Held by provider] enoxaparin  1 mg/kg Subcutaneous BID    vitamin C  500 mg Oral BID    thiamine  100 mg Oral Daily    zinc sulfate  50 mg Oral Daily    heparin flush  3 mL Intravenous 2 times per day    insulin glargine  25 Units Subcutaneous Daily    docusate  100 mg Oral Daily    senna  5 mL Oral Nightly    midodrine  10 mg Oral TID    insulin lispro  0-18 Units Subcutaneous Q6H    pantoprazole  40 mg Intravenous Daily    And    sodium chloride (PF)  10 mL Intravenous Daily    ipratropium-albuterol  1 ampule Inhalation Q4H WA    budesonide  500 mcg Nebulization BID    Arformoterol Tartrate  15 mcg Nebulization BID    [Held by provider] clopidogrel  75 mg Oral Daily    chlorhexidine  15 mL Mouth/Throat BID    sodium chloride flush  10 mL Intravenous 2 times per day    Vitamin D  2,000 Units Oral Daily    gabapentin  800 mg Oral TID    levothyroxine  100 mcg Oral Daily    atorvastatin  20 mg Oral Daily    acetaminophen  650 mg Rectal Once     Continuous Infusions:   phenylephrine (HORACE-SYNEPHRINE) 50mg/250mL infusion Stopped (12/31/20 1109)     PRN Meds:       metoprolol, 5 mg, Q6H PRN      sodium chloride flush, 10 mL, PRN      heparin flush, 3 mL, PRN      potassium chloride, 3.3*   CL 99 99 100   CO2 32* 33* 34*   BUN 9 10 13   CREATININE 0.3* 0.4* 0.3*   GLUCOSE 214* 184* 184*   CALCIUM 7.2* 6.9* 7.3*   PROT 5.2*  --  5.1*   LABALBU 3.4*  --  3.3*   BILITOT 0.6  --  0.6   ALKPHOS 104  --  117*   AST 25  --  25   ALT 18  --  15      Magnesium:   Lab Results   Component Value Date    MG 2.0 01/01/2021     Phosphorus:   Lab Results   Component Value Date    PHOS 1.8 01/01/2021     Ionized Calcium: No results found for: CAION     Urinalysis:     Troponin:   No results for input(s): TROPONINI in the last 72 hours. Microbiology:    Cultures during this admission:     Blood cultures:                 [] None drawn      [x] Negative             []  Positive (Details:  )  Urine Culture:                   [] None drawn      [] Negative             []  Positive (Details:  )  Sputum Culture:               [] None drawn       [x] Negative             []  Positive (Details:  )   Endotracheal aspirate:     [] None drawn       [] Negative             []  Positive (Details:  )     Other pertinent Labs:   COVID-19 + December 13  MRSA negative screening   radiology/Imaging:     Chest Xray (1/1/2021):       Right PICC line with tip at the junction of the subclavian and SVC   Extensive bilateral pulmonary infiltrates unchanged   ET and NG tubes appear in satisfactory position             ASSESSMENT:      1. Acute hypoxic respiratory failure   2. Covid pneumonitis - critically severe   3. Shock- septic   4. Adrenal insufficiency  5. Hyponatremia   6. Hypokalemia   7. Leukocytosis    8. COPD  9. pvd  10. chf not in acute exacerbatino   11. Hx lacunar infarct   12. SSS s/p pacemaker   13. Hx seizure do   14. Bilateral carotid stenosis       SYSTEMS ASSESSMENT    Neuro   Previous history of lacunar stroke  Alert and Following command, Tolerating Trach Ventilator well. Decreased Agitation  Pain around Trach site, Fentanyl PRN for pain.      Respiratory   Acute respiratory failure with hypoxia secondary to Palliative following   6. Rising ddimer- full dose lovenox bid- held due to abdominal bleeding, on scds  7. Lasix diuresis   8. Much more awake today, following commands  9. Afib with RVR 12/30, gave amio bolus, start on amio per PEG  10. D/c a line, ensure BP is stable off fern  12. Nicotine patch as patient wants  13. Weaning off fentanyl drip, transitioning to 50 derrick fentanyl patch, will have to wean the patch as well  14. Continue ICU level of care    Electronically signed by Ardell Duane, DO on 1/1/2021 at 4:34 PM    I personally saw, examined and provided care for the patient. Radiographs, labs and medication list were reviewed by me independently. I spoke with bedside nursing, therapists and consultants. Critical care services and times documented are independent of procedures and multidisciplinary rounds with Residents. Additionally comprehensive, multidisciplinary rounds were conducted with the MICU team. The case was discussed in detail and plans for care were established. Review of Residents documentation was conducted and revisions were made as appropriate. I agree with the above documented exam, problem list and plan of care. psv trial as tolerated   Monitor saturations   Dixie Love M.D.    Pulmonary/Critical Care Medicine   38 min cct excluding procedures

## 2021-01-01 NOTE — PROGRESS NOTES
Subjective:    Patient seen and examined at bedside. Awake. Slightly hypoxemic 86 to 88% on ventilator. No complaints    Objective:    BP (!) 96/47   Pulse 79   Temp 97.1 °F (36.2 °C)   Resp 24   Ht 5' 6\" (1.676 m)   Wt 220 lb 3.8 oz (99.9 kg)   SpO2 94%   BMI 35.55 kg/m²     Current medications that patient is taking have been reviewed. Heart:  RRR, no murmurs, gallops, or rubs.   Lungs:  CTA bilaterally, no wheeze, rales or rhonchi  Abd: bowel sounds present, soft, nontender, nondistended, no masses  Extrem:  1+ LE edema    CBC:   Lab Results   Component Value Date    WBC 10.4 01/01/2021    RBC 2.86 01/01/2021    HGB 8.8 01/01/2021    HCT 29.5 01/01/2021    .1 01/01/2021    MCH 30.8 01/01/2021    MCHC 29.8 01/01/2021    RDW 20.3 01/01/2021     01/01/2021    MPV 10.1 01/01/2021     BMP:    Lab Results   Component Value Date     01/01/2021    K 3.3 01/01/2021    K 3.3 12/15/2020     01/01/2021    CO2 34 01/01/2021    BUN 13 01/01/2021    LABALBU 3.3 01/01/2021    CREATININE 0.3 01/01/2021    CALCIUM 7.3 01/01/2021    GFRAA >60 01/01/2021    LABGLOM >60 01/01/2021    GLUCOSE 184 01/01/2021    GLUCOSE 106 12/15/2011        Assessment:    Patient Active Problem List   Diagnosis    History of hypertension    HLD (hyperlipidemia)    Tobacco abuse    [facility] obstructive [facility]    History of DVT (deep vein thrombosis)    Seizure ([facility])    [facility]ick [facility] syndrome) [facility] [address]    Pacemaker    Hypothyroid    PVD (peripheral vascular disease) ([facility] [address] [facility]    Coronary artery disease involving native coronary artery without angina pectoris    Left carotid artery occlusion    Osteoporosis    Bilateral carotid artery stenosis    Atherosclerosis of native artery of both lower extremities with intermittent claudication ([facility])    Osteoarthritis    Post-traumatic osteoarthritis of left hip    Trauma    Multiple closed fractures of ribs of left side    Chest wall pain  Nodule of spleen    CAD in native artery    Shoulder dislocation, left, subsequent encounter    Neuropathy    Lacunar infarction (HCC)    CHF (congestive heart failure) (HCC)    Pneumonia    Acute respiratory failure with hypoxia (HCC)    Acute respiratory failure due to COVID-19 ([facility] [address]    Septic shock (HCC)    Atrial fibrillation with RVR (HCC)       Plan:       1.  Acute respiratory failure with hypoxia              Trach+, on ventilator, FiO2 60%              COVID +              Multifocal PNA - XR 1/1 stable   Weaning off fentanyl drip to patch  2. Shock -likely septic               Continue with phenylephrine  3. Anemia - resolved  4. Hypothyroidism - continue synthroid  5. Hyponatremia -resolved                   Nephrology consulted  6. A.  fib RVR - amiodarone through PEG       Please call my cell phone between 8pm-6am 21 [phone]    Geeta Robert    5:23 PM  1/1/2021

## 2021-01-02 NOTE — PATIENT CARE CONFERENCE
.  Intensive Care Daily Quality Rounding Checklist      ICU Team Members: Dr. Berry Bales, Dr. Johnathon Carvalho (resident), charge nurse, bedside nurse, respiratory therapist    ICU Day #: NUMBER: 19    Intubation Date: December 15    Ventilator Day #: NUMBER: 19-- trache 29    Central Line Insertion Date:  Dec 21 PICC line placement        Day #: NUMBER: 13     Arterial Line Insertion Date: d/c'd 1/1/2021       Day #: n/a    Temporary Hemodialysis Catheter Insertion Date:  n/a      Day # n/a    DVT Prophylaxis: lovenox    GI Prophylaxis: protonix    Allen Catheter Insertion Date: December 13       Day #: 21      Continued need (if yes, reason documented and discussed with physician): yes- accurate I & O    Skin Issues/ Wounds and ordered treatment discussed on rounds: yes- sos protocol    Goals/ Plans for the Day: Daily labs & abgs- correct electrolytes as needed, check transfer.  Pts son requesting pt be re- tested for Covid

## 2021-01-02 NOTE — PLAN OF CARE
Problem: Skin Integrity:  Goal: Will show no infection signs and symptoms  Description: Will show no infection signs and symptoms  Outcome: Met This Shift  Goal: Absence of new skin breakdown  Description: Absence of new skin breakdown  Outcome: Met This Shift     Problem: Airway Clearance - Ineffective  Goal: Achieve or maintain patent airway  Outcome: Met This Shift     Problem: Gas Exchange - Impaired  Goal: Absence of hypoxia  Outcome: Met This Shift     Problem: Breathing Pattern - Ineffective  Goal: Ability to achieve and maintain a regular respiratory rate  Outcome: Met This Shift     Problem:  Body Temperature -  Risk of, Imbalanced  Goal: Ability to maintain a body temperature within defined limits  Outcome: Met This Shift     Problem: Isolation Precautions - Risk of Spread of Infection  Goal: Prevent transmission of infection  Outcome: Met This Shift     Problem: Nutrition Deficits  Goal: Optimize nutrtional status  Outcome: Met This Shift     Problem: Risk for Fluid Volume Deficit  Goal: Maintain normal heart rhythm  Outcome: Met This Shift     Problem: Falls - Risk of:  Goal: Will remain free from falls  Description: Will remain free from falls  Outcome: Met This Shift  Goal: Absence of physical injury  Description: Absence of physical injury  Outcome: Met This Shift     Problem: Pain:  Goal: Pain level will decrease  Description: Pain level will decrease  Outcome: Met This Shift  Goal: Control of acute pain  Description: Control of acute pain  Outcome: Met This Shift  Goal: Control of chronic pain  Description: Control of chronic pain  Outcome: Met This Shift     Problem: Pain:  Goal: Pain level will decrease  Description: Pain level will decrease  Outcome: Met This Shift  Goal: Control of acute pain  Description: Control of acute pain  Outcome: Met This Shift  Goal: Control of chronic pain  Description: Control of chronic pain  Outcome: Met This Shift

## 2021-01-02 NOTE — PROGRESS NOTES
Critical Care Team - Daily Progress Note         Date and time: 1/2/2021 5:02 PM  Patient's name:  Jena Gupta  Medical Record Number: [id number]  Patient's account/billing number: [de-identified]  Patient's YOB: 1950  Age: 65 y.o. Date of Admission: 12/13/2020 10:51 AM  Length of stay during current admission: 20      Primary Care Physician: Alex Booth DO  ICU Attending Physician: Dr. Mervin Medina Status: Full Code    Reason for ICU admission: respiratory failure   Shock   Covid 19 pneumonitis       SUBJECTIVE:     OVERNIGHT EVENTS:         12/16: Overnight able to titrate down some of the vasopressors though still intermittently hypotensive and requiring vasopressin as well as Levophed. Still sedated with fentanyl and Versed. Hyponatremia notable at 119 new today. 12/17: Patient was able to be titrated off the vasopressors. Is now on midodrine. Minimally hypothermic overnight now on Melissa hugger with improvement in temperature. Continue sedation with fentanyl and Versed. 12/18: Patient continues on the ventilator. Back on small amount of Levophed in addition to the vasopressin. Hoping to wean sedation more today. 12/19: Back on levophed overnight. Initially able to titrate fio2 to 40%, became hypoxic and increased to 60%. Apparently their were several episodes of bradycardia overnight. Patient has pacemaker which did not fire per nursing. Follows with Dr. Hayley Daniels for cardiology. 12/20: Patient: 6 L overnight, nephrology ordered K-Phos, and half-normal saline, will CT her head today. 12/21: Patient tolerated being supine overnight. Plan for PICC line today. Pacemaker interrogated with no runs of V. tach or other rhythm needing acute intervention. 12/22: Patient remained supine without difficulty. Continuing to try to wean down her FiO2. Her sodium has improved. 12/23: Patient continues to tolerate the vent. She is waking up more.   Sodium is much better. No acute overnight events. 12/24: Failed weaning trial yesterday. Became agitated. Continues to tolerate the ventilator. Day 9 on the ventilator. 12/25: The patient occasionally opens her eyes. She occasionally follows commands. She gets extremely anxious when sedation is weaned further. Continues on Versed and fentanyl. 12/26: Patient continues on the ventilator. Opens eyes with sedation vacation, not really following commands. Day 11 on the ventilator. No other acute overnight events. 12:27: Continues on the vent. Awake on sedation, does follow commands. Day 12 on the ventilator. 12/28: Patient continues on the ventilator. Still occasionally becomes agitated when attempting to wean sedation. Day 13 on the ventilator. Occasionally hypotensive requiring Levophed. 12/29: trach and peg today. Spent 29th on the phone talking to her son who is the power of  yesterday explaining her prognosis and the treatment course going forward. He wants a trach and PEG and everything done at this point. Patient is actually much more awake today and following commands. When asked if she needs the volume turned up on the TV she shakes her head yes. Plan for trach and PEG this afternoon. 12/30: Trach and PEG yesterday. Had to be placed back on Levophed, currently running at 13 mics. Patient is now awake, has restraints off, is alert and responding to commands. Weaning sedatives. 12/31: No acute events overnight, episode of A. fib with RVR yesterday, changed to needle from Levophed, and started on amiodarone bolus and amiodarone per PEG tube. Off fern today. 01/01: No acute events, Weaned off fentanyl drip    01/02: No acute events.     CURRENT VENTILATION STATUS:     [x] Ventilator  [] BIPAP  [] Nasal Cannula [] Room Air      IF INTUBATED, ET TUBE MARKING AT LOWER LIP:       cms    SECRETIONS Amount:  [] Small [] Moderate  [] Large  [x] None  Color:     [] White [] Colored  [] Bloody    SEDATION:  RAAS Score:  [] Fentanyl gtt  [] Versed gtt  [] Ativan gtt   [x] No Sedation    PARALYZED:  [x] No    [] Yes      VASOPRESSORS:  [x] No    [] Yes    If yes -   [] Levophed       [] Dopamine     [] Vasopressin       [] Dobutamine  [] Phenylephrine         [] Epinephrine    CENTRAL LINES:     [x] No   [] Yes   (Date of Insertion:  12/15 )           If yes -     [] Right IJ     [] Left IJ [] Right Femoral [] Left Femoral                   [] Right Subclavian [] Left Subclavian   PICC line    GARCIA'S CATHETER:   [] No   [x] Yes  (Date of Insertion: 12/15  )     URINE OUTPUT:            [x] Good   [] Low              [] Anuric      OBJECTIVE:     VITAL SIGNS:  BP (!) 104/52   Pulse 71   Temp 97.9 °F (36.6 °C) (Axillary)   Resp 27   Ht 5' 6\" (1.676 m)   Wt 220 lb 3.8 oz (99.9 kg)   SpO2 91%   BMI 35.55 kg/m²   Tmax over 24 hours:  Temp (24hrs), Av.5 °F (36.4 °C), Min:97 °F (36.1 °C), Max:98.1 °F (36.7 °C)      Patient Vitals for the past 6 hrs:   BP Temp Temp src Pulse Resp SpO2   21 1602 -- -- -- 71 27 91 %   21 1600 (!) 104/52 97.9 °F (36.6 °C) Axillary 64 27 92 %   21 1500 (!) 104/49 -- -- 69 22 --   21 1400 (!) 109/52 -- -- 75 29 91 %   21 1300 134/63 -- -- 75 24 93 %   21 1214 -- -- -- 68 22 93 %   21 1212 -- -- -- 66 21 92 %   21 1200 119/62 98.1 °F (36.7 °C) -- 68 23 93 %         Intake/Output Summary (Last 24 hours) at 2021 1702  Last data filed at 2021 0900  Gross per 24 hour   Intake 1092.73 ml   Output 351 ml   Net 741.73 ml     Wt Readings from Last 2 Encounters:   20 220 lb 3.8 oz (99.9 kg)   20 188 lb 9 oz (85.5 kg)     Body mass index is 35.55 kg/m².         PHYSICAL EXAMINATION:    General: Alert, resting comfortably  HEENT: Pupils are equal round and reactive to light, there are no oral lesions and no post-nasal drip   Neck: supple without adenopathy, tracheostomy in place, tender around site  Cardiovascular: regular rate and rhythm without murmur or gallop  Respiratory:  Decreased breath sounds bilaterally without wheezing or crackles.   Air entry is symmetric  Abdomen: soft, non-tender, non-distended, normal bowel sounds, lateral bruising in the flanks; PEG in place  Extremities: warm, no edema, no clubbing   Skin:  Large bilateral ecchymoses on the flanks, and lower quadrants of the abdomen  Neurologic: Trached, pupils equal round reactive, watching TV, following commands, moves all extremities; writes on clipboards and mouths questions/answers appropriately     Any additional physical findings:    MEDICATIONS:    Scheduled Meds:   nicotine  1 patch Transdermal Daily    fentaNYL  1 patch Transdermal Q72H    amiodarone  400 mg Oral BID    Followed by   Susan Eubanks ON 1/6/2021] amiodarone  200 mg Oral Daily    hydrocortisone sodium succinate PF  50 mg Intravenous Q6H    enoxaparin  1 mg/kg Subcutaneous BID    vitamin C  500 mg Oral BID    thiamine  100 mg Oral Daily    zinc sulfate  50 mg Oral Daily    heparin flush  3 mL Intravenous 2 times per day    insulin glargine  25 Units Subcutaneous Daily    docusate  100 mg Oral Daily    senna  5 mL Oral Nightly    midodrine  10 mg Oral TID    insulin lispro  0-18 Units Subcutaneous Q6H    pantoprazole  40 mg Intravenous Daily    And    sodium chloride (PF)  10 mL Intravenous Daily    ipratropium-albuterol  1 ampule Inhalation Q4H WA    budesonide  500 mcg Nebulization BID    Arformoterol Tartrate  15 mcg Nebulization BID    [Held by provider] clopidogrel  75 mg Oral Daily    chlorhexidine  15 mL Mouth/Throat BID    sodium chloride flush  10 mL Intravenous 2 times per day    Vitamin D  2,000 Units Oral Daily    gabapentin  800 mg Oral TID    levothyroxine  100 mcg Oral Daily    atorvastatin  20 mg Oral Daily    acetaminophen  650 mg Rectal Once     Continuous Infusions:    PRN Meds:       acetaminophen, 650 mg, Q6H PRN     metoprolol, 5 mg, Q6H PRN      sodium chloride flush, 10 mL, PRN      heparin flush, 3 mL, PRN      potassium chloride, 20 mEq, PRN      fentanNYL, 25 mcg, Q3H PRN      promethazine, 12.5 mg, Q6H PRN    Or      ondansetron, 4 mg, Q6H PRN      polyethylene glycol, 17 g, Daily PRN          VENT SETTINGS (Comprehensive) (if applicable):  Vent Information  $Ventilation: $Subsequent Day  Skin Assessment: Redness (see comment/note)  Equipment ID: [id number]  Equipment Changed: (waterbag 1/2 full)  Vent Type: [phone]  Vent Mode: AC/VC+  Vt Ordered: 450 mL  Pressure Ordered: 14  Rate Set: 14 bmp  Peak Flow: 0 L/min  Pressure Support: 0 cmH20  FiO2 : 70 %  SpO2: 91 %  SpO2/FiO2 ratio: 130  PaO2/FiO2 ratio: 86  Sensitivity: 2  PEEP/CPAP: 10  I Time/ I Time %: 0.9 s  Humidification Source: Heated wire  Humidification Temp: 37  Humidification Temp Measured: 37.3  Circuit Condensation: Drained  Mask Type: Full face mask  Mask Size: Small  Additional Respiratory  Assessments  Pulse: 71  Resp: 27  SpO2: 91 %  Position: Semi-Marrero's  Humidification Source: Heated wire  Humidification Temp: 37  Circuit Condensation: Drained  Oral Care: Mouth suctioned, Mouth moisturizer, Mouth swabbed  Subglottic Suction Done?: Yes  Cuff Pressure (cm H2O): 29 cm H2O    ABGs:   Recent Labs     01/02/21  0539   PH 7.485*   PCO2 40.1   PO2 58.0*   HCO3 29.5*   BE 5.7*   O2SAT 90.8*       Laboratory findings:    Complete Blood Count:   Recent Labs     12/31/20  0530 01/01/21  0600 01/02/21  0520   WBC 11.1 10.4 10.3   HGB 9.0* 8.8* 8.6*   HCT 29.1* 29.5* 29.0*    123* 117*        Last 3 Blood Glucose:   Recent Labs     12/31/20  1630 01/01/21  0600 01/02/21  0520   GLUCOSE 184* 184* 184*        PT/INR:    Lab Results   Component Value Date    PROTIME 11.4 12/13/2020    PROTIME 11.9 12/15/2011    INR 1.0 12/13/2020     PTT:    Lab Results   Component Value Date    APTT 21.6 12/13/2020       Comprehensive Metabolic Profile:   Recent Labs 12/31/20  1630 01/01/21  0600 01/01/21  1940 01/02/21  0520    139  --  138   K 3.6 3.3* 3.6 3.3*   CL 99 100  --  99   CO2 33* 34*  --  35*   BUN 10 13  --  17   CREATININE 0.4* 0.3*  --  0.4*   GLUCOSE 184* 184*  --  184*   CALCIUM 6.9* 7.3*  --  7.5*   PROT  --  5.1*  --  5.0*   LABALBU  --  3.3*  --  3.0*   BILITOT  --  0.6  --  0.6   ALKPHOS  --  117*  --  111*   AST  --  25  --  20   ALT  --  15  --  14      Magnesium:   Lab Results   Component Value Date    MG 2.1 01/02/2021     Phosphorus:   Lab Results   Component Value Date    PHOS 2.0 01/02/2021     Ionized Calcium: No results found for: CAION     Urinalysis:     Troponin:   No results for input(s): TROPONINI in the last 72 hours. Microbiology:    Cultures during this admission:     Blood cultures:                 [] None drawn      [x] Negative             []  Positive (Details:  )  Urine Culture:                   [] None drawn      [] Negative             []  Positive (Details:  )  Sputum Culture:               [] None drawn       [x] Negative             []  Positive (Details:  )   Endotracheal aspirate:     [] None drawn       [] Negative             []  Positive (Details:  )     Other pertinent Labs:   COVID-19 + December 13  MRSA negative screening   radiology/Imaging:     Chest Xray (1/2/2021):       Right PICC line with tip at the junction of the subclavian and SVC   Extensive bilateral pulmonary infiltrates unchanged   ET and NG tubes appear in satisfactory position             ASSESSMENT:      1. Acute hypoxic respiratory failure   2. Covid pneumonitis - critically severe   3. Shock- septic   4. Adrenal insufficiency  5. Hyponatremia   6. Hypokalemia   7. Leukocytosis    8. COPD  9. pvd  10. chf not in acute exacerbatino   11. Hx lacunar infarct   12. SSS s/p pacemaker   13. Hx seizure do   14.  Bilateral carotid stenosis       SYSTEMS ASSESSMENT    Neuro   Previous history of lacunar stroke  Alert and Following command, Tolerating Trach Ventilator well. Decreased Agitation  Pain around Trach site, Fentanyl PRN for pain. Respiratory   Acute respiratory failure with hypoxia secondary to COVID-19 viral pneumonia  Intubated  Course of Cefepime completed for possible superimposed bacterial pneumonia   Wean peep and fio2 as tolerated. Keep O2 sat 90-92%  abg daily  Trach and Peg 12/29    Cardiovascular   Shock, septic  Increase midodrine to 15 tid  intermittent atrial fib/flutter  Afib RVR: Amiodarone 400mg 2 times daily decrease to 200mg once daily on 1/6    Pacemaker interrogated that showed several episodes of supraventricular tachycardia over the last couple of months but no persistent shockable rhythm  Continue plavix, hx of CAD with stent      Gastrointestinal   GI prophylaxis with Protonix  Diet per dietary recs   Colace/senna for constipation  Ct abdomen pelvis 12/27 showed no acute intraabdominal etiology   1/1: Constipation- Fleet enema Ordered. Renal   Hyponatremia -resolved   -Continue to trend metabolic panel   -Nephrology following for fluid recommendations intravenously  Replete potassium as needed  Adrenal Insufficiency: Solucortef 50mg TID  Lasix 20 mg bid diuresis     Infectious Disease   COVID-19 viral pneumonia   Received remdesivir, Toci, vit D, vit c, thiamine   Septic shock - improving  Possible superimposed bacterial pneumonia   -Completed course of cefepime  Blood cultures and respiratory culture negative thus far  ua sent today         Hematology/Oncology   Anemia on admission, trend H&H, improving  CBC daily  lovenox 1mg/kg bid - rising d-dimer, held for oozing around [place], Restarted on 1/1. Endocrine   high-dose sliding scale Humalog for hyperglycemia  Start lantus   -better glycemic control today  Monitor growth closely  Cortisol on admission 1.42  High-dose Solucortef decreased to 50 q 8 hrs      Social/Spiritual/DNR/Other   Full code. Palliative consulted. ASSESSMENT/ PLAN     1.   Trach and peg 12/29  2. Hyponatremia -resolved- nephrology following  3.   midodrine TID -4. increased solucortef  5. Palliative following   6. Rising ddimer- full dose lovenox bid- held due to abdominal bleeding, on scds  7. Lasix diuresis   8. Alert today, following commands, writing on clipboard  9. Afib with RVR 12/30, gave amio bolus, start on amio per PEG  10. D/c a line, ensure BP is stable off fern  12. Nicotine patch as patient wants  13. Weaning off fentanyl drip, transitioning to 50 derrick fentanyl patch, will have to wean the patch as well  14. Continue ICU level of care    Electronically signed by Jeri Bowman MD on 1/2/2021 at [address] PM    I personally saw, examined and provided care for the patient. Radiographs, labs and medication list were reviewed by me independently. I spoke with bedside nursing, therapists and consultants. Critical care services and times documented are independent of procedures and multidisciplinary rounds with Residents. Additionally comprehensive, multidisciplinary rounds were conducted with the MICU team. The case was discussed in detail and plans for care were established. Review of Residents documentation was conducted and revisions were made as appropriate. I agree with the above documented exam, problem list and plan of care. Doing well   Wean fio2 potential for transfer later   Christopher Bolivar M.D.    Pulmonary/Critical Care Medicine   34 min cct excluding procedures

## 2021-01-02 NOTE — PROGRESS NOTES
Subjective:    Patient seen and examined at bedside, awake responding to questions. Denies pain. Comfortable on the ventilator. Objective:    BP (!) 83/56   Pulse 63   Temp 97.1 °F (36.2 °C) (Axillary)   Resp 19   Ht 5' 6\" (1.676 m)   Wt 220 lb 3.8 oz (99.9 kg)   SpO2 94%   BMI 35.55 kg/m²     Current medications that patient is taking have been reviewed. Heart:  RRR, no murmurs, gallops, or rubs.   Lungs:  CTA bilaterally, no wheeze, rales or rhonchi  Abd: bowel sounds present, soft, nontender, nondistended, no masses  Extrem:  Trace edema    CBC:   Lab Results   Component Value Date    WBC 10.3 01/02/2021    RBC 2.74 01/02/2021    HGB 8.6 01/02/2021    HCT 29.0 01/02/2021    .8 01/02/2021    MCH 31.4 01/02/2021    MCHC 29.7 01/02/2021    RDW 21.0 01/02/2021     01/02/2021    MPV 10.9 01/02/2021     BMP:    Lab Results   Component Value Date     01/02/2021    K 3.3 01/02/2021    K 3.3 12/15/2020    CL 99 01/02/2021    CO2 35 01/02/2021    BUN 17 01/02/2021    LABALBU 3.0 01/02/2021    CREATININE 0.4 01/02/2021    CALCIUM 7.5 01/02/2021    GFRAA >60 01/02/2021    LABGLOM >60 01/02/2021    GLUCOSE 184 01/02/2021    GLUCOSE 106 12/15/2011        Assessment:    Patient Active Problem List   Diagnosis    History of hypertension    HLD (hyperlipidemia)    Tobacco abuse    [facility]    History of DVT (deep vein thrombosis)    Seizure ([facility])    [facility] [address] [facility]    Pacemaker    Hypothyroid    PVD (peripheral vascular disease) [facility] [address] [facility]    Coronary artery disease involving native coronary artery without angina pectoris    Left carotid artery occlusion    Osteoporosis    Bilateral carotid artery stenosis    Atherosclerosis of native artery of both lower extremities with intermittent claudication ([facility])    Osteoarthritis    Post-traumatic osteoarthritis of left hip    Trauma    Multiple closed fractures of ribs of left side  Chest wall pain    Nodule of spleen    CAD in native artery    Shoulder dislocation, left, subsequent encounter    Neuropathy    Lacunar infarction (HCC)    CHF (congestive heart failure) (HCC)    Pneumonia    Acute respiratory failure with hypoxia (HCC)    Acute respiratory failure due to COVID-19 ([facility] [address]    Septic shock (HCC)    Atrial fibrillation with RVR (HCC)       Plan:    1.  Acute respiratory failure with hypoxia              Trach+, on ventilator, FiO2 60%              COVID +              Multifocal PNA - XR 1/1 stable              Weaning off fentanyl drip to patch   Repeat COVID test for placement  2. Shock -likely septic               Continue with phenylephrine  3. Anemia - resolved  4. Hypothyroidism - continue synthroid  5. Hyponatremia -resolved                   Nephrology consulted  6. A.  fib RVR - amiodarone through PEG       Please call my cell phone between 8pm-6am [phone]    10:31 AM  1/2/2021

## 2021-01-03 NOTE — PROGRESS NOTES
Subjective:    Patient seen and examined at bedside. Oxygen requirements increased on ventilator. Patient is requesting discharge. Objective:    BP (!) 113/59   Pulse 75   Temp 97.7 °F (36.5 °C) (Axillary)   Resp 28   Ht 5' 6\" (1.676 m)   Wt 220 lb 3.8 oz (99.9 kg)   SpO2 90%   BMI 35.55 kg/m²     Current medications that patient is taking have been reviewed. Heart:  RRR, no murmurs, gallops, or rubs.   Lungs:  CTA bilaterally, no wheeze, rales or rhonchi  Abd: bowel sounds present, soft, nontender, nondistended, no masses, PEG+  Extrem:  No cyanosis or edema    CBC:   Lab Results   Component Value Date    WBC 13.4 01/03/2021    RBC 2.92 01/03/2021    HGB 9.4 01/03/2021    HCT 29.9 01/03/2021    .4 01/03/2021    MCH 32.2 01/03/2021    MCHC 31.4 01/03/2021    RDW 21.2 01/03/2021     01/03/2021    MPV 10.7 01/03/2021     BMP:    Lab Results   Component Value Date     01/03/2021    K 3.2 01/03/2021    K 3.3 12/15/2020    CL 99 01/03/2021    CO2 33 01/03/2021    BUN 12 01/03/2021    LABALBU 3.0 01/03/2021    CREATININE 0.3 01/03/2021    CALCIUM 7.6 01/03/2021    GFRAA >60 01/03/2021    LABGLOM >60 01/03/2021    GLUCOSE 155 01/03/2021    GLUCOSE 106 12/15/2011        Assessment:    Patient Active Problem List   Diagnosis    History of hypertension    HLD (hyperlipidemia)    Tobacco abuse    [facility]    History of DVT (deep vein thrombosis)    Seizure ([facility]    [facility] syndrome) [facility] [address]    Pacemaker    Hypothyroid    PVD (peripheral vascular disease) ([facility] [address]    Coronary artery disease involving native coronary artery without angina pectoris    Left carotid artery occlusion    Osteoporosis    Bilateral carotid artery stenosis    Atherosclerosis of native artery of both lower extremities with intermittent claudication ([facility]    Osteoarthritis    Post-traumatic osteoarthritis of left hip    Trauma    Multiple closed fractures of ribs of left side    Chest wall pain    Nodule of spleen    CAD in native artery    Shoulder dislocation, left, subsequent encounter    Neuropathy    Lacunar infarction (HCC)    CHF (congestive heart failure) (HCC)    Pneumonia    Acute respiratory failure with hypoxia (HCC)    Acute respiratory failure due to COVID-19 ([address]    Septic shock (HCC)    Atrial fibrillation with RVR (HCC)       Plan:    1.  Acute respiratory failure with hypoxia              Trach+, on ventilator, FiO2 60%              COVID +              Multifocal PNA - XR 1/1 stable              Weaning off fentanyl drip to patch              Repeat COVID test +  2. Shock -likely septic               Off phenylephrine  3. Anemia - resolved  4. Hypothyroidism - continue synthroid  5. Hyponatremia -resolved                   Nephrology consulted  6. A.  fib RVR - amiodarone through PEG       Please call my cell phone between 8pm-6am [phone]    10:02 AM  1/3/2021

## 2021-01-03 NOTE — PATIENT CARE CONFERENCE
Intensive Care Daily Quality Rounding Checklist        ICU Team Members: Dr. Martin Hernandez, Dr. Geovanni Haque (resident), charge nurse, bedside nurse, respiratory therapist     ICU Day #: NUMBER: 20     Intubation Date: December 15     Ventilator Day #: NUMBER: 20-- trache 12/29     Central Line Insertion Date:  Dec 21 PICC line placement                                                    Day #: NUMBER: 14      Arterial Line Insertion Date: d/c'd 1/1/2021                              Day #: n/a     Temporary Hemodialysis Catheter Insertion Date:  n/a                             Day # n/a     DVT Prophylaxis: lovenox    GI Prophylaxis: protonix     Allen Catheter Insertion Date: December 13                                        Day #: 22                             Continued need (if yes, reason documented and discussed with physician): yes- accurate I & O     Skin Issues/ Wounds and ordered treatment discussed on rounds: yes- sos protocol     Goals/ Plans for the Day: Daily labs & abgs- correct electrolytes as needed, awaiting IM bed since yesterday

## 2021-01-04 NOTE — PROGRESS NOTES
Called to bedside given inability to effectively ventilate. Patient is not receiving tidal volumes. On 12/29 the patient underwent open tracheostomy with Dr. Mar Ortiz with an 8-0 cuffed Shiley. At the bedside this was deflated and exchanged for an 8-0 cuffed XLT. Patient effectively ventilated receiving tidal volumes after the procedure. When trying to elevate the head of her bed her tidal volumes were not the exchanged. The etiology of this is being further evaluated with chest x-ray and possible bronchoscopy in the future. The patient tolerated the exchange without issue.     Corrie Mosuzanneing

## 2021-01-04 NOTE — CARE COORDINATION
COVID POSITIVE 12/13. Vent since 12/15-FIO2 70% . s/p Trach/PEG 12/29. Patient has [address]. Insurance. From [facility]- can return only if extubated-no repeat COVID test is needed. Accepted @ [facility] when medically stable(only area [facility] accepting vents/ + covid at this time)- insurance precert waived until 3/49/81. P [facility] of discharge plan. Intermediate LOC from 1/2-remains in ICU- no available interm bed. Will follow.  N-17 initiated  Johanna Boateng

## 2021-01-04 NOTE — PROGRESS NOTES
Occupational Therapy  OT order received and chart reviewed. Hold per nursing d/t medical status, will continue to follow. Thank you.     Travon Virk OTR/L  [id number]

## 2021-01-05 NOTE — PROGRESS NOTES
Subjective:  Status post PEG and trach: Concern questions appropriately wants to be transferred upstairs alert  Discussed with staff    Objective:    BP (!) 103/59   Pulse 69   Temp 98.6 °F (37 °C) (Axillary)   Resp 16   Ht 5' 6\" (1.676 m)   Wt 220 lb 3.8 oz (99.9 kg)   SpO2 (!) 89%   BMI 35.55 kg/m²     24HR INTAKE/OUTPUT:      Intake/Output Summary (Last 24 hours) at 1/4/2021 2037  Last data filed at 1/4/2021 1600  Gross per 24 hour   Intake 301.2 ml   Output 1385 ml   Net -1083.8 ml       General appearance: Trach to vent alert awake answering questions mechanically ventilated supine  Neck: FROM, supple   Lungs: Clear bilaterally no wheezes, no rhonchi, no crackles  CV: RRR, no MRGs; normal carotid upstroke and amplitude without Bruits  Abdomen: Soft, non-tender; no masses or HSM  Extremities: No edema, no cyanosis, no clubbing  Skin: Intact no rash, no lesions, no ulcers    Psych: Alert and oriented normal affect  Neuro: Nonfocal      Most Recent Labs  Lab Results   Component Value Date    WBC 15.0 (H) 01/04/2021    HGB 9.9 (L) 01/04/2021    HCT 33.0 (L) 01/04/2021     01/04/2021     01/04/2021    K 3.2 (L) 01/04/2021    CL 99 01/04/2021    CREATININE 0.3 (L) 01/04/2021    BUN 12 01/04/2021    CO2 32 (H) 01/04/2021    GLUCOSE 221 (H) 01/04/2021    ALT 14 01/03/2021    AST 21 01/03/2021    INR 1.0 12/13/2020    TSH 0.381 12/03/2020    LABA1C 5.8 04/05/2017     Recent Labs     01/04/21  0620   MG 2.2     Lab Results   Component Value Date    CALCIUM 8.1 (L) 01/04/2021    PHOS 2.4 (L) 01/04/2021        XR CHEST PORTABLE   Final Result   No significant changes since the January 4 same-day performed early at 05:48   a.mTrinidad Lopez XR CHEST PORTABLE   Final Result   Extensive bilateral pulmonary infiltrates increasing from prior exam      XR CHEST PORTABLE   Final Result   Continued diffuse bilateral infiltrates, worse on the right. Tracheostomy tube and right-sided central venous catheter unchanged. XR CHEST PORTABLE   Final Result   Unchanged bilateral infiltrates. XR CHEST PORTABLE   Final Result   1. Extensive multifocal bilateral pulmonary infiltrates unchanged compared to   the patient's prior study. XR CHEST PORTABLE   Final Result   Stable support apparatus. Persistent diffuse bilateral airspace disease. No significant change   compared to most recent study but findings progressed compared to the study   from December 27. XR CHEST PORTABLE   Final Result   No interval change      CT ABDOMEN PELVIS WO CONTRAST Additional Contrast? None   Final Result   1. Dense airspace consolidation in the left lower lobe and increase in right   basilar lung opacities compatible with multifocal pneumonia. At least small   to moderate size bilateral pleural effusions with adjacent atelectasis. 2. Diffuse body wall edema. There is intermediate attenuation fluid in the   left lateral abdominal wall, and a component of hemorrhage is possible as   well. 3. Small to moderate amount of fluid in the abdomen and pelvis with simple   fluid attenuation. 4. Hepatic surface nodularity consistent with hepatic cirrhosis. 5. Nonobstructive renal calculi bilaterally. 6. Colonic diverticulosis without acute inflammatory changes. XR CHEST PORTABLE   Final Result   1. Extensive bilateral airspace disease and small left pleural effusion,   unchanged. 2.  Stable position of support tubes and right-sided PICC line. XR CHEST PORTABLE   Final Result   Extensive bilateral pulmonary infiltrates unchanged   ET and other life support devices appear in satisfactory position      XR CHEST PORTABLE   Final Result   ET tube in satisfactory position   Bilateral pulmonary infiltrates are unchanged is      XR CHEST PORTABLE   Final Result   Stable bilateral airspace disease which may represent pulmonary edema. XR CHEST PORTABLE   Final Result   1. Good position right-sided PICC line.    2.  Otherwise stable findings. Bilateral airspace infiltrates/edema, worse   on the right. XR CHEST PORTABLE   Final Result   Right PICC line with tip at the junction of the subclavian and SVC   Extensive bilateral pulmonary infiltrates unchanged   ET and NG tubes appear in satisfactory position      XR CHEST PORTABLE   Final Result   Bilateral airspace disease which is not appreciably changed and is likely   related to pulmonary edema. XR CHEST PORTABLE   Final Result   Mildly progressive bilateral infiltrates. Improving left pleural effusion      XR CHEST PORTABLE   Final Result   Mildly waning airspace disease with slight improvement of aeration   bilaterally. XR CHEST PORTABLE   Final Result   Diffuse infiltrate, improved in the upper lungs as compared to prior. There is new consolidation in the left lower lobe with possible left pleural   effusion. XR CHEST PORTABLE   Final Result   Endotracheal tube in low position, can be pulled back about 3 cm. NG tube in good position. Pattern of bilateral interstitial infiltrate in the perihilar regions or the   periphery of the lung as above commented. The pulmonary edema versus   bilateral viral pneumonia will be part of the differential diagnosis. Please   correlate clinically. T   Preliminary report given to Dr. Shahram Pike at the time   of the interpretation. XR ABDOMEN FOR NG/OG/NE TUBE PLACEMENT   Final Result   Enteric feeding tube partially imaged below the diaphragm and likely within   the stomach. XR CHEST PORTABLE   Final Result   Increased interstitial markings throughout both lungs along with   patchy/consolidative airspace opacities noted bilaterally as above. Findings   are suspicious for multifocal pneumonia or pulmonary edema. CTA PULMONARY W CONTRAST   Final Result   No central pulmonary embolism or aortic dissection.    Progressive diffuse bilateral patchy and ground-glass infiltrates concerning   for progressive multifocal pneumonia/viral infection. XR CHEST PORTABLE   Final Result   Progressive diffuse bilateral infiltrates and pleural effusion likely   CHF/edema and or diffuse pneumonia. Assessment    Active Problems:    Acute respiratory failure with hypoxia (HCC)    Acute respiratory failure due to COVID-19 [facility])    Septic shock ([facility])    Atrial fibrillation with RVR ([facility])  Resolved Problems:    * No resolved hospital problems. *      Plan:     1.  Acute respiratory failure with hypoxia  - COVID 19 PNA / bacterial/ HF. CXR improved 12/17  -Intubated sedated mechanically ventilated   -Prone not paralyzed             Hydrocortisone IV + vit D   Remdesivir completed              Diffuse bilateral patchy GGO on CT, no PE                         cefepime completed              Fentanyl   SP PEG and trach 12/29    2. Shock -likely septic secondary to viral sepsis              Currently off   levophed, vasopressin off wean off   Midodrine              TLC inserted              Blood cultures sent, no evidence of UTI   Completed cefepime              Wean hydrocortisone,cont midodrine    3. Anemia - resolved              KOBEDQ false lab value    4. Hypothyroidism - continue synthroid    5.  Hyponatremia -improved    nephrology consult appreciated              IVF resuscitation              Continue to monitor labs closely       A. fib RVR amiodarone drip    Palliative care consult appreciated  Anticipate discharge to SNF with ventilator capability when medically stable    Electronically signed by Fely Gomes MD on 1/4/2021 at 8:37 PM

## 2021-01-05 NOTE — PATIENT CARE CONFERENCE
Intensive Care Daily Quality Rounding Checklist        ICU Team Members: Dr. Oren Angulo, Thomas Hand & Danny (residents), charge nurse, bedside nurse, clinical pharmacist     ICU Day #: NUMBER: 22     Intubation Date: December 15     Ventilator Day #: NUMBER: 22-- trache 12/29     Central Line Insertion Date:  Dec 21 PICC line placement                                                    Day #: NUMBER: 16      Arterial Line Insertion Date: d/c'd 1/1/2021                              Day #: n/a     Temporary Hemodialysis Catheter Insertion Date:  n/a                             Day # n/a     DVT Prophylaxis: lovenox    GI Prophylaxis: protonix     Allen Catheter Insertion Date: December 13                                        Day #: 24                             Continued need (if yes, reason documented and discussed with physician): yes- accurate I & O     Skin Issues/ Wounds and ordered treatment discussed on rounds: yes- sos protocol     Goals/ Plans for the Day: Daily labs & abgs- correct electrolytes as needed, wean vent as able, add Ativan routine, soft wrist restraints to prevent pulling tubes, start Levophed drip

## 2021-01-05 NOTE — PROGRESS NOTES
GENERAL SURGERY  DAILY PROGRESS NOTE  1/5/2021    Subjective:  Exchanged tracheostomy from 8-0 Shiley to 8-0 XLT. Now getting tidal volumes on ventilator. Objective:  BP (!) 75/48   Pulse 87   Temp 97.9 °F (36.6 °C) (Axillary)   Resp 26   Ht 5' 6\" (1.676 m)   Wt 220 lb 3.8 oz (99.9 kg)   SpO2 95%   BMI 35.55 kg/m²     Examination performed from the door given Covid 19+ status    Assessment/Plan:  65 y.o. female status post trach exchange on 1/4 for inability to obtain adequate tidal volumes. Now obtaining tidal volumes and saturations are much better than prior. General surgery will sign off at this time. Please page with any questions or concerns.       Electronically signed by Marcela Melo MD on 1/5/2021 at 7:25 AM

## 2021-01-05 NOTE — CARE COORDINATION
COVID POSITIVE 12/13. Vent since 12/15-FIO2 100% . s/p Trach/PEG 12/29. Patient has [address]. Insurance. From [facility]- can return only if extubated-no repeat COVID test is needed. Accepted @ [facility] when medically stable(only area [facility] accepting vents/ + covid at this time)- insurance precert waived until 7/59/26. [facility] of discharge plan. LOC upgraded back to ICU status. Will follow.  N-17 initiated  Santiago Figueroa

## 2021-01-05 NOTE — PROGRESS NOTES
better. No acute overnight events. 12/24: Failed weaning trial yesterday. Became agitated. Continues to tolerate the ventilator. Day 9 on the ventilator. 12/25: The patient occasionally opens her eyes. She occasionally follows commands. She gets extremely anxious when sedation is weaned further. Continues on Versed and fentanyl. 12/26: Patient continues on the ventilator. Opens eyes with sedation vacation, not really following commands. Day 11 on the ventilator. No other acute overnight events. 12:27: Continues on the vent. Awake on sedation, does follow commands. Day 12 on the ventilator. 12/28: Patient continues on the ventilator. Still occasionally becomes agitated when attempting to wean sedation. Day 13 on the ventilator. Occasionally hypotensive requiring Levophed. 12/29: trach and peg today. Spent 29th on the phone talking to her son who is the power of  yesterday explaining her prognosis and the treatment course going forward. He wants a trach and PEG and everything done at this point. Patient is actually much more awake today and following commands. When asked if she needs the volume turned up on the TV she shakes her head yes. Plan for trach and PEG this afternoon. 12/30: Trach and PEG yesterday. Had to be placed back on Levophed, currently running at 13 mics. Patient is now awake, has restraints off, is alert and responding to commands. Weaning sedatives. 12/31: No acute events overnight, episode of A. fib with RVR yesterday, changed to needle from Levophed, and started on amiodarone bolus and amiodarone per PEG tube. Off fern today. 01/01: No acute events, Weaned off fentanyl drip    01/02: No acute events    1/4: No acute events overnight    1/5: Become more agitated overnight with tachypnea and fluctuating blood pressure. Also ventilatory status declining requiring increased sedation.       CURRENT VENTILATION STATUS:     [x] Ventilator  [] BIPAP [] Nasal Cannula [] Room Air      IF INTUBATED, ET TUBE MARKING AT LOWER LIP:       cms    SECRETIONS Amount:  [] Small [] Moderate  [] Large  [x] None  Color:     [] White [] Colored  [] Bloody    SEDATION:  RAAS Score:  [] Fentanyl gtt  [] Versed gtt  [] Ativan gtt   [x] No Sedation    PARALYZED:  [x] No    [] Yes      VASOPRESSORS:  [] No    [x] Yes    If yes -   [] Levophed       [] Dopamine     [] Vasopressin       [] Dobutamine  [x] Phenylephrine         [] Epinephrine    CENTRAL LINES:     [] No   [x] Yes   (Date of Insertion:  12/15 ) PICC line          If yes -     [] Right IJ     [] Left IJ [] Right Femoral [] Left Femoral                   [] Right Subclavian [] Left Subclavian   PICC line    GARCIA'S CATHETER:   [] No   [x] Yes  (Date of Insertion: 12/15  )     URINE OUTPUT:            [x] Good   [] Low              [] Anuric      OBJECTIVE:     VITAL SIGNS:  BP 85/60   Pulse 78   Temp 97 °F (36.1 °C) (Axillary)   Resp 16   Ht 5' 6\" (1.676 m)   Wt 220 lb 3.8 oz (99.9 kg)   SpO2 (!) 81%   BMI 35.55 kg/m²   Tmax over 24 hours:  Temp (24hrs), Av.1 °F (36.7 °C), Min:97 °F (36.1 °C), Max:99 °F (37.2 °C)      Patient Vitals for the past 6 hrs:   BP Temp Temp src Pulse Resp SpO2   21 0900 85/60 -- -- 78 16 (!) 81 %   21 0859 -- -- -- 79 17 (!) 81 %   21 0857 (!) 140/75 -- -- 85 20 (!) 80 %   21 0851 -- -- -- -- 25 (!) 76 %   21 0850 -- -- -- -- 26 (!) 76 %   21 0849 -- -- -- 98 (!) 31 (!) 76 %   21 0839 (!) 143/87 -- -- 100 -- (!) 82 %   21 0800 (!) 114/47 97 °F (36.1 °C) Axillary 79 16 93 %   21 0600 (!) 75/48 -- -- 87 26 95 %   21 0500 (!) 81/50 -- -- 69 14 92 %   21 0400 (!) 105/56 97.9 °F (36.6 °C) Axillary 86 21 --         Intake/Output Summary (Last 24 hours) at 2021 0928  Last data filed at 2021 0600  Gross per 24 hour   Intake 790.2 ml   Output 1125 ml   Net -334.8 ml     Wt Readings from Last 2 Encounters:   20 220 lb 3.8 oz (99.9 kg)   12/08/20 188 lb 9 oz (85.5 kg)     Body mass index is 35.55 kg/m². PHYSICAL EXAMINATION:    General: Sedated, resting comfortably  HEENT: Pupils are equal round and reactive to light, there are no oral lesions and no post-nasal drip   Neck: supple without adenopathy, tracheostomy in place, tender around site  Cardiovascular: regular rate and rhythm without murmur or gallop  Respiratory:  Decreased breath sounds bilaterally without wheezing or crackles.   Air entry is symmetric  Abdomen: soft, non-tender, non-distended, normal bowel sounds, lateral bruising in the flanks; PEG in place  Extremities: warm, no edema, no clubbing   Skin:  Large bilateral ecchymoses on the flanks, and lower quadrants of the abdomen  Neurologic: Trached, pupils equal round reactive, watching TV, following commands, moves all extremities; writes on clipboards and mouths questions/answers appropriately     Any additional physical findings:    MEDICATIONS:    Scheduled Meds:   hydrocortisone sodium succinate PF  50 mg Intravenous Q12H    lansoprazole  30 mg Per G Tube QAM AC    apixaban  5 mg Oral BID    nicotine  1 patch Transdermal Daily    fentaNYL  1 patch Transdermal Q72H    amiodarone  400 mg Oral BID    Followed by   Charlene Yates ON 1/6/2021] amiodarone  200 mg Oral Daily    vitamin C  500 mg Oral BID    thiamine  100 mg Oral Daily    zinc sulfate  50 mg Oral Daily    heparin flush  3 mL Intravenous 2 times per day    insulin glargine  25 Units Subcutaneous Daily    docusate  100 mg Oral Daily    senna  5 mL Oral Nightly    midodrine  10 mg Oral TID    insulin lispro  0-18 Units Subcutaneous Q6H    ipratropium-albuterol  1 ampule Inhalation Q4H WA    budesonide  500 mcg Nebulization BID    Arformoterol Tartrate  15 mcg Nebulization BID    clopidogrel  75 mg Oral Daily    chlorhexidine  15 mL Mouth/Throat BID    sodium chloride flush  10 mL Intravenous 2 times per day    Vitamin D  2,000 Units Oral Daily    gabapentin  800 mg Oral TID    levothyroxine  100 mcg Oral Daily    atorvastatin  20 mg Oral Daily     Continuous Infusions:   propofol 30 mcg/kg/min (01/05/21 0856)    fentaNYL 5 mcg/ml in 0.9%  ml infusion 125 mcg/hr (01/05/21 0315)     PRN Meds:       LORazepam, 1 mg, Q4H PRN      acetaminophen, 650 mg, Q6H PRN      metoprolol, 5 mg, Q6H PRN      sodium chloride flush, 10 mL, PRN      heparin flush, 3 mL, PRN      potassium chloride, 20 mEq, PRN      fentanNYL, 25 mcg, Q3H PRN      promethazine, 12.5 mg, Q6H PRN    Or      ondansetron, 4 mg, Q6H PRN      polyethylene glycol, 17 g, Daily PRN          VENT SETTINGS (Comprehensive) (if applicable):  Vent Information  $Ventilation: $Subsequent Day  Skin Assessment: Redness (see comment/note)  Equipment ID: [id number]  Equipment Changed: (S) Humidification  Vent Type: [id number]  Vent Mode: AC/VC+  Vt Ordered: 400 mL  Pressure Ordered: 14  Rate Set: 16 bmp  Peak Flow: 0 L/min  Pressure Support: 0 cmH20  FiO2 : 100 %  SpO2: (!) 81 %  SpO2/FiO2 ratio: 81  PaO2/FiO2 ratio: 86  Sensitivity: 2  PEEP/CPAP: 12  I Time/ I Time %: 1 s  Humidification Source: Heated wire  Humidification Temp: 37  Humidification Temp Measured: 36.6  Circuit Condensation: Drained  Mask Type: Full face mask  Mask Size: Small  Additional Respiratory  Assessments  Pulse: 78  Resp: 16  SpO2: (!) 81 %  Position: Semi-Marrero's  Humidification Source: Heated wire  Humidification Temp: 37  Circuit Condensation: Drained  Oral Care: Lip moisturizer applied  Subglottic Suction Done?: Yes  Cuff Pressure (cm H2O): (positional)    ABGs:   No results for input(s): PH, PCO2, PO2, HCO3, BE, O2SAT in the last 72 hours.     Laboratory findings:    Complete Blood Count:   Recent Labs     01/03/21  0540 01/04/21  0620 01/05/21  0420   WBC 13.4* 15.0* 17.6*   HGB 9.4* 9.9* 10.0*   HCT 29.9* 33.0* 33.9*    160 165        Last 3 Blood Glucose:   Recent Labs     01/03/21  0535 01/04/21  0620 01/05/21  0420   GLUCOSE 155* 221* 158*        PT/INR:    Lab Results   Component Value Date    PROTIME 11.4 12/13/2020    PROTIME 11.9 12/15/2011    INR 1.0 12/13/2020     PTT:    Lab Results   Component Value Date    APTT 21.6 12/13/2020       Comprehensive Metabolic Profile:   Recent Labs     01/03/21  0540 01/03/21  1815 01/04/21  0620 01/05/21  0420     --  136 138   K 3.2* 3.6 3.2* 4.4   CL 99  --  99 100   CO2 33*  --  32* 31*   BUN 12  --  12 18   CREATININE 0.3*  --  0.3* 0.4*   GLUCOSE 155*  --  221* 158*   CALCIUM 7.6*  --  8.1* 8.3*   PROT 5.0*  --   --   --    LABALBU 3.0*  --   --   --    BILITOT 0.7  --   --   --    ALKPHOS 130*  --   --   --    AST 21  --   --   --    ALT 14  --   --   --       Magnesium:   Lab Results   Component Value Date    MG 2.5 01/05/2021     Phosphorus:   Lab Results   Component Value Date    PHOS 4.0 01/05/2021     Ionized Calcium: No results found for: CAION     Urinalysis:     Troponin:   No results for input(s): TROPONINI in the last 72 hours. Microbiology:    Cultures during this admission:     Blood cultures:                 [] None drawn      [x] Negative             []  Positive (Details:  )  Urine Culture:                   [] None drawn      [] Negative             []  Positive (Details:  )  Sputum Culture:               [] None drawn       [x] Negative             []  Positive (Details:  )   Endotracheal aspirate:     [] None drawn       [] Negative             []  Positive (Details:  )     Other pertinent Labs:   COVID-19 + December 13  MRSA negative screening   radiology/Imaging:     Chest Xray (1/5/2021):       Right PICC line with tip at the junction of the subclavian and SVC   Extensive bilateral pulmonary infiltrates unchanged   ET and NG tubes appear in satisfactory position             ASSESSMENT:      1. Acute hypoxic respiratory failure   2. Covid pneumonitis - critically severe   3. Shock- septic   4.  Adrenal insufficiency  5. Hyponatremia   6. Hypokalemia   7. Leukocytosis    8. COPD  9. pvd  10. chf not in acute exacerbatino   11. Hx lacunar infarct   12. SSS s/p pacemaker   13. Hx seizure do   14. Bilateral carotid stenosis       SYSTEMS ASSESSMENT    Neuro   Previous history of lacunar stroke  Alert and Following command, Tolerating Trach Ventilator well. Decreased Agitation  Pain around Trach site, Fentanyl PRN for pain. Respiratory   Acute respiratory failure with hypoxia secondary to COVID-19 viral pneumonia  Intubated  Course of Cefepime completed for possible superimposed bacterial pneumonia   Wean peep and fio2 as tolerated. Keep O2 sat 90-92%  abg daily  Trach and Peg 12/29 1/5 scheduled Ativan for every 6 and Oxy 10 milligrams every 6 hours. Stop fentanyl patch. Patient tachypneic more today than previous, requiring increased sedation in order to maintain oxygen saturation. Cardiovascular   Shock, septic  Increase midodrine to 15 tid  intermittent atrial fib/flutter  Afib RVR: Amiodarone 400mg 2 times daily decrease to 200mg once daily on 1/6  Pacemaker interrogated that showed several episodes of supraventricular tachycardia over the last couple of months but no persistent shockable rhythm  Continue plavix, hx of CAD with stent      Gastrointestinal   GI prophylaxis with Protonix  Diet per dietary recs   Colace/senna for constipation  Ct abdomen pelvis 12/27 showed no acute intraabdominal etiology   1/1: Constipation- Fleet enema Ordered.      Renal   Hyponatremia -resolved   -Continue to trend metabolic panel   -Nephrology following for fluid recommendations intravenously  Replete potassium as needed  Adrenal Insufficiency: Solucortef 50mg TID  Lasix 20 mg bid diuresis     Infectious Disease   COVID-19 viral pneumonia   Received remdesivir, Toci, vit D, vit c, thiamine   Septic shock - improving  Possible superimposed bacterial pneumonia   -Completed course of cefepime  Blood cultures and respiratory culture negative thus far  ua sent today         Hematology/Oncology   Anemia on admission, trend H&H, improving  CBC daily  lovenox 1mg/kg bid - rising d-dimer, held for oozing around Bates, Restarted on 1/1. Endocrine   high-dose sliding scale Humalog for hyperglycemia  Start lantus   -better glycemic control today  Monitor growth closely  Cortisol on admission 1.42  High-dose Solucortef decreased to 50 q 8 hrs      Social/Spiritual/DNR/Other   Full code. Palliative consulted. ASSESSMENT/ PLAN     1. Trach and peg 12/29  2. Hyponatremia -resolved- nephrology following  3.   midodrine TID -4. increased solucortef  5. Palliative following   6. Rising ddimer- full dose lovenox bid- held due to abdominal bleeding, on scds  7. Lasix diuresis   8. Alert today, following commands, writing on clipboard  9. Afib with RVR 12/30, gave amio bolus, start on amio per PEG  10. D/c a line, ensure BP is stable off fern  12. Nicotine patch   13. Stop fentanyl patch. Start scheduled Ativan and Aloxi. 14.  Patient increasingly tachypneic requiring increased sedation for maintaining oxygenation. Wean sedation as tolerated. 15. Continue ICU level of care    Electronically signed by Lizbeth Cervantes DO on 1/5/2021 at 4:45 PM    I personally saw, examined and provided care for the patient. Radiographs, labs and medication list were reviewed by me independently. I spoke with bedside nursing, therapists and consultants. Critical care services and times documented are independent of procedures and multidisciplinary rounds with Residents. Additionally comprehensive, multidisciplinary rounds were conducted with the MICU team. The case was discussed in detail and plans for care were established. Review of Residents documentation was conducted and revisions were made as appropriate. I agree with the above documented exam, problem list and plan of care.   Suzzette Heimlich, MD   CCT excluding procedures:38'

## 2021-01-05 NOTE — PROGRESS NOTES
1/5/2021  10:51 AM      Comprehensive Nutrition Assessment    Type and Reason for Visit:  Reassess    Nutrition Recommendations/Plan: Continue current TF, as tolerated. Nutrition Assessment:  Pt remains intubated/sedated s/p trach and PEG. Recommend Continue current TF and consult Dietitian for updated recommendations,when propofol weaned off. Malnutrition Assessment:  Malnutrition Status: At risk for malnutrition (Comment)    Context:  Acute Illness     Findings of the 6 clinical characteristics of malnutrition:  Energy Intake:  1 - 75% or less of estimated energy requirements for 7 or more days  Weight Loss:  Unable to assess(d/t fluids/fluctuations)     Body Fat Loss:  Unable to assess     Muscle Mass Loss:  Unable to assess    Fluid Accumulation:  No significant fluid accumulation     Strength:  Not Performed    Estimated Daily Nutrient Needs:  Energy (kcal):  ; Weight Used for Energy Requirements:  Admission     Protein (g):  75-90 (1.3-1.5 g/kg);  Weight Used for Protein Requirements:  Ideal        Fluid (ml/day):  per Critical Care; Method Used for Fluid Requirements:  Other (Comment)      Nutrition Related Findings:  trace general edema, trach to vent, PEG to TF, pressor (MAP 69),-I/O 8L      Wounds:  Surgical Incision(trach/PEG sites)       Current Nutrition Therapies:    Current Tube Feeding (TF) Orders:  · Feeding Route: PEG(12/29 PEG placed)  · Formula: Immune Enhancing  · Schedule: Continuous  · Additives/Modulars:    · Water Flushes: 200 ml Q4 hrs =1200 ml/d  · Current TF & Flush Orders Provides: at goal  · Goal TF & Flush Orders Provides: 1080 ml/d, 1620 nguyen (0148-8742 nguyen/d w/propofol dose fluctuating 1/5), 102 g pro, 2020 ml total free water    Additional Calorie Sources:   propofol (fluctuating dose today and off now 2/2 hypotension)= nguyen/d    Anthropometric Measures:  · Height: 5' 6\" (167.6 cm)  · Current Body Weight: 220 lb (99.8 kg)(12/27)   · Admission Body Weight: 184 lb (83.5 kg)(12/15 first measured wt)    · Usual Body Weight: 161 lb (73 kg)(per EMR x 6 mo)     · Ideal Body Weight: 130 lbs; % Ideal Body Weight 169.2 %   · BMI: 35.5  · BMI Categories: Overweight (BMI 25.0-29.9)(using admit wt BMI 29.7)       Nutrition Diagnosis:   · Inadequate oral intake related to impaired respiratory function(2/2 COVID-19 PNA) as evidenced by NPO or clear liquid status due to medical condition, intubation      Nutrition Interventions:   Food and/or Nutrient Delivery:  Continue NPO, Continue Current Tube Feeding  Nutrition Education/Counseling:  No recommendation at this time   Coordination of Nutrition Care:  Continue to monitor while inpatient    Goals:  Pt will ernestine EN at goal rate       Nutrition Monitoring and Evaluation:   Behavioral-Environmental Outcomes:  None Identified   Food/Nutrient Intake Outcomes:  Enteral Nutrition Intake/Tolerance  Physical Signs/Symptoms Outcomes:  Biochemical Data, GI Status, Fluid Status or Edema, Hemodynamic Status, Weight, Skin, Nutrition Focused Physical Findings     Discharge Planning:    Enteral Nutrition     Electronically signed by Ravinder Watt RD, CNSC, LD on 1/5/21 at 10:51 AM EST    Contact: [phone]

## 2021-01-05 NOTE — PATIENT CARE CONFERENCE
Intensive Care Daily Quality Rounding Checklist        ICU Team Members: Dr. Maximo Glover, Thomas Hand & Danny (residents), charge nurse, bedside nurse, clinical pharmacist     ICU Day #: NUMBER: 21     Intubation Date: December 15     Ventilator Day #: NUMBER: 21-- trache 12/29     Central Line Insertion Date:  Dec 21 PICC line placement                                                    Day #: NUMBER: 15      Arterial Line Insertion Date: d/c'd 1/1/2021                              Day #: n/a     Temporary Hemodialysis Catheter Insertion Date:  n/a                             Day # n/a     DVT Prophylaxis: lovenox    GI Prophylaxis: protonix     Allen Catheter Insertion Date: December 13                                        Day #: 23                             Continued need (if yes, reason documented and discussed with physician): yes- accurate I & O     Skin Issues/ Wounds and ordered treatment discussed on rounds: yes- sos protocol     Goals/ Plans for the Day: Daily labs & abgs- correct electrolytes as needed, awaiting IM bed since yesterday

## 2021-01-06 NOTE — PATIENT CARE CONFERENCE
Intensive Care Daily Quality Rounding Checklist        ICU Team Members: Dr. Monica Pichardo, Thomas Hand & Danny (residents), charge nurse, bedside nurse, clinical pharmacist     ICU Day #: NUMBER: 23     Intubation Date: December 15     Ventilator Day #: NUMBER: 23-- trache 12/29     Central Line Insertion Date:  Dec 21 PICC line placement                                                    Day #: NUMBER: 17      Arterial Line Insertion Date: d/c'd 1/1/2021                              Day #: n/a     Temporary Hemodialysis Catheter Insertion Date:  n/a                             Day # n/a     DVT Prophylaxis: lovenox    GI Prophylaxis: protonix     Allen Catheter Insertion Date: December 13                                        Day #: 25                             Continued need (if yes, reason documented and discussed with physician): yes- accurate I & O     Skin Issues/ Wounds and ordered treatment discussed on rounds: yes- sos protocol     Goals/ Plans for the Day: Daily labs & abgs- correct electrolytes as needed, wean vent as able, soft wrist restraints to prevent pulling tubes, check CT chest today

## 2021-01-06 NOTE — PROGRESS NOTES
Physical Therapy    Facility/Department: [address] ICU      NAME: Murray Torres  : 1950  MRN: [id number]    Date of Service: 2021    Pt remains on hold for PT at this time. Will discontinue PT. Please re-order when appropriate.    Vi PT [id number]

## 2021-01-06 NOTE — CONSULTS
Palliative Care Department  [phone]  Palliative Care Initial Consult  Provider Anna Lowe MD      PATIENT: Anne-Marie Horton  : 1950  MRN: [id number]  ADMISSION DATE: 2020 10:51 AM  Referring Provider: Whitney Shaw DO     Palliative Medicine was consulted on hospital day 24 for assistance with Goals of care, Code Status Discussion    HPI:     Jose Rosenbaum is a 65 y.o. 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 who presented to [facility]) on 2020 with shortness of breath. She was found to have COVID, she was intubated and currently in ICU. ASSESSMENT/PLAN:     Pertinent Hospital Diagnoses      Covid-19    Intubated s/p peg and trach placment    Palliative Care Encounter / Counseling Regarding Goals of Care  Please see detailed goals of care discussion as below   At this time, Anne-Marie Horton, Does Not have capacity for medical decision-making. Capacity is time limited and situation/question specific   Outcome of goals of care meeting: Spoke to son Adrianne Coleman, told him about his mother's condition. He is very sad about her outcome, he needs to contact his brother before they make any decision.  Code status Full Code   Advanced Directives: no POA or living will in [facility]   Surrogate/Legal R Jasvir Benjamin 79, [phone], son, Kehinde Santiago, [phone] son     Thank you for the opportunity to participate in the care of Anne-Marie Horton.      Anna Lowe MD  Palliative Medicine     SUBJECTIVE:     Details of Conversation: Spoke with son Loren Haque, updated him on his mother's condition. She had PEG tube and trach placed, she was doing fairly well. She was responding to questions, she was watching television. About 2 days ago her breathing became very difficult, she needs 100% oxygen. There are pressors that were restarted as well. I explained all this to Loren Haque he needs to speak with his brother Yesica Blackmon. After that they will make a decision. Prognosis: Poor    OBJECTIVE:     BP (!) 154/77   Pulse 89   Temp 98.8 °F (37.1 °C) (Axillary)   Resp (!) 35   Ht 5' 6\" (1.676 m)   Wt 220 lb 3.8 oz (99.9 kg)   SpO2 90%   BMI 35.55 kg/m²     Due to the current efforts to prevent transmission of COVID-19 and also the need to preserve PPE for other caregivers, a face-to-face encounter with the patient was not performed. That being said, all relevant records and diagnostic tests were reviewed, including laboratory results and imaging. Please reference any relevant documentation elsewhere. Care will be coordinated with the primary service and other specialties as appropriate. Time/Communication  Greater than 50% of time spent, total 35 minutes in counseling and coordination of care at the bedside regarding goals of care. Thank you for allowing Palliative Medicine to participate in the care of Hallie Echeverria. Note: This report was completed using [employer] voiced recognition software. Every effort has been made to ensure accuracy; however, inadvertent computerized transcription errors may be present.

## 2021-01-06 NOTE — PROGRESS NOTES
Subjective:  Status post PEG and trach: Concern questions appropriately wants to be transferred upstairs alert  Discussed with staff    Objective:    /70   Pulse 76   Temp 97 °F (36.1 °C) (Axillary)   Resp 16   Ht 5' 6\" (1.676 m)   Wt 220 lb 3.8 oz (99.9 kg)   SpO2 92%   BMI 35.55 kg/m²     24HR INTAKE/OUTPUT:      Intake/Output Summary (Last 24 hours) at 1/5/2021 2042  Last data filed at 1/5/2021 1800  Gross per 24 hour   Intake 2382. 85 ml   Output 1200 ml   Net 1182. 85 ml       General appearance: Trach to vent alert awake answering questions mechanically ventilated supine  Neck: FROM, supple   Lungs: Clear bilaterally no wheezes, no rhonchi, no crackles  CV: RRR, no MRGs; normal carotid upstroke and amplitude without Bruits  Abdomen: Soft, non-tender; no masses or HSM  Extremities: No edema, no cyanosis, no clubbing  Skin: Intact no rash, no lesions, no ulcers    Psych: Alert and oriented normal affect  Neuro: Nonfocal      Most Recent Labs  Lab Results   Component Value Date    WBC 17.6 (H) 01/05/2021    HGB 10.0 (L) 01/05/2021    HCT 33.9 (L) 01/05/2021     01/05/2021     01/05/2021    K 4.4 01/05/2021     01/05/2021    CREATININE 0.4 (L) 01/05/2021    BUN 18 01/05/2021    CO2 31 (H) 01/05/2021    GLUCOSE 158 (H) 01/05/2021    ALT 14 01/03/2021    AST 21 01/03/2021    INR 1.0 12/13/2020    TSH 0.381 12/03/2020    LABA1C 5.8 04/05/2017     Recent Labs     01/05/21  0420   MG 2.5     Lab Results   Component Value Date    CALCIUM 8.3 (L) 01/05/2021    PHOS 4.0 01/05/2021        XR CHEST PORTABLE   Final Result   No significant change since the January 4. Persistent bilateral lung   infiltrates. XR CHEST PORTABLE   Final Result   No significant changes since the January 4 same-day performed early at 05:48   a.m. Lysbeth Carrel       XR CHEST PORTABLE   Final Result   Extensive bilateral pulmonary infiltrates increasing from prior exam      XR CHEST PORTABLE   Final Result   Continued diffuse bilateral infiltrates, worse on the right. Tracheostomy tube and right-sided central venous catheter unchanged. XR CHEST PORTABLE   Final Result   Unchanged bilateral infiltrates. XR CHEST PORTABLE   Final Result   1. Extensive multifocal bilateral pulmonary infiltrates unchanged compared to   the patient's prior study. XR CHEST PORTABLE   Final Result   Stable support apparatus. Persistent diffuse bilateral airspace disease. No significant change   compared to most recent study but findings progressed compared to the study   from December 27. XR CHEST PORTABLE   Final Result   No interval change      CT ABDOMEN PELVIS WO CONTRAST Additional Contrast? None   Final Result   1. Dense airspace consolidation in the left lower lobe and increase in right   basilar lung opacities compatible with multifocal pneumonia. At least small   to moderate size bilateral pleural effusions with adjacent atelectasis. 2. Diffuse body wall edema. There is intermediate attenuation fluid in the   left lateral abdominal wall, and a component of hemorrhage is possible as   well. 3. Small to moderate amount of fluid in the abdomen and pelvis with simple   fluid attenuation. 4. Hepatic surface nodularity consistent with hepatic cirrhosis. 5. Nonobstructive renal calculi bilaterally. 6. Colonic diverticulosis without acute inflammatory changes. XR CHEST PORTABLE   Final Result   1. Extensive bilateral airspace disease and small left pleural effusion,   unchanged. 2.  Stable position of support tubes and right-sided PICC line.       XR CHEST PORTABLE   Final Result   Extensive bilateral pulmonary infiltrates unchanged   ET and other life support devices appear in satisfactory position      XR CHEST PORTABLE   Final Result   ET tube in satisfactory position   Bilateral pulmonary infiltrates are unchanged is      XR CHEST PORTABLE   Final Result   Stable bilateral airspace disease which may represent embolism or aortic dissection. Progressive diffuse bilateral patchy and ground-glass infiltrates concerning   for progressive multifocal pneumonia/viral infection. XR CHEST PORTABLE   Final Result   Progressive diffuse bilateral infiltrates and pleural effusion likely   CHF/edema and or diffuse pneumonia. XR CHEST PORTABLE    (Results Pending)       Assessment    Active Problems:    Acute respiratory failure with hypoxia (HCC)    Acute respiratory failure due to COVID-19 [facility])    Septic shock (HCC)    Atrial fibrillation with RVR ([facility])  Resolved Problems:    * No resolved hospital problems. *      Plan:     1.  Acute respiratory failure with hypoxia  - COVID 19 PNA / bacterial/ HF. CXR improved 12/17  -Intubated sedated mechanically ventilated   -Prone not paralyzed             Hydrocortisone IV + vit D   Remdesivir completed              Diffuse bilateral patchy GGO on CT, no PE                         cefepime completed              Fentanyl   SP PEG and trach 12/29    2. Shock -likely septic secondary to viral sepsis             back on levophed, Jay   wean as tolerated   midodrine              TLC inserted              Blood cultures sent, UCx negative    Completed cefepime              Wean hydrocortisone,cont midodrine    3. Anemia - Hg stable                 4. Hypothyroidism - continue synthroid    5.  Hyponatremia -improved    nephrology consult appreciated              IVF resuscitation              Continue to monitor labs closely       A. fib RVR amiodarone drip-->PO    Palliative care consult appreciated  Anticipate discharge to SNF with ventilator capability when medically stable    Electronically signed by Selvin Ridley MD on 1/5/2021 at 8:42 PM

## 2021-01-06 NOTE — PROGRESS NOTES
Occupational Therapy      At this time - patient not appropriate for skilled OT services d/t medical status  Please reorder when appropriate

## 2021-01-06 NOTE — PROGRESS NOTES
Critical Care Team - Daily Progress Note         Date and time: 1/6/2021 10:27 AM  Patient's name:  [facility] Record Number: [id number]  Patient's account/billing number: [de-identified]  Patient's YOB: 1950  Age: 65 y.o. Date of Admission: 12/13/2020 10:51 AM  Length of stay during current admission: 24      Primary Care Physician: Hina Del Castillo DO  ICU Attending Physician: Dr. Ramona Garcia Status: Full Code    Reason for ICU admission: respiratory failure   Shock   Covid 19 pneumonitis       SUBJECTIVE:     OVERNIGHT EVENTS:         12/16: Overnight able to titrate down some of the vasopressors though still intermittently hypotensive and requiring vasopressin as well as Levophed. Still sedated with fentanyl and Versed. Hyponatremia notable at 119 new today. 12/17: Patient was able to be titrated off the vasopressors. Is now on midodrine. Minimally hypothermic overnight now on Melissa hugger with improvement in temperature. Continue sedation with fentanyl and Versed. 12/18: Patient continues on the ventilator. Back on small amount of Levophed in addition to the vasopressin. Hoping to wean sedation more today. 12/19: Back on levophed overnight. Initially able to titrate fio2 to 40%, became hypoxic and increased to 60%. Apparently their were several episodes of bradycardia overnight. Patient has pacemaker which did not fire per nursing. Follows with Dr. Jorge Marte for cardiology. 12/20: Patient: 6 L overnight, nephrology ordered K-Phos, and half-normal saline, will CT her head today. 12/21: Patient tolerated being supine overnight. Plan for PICC line today. Pacemaker interrogated with no runs of V. tach or other rhythm needing acute intervention. 12/22: Patient remained supine without difficulty. Continuing to try to wean down her FiO2. Her sodium has improved. 12/23: Patient continues to tolerate the vent. She is waking up more.   Sodium is much better. No acute overnight events. 12/24: Failed weaning trial yesterday. Became agitated. Continues to tolerate the ventilator. Day 9 on the ventilator. 12/25: The patient occasionally opens her eyes. She occasionally follows commands. She gets extremely anxious when sedation is weaned further. Continues on Versed and fentanyl. 12/26: Patient continues on the ventilator. Opens eyes with sedation vacation, not really following commands. Day 11 on the ventilator. No other acute overnight events. 12:27: Continues on the vent. Awake on sedation, does follow commands. Day 12 on the ventilator. 12/28: Patient continues on the ventilator. Still occasionally becomes agitated when attempting to wean sedation. Day 13 on the ventilator. Occasionally hypotensive requiring Levophed. 12/29: trach and peg today. Spent 29th on the phone talking to her son who is the power of  yesterday explaining her prognosis and the treatment course going forward. He wants a trach and PEG and everything done at this point. Patient is actually much more awake today and following commands. When asked if she needs the volume turned up on the TV she shakes her head yes. Plan for trach and PEG this afternoon. 12/30: Trach and PEG yesterday. Had to be placed back on Levophed, currently running at 13 mics. Patient is now awake, has restraints off, is alert and responding to commands. Weaning sedatives. 12/31: No acute events overnight, episode of A. fib with RVR yesterday, changed to needle from Levophed, and started on amiodarone bolus and amiodarone per PEG tube. Off fern today. 01/01: No acute events, Weaned off fentanyl drip    01/02: No acute events    1/4: No acute events overnight    1/5: Become more agitated overnight with tachypnea and fluctuating blood pressure. Also ventilatory status declining requiring increased sedation.     1/6: Day 23 Ventilation, Continues tachypnea and poor ventilation. CURRENT VENTILATION STATUS:     [x] Ventilator  [] BIPAP  [] Nasal Cannula [] Room Air      IF INTUBATED, ET TUBE MARKING AT LOWER LIP:       cms    SECRETIONS Amount:  [] Small [] Moderate  [] Large  [x] None  Color:     [] White [] Colored  [] Bloody    SEDATION:  RAAS Score:+1  [] Fentanyl gtt  [] Versed gtt  [x] Ativan gtt   [] No Sedation    PARALYZED:  [x] No    [] Yes      VASOPRESSORS:  [] No    [x] Yes    If yes -   [] Levophed       [] Dopamine     [] Vasopressin       [] Dobutamine  [x] Phenylephrine         [] Epinephrine    CENTRAL LINES:     [] No   [x] Yes   (Date of Insertion:  12/15 ) PICC line          If yes -     [] Right IJ     [] Left IJ [] Right Femoral [] Left Femoral                   [] Right Subclavian [] Left Subclavian   PICC line    GARCIA'S CATHETER:   [] No   [x] Yes  (Date of Insertion: 12/15  )     URINE OUTPUT:            [x] Good   [] Low              [] Anuric      OBJECTIVE:     VITAL SIGNS:  /67   Pulse 75   Temp 98.8 °F (37.1 °C) (Axillary)   Resp 27   Ht 5' 6\" (1.676 m)   Wt 220 lb 3.8 oz (99.9 kg)   SpO2 (!) 86%   BMI 35.55 kg/m²   Tmax over 24 hours:  Temp (24hrs), Av.4 °F (36.3 °C), Min:97 °F (36.1 °C), Max:98.8 °F (37.1 °C)      Patient Vitals for the past 6 hrs:   BP Temp Temp src Pulse Resp SpO2   21 0900 -- -- -- 75 -- --   21 0840 137/67 -- -- 91 27 (!) 86 %   21 0815 114/61 -- -- 86 26 94 %   21 0800 (!) 109/58 98.8 °F (37.1 °C) Axillary 72 16 93 %   21 0741 -- -- -- 69 16 94 %   21 0739 -- -- -- -- 16 93 %   21 0738 -- -- -- -- 17 93 %   21 0737 -- -- -- -- 15 93 %   21 0600 (!) 101/55 -- -- 80 21 92 %   21 0500 108/60 -- -- 66 17 95 %   21 0434 -- -- -- 64 16 95 %         Intake/Output Summary (Last 24 hours) at 2021 1027  Last data filed at 2021 0800  Gross per 24 hour   Intake 2680.85 ml   Output 1660 ml   Net 1020. 85 ml     Wt Readings from Last 2  sodium chloride flush  10 mL Intravenous 2 times per day    gabapentin  800 mg Oral TID    levothyroxine  100 mcg Oral Daily    atorvastatin  20 mg Oral Daily     Continuous Infusions:   norepinephrine 5 mcg/kg/min (01/06/21 0906)    phenylephrine (HORACE-SYNEPHRINE) 50mg/250mL infusion 100 mcg/min (01/06/21 0703)    propofol Stopped (01/06/21 0815)    fentaNYL 5 mcg/ml in 0.9%  ml infusion Stopped (01/06/21 0907)     PRN Meds:       LORazepam, 1 mg, Q4H PRN      acetaminophen, 650 mg, Q6H PRN      metoprolol, 5 mg, Q6H PRN      sodium chloride flush, 10 mL, PRN      heparin flush, 3 mL, PRN      potassium chloride, 20 mEq, PRN      fentanNYL, 25 mcg, Q3H PRN      promethazine, 12.5 mg, Q6H PRN    Or      ondansetron, 4 mg, Q6H PRN      polyethylene glycol, 17 g, Daily PRN          VENT SETTINGS (Comprehensive) (if applicable):  Vent Information  $Ventilation: $Subsequent Day  Skin Assessment: Redness (see comment/note)  Equipment ID: [id number]  Equipment Changed: (S) Humidification  Vent Type: [id number]  Vent Mode: AC/VC+  Vt Ordered: 400 mL  Pressure Ordered: 14  Rate Set: 16 bmp  Peak Flow: 0 L/min  Pressure Support: 0 cmH20  FiO2 : 85 %  SpO2: (!) 86 %  SpO2/FiO2 ratio: 101.18  PaO2/FiO2 ratio: 86  Sensitivity: 2  PEEP/CPAP: 12  I Time/ I Time %: 0.9 s  Humidification Source: Heated wire  Humidification Temp: 37  Humidification Temp Measured: 38  Circuit Condensation: Drained  Mask Type: Full face mask  Mask Size: Small  Additional Respiratory  Assessments  Pulse: 75  Resp: 27  SpO2: (!) 86 %  Position: Semi-Marrero's  Humidification Source: Heated wire  Humidification Temp: 37  Circuit Condensation: Drained  Oral Care: Mouth swabbed, Mouthwash, Mouth suctioned  Subglottic Suction Done?: No  Cuff Pressure (cm H2O): (positional)    ABGs:   No results for input(s): PH, PCO2, PO2, HCO3, BE, O2SAT in the last 72 hours.     Laboratory findings:    Complete Blood Count:   Recent Labs     01/04/21 0620 01/05/21  0420 01/06/21  0600   WBC 15.0* 17.6* 13.6*   HGB 9.9* 10.0* 9.4*   HCT 33.0* 33.9* 32.7*    165 168        Last 3 Blood Glucose:   Recent Labs     01/04/21  0620 01/05/21 0420 01/06/21  0600   GLUCOSE 221* 158* 199*        PT/INR:    Lab Results   Component Value Date    PROTIME 11.4 12/13/2020    PROTIME 11.9 12/15/2011    INR 1.0 12/13/2020     PTT:    Lab Results   Component Value Date    APTT 21.6 12/13/2020       Comprehensive Metabolic Profile:   Recent Labs     01/04/21 0620 01/05/21 0420 01/06/21  0600    138 138   K 3.2* 4.4 4.2   CL 99 100 104   CO2 32* 31* 33*   BUN 12 18 18   CREATININE 0.3* 0.4* 0.4*   GLUCOSE 221* 158* 199*   CALCIUM 8.1* 8.3* 8.1*      Magnesium:   Lab Results   Component Value Date    MG 1.9 01/06/2021     Phosphorus:   Lab Results   Component Value Date    PHOS 3.4 01/06/2021     Ionized Calcium: No results found for: CAION     Urinalysis:     Troponin:   No results for input(s): TROPONINI in the last 72 hours. Microbiology:    Cultures during this admission:     Blood cultures:                 [] None drawn      [x] Negative             []  Positive (Details:  )  Urine Culture:                   [] None drawn      [] Negative             []  Positive (Details:  )  Sputum Culture:               [] None drawn       [x] Negative             []  Positive (Details:  )   Endotracheal aspirate:     [] None drawn       [] Negative             []  Positive (Details:  )     Other pertinent Labs:   COVID-19 + December 13  MRSA negative screening   radiology/Imaging:     Chest Xray (1/6/2021):       Right PICC line with tip at the junction of the subclavian and SVC   Extensive bilateral pulmonary infiltrates unchanged   ET and NG tubes appear in satisfactory position             ASSESSMENT:      1. Acute hypoxic respiratory failure   2. Covid pneumonitis - critically severe   3. Shock- septic   4. Adrenal insufficiency  5. Hyponatremia   6.  Hypokalemia 7. Leukocytosis    8. COPD  9. pvd  10. chf not in acute exacerbatino   11. Hx lacunar infarct   12. SSS s/p pacemaker   13. Hx seizure do   14. Bilateral carotid stenosis       SYSTEMS ASSESSMENT    Neuro   Previous history of lacunar stroke  Alert and Following command, Tolerating Trach Ventilator well. Decreased Agitation  Pain around Trach site, Fentanyl PRN for pain. Respiratory   Acute respiratory failure with hypoxia secondary to COVID-19 viral pneumonia  Intubated  Course of Cefepime completed for possible superimposed bacterial pneumonia   Wean peep and fio2 as tolerated. Keep O2 sat 90-92%  abg daily  Trach and Peg 12/29 1/5 scheduled Ativan for every 6 and Oxy 10 milligrams every 6 hours. Stop fentanyl patch. Patient tachypneic more today than previous, requiring increased sedation in order to maintain oxygen saturation. 1/6: Repeat CT head and Chest today.          Cardiovascular   Shock, septic  Increase midodrine to 15 tid  intermittent atrial fib/flutter  Afib RVR: Amiodarone 400mg 2 times daily decrease to 200mg once daily on 1/6  Pacemaker interrogated that showed several episodes of supraventricular tachycardia over the last couple of months but no persistent shockable rhythm  Continue plavix, hx of CAD with stent      Gastrointestinal   GI prophylaxis with Protonix  Diet per dietary recs   Colace/senna for constipation  Ct abdomen pelvis 12/27 showed no acute intraabdominal etiology       Renal   Hyponatremia -resolved   -Continue to trend metabolic panel   -Nephrology following for fluid recommendations intravenously  Replete potassium as needed  Adrenal Insufficiency: Solucortef 50mg TID  Lasix 20 mg bid diuresis     Infectious Disease   COVID-19 viral pneumonia   Received remdesivir, Toci, vit D, vit c, thiamine   Septic shock - improving  Possible superimposed bacterial pneumonia   -Completed course of cefepime  1/6  Blood cultures resent   1/6  ua sent today  With repeat urine culture. Hematology/Oncology   Anemia on admission, trend H&H, improving  CBC daily  1/5 No more Blood in stools, Restart Lovenox. Endocrine   high-dose sliding scale Humalog for hyperglycemia  Start lantus   -better glycemic control today  Monitor growth closely  Cortisol on admission 1.42  High-dose Solucortef decreased to 50 q 8 hrs      Social/Spiritual/DNR/Other   Full code. Palliative consulted. ASSESSMENT/ PLAN     1. Trach and peg 12/29  2. Hyponatremia -resolved- nephrology following  3.   midodrine TID   4. Palliative following   5. Lasix diuresis   6. Wean Pressors as tolerated. 7. Nicotine patch   8.  Stop fentanyl patch. Start scheduled Ativan and Oxi.    9.  Patient increasingly tachypneic requiring increased sedation for maintaining oxygenation. Wean sedation as tolerated. 10. Continue ICU level of care    Electronically signed by Debbi Alston DO on 1/6/2021 at 10:27 AM    Addendum; Patient remains critically ill. Her PF ratio has worsened with increased oxygen requirement and PEEP. She is on propofol and fentanyl for sedation and every time we try to wean her off we have issues with oxygenation and her agitations. We did obtain a CT of her chest which shows significant worsening bilateral dense pulmonary infiltrates. ET of the head was negative for any acute changes. Appreciate palliative care help. Patient's overall prognosis is poor. I personally saw, examined and provided care for the patient. Radiographs, labs and medication list were reviewed by me independently. I spoke with bedside nursing, therapists and consultants. Critical care services and times documented are independent of procedures and multidisciplinary rounds with Residents. Additionally comprehensive, multidisciplinary rounds were conducted with the MICU team. The case was discussed in detail and plans for care were established.  Review of Residents documentation was conducted and revisions were made as appropriate. I agree with the above documented exam, problem list and plan of care.   Scot Campos MD   CCT excluding procedures:38'

## 2021-01-06 NOTE — PROGRESS NOTES
disease and small left pleural effusion,   unchanged. 2.  Stable position of support tubes and right-sided PICC line. XR CHEST PORTABLE   Final Result   Extensive bilateral pulmonary infiltrates unchanged   ET and other life support devices appear in satisfactory position      XR CHEST PORTABLE   Final Result   ET tube in satisfactory position   Bilateral pulmonary infiltrates are unchanged is      XR CHEST PORTABLE   Final Result   Stable bilateral airspace disease which may represent pulmonary edema. XR CHEST PORTABLE   Final Result   1. Good position right-sided PICC line. 2.  Otherwise stable findings. Bilateral airspace infiltrates/edema, worse   on the right. XR CHEST PORTABLE   Final Result   Right PICC line with tip at the junction of the subclavian and SVC   Extensive bilateral pulmonary infiltrates unchanged   ET and NG tubes appear in satisfactory position      XR CHEST PORTABLE   Final Result   Bilateral airspace disease which is not appreciably changed and is likely   related to pulmonary edema. XR CHEST PORTABLE   Final Result   Mildly progressive bilateral infiltrates. Improving left pleural effusion      XR CHEST PORTABLE   Final Result   Mildly waning airspace disease with slight improvement of aeration   bilaterally. XR CHEST PORTABLE   Final Result   Diffuse infiltrate, improved in the upper lungs as compared to prior. There is new consolidation in the left lower lobe with possible left pleural   effusion. XR CHEST PORTABLE   Final Result   Endotracheal tube in low position, can be pulled back about 3 cm. NG tube in good position. Pattern of bilateral interstitial infiltrate in the perihilar regions or the   periphery of the lung as above commented. The pulmonary edema versus   bilateral viral pneumonia will be part of the differential diagnosis. Please   correlate clinically.   T   Preliminary report given to Dr. Andrey Echols at the time   of the interpretation. XR ABDOMEN FOR NG/OG/NE TUBE PLACEMENT   Final Result   Enteric feeding tube partially imaged below the diaphragm and likely within   the stomach. XR CHEST PORTABLE   Final Result   Increased interstitial markings throughout both lungs along with   patchy/consolidative airspace opacities noted bilaterally as above. Findings   are suspicious for multifocal pneumonia or pulmonary edema. CTA PULMONARY W CONTRAST   Final Result   No central pulmonary embolism or aortic dissection. Progressive diffuse bilateral patchy and ground-glass infiltrates concerning   for progressive multifocal pneumonia/viral infection. XR CHEST PORTABLE   Final Result   Progressive diffuse bilateral infiltrates and pleural effusion likely   CHF/edema and or diffuse pneumonia. CT CHEST WO CONTRAST    (Results Pending)       Assessment    Active Problems:    Acute respiratory failure with hypoxia ([facility])    Acute respiratory failure due to COVID-19 [facility])    Septic shock ([facility])    Atrial fibrillation with RVR ([facility])  Resolved Problems:    * No resolved hospital problems. *      Plan:     1.  Acute respiratory failure with hypoxia  - COVID 19 PNA / bacterial/ HF. CXR improved 12/17  -Intubated sedated mechanically ventilated   -Prone not paralyzed             Hydrocortisone IV + vit D   Remdesivir completed              Diffuse bilateral patchy GGO on CT, no PE                         cefepime completed              Fentanyl   SP PEG and trach 12/29    2. Shock -likely septic secondary to viral sepsis             back on levophed, Jay   wean as tolerated   midodrine              TLC inserted              Blood cultures sent, UCx negative    Completed cefepime              Wean hydrocortisone,cont midodrine    3. Anemia - Hg stable                 4. Hypothyroidism - continue synthroid    5.  Hyponatremia -improved    nephrology consult appreciated              IVF resuscitation              Continue to monitor labs closely       A. fib RVR amiodarone drip-->PO    Palliative care re-consult appreciated    Anticipate discharge to SNF with ventilator capability when medically stable    Electronically signed by Twan Angulo MD on 1/6/2021 at 4:27 PM

## 2021-01-06 NOTE — CARE COORDINATION
COVID POSITIVE 1/2 & 12/13. Vent since 12/15-FIO2 100% . s/p Trach/PEG 12/29. Requiring 2 iv pressors. Patient has [address]. Insurance. Accepted @ [facility] when medically stable(only area [facility] accepting vents/ + covid at this time)- insurance precert waived until 0/17/32. IAX Don aware of discharge plan.  Palliative care following-full code. Will follow.  N-17 initiated Ivan Pena

## 2021-01-07 NOTE — CARE COORDINATION
COVID POSITIVE 1/2 & 12/13. Vent since 12/15-FIO2 100% . s/p Trach/PEG 12/29. Requiring pressor. Patient has [address]. Insurance. Accepted @ [facility] [place] when medically stable(only area [facility] accepting vents/ + covid at this time)- insurance precert waived until 7/18/64. HSUYDLADOV care following-full code. Will follow.  N-17 initiated  [facility]

## 2021-01-07 NOTE — PLAN OF CARE
Problem: Skin Integrity:  Goal: Will show no infection signs and symptoms  Description: Will show no infection signs and symptoms  Outcome: Met This Shift  Goal: Absence of new skin breakdown  Description: Absence of new skin breakdown  Outcome: Met This Shift     Problem: Airway Clearance - Ineffective  Goal: Achieve or maintain patent airway  Outcome: Not Met This Shift     Problem: Gas Exchange - Impaired  Goal: Absence of hypoxia  Outcome: Met This Shift  Goal: Promote optimal lung function  Outcome: Not Met This Shift     Problem: Breathing Pattern - Ineffective  Goal: Ability to achieve and maintain a regular respiratory rate  Outcome: Not Met This Shift     Problem:  Body Temperature -  Risk of, Imbalanced  Goal: Ability to maintain a body temperature within defined limits  Outcome: Met This Shift  Goal: Will regain or maintain usual level of consciousness  Outcome: Not Met This Shift  Goal: Complications related to the disease process, condition or treatment will be avoided or minimized  Outcome: Not Met This Shift

## 2021-01-07 NOTE — PROGRESS NOTES
Subjective:  Status post PEG and trach: confused  Discussed with staff    Objective:    /61   Pulse 84   Temp 97.8 °F (36.6 °C) (Axillary)   Resp 25   Ht 5' 6\" (1.676 m)   Wt 206 lb 2.1 oz (93.5 kg)   SpO2 93%   BMI 33.27 kg/m²     24HR INTAKE/OUTPUT:      Intake/Output Summary (Last 24 hours) at 1/7/2021 1834  Last data filed at 1/7/2021 1613  Gross per 24 hour   Intake 3614.01 ml   Output 2180 ml   Net 1434.01 ml       General appearance: Trach to vent somolent answering questions mechanically ventilated supine  Neck: FROM, supple   Lungs: Clear bilaterally no wheezes, no rhonchi, no crackles  CV: RRR, no MRGs; normal carotid upstroke and amplitude without Bruits  Abdomen: Soft, non-tender; no masses or HSM  Extremities: No edema, no cyanosis, no clubbing  Skin: Intact no rash, no lesions, no ulcers    Psych: Alert and oriented normal affect  Neuro: Nonfocal      Most Recent Labs  Lab Results   Component Value Date    WBC 19.2 (H) 01/07/2021    HGB 9.7 (L) 01/07/2021    HCT 32.5 (L) 01/07/2021     01/07/2021     01/07/2021    K 3.8 01/07/2021    CL 97 (L) 01/07/2021    CREATININE 0.4 (L) 01/07/2021    BUN 14 01/07/2021    CO2 34 (H) 01/07/2021    GLUCOSE 195 (H) 01/07/2021    ALT 14 01/03/2021    AST 21 01/03/2021    INR 1.0 12/13/2020    TSH 0.381 12/03/2020    LABA1C 5.8 04/05/2017     Recent Labs     01/07/21  0535   MG 2.0     Lab Results   Component Value Date    CALCIUM 7.9 (L) 01/07/2021    PHOS 2.8 01/07/2021        CT CHEST WO CONTRAST   Final Result   Progressive diffuse patchy and multifocal ground-glass infiltrates and   pleural effusion which may be due to multifocal pneumonia including viral   infection and or edema. Bilateral kidney stones. CT HEAD WO CONTRAST   Final Result   No evidence of intracranial hemorrhage or herniation. Moderate degree of   senescent changes and involutional changes.   There appear to be chronic   sequela of prior focal ischemic events bilaterally. Intracranial   atherosclerotic disease noted. (Although there is no convincing evidence of an acute ischemic event, if   there is high clinical concern, consider MRI.)   2. Paranasal sinus disease and partial mastoid air cell effusions. XR CHEST PORTABLE   Final Result   No significant interval change      XR CHEST PORTABLE   Final Result   No significant change since the January 4. Persistent bilateral lung   infiltrates. XR CHEST PORTABLE   Final Result   No significant changes since the January 4 same-day performed early at 05:48   a.m. Bing Iglesias XR CHEST PORTABLE   Final Result   Extensive bilateral pulmonary infiltrates increasing from prior exam      XR CHEST PORTABLE   Final Result   Continued diffuse bilateral infiltrates, worse on the right. Tracheostomy tube and right-sided central venous catheter unchanged. XR CHEST PORTABLE   Final Result   Unchanged bilateral infiltrates. XR CHEST PORTABLE   Final Result   1. Extensive multifocal bilateral pulmonary infiltrates unchanged compared to   the patient's prior study. XR CHEST PORTABLE   Final Result   Stable support apparatus. Persistent diffuse bilateral airspace disease. No significant change   compared to most recent study but findings progressed compared to the study   from December 27. XR CHEST PORTABLE   Final Result   No interval change      CT ABDOMEN PELVIS WO CONTRAST Additional Contrast? None   Final Result   1. Dense airspace consolidation in the left lower lobe and increase in right   basilar lung opacities compatible with multifocal pneumonia. At least small   to moderate size bilateral pleural effusions with adjacent atelectasis. 2. Diffuse body wall edema. There is intermediate attenuation fluid in the   left lateral abdominal wall, and a component of hemorrhage is possible as   well. 3. Small to moderate amount of fluid in the abdomen and pelvis with simple   fluid attenuation.    4. Hepatic surface nodularity consistent with hepatic cirrhosis. 5. Nonobstructive renal calculi bilaterally. 6. Colonic diverticulosis without acute inflammatory changes. XR CHEST PORTABLE   Final Result   1. Extensive bilateral airspace disease and small left pleural effusion,   unchanged. 2.  Stable position of support tubes and right-sided PICC line. XR CHEST PORTABLE   Final Result   Extensive bilateral pulmonary infiltrates unchanged   ET and other life support devices appear in satisfactory position      XR CHEST PORTABLE   Final Result   ET tube in satisfactory position   Bilateral pulmonary infiltrates are unchanged is      XR CHEST PORTABLE   Final Result   Stable bilateral airspace disease which may represent pulmonary edema. XR CHEST PORTABLE   Final Result   1. Good position right-sided PICC line. 2.  Otherwise stable findings. Bilateral airspace infiltrates/edema, worse   on the right. XR CHEST PORTABLE   Final Result   Right PICC line with tip at the junction of the subclavian and SVC   Extensive bilateral pulmonary infiltrates unchanged   ET and NG tubes appear in satisfactory position      XR CHEST PORTABLE   Final Result   Bilateral airspace disease which is not appreciably changed and is likely   related to pulmonary edema. XR CHEST PORTABLE   Final Result   Mildly progressive bilateral infiltrates. Improving left pleural effusion      XR CHEST PORTABLE   Final Result   Mildly waning airspace disease with slight improvement of aeration   bilaterally. XR CHEST PORTABLE   Final Result   Diffuse infiltrate, improved in the upper lungs as compared to prior. There is new consolidation in the left lower lobe with possible left pleural   effusion. XR CHEST PORTABLE   Final Result   Endotracheal tube in low position, can be pulled back about 3 cm. NG tube in good position.    Pattern of bilateral interstitial infiltrate in the perihilar regions or the hydrocortisone,cont midodrine  Zosyn started    3. Anemia - Hg stable cont to monitor                 4. Hypothyroidism - continue synthroid    5.  Hyponatremia -improved    nephrology consult appreciated              IVF resuscitation              Continue to monitor labs closely       A. fib RVR amiodarone drip-->PO  Apixaban    Palliative care re-consult appreciated    Anticipate discharge to SNF with ventilator capability when medically stable    Electronically signed by Merrick Kamara MD on 1/7/2021 at 6:34 PM

## 2021-01-07 NOTE — CONSULTS
[facility]     Consult Note                                 [address] [facility] [address], [place], [address]                   Phone [phone]     Fax [phone]        Date:   1/7/2021  Patient name:  Jena Gupta  Date of admission:  12/13/2020 10:51 AM  MRN:   [id number]  YOB: 1950    Reason for Consult: Leukocytosis  CC:   Chief Complaint   Patient presents with    Shortness of Breath     70% RA at NH 88% NRB , being tx for pneumonia hx of copd        HISTORY OF PRESENT ILLNESS:                The patient is a 65 y.o. female past medical history of COPD, CAD, mitral valve insufficiency, hypothyroidism, DVT who was admitted 8 days ago for shortness of breath. She had a previous admission for left shoulder dislocation reduced in the ER and treatment for COPD exacerbation as well as bacterial pneumonia by pulmonology. He was discharged to a skilled nursing on 3 L oxygen. This time she was found to be Covid positive and because she was short of breath and hypotensive she was intubated. She completed remdesivir as well as Decadron course. Also got 1 dose of Tocilizumab. Was also kept on vancomycin and cefepime for presumed sepsis. Currently she is afebrile, still on pressors that include phenylephrine. There is worsening of her  ventilator settings and is currently 100% FiO2 and 14 of PEEP. WBC count has been high in last few days and today it is 19.2 and that initiated the ID consult. To be noted she has also been on hydrocortisone 50 mg every 12 for last 4 days that has been discontinued today chest CT that was repeated yesterday shows progressive worsening of the infiltrates as well as dense consolidation seen in bilateral lower lungs. Tracheal aspirate from 1/5/2021 shows few PMNs, rare epithelial cells, rare yeast.  Covid test from 1/2/21 is still positive.     He was restarted on vancomycin plus Zosyn yesterday. Before that she has been off antibiotics for a long time      Past Medical History:   has a past medical history of Arthritis, CAD (coronary artery disease), Carotid stenosis, CHF (congestive heart failure) ([address] Closed fracture of pelvis with delayed healing, Closed fracture of twelfth thoracic vertebra ([address] COPD (chronic obstructive pulmonary disease) ([address] DVT of leg (deep venous thrombosis) ([address] Hx of blood clots, Hyperlipidemia, Hypertension, Mitral regurgitation, Prolonged emergence from general anesthesia, PVD (peripheral vascular disease) with claudication ([address] Retinal ischemia, Stenosis of intracranial portions of left internal carotid artery, and Thyroid disease. Past Surgical History:   has a past surgical history that includes Hysterectomy; Appendectomy; Cholecystectomy; Pacemaker insertion (Left, 11-); other surgical history (12/2/14); ECHO Limited (12/3/2014); other surgical history (5/5/2015); back surgery (10/26/15); other surgical history (2/16/2016); Carotid-subclavian Bypass Graft (Left, 5/19/2016); eye surgery; pr total hip arthroplasty (Left, 3/13/2018); Fixation Kyphoplasty; joint replacement (Bilateral); fracture surgery (Right); Cardiac catheterization (08/10/2020); Coronary angioplasty with stent (08/10/2020); picc line insertion nurse (12/21/2020); tracheostomy (N/A, 12/29/2020); and Gastrostomy tube placement (N/A, 12/29/2020). Home Medications:    Prior to Admission medications    Medication Sig Start Date End Date Taking?  Authorizing Provider   zinc gluconate 50 MG tablet Take 50 mg by mouth daily    Historical Provider, MD   zinc oxide 20 % ointment Apply topically as needed for Dry Skin Apply topically as needed to buttocks    Historical Provider, MD   acetaminophen (TYLENOL) 325 MG tablet Take 650 mg by mouth every 6 hours as needed for Pain or Fever    Historical Provider, MD   pantoprazole (PROTONIX) 40 MG tablet Take 40 mg by Provider, MD   gabapentin (NEURONTIN) 800 MG tablet Take 800 mg by mouth three times daily 11/14/16   Historical Provider, MD   aspirin 81 MG EC tablet Take 81 mg by mouth daily    Historical Provider, MD       Allergies:  Anesthetics, lulu and Penicillins    Social History:   reports that she has been smoking cigarettes. She has a 2.50 pack-year smoking history. She has never used smokeless tobacco. She reports current alcohol use. She reports that she does not use drugs. Family History: family history includes Heart Disease in her father and mother. REVIEW OF SYSTEMS:    Limited as patient is intubated but not awake        PHYSICAL EXAM:    BP (!) 109/57   Pulse 82   Temp 97.4 °F (36.3 °C) (Axillary)   Resp 16   Ht 5' 6\" (1.676 m)   Wt 206 lb 2.1 oz (93.5 kg)   SpO2 (!) 89%   BMI 33.27 kg/m²    VENT SETTINGS:   Vent Information  $Ventilation: $Subsequent Day  Skin Assessment: Redness (see comment/note)  Equipment ID: [id number]  Equipment Changed: (S) Humidification  Vent Type: [id number]  Vent Mode: AC/VC+  Vt Ordered: 400 mL  Pressure Ordered: 14  Rate Set: (S) 20 bmp  Peak Flow: 0 L/min  Pressure Support: 0 cmH20  FiO2 : 100 %  SpO2: (!) 89 %  SpO2/FiO2 ratio: 89  PaO2/FiO2 ratio: 86  Sensitivity: 2  PEEP/CPAP: (S) 14  I Time/ I Time %: 0 s  Humidification Source: Heated wire  Humidification Temp: 37  Humidification Temp Measured: 37  Circuit Condensation: Drained  Mask Type: Full face mask  Mask Size: Small    General appearance: Not awake, status post tracheostomy  Skin: Warm and dry  Eyes: Pink palpebral conjunctivae. PERRL  Ears: External ears normal.  Nose/Sinuses: Nares normal. Septum midline. Mucosa normal. No sinus tenderness. Oropharynx: Limited exam  Neck: Neck supple. No jugular venous distension, lymphadenopathy or thyromegaly Trachea midline  Lungs: Crackles present bilateral lung fields  Heart: S1 S2  Regular rate and rhythm. No rub, murmur or gallop  Abdomen: Abdomen soft, non-tender.  BS normal. No masses, organomegaly  Extremities: No edema, Peripheral pulses palpable  Musculoskeletal: Muscular strength appears intact. No joint effusion, tenderness, swelling or warmth    Right brachial PICC line in place since 12/21/2020  DATA:    Labs:     Last 3 CBC:  Recent Labs     01/05/21  0420 01/06/21  0600 01/07/21  0535   WBC 17.6* 13.6* 19.2*   RBC 3.20* 2.99* 3.09*   HGB 10.0* 9.4* 9.7*    168 191   MPV 10.7 10.6 10.8       Last 3 BMP  Recent Labs     01/05/21  0420 01/06/21  0600 01/07/21  0535    138 135   K 4.4 4.2 3.8    104 97*   CO2 31* 33* 34*   BUN 18 18 14   CREATININE 0.4* 0.4* 0.4*   GLUCOSE 158* 199* 195*   CALCIUM 8.3* 8.1* 7.9*       LIVER PROFILE:No results for input(s): AST, ALT, LABALBU in the last 72 hours. URINARY CATHETER OUTPUT (Allen):  [REMOVED] Urethral Catheter 16 fr-Output (mL): 100 mL  Urethral Catheter Non-latex 16 fr-Output (mL): 100 mL    DRAIN/TUBE OUTPUT:  Gastrostomy/Enterostomy/Jejunostomy Percutaneous Endoscopic Gastrostomy (PEG) LUQ 20 fr-Output (mL): 0 ml  [REMOVED] NG/OG/NJ/NE Tube Nasogastric 16 fr Right nostril-Output (mL): 0 ml  Microbiology :  No results for input(s): BC in the last 72 hours. No results for input(s): Todd Signs in the last 72 hours. No results for input(s): LABURIN in the last 72 hours. Recent Labs     01/05/21  1115   CULTRESP Oral Pharyngeal Rachell present     No results for input(s): WNDABS in the last 72 hours. Radiology :          Assessment and Plan:      · Leukocytosis reactive versus sepsis  · Covid pneumonia-treated with remdesivir as well as Decadron plus Tocilizumab  · Acute respiratory failure-worsening status post tracheostomy and PEG tube placement, worsening chest infiltrates  · Mitral regurgitation    Plan  -In last to 3 days needing more pressors.   My concern is less per infectious etiology  versus noninfectious  -She has been on hydrocortisone that is being discontinued today  -Tracheal aspirate does not show a lot of PMNs, follow the growth for now  -Patient has been started on vancomycin and Zosyn wait for the cultures to finalize before we discontinue  -Sent for PCP stain of the sputum as she has been on steroids for a long time  -Sent for Fungitell as well as serum galactomannan  -Chest CT without contrast noted, will discuss with ICU team to consider diuresis                  Maldonado Stewart MD

## 2021-01-07 NOTE — PROGRESS NOTES
Palliative Care Department  [phone]  Palliative Care Progress Note  Provider Lynette Polk PA-C    PATIENT: Teddy Torres  : 1950  MRN: [id number]  ADMISSION DATE: 2020 10:51 AM  Referring Provider: Elfego Miller DO      Palliative Medicine was consulted on hospital day 24 for assistance with Goals of care, Code Status Discussion    Brief Summary:  Andrea Hummel is a 65 y.o. with a history of arthritis, falls, CAD with ptca/stenting by Dr Gino Jha, carotid stenosis, CHF, closed fracture of the pelvis with total hip arthroplasty on the left  with delayed healing, DVT of the leg, history of lacunar infarct, seizure, hypertension, hyperlipidemia, peripheral vascular disease with claudication, mitral regurgitation, retinal ischemia, stenosis of intracranial portions of the left internal carotid artery, thyroid disease, permanent pacemaker in situ , carotid subclavian bypass graft , and fixed kyphoplasty. who presented to [facility] on 2020 with shortness of breath. She was found to have COVID, she was intubated and currently in ICU. ASSESSMENT/PLAN:     Pertinent hospital diagnoses:     1. Viral Pneumonia, COVID-19  -  ID following  -  Treatment: remdesivir, Toci, vit D, vit c, thiamine   -  Treatment for secondary bacterial infection cefepime    2. Acute hypoxic respiratory failure  - Treatment per ICU team/primary service  - Trach/PEG placed 2020    3. Septic shock   -Continue pressor support    4. Atrial fibrillation with RVR  -Controlled rate    5. Agitation   - repeat CT no acute changes   - was ordered ativan    PALLIATIVE CARE ENCOUNTER  -  Capacity: At this time, Andrea Hummel, Does Not have capacity for medical decision-making.   Capacity is time limited and situation/question specific  - Surrogate decision maker/Legal NOK:    Roddy Peña [phone]   Roddy Cardenas [phone]  - Outcome of goals of care meeting: Call to roddy  - Code Status:   Full  - Advanced directives: No POA or living will in [facility]  - Spiritual assessment:   - Bereavement and grief:     - DISPO: To be determined    Referrals to: none today    SUBJECTIVE:   Chart reviewed  Discussed with bedside RN, ICU team  Vent day #24 s/p trach 12/29  Remains on pressors, fentanyl restarted  Patient requiring significant sedation to maintain oxygenation with tracheostomy in place on a ventilator per Dr. Jaki Burton. Family having difficulty understanding patient is not improving clinically and in fact she is required restarting of pressors and sedation    CT head repeated 1/6 with no new acute issues or changes  CT of chest 1/6 with progressive diffuse patchy and multifocal groundglass infiltrates and pleural effusions, small pericardial effusion with severe coronary artery calcifications, multiple compression fractures with previous vertebroplasty's in thoracic and lumbar spine, left adrenal nodule     discussion held over telephone due to current visitor restriction secondary to [address] pandemic  Details of Conversation: 2 call son    Inpatient medications reviewed: yes  Home Medications reviewed: yes    OARRS reviewed -patient consistently prescribed gabapentin 800 mg 3 times daily #84,days 28 and Percocet 10/325 every 6 hours #112, days 28    OBJECTIVE:   According to institutional recommendations and guidelines, a face-to-face encounter was not performed due to the current efforts to prevent transmission of COVID-19 and the need to preserve PPE for other caregivers. Relevant records, nursing assessment, and diagnostic testing including laboratory results and imaging were reviewed. Please reference any relevant documentation elsewhere. Patient was observed through the window and observation as reported below. Care will be coordinated with the primary services and consultants.     Prognosis: Poor    Physical Exam:  BP (!) 92/55   Pulse 85   Temp 97.4 °F (36.3 °C) (Axillary)   Resp 19

## 2021-01-07 NOTE — PROCEDURES
Arterial Line Placement Procedure Note                     Indication: Need for serial blood work, arterial blood gases, severe hypotension and pulmonary disease    Consent: Unable to be obtained due to patient's condition. Victorino's Test: Not indicated in this particular procedure    Procedure: The skin over the right radial artery was prepped with betadine and draped in a sterile fashion. Local anesthesia was not performed due to the emergent nature of this procedure. A 24 gauge arterial line catheter was then inserted, using a modified Seldinger technique, into the vessel. The transducer set was then attached and securely fastened to the skin with sutures. Waveforms on the monitor were observed and found to be adequate. The patient had good distal perfusion after the procedure. The site was then dressed in a sterile fashion. The patient tolerated the procedure well.      Complications: None

## 2021-01-07 NOTE — PROGRESS NOTES
Critical Care Team - Daily Progress Note         Date and time: 1/7/2021 3:04 PM  Patient's name:  Padma Hoffmann  Medical Record Number: [id number]  Patient's account/billing number: [de-identified]  Patient's YOB: 1950  Age: 65 y.o. Date of Admission: 12/13/2020 10:51 AM  Length of stay during current admission: 25      Primary Care Physician: Anna Marie Ornelas DO  ICU Attending Physician: Dr. Siva Pena Status: Full Code    Reason for ICU admission: respiratory failure   Shock   Covid 19 pneumonitis       SUBJECTIVE:     OVERNIGHT EVENTS:         12/16: Overnight able to titrate down some of the vasopressors though still intermittently hypotensive and requiring vasopressin as well as Levophed. Still sedated with fentanyl and Versed. Hyponatremia notable at 119 new today. 12/17: Patient was able to be titrated off the vasopressors. Is now on midodrine. Minimally hypothermic overnight now on Melissa hugger with improvement in temperature. Continue sedation with fentanyl and Versed. 12/18: Patient continues on the ventilator. Back on small amount of Levophed in addition to the vasopressin. Hoping to wean sedation more today. 12/19: Back on levophed overnight. Initially able to titrate fio2 to 40%, became hypoxic and increased to 60%. Apparently their were several episodes of bradycardia overnight. Patient has pacemaker which did not fire per nursing. Follows with Dr. Annita Lo for cardiology. 12/20: Patient: 6 L overnight, nephrology ordered K-Phos, and half-normal saline, will CT her head today. 12/21: Patient tolerated being supine overnight. Plan for PICC line today. Pacemaker interrogated with no runs of V. tach or other rhythm needing acute intervention. 12/22: Patient remained supine without difficulty. Continuing to try to wean down her FiO2. Her sodium has improved. 12/23: Patient continues to tolerate the vent. She is waking up more.   Sodium is much better. No acute overnight events. 12/24: Failed weaning trial yesterday. Became agitated. Continues to tolerate the ventilator. Day 9 on the ventilator. 12/25: The patient occasionally opens her eyes. She occasionally follows commands. She gets extremely anxious when sedation is weaned further. Continues on Versed and fentanyl. 12/26: Patient continues on the ventilator. Opens eyes with sedation vacation, not really following commands. Day 11 on the ventilator. No other acute overnight events. 12:27: Continues on the vent. Awake on sedation, does follow commands. Day 12 on the ventilator. 12/28: Patient continues on the ventilator. Still occasionally becomes agitated when attempting to wean sedation. Day 13 on the ventilator. Occasionally hypotensive requiring Levophed. 12/29: trach and peg today. Spent 29th on the phone talking to her son who is the power of  yesterday explaining her prognosis and the treatment course going forward. He wants a trach and PEG and everything done at this point. Patient is actually much more awake today and following commands. When asked if she needs the volume turned up on the TV she shakes her head yes. Plan for trach and PEG this afternoon. 12/30: Trach and PEG yesterday. Had to be placed back on Levophed, currently running at 13 mics. Patient is now awake, has restraints off, is alert and responding to commands. Weaning sedatives. 12/31: No acute events overnight, episode of A. fib with RVR yesterday, changed to needle from Levophed, and started on amiodarone bolus and amiodarone per PEG tube. Off fern today. 01/01: No acute events, Weaned off fentanyl drip    01/02: No acute events    1/4: No acute events overnight    1/5: Become more agitated overnight with tachypnea and fluctuating blood pressure. Also ventilatory status declining requiring increased sedation.     1/6: Day 23 Ventilation, Continues tachypnea and poor 01/07/21 1130 (!) 93/54 -- -- 81 21 92 %   01/07/21 1115 111/60 -- -- 73 19 93 %   01/07/21 1100 112/64 -- -- 74 20 92 %   01/07/21 1045 121/64 -- -- 75 20 92 %   01/07/21 1030 (!) 97/56 -- -- 77 26 91 %   01/07/21 1015 (!) 103/58 -- -- 73 19 93 %   01/07/21 1000 136/81 -- -- 75 20 92 %   01/07/21 0945 133/80 -- -- 82 16 (!) 89 %   01/07/21 0930 116/65 -- -- 80 18 (!) 87 %   01/07/21 0915 117/61 -- -- 80 17 (!) 88 %         Intake/Output Summary (Last 24 hours) at 1/7/2021 1504  Last data filed at 1/7/2021 1200  Gross per 24 hour   Intake 3427.13 ml   Output 2230 ml   Net 1197.13 ml     Wt Readings from Last 2 Encounters:   01/07/21 206 lb 2.1 oz (93.5 kg)   12/08/20 188 lb 9 oz (85.5 kg)     Body mass index is 33.27 kg/m². PHYSICAL EXAMINATION:    General: Sedated, ill-appearing, intermittently agitated  HEENT: Pupils are equal round and reactive to light, there are no oral lesions and no post-nasal drip   Neck: supple without adenopathy, tracheostomy in place, tender around site  Cardiovascular: regular rate and rhythm without murmur or gallop  Respiratory:  Decreased breath sounds bilaterally without wheezing or crackles. Air entry is symmetric, currently tachypneic.   Abdomen: soft, non-tender, non-distended, normal bowel sounds, lateral bruising in the flanks; PEG in place  Extremities: warm, bilateral equal edema upper and lower extremities, no clubbing   Skin:  Large bilateral ecchymoses on the flanks, and lower quadrants of the abdomen  Neurologic: Trached, pupils equal round reactive, sedated,  Withdraws from pain     Any additional physical findings:    MEDICATIONS:    Scheduled Meds:   piperacillin-tazobactam  3.375 g Intravenous Q8H    senna  10 mL Oral BID    hydrocortisone sodium succinate PF  50 mg Intravenous Q8H    midodrine  15 mg Oral TID    vitamin C  500 mg Oral Daily    Vitamin D  1,000 Units Oral Daily    vancomycin  2,000 mg Intravenous Q12H    LORazepam  2 mg Oral Q4H    37  Humidification Temp Measured: 37  Circuit Condensation: Drained  Mask Type: Full face mask  Mask Size: Small  Additional Respiratory  Assessments  Pulse: 87  Resp: 23  SpO2: 90 %  Position: Semi-Marrero's  Humidification Source: Heated wire  Humidification Temp: 37  Circuit Condensation: Drained  Oral Care: Mouth swabbed, Mouthwash, Mouth suctioned  Subglottic Suction Done?: No  Cuff Pressure (cm H2O): 29 cm H2O    ABGs:   Recent Labs     01/07/21  0556   PH 7.304*   PCO2 64.7*   PO2 61.0*   HCO3 31.4*   BE 3.7*   O2SAT 89.8*       Laboratory findings:    Complete Blood Count:   Recent Labs     01/05/21  0420 01/06/21  0600 01/07/21  0535   WBC 17.6* 13.6* 19.2*   HGB 10.0* 9.4* 9.7*   HCT 33.9* 32.7* 32.5*    168 191        Last 3 Blood Glucose:   Recent Labs     01/05/21  0420 01/06/21 0600 01/07/21  0535   GLUCOSE 158* 199* 195*        PT/INR:    Lab Results   Component Value Date    PROTIME 11.4 12/13/2020    PROTIME 11.9 12/15/2011    INR 1.0 12/13/2020     PTT:    Lab Results   Component Value Date    APTT 21.6 12/13/2020       Comprehensive Metabolic Profile:   Recent Labs     01/05/21  0420 01/06/21  0600 01/07/21  0535    138 135   K 4.4 4.2 3.8    104 97*   CO2 31* 33* 34*   BUN 18 18 14   CREATININE 0.4* 0.4* 0.4*   GLUCOSE 158* 199* 195*   CALCIUM 8.3* 8.1* 7.9*      Magnesium:   Lab Results   Component Value Date    MG 2.0 01/07/2021     Phosphorus:   Lab Results   Component Value Date    PHOS 2.8 01/07/2021     Ionized Calcium: No results found for: CAION     Urinalysis:     Troponin:   No results for input(s): TROPONINI in the last 72 hours.     Microbiology:    Cultures during this admission:     Blood cultures:                 [x] Pending            [] Negative             []  Positive (Details:  )  Urine Culture:                   [] None drawn      [] Negative             []  Positive (Details:  )  Sputum Culture:               [] None drawn       [] Negative             [] Positive (Details:  )   Endotracheal aspirate:     [] None drawn       [] Negative             []  Positive (Details:  )     Other pertinent Labs:   COVID-19 + December 13  MRSA negative screening   radiology/Imaging:     Chest Xray (1/7/2021):       Right PICC line with tip at the junction of the subclavian and SVC   Extensive bilateral pulmonary infiltrates unchanged   ET and NG tubes appear in satisfactory position             ASSESSMENT:      1. Acute hypoxic respiratory failure   2. Covid pneumonitis - critically severe   3. Shock-septic   4. Adrenal insufficiency  5. Electrolyte abnormalities  6. Leukocytosis    7. COPD  8. pvd  9. chf not in acute exacerbatino   10. Hx lacunar infarct   11. SSS s/p pacemaker   12. Hx seizure do   13. Bilateral carotid stenosis       SYSTEMS ASSESSMENT    Neuro   Previous history of lacunar stroke  Alert and Following command, Tolerating Trach Ventilator well. Decreased Agitation  Pain around Trach site, Fentanyl PRN for pain. Respiratory   Acute respiratory failure with hypoxia secondary to COVID-19 viral pneumonia  Intubated  Course of Cefepime completed for possible superimposed bacterial pneumonia   Wean peep and fio2 as tolerated. Keep O2 sat 90-92%  abg daily  Trach and Peg 12/29 1/5 scheduled Ativan for every 6 and Oxy 10 milligrams every 6 hours. Stop fentanyl patch. Patient tachypneic more today than previous, requiring increased sedation in order to maintain oxygen saturation. 1/6: Repeat CT head and Chest today. 1/7; patient continues to require increased PEEP. Continue to wean as tolerated.         Cardiovascular   Shock, septic  Increase midodrine to 15 tid  intermittent atrial fib/flutter  Afib RVR: Amiodarone 400mg 2 times daily decrease to 200mg once daily on 1/6  Pacemaker interrogated that showed several episodes of supraventricular tachycardia over the last couple of months but no persistent shockable rhythm  Continue plavix, hx of CAD with stent  1/7:  Continues to require phenylephrine to maintain MAP of 65. Place art line. Gastrointestinal   GI prophylaxis with Protonix  Diet per dietary recs   Colace/senna for constipation  Ct abdomen pelvis 12/27 showed no acute intraabdominal etiology       Renal   Hyponatremia -resolved   -Continue to trend metabolic panel   -Nephrology following for fluid recommendations intravenously  Replete potassium as needed  Adrenal Insufficiency: Solucortef 50mg TID  1/7: Lasix on hold due to sepsis. Infectious Disease   COVID-19 viral pneumonia   Received remdesivir, Toci, vit D, vit c, thiamine   1/6  Blood cultures pending. 1/6  ua sent today  With repeat urine culture. 1/7: Restart vancomycin and Zosyn due to possible sepsis. Culture still pending. Hematology/Oncology   Anemia on admission, trend H&H, improving  CBC daily  1/5 No more Blood in stools, Restart Lovenox. Endocrine   Diabetes: High-dose sliding scale insulin for hyperglycemia    Adrenal insufficiency, sepsis : high-dose Solucortef decreased to 50 q 8 hrs due to worsening blood pressure. Social/Spiritual/DNR/Other   Full code. Palliative consulted. ASSESSMENT/ PLAN     1. Trach and peg 12/29  2. Hyponatremia -resolved- nephrology following  3.   midodrine TID   4. Palliative following   5. Lasix diuresis   6. Wean Pressors as tolerated. 7. Nicotine patch   8.  Stop fentanyl patch. Start scheduled Ativan and Oxi.    9.  Patient increasingly tachypneic and needing increased PEEP. Requiring increased sedation for maintaining oxygenation. Wean sedation as tolerated. 8.  Spoke to son about goals of outcome. 11. Continue ICU level of care    Electronically signed by Javier Puckett DO on 1/7/2021 at 3:04 PM    I personally saw, examined and provided care for the patient. Radiographs, labs and medication list were reviewed by me independently. I spoke with bedside nursing, therapists and consultants.  Critical care services and times documented are independent of procedures and multidisciplinary rounds with Residents. Additionally comprehensive, multidisciplinary rounds were conducted with the MICU team. The case was discussed in detail and plans for care were established. Review of Residents documentation was conducted and revisions were made as appropriate. I agree with the above documented exam, problem list and plan of care.   Samantha Boast, MD   CCT excluding procedures:40'

## 2021-01-08 NOTE — PROGRESS NOTES
Intensive Care Daily Quality Rounding Checklist        ICU Team Members:  Dr. Tavares Escamilla, Thomas Hand & Danny (residents), charge nurse, bedside nurse, clinical pharmacist, respiratory therapist     ICU Day #: NUMBER: 25     Intubation Date: December 15     Ventilator Day #: NUMBER: 25-- trache 12/29     Central Line Insertion Date:  Dec 21 PICC line placement                                                    Day #: NUMBER: 19      Arterial Line Insertion Date: d/c'd 1/1/2021                              Day #: n/a     Temporary Hemodialysis Catheter Insertion Date:  n/a                             Day # n/a     DVT Prophylaxis: lovenox    GI Prophylaxis: protonix     Allen Catheter Insertion Date: December 13                                        Day #: 27                             Continued need (if yes, reason documented and discussed with physician): yes- accurate I & O     Skin Issues/ Wounds and ordered treatment discussed on rounds: yes- sos protocol     Goals/ Plans for the Day: start reglan, increase hydrocortisone, resume tube feed, wean pressors, continue critical care management.

## 2021-01-08 NOTE — PROGRESS NOTES
ID Progress Note                [address] [address], [place], [address] [facility]            Phone [phone]     Fax [phone]      Chief complaint   Shortness of breath    Subjective:  Afebrile, still on phenylephrine, vent settings are better now,  She is not awake/alert            Objective:    Vitals:    01/08/21 0954   BP:    Pulse: 74   Resp: 24   Temp:    SpO2: 92%     General appearance: Not awake, status post tracheostomy  Skin: Warm and dry  Eyes: Pink palpebral conjunctivae. PERRL  Ears: External ears normal.  Nose/Sinuses: Nares normal. Septum midline. Mucosa normal. No sinus tenderness. Oropharynx: Limited exam  Neck: Neck supple. No jugular venous distension, lymphadenopathy or thyromegaly Trachea midline  Lungs: Crackles present bilateral lung fields  Heart: S1 S2  Regular rate and rhythm. No rub, murmur or gallop  Abdomen: Abdomen soft, non-tender. BS normal. No masses, organomegaly  Extremities: No edema, Peripheral pulses palpable  Musculoskeletal: Muscular strength appears intact.  No joint effusion, tenderness, swelling or warmth     Right brachial PICC line in place since 12/21/2020    Labs:  Recent Labs     01/06/21  0600 01/07/21  0535 01/08/21  0500   WBC 13.6* 19.2* 19.2*   RBC 2.99* 3.09* 2.78*   HGB 9.4* 9.7* 9.0*   HCT 32.7* 32.5* 29.0*   .4* 105.2* 104.3*   MCH 31.4 31.4 32.4   MCHC 28.7* 29.8* 31.0*   RDW 20.0* 19.8* 19.2*    191 177   MPV 10.6 10.8 10.8     CMP:    Lab Results   Component Value Date     01/08/2021    K 4.9 01/08/2021    K 3.3 12/15/2020    CL 96 01/08/2021    CO2 35 01/08/2021    BUN 11 01/08/2021    CREATININE 0.3 01/08/2021    GFRAA >60 01/08/2021    LABGLOM >60 01/08/2021    GLUCOSE 205 01/08/2021    GLUCOSE 106 12/15/2011    PROT 5.0 01/03/2021    LABALBU 3.0 01/03/2021    CALCIUM 7.9 01/08/2021    BILITOT 0.7 01/03/2021    ALKPHOS 130 01/03/2021    AST 21 01/03/2021    ALT 14 01/03/2021          Microbiology :  No results for input(s): BC in the last 72 hours. No results for input(s): [place] in the last 72 hours. No results for input(s): LABURIN in the last 72 hours. No results for input(s): CULTRESP in the last 72 hours. No results for input(s): WNDABS in the last 72 hours.     Radiology :    noted    URINARY CATHETER OUTPUT (Allen):  [REMOVED] Urethral Catheter 16 fr-Output (mL): 100 mL  Urethral Catheter Non-latex 16 fr-Output (mL): 100 mL    DRAIN/TUBE OUTPUT:  Gastrostomy/Enterostomy/Jejunostomy Percutaneous Endoscopic Gastrostomy (PEG) LUQ 20 fr-Output (mL): 0 ml  [REMOVED] NG/OG/NJ/NE Tube Nasogastric 16 fr Right nostril-Output (mL): 0 ml,  STOOL OUTPUT:  Stool (measured) : 1 mL       Assessment and Plan:      · Leukocytosis reactive versus sepsis, concern for infectious etiology  · Covid pneumonia-treated with remdesivir as well as Decadron plus Tocilizumab  · Acute respiratory failure-worsening status post tracheostomy and PEG tube placement, worsening chest infiltrates  · Mitral regurgitation     Plan  -She has been on hydrocortisone that was discontinued yesterday  -Tracheal aspirate does not show a lot of PMNs, follow the growth for now  -Discontinue vancomycin and Zosyn today  -Sputum from tracheal aspirate has a negative PCP stain, blood cultures from 1/7/2020 were negative so far  -Sent for Fungitell as well as serum galactomannan  -Chest CT without contrast noted  -We will follow her off antibiotic           Electronically signed by Alexia Plata MD on 1/8/2021 at 12:41 PM

## 2021-01-08 NOTE — PROGRESS NOTES
Spoke to son Dirk Garcia about Sravanthi's condition, I explained to him how severe her condition currently is. He would like to speak with Martha Cooper, and see if they could come to an agreement. This was Donnie's response on Monday when I called him, Martha Cooper is currently not answering his phone. I emphasized CODE STATUS change to Dirk Garcia. Comfort and support were provided.     Xochilt Samuel MD  Palliative medicine

## 2021-01-08 NOTE — CARE COORDINATION
COVID POSITIVE 1/2 & 12/13. Vent since 12/15-FIO2 85% . s/p Trach/PEG 12/29. Requiring pressor. Patient has [employer]. Insurance. Accepted @ [facility] (only area facility accepting vent/covid patients ) when medically stable- insurance precert waived until 3/58/07. [id number] care following-full code. Will follow. N-17 in [facility].  Latanya Castaneda

## 2021-01-08 NOTE — PROGRESS NOTES
Critical Care Team - Daily Progress Note         Date and time: 1/8/2021 10:29 AM  Patient's name:  Preeti Belcher  Medical Record Number: [id number]  Patient's account/billing number: [de-identified]  Patient's YOB: 1950  Age: 65 y.o. Date of Admission: 12/13/2020 10:51 AM  Length of stay during current admission: 26      Primary Care Physician: David Conklin DO  ICU Attending Physician: Dr. Allyssa Pandey Status: Full Code    Reason for ICU admission: respiratory failure   Shock   Covid 19 pneumonitis       SUBJECTIVE:     OVERNIGHT EVENTS:         12/16: Overnight able to titrate down some of the vasopressors though still intermittently hypotensive and requiring vasopressin as well as Levophed. Still sedated with fentanyl and Versed. Hyponatremia notable at 119 new today. 12/17: Patient was able to be titrated off the vasopressors. Is now on midodrine. Minimally hypothermic overnight now on Melissa hugger with improvement in temperature. Continue sedation with fentanyl and Versed. 12/18: Patient continues on the ventilator. Back on small amount of Levophed in addition to the vasopressin. Hoping to wean sedation more today. 12/19: Back on levophed overnight. Initially able to titrate fio2 to 40%, became hypoxic and increased to 60%. Apparently their were several episodes of bradycardia overnight. Patient has pacemaker which did not fire per nursing. Follows with Dr. Celia Franco for cardiology. 12/20: Patient: 6 L overnight, nephrology ordered K-Phos, and half-normal saline, will CT her head today. 12/21: Patient tolerated being supine overnight. Plan for PICC line today. Pacemaker interrogated with no runs of V. tach or other rhythm needing acute intervention. 12/22: Patient remained supine without difficulty. Continuing to try to wean down her FiO2. Her sodium has improved. 12/23: Patient continues to tolerate the vent. She is waking up more.   Sodium is much better. No acute overnight events. 12/24: Failed weaning trial yesterday. Became agitated. Continues to tolerate the ventilator. Day 9 on the ventilator. 12/25: The patient occasionally opens her eyes. She occasionally follows commands. She gets extremely anxious when sedation is weaned further. Continues on Versed and fentanyl. 12/26: Patient continues on the ventilator. Opens eyes with sedation vacation, not really following commands. Day 11 on the ventilator. No other acute overnight events. 12:27: Continues on the vent. Awake on sedation, does follow commands. Day 12 on the ventilator. 12/28: Patient continues on the ventilator. Still occasionally becomes agitated when attempting to wean sedation. Day 13 on the ventilator. Occasionally hypotensive requiring Levophed. 12/29: trach and peg today. Spent 29th on the phone talking to her son who is the power of  yesterday explaining her prognosis and the treatment course going forward. He wants a trach and PEG and everything done at this point. Patient is actually much more awake today and following commands. When asked if she needs the volume turned up on the TV she shakes her head yes. Plan for trach and PEG this afternoon. 12/30: Trach and PEG yesterday. Had to be placed back on Levophed, currently running at 13 mics. Patient is now awake, has restraints off, is alert and responding to commands. Weaning sedatives. 12/31: No acute events overnight, episode of A. fib with RVR yesterday, changed to needle from Levophed, and started on amiodarone bolus and amiodarone per PEG tube. Off fern today. 01/01: No acute events, Weaned off fentanyl drip    01/02: No acute events    1/4: No acute events overnight    1/5: Become more agitated overnight with tachypnea and fluctuating blood pressure. Also ventilatory status declining requiring increased sedation.     1/6: Day 23 Ventilation, Continues tachypnea and poor ventilation. : Day 24 ventilation. Not tolerating ventilation. Continue to wean sedation as able. Cultures redrawn. : Day 25 ventilation. Patient found overnight 700 cc residual volume, stopped tube feedings at that time. Recheck this morning only 50 cc residual restarted tube feedings. ,  Start Reglan every 6 hours.     CURRENT VENTILATION STATUS:     [x] Ventilator  [] BIPAP  [] Nasal Cannula [] Room Air      IF INTUBATED, ET TUBE MARKING AT LOWER LIP:       cms    SECRETIONS Amount:  [] Small [] Moderate  [] Large  [x] None  Color:     [] White [] Colored  [] Bloody    SEDATION:  RAAS Score:+1  [] Fentanyl gtt  [x] Versed gtt  [x] Ativan gtt   [] No Sedation    PARALYZED:  [x] No    [] Yes      VASOPRESSORS:  [] No    [x] Yes    If yes -   [] Levophed       [] Dopamine     [] Vasopressin       [] Dobutamine  [x] Phenylephrine         [] Epinephrine    CENTRAL LINES:     [] No   [x] Yes   (Date of Insertion:  12/15 ) PICC line          If yes -     [] Right IJ     [] Left IJ [] Right Femoral [] Left Femoral                   [] Right Subclavian [] Left Subclavian   PICC line    GARCIA'S CATHETER:   [] No   [x] Yes  (Date of Insertion: 12/15  )     URINE OUTPUT:            [x] Good   [] Low              [] Anuric      OBJECTIVE:     VITAL SIGNS:  /61   Pulse 74   Temp 98.5 °F (36.9 °C) (Axillary)   Resp 24   Ht 5' 6\" (1.676 m)   Wt 210 lb 1.6 oz (95.3 kg)   SpO2 92%   BMI 33.91 kg/m²   Tmax over 24 hours:  Temp (24hrs), Av.2 °F (36.8 °C), Min:97.8 °F (36.6 °C), Max:99.1 °F (37.3 °C)      Patient Vitals for the past 6 hrs:   Temp Temp src Pulse Resp SpO2   21 0954 -- -- 74 24 92 %   21 0945 -- -- 82 25 91 %   21 0900 -- -- 75 22 91 %   21 0831 -- -- 73 21 90 %   21 0800 98.5 °F (36.9 °C) Axillary 70 22 (!) 89 %   21 0630 -- -- 61 20 94 %   21 0600 -- -- 71 24 95 %   21 0530 -- -- 70 22 98 %   21 0500 -- -- 60 20 96 %   21 Condensation: Drained  Oral Care: Mouth swabbed, Mouthwash, Mouth suctioned  Subglottic Suction Done?: No  Cuff Pressure (cm H2O): 29 cm H2O    ABGs:   Recent Labs     01/08/21  0550   PH 7.342*   PCO2 57.9*   PO2 78.8   HCO3 30.7*   BE 4.0*   O2SAT 95.3       Laboratory findings:    Complete Blood Count:   Recent Labs     01/06/21  0600 01/07/21  0535 01/08/21  0500   WBC 13.6* 19.2* 19.2*   HGB 9.4* 9.7* 9.0*   HCT 32.7* 32.5* 29.0*    191 177        Last 3 Blood Glucose:   Recent Labs     01/06/21  0600 01/07/21  0535 01/08/21  0500   GLUCOSE 199* 195* 205*        PT/INR:    Lab Results   Component Value Date    PROTIME 11.4 12/13/2020    PROTIME 11.9 12/15/2011    INR 1.0 12/13/2020     PTT:    Lab Results   Component Value Date    APTT 21.6 12/13/2020       Comprehensive Metabolic Profile:   Recent Labs     01/06/21  0600 01/07/21  0535 01/08/21  0500    135 134   K 4.2 3.8 4.9    97* 96*   CO2 33* 34* 35*   BUN 18 14 11   CREATININE 0.4* 0.4* 0.3*   GLUCOSE 199* 195* 205*   CALCIUM 8.1* 7.9* 7.9*      Magnesium:   Lab Results   Component Value Date    MG 2.1 01/08/2021     Phosphorus:   Lab Results   Component Value Date    PHOS 2.7 01/08/2021     Ionized Calcium: No results found for: CAION     Urinalysis:     Troponin:   No results for input(s): TROPONINI in the last 72 hours.     Microbiology:    Cultures during this admission:     Blood cultures:                 [x] Pending            [] Negative             []  Positive (Details:  )  Urine Culture:                   [] None drawn      [] Negative             []  Positive (Details:  )  Sputum Culture:               [] None drawn       [] Negative             []  Positive (Details:  )   Endotracheal aspirate:     [] None drawn       [] Negative             []  Positive (Details:  )     Other pertinent Labs:   COVID-19 + December 13  MRSA negative screening   radiology/Imaging:     Chest Xray (1/8/2021):       Right PICC line with tip at the junction of the subclavian and SVC   Extensive bilateral pulmonary infiltrates unchanged   ET and NG tubes appear in satisfactory position             ASSESSMENT:      1. Acute on chronic hypoxic respiratory failure   2. Covid pneumonitis - critically severe   3. Shock-septic   4. Adrenal insufficiency  5. Electrolyte abnormalities  6. Leukocytosis    7. COPD  8. pvd  9. chf not in acute exacerbatino   10. Hx lacunar infarct   11. SSS s/p pacemaker   12. Hx seizure do   13. Bilateral carotid stenosis       SYSTEMS ASSESSMENT    Neuro   Previous history of lacunar stroke  Alert and Following command, Tolerating Trach Ventilator well. Decreased Agitation  Pain around Trach site, Fentanyl PRN for pain. Respiratory   Acute respiratory failure with hypoxia secondary to COVID-19 viral pneumonia  Intubated  Course of Cefepime completed for possible superimposed bacterial pneumonia   Wean peep and fio2 as tolerated. Keep O2 sat 90-92%  abg daily  Trach and Peg 12/29 1/5 scheduled Ativan for every 6 and Oxy 10 milligrams every 6 hours. Stop fentanyl patch. Patient tachypneic more today than previous, requiring increased sedation in order to maintain oxygen saturation. 1/6: Repeat CT head and Chest today. 1/7; patient continues to require increased PEEP. Continue to wean as tolerated. 1/8: Decrease tidal volume to 360 increase rate to 24. Cardiovascular   Shock, septic  Increase midodrine to 15 tid  intermittent atrial fib/flutter  Afib RVR: Amiodarone 400mg 2 times daily decrease to 200mg once daily on 1/6  Pacemaker interrogated that showed several episodes of supraventricular tachycardia over the last couple of months but no persistent shockable rhythm  Continue plavix, hx of CAD with stent  1/7:  Continues to require phenylephrine to maintain MAP of 65. Place art line.       Gastrointestinal   GI prophylaxis with Protonix  Diet per dietary recs   Colace/senna for constipation  Ct abdomen pelvis 12/27 showed no acute intraabdominal etiology       Renal   Hyponatremia -resolved   -Continue to trend metabolic panel   -Nephrology following for fluid recommendations intravenously  Replete potassium as needed  Adrenal Insufficiency: Increase Solu-Cortef to 100 mg every 8 hours. 1/7: Lasix on hold due to sepsis. Infectious Disease   COVID-19 viral pneumonia   Received remdesivir, Toci, vit D, vit c, thiamine   1/6  Blood cultures pending. 1/6  ua sent today  With repeat urine culture. 1/7: Restart vancomycin and Zosyn due to possible sepsis. Culture still pending. Hematology/Oncology   Anemia on admission, trend H&H, improving  CBC daily  1/5 No more Blood in stools, Restart Lovenox. Endocrine   Diabetes: High-dose sliding scale insulin for hyperglycemia  Hyperglycemia: Increase Lantus dose to 30. Adrenal insufficiency, sepsis : high-dose Solucortef decreased to 50 q 8 hrs due to worsening blood pressure. Social/Spiritual/DNR/Other   Full code. Palliative consulted. ASSESSMENT/ PLAN     1. Trach and peg 12/29  2. Hyponatremia -resolved- nephrology following  3.   midodrine TID   4. Palliative following   5. Lasix diuresis   6. Wean Pressors as tolerated. 7. Nicotine patch   8.  Stop fentanyl patch. Start scheduled Ativan and Oxi.    9.  Patient increasingly tachypneic and needing increased PEEP. Requiring increased sedation for maintaining oxygenation. Wean sedation as tolerated. 10. Continue ICU level of care    Electronically signed by Dereje Dunn DO on 1/8/2021 at 10:29 AM    I personally saw, examined and provided care for the patient. Radiographs, labs and medication list were reviewed by me independently. I spoke with bedside nursing, therapists and consultants. Critical care services and times documented are independent of procedures and multidisciplinary rounds with Residents.  Additionally comprehensive, multidisciplinary rounds were conducted with the MICU team. The case was discussed in detail and plans for care were established. Review of Residents documentation was conducted and revisions were made as appropriate. I agree with the above documented exam, problem list and plan of care.   Ronni Orosco MD   CCT excluding procedures:38'

## 2021-01-08 NOTE — PROGRESS NOTES
Subjective:  Status post PEG and trach: arousable  Discussed with staff    Objective:    /61   Pulse 78   Temp 97.6 °F (36.4 °C) (Axillary)   Resp 25   Ht 5' 6\" (1.676 m)   Wt 210 lb 1.6 oz (95.3 kg)   SpO2 99%   BMI 33.91 kg/m²     24HR INTAKE/OUTPUT:      Intake/Output Summary (Last 24 hours) at 1/8/2021 1748  Last data filed at 1/8/2021 1600  Gross per 24 hour   Intake 4983.5 ml   Output 4160 ml   Net 823.5 ml       General appearance: Trach to vent arousable answering questions mechanically ventilated supine  Neck: FROM, supple   Lungs: few rhonchi bilaterally no wheezes, no , no crackles  CV: RRR, no MRGs; normal carotid upstroke and amplitude without Bruits  Abdomen: Soft, non-tender; no masses or HSM  Extremities: No edema, no cyanosis, no clubbing  Skin: Intact no rash, no lesions, no ulcers    Psych: Not Alert and oriented normal affect  Neuro: Nonfocal  Exam limited by [place] [id number] isolation policy and efforts to preserve PPE. Exam findings shared among providers. Most Recent Labs  Lab Results   Component Value Date    WBC 19.2 (H) 01/08/2021    HGB 9.0 (L) 01/08/2021    HCT 29.0 (L) 01/08/2021     01/08/2021     01/08/2021    K 4.9 01/08/2021    CL 96 (L) 01/08/2021    CREATININE 0.3 (L) 01/08/2021    BUN 11 01/08/2021    CO2 35 (H) 01/08/2021    GLUCOSE 205 (H) 01/08/2021    ALT 14 01/03/2021    AST 21 01/03/2021    INR 1.0 12/13/2020    TSH 0.381 12/03/2020    LABA1C 5.8 04/05/2017     Recent Labs     01/08/21  0500   MG 2.1     Lab Results   Component Value Date    CALCIUM 7.9 (L) 01/08/2021    PHOS 2.7 01/08/2021        XR CHEST PORTABLE   Final Result   Bilateral airspace disease which is not appreciably changed. CT CHEST WO CONTRAST   Final Result   Progressive diffuse patchy and multifocal ground-glass infiltrates and   pleural effusion which may be due to multifocal pneumonia including viral   infection and or edema. Bilateral kidney stones.       CT HEAD WO CONTRAST   Final Result   No evidence of intracranial hemorrhage or herniation. Moderate degree of   senescent changes and involutional changes. There appear to be chronic   sequela of prior focal ischemic events bilaterally. Intracranial   atherosclerotic disease noted. (Although there is no convincing evidence of an acute ischemic event, if   there is high clinical concern, consider MRI.)   2. Paranasal sinus disease and partial mastoid air cell effusions. XR CHEST PORTABLE   Final Result   No significant interval change      XR CHEST PORTABLE   Final Result   No significant change since the January 4. Persistent bilateral lung   infiltrates. XR CHEST PORTABLE   Final Result   No significant changes since the January 4 same-day performed early at 05:48   a.m. [facility] XR CHEST PORTABLE   Final Result   Extensive bilateral pulmonary infiltrates increasing from prior exam      XR CHEST PORTABLE   Final Result   Continued diffuse bilateral infiltrates, worse on the right. Tracheostomy tube and right-sided central venous catheter unchanged. XR CHEST PORTABLE   Final Result   Unchanged bilateral infiltrates. XR CHEST PORTABLE   Final Result   1. Extensive multifocal bilateral pulmonary infiltrates unchanged compared to   the patient's prior study. XR CHEST PORTABLE   Final Result   Stable support apparatus. Persistent diffuse bilateral airspace disease. No significant change   compared to most recent study but findings progressed compared to the study   from December 27. XR CHEST PORTABLE   Final Result   No interval change      CT ABDOMEN PELVIS WO CONTRAST Additional Contrast? None   Final Result   1. Dense airspace consolidation in the left lower lobe and increase in right   basilar lung opacities compatible with multifocal pneumonia. At least small   to moderate size bilateral pleural effusions with adjacent atelectasis. 2. Diffuse body wall edema.   There is intermediate attenuation fluid in the   left lateral abdominal wall, and a component of hemorrhage is possible as   well. 3. Small to moderate amount of fluid in the abdomen and pelvis with simple   fluid attenuation. 4. Hepatic surface nodularity consistent with hepatic cirrhosis. 5. Nonobstructive renal calculi bilaterally. 6. Colonic diverticulosis without acute inflammatory changes. XR CHEST PORTABLE   Final Result   1. Extensive bilateral airspace disease and small left pleural effusion,   unchanged. 2.  Stable position of support tubes and right-sided PICC line. XR CHEST PORTABLE   Final Result   Extensive bilateral pulmonary infiltrates unchanged   ET and other life support devices appear in satisfactory position      XR CHEST PORTABLE   Final Result   ET tube in satisfactory position   Bilateral pulmonary infiltrates are unchanged is      XR CHEST PORTABLE   Final Result   Stable bilateral airspace disease which may represent pulmonary edema. XR CHEST PORTABLE   Final Result   1. Good position right-sided PICC line. 2.  Otherwise stable findings. Bilateral airspace infiltrates/edema, worse   on the right. XR CHEST PORTABLE   Final Result   Right PICC line with tip at the junction of the subclavian and SVC   Extensive bilateral pulmonary infiltrates unchanged   ET and NG tubes appear in satisfactory position      XR CHEST PORTABLE   Final Result   Bilateral airspace disease which is not appreciably changed and is likely   related to pulmonary edema. XR CHEST PORTABLE   Final Result   Mildly progressive bilateral infiltrates. Improving left pleural effusion      XR CHEST PORTABLE   Final Result   Mildly waning airspace disease with slight improvement of aeration   bilaterally. XR CHEST PORTABLE   Final Result   Diffuse infiltrate, improved in the upper lungs as compared to prior. There is new consolidation in the left lower lobe with possible left pleural   effusion.       XR viral sepsis  Cont to require Jay   wean as tolerated                 Blood cultures NGTD, UCx negative    Completed cefepime  Hydrocortisone off, incresed midodrine  Abx completed-- monitor cultures    3. Anemia - Hg stable cont to monitor                 4. Hypothyroidism - continue synthroid    5.  Hyponatremia -improved    nephrology consult appreciated              IVF resuscitation              Continue to monitor labs closely       A. fib RVR amiodarone drip-->PO  Apixaban    Palliative care re-consult appreciated  -- continued code discussions with family  -- given poor progrnosis CC with withdrawal of care seems appropriate    Possible discharge to SNF with ventilator capability when medically stable-- unfortunately pt is not improving    Electronically signed by Selvin Ridley MD on 1/8/2021 at 5:48 PM

## 2021-01-09 NOTE — PROGRESS NOTES
Subjective:  Status post PEG and trach: arousable  Discussed with staff    Objective:    BP (!) 126/59   Pulse 93   Temp 98 °F (36.7 °C) (Axillary)   Resp (!) 31   Ht 5' 6\" (1.676 m)   Wt 210 lb 1.6 oz (95.3 kg)   SpO2 94%   BMI 33.91 kg/m²     24HR INTAKE/OUTPUT:      Intake/Output Summary (Last 24 hours) at 1/9/2021 1213  Last data filed at 1/9/2021 1159  Gross per 24 hour   Intake 2204.5 ml   Output 3050 ml   Net -845.5 ml       General appearance: Trach to vent arousable answering questions mechanically ventilated supine  Neck: FROM, supple   Lungs: few rhonchi bilaterally no wheezes, no , no crackles  CV: RRR, no MRGs; normal carotid upstroke and amplitude without Bruits  Abdomen: Soft, non-tender; no masses or HSM  Extremities: No edema, no cyanosis, no clubbing  Skin: Intact no rash, no lesions, no ulcers    Psych: Not Alert and oriented normal affect  Neuro: Nonfocal  Exam limited by [id number] isolation policy and efforts to preserve PPE. Exam findings shared among providers. Most Recent Labs  Lab Results   Component Value Date    WBC 16.8 (H) 01/09/2021    HGB 9.1 (L) 01/09/2021    HCT 31.2 (L) 01/09/2021     01/09/2021     01/09/2021    K 4.1 01/09/2021    CL 97 (L) 01/09/2021    CREATININE 0.3 (L) 01/09/2021    BUN 9 01/09/2021    CO2 36 (H) 01/09/2021    GLUCOSE 126 (H) 01/09/2021    ALT 14 01/03/2021    AST 21 01/03/2021    INR 1.0 12/13/2020    TSH 0.381 12/03/2020    LABA1C 5.8 04/05/2017     Recent Labs     01/09/21  0630   MG 2.2     Lab Results   Component Value Date    CALCIUM 8.0 (L) 01/09/2021    PHOS 2.6 01/09/2021        XR CHEST PORTABLE   Final Result   Bilateral airspace disease which is not appreciably changed. CT CHEST WO CONTRAST   Final Result   Progressive diffuse patchy and multifocal ground-glass infiltrates and   pleural effusion which may be due to multifocal pneumonia including viral   infection and or edema. Bilateral kidney stones.       CT HEAD WO CONTRAST   Final Result   No evidence of intracranial hemorrhage or herniation. Moderate degree of   senescent changes and involutional changes. There appear to be chronic   sequela of prior focal ischemic events bilaterally. Intracranial   atherosclerotic disease noted. (Although there is no convincing evidence of an acute ischemic event, if   there is high clinical concern, consider MRI.)   2. Paranasal sinus disease and partial mastoid air cell effusions. XR CHEST PORTABLE   Final Result   No significant interval change      XR CHEST PORTABLE   Final Result   No significant change since the January 4. Persistent bilateral lung   infiltrates. XR CHEST PORTABLE   Final Result   No significant changes since the January 4 same-day performed early at 05:48   a.m. [employer] XR CHEST PORTABLE   Final Result   Extensive bilateral pulmonary infiltrates increasing from prior exam      XR CHEST PORTABLE   Final Result   Continued diffuse bilateral infiltrates, worse on the right. Tracheostomy tube and right-sided central venous catheter unchanged. XR CHEST PORTABLE   Final Result   Unchanged bilateral infiltrates. XR CHEST PORTABLE   Final Result   1. Extensive multifocal bilateral pulmonary infiltrates unchanged compared to   the patient's prior study. XR CHEST PORTABLE   Final Result   Stable support apparatus. Persistent diffuse bilateral airspace disease. No significant change   compared to most recent study but findings progressed compared to the study   from December 27. XR CHEST PORTABLE   Final Result   No interval change      CT ABDOMEN PELVIS WO CONTRAST Additional Contrast? None   Final Result   1. Dense airspace consolidation in the left lower lobe and increase in right   basilar lung opacities compatible with multifocal pneumonia. At least small   to moderate size bilateral pleural effusions with adjacent atelectasis. 2. Diffuse body wall edema.   There is intermediate CHEST PORTABLE   Final Result   Endotracheal tube in low position, can be pulled back about 3 cm. NG tube in good position. Pattern of bilateral interstitial infiltrate in the perihilar regions or the   periphery of the lung as above commented. The pulmonary edema versus   bilateral viral pneumonia will be part of the differential diagnosis. Please   correlate clinically. T   Preliminary report given to Dr. Anjali Chaudhari at the time   of the interpretation. XR ABDOMEN FOR NG/OG/NE TUBE PLACEMENT   Final Result   Enteric feeding tube partially imaged below the diaphragm and likely within   the stomach. XR CHEST PORTABLE   Final Result   Increased interstitial markings throughout both lungs along with   patchy/consolidative airspace opacities noted bilaterally as above. Findings   are suspicious for multifocal pneumonia or pulmonary edema. CTA PULMONARY W CONTRAST   Final Result   No central pulmonary embolism or aortic dissection. Progressive diffuse bilateral patchy and ground-glass infiltrates concerning   for progressive multifocal pneumonia/viral infection. XR CHEST PORTABLE   Final Result   Progressive diffuse bilateral infiltrates and pleural effusion likely   CHF/edema and or diffuse pneumonia. XR CHEST PORTABLE    (Results Pending)       Assessment    Active Problems:    Acute respiratory failure with hypoxia ([facility])    Acute respiratory failure due to COVID-19 [facility])    Septic shock ([facility])    Atrial fibrillation with RVR ([facility])  Resolved Problems:    * No resolved hospital problems.  *      Plan:     1.  Acute respiratory failure with hypoxia  - COVID 19 PNA / bacterial/ HF. CXR improved 12/17  -Intubated sedated mechanically ventilated   -Prone not paralyzed             Hydrocortisone IV + vit D   Remdesivir completed              Diffuse bilateral patchy GGO on CT, no PE                         cefepime completed              Fentanyl   SP PEG and trach

## 2021-01-09 NOTE — PROGRESS NOTES
ID Progress Note                [address], [place], [address] [facility]            Phone [phone]     Fax [phone]      Chief complaint   Shortness of breath    Subjective:  Afebrile, still on phenylephrine, vent settings are better now,  She is not awake/alert    Objective:    Vitals:    01/09/21 1315   BP:    Pulse: 89   Resp: (!) 31   Temp:    SpO2: 92%     General appearance: Not awake, status post tracheostomy  Skin: Warm and dry  Eyes: Pink palpebral conjunctivae. PERRL  Ears: External ears normal.  Nose/Sinuses: Nares normal. Septum midline. Mucosa normal. No sinus tenderness. Oropharynx: Limited exam  Neck: Neck supple. No jugular venous distension, lymphadenopathy or thyromegaly Trachea midline  Lungs: Crackles present bilateral lung fields  Heart: S1 S2  Regular rate and rhythm. No rub, murmur or gallop  Abdomen: Abdomen soft, non-tender. BS normal. No masses, organomegaly  Extremities: No edema, Peripheral pulses palpable  Musculoskeletal: Muscular strength appears intact.  No joint effusion, tenderness, swelling or warmth     Right brachial PICC line in place since 12/21/2020    Labs:  Recent Labs     01/07/21  0535 01/08/21  0500 01/09/21  0630   WBC 19.2* 19.2* 16.8*   RBC 3.09* 2.78* 2.97*   HGB 9.7* 9.0* 9.1*   HCT 32.5* 29.0* 31.2*   .2* 104.3* 105.1*   MCH 31.4 32.4 30.6   MCHC 29.8* 31.0* 29.2*   RDW 19.8* 19.2* 19.8*    177 194   MPV 10.8 10.8 10.5     CMP:    Lab Results   Component Value Date     01/09/2021    K 4.1 01/09/2021    K 3.3 12/15/2020    CL 97 01/09/2021    CO2 36 01/09/2021    BUN 9 01/09/2021    CREATININE 0.3 01/09/2021    GFRAA >60 01/09/2021    LABGLOM >60 01/09/2021    GLUCOSE 126 01/09/2021    GLUCOSE 106 12/15/2011    PROT 5.0 01/03/2021    LABALBU 3.0 01/03/2021    CALCIUM 8.0 01/09/2021    BILITOT 0.7 01/03/2021    ALKPHOS 130 01/03/2021    AST 21 01/03/2021    ALT 14 01/03/2021          Microbiology :  Recent Labs 01/07/21  1325   BC 24 Hours no growth     Recent Labs     01/07/21  1502   BLOODCULT2 24 Hours no growth     No results for input(s): LABURIN in the last 72 hours. No results for input(s): CULTRESP in the last 72 hours. No results for input(s): WNDABS in the last 72 hours.     Radiology :    noted    URINARY CATHETER OUTPUT ([facility]):  [REMOVED] Urethral Catheter 16 fr-Output (mL): 100 mL  Urethral Catheter Non-latex 16 fr-Output (mL): 300 mL    DRAIN/TUBE OUTPUT:  Gastrostomy/Enterostomy/Jejunostomy Percutaneous Endoscopic Gastrostomy (PEG) LUQ 20 fr-Output (mL): 0 ml  [REMOVED] NG/OG/NJ/NE Tube Nasogastric 16 fr Right nostril-Output (mL): 0 ml,  STOOL OUTPUT:  Stool (measured) : 1 mL       Assessment and Plan:      · Leukocytosis reactive versus sepsis, concern for infectious etiology  · Covid pneumonia-treated with remdesivir as well as Decadron plus Tocilizumab  · Acute respiratory failure-worsening status post tracheostomy and PEG tube placement, worsening chest infiltrates  · Mitral regurgitation     Plan  -leucocytosis trending down  -Tracheal aspirate does not show a lot of PMNs, follow the growth for now  -off abx  -Sputum from tracheal aspirate has a negative PCP stain, blood cultures from 1/7/2020 were negative so far  -Sent for Fungitell as well as serum galactomannan  -Chest CT without contrast noted  -We will follow her off antibiotic           Electronically signed by Lubna Calixto MD on 1/9/2021 at 1:39 PM

## 2021-01-09 NOTE — PROGRESS NOTES
Critical Care Team - Daily Progress Note         Date and time: 1/9/2021 5:34 PM  Patient's name:  Adele Cooney  Medical Record Number: [id number]  Patient's account/billing number: [de-identified]  Patient's YOB: 1950  Age: 65 y.o. Date of Admission: 12/13/2020 10:51 AM  Length of stay during current admission: 27      Primary Care Physician: Otoniel Pichardo DO  ICU Attending Physician: Dr. Marco Coleman Status: Full Code    Reason for ICU admission: respiratory failure   Shock   Covid 19 pneumonitis       SUBJECTIVE:     OVERNIGHT EVENTS:         12/16: Overnight able to titrate down some of the vasopressors though still intermittently hypotensive and requiring vasopressin as well as Levophed. Still sedated with fentanyl and Versed. Hyponatremia notable at 119 new today. 12/17: Patient was able to be titrated off the vasopressors. Is now on midodrine. Minimally hypothermic overnight now on Melissa hugger with improvement in temperature. Continue sedation with fentanyl and Versed. 12/18: Patient continues on the ventilator. Back on small amount of Levophed in addition to the vasopressin. Hoping to wean sedation more today. 12/19: Back on levophed overnight. Initially able to titrate fio2 to 40%, became hypoxic and increased to 60%. Apparently their were several episodes of bradycardia overnight. Patient has pacemaker which did not fire per nursing. Follows with Dr. Khushbu Vela for cardiology. 12/20: Patient: 6 L overnight, nephrology ordered K-Phos, and half-normal saline, will CT her head today. 12/21: Patient tolerated being supine overnight. Plan for PICC line today. Pacemaker interrogated with no runs of V. tach or other rhythm needing acute intervention. 12/22: Patient remained supine without difficulty. Continuing to try to wean down her FiO2. Her sodium has improved. 12/23: Patient continues to tolerate the vent. She is waking up more.   Sodium is much better. No acute overnight events. 12/24: Failed weaning trial yesterday. Became agitated. Continues to tolerate the ventilator. Day 9 on the ventilator. 12/25: The patient occasionally opens her eyes. She occasionally follows commands. She gets extremely anxious when sedation is weaned further. Continues on Versed and fentanyl. 12/26: Patient continues on the ventilator. Opens eyes with sedation vacation, not really following commands. Day 11 on the ventilator. No other acute overnight events. 12:27: Continues on the vent. Awake on sedation, does follow commands. Day 12 on the ventilator. 12/28: Patient continues on the ventilator. Still occasionally becomes agitated when attempting to wean sedation. Day 13 on the ventilator. Occasionally hypotensive requiring Levophed. 12/29: trach and peg today. Spent 29th on the phone talking to her son who is the power of  yesterday explaining her prognosis and the treatment course going forward. He wants a trach and PEG and everything done at this point. Patient is actually much more awake today and following commands. When asked if she needs the volume turned up on the TV she shakes her head yes. Plan for trach and PEG this afternoon. 12/30: Trach and PEG yesterday. Had to be placed back on Levophed, currently running at 13 mics. Patient is now awake, has restraints off, is alert and responding to commands. Weaning sedatives. 12/31: No acute events overnight, episode of A. fib with RVR yesterday, changed to needle from Levophed, and started on amiodarone bolus and amiodarone per PEG tube. Off fern today. 01/01: No acute events, Weaned off fentanyl drip    01/02: No acute events    1/4: No acute events overnight    1/5: Become more agitated overnight with tachypnea and fluctuating blood pressure. Also ventilatory status declining requiring increased sedation.     1/6: Day 23 Ventilation, Continues tachypnea and poor LORazepam  2 mg Oral Q4H    oxyCODONE  10 mg Oral Q6H    lansoprazole  30 mg Per G Tube QAM AC    apixaban  5 mg Oral BID    nicotine  1 patch Transdermal Daily    amiodarone  200 mg Oral Daily    thiamine  100 mg Oral Daily    zinc sulfate  50 mg Oral Daily    heparin flush  3 mL Intravenous 2 times per day    docusate  100 mg Oral Daily    insulin lispro  0-18 Units Subcutaneous Q6H    ipratropium-albuterol  1 ampule Inhalation Q4H WA    budesonide  500 mcg Nebulization BID    Arformoterol Tartrate  15 mcg Nebulization BID    clopidogrel  75 mg Oral Daily    chlorhexidine  15 mL Mouth/Throat BID    sodium chloride flush  10 mL Intravenous 2 times per day    gabapentin  800 mg Oral TID    levothyroxine  100 mcg Oral Daily    atorvastatin  20 mg Oral Daily     Continuous Infusions:   dextrose      phenylephrine (HORACE-SYNEPHRINE) 50mg/250mL infusion Stopped (01/09/21 1148)     PRN Meds:       glucose, 15 g, PRN      dextrose, 12.5 g, PRN      glucagon (rDNA), 1 mg, PRN      dextrose, 100 mL/hr, PRN      LORazepam, 1 mg, Q4H PRN      acetaminophen, 650 mg, Q6H PRN      metoprolol, 5 mg, Q6H PRN      sodium chloride flush, 10 mL, PRN      heparin flush, 3 mL, PRN      potassium chloride, 20 mEq, PRN      fentanNYL, 25 mcg, Q3H PRN      ondansetron, 4 mg, Q6H PRN      polyethylene glycol, 17 g, Daily PRN          VENT SETTINGS (Comprehensive) (if applicable):  Vent Information  $Ventilation: $Subsequent Day  Skin Assessment: Clean, dry, & intact  Equipment ID: [id number]  Equipment Changed: Humidification  Vent Type: [id number]  Vent Mode: AC/VC+  Vt Ordered: 360 mL  Pressure Ordered: 14  Rate Set: 24 bmp  Peak Flow: 0 L/min  Pressure Support: 0 cmH20  FiO2 : 75 %  SpO2: 95 %  SpO2/FiO2 ratio: 126.67  PaO2/FiO2 ratio: 86  Sensitivity: 2  PEEP/CPAP: 14  I Time/ I Time %: 0 s  Humidification Source: Heated wire  Humidification Temp: 37  Humidification Temp Measured: 37  Circuit Condensation: Drained  Mask Type: Full face mask  Mask Size: Small  Additional Respiratory  Assessments  Pulse: 85  Resp: 29  SpO2: 95 %  Position: Semi-Marrero's  Humidification Source: Heated wire  Humidification Temp: 37  Circuit Condensation: Drained  Oral Care: Mouth swabbed, Mouthwash, Mouth suctioned  Subglottic Suction Done?: No  Cuff Pressure (cm H2O): 29 cm H2O    ABGs:   Recent Labs     01/09/21  0648   PH 7.436   PCO2 49.3*   PO2 73.1*   HCO3 32.4*   BE 7.3*   O2SAT 95.4       Laboratory findings:    Complete Blood Count:   Recent Labs     01/07/21  0535 01/08/21  0500 01/09/21  0630   WBC 19.2* 19.2* 16.8*   HGB 9.7* 9.0* 9.1*   HCT 32.5* 29.0* 31.2*    177 194        Last 3 Blood Glucose:   Recent Labs     01/07/21  0535 01/08/21  0500 01/09/21  0630   GLUCOSE 195* 205* 126*        PT/INR:    Lab Results   Component Value Date    PROTIME 11.4 12/13/2020    PROTIME 11.9 12/15/2011    INR 1.0 12/13/2020     PTT:    Lab Results   Component Value Date    APTT 21.6 12/13/2020       Comprehensive Metabolic Profile:   Recent Labs     01/07/21  0535 01/08/21  0500 01/09/21  0630    134 137   K 3.8 4.9 4.1   CL 97* 96* 97*   CO2 34* 35* 36*   BUN 14 11 9   CREATININE 0.4* 0.3* 0.3*   GLUCOSE 195* 205* 126*   CALCIUM 7.9* 7.9* 8.0*      Magnesium:   Lab Results   Component Value Date    MG 2.2 01/09/2021     Phosphorus:   Lab Results   Component Value Date    PHOS 2.6 01/09/2021     Ionized Calcium: No results found for: CAION     Urinalysis:     Troponin:   No results for input(s): TROPONINI in the last 72 hours.     Microbiology:    Cultures during this admission:     Blood cultures:                 [x] Pending            [] Negative             []  Positive (Details:  )  Urine Culture:                   [] None drawn      [] Negative             []  Positive (Details:  )  Sputum Culture:               [] None drawn       [] Negative             []  Positive (Details:  )   Endotracheal aspirate:     [] None drawn [] Negative             []  Positive (Details:  )     Other pertinent Labs:   COVID-19 + December 13  MRSA negative screening   radiology/Imaging:     Chest Xray (1/9/2021):       Right PICC line with tip at the junction of the subclavian and SVC   Extensive bilateral pulmonary infiltrates unchanged   ET and NG tubes appear in satisfactory position             ASSESSMENT:      1. Acute on chronic hypoxic respiratory failure   2. Covid pneumonitis - critically severe   3. Shock-septic   4. Adrenal insufficiency  5. Electrolyte abnormalities  6. Leukocytosis    7. COPD  8. pvd  9. chf not in acute exacerbatino   10. Hx lacunar infarct   11. SSS s/p pacemaker   12. Hx seizure do   13. Bilateral carotid stenosis       SYSTEMS ASSESSMENT    Neuro   Previous history of lacunar stroke  Currently off IV sedation, will change Ativan to 2 mg every 6 hours. Continue oxycodone 10 mg every 6. Respiratory   Acute respiratory failure with hypoxia secondary to COVID-19 viral pneumonia  Trach and Peg 12/29 1/5 scheduled Ativan for every 6 and Oxy 10 milligrams every 6 hours. Stop fentanyl patch. Patient tachypneic more today than previous, requiring increased sedation in order to maintain oxygen saturation. 1/6: Repeat CT head and Chest today. 1/7; patient continues to require increased PEEP. Continue to wean as tolerated.   1/8: Decrease tidal volume to 360 increase rate to 24.  1/9; trying to wean FiO2 but not successful so far        Cardiovascular   Shock, septic -weaned off pressors today  Increase midodrine to 15 tid  intermittent atrial fib/flutter  Afib RVR: Amiodarone 400mg 2 times daily decrease to 200mg once daily on 1/6  Pacemaker interrogated that showed several episodes of supraventricular tachycardia over the last couple of months but no persistent shockable rhythm  Continue plavix, hx of CAD with stent        Gastrointestinal   GI prophylaxis with Protonix  Diet per dietary recs   Colace/senna for constipation  Ct abdomen pelvis 12/27 showed no acute intraabdominal etiology       Renal   Hyponatremia -resolved   -Continue to trend metabolic panel   -Nephrology following for fluid recommendations intravenously  Replete potassium as needed  Adrenal Insufficiency: Increase Solu-Cortef to 100 mg every 8 hours. 1/7: Lasix on hold due to sepsis. Infectious Disease   COVID-19 viral pneumonia   Received remdesivir, Toci, vit D, vit c, thiamine   1/6  Blood cultures pending. 1/6  ua sent today  With repeat urine culture. 1/7: Restart vancomycin and Zosyn due to possible sepsis. Culture still pending. Hematology/Oncology   Anemia on admission, trend H&H, improving  CBC daily  1/5 No more Blood in stools, Restart Lovenox. Endocrine   Diabetes: High-dose sliding scale insulin for hyperglycemia  Hyperglycemia: Increase Lantus dose to 30. Adrenal insufficiency, sepsis : high-dose Solucortef decreased to 50 q 8 hrs due to worsening blood pressure. Social/Spiritual/DNR/Other   Full code. Palliative consulted. ASSESSMENT/ PLAN     1. Trach and peg 12/29  2. We will increase the interval of Ativan  3. Continue midodrine TID , phenylephrine weaned off today  4. Palliative following , family wishes patient to be a full code  5. Continue hydrocortisone 100 mg every 8 hopefully can start weaning tomorrow  7. Continue nicotine patch   8.   We will try to wean FiO2 for saturations above 94%    Electronically signed by Kurtis Brito MD on 1/9/2021 at 5:34 PM   CCT excluding procedures;38'    I

## 2021-01-09 NOTE — FLOWSHEET NOTE
Patient residual at 300ml after tube feed restarted, bowel sounds hypoactive, will hold until morning.

## 2021-01-09 NOTE — PLAN OF CARE
Problem: Skin Integrity:  Goal: Will show no infection signs and symptoms  Description: Will show no infection signs and symptoms  Outcome: Met This Shift  Goal: Absence of new skin breakdown  Description: Absence of new skin breakdown  Outcome: Met This Shift     Problem: Gas Exchange - Impaired  Goal: Absence of hypoxia  Outcome: Met This Shift  Goal: Promote optimal lung function  Outcome: Met This Shift

## 2021-01-09 NOTE — PROGRESS NOTES
Intensive Care Daily Quality Rounding Checklist        ICU Team Members:  Dr. Gilmer Meckel, Thomas Hand & Danny (residents), charge nurse, bedside nurse, clinical pharmacist, respiratory therapist     ICU Day #: NUMBER: 26     Intubation Date: December 15     Ventilator Day #: NUMBER: 25-- trache 12/29     Central Line Insertion Date:  Dec 21 PICC line placement                                                    Day #: NUMBER: 20      Arterial Line Insertion Date: d/c'd 1/1/2021                              Day #: n/a     Temporary Hemodialysis Catheter Insertion Date:  n/a                             Day # n/a     DVT Prophylaxis: lovenox    GI Prophylaxis: protonix     Allen Catheter Insertion Date: December 13                                        Day #: 27                             Continued need (if yes, reason documented and discussed with physician): yes- accurate I & O     Skin Issues/ Wounds and ordered treatment discussed on rounds: yes- sos protocol     Goals/ Plans for the Day: Monitor labs and vitals. Decrease FIO2 wean able.  Continue oxy and ativan for sedation

## 2021-01-10 NOTE — PROGRESS NOTES
ID Progress Note                [address] [facility] [address], [place], [address]            Phone [phone]     Fax [phone]      Chief complaint   Shortness of breath    Subjective:  Afebrile, still on phenylephrine, vent settings are better now,  She is not awake/alert  She is still on 14 of PEEP, FiO2 has gone down from 100% to 80%  Objective:    Vitals:    01/10/21 0933   BP:    Pulse: 89   Resp: 23   Temp:    SpO2: 94%     General appearance: Not awake, status post tracheostomy  Skin: Warm and dry  Eyes: Pink palpebral conjunctivae. PERRL  Ears: External ears normal.  Nose/Sinuses: Nares normal. Septum midline. Mucosa normal. No sinus tenderness. Oropharynx: Limited exam  Neck: Neck supple. No jugular venous distension, lymphadenopathy or thyromegaly Trachea midline  Lungs: Crackles present bilateral lung fields  Heart: S1 S2  Regular rate and rhythm. No rub, murmur or gallop  Abdomen: Abdomen soft, non-tender. BS normal. No masses, organomegaly  Extremities: No edema, Peripheral pulses palpable  Musculoskeletal: Muscular strength appears intact.  No joint effusion, tenderness, swelling or warmth     Right brachial PICC line in place since 12/21/2020    Labs:  Recent Labs     01/08/21  0500 01/09/21  0630 01/10/21  0530   WBC 19.2* 16.8* 16.2*   RBC 2.78* 2.97* 2.79*   HGB 9.0* 9.1* 8.8*   HCT 29.0* 31.2* 29.2*   .3* 105.1* 104.7*   MCH 32.4 30.6 31.5   MCHC 31.0* 29.2* 30.1*   RDW 19.2* 19.8* 20.0*    194 201   MPV 10.8 10.5 10.5     CMP:    Lab Results   Component Value Date     01/10/2021    K 3.7 01/10/2021    K 3.3 12/15/2020    CL 97 01/10/2021    CO2 36 01/10/2021    BUN 13 01/10/2021    CREATININE 0.3 01/10/2021    GFRAA >60 01/10/2021    LABGLOM >60 01/10/2021    GLUCOSE 164 01/10/2021    GLUCOSE 106 12/15/2011    PROT 5.0 01/03/2021    LABALBU 3.0 01/03/2021    CALCIUM 7.9 01/10/2021    BILITOT 0.7 01/03/2021    ALKPHOS 130 01/03/2021    AST 21 01/03/2021    ALT 14 01/03/2021          Microbiology :  Recent Labs     01/07/21  1325   BC 24 Hours no growth     Recent Labs     01/07/21  1502   BLOODCULT2 24 Hours no growth     No results for input(s): Charma Gather in the last 72 hours. No results for input(s): CULTRESP in the last 72 hours. No results for input(s): WNDABS in the last 72 hours.     Radiology :    noted    URINARY CATHETER OUTPUT (Allen):  [REMOVED] Urethral Catheter 16 fr-Output (mL): 100 mL  Urethral Catheter Non-latex 16 fr-Output (mL): 300 mL    DRAIN/TUBE OUTPUT:  Gastrostomy/Enterostomy/Jejunostomy Percutaneous Endoscopic Gastrostomy (PEG) LUQ 20 fr-Output (mL): 0 ml  [REMOVED] NG/OG/NJ/NE Tube Nasogastric 16 fr Right nostril-Output (mL): 0 ml,  STOOL OUTPUT:  Stool (measured) : 1 mL       Assessment and Plan:      · Leukocytosis reactive versus sepsis, concern for infectious etiology  · Covid pneumonia-treated with remdesivir as well as Decadron plus Tocilizumab  · Acute respiratory failure-worsening status post tracheostomy and PEG tube placement, worsening chest infiltrates  · Mitral regurgitation     Plan  -leucocytosis trending down  -Tracheal aspirate does not show a lot of PMNs, follow the growth for now  -off abx  -Sputum from tracheal aspirate has a negative PCP stain, blood cultures from 1/7/2020 were negative so far  -Sent for Fungitell as well as serum galactomannan  -Chest CT without contrast noted  -We will follow her off antibiotic           Electronically signed by Fay Nunn MD on 1/10/2021 at 9:39 AM

## 2021-01-10 NOTE — PATIENT CARE CONFERENCE
Intensive Care Daily Quality Rounding Checklist      ICU Team Members: Dr. Maritza Rosenbaum, charge nurse, bedside nurse and respiratory therapy    ICU Day #: 27    Intubation Date: trach  12/29/20    Ventilator Day #: 26    Central Line Insertion Date: PICC  Dec. 21st        Day #: 21     Arterial Line Insertion Date: Jan. 7th      Day #: 4    Temporary Hemodialysis Catheter Insertion Date:       Day # N/A    DVT Prophylaxis:Lovenox    GI Prophylaxis: Protonix    Allen Catheter Insertion Date: Dec. 13th       Day #: 28      Continued need (if yes, reason documented and discussed with physician):     Skin Issues/ Wounds and ordered treatment discussed on rounds:    Goals/ Plans for the Day: Monitor labs and vitals. Ammonia level. Decreased oxy and ativan dose.  Lasix, albumin and diamox ordered

## 2021-01-10 NOTE — PROGRESS NOTES
Subjective:  Status post PEG and trach: arousable  Discussed with staff    Objective:    BP (!) 121/57   Pulse 65   Temp 97.3 °F (36.3 °C) (Axillary)   Resp 23   Ht 5' 6\" (1.676 m)   Wt 210 lb 1.6 oz (95.3 kg)   SpO2 96%   BMI 33.91 kg/m²     24HR INTAKE/OUTPUT:      Intake/Output Summary (Last 24 hours) at 1/9/2021 2031  Last data filed at 1/9/2021 1745  Gross per 24 hour   Intake 874.73 ml   Output 1400 ml   Net -525.27 ml       General appearance: Trach to vent arousable answering questions mechanically ventilated supine  Neck: FROM, supple   Lungs: few rhonchi bilaterally no wheezes, no , no crackles  CV: RRR, no MRGs; normal carotid upstroke and amplitude without Bruits  Abdomen: Soft, non-tender; no masses or HSM  Extremities: No edema, no cyanosis, no clubbing  Skin: Intact no rash, no lesions, no ulcers    Psych: Not Alert and oriented normal affect  Neuro: Nonfocal  Exam limited by [place] [id number] isolation policy and efforts to preserve PPE. Exam findings shared among providers. Most Recent Labs  Lab Results   Component Value Date    WBC 16.8 (H) 01/09/2021    HGB 9.1 (L) 01/09/2021    HCT 31.2 (L) 01/09/2021     01/09/2021     01/09/2021    K 4.1 01/09/2021    CL 97 (L) 01/09/2021    CREATININE 0.3 (L) 01/09/2021    BUN 9 01/09/2021    CO2 36 (H) 01/09/2021    GLUCOSE 126 (H) 01/09/2021    ALT 14 01/03/2021    AST 21 01/03/2021    INR 1.0 12/13/2020    TSH 0.381 12/03/2020    LABA1C 5.8 04/05/2017     Recent Labs     01/09/21  0630   MG 2.2     Lab Results   Component Value Date    CALCIUM 8.0 (L) 01/09/2021    PHOS 2.6 01/09/2021        XR CHEST PORTABLE   Final Result   Bilateral airspace disease which is not appreciably changed. CT CHEST WO CONTRAST   Final Result   Progressive diffuse patchy and multifocal ground-glass infiltrates and   pleural effusion which may be due to multifocal pneumonia including viral   infection and or edema. Bilateral kidney stones.       CT HEAD WO CONTRAST   Final Result   No evidence of intracranial hemorrhage or herniation. Moderate degree of   senescent changes and involutional changes. There appear to be chronic   sequela of prior focal ischemic events bilaterally. Intracranial   atherosclerotic disease noted. (Although there is no convincing evidence of an acute ischemic event, if   there is high clinical concern, consider MRI.)   2. Paranasal sinus disease and partial mastoid air cell effusions. XR CHEST PORTABLE   Final Result   No significant interval change      XR CHEST PORTABLE   Final Result   No significant change since the January 4. Persistent bilateral lung   infiltrates. XR CHEST PORTABLE   Final Result   No significant changes since the January 4 same-day performed early at 05:48   a.m. Petra Heman XR CHEST PORTABLE   Final Result   Extensive bilateral pulmonary infiltrates increasing from prior exam      XR CHEST PORTABLE   Final Result   Continued diffuse bilateral infiltrates, worse on the right. Tracheostomy tube and right-sided central venous catheter unchanged. XR CHEST PORTABLE   Final Result   Unchanged bilateral infiltrates. XR CHEST PORTABLE   Final Result   1. Extensive multifocal bilateral pulmonary infiltrates unchanged compared to   the patient's prior study. XR CHEST PORTABLE   Final Result   Stable support apparatus. Persistent diffuse bilateral airspace disease. No significant change   compared to most recent study but findings progressed compared to the study   from December 27. XR CHEST PORTABLE   Final Result   No interval change      CT ABDOMEN PELVIS WO CONTRAST Additional Contrast? None   Final Result   1. Dense airspace consolidation in the left lower lobe and increase in right   basilar lung opacities compatible with multifocal pneumonia. At least small   to moderate size bilateral pleural effusions with adjacent atelectasis. 2. Diffuse body wall edema.   There is intermediate attenuation fluid in the   left lateral abdominal wall, and a component of hemorrhage is possible as   well. 3. Small to moderate amount of fluid in the abdomen and pelvis with simple   fluid attenuation. 4. Hepatic surface nodularity consistent with hepatic cirrhosis. 5. Nonobstructive renal calculi bilaterally. 6. Colonic diverticulosis without acute inflammatory changes. XR CHEST PORTABLE   Final Result   1. Extensive bilateral airspace disease and small left pleural effusion,   unchanged. 2.  Stable position of support tubes and right-sided PICC line. XR CHEST PORTABLE   Final Result   Extensive bilateral pulmonary infiltrates unchanged   ET and other life support devices appear in satisfactory position      XR CHEST PORTABLE   Final Result   ET tube in satisfactory position   Bilateral pulmonary infiltrates are unchanged is      XR CHEST PORTABLE   Final Result   Stable bilateral airspace disease which may represent pulmonary edema. XR CHEST PORTABLE   Final Result   1. Good position right-sided PICC line. 2.  Otherwise stable findings. Bilateral airspace infiltrates/edema, worse   on the right. XR CHEST PORTABLE   Final Result   Right PICC line with tip at the junction of the subclavian and SVC   Extensive bilateral pulmonary infiltrates unchanged   ET and NG tubes appear in satisfactory position      XR CHEST PORTABLE   Final Result   Bilateral airspace disease which is not appreciably changed and is likely   related to pulmonary edema. XR CHEST PORTABLE   Final Result   Mildly progressive bilateral infiltrates. Improving left pleural effusion      XR CHEST PORTABLE   Final Result   Mildly waning airspace disease with slight improvement of aeration   bilaterally. XR CHEST PORTABLE   Final Result   Diffuse infiltrate, improved in the upper lungs as compared to prior. There is new consolidation in the left lower lobe with possible left pleural   effusion.       XR CHEST PORTABLE   Final Result   Endotracheal tube in low position, can be pulled back about 3 cm. NG tube in good position. Pattern of bilateral interstitial infiltrate in the perihilar regions or the   periphery of the lung as above commented. The pulmonary edema versus   bilateral viral pneumonia will be part of the differential diagnosis. Please   correlate clinically. T   Preliminary report given to Dr. Myrtice Primrose at the time   of the interpretation. XR ABDOMEN FOR NG/OG/NE TUBE PLACEMENT   Final Result   Enteric feeding tube partially imaged below the diaphragm and likely within   the stomach. XR CHEST PORTABLE   Final Result   Increased interstitial markings throughout both lungs along with   patchy/consolidative airspace opacities noted bilaterally as above. Findings   are suspicious for multifocal pneumonia or pulmonary edema. CTA PULMONARY W CONTRAST   Final Result   No central pulmonary embolism or aortic dissection. Progressive diffuse bilateral patchy and ground-glass infiltrates concerning   for progressive multifocal pneumonia/viral infection. XR CHEST PORTABLE   Final Result   Progressive diffuse bilateral infiltrates and pleural effusion likely   CHF/edema and or diffuse pneumonia. XR CHEST PORTABLE    (Results Pending)       Assessment    Active Problems:    Acute respiratory failure with hypoxia ([facility])    Acute respiratory failure due to COVID-19 [facility])    Septic shock ([facility])    Atrial fibrillation with RVR ([facility])  Resolved Problems:    * No resolved hospital problems. *      Plan:     1.  Acute respiratory failure with hypoxia  - COVID 19 PNA / bacterial/ HF. CXR improved 12/17  -Intubated sedated mechanically ventilated   -Prone not paralyzed             Hydrocortisone IV + vit D   Remdesivir completed              Diffuse bilateral patchy GGO on CT, no PE                         cefepime completed               SP PEG and trach 12/29    2. Shock -likely septic secondary to viral sepsis  Off Jay   wean as tolerated                 Blood cultures NGTD, UCx negative    Completed cefepime  Hydrocortisone off, incresed midodrine  Abx completed-- monitor cultures    3. Anemia - Hg stable cont to monitor                 4. Hypothyroidism - continue synthroid    5.  Hyponatremia -improved    nephrology consult appreciated              IVF resuscitation              Continue to monitor labs closely       A. fib RVR amiodarone drip-->PO  Apixaban    Palliative care re-consult appreciated  -- continued code discussions with family  -- given poor progrnosis CC with withdrawal of care seems appropriate    Possible discharge to SNF with ventilator capability when medically stable-- unfortunately pt is not improving    Electronically signed by Priscilla Vega MD on 1/9/2021 at 8:31 PM

## 2021-01-10 NOTE — PROGRESS NOTES
Subjective:  Status post PEG and trach: arousable  Discussed with staff    Objective:    /64   Pulse 90   Temp 97.7 °F (36.5 °C) (Axillary)   Resp 26   Ht 5' 6\" (1.676 m)   Wt 211 lb 10.3 oz (96 kg)   SpO2 94%   BMI 34.16 kg/m²     24HR INTAKE/OUTPUT:      Intake/Output Summary (Last 24 hours) at 1/10/2021 1255  Last data filed at 1/10/2021 0600  Gross per 24 hour   Intake 2015.73 ml   Output 675 ml   Net 1340.73 ml       General appearance: Trach to vent arousable answering questions mechanically ventilated supine  Neck: FROM, supple   Lungs: few rhonchi bilaterally no wheezes, no , no crackles  CV: RRR, no MRGs; normal carotid upstroke and amplitude without Bruits  Abdomen: Soft, non-tender; no masses or HSM  Extremities: No edema, no cyanosis, no clubbing  Skin: Intact no rash, no lesions, no ulcers    Psych: Not Alert and oriented normal affect  Neuro: Nonfocal  Exam limited by [place] [id number] isolation policy and efforts to preserve PPE. Exam findings shared among providers. Most Recent Labs  Lab Results   Component Value Date    WBC 16.2 (H) 01/10/2021    HGB 8.8 (L) 01/10/2021    HCT 29.2 (L) 01/10/2021     01/10/2021     01/10/2021    K 3.7 01/10/2021    CL 97 (L) 01/10/2021    CREATININE 0.3 (L) 01/10/2021    BUN 13 01/10/2021    CO2 36 (H) 01/10/2021    GLUCOSE 164 (H) 01/10/2021    ALT 14 01/03/2021    AST 21 01/03/2021    INR 1.0 12/13/2020    TSH 0.381 12/03/2020    LABA1C 5.8 04/05/2017     Recent Labs     01/10/21  0530   MG 2.3     Lab Results   Component Value Date    CALCIUM 7.9 (L) 01/10/2021    PHOS 2.9 01/10/2021        XR CHEST PORTABLE   Final Result   Pulmonary infiltrates unchanged. Tracheostomy   PICC line in satisfactory position   Pacemaker   Left 9th rib fracture you had      XR CHEST PORTABLE   Final Result   Bilateral airspace disease which is not appreciably changed.       CT CHEST WO CONTRAST   Final Result Progressive diffuse patchy and multifocal ground-glass infiltrates and   pleural effusion which may be due to multifocal pneumonia including viral   infection and or edema. Bilateral kidney stones. CT HEAD WO CONTRAST   Final Result   No evidence of intracranial hemorrhage or herniation. Moderate degree of   senescent changes and involutional changes. There appear to be chronic   sequela of prior focal ischemic events bilaterally. Intracranial   atherosclerotic disease noted. (Although there is no convincing evidence of an acute ischemic event, if   there is high clinical concern, consider MRI.)   2. Paranasal sinus disease and partial mastoid air cell effusions. XR CHEST PORTABLE   Final Result   No significant interval change      XR CHEST PORTABLE   Final Result   No significant change since the January 4. Persistent bilateral lung   infiltrates. XR CHEST PORTABLE   Final Result   No significant changes since the January 4 same-day performed early at 05:48   a.m. Delsa Severance XR CHEST PORTABLE   Final Result   Extensive bilateral pulmonary infiltrates increasing from prior exam      XR CHEST PORTABLE   Final Result   Continued diffuse bilateral infiltrates, worse on the right. Tracheostomy tube and right-sided central venous catheter unchanged. XR CHEST PORTABLE   Final Result   Unchanged bilateral infiltrates. XR CHEST PORTABLE   Final Result   1. Extensive multifocal bilateral pulmonary infiltrates unchanged compared to   the patient's prior study. XR CHEST PORTABLE   Final Result   Stable support apparatus. Persistent diffuse bilateral airspace disease. No significant change   compared to most recent study but findings progressed compared to the study   from December 27.       XR CHEST PORTABLE   Final Result   No interval change      CT ABDOMEN PELVIS WO CONTRAST Additional Contrast? None   Final Result 1. Dense airspace consolidation in the left lower lobe and increase in right   basilar lung opacities compatible with multifocal pneumonia. At least small   to moderate size bilateral pleural effusions with adjacent atelectasis. 2. Diffuse body wall edema. There is intermediate attenuation fluid in the   left lateral abdominal wall, and a component of hemorrhage is possible as   well. 3. Small to moderate amount of fluid in the abdomen and pelvis with simple   fluid attenuation. 4. Hepatic surface nodularity consistent with hepatic cirrhosis. 5. Nonobstructive renal calculi bilaterally. 6. Colonic diverticulosis without acute inflammatory changes. XR CHEST PORTABLE   Final Result   1. Extensive bilateral airspace disease and small left pleural effusion,   unchanged. 2.  Stable position of support tubes and right-sided PICC line. XR CHEST PORTABLE   Final Result   Extensive bilateral pulmonary infiltrates unchanged   ET and other life support devices appear in satisfactory position      XR CHEST PORTABLE   Final Result   ET tube in satisfactory position   Bilateral pulmonary infiltrates are unchanged is      XR CHEST PORTABLE   Final Result   Stable bilateral airspace disease which may represent pulmonary edema. XR CHEST PORTABLE   Final Result   1. Good position right-sided PICC line. 2.  Otherwise stable findings. Bilateral airspace infiltrates/edema, worse   on the right. XR CHEST PORTABLE   Final Result   Right PICC line with tip at the junction of the subclavian and SVC   Extensive bilateral pulmonary infiltrates unchanged   ET and NG tubes appear in satisfactory position      XR CHEST PORTABLE   Final Result   Bilateral airspace disease which is not appreciably changed and is likely   related to pulmonary edema. XR CHEST PORTABLE   Final Result   Mildly progressive bilateral infiltrates.    Improving left pleural effusion      XR CHEST PORTABLE   Final Result Mildly waning airspace disease with slight improvement of aeration   bilaterally. XR CHEST PORTABLE   Final Result   Diffuse infiltrate, improved in the upper lungs as compared to prior. There is new consolidation in the left lower lobe with possible left pleural   effusion. XR CHEST PORTABLE   Final Result   Endotracheal tube in low position, can be pulled back about 3 cm. NG tube in good position. Pattern of bilateral interstitial infiltrate in the perihilar regions or the   periphery of the lung as above commented. The pulmonary edema versus   bilateral viral pneumonia will be part of the differential diagnosis. Please   correlate clinically. T   Preliminary report given to Dr. Twila Ahumada at the time   of the interpretation. XR ABDOMEN FOR NG/OG/NE TUBE PLACEMENT   Final Result   Enteric feeding tube partially imaged below the diaphragm and likely within   the stomach. XR CHEST PORTABLE   Final Result   Increased interstitial markings throughout both lungs along with   patchy/consolidative airspace opacities noted bilaterally as above. Findings   are suspicious for multifocal pneumonia or pulmonary edema. CTA PULMONARY W CONTRAST   Final Result   No central pulmonary embolism or aortic dissection. Progressive diffuse bilateral patchy and ground-glass infiltrates concerning   for progressive multifocal pneumonia/viral infection. XR CHEST PORTABLE   Final Result   Progressive diffuse bilateral infiltrates and pleural effusion likely   CHF/edema and or diffuse pneumonia. Assessment    Active Problems:    Acute respiratory failure with hypoxia (HCC)    Acute respiratory failure due to COVID-19 [facility])    Septic shock (HCC)    Atrial fibrillation with RVR ([facility])  Resolved Problems:    * No resolved hospital problems.  *      Plan:     1.  Acute respiratory failure with hypoxia  - COVID 19 PNA / bacterial/ HF. CXR improved 12/17 -Intubated sedated mechanically ventilated   -Prone not paralyzed             Hydrocortisone IV + vit D   Remdesivir completed              Diffuse bilateral patchy GGO on CT, no PE                         cefepime completed               SP PEG and trach 12/29    2. Shock -likely septic secondary to viral sepsis  Off Jay   wean as tolerated                 Blood cultures NGTD, UCx negative    Completed cefepime  Hydrocortisone resumed, midodrine  Abx completed-- monitor cultures    3. Anemia - Hg stable cont to monitor                 4. Hypothyroidism - continue synthroid    5.  Hyponatremia -improved    nephrology consult appreciated              IVF resuscitation              Continue to monitor labs closely       A. fib RVR amiodarone drip-->PO  Apixaban    Palliative care re-consult appreciated  -- continued code discussions with family  -- given poor progrnosis CC with withdrawal of care seems appropriate    Possible discharge to SNF with ventilator capability when medically stable-- unfortunately pt is not improving    Electronically signed by Hannah Faulkner MD on 1/10/2021 at 12:55 PM

## 2021-01-11 NOTE — PROGRESS NOTES
ID Progress Note                [address] [facility] [address], [place], [facility]            Phone [phone]     Fax [phone]      Chief complaint   Shortness of breath    Subjective:  She has not been following commands, has sedation is minimal at this time   she is still on 14 of PEEP, FiO2 has gone down from 100% to 80%  Objective:    Vitals:    01/11/21 1006   BP:    Pulse: 94   Resp: 23   Temp:    SpO2: 93%     General appearance: Not awake, status post tracheostomy  Skin: Warm and dry  Eyes: Pink palpebral conjunctivae. PERRL  Ears: External ears normal.  Nose/Sinuses: Nares normal. Septum midline. Mucosa normal. No sinus tenderness. Oropharynx: Limited exam  Neck: Neck supple. No jugular venous distension, lymphadenopathy or thyromegaly Trachea midline  Lungs: Crackles present bilateral lung fields  Heart: S1 S2  Regular rate and rhythm. No rub, murmur or gallop  Abdomen: Abdomen soft, non-tender. BS normal. No masses, organomegaly  Extremities: No edema, Peripheral pulses palpable  Musculoskeletal: Muscular strength appears intact.  No joint effusion, tenderness, swelling or warmth     Right brachial PICC line in place since 12/21/2020    Labs:  Recent Labs     01/09/21  0630 01/10/21  0530 01/11/21  0540   WBC 16.8* 16.2* 15.4*   RBC 2.97* 2.79* 2.60*   HGB 9.1* 8.8* 8.1*   HCT 31.2* 29.2* 28.2*   .1* 104.7* 108.5*   MCH 30.6 31.5 31.2   MCHC 29.2* 30.1* 28.7*   RDW 19.8* 20.0* 19.5*    201 192   MPV 10.5 10.5 10.3     CMP:    Lab Results   Component Value Date     01/11/2021    K 2.8 01/11/2021    K 3.3 12/15/2020     01/11/2021    CO2 34 01/11/2021    BUN 17 01/11/2021    CREATININE 0.3 01/11/2021    GFRAA >60 01/11/2021    LABGLOM >60 01/11/2021    GLUCOSE 240 01/11/2021    GLUCOSE 106 12/15/2011    PROT 5.0 01/03/2021    LABALBU 3.0 01/03/2021    CALCIUM 7.9 01/11/2021    BILITOT 0.7 01/03/2021    ALKPHOS 130 01/03/2021    AST 21 01/03/2021    ALT 14 01/03/2021 Microbiology :  No results for input(s): BC in the last 72 hours. No results for input(s): Alvia Pablo in the last 72 hours. No results for input(s): LABURIN in the last 72 hours. No results for input(s): CULTRESP in the last 72 hours. No results for input(s): WNDABS in the last 72 hours.     Radiology :    noted    URINARY CATHETER OUTPUT (Allen):  [REMOVED] Urethral Catheter 16 fr-Output (mL): 100 mL  Urethral Catheter Non-latex 16 fr-Output (mL): 1300 mL    DRAIN/TUBE OUTPUT:  Gastrostomy/Enterostomy/Jejunostomy Percutaneous Endoscopic Gastrostomy (PEG) LUQ 20 fr-Output (mL): 0 ml  [REMOVED] NG/OG/NJ/NE Tube Nasogastric 16 fr Right nostril-Output (mL): 0 ml,  STOOL OUTPUT:  Stool (measured) : 1 mL       Assessment and Plan:      · Leukocytosis-  reactive   · Covid pneumonia-treated with remdesivir as well as Decadron plus Tocilizumab  · Acute respiratory failure-worsening status post tracheostomy and PEG tube placement, worsening chest infiltrates  · Mitral regurgitation     Plan  -leucocytosis trending down  -Tracheal aspirate does not show a lot of PMNs, follow the growth for now  -off abx  -Sputum from tracheal aspirate has a negative PCP stain, blood cultures from 1/7/2020 were negative so far  -Low Fungitell as well as serum galactomannan-negative  -Chest CT without contrast noted  -He has been off antibiotics, ID will follow as needed now        Electronically signed by Gwendalyn Nageotte, MD on 1/11/2021 at 10:17 AM

## 2021-01-11 NOTE — PROGRESS NOTES
Critical Care Team - Daily Progress Note         Date and time: 1/11/2021 3:45 PM  Patient's name:  Madelin Call  Medical Record Number: [id number]  Patient's account/billing number: [de-identified]  Patient's YOB: 1950  Age: 65 y.o. Date of Admission: 12/13/2020 10:51 AM  Length of stay during current admission: 29      Primary Care Physician: Ximena Castro DO  ICU Attending Physician: Dr. Amandeep Goldman    Code Status: DNR-CCA    Reason for ICU admission: respiratory failure   Shock   Covid 19 pneumonitis       SUBJECTIVE:     OVERNIGHT EVENTS:         12/16: Overnight able to titrate down some of the vasopressors though still intermittently hypotensive and requiring vasopressin as well as Levophed. Still sedated with fentanyl and Versed. Hyponatremia notable at 119 new today. 12/17: Patient was able to be titrated off the vasopressors. Is now on midodrine. Minimally hypothermic overnight now on Melissa hugger with improvement in temperature. Continue sedation with fentanyl and Versed. 12/18: Patient continues on the ventilator. Back on small amount of Levophed in addition to the vasopressin. Hoping to wean sedation more today. 12/19: Back on levophed overnight. Initially able to titrate fio2 to 40%, became hypoxic and increased to 60%. Apparently their were several episodes of bradycardia overnight. Patient has pacemaker which did not fire per nursing. Follows with Dr. Vivi Canada for cardiology. 12/20: Patient: 6 L overnight, nephrology ordered K-Phos, and half-normal saline, will CT her head today. 12/21: Patient tolerated being supine overnight. Plan for PICC line today. Pacemaker interrogated with no runs of V. tach or other rhythm needing acute intervention. 12/22: Patient remained supine without difficulty. Continuing to try to wean down her FiO2. Her sodium has improved. 12/23: Patient continues to tolerate the vent. She is waking up more.   Sodium is much findings:    MEDICATIONS:    Scheduled Meds:   hydrocortisone sodium succinate PF  50 mg Intravenous Q8H    [START ON 1/12/2021] insulin glargine  40 Units Subcutaneous Daily    LORazepam  0.5 mg Oral 4 times per day    oxyCODONE  5 mg Oral Q6H    vitamin C  1,000 mg Oral Daily    metoclopramide  5 mg Intravenous Q6H    senna  10 mL Oral BID    midodrine  15 mg Oral TID    Vitamin D  1,000 Units Oral Daily    lansoprazole  30 mg Per G Tube QAM AC    apixaban  5 mg Oral BID    nicotine  1 patch Transdermal Daily    amiodarone  200 mg Oral Daily    thiamine  100 mg Oral Daily    zinc sulfate  50 mg Oral Daily    heparin flush  3 mL Intravenous 2 times per day    docusate  100 mg Oral Daily    insulin lispro  0-18 Units Subcutaneous Q6H    ipratropium-albuterol  1 ampule Inhalation Q4H WA    budesonide  500 mcg Nebulization BID    Arformoterol Tartrate  15 mcg Nebulization BID    clopidogrel  75 mg Oral Daily    chlorhexidine  15 mL Mouth/Throat BID    sodium chloride flush  10 mL Intravenous 2 times per day    gabapentin  800 mg Oral TID    levothyroxine  100 mcg Oral Daily    atorvastatin  20 mg Oral Daily     Continuous Infusions:   dextrose      phenylephrine (HORACE-SYNEPHRINE) 50mg/250mL infusion 25 mcg/min (01/11/21 1539)     PRN Meds:       glucose, 15 g, PRN      dextrose, 12.5 g, PRN      glucagon (rDNA), 1 mg, PRN      dextrose, 100 mL/hr, PRN      LORazepam, 1 mg, Q4H PRN      acetaminophen, 650 mg, Q6H PRN      metoprolol, 5 mg, Q6H PRN      sodium chloride flush, 10 mL, PRN      heparin flush, 3 mL, PRN      potassium chloride, 20 mEq, PRN      fentanNYL, 25 mcg, Q3H PRN      ondansetron, 4 mg, Q6H PRN      polyethylene glycol, 17 g, Daily PRN          VENT SETTINGS (Comprehensive) (if applicable):  Vent Information  $Ventilation: $Subsequent Day  Skin Assessment: Clean, dry, & intact  Equipment ID: [id number]  Equipment Changed: Humidification  Vent Type: [id number]  Vent Mode: AC/VC+  Vt Ordered: 380 mL  Pressure Ordered: 14  Rate Set: 22 bmp  Peak Flow: 0 L/min  Pressure Support: 0 cmH20  FiO2 : (S) 80 %  SpO2: 96 %  SpO2/FiO2 ratio: 113.75  PaO2/FiO2 ratio: 86  Sensitivity: 2  PEEP/CPAP: 12  I Time/ I Time %: 0.9 s  Humidification Source: Heated wire  Humidification Temp: 37  Humidification Temp Measured: 37  Circuit Condensation: Drained  Mask Type: Full face mask  Mask Size: Small  Additional Respiratory  Assessments  Pulse: 103  Resp: 24  SpO2: 96 %  Position: Semi-Marrero's  Humidification Source: Heated wire  Humidification Temp: 37  Circuit Condensation: Drained  Oral Care: Suction toothette, Mouth suctioned, Mouth moisturizer, Mouth swabbed  Subglottic Suction Done?: No  Airway Type: Trachial  Airway Size: 8(XLT)  Cuff Pressure (cm H2O): 29 cm H2O    ABGs:   Recent Labs     01/11/21  0544   PH 7.328*   PCO2 63.6*   PO2 85.6   HCO3 32.6*   BE 5.6*   O2SAT 96.0       Laboratory findings:    Complete Blood Count:   Recent Labs     01/09/21  0630 01/10/21  0530 01/11/21  0540   WBC 16.8* 16.2* 15.4*   HGB 9.1* 8.8* 8.1*   HCT 31.2* 29.2* 28.2*    201 192        Last 3 Blood Glucose:   Recent Labs     01/09/21  0630 01/10/21  0530 01/11/21  0540   GLUCOSE 126* 164* 240*        PT/INR:    Lab Results   Component Value Date    PROTIME 11.4 12/13/2020    PROTIME 11.9 12/15/2011    INR 1.0 12/13/2020     PTT:    Lab Results   Component Value Date    APTT 21.6 12/13/2020       Comprehensive Metabolic Profile:   Recent Labs     01/09/21  0630 01/10/21  0530 01/11/21  0540    137 140   K 4.1 3.7 2.8*   CL 97* 97* 100   CO2 36* 36* 34*   BUN 9 13 17   CREATININE 0.3* 0.3* 0.3*   GLUCOSE 126* 164* 240*   CALCIUM 8.0* 7.9* 7.9*      Magnesium:   Lab Results   Component Value Date    MG 2.2 01/11/2021     Phosphorus:   Lab Results   Component Value Date    PHOS 2.9 01/11/2021     Ionized Calcium: No results found for: ANTHONY     Urinalysis:     Troponin:   No results for input(s): TROPONINI in the last 72 hours. Microbiology:    Cultures during this admission:     Blood cultures:                 [] Pending            [x] Negative             []  Positive (Details:  )  Urine Culture:                   [] None drawn      [] Negative             []  Positive (Details:  )  Sputum Culture:               [] None drawn       [x] Negative             []  Positive (Details:  )   CULTURE, RESPIRATORY 01/05/2021 11:15 AM [facility]   Oral Pharyngeal Rachell present    Smear, Respiratory 01/05/2021 11:15 AM [facility]   Group 6: <25 PMN's/LPF and <25 Epithelial cells/LPF   Few Polymorphonuclear leukocytes   Rare Epithelial cells   Rare yeast      Endotracheal aspirate:     [] None drawn       [] Negative             []  Positive (Details:  )     Other pertinent Labs:   COVID-19 + December 13  MRSA negative screening   radiology/Imaging:     Chest Xray (1/11/2021):       Right PICC line with tip at the junction of the subclavian and SVC   Extensive bilateral pulmonary infiltrates unchanged   ET and NG tubes appear in satisfactory position             ASSESSMENT:      1. Acute on chronic hypoxic respiratory failure   2. Covid pneumonitis - critically severe   3. Shock-septic   4. Adrenal insufficiency  5. Electrolyte abnormalities  6. Leukocytosis    7. COPD  8. pvd  9. chf not in acute exacerbatino   10. Hx lacunar infarct   11. SSS s/p pacemaker   12. Hx seizure do   13. Bilateral carotid stenosis       SYSTEMS ASSESSMENT    Neuro   Previous history of lacunar stroke  , will change Ativan to 1 mg every 6 hours. Continue oxycodone 7.5mg every 6. Respiratory   Acute respiratory failure with hypoxia secondary to COVID-19 viral pneumonia  Trach and Peg 12/29 1/5 scheduled Ativan for every 6 and Oxy 10 milligrams every 6 hours. Stop fentanyl patch.   Patient tachypneic more today than previous, requiring increased sedation in order to maintain oxygen saturation. 1/6: Repeat CT head and Chest today. 1/7; patient continues to require increased PEEP. Continue to wean as tolerated. 1/8: Decrease tidal volume to 360 increase rate to 24.  1/9; trying to wean FiO2 but not successful so far  1/10: continue to wean Fio2 as tolertaed  1/11: Decrease PEEP to 12, wean phenylephrine as tolerated. Cardiovascular   Shock, septic -weaned off pressors   Increase midodrine to 15 tid  intermittent atrial fib/flutter  Afib RVR: Amiodarone 400mg 2 times daily decrease to 200mg once daily on 1/6  Pacemaker interrogated that showed several episodes of supraventricular tachycardia over the last couple of months but no persistent shockable rhythm  Continue plavix, hx of CAD with stent  Continue to hold Lasix. Stop Diamox. Gastrointestinal   GI prophylaxis with Protonix  Diet per dietary recs   Colace/senna for constipation  Ct abdomen pelvis 12/27 showed no acute intraabdominal etiology       Renal   Hyponatremia -resolved   -Continue to trend metabolic panel   -Nephrology following for fluid recommendations intravenously  Replete potassium as needed  Adrenal Insufficiency: Increase Solu-Cortef to 100 mg every 8 hours. 1/7: Lasix on hold due to sepsis. Infectious Disease   COVID-19 viral pneumonia   Received remdesivir, Toci, vit D, vit c, thiamine   1/6  Blood cultures pending. 1/6  ua sent today  With repeat urine culture. 1/7: Restart vancomycin and Zosyn due to possible sepsis. Culture still pending. Hematology/Oncology   Anemia on admission, trend H&H, improving  CBC daily  1/5 No more Blood in stools, Restart Lovenox. Endocrine   Diabetes: High-dose sliding scale insulin for hyperglycemia  Hyperglycemia: Increase Lantus dose to 40mg  daily. Adrenal insufficiency, sepsis : high-dose Solucortef decreased to 50 q 8 hrs due to worsening blood pressure. Social/Spiritual/DNR/Other   Full code. Palliative consulted.      ASSESSMENT/ PLAN     1. Trach and peg 12/29  2. Continue midodrine TID ,   3. Palliative following , family wishes patient to be a full code  4. Wean hydrocortisone to 50 mg q8  5. Continue nicotine patch   6. We will try to wean FiO2 for saturations above 94%    Electronically signed by Rasheeda Perez DO on 1/11/2021 at 3:45 PM     I personally saw, examined and provided care for the patient. Radiographs, labs and medication list were reviewed by me independently. I spoke with bedside nursing, therapists and consultants. Critical care services and times documented are independent of procedures and multidisciplinary rounds with Residents. Additionally comprehensive, multidisciplinary rounds were conducted with the MICU team. The case was discussed in detail and plans for care were established. Review of Residents documentation was conducted and revisions were made as appropriate. I agree with the above documented exam, problem list and plan of care with the following additions:    33 minutes of CCT spent with the patient, reviewing the chart including imaging studies, and discussing the case with other health care professionals. This time excludes procedures.      Electronically signed by Zhao Gaston MD on 1/18/2021 at 11:40 AM    I

## 2021-01-11 NOTE — PROGRESS NOTES
1/11/2021  11:59 AM      Comprehensive Nutrition Assessment    Type and Reason for Visit:  Reassess    Nutrition Recommendations/Plan: Since TF held several occasions 2/2 residuals, recommend consider change formula to Semi-Elemental TF (Vital AF 1.2), which may be better tolerated. TF RECOMMENDATION: Change to Semi-Elemental (Vital AF 1.2) to goal rate 55 ml/hr x 23 hr/d (hold 30 min before and after Synthroid)  This will provide: 1265 ml/d, 1518 nguyen, 95 g, 1026 ml free water    Nutrition Assessment:  Pt remains intubated/sedated 2/2 Covid+. Recommend change TF from current Immune Enhancing (Pivot 1.5) to Semi-Elemental TF (Vital AF 1.2) which is formulated to promote GI tolerance. Malnutrition Assessment:  Malnutrition Status: At risk for malnutrition (Comment)    Context:  Acute Illness     Findings of the 6 clinical characteristics of malnutrition:  Energy Intake:  1 - 75% or less of estimated energy requirements for 7 or more days  Weight Loss:  Unable to assess(d/t fluids/fluctuations)     Body Fat Loss:  Unable to assess     Muscle Mass Loss:  Unable to assess    Fluid Accumulation:  No significant fluid accumulation     Strength:  Not Performed    Estimated Daily Nutrient Needs:  Energy (kcal):  ; Weight Used for Energy Requirements:  Admission     Protein (g):  75-90 (1.3-1.5 g/kg);  Weight Used for Protein Requirements:  Ideal        Fluid (ml/day):  Per Critical Care; Method Used for Fluid Requirements:  Other (Comment)      Nutrition Related Findings:  trach/vent, PEG to TF, +1-+2 edema, hypo BS, pressor (MAP 87)      Wounds:  Surgical Incision(trach/PEG sites)       Current Nutrition Therapies:    Current Tube Feeding (TF) Orders:  · Feeding Route: PEG(12/29 PEG placed)  · Formula: Immune Enhancing  · Schedule: Continuous  · Additives/Modulars:    · Water Flushes: 200 ml Q4 hrs =1200 ml/d  · Current TF & Flush Orders Provides: at goal  · Goal TF & Flush Orders Provides: 1080 ml/d, 1620

## 2021-01-11 NOTE — PLAN OF CARE
Problem: Inadequate oral food/beverage intake (NI-2.1)  Goal: Food and/or Nutrient Delivery  Pt will ernestine EN at goal rate  Description: Individualized approach for food/nutrient provision.   Outcome: Met This Shift

## 2021-01-11 NOTE — PROGRESS NOTES
Critical Care Team - Daily Progress Note         Date and time: 1/10/2021 11:35 PM  Patient's name:  Anne-Marie Horton  Medical Record Number: [id number]  Patient's account/billing number: [de-identified]  Patient's YOB: 1950  Age: 65 y.o. Date of Admission: 12/13/2020 10:51 AM  Length of stay during current admission: 28      Primary Care Physician: Shauna Love DO  ICU Attending Physician: Dr. Danisha Almonte Status: Full Code    Reason for ICU admission: respiratory failure   Shock   Covid 19 pneumonitis       SUBJECTIVE:     OVERNIGHT EVENTS:         12/16: Overnight able to titrate down some of the vasopressors though still intermittently hypotensive and requiring vasopressin as well as Levophed. Still sedated with fentanyl and Versed. Hyponatremia notable at 119 new today. 12/17: Patient was able to be titrated off the vasopressors. Is now on midodrine. Minimally hypothermic overnight now on Melissa hugger with improvement in temperature. Continue sedation with fentanyl and Versed. 12/18: Patient continues on the ventilator. Back on small amount of Levophed in addition to the vasopressin. Hoping to wean sedation more today. 12/19: Back on levophed overnight. Initially able to titrate fio2 to 40%, became hypoxic and increased to 60%. Apparently their were several episodes of bradycardia overnight. Patient has pacemaker which did not fire per nursing. Follows with Dr. Riley Roa for cardiology. 12/20: Patient: 6 L overnight, nephrology ordered K-Phos, and half-normal saline, will CT her head today. 12/21: Patient tolerated being supine overnight. Plan for PICC line today. Pacemaker interrogated with no runs of V. tach or other rhythm needing acute intervention. 12/22: Patient remained supine without difficulty. Continuing to try to wean down her FiO2. Her sodium has improved. 12/23: Patient continues to tolerate the vent. She is waking up more.   Sodium is much better. No acute overnight events. 12/24: Failed weaning trial yesterday. Became agitated. Continues to tolerate the ventilator. Day 9 on the ventilator. 12/25: The patient occasionally opens her eyes. She occasionally follows commands. She gets extremely anxious when sedation is weaned further. Continues on Versed and fentanyl. 12/26: Patient continues on the ventilator. Opens eyes with sedation vacation, not really following commands. Day 11 on the ventilator. No other acute overnight events. 12:27: Continues on the vent. Awake on sedation, does follow commands. Day 12 on the ventilator. 12/28: Patient continues on the ventilator. Still occasionally becomes agitated when attempting to wean sedation. Day 13 on the ventilator. Occasionally hypotensive requiring Levophed. 12/29: trach and peg today. Spent 29th on the phone talking to her son who is the power of  yesterday explaining her prognosis and the treatment course going forward. He wants a trach and PEG and everything done at this point. Patient is actually much more awake today and following commands. When asked if she needs the volume turned up on the TV she shakes her head yes. Plan for trach and PEG this afternoon. 12/30: Trach and PEG yesterday. Had to be placed back on Levophed, currently running at 13 mics. Patient is now awake, has restraints off, is alert and responding to commands. Weaning sedatives. 12/31: No acute events overnight, episode of A. fib with RVR yesterday, changed to needle from Levophed, and started on amiodarone bolus and amiodarone per PEG tube. Off fern today. 01/01: No acute events, Weaned off fentanyl drip    01/02: No acute events    1/4: No acute events overnight    1/5: Become more agitated overnight with tachypnea and fluctuating blood pressure. Also ventilatory status declining requiring increased sedation.     1/6: Day 23 Ventilation, Continues tachypnea and poor ventilation. : Day 24 ventilation. Not tolerating ventilation. Continue to wean sedation as able. Cultures redrawn. : Day 25 ventilation. Patient found overnight 700 cc residual volume, stopped tube feedings at that time. Recheck this morning only 50 cc residual restarted tube feedings. ,  Start Reglan every 6 hours. : Remains on ventilator. Unable to wean FiO2. Tube feeds were stopped again last night for having residual 300 cc will resume trickle feeds today. Weaned off pressors. 1/10: Vent day #27, off all pressors and IV sedation. Opens eyes upon calling her name and withdraws to pain. Still on high FIO2 and PEEP     CURRENT VENTILATION STATUS:     [x] Ventilator  [] BIPAP  [] Nasal Cannula [] Room Air      IF INTUBATED, ET TUBE MARKING AT LOWER LIP:       cms    SECRETIONS Amount:  [] Small [] Moderate  [] Large  [x] None  Color:     [] White [] Colored  [] Bloody    SEDATION:  RAAS Score:+1  [] Fentanyl gtt  [] Versed gtt  [x] Ativan gtt   [] No Sedation    PARALYZED:  [x] No    [] Yes      VASOPRESSORS:  [] No    [] Yes    If yes -   [] Levophed       [] Dopamine     [] Vasopressin       [] Dobutamine  [] Phenylephrine         [] Epinephrine    CENTRAL LINES:     [] No   [x] Yes   (Date of Insertion:  12/15 ) PICC line          If yes -     [] Right IJ     [] Left IJ [] Right Femoral [] Left Femoral                   [] Right Subclavian [] Left Subclavian   PICC line    GARCIA'S CATHETER:   [] No   [x] Yes  (Date of Insertion: 12/15  )     URINE OUTPUT:            [x] Good   [] Low              [] Anuric      OBJECTIVE:     VITAL SIGNS:  BP (!) 132/54   Pulse 70   Temp 97.9 °F (36.6 °C) (Axillary)   Resp 22   Ht 5' 6\" (1.676 m)   Wt 211 lb 10.3 oz (96 kg)   SpO2 96%   BMI 34.16 kg/m²   Tmax over 24 hours:  Temp (24hrs), Av.6 °F (36.4 °C), Min:97 °F (36.1 °C), Max:97.9 °F (36.6 °C)      Patient Vitals for the past 6 hrs:   Temp Temp src Pulse Resp SpO2   01/10/21 2300 -- -- 70 22 96 %   01/10/21 2209 -- -- 75 21 96 %   01/10/21 2200 -- -- 76 22 96 %   01/10/21 2155 -- -- 77 21 95 %   01/10/21 2100 -- -- 89 21 95 %   01/10/21 2000 97.9 °F (36.6 °C) Axillary 84 21 95 %   01/10/21 1800 -- -- 84 23 94 %         Intake/Output Summary (Last 24 hours) at 1/10/2021 2335  Last data filed at 1/10/2021 1700  Gross per 24 hour   Intake 1923 ml   Output 3075 ml   Net -1152 ml     Wt Readings from Last 2 Encounters:   01/10/21 211 lb 10.3 oz (96 kg)   12/08/20 188 lb 9 oz (85.5 kg)     Body mass index is 34.16 kg/m². PHYSICAL EXAMINATION:    General: Sedated, ill-appearing, intermittently agitated  HEENT: Pupils are equal round and reactive to light, there are no oral lesions and no post-nasal drip   Neck: supple without adenopathy, tracheostomy in place, tender around site  Cardiovascular: regular rate and rhythm without murmur or gallop  Respiratory:  Decreased breath sounds bilaterally without wheezing or crackles. Air entry is symmetric, currently tachypneic.   Abdomen: soft, non-tender, non-distended, normal bowel sounds, lateral bruising in the flanks; PEG in place  Extremities: warm, bilateral equal edema upper and lower extremities, no clubbing   Skin:  Large bilateral ecchymoses on the flanks, and lower quadrants of the abdomen  Neurologic: Trached, pupils equal round reactive, sedated,  Withdraws from pain     Any additional physical findings:    MEDICATIONS:    Scheduled Meds:   LORazepam  1 mg Oral 4 times per day    oxyCODONE  7.5 mg Oral Q6H    acetaZOLAMIDE  500 mg Oral Daily    vitamin C  1,000 mg Oral Daily    hydrocortisone sodium succinate PF  100 mg Intravenous Q8H    metoclopramide  5 mg Intravenous Q6H    insulin glargine  30 Units Subcutaneous Daily    senna  10 mL Oral BID    midodrine  15 mg Oral TID    Vitamin D  1,000 Units Oral Daily    lansoprazole  30 mg Per G Tube QAM AC    apixaban  5 mg Oral BID    nicotine  1 patch Transdermal Daily    amiodarone  200 mg Oral Daily    thiamine  100 mg Oral Daily    zinc sulfate  50 mg Oral Daily    heparin flush  3 mL Intravenous 2 times per day    docusate  100 mg Oral Daily    insulin lispro  0-18 Units Subcutaneous Q6H    ipratropium-albuterol  1 ampule Inhalation Q4H WA    budesonide  500 mcg Nebulization BID    Arformoterol Tartrate  15 mcg Nebulization BID    clopidogrel  75 mg Oral Daily    chlorhexidine  15 mL Mouth/Throat BID    sodium chloride flush  10 mL Intravenous 2 times per day    gabapentin  800 mg Oral TID    levothyroxine  100 mcg Oral Daily    atorvastatin  20 mg Oral Daily     Continuous Infusions:   dextrose      phenylephrine (HORACE-SYNEPHRINE) 50mg/250mL infusion 50 mcg/min (01/10/21 2159)     PRN Meds:       glucose, 15 g, PRN      dextrose, 12.5 g, PRN      glucagon (rDNA), 1 mg, PRN      dextrose, 100 mL/hr, PRN      LORazepam, 1 mg, Q4H PRN      acetaminophen, 650 mg, Q6H PRN      metoprolol, 5 mg, Q6H PRN      sodium chloride flush, 10 mL, PRN      heparin flush, 3 mL, PRN      potassium chloride, 20 mEq, PRN      fentanNYL, 25 mcg, Q3H PRN      ondansetron, 4 mg, Q6H PRN      polyethylene glycol, 17 g, Daily PRN          VENT SETTINGS (Comprehensive) (if applicable):  Vent Information  $Ventilation: $Subsequent Day  Skin Assessment: Clean, dry, & intact  Equipment ID: [id number]  Equipment Changed: Humidification  Vent Type: [phone]  Vent Mode: AC/VC+  Vt Ordered: 380 mL  Pressure Ordered: 14  Rate Set: 22 bmp  Peak Flow: 0 L/min  Pressure Support: 0 cmH20  FiO2 : 75 %  SpO2: 96 %  SpO2/FiO2 ratio: 128  PaO2/FiO2 ratio: 86  Sensitivity: 2  PEEP/CPAP: 14  I Time/ I Time %: 0.9 s  Humidification Source: Heated wire  Humidification Temp: 37  Humidification Temp Measured: 37  Circuit Condensation: Drained  Mask Type: Full face mask  Mask Size: Small  Additional Respiratory  Assessments  Pulse: 70  Resp: 22  SpO2: 96 %  Position: Semi-Marrero's  Humidification Source: Heated wire  Humidification Temp: 37  Circuit Condensation: Drained  Oral Care: Suction toothette, Mouth suctioned, Mouth moisturizer, Mouth swabbed  Subglottic Suction Done?: No  Airway Type: Trachial  Airway Size: 8(XLT)  Cuff Pressure (cm H2O): 29 cm H2O    ABGs:   Recent Labs     01/10/21  0535   PH 7.398   PCO2 60.1*   PO2 58.1*   HCO3 36.2*   BE 9.8*   O2SAT 89.8*       Laboratory findings:    Complete Blood Count:   Recent Labs     01/08/21  0500 01/09/21  0630 01/10/21  0530   WBC 19.2* 16.8* 16.2*   HGB 9.0* 9.1* 8.8*   HCT 29.0* 31.2* 29.2*    194 201        Last 3 Blood Glucose:   Recent Labs     01/08/21  0500 01/09/21  0630 01/10/21  0530   GLUCOSE 205* 126* 164*        PT/INR:    Lab Results   Component Value Date    PROTIME 11.4 12/13/2020    PROTIME 11.9 12/15/2011    INR 1.0 12/13/2020     PTT:    Lab Results   Component Value Date    APTT 21.6 12/13/2020       Comprehensive Metabolic Profile:   Recent Labs     01/08/21  0500 01/09/21  0630 01/10/21  0530    137 137   K 4.9 4.1 3.7   CL 96* 97* 97*   CO2 35* 36* 36*   BUN 11 9 13   CREATININE 0.3* 0.3* 0.3*   GLUCOSE 205* 126* 164*   CALCIUM 7.9* 8.0* 7.9*      Magnesium:   Lab Results   Component Value Date    MG 2.3 01/10/2021     Phosphorus:   Lab Results   Component Value Date    PHOS 2.9 01/10/2021     Ionized Calcium: No results found for: CAION     Urinalysis:     Troponin:   No results for input(s): TROPONINI in the last 72 hours.     Microbiology:    Cultures during this admission:     Blood cultures:                 [x] Pending            [] Negative             []  Positive (Details:  )  Urine Culture:                   [] None drawn      [] Negative             []  Positive (Details:  )  Sputum Culture:               [] None drawn       [] Negative             []  Positive (Details:  )   Endotracheal aspirate:     [] None drawn       [] Negative             []  Positive (Details:  )     Other pertinent Labs:   COVID-19 + December 13  MRSA negative screening   radiology/Imaging:     Chest Xray (1/10/2021):       Right PICC line with tip at the junction of the subclavian and SVC   Extensive bilateral pulmonary infiltrates unchanged   ET and NG tubes appear in satisfactory position             ASSESSMENT:      1. Acute on chronic hypoxic respiratory failure   2. Covid pneumonitis - critically severe   3. Shock-septic   4. Adrenal insufficiency  5. Electrolyte abnormalities  6. Leukocytosis    7. COPD  8. pvd  9. chf not in acute exacerbatino   10. Hx lacunar infarct   11. SSS s/p pacemaker   12. Hx seizure do   13. Bilateral carotid stenosis       SYSTEMS ASSESSMENT    Neuro   Previous history of lacunar stroke  , will change Ativan to 1 mg every 6 hours. Continue oxycodone 7.5mg every 6. Respiratory   Acute respiratory failure with hypoxia secondary to COVID-19 viral pneumonia  Trach and Peg 12/29 1/5 scheduled Ativan for every 6 and Oxy 10 milligrams every 6 hours. Stop fentanyl patch. Patient tachypneic more today than previous, requiring increased sedation in order to maintain oxygen saturation. 1/6: Repeat CT head and Chest today. 1/7; patient continues to require increased PEEP. Continue to wean as tolerated.   1/8: Decrease tidal volume to 360 increase rate to 24.  1/9; trying to wean FiO2 but not successful so far  1/10: continue to wean Fio2 as tolertaed        Cardiovascular   Shock, septic -weaned off pressors today  Increase midodrine to 15 tid  intermittent atrial fib/flutter  Afib RVR: Amiodarone 400mg 2 times daily decrease to 200mg once daily on 1/6  Pacemaker interrogated that showed several episodes of supraventricular tachycardia over the last couple of months but no persistent shockable rhythm  Continue plavix, hx of CAD with stent        Gastrointestinal   GI prophylaxis with Protonix  Diet per dietary recs   Colace/senna for constipation  Ct abdomen pelvis 12/27 showed no acute intraabdominal etiology Renal   Hyponatremia -resolved   -Continue to trend metabolic panel   -Nephrology following for fluid recommendations intravenously  Replete potassium as needed  Adrenal Insufficiency: Increase Solu-Cortef to 100 mg every 8 hours. 1/7: Lasix on hold due to sepsis. Infectious Disease   COVID-19 viral pneumonia   Received remdesivir, Toci, vit D, vit c, thiamine   1/6  Blood cultures pending. 1/6  ua sent today  With repeat urine culture. 1/7: Restart vancomycin and Zosyn due to possible sepsis. Culture still pending. Hematology/Oncology   Anemia on admission, trend H&H, improving  CBC daily  1/5 No more Blood in stools, Restart Lovenox. Endocrine   Diabetes: High-dose sliding scale insulin for hyperglycemia  Hyperglycemia: Increase Lantus dose to 30. Adrenal insufficiency, sepsis : high-dose Solucortef decreased to 50 q 8 hrs due to worsening blood pressure. Social/Spiritual/DNR/Other   Full code. Palliative consulted. ASSESSMENT/ PLAN     1. Trach and peg 12/29  2. .  Continue midodrine TID ,   3. Palliative following , family wishes patient to be a full code  4. Wean hydrocortisone to 50 mg q8  5. Continue nicotine patch   6.   We will try to wean FiO2 for saturations above 94%    Electronically signed by Macie Conner MD on 1/10/2021 at 11:35 PM   CCT excluding procedures;38'    I

## 2021-01-11 NOTE — PROGRESS NOTES
Palliative Care Department  [phone]  Palliative Care Progress Note  Provider Fernando Coburn MD      PATIENT: Palma Ledezma  : 1950  MRN: [id number]  ADMISSION DATE: 2020 10:51 AM  Referring Provider: Yoko Khan DO     Palliative Medicine was consulted on hospital day 29 for assistance with Goals of care, Code Status Discussion    HPI:     Akhil Luevano is a 65 y.o. 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 who presented to [facility] ([facility] on 2020 with shortness of breath. She was found to have COVID, she was intubated and currently in ICU. ASSESSMENT/PLAN:     Pertinent Hospital Diagnoses      Covid-19    Intubated s/p peg and trach placment    Palliative Care Encounter / Counseling Regarding Goals of Care  Please see detailed goals of care discussion as below   At this time, Palma Ledezma, Does Not have capacity for medical decision-making. Capacity is time limited and situation/question specific   Outcome of goals of care meeting: Spoke to ziyad Brewer, told him about his mother's condition.  Code status DNR-CCA   Advanced Directives: no POA or living will in [facility]   Surrogate/Legal R Jasvir Benjamin 79, [phone], son, [facility], [phone] son     Thank you for the opportunity to participate in the care of Palma Ledezma.      Fernando Coburn MD  Palliative Medicine     SUBJECTIVE:     Details of Conversation: Spoke with ziyad Brewer, he had a discussion with his brother Gerald Mckeon. They have decided to change their mother's CODE STATUS to DNR CCA. They want to continue ventilatory support. If things do not respond the way they are hoping. They will make further decisions at that time. He asked about if he can come visit his mother. I asked ICU team, he will give them a call later today. Prognosis: Poor    OBJECTIVE:     /65   Pulse 136   Temp 98.7 °F (37.1 °C) (Axillary)   Resp 26   Ht 5' 6\" (1.676 m)   Wt 210 lb 12.2 oz (95.6 kg)   SpO2 94%   BMI 34.02 kg/m²     Due to the current efforts to prevent transmission of COVID-19 and also the need to preserve PPE for other caregivers, a face-to-face encounter with the patient was not performed. That being said, all relevant records and diagnostic tests were reviewed, including laboratory results and imaging. Please reference any relevant documentation elsewhere. Care will be coordinated with the primary service and other specialties as appropriate. Time/Communication  Greater than 50% of time spent, total 35 minutes in counseling and coordination of care at the bedside regarding goals of care. Thank you for allowing Palliative Medicine to participate in the care of Hallie Echeverria. Note: This report was completed using computerArtoo voiced recognition software. Every effort has been made to ensure accuracy; however, inadvertent computerized transcription errors may be present.

## 2021-01-11 NOTE — PLAN OF CARE
Problem: Skin Integrity:  Goal: Absence of new skin breakdown  Description: Absence of new skin breakdown  Outcome: Met This Shift     Problem: Airway Clearance - Ineffective  Goal: Achieve or maintain patent airway  Outcome: Met This Shift     Problem: Gas Exchange - Impaired  Goal: Absence of hypoxia  Outcome: Met This Shift  Goal: Promote optimal lung function  Outcome: Met This Shift     Problem: Body Temperature -  Risk of, Imbalanced  Goal: Ability to maintain a body temperature within defined limits  Outcome: Met This Shift     Problem: Isolation Precautions - Risk of Spread of Infection  Goal: Prevent transmission of infection  Outcome: Met This Shift     Problem: Nutrition Deficits  Goal: Optimize nutrtional status  Outcome: Met This Shift     Problem: Falls - Risk of:  Goal: Will remain free from falls  Description: Will remain free from falls  Outcome: Met This Shift  Goal: Absence of physical injury  Description: Absence of physical injury  Outcome: Met This Shift     Problem: Inadequate oral food/beverage intake (NI-2.1)  Goal: Food and/or Nutrient Delivery  Description: Individualized approach for food/nutrient provision.   1/11/2021 1158 by Ezekiel Costa, RD, CNSC, LD  Outcome: Met This Shift     Problem: Pain:  Goal: Control of acute pain  Description: Control of acute pain  Outcome: Met This Shift  Goal: Control of chronic pain  Description: Control of chronic pain  Outcome: Met This Shift

## 2021-01-11 NOTE — CARE COORDINATION
COVID POSITIVE 1/2 & 12/13. Vent since 12/15-FIO2 75% . s/p Trach/PEG 12/29. Requiring pressor. Patient has [employer]. Accepted @ [facility] (only area facility accepting vent/covid patients ) when medically stable- insurance precert waived until 1/38/51. [id number] care following-full code. Will follow. N-17 in epic.  Dianne Canales

## 2021-01-11 NOTE — PATIENT CARE CONFERENCE
Intensive Care Daily Quality Rounding Checklist      ICU Team Members: Dr. Pablo Weiss, resident Dr. Miguelito Wesley, bedside RN,TL, clinical pharmacist, RT    ICU Day #: 28    Intubation Date: trach  Dec. 29th    Ventilator Day #: 27    Central Line Insertion Date: PICC Dec.21st        Day #: 22     Arterial Line Insertion Date: Jan. 7th      Day #: 5    Temporary Hemodialysis Catheter Insertion Date:       Day # N/A    DVT Prophylaxis: Lovenox    GI Prophylaxis: Protonix    Allen Catheter Insertion Date: Dec. 13th      Day #: 29      Continued need (if yes, reason documented and discussed with physician):     Skin Issues/ Wounds and ordered treatment discussed on rounds:     Goals/ Plans for the Day: wean pressors, wean vent, replace K, stop diamox, hold lasix, lantus, continue critical management

## 2021-01-11 NOTE — PROGRESS NOTES
Chief Complaint:  Chief Complaint   Patient presents with    Shortness of Breath     70% RA at NH 88% NRB , being tx for pneumonia hx of copd      <principal problem not specified>     Subjective:    Unresponsive, trached    Objective:    /65   Pulse 124   Temp 98.4 °F (36.9 °C) (Axillary)   Resp 26   Ht 5' 6\" (1.676 m)   Wt 210 lb 12.2 oz (95.6 kg)   SpO2 93%   BMI 34.02 kg/m²     Current medications that patient is taking have been reviewed.     General appearance: Moribund appearing elderly female  HEENT: AT/NC, dry mucous membranes  Neck: Trached  Lungs: Anterior rhonchi, tachypneic  CV: RRR, no MRGs  Abdomen: Soft, non-tender; no masses or HSM, +BS  Extremities: No peripheral edema or digital cyanosis  Skin: no rash, lesions or ulcers  Psych: Unresponsive  Neuro: Unresponsive    Labs:  CBC with Differential:    Lab Results   Component Value Date    WBC 15.4 01/11/2021    RBC 2.60 01/11/2021    HGB 8.1 01/11/2021    HCT 28.2 01/11/2021     01/11/2021    .5 01/11/2021    MCH 31.2 01/11/2021    MCHC 28.7 01/11/2021    RDW 19.5 01/11/2021    NRBC 0.9 01/03/2021    SEGSPCT 89 03/10/2014    BANDSPCT 1 05/19/2016    METASPCT 1.0 01/11/2021    LYMPHOPCT 4.0 01/11/2021    PROMYELOPCT 1.8 12/19/2020    MONOPCT 4.0 01/11/2021    MYELOPCT 1.8 12/24/2020    BASOPCT 0.0 01/11/2021    MONOSABS 0.62 01/11/2021    LYMPHSABS 0.62 01/11/2021    EOSABS 0.00 01/11/2021    BASOSABS 0.00 01/11/2021     CMP:    Lab Results   Component Value Date     01/11/2021    K 2.8 01/11/2021    K 3.3 12/15/2020     01/11/2021    CO2 34 01/11/2021    BUN 17 01/11/2021    CREATININE 0.3 01/11/2021    GFRAA >60 01/11/2021    LABGLOM >60 01/11/2021    GLUCOSE 240 01/11/2021    GLUCOSE 106 12/15/2011    PROT 5.0 01/03/2021    LABALBU 3.0 01/03/2021    CALCIUM 7.9 01/11/2021    BILITOT 0.7 01/03/2021    ALKPHOS 130 01/03/2021    AST 21 01/03/2021    ALT 14 01/03/2021          Assessment/Plan:  Principal Problem: Acute respiratory failure with hypoxia (HCC)  Active Problems:    COPD (chronic obstructive pulmonary disease) (HCC)    Coronary artery disease involving native coronary artery without angina pectoris    Acute respiratory failure due to COVID-19 [facility])    Septic shock (HCC)    Atrial fibrillation with RVR ([address]  Resolved Problems:    * No resolved hospital problems. *       Wean vent as tolerated    Continue steroids    Continue Eliquis and amiodarone    Continue bronchodilators    Continue Plavix    LTAC?     Requires continued inpatient level of care     Mac Lunch    4:38 PM  1/11/2021  Cell: [phone]

## 2021-01-12 NOTE — PLAN OF CARE
Problem: Skin Integrity:  Goal: Will show no infection signs and symptoms  Description: Will show no infection signs and symptoms  Outcome: Met This Shift  Goal: Absence of new skin breakdown  Description: Absence of new skin breakdown  Outcome: Met This Shift     Problem: Airway Clearance - Ineffective  Goal: Achieve or maintain patent airway  Outcome: Met This Shift     Problem: Gas Exchange - Impaired  Goal: Absence of hypoxia  Outcome: Met This Shift  Goal: Promote optimal lung function  Outcome: Met This Shift     Problem: Breathing Pattern - Ineffective  Goal: Ability to achieve and maintain a regular respiratory rate  Outcome: Met This Shift     Problem:  Body Temperature -  Risk of, Imbalanced  Goal: Ability to maintain a body temperature within defined limits  Outcome: Met This Shift  Goal: Will regain or maintain usual level of consciousness  Outcome: Met This Shift  Goal: Complications related to the disease process, condition or treatment will be avoided or minimized  Outcome: Met This Shift     Problem: Isolation Precautions - Risk of Spread of Infection  Goal: Prevent transmission of infection  Outcome: Met This Shift     Problem: Nutrition Deficits  Goal: Optimize nutrtional status  Outcome: Met This Shift     Problem: Risk for Fluid Volume Deficit  Goal: Maintain normal heart rhythm  Outcome: Met This Shift  Goal: Maintain absence of muscle cramping  Outcome: Met This Shift  Goal: Maintain normal serum potassium, sodium, calcium, phosphorus, and pH  Outcome: Met This Shift     Problem: Loneliness or Risk for Loneliness  Goal: Demonstrate positive use of time alone when socialization is not possible  Outcome: Met This Shift     Problem: Fatigue  Goal: Verbalize increase energy and improved vitality  Outcome: Met This Shift     Problem: Patient Education: Go to Patient Education Activity  Goal: Patient/Family Education  Outcome: Met This Shift     Problem: Falls - Risk of:  Goal: Will remain free from falls  Description: Will remain free from falls  Outcome: Met This Shift  Goal: Absence of physical injury  Description: Absence of physical injury  Outcome: Met This Shift     Problem: Pain:  Goal: Pain level will decrease  Description: Pain level will decrease  Outcome: Met This Shift  Goal: Control of acute pain  Description: Control of acute pain  Outcome: Met This Shift  Goal: Control of chronic pain  Description: Control of chronic pain  Outcome: Met This Shift

## 2021-01-12 NOTE — PROGRESS NOTES
Chart reviewed. Patient seen outside ICU room due to COVID-19 isolation and conservation of PPE. Spoke with ICU RN and patient is unresponsive, not following commands. She had trach and PEG placed on 12/29/20. This is ventilator day 29. FiO2 is 70%. Reviewed conversation Dr. Jessica Granger had with patient's son. Code status was changed to [facility] but they would like to continue ventilatory support at this time. Palliative care will continue to follow.      Glenis Hagen APRN-CNP  Palliative Care

## 2021-01-12 NOTE — PROGRESS NOTES
Critical Care Team - Daily Progress Note         Date and time: 1/12/2021 2:02 PM  Patient's name:  [facility] Record Number: [id number]  Patient's account/billing number: [de-identified]  Patient's YOB: 1950  Age: 65 y.o. Date of Admission: 12/13/2020 10:51 AM  Length of stay during current admission: 30      Primary Care Physician: Alex Booth DO  ICU Attending Physician: Dr. Miriam King    Code Status: DNR-CCA    Reason for ICU admission: respiratory failure   Shock   Covid 19 pneumonitis       SUBJECTIVE:     OVERNIGHT EVENTS:         12/16: Overnight able to titrate down some of the vasopressors though still intermittently hypotensive and requiring vasopressin as well as Levophed. Still sedated with fentanyl and Versed. Hyponatremia notable at 119 new today. 12/17: Patient was able to be titrated off the vasopressors. Is now on midodrine. Minimally hypothermic overnight now on Melissa hugger with improvement in temperature. Continue sedation with fentanyl and Versed. 12/18: Patient continues on the ventilator. Back on small amount of Levophed in addition to the vasopressin. Hoping to wean sedation more today. 12/19: Back on levophed overnight. Initially able to titrate fio2 to 40%, became hypoxic and increased to 60%. Apparently their were several episodes of bradycardia overnight. Patient has pacemaker which did not fire per nursing. Follows with Dr. Hayley Daniels for cardiology. 12/20: Patient: 6 L overnight, nephrology ordered K-Phos, and half-normal saline, will CT her head today. 12/21: Patient tolerated being supine overnight. Plan for PICC line today. Pacemaker interrogated with no runs of V. tach or other rhythm needing acute intervention. 12/22: Patient remained supine without difficulty. Continuing to try to wean down her FiO2. Her sodium has improved. 12/23: Patient continues to tolerate the vent. She is waking up more.   Sodium is much better. No acute overnight events. 12/24: Failed weaning trial yesterday. Became agitated. Continues to tolerate the ventilator. Day 9 on the ventilator. 12/25: The patient occasionally opens her eyes. She occasionally follows commands. She gets extremely anxious when sedation is weaned further. Continues on Versed and fentanyl. 12/26: Patient continues on the ventilator. Opens eyes with sedation vacation, not really following commands. Day 11 on the ventilator. No other acute overnight events. 12:27: Continues on the vent. Awake on sedation, does follow commands. Day 12 on the ventilator. 12/28: Patient continues on the ventilator. Still occasionally becomes agitated when attempting to wean sedation. Day 13 on the ventilator. Occasionally hypotensive requiring Levophed. 12/29: trach and peg today. Spent 29th on the phone talking to her son who is the power of  yesterday explaining her prognosis and the treatment course going forward. He wants a trach and PEG and everything done at this point. Patient is actually much more awake today and following commands. When asked if she needs the volume turned up on the TV she shakes her head yes. Plan for trach and PEG this afternoon. 12/30: Trach and PEG yesterday. Had to be placed back on Levophed, currently running at 13 mics. Patient is now awake, has restraints off, is alert and responding to commands. Weaning sedatives. 12/31: No acute events overnight, episode of A. fib with RVR yesterday, changed to needle from Levophed, and started on amiodarone bolus and amiodarone per PEG tube. Off fern today. 01/01: No acute events, Weaned off fentanyl drip    01/02: No acute events    1/4: No acute events overnight    1/5: Become more agitated overnight with tachypnea and fluctuating blood pressure. Also ventilatory status declining requiring increased sedation.     1/6: Day 23 Ventilation, Continues tachypnea and poor kg)   SpO2 (S) 95%   BMI 34.02 kg/m²   Tmax over 24 hours:  Temp (24hrs), Av.9 °F (36.6 °C), Min:97.5 °F (36.4 °C), Max:98.4 °F (36.9 °C)      Patient Vitals for the past 6 hrs:   Pulse Resp SpO2   21 1256 89 27 95 %   21 1255 -- 27 95 %   21 1214 129 28 91 %   21 1117 126 29 93 %   21 1001 125 (!) 32 94 %   21 1000 118 (!) 31 94 %   21 0959 -- (!) 32 94 %   21 0958 -- 30 94 %   21 0900 123 (!) 33 94 %         Intake/Output Summary (Last 24 hours) at 2021 1402  Last data filed at 2021 1000  Gross per 24 hour   Intake 340 ml   Output 1410 ml   Net -1070 ml     Wt Readings from Last 2 Encounters:   21 210 lb 12.2 oz (95.6 kg)   20 188 lb 9 oz (85.5 kg)     Body mass index is 34.02 kg/m². PHYSICAL EXAMINATION:    General: Withdraw from pain, ill-appearing, intermittently agitated  HEENT: Pupils are equal round and reactive to light, there are no oral lesions and no post-nasal drip   Neck: supple without adenopathy, tracheostomy in place  Cardiovascular: regular rate, and rhythm without murmur or gallop  Respiratory:  Decreased breath sounds bilaterally without wheezing or crackles. Air entry is symmetric, currently tachypneic. Abdomen: soft, non-tender, non-distended, normal bowel sounds, lateral bruising in the flanks; PEG in place  Extremities: warm, bilateral equal edema upper and lower extremities, no clubbing   Skin:  Large bilateral ecchymoses on the flanks, and lower quadrants of the abdomen  Neurologic: Trached, pupils equal round, decreased responsiveness even on weaned sedation.     Any additional physical findings:    MEDICATIONS:    Scheduled Meds:   amiodarone  200 mg Oral BID    hydrocortisone sodium succinate PF  50 mg Intravenous Q8H    insulin glargine  40 Units Subcutaneous Daily    vitamin C  1,000 mg Oral Daily    metoclopramide  5 mg Intravenous Q6H    senna  10 mL Oral BID    midodrine  15 mg Oral TID  Vitamin D  1,000 Units Oral Daily    lansoprazole  30 mg Per G Tube QAM AC    apixaban  5 mg Oral BID    nicotine  1 patch Transdermal Daily    thiamine  100 mg Oral Daily    zinc sulfate  50 mg Oral Daily    heparin flush  3 mL Intravenous 2 times per day    docusate  100 mg Oral Daily    insulin lispro  0-18 Units Subcutaneous Q6H    ipratropium-albuterol  1 ampule Inhalation Q4H WA    budesonide  500 mcg Nebulization BID    Arformoterol Tartrate  15 mcg Nebulization BID    clopidogrel  75 mg Oral Daily    chlorhexidine  15 mL Mouth/Throat BID    sodium chloride flush  10 mL Intravenous 2 times per day    gabapentin  800 mg Oral TID    levothyroxine  100 mcg Oral Daily    atorvastatin  20 mg Oral Daily     Continuous Infusions:   dextrose       PRN Meds:       LORazepam, 0.5 mg, Q6H PRN      oxyCODONE, 5 mg, Q6H PRN      glucose, 15 g, PRN      dextrose, 12.5 g, PRN      glucagon (rDNA), 1 mg, PRN      dextrose, 100 mL/hr, PRN      LORazepam, 1 mg, Q4H PRN      acetaminophen, 650 mg, Q6H PRN      metoprolol, 5 mg, Q6H PRN      sodium chloride flush, 10 mL, PRN      heparin flush, 3 mL, PRN      potassium chloride, 20 mEq, PRN      fentanNYL, 25 mcg, Q3H PRN      ondansetron, 4 mg, Q6H PRN      polyethylene glycol, 17 g, Daily PRN          VENT SETTINGS (Comprehensive) (if applicable):  Vent Information  $Ventilation: $Subsequent Day  Skin Assessment: Clean, dry, & intact  Equipment ID: [id number]  Equipment Changed: Humidification  Vent Type: [phone]  Vent Mode: (S) VS  Vt Ordered: (S) 400 mL  Pressure Ordered: 14  Rate Set: 0 bmp  Peak Flow: 0 L/min  Pressure Support: 0 cmH20  FiO2 : (S) 80 %  SpO2: (S) 95 %  SpO2/FiO2 ratio: 118.75  PaO2/FiO2 ratio: 86  Sensitivity: 2  PEEP/CPAP: (S) 12  I Time/ I Time %: 0 s  Humidification Source: Heated wire  Humidification Temp: 37  Humidification Temp Measured: 36.8  Circuit Condensation: Drained  Mask Type: Full face mask  Mask Size: Small  Additional Respiratory  Assessments  Pulse: 89  Resp: 27  SpO2: (S) 95 %  Position: Semi-Marrero's  Humidification Source: Heated wire  Humidification Temp: 37  Circuit Condensation: Drained  Oral Care: Suction toothette, Mouth suctioned, Mouth moisturizer, Mouth swabbed  Subglottic Suction Done?: No  Airway Type: Trachial  Airway Size: 8(XLT)  Cuff Pressure (cm H2O): 29 cm H2O    ABGs:   Recent Labs     01/12/21  0514   PH 7.368   PCO2 61.3*   PO2 68.0*   HCO3 34.5*   BE 7.9*   O2SAT 93.6       Laboratory findings:    Complete Blood Count:   Recent Labs     01/10/21  0530 01/11/21  0540 01/12/21  0505   WBC 16.2* 15.4* 15.5*   HGB 8.8* 8.1* 8.1*   HCT 29.2* 28.2* 28.7*    192 201        Last 3 Blood Glucose:   Recent Labs     01/10/21  0530 01/11/21  0540 01/12/21  0505   GLUCOSE 164* 240* 205*        PT/INR:    Lab Results   Component Value Date    PROTIME 11.4 12/13/2020    PROTIME 11.9 12/15/2011    INR 1.0 12/13/2020     PTT:    Lab Results   Component Value Date    APTT 21.6 12/13/2020       Comprehensive Metabolic Profile:   Recent Labs     01/10/21  0530 01/11/21  0540 01/12/21  0505    140 136   K 3.7 2.8* 3.7   CL 97* 100 102   CO2 36* 34* 33*   BUN 13 17 21   CREATININE 0.3* 0.3* 0.3*   GLUCOSE 164* 240* 205*   CALCIUM 7.9* 7.9* 7.9*      Magnesium:   Lab Results   Component Value Date    MG 2.3 01/12/2021     Phosphorus:   Lab Results   Component Value Date    PHOS 2.4 01/12/2021     Ionized Calcium: No results found for: CAION     Urinalysis:     Troponin:   No results for input(s): TROPONINI in the last 72 hours.     Microbiology:    Cultures during this admission:     Blood cultures:                 [] Pending            [x] Negative             []  Positive (Details:  )  Urine Culture:                   [] None drawn      [] Negative             []  Positive (Details:  )  Sputum Culture:               [] None drawn       [x] Negative             []  Positive (Details:  )   CULTURE, RESPIRATORY 01/05/2021 11:15 AM [facility]   Oral Pharyngeal Rachell present    Smear, Respiratory [facility]2021 11:[facility]   Group 6: <25 PMN's/LPF and <25 Epithelial cells/LPF   Few Polymorphonuclear leukocytes   Rare Epithelial cells   Rare yeast      Endotracheal aspirate:     [] None drawn       [] Negative             []  Positive (Details:  )     Other pertinent Labs:   COVID-19 + December 13  MRSA negative screening   radiology/Imaging:     Chest Xray (1/12/2021):       Right PICC line with tip at the junction of the subclavian and SVC   Extensive bilateral pulmonary infiltrates unchanged   ET and NG tubes appear in satisfactory position             ASSESSMENT:      1. Acute on chronic hypoxic respiratory failure   2. Covid pneumonitis - critically severe   3. Shock-septic   4. Adrenal insufficiency  5. Electrolyte abnormalities  6. Leukocytosis    7. COPD  8. pvd  9. chf not in acute exacerbatino   10. Hx lacunar infarct   11. SSS s/p pacemaker   12. Hx seizure do   13. Bilateral carotid stenosis       SYSTEMS ASSESSMENT    Neuro   Previous history of lacunar stroke  , will change Ativan to 1 mg every 4 hours. Continue oxycodone 5mg every 6. Respiratory   Acute respiratory failure with hypoxia secondary to COVID-19 viral pneumonia  Trach and Peg 12/29 1/5 scheduled Ativan for every 6 and Oxy 10 milligrams every 6 hours. Stop fentanyl patch. Patient tachypneic more today than previous, requiring increased sedation in order to maintain oxygen saturation. 1/6: Repeat CT head and Chest today. 1/7; patient continues to require increased PEEP. Continue to wean as tolerated. 1/8: Decrease tidal volume to 360 increase rate to 24.  1/9; trying to wean FiO2 but not successful so far  1/10: continue to wean Fio2 as tolertaed  1/11: Decrease PEEP to 12, wean phenylephrine as tolerated. 1/12: Change to Volume support mode, wean sedation. Cardiovascular   Shock, septic -weaned off pressors   Increase midodrine to 15 tid  intermittent atrial fib/flutter  Afib RVR: Amiodarone 400mg 2 times daily decrease to 200mg once daily on 1/6  Pacemaker interrogated that showed several episodes of supraventricular tachycardia over the last couple of months but no persistent shockable rhythm  Continue plavix, hx of CAD with stent  Continue to hold Lasix. Stop Diamox. Persistent Afib RVR, Start Digoxin. Gastrointestinal   GI prophylaxis with Protonix  Diet per dietary recs   Colace/senna for constipation  Ct abdomen pelvis 12/27 showed no acute intraabdominal etiology       Renal   Hyponatremia -resolved   -Continue to trend metabolic panel   -Nephrology following for fluid recommendations intravenously  Replete potassium as needed  Adrenal Insufficiency: Increase Solu-Cortef to 100 mg every 8 hours. 1/7: Lasix on hold due to sepsis. Infectious Disease   COVID-19 viral pneumonia   Received remdesivir, Toci, vit D, vit c, thiamine   1/6  Blood cultures pending. 1/6  ua sent today  With repeat urine culture. 1/7: Restart vancomycin and Zosyn due to possible sepsis. Culture still pending. Hematology/Oncology   Anemia on admission, trend H&H, improving  CBC daily  1/5 No more Blood in stools, Restart Lovenox. Endocrine   Diabetes: High-dose sliding scale insulin for hyperglycemia  Hyperglycemia: Increase Lantus dose to 40mg  daily. Adrenal insufficiency, sepsis : high-dose Solucortef decreased to 50 q 8 hrs due to worsening blood pressure. Social/Spiritual/DNR/Other   Full code. Palliative consulted. ASSESSMENT/ PLAN     1. Trach and peg 12/29  2. Continue midodrine TID ,   3. Palliative following , family wishes patient to be a full code  4. Wean hydrocortisone to 50 mg q8  5. Continue nicotine patch   6. We will try to wean FiO2 for saturations above 94%  7. Start Digoxin for Afib RVR  8.  Change to Pressure Support Mode, wean sedation and vent as tolerated. Electronically signed by Libby Mora DO on 1/12/2021 at 2:02 PM       Addendum:  After increasing amnio to 200 mg twice daily and giving dose of dig patient now is converted back to normal sinus rhythm. Tolerated volume support ventilation for almost 8 hours today. Will rest overnight on AC and resume in the morning. I personally saw, examined and provided care for the patient. Radiographs, labs and medication list were reviewed by me independently. I spoke with bedside nursing, therapists and consultants. Critical care services and times documented are independent of procedures and multidisciplinary rounds with Residents. Additionally comprehensive, multidisciplinary rounds were conducted with the MICU team. The case was discussed in detail and plans for care were established. Review of Residents documentation was conducted and revisions were made as appropriate. I agree with the above documented exam, problem list and plan of care.   Scot Campos MD   CCT excluding procedures:38' 27-Feb-2019 16:45

## 2021-01-12 NOTE — PATIENT CARE CONFERENCE
Intensive Care Daily Quality Rounding Checklist        ICU Team Members: Dr. Cecilio Dos Santos, resident Dr. Lloyd Kurtz, bedside RN,TL, clinical pharmacist, RT     ICU Day #: 29     Intubation Date: trach  Dec. 29th, changed 1/4     Ventilator Day #: 28     Central Line Insertion Date: PICC Dec.21st                                                    Day #: 23      Arterial Line Insertion Date: Jan. 7th                             Day #: 6     Temporary Hemodialysis Catheter Insertion Date:                              Day # N/A     DVT Prophylaxis: Lovenox    GI Prophylaxis: Protonix     Allen Catheter Insertion Date: Dec. 13th                             Day #: 30                             Continued need (if yes, reason documented and discussed with physician):      Skin Issues/ Wounds and ordered treatment discussed on rounds:      Goals/ Plans for the Day: wean pressors, wean vent,  continue critical management, make the ativan and oxy prn, trail p/s ventilation, load digoxin today

## 2021-01-12 NOTE — CARE COORDINATION
COVID POSITIVE 1/2 & 12/13. Vent since 12/15-FIO2 70% . s/p Trach/PEG 12/29. Weaned off pressor. Patient has [employer]. Insurance. Accepted @ [facility] when medically stable- insurance precert waived until 7/85/68. [id number] care following-DNR-CCA. Will follow.  N-17 in [facility] Latanya Castaneda

## 2021-01-13 NOTE — PLAN OF CARE
Problem: Skin Integrity:  Goal: Will show no infection signs and symptoms  Description: Will show no infection signs and symptoms  Outcome: Met This Shift  Goal: Absence of new skin breakdown  Description: Absence of new skin breakdown  Outcome: Met This Shift     Problem: Airway Clearance - Ineffective  Goal: Achieve or maintain patent airway  Outcome: Met This Shift     Problem: Gas Exchange - Impaired  Goal: Absence of hypoxia  Outcome: Met This Shift  Goal: Promote optimal lung function  Outcome: Met This Shift     Problem:  Body Temperature -  Risk of, Imbalanced  Goal: Ability to maintain a body temperature within defined limits  Outcome: Met This Shift     Problem: Isolation Precautions - Risk of Spread of Infection  Goal: Prevent transmission of infection  Outcome: Met This Shift     Problem: Nutrition Deficits  Goal: Optimize nutrtional status  Outcome: Met This Shift     Problem: Risk for Fluid Volume Deficit  Goal: Maintain normal heart rhythm  Outcome: Met This Shift     Problem: Patient Education: Go to Patient Education Activity  Goal: Patient/Family Education  Outcome: Met This Shift     Problem: Falls - Risk of:  Goal: Will remain free from falls  Description: Will remain free from falls  Outcome: Met This Shift  Goal: Absence of physical injury  Description: Absence of physical injury  Outcome: Met This Shift     Problem: Pain:  Goal: Pain level will decrease  Description: Pain level will decrease  Outcome: Met This Shift  Goal: Control of acute pain  Description: Control of acute pain  Outcome: Met This Shift  Goal: Control of chronic pain  Description: Control of chronic pain  Outcome: Met This Shift

## 2021-01-13 NOTE — PROGRESS NOTES
Critical Care Team - Daily Progress Note         Date and time: 1/13/2021 3:22 PM  Patient's name:  Henry Burris  Medical Record Number: [id number]  Patient's account/billing number: [de-identified]  Patient's YOB: 1950  Age: 65 y.o. Date of Admission: 12/13/2020 10:51 AM  Length of stay during current admission: 31      Primary Care Physician: Andrew Rojas DO  ICU Attending Physician: Dr. Merary Thomas    Code Status: DNR-CCA    Reason for ICU admission: respiratory failure   Shock   Covid 19 pneumonitis       SUBJECTIVE:     OVERNIGHT EVENTS:         12/16: Overnight able to titrate down some of the vasopressors though still intermittently hypotensive and requiring vasopressin as well as Levophed. Still sedated with fentanyl and Versed. Hyponatremia notable at 119 new today. 12/17: Patient was able to be titrated off the vasopressors. Is now on midodrine. Minimally hypothermic overnight now on Melissa hugger with improvement in temperature. Continue sedation with fentanyl and Versed. 12/18: Patient continues on the ventilator. Back on small amount of Levophed in addition to the vasopressin. Hoping to wean sedation more today. 12/19: Back on levophed overnight. Initially able to titrate fio2 to 40%, became hypoxic and increased to 60%. Apparently their were several episodes of bradycardia overnight. Patient has pacemaker which did not fire per nursing. Follows with Dr. Prerna Schneider for cardiology. 12/20: Patient: 6 L overnight, nephrology ordered K-Phos, and half-normal saline, will CT her head today. 12/21: Patient tolerated being supine overnight. Plan for PICC line today. Pacemaker interrogated with no runs of V. tach or other rhythm needing acute intervention. 12/22: Patient remained supine without difficulty. Continuing to try to wean down her FiO2. Her sodium has improved. 12/23: Patient continues to tolerate the vent. She is waking up more.   Sodium is much better. No acute overnight events. 12/24: Failed weaning trial yesterday. Became agitated. Continues to tolerate the ventilator. Day 9 on the ventilator. 12/25: The patient occasionally opens her eyes. She occasionally follows commands. She gets extremely anxious when sedation is weaned further. Continues on Versed and fentanyl. 12/26: Patient continues on the ventilator. Opens eyes with sedation vacation, not really following commands. Day 11 on the ventilator. No other acute overnight events. 12:27: Continues on the vent. Awake on sedation, does follow commands. Day 12 on the ventilator. 12/28: Patient continues on the ventilator. Still occasionally becomes agitated when attempting to wean sedation. Day 13 on the ventilator. Occasionally hypotensive requiring Levophed. 12/29: trach and peg today. Spent 29th on the phone talking to her son who is the power of  yesterday explaining her prognosis and the treatment course going forward. He wants a trach and PEG and everything done at this point. Patient is actually much more awake today and following commands. When asked if she needs the volume turned up on the TV she shakes her head yes. Plan for trach and PEG this afternoon. 12/30: Trach and PEG yesterday. Had to be placed back on Levophed, currently running at 13 mics. Patient is now awake, has restraints off, is alert and responding to commands. Weaning sedatives. 12/31: No acute events overnight, episode of A. fib with RVR yesterday, changed to needle from Levophed, and started on amiodarone bolus and amiodarone per PEG tube. Off fern today. 01/01: No acute events, Weaned off fentanyl drip    01/02: No acute events    1/4: No acute events overnight    1/5: Become more agitated overnight with tachypnea and fluctuating blood pressure. Also ventilatory status declining requiring increased sedation.     1/6: Day 23 Ventilation, Continues tachypnea and poor ventilation. 1/7: Day 24 ventilation. Not tolerating ventilation. Continue to wean sedation as able. Cultures redrawn. 1/8: Day 25 ventilation. Patient found overnight 700 cc residual volume, stopped tube feedings at that time. Recheck this morning only 50 cc residual restarted tube feedings. ,  Start Reglan every 6 hours. 1/9: Remains on ventilator. Unable to wean FiO2. Tube feeds were stopped again last night for having residual 300 cc will resume trickle feeds today. Weaned off pressors. 1/10: Vent day #27, off all pressors and IV sedation. Opens eyes upon calling her name and withdraws to pain. Still on high FIO2 and PEEP     1/11: Vent Day #28, increase Lantus, decrease Ativan to 0.5 every 6 hours. Hold Lasix. Wean phenylephrine as tolerated and decrease PEEP to 12.    1/12: Vent Day #29, Change to Ativan PRN. Continue to wean. Off pressors. 1/13: Vent day #30, patient overall clinically better. Weaning oxygen support. Transfer patient to intermediate floor pending discharge to skilled nursing facility.     CURRENT VENTILATION STATUS:     [x] Ventilator  [] BIPAP  [] Nasal Cannula [] Room Air      IF INTUBATED, ET TUBE MARKING AT LOWER LIP:       cms    SECRETIONS Amount:  [] Small [] Moderate  [] Large  [x] None  Color:     [] White [] Colored  [] Bloody    SEDATION:  RAAS Score:+1  [] Fentanyl gtt  [] Versed gtt  [x] Ativan gtt   [] No Sedation    PARALYZED:  [x] No    [] Yes      VASOPRESSORS:  [x] No    [] Yes    If yes -   [] Levophed       [] Dopamine     [] Vasopressin       [] Dobutamine  [] Phenylephrine         [] Epinephrine    CENTRAL LINES:     [] No   [x] Yes   (Date of Insertion:  12/15 ) PICC line          If yes -     [] Right IJ     [] Left IJ [] Right Femoral [] Left Femoral                   [] Right Subclavian [] Left Subclavian   PICC line    GARCIA'S CATHETER:   [] No   [x] Yes  (Date of Insertion: 12/15  )     URINE OUTPUT:            [x] Good   [] Low [] Anuric      OBJECTIVE:     VITAL SIGNS:  BP (!) 98/53   Pulse 101   Temp 98 °F (36.7 °C) (Axillary)   Resp 29   Ht 5' 6\" (1.676 m)   Wt 210 lb 12.2 oz (95.6 kg)   SpO2 95%   BMI 34.02 kg/m²   Tmax over 24 hours:  Temp (24hrs), Av.8 °F (36.6 °C), Min:97.2 °F (36.2 °C), Max:98.3 °F (36.8 °C)      Patient Vitals for the past 6 hrs:   BP Temp Temp src Pulse Resp SpO2   21 1430 -- -- -- 101 29 95 %   21 1400 -- -- -- 105 30 95 %   21 1330 -- -- -- 114 (!) 32 94 %   21 1300 -- -- -- 111 30 94 %   21 1230 -- -- -- 133 (!) 33 94 %   21 1217 -- -- -- 131 -- --   21 1200 (!) 98/53 98 °F (36.7 °C) Axillary 115 29 94 %   21 1156 -- -- -- 93 26 94 %   21 1155 -- -- -- -- (!) 32 95 %   21 1100 -- -- -- 108 (!) 33 91 %   21 1030 -- -- -- 118 (!) 32 (!) 89 %   21 1000 -- -- -- 109 (!) 34 92 %   21 0930 -- -- -- 82 (!) 32 93 %         Intake/Output Summary (Last 24 hours) at 2021 1522  Last data filed at 2021 1400  Gross per 24 hour   Intake 1352 ml   Output 1325 ml   Net 27 ml     Wt Readings from Last 2 Encounters:   21 210 lb 12.2 oz (95.6 kg)   20 188 lb 9 oz (85.5 kg)     Body mass index is 34.02 kg/m². PHYSICAL EXAMINATION:    General: Withdraw from pain, leg eyes to commands. Improved overall. HEENT: Pupils are equal round and reactive to light, there are no oral lesions and no post-nasal drip   Neck: supple without adenopathy, tracheostomy in place  Cardiovascular: regular rate, and rhythm without murmur or gallop  Respiratory:  Decreased breath sounds bilaterally without wheezing or crackles. Air entry is symmetric, currently tachypneic.   Abdomen: soft, non-tender, non-distended, normal bowel sounds, lateral bruising in the flanks; PEG in place  Extremities: warm, bilateral equal edema upper and lower extremities, no clubbing   Skin:  Large bilateral ecchymoses on the flanks, and lower quadrants of AC/VC+  Vt Ordered: 380 mL  Pressure Ordered: 14  Rate Set: 22 bmp  Peak Flow: 0 L/min  Pressure Support: 0 cmH20  FiO2 : 70 %  SpO2: 95 %  SpO2/FiO2 ratio: 134.29  PaO2/FiO2 ratio: 86  Sensitivity: 2  PEEP/CPAP: 12  I Time/ I Time %: 1 s  Humidification Source: Heated wire  Humidification Temp: 37  Humidification Temp Measured: 37  Circuit Condensation: Drained  Mask Type: Full face mask  Mask Size: Small  Additional Respiratory  Assessments  Pulse: 101  Resp: 29  SpO2: 95 %  Position: Semi-Marrero's  Humidification Source: Heated wire  Humidification Temp: 37  Circuit Condensation: Drained  Oral Care: Mouth suctioned, Mouth moisturizer, Mouth swabbed  Subglottic Suction Done?: No  Airway Type: Trachial  Airway Size: 8  Cuff Pressure (cm H2O): 29 cm H2O    ABGs:   Recent Labs     01/13/21  0516   PH 7.396   PCO2 55.4*   PO2 72.6*   HCO3 33.2*   BE 7.3*   O2SAT 94.8       Laboratory findings:    Complete Blood Count:   Recent Labs     01/11/21  0540 01/12/21  0505 01/13/21  0505   WBC 15.4* 15.5* 17.9*   HGB 8.1* 8.1* 8.2*   HCT 28.2* 28.7* 29.3*    201 212        Last 3 Blood Glucose:   Recent Labs     01/12/21  0505 01/12/21  1524 01/13/21  0505   GLUCOSE 205* 180* 173*        PT/INR:    Lab Results   Component Value Date    PROTIME 11.4 12/13/2020    PROTIME 11.9 12/15/2011    INR 1.0 12/13/2020     PTT:    Lab Results   Component Value Date    APTT 21.6 12/13/2020       Comprehensive Metabolic Profile:   Recent Labs     01/12/21  0505 01/12/21  1524 01/13/21  0505    140 139   K 3.7 4.3 4.3    103 101   CO2 33* 36* 34*   BUN 21 22 21   CREATININE 0.3* 0.3* 0.3*   GLUCOSE 205* 180* 173*   CALCIUM 7.9* 8.0* 8.2*      Magnesium:   Lab Results   Component Value Date    MG 2.4 01/13/2021     Phosphorus:   Lab Results   Component Value Date    PHOS 2.4 01/13/2021     Ionized Calcium: No results found for: CAION     Urinalysis:     Troponin:   No results for input(s): TROPONINI in the last 72 hours.    Microbiology:    Cultures during this admission:     Blood cultures:                 [] Pending            [x] Negative             []  Positive (Details:  )  Urine Culture:                   [] None drawn      [] Negative             []  Positive (Details:  )  Sputum Culture:               [] None drawn       [x] Negative             []  Positive (Details:  )   CULTURE, RESPIRATORY 01/05/2021 11:15 AM [facility]   Oral Pharyngeal Rachell present    Smear, Respiratory 01/05/2021 11:15 AM [facility]   Group 6: <25 PMN's/LPF and <25 Epithelial cells/LPF   Few Polymorphonuclear leukocytes   Rare Epithelial cells   Rare yeast      Endotracheal aspirate:     [] None drawn       [] Negative             []  Positive (Details:  )     Other pertinent Labs:   COVID-19 + December 13  MRSA negative screening   radiology/Imaging:     Chest Xray (1/13/2021):       Right PICC line with tip at the junction of the subclavian and SVC   Extensive bilateral pulmonary infiltrates unchanged   ET and NG tubes appear in satisfactory position             ASSESSMENT:      1. Acute on chronic hypoxic respiratory failure   2. Covid pneumonitis - critically severe   3. Shock-septic   4. Adrenal insufficiency  5. Electrolyte abnormalities  6. Leukocytosis    7. COPD  8. pvd  9. chf not in acute exacerbatino   10. Hx lacunar infarct   11. SSS s/p pacemaker   12. Hx seizure do   13. Bilateral carotid stenosis       SYSTEMS ASSESSMENT    Neuro   Previous history of lacunar stroke  Weaned off sedation Ativan and oxycodone  Patient was now blank with commands moves some extremities slightly. Will need physical therapy and continued respiratory support going forward. Respiratory   Acute respiratory failure with hypoxia secondary to COVID-19 viral pneumonia  Trach and Peg 12/29 1/5 scheduled Ativan for every 6 and Oxy 10 milligrams every 6 hours. Stop fentanyl patch.   Patient tachypneic more today than previous, requiring increased sedation in order to maintain oxygen saturation. 1/6: Repeat CT head and Chest today. 1/7; patient continues to require increased PEEP. Continue to wean as tolerated. 1/8: Decrease tidal volume to 360 increase rate to 24.  1/9; trying to wean FiO2 but not successful so far  1/10: continue to wean Fio2 as tolertaed  1/11: Decrease PEEP to 12, wean phenylephrine as tolerated. 1/12: Change to Volume support mode, wean sedation. 1/13: Overall respiratory improvement. Patient still on 70% O2 however we will continue to titrate down. Tolerated volume support today, clinically continues to improve        Cardiovascular   Shock, septic -weaned off pressors   Increase midodrine to 15 tid  intermittent atrial fib/flutter  Afib RVR: Amiodarone 400mg 2 times daily decrease to 200mg once daily on 1/6  Pacemaker interrogated that showed several episodes of supraventricular tachycardia over the last couple of months but no persistent shockable rhythm  Continue plavix, hx of CAD with stent  Continue to hold Lasix. Stop Diamox. Persistent Afib RVR, Start Digoxin-improving, continue digoxin treatment. 1/13: Increase amiodarone to 200 mg twice daily            Gastrointestinal   GI prophylaxis with Protonix  Diet per dietary recs   Colace/senna for constipation  Ct abdomen pelvis 12/27 showed no acute intraabdominal etiology       Renal   Hyponatremia -resolved   -Continue to trend metabolic panel   -Nephrology following for fluid recommendations intravenously  Replete potassium as needed  Adrenal Insufficiency: Increase Solu-Cortef to 100 mg every 8 hours. 1/7: Lasix on hold due to sepsis. Infectious Disease   COVID-19 viral pneumonia   Received remdesivir, Toci, vit D, vit c, thiamine   1/6  Blood cultures pending. 1/6  ua sent today  With repeat urine culture. 1/7: Restart vancomycin and Zosyn due to possible sepsis.   Cultures negative      Hematology/Oncology   Anemia on admission, trend H&H, improving  CBC daily  1/5 No more Blood in stools, Restarted Lovenox. Endocrine   Diabetes: High-dose sliding scale insulin for hyperglycemia  Hyperglycemia: Increase Lantus dose to 40mg  daily. Adrenal insufficiency, sepsis : high-dose Solucortef decreased to 50 q 8 hrs due to worsening blood pressure. Social/Spiritual/DNR/Other   Full code. Palliative consulted. Spoke with son he dresses concerns of the patient not being ready to be discharged to the nursing home. He also want to readdress whether there would be any clinical improvement going forward. ASSESSMENT/ PLAN     1. Trach and peg 12/29  2. Continue midodrine TID ,   3. Palliative following , family wishes patient to be a full code  4. Wean hydrocortisone to 50 mg q8  5. Continue nicotine patch   6. We will try to wean FiO2 for saturations above 94%  7. Start Digoxin for Afib RVR  8. Change to Pressure Support Mode, wean sedation and vent as tolerated. Electronically signed by Maria Del Rosario Rosales DO on 1/13/2021 at 4:05 PM    I personally saw, examined and provided care for the patient. Radiographs, labs and medication list were reviewed by me independently. I spoke with bedside nursing, therapists and consultants. Critical care services and times documented are independent of procedures and multidisciplinary rounds with Residents. Additionally comprehensive, multidisciplinary rounds were conducted with the MICU team. The case was discussed in detail and plans for care were established. Review of Residents documentation was conducted and revisions were made as appropriate. I agree with the above documented exam, problem list and plan of care.   Kurtis Brito MD   CCT excluding procedures:38'

## 2021-01-13 NOTE — PATIENT CARE CONFERENCE
Intensive Care Daily Quality Rounding Checklist        ICU Team Members:  Dr. Jaki Burton, Thomas Hand & Mago Sandhu (residents), clinical pharmacist, charge nurse, bedside nurse, respiratory therapist    ICU Day #: 30     Intubation Date: placed on vent 12/15, trach Dec. 29th, changed 1/4     Ventilator Day #: 30     Central Line Insertion Date: PICC Kg Hard                                                    Day #: 24      Arterial Line Insertion Date: Jan. 7th                             Day #: 7     DVT Prophylaxis: Eliquis    GI Prophylaxis: Protonix     Allen Catheter Insertion Date: Dec. 30                             Day #: 15                             Continued need (if yes, reason documented and discussed with physician): yes, need for accurate I+O's     Skin Issues/ Wounds and ordered treatment discussed on rounds: no issues, SOS precautions     Goals/ Plans for the Day: Daily labs, wean vent to pressure support, wean hydrocortisone, continue tube feeds, update family

## 2021-01-13 NOTE — PROGRESS NOTES
Chief Complaint:       Chief Complaint   Patient presents with    Shortness of Breath       70% RA at NH 88% NRB , being tx for pneumonia hx of copd       Acute respiratory failure with hypoxia ([address]      Subjective:     Unresponsive, trached     Objective:     BP (!) 109/3   Pulse 82   Temp 97.2 °F (36.2 °C) (Axillary)   Resp (!) 31   Ht 5' 6\" (1.676 m)   Wt 210 lb 12.2 oz (95.6 kg)   SpO2 93%   BMI 34.02 kg/m²      Current medications that patient is taking have been reviewed.     General appearance: Moribund appearing elderly female  HEENT: AT/NC, dry mucous membranes  Neck: Trached  Lungs: Anterior rhonchi, tachypneic, some belly breathing  CV: RRR, no MRGs  Abdomen: Soft, non-tender; no masses or HSM, +BS  Extremities: No peripheral edema or digital cyanosis  Skin: no rash, lesions or ulcers  Psych: Unresponsive  Neuro: Unresponsive     Labs:  CBC with Differential:          Lab Results   Component Value Date     WBC 15.5 01/12/2021     RBC 2.63 01/12/2021     HGB 8.1 01/12/2021     HCT 28.7 01/12/2021      01/12/2021     .1 01/12/2021     MCH 30.8 01/12/2021     MCHC 28.2 01/12/2021     RDW 19.5 01/12/2021     NRBC 0.9 01/03/2021     SEGSPCT 89 03/10/2014     BANDSPCT 1 05/19/2016     METASPCT 1.0 01/11/2021     LYMPHOPCT 3.8 01/12/2021     PROMYELOPCT 1.8 12/19/2020     MONOPCT 3.0 01/12/2021     MYELOPCT 1.8 12/24/2020     BASOPCT 0.2 01/12/2021     MONOSABS 0.46 01/12/2021     LYMPHSABS 0.59 01/12/2021     EOSABS 0.01 01/12/2021     BASOSABS 0.03 01/12/2021      CMP:          Lab Results   Component Value Date      01/12/2021     K 4.3 01/12/2021     K 3.3 12/15/2020      01/12/2021     CO2 36 01/12/2021     BUN 22 01/12/2021     CREATININE 0.3 01/12/2021     GFRAA >60 01/12/2021     LABGLOM >60 01/12/2021     GLUCOSE 180 01/12/2021     GLUCOSE 106 12/15/2011     PROT 5.0 01/03/2021     LABALBU 3.0 01/03/2021     CALCIUM 8.0 01/12/2021     BILITOT 0.7 01/03/2021     ALKPHOS 130 01/03/2021     AST 21 01/03/2021     ALT 14 01/03/2021            Assessment/Plan:  Principal Problem:    Acute respiratory failure with hypoxia (HCC)  Active Problems:    COPD (chronic obstructive pulmonary disease) (HCC)    Coronary artery disease involving native coronary artery without angina pectoris    Acute respiratory failure due to COVID-19 [facility])    Septic shock (HCC)    Atrial fibrillation with RVR ([address]  Resolved Problems:    * No resolved hospital problems.  *        Wean vent as tolerated     Continue steroids     Continue Eliquis and amiodarone     Continue bronchodilators     Continue Plavix     LTAC?     Requires continued inpatient level of care      Alexander Villagomez    9:36 PM  1/12/2021  Cell: [phone]

## 2021-01-13 NOTE — CARE COORDINATION
Notified per nursing, pt is cleared for discharge to [facility]- awaiting discharge orders. Spoke w/ Suzy @ [place]- they are able to accept pt w/ FIO2 70%, PEEP 12- no repeat COVID testing is needed.  SW notified to set up transport  Alfredo Myers

## 2021-01-13 NOTE — CARE COORDINATION
1/13/2021  Social Work Discharge Planning:SW set up ambulance transport via [facility]  for Pt to go to [address] [place] [address] at [facility] today. Nurse here, [facility] [place] and were son Pembine Lipoma were notified. Electronically signed by DAVID Montes on 1/13/2021 at 12:21 PM    1/13/2021  Social Work Discharge Planning:SW was informed that Pt will not be discharging today. SW cancelled transport and notified son Tiffani Lipoma and [facility] [place].  Electronically signed by DAVID Montes on 1/13/2021 at 3:06 PM

## 2021-01-14 PROBLEM — J12.82 PNEUMONIA DUE TO COVID-19 VIRUS: Chronic | Status: ACTIVE | Noted: 2021-01-01

## 2021-01-14 PROBLEM — U07.1 PNEUMONIA DUE TO COVID-19 VIRUS: Chronic | Status: ACTIVE | Noted: 2021-01-01

## 2021-01-14 NOTE — CARE COORDINATION
COVID POSITIVE 1/2( Hx positive 12/13, negative 12/2) . Vent since 12/15-FIO2 70%,PEEP 12. S/p trach/PEG on 12/29. Palliative care following-DNR-CCA. [facility]charge held to [facility] per medical yesterday d/t high O2/PEEP demand- [facility] continues to follow- has [employer]-insurance precert waived til 0/84- will need PT/OT evals if not discharged before 1/15. Will follow Latanya Castaneda     Informed per nursing, pt is cleared for discharge. Suzy @ [place] [facility] aware and able to accept patient today. SW notified to set up transport.  Latanya Castaneda

## 2021-01-14 NOTE — DISCHARGE SUMMARY
Physician Discharge Summary     Patient ID:  Jeannette Magaña  [id number]  65 y.o.  1950    Admit date: 12/13/2020    Discharge date and time:  1/14/2021     Admission Diagnoses:   Chief Complaint   Patient presents with    Shortness of Breath     70% RA at [facility] 88% NRB , being tx for pneumonia hx of copd       Acute respiratory failure with hypoxia [facility])     Discharge Diagnoses:   Principal Problem:    Acute respiratory failure with hypoxia ([facility]  Active Problems:    COPD (chronic obstructive pulmonary disease) ([facility]    History of DVT (deep vein thrombosis)    Coronary artery disease involving native coronary artery without angina pectoris    Acute respiratory failure due to COVID-19 [facility])    Septic shock (HCC)    Atrial fibrillation with RVR ([facility]    Pneumonia due to COVID-19 virus  Resolved Problems:    * No resolved hospital problems. *       Consults: Palliative care, surgery, ID, nephrology, pulm/CCM    Procedures: ICU procedures, trach/PEG    Hospital Course:   Patient presented with hypoxemic respiratory failure due to COVID-19 pneumonia. She was intubated and sent to the ICU. She was here for a very prolonged period of time, so please see the ICU notes for full details. She developed atrial fibrillation with RVR, cardiology was consulted, and her medications have been adjusted. She could not come off the ventilator and ultimately had trach and PEG.   Apparently she did not qualify for LTAC, and therefore will be transferred to skilled nursing facility     Discharge Exam:  Vitals:    01/14/21 1239 01/14/21 1244 01/14/21 1300 01/14/21 1400   BP:       Pulse:  80 90 88   Resp: (!) 34 24 (!) 35 (!) 31   Temp:       TempSrc:       SpO2: 92% 96% 91% 91%   Weight:       Height:            General: Ill-appearing female, with tracheostomy  Cardiac: Regular rate and rhythm without murmurs  Lungs: Clear anteriorly, but mildly tachypneic and does intermittently do belly breathing  Abdomen: Soft, nontender, PEG in place  Extremities: 1+ bilateral lower extremity edema       Condition:  Stable    Disposition: [facility]    Patient Instructions:   Current Discharge Medication List      START taking these medications    Details   oxyCODONE (ROXICODONE) 5 MG immediate release tablet Take 1 tablet by mouth every 6 hours as needed for Pain for up to 3 days. Qty: 10 tablet, Refills: 0    Comments: Reduce doses taken as pain becomes manageable  Associated Diagnoses: Trauma;  Closed fracture of multiple ribs of left side, initial encounter      amiodarone (CORDARONE) 200 MG tablet Take 1 tablet by mouth 2 times daily  Qty:        apixaban (ELIQUIS) 5 MG TABS tablet Take 1 tablet by mouth 2 times daily  Qty: 60 tablet      digoxin (LANOXIN) 250 MCG tablet Take 1 tablet by mouth daily  Qty: 30 tablet, Refills: 3      midodrine (PROAMATINE) 5 MG tablet Take 3 tablets by mouth 3 times daily  Qty: 90 tablet, Refills: 3      lansoprazole 3 MG/ML SUSP 10 mLs by Per G Tube route every morning (before breakfast)  Qty: 300 mL         CONTINUE these medications which have CHANGED    Details   levothyroxine (SYNTHROID) 100 MCG tablet Take 1 tablet by mouth Daily  Qty: 30 tablet, Refills: 3         CONTINUE these medications which have NOT CHANGED    Details   zinc gluconate 50 MG tablet Take 50 mg by mouth daily      zinc oxide 20 % ointment Apply topically as needed for Dry Skin Apply topically as needed to buttocks      acetaminophen (TYLENOL) 325 MG tablet Take 650 mg by mouth every 6 hours as needed for Pain or Fever      magnesium hydroxide (MILK OF MAGNESIA) 400 MG/5ML suspension Take 30 mLs by mouth daily as needed for Constipation      L-Methylfolate-B6-B12 3-35-2 MG TABS Take by mouth daily 3-35-2 mg      atorvastatin (LIPITOR) 20 MG tablet Take 20 mg by mouth nightly      acetylcysteine (MUCOMYST) 20 % nebulizer solution Take 70 mg/kg by mouth 3 times daily as needed      Respiratory Therapy Supplies (FULL KIT NEBULIZER SET) MISC Use as directed with nebulized medication. Qty: 1 each, Refills: 0    Comments: Supply prescription to Pharmacy or [address] [place] [address] location of patient or coverage preference. Dispense full nebulizer kit - compressor, tubing, mouthpiece, mask, ancillary supplies - per requirements of ordered product, patient preference, or coverage allowances.       Arformoterol Tartrate (BROVANA) 15 MCG/2ML NEBU Take 2 mLs by nebulization 2 times daily  Qty: 120 mL, Refills: 3      budesonide (PULMICORT) 0.5 MG/2ML nebulizer suspension Take 2 mLs by nebulization 2 times daily  Qty: 60 ampule, Refills: 3      ipratropium-albuterol (DUONEB) 0.5-2.5 (3) MG/3ML SOLN nebulizer solution Inhale 3 mLs into the lungs 4 times daily  Qty: 360 mL, Refills: 3    Associated Diagnoses: Centrilobular emphysema (HCC)      clopidogrel (PLAVIX) 75 MG tablet Take 1 tablet by mouth daily  Qty: 30 tablet, Refills: 3      ranolazine (RANEXA) 500 MG extended release tablet Take 1 tablet by mouth 2 times daily  Qty: 180 tablet, Refills: 3      gabapentin (NEURONTIN) 800 MG tablet Take 800 mg by mouth three times daily  Refills: 2         STOP taking these medications       pantoprazole (PROTONIX) 40 MG tablet Comments:   Reason for Stopping:         oxyCODONE-acetaminophen (PERCOCET)  MG per tablet Comments:   Reason for Stopping:         levoFLOXacin (LEVAQUIN) 750 MG tablet Comments:   Reason for Stopping:         predniSONE (DELTASONE) 20 MG tablet Comments:   Reason for Stopping:         guaiFENesin 400 MG tablet Comments:   Reason for Stopping:         metoprolol succinate (TOPROL XL) 25 MG extended release tablet Comments:   Reason for Stopping:         L-methylfolate-B6-B12 (METANX) 1-31.619-1-78 MG CAPS capsule Comments:   Reason for Stopping:         simvastatin (ZOCOR) 20 MG tablet Comments:   Reason for Stopping:         aspirin 81 MG EC tablet Comments:   Reason for Stopping:             Activity: activity as tolerated  Diet:

## 2021-01-14 NOTE — PROGRESS NOTES
Chief Complaint:  Chief Complaint   Patient presents with    Shortness of Breath     70% RA at [facility] 88% NRB , being tx for pneumonia hx of copd      Acute respiratory failure with hypoxia ([address]     Subjective:    Unresponsive, trached    Objective:    BP (!) 96/45   Pulse 96   Temp 98.2 °F (36.8 °C) (Axillary)   Resp (!) 33   Ht 5' 6\" (1.676 m)   Wt 210 lb 12.2 oz (95.6 kg)   SpO2 (!) 89%   BMI 34.02 kg/m²     Current medications that patient is taking have been reviewed.     General appearance: Moribund appearing elderly female  HEENT: AT/NC, dry mucous membranes  Neck: Trached  Lungs: Anterior rhonchi, tachypneic, some belly breathing  CV: RRR, no MRGs  Abdomen: Soft, non-tender; no masses or HSM, +BS  Extremities: No peripheral edema or digital cyanosis  Skin: no rash, lesions or ulcers  Psych: Unresponsive  Neuro: Unresponsive    Labs:  CBC with Differential:    Lab Results   Component Value Date    WBC 17.9 01/13/2021    RBC 2.70 01/13/2021    HGB 8.2 01/13/2021    HCT 29.3 01/13/2021     01/13/2021    .5 01/13/2021    MCH 30.4 01/13/2021    MCHC 28.0 01/13/2021    RDW 19.4 01/13/2021    NRBC 0.9 01/03/2021    SEGSPCT 89 03/10/2014    BANDSPCT 1 05/19/2016    METASPCT 1.0 01/11/2021    LYMPHOPCT 4.3 01/13/2021    PROMYELOPCT 1.8 12/19/2020    MONOPCT 3.4 01/13/2021    MYELOPCT 1.8 12/24/2020    BASOPCT 0.2 01/13/2021    MONOSABS 0.61 01/13/2021    LYMPHSABS 0.77 01/13/2021    EOSABS 0.01 01/13/2021    BASOSABS 0.03 01/13/2021     CMP:    Lab Results   Component Value Date     01/13/2021    K 4.3 01/13/2021    K 3.3 12/15/2020     01/13/2021    CO2 34 01/13/2021    BUN 21 01/13/2021    CREATININE 0.3 01/13/2021    GFRAA >60 01/13/2021    LABGLOM >60 01/13/2021    GLUCOSE 173 01/13/2021    GLUCOSE 106 12/15/2011    PROT 5.0 01/03/2021    LABALBU 3.0 01/03/2021    CALCIUM 8.2 01/13/2021    BILITOT 0.7 01/03/2021    ALKPHOS 130 01/03/2021    AST 21 01/03/2021    ALT 14 01/03/2021 Assessment/Plan:  Principal Problem:    Acute respiratory failure with hypoxia (HCC)  Active Problems:    COPD (chronic obstructive pulmonary disease) (HCC)    Coronary artery disease involving native coronary artery without angina pectoris    Acute respiratory failure due to COVID-19 [facility])    Septic shock ([facility])    Atrial fibrillation with RVR ([facility]  Resolved Problems:    * No resolved hospital problems. *       Wean vent as tolerated    Continue steroids    Continue Eliquis and amiodarone    Continue bronchodilators    Continue Plavix    Prognosis not good. She has been in the hospital for 1 month. Her baseline health is not good - CAD, PAD, VTE, CHF, COPD. She remains unresponsive or minimally responsive, trached and pegged.     Requires continued inpatient level of care     Humble Navarrete    10:04 PM  1/13/2021  Cell: [phone]

## 2021-01-14 NOTE — PROGRESS NOTES
Critical Care Team - Daily Progress Note         Date and time: 1/14/2021 5:02 PM  Patient's name:  Cordelia Jovel  Medical Record Number: [id number]  Patient's account/billing number: [de-identified]  Patient's YOB: 1950  Age: 65 y.o. Date of Admission: 12/13/2020 10:51 AM  Length of stay during current admission: 32      Primary Care Physician: Tonya Rucker DO  ICU Attending Physician: Dr. Bin Allen    Code Status: DNR-CCA    Reason for ICU admission: respiratory failure   Shock   Covid 19 pneumonitis       SUBJECTIVE:     OVERNIGHT EVENTS:         12/16: Overnight able to titrate down some of the vasopressors though still intermittently hypotensive and requiring vasopressin as well as Levophed. Still sedated with fentanyl and Versed. Hyponatremia notable at 119 new today. 12/17: Patient was able to be titrated off the vasopressors. Is now on midodrine. Minimally hypothermic overnight now on Melissa hugger with improvement in temperature. Continue sedation with fentanyl and Versed. 12/18: Patient continues on the ventilator. Back on small amount of Levophed in addition to the vasopressin. Hoping to wean sedation more today. 12/19: Back on levophed overnight. Initially able to titrate fio2 to 40%, became hypoxic and increased to 60%. Apparently their were several episodes of bradycardia overnight. Patient has pacemaker which did not fire per nursing. Follows with Dr. Zachariah Dominguez for cardiology. 12/20: Patient: 6 L overnight, nephrology ordered K-Phos, and half-normal saline, will CT her head today. 12/21: Patient tolerated being supine overnight. Plan for PICC line today. Pacemaker interrogated with no runs of V. tach or other rhythm needing acute intervention. 12/22: Patient remained supine without difficulty. Continuing to try to wean down her FiO2. Her sodium has improved. 12/23: Patient continues to tolerate the vent. She is waking up more.   Sodium is much better. No acute overnight events. 12/24: Failed weaning trial yesterday. Became agitated. Continues to tolerate the ventilator. Day 9 on the ventilator. 12/25: The patient occasionally opens her eyes. She occasionally follows commands. She gets extremely anxious when sedation is weaned further. Continues on Versed and fentanyl. 12/26: Patient continues on the ventilator. Opens eyes with sedation vacation, not really following commands. Day 11 on the ventilator. No other acute overnight events. 12:27: Continues on the vent. Awake on sedation, does follow commands. Day 12 on the ventilator. 12/28: Patient continues on the ventilator. Still occasionally becomes agitated when attempting to wean sedation. Day 13 on the ventilator. Occasionally hypotensive requiring Levophed. 12/29: trach and peg today. Spent 29th on the phone talking to her son who is the power of  yesterday explaining her prognosis and the treatment course going forward. He wants a trach and PEG and everything done at this point. Patient is actually much more awake today and following commands. When asked if she needs the volume turned up on the TV she shakes her head yes. Plan for trach and PEG this afternoon. 12/30: Trach and PEG yesterday. Had to be placed back on Levophed, currently running at 13 mics. Patient is now awake, has restraints off, is alert and responding to commands. Weaning sedatives. 12/31: No acute events overnight, episode of A. fib with RVR yesterday, changed to needle from Levophed, and started on amiodarone bolus and amiodarone per PEG tube. Off fern today. 01/01: No acute events, Weaned off fentanyl drip    01/02: No acute events    1/4: No acute events overnight    1/5: Become more agitated overnight with tachypnea and fluctuating blood pressure. Also ventilatory status declining requiring increased sedation.     1/6: Day 23 Ventilation, Continues tachypnea and poor ventilation. 1/7: Day 24 ventilation. Not tolerating ventilation. Continue to wean sedation as able. Cultures redrawn. 1/8: Day 25 ventilation. Patient found overnight 700 cc residual volume, stopped tube feedings at that time. Recheck this morning only 50 cc residual restarted tube feedings. ,  Start Reglan every 6 hours. 1/9: Remains on ventilator. Unable to wean FiO2. Tube feeds were stopped again last night for having residual 300 cc will resume trickle feeds today. Weaned off pressors. 1/10: Vent day #27, off all pressors and IV sedation. Opens eyes upon calling her name and withdraws to pain. Still on high FIO2 and PEEP     1/11: Vent Day #28, increase Lantus, decrease Ativan to 0.5 every 6 hours. Hold Lasix. Wean phenylephrine as tolerated and decrease PEEP to 12.    1/12: Vent Day #29, Change to Ativan PRN. Continue to wean. Off pressors. 1/13: Vent day #30, patient overall clinically better. Weaning oxygen support. Transfer patient to intermediate floor pending discharge to skilled nursing facility. 1/14: Vent Day #31, patient improving clinically. Will be discharged to nursing facility.     CURRENT VENTILATION STATUS:     [x] Ventilator  [] BIPAP  [] Nasal Cannula [] Room Air      IF INTUBATED, ET TUBE MARKING AT LOWER LIP:       cms    SECRETIONS Amount:  [] Small [] Moderate  [] Large  [x] None  Color:     [] White [] Colored  [] Bloody    SEDATION:  RAAS Score:+1  [] Fentanyl gtt  [] Versed gtt  [x] Ativan gtt   [] No Sedation    PARALYZED:  [x] No    [] Yes      VASOPRESSORS:  [x] No    [] Yes    If yes -   [] Levophed       [] Dopamine     [] Vasopressin       [] Dobutamine  [] Phenylephrine         [] Epinephrine    CENTRAL LINES:     [] No   [x] Yes   (Date of Insertion:  12/15 ) PICC line          If yes -     [] Right IJ     [] Left IJ [] Right Femoral [] Left Femoral                   [] Right Subclavian [] Left Subclavian   PICC line    GARCIA'S CATHETER:   [] No [x] Yes  (Date of Insertion: 12/15  )     URINE OUTPUT:            [x] Good   [] Low              [] Anuric      OBJECTIVE:     VITAL SIGNS:  BP (!) 96/45   Pulse 89   Temp 97.6 °F (36.4 °C) (Axillary)   Resp (!) 33   Ht 5' 6\" (1.676 m)   Wt 210 lb 12.2 oz (95.6 kg)   SpO2 92%   BMI 34.02 kg/m²   Tmax over 24 hours:  Temp (24hrs), Av.9 °F (36.6 °C), Min:97.1 °F (36.2 °C), Max:98.5 °F (36.9 °C)      Patient Vitals for the past 6 hrs:   Temp Temp src Pulse Resp SpO2   21 1611 -- -- -- (!) 33 92 %   21 1607 -- -- 89 23 92 %   21 1600 -- -- 88 (!) 40 92 %   21 1500 -- -- 81 (!) 33 90 %   21 1400 -- -- 88 (!) 31 91 %   21 1300 -- -- 90 (!) 35 91 %   21 1244 -- -- 80 24 96 %   21 1239 -- -- -- (!) 34 92 %   21 1200 97.6 °F (36.4 °C) Axillary 83 29 92 %         Intake/Output Summary (Last 24 hours) at 2021 1702  Last data filed at 2021 1000  Gross per 24 hour   Intake 590 ml   Output 1015 ml   Net -425 ml     Wt Readings from Last 2 Encounters:   21 210 lb 12.2 oz (95.6 kg)   20 188 lb 9 oz (85.5 kg)     Body mass index is 34.02 kg/m². PHYSICAL EXAMINATION:    General: Will follow some commands, opens eyes spontaneously. Improved overall. HEENT: Pupils are equal round and reactive to light, there are no oral lesions and no post-nasal drip   Neck: supple without adenopathy, tracheostomy in place  Cardiovascular: regular rate, and rhythm without murmur or gallop  Respiratory:  Decreased breath sounds bilaterally without wheezing or crackles. Air entry is symmetric, currently tachypneic.   Abdomen: soft, non-tender, non-distended, normal bowel sounds, lateral bruising in the flanks; PEG in place  Extremities: warm, bilateral equal edema upper and lower extremities, no clubbing   Skin:  Large bilateral ecchymoses on the flanks, and lower quadrants of the abdomen  Neurologic: Trached, pupils equal round, interactive, opens eyes spontaneously    Any additional physical findings:    MEDICATIONS:    Scheduled Meds:   digoxin  250 mcg Oral Daily    senna  10 mL Per G Tube Nightly    amiodarone  200 mg Oral BID    insulin glargine  40 Units Subcutaneous Daily    vitamin C  1,000 mg Oral Daily    midodrine  15 mg Oral TID    Vitamin D  1,000 Units Oral Daily    lansoprazole  30 mg Per G Tube QAM AC    apixaban  5 mg Oral BID    nicotine  1 patch Transdermal Daily    thiamine  100 mg Oral Daily    zinc sulfate  50 mg Oral Daily    heparin flush  3 mL Intravenous 2 times per day    docusate  100 mg Oral Daily    insulin lispro  0-18 Units Subcutaneous Q6H    ipratropium-albuterol  1 ampule Inhalation Q4H WA    budesonide  500 mcg Nebulization BID    Arformoterol Tartrate  15 mcg Nebulization BID    clopidogrel  75 mg Oral Daily    chlorhexidine  15 mL Mouth/Throat BID    sodium chloride flush  10 mL Intravenous 2 times per day    gabapentin  800 mg Oral TID    levothyroxine  100 mcg Oral Daily    atorvastatin  20 mg Oral Daily     Continuous Infusions:   dextrose       PRN Meds:       LORazepam, 0.5 mg, Q6H PRN      oxyCODONE, 5 mg, Q6H PRN      glucose, 15 g, PRN      dextrose, 12.5 g, PRN      glucagon (rDNA), 1 mg, PRN      dextrose, 100 mL/hr, PRN      LORazepam, 1 mg, Q4H PRN      acetaminophen, 650 mg, Q6H PRN      metoprolol, 5 mg, Q6H PRN      sodium chloride flush, 10 mL, PRN      heparin flush, 3 mL, PRN      potassium chloride, 20 mEq, PRN      fentanNYL, 25 mcg, Q3H PRN      ondansetron, 4 mg, Q6H PRN      polyethylene glycol, 17 g, Daily PRN          VENT SETTINGS (Comprehensive) (if applicable):  Vent Information  $Ventilation: $Subsequent Day  Skin Assessment: Clean, dry, & intact  Equipment ID: [id number]  Equipment Changed: (S) Humidification  Vent Type: [id number]  Vent Mode: AC/VC+  Vt Ordered: 380 mL  Pressure Ordered: 14  Rate Set: 22 bmp  Peak Flow: 0 L/min  Pressure Support: 0 cmH20  FiO2 : 70 %  SpO2: 92 %  SpO2/FiO2 ratio: 131.43  PaO2/FiO2 ratio: 86  Sensitivity: 2  PEEP/CPAP: 12  I Time/ I Time %: 1 s  Humidification Source: Heated wire  Humidification Temp: 37  Humidification Temp Measured: 37  Circuit Condensation: Drained  Mask Type: Full face mask  Mask Size: Small  Additional Respiratory  Assessments  Pulse: 89  Resp: (!) 33  SpO2: 92 %  Position: Semi-Marrero's  Humidification Source: Heated wire  Humidification Temp: 37  Circuit Condensation: Drained  Oral Care: Mouth suctioned, Mouth moisturizer, Mouth swabbed  Subglottic Suction Done?: No  Airway Type: Trachial  Airway Size: 8  Cuff Pressure (cm H2O): 29 cm H2O    ABGs:   Recent Labs     01/13/21  0516   PH 7.396   PCO2 55.4*   PO2 72.6*   HCO3 33.2*   BE 7.3*   O2SAT 94.8       Laboratory findings:    Complete Blood Count:   Recent Labs     01/12/21  0505 01/13/21  0505 01/14/21  0540   WBC 15.5* 17.9* 18.6*   HGB 8.1* 8.2* 8.1*   HCT 28.7* 29.3* 28.9*    212 233        Last 3 Blood Glucose:   Recent Labs     01/12/21  1524 01/13/21  0505 01/14/21  0540   GLUCOSE 180* 173* 146*        PT/INR:    Lab Results   Component Value Date    PROTIME 11.4 12/13/2020    PROTIME 11.9 12/15/2011    INR 1.0 12/13/2020     PTT:    Lab Results   Component Value Date    APTT 21.6 12/13/2020       Comprehensive Metabolic Profile:   Recent Labs     01/12/21  1524 01/13/21  0505 01/14/21  0540    139 140   K 4.3 4.3 4.2    101 103   CO2 36* 34* 35*   BUN 22 21 20   CREATININE 0.3* 0.3* 0.3*   GLUCOSE 180* 173* 146*   CALCIUM 8.0* 8.2* 8.1*      Magnesium:   Lab Results   Component Value Date    MG 2.4 01/13/2021     Phosphorus:   Lab Results   Component Value Date    PHOS 2.4 01/13/2021     Ionized Calcium: No results found for: CAION     Urinalysis:     Troponin:   No results for input(s): TROPONINI in the last 72 hours.     Microbiology:    Cultures during this admission:     Blood cultures:                 [] Pending            [x] Negative []  Positive (Details:  )  Urine Culture:                   [] None drawn      [] Negative             []  Positive (Details:  )  Sputum Culture:               [] None drawn       [x] Negative             []  Positive (Details:  )   CULTURE, RESPIRATORY 01/05/2021 11:15 AM [facility]   Oral Pharyngeal Rachell present    Smear, Respiratory 01/05/2021 11:15 AM [facility]   Group 6: <25 PMN's/LPF and <25 Epithelial cells/LPF   Few Polymorphonuclear leukocytes   Rare Epithelial cells   Rare yeast      Endotracheal aspirate:     [] None drawn       [] Negative             []  Positive (Details:  )     Other pertinent Labs:   COVID-19 + December 13  MRSA negative screening   radiology/Imaging:     Chest Xray (1/14/2021):       Right PICC line with tip at the junction of the subclavian and SVC   Extensive bilateral pulmonary infiltrates unchanged   ET and NG tubes appear in satisfactory position             ASSESSMENT:      1. Acute on chronic hypoxic respiratory failure   2. Covid pneumonitis - critically severe   3. Shock-septic   4. Adrenal insufficiency  5. Electrolyte abnormalities  6. Leukocytosis    7. COPD  8. pvd  9. chf not in acute exacerbatino   10. Hx lacunar infarct   11. SSS s/p pacemaker   12. Hx seizure do   13. Bilateral carotid stenosis       SYSTEMS ASSESSMENT    Neuro   Previous history of lacunar stroke  Weaned off sedation Ativan and oxycodone  Patient was now blank with commands moves some extremities slightly. Will need physical therapy and continued respiratory support going forward. Respiratory   Acute respiratory failure with hypoxia secondary to COVID-19 viral pneumonia  Trach and Peg 12/29 1/5 scheduled Ativan for every 6 and Oxy 10 milligrams every 6 hours. Stop fentanyl patch. Patient tachypneic more today than previous, requiring increased sedation in order to maintain oxygen saturation.   1/6: Repeat CT head and Chest today.  1/7; patient continues to require increased PEEP. Continue to wean as tolerated. 1/8: Decrease tidal volume to 360 increase rate to 24.  1/9; trying to wean FiO2 but not successful so far  1/10: continue to wean Fio2 as tolertaed  1/11: Decrease PEEP to 12, wean phenylephrine as tolerated. 1/12: Change to Volume support mode, wean sedation. 1/13: Overall respiratory improvement. Patient still on 70% O2 however we will continue to titrate down. Tolerated volume support today, clinically continues to improve        Cardiovascular   Shock, septic -weaned off pressors   Increase midodrine to 15 tid  intermittent atrial fib/flutter  Afib RVR: Amiodarone 400mg 2 times daily decrease to 200mg once daily on 1/6  Pacemaker interrogated that showed several episodes of supraventricular tachycardia over the last couple of months but no persistent shockable rhythm  Continue plavix, hx of CAD with stent  Continue to hold Lasix. Stop Diamox. Persistent Afib RVR, Start Digoxin-improving, continue digoxin treatment. 1/13: Increase amiodarone to 200 mg twice daily            Gastrointestinal   GI prophylaxis with Protonix  Diet per dietary recs   Colace/senna for constipation  Ct abdomen pelvis 12/27 showed no acute intraabdominal etiology       Renal   Hyponatremia -resolved   -Continue to trend metabolic panel   -Nephrology following for fluid recommendations intravenously  Replete potassium as needed  Adrenal Insufficiency: Increase Solu-Cortef to 100 mg every 8 hours. PATRICK resolved kidney function improving. Infectious Disease   COVID-19 viral pneumonia   Received remdesivir, Toci, vit D, vit c, thiamine   1/6  Blood cultures pending. 1/6  ua sent today  With repeat urine culture. 1/7: Restart vancomycin and Zosyn due to possible sepsis.   Cultures negative  1/14: Patient off pressors overall doing better    Hematology/Oncology   Anemia on admission, trend H&H, improving  CBC daily  1/5 No more Blood in stools, Restarted Lovenox. Endocrine   Diabetes: High-dose sliding scale insulin for hyperglycemia  Hyperglycemia: Increase Lantus dose to 40mg  daily. Hydrocortisone weaned, continue to monitor blood pressure      Social/Spiritual/DNR/Other   Full code. Palliative consulted. Spoke with son he dresses concerns of the patient not being ready to be discharged to the nursing home. He also want to readdress whether there would be any clinical improvement going forward. ASSESSMENT/ PLAN     1. Trach and peg 12/29  2. Continue midodrine TID ,   3. Palliative following , Code stauts changed to DNR-CCA  4. Continue nicotine patch   5. We will try to wean FiO2 for saturations above 94%  6. Digoxin 250 mg once daily for A. fib continue  7. Change to Pressure Support Mode, wean vent as tolerated  8. Overall improved clinically. Patient stable to be discharged to vent facility unit. Electronically signed by Heena Del Valle DO on 1/14/2021 at 5:02 PM    Addendum:    Mobile transfer arrived to  patient this evening  , on two attempts to transfer patient with their portable vent patient desaturated into the 70's and became very tachycardic and tachypnea, I cancelled the discharge and transportation . Patient remains in ICU care. I personally saw, examined and provided care for the patient. Radiographs, labs and medication list were reviewed by me independently. I spoke with bedside nursing, therapists and consultants. Critical care services and times documented are independent of procedures and multidisciplinary rounds with Residents. Additionally comprehensive, multidisciplinary rounds were conducted with the MICU team. The case was discussed in detail and plans for care were established. Review of Residents documentation was conducted and revisions were made as appropriate. I agree with the above documented exam, problem list and plan of care.   Kailyn Johnson MD   CCT excluding procedures:38'

## 2021-01-14 NOTE — CARE COORDINATION
Social Work/Discharge Planning:  Received notification patient to discharge today to [facility] and to arrange transport. Called [facility] and arranged Ambulance for 5:00pm.  Notified patient son Porfirio Kapoor (ph: [phone]), RN and liaison Suzy at [facility] of discharge time. Spoke with Lakshmi Ayala from [address] [place] [address] Drive, [address] and informed to complete PASRR. PASRR completed, electronic  N-17 in Epic and Ambulance form in soft chart. Electronically signed by DAVID Peck on 1/14/2021 at 12:19 PM    Addendum:  Job Givens with [facility] confirmed patient does not need a COVID test.  Notified RN.   Electronically signed by DAVID Peck on 1/14/2021 at 1:41 PM

## 2021-01-15 NOTE — PROGRESS NOTES
Chief Complaint:  Chief Complaint   Patient presents with    Shortness of Breath     70% RA at NH 88% NRB , being tx for pneumonia hx of copd      Acute respiratory failure with hypoxia ([address]     Subjective:    Lethargic but eyes are open. Follows some commands. Eyes droop shut quickly. Tried to d/c to SNF today but couldn't even tolerate movement for transport. Objective:    BP (!) 99/41   Pulse 67   Temp 97.6 °F (36.4 °C) (Axillary)   Resp 25   Ht 5' 6\" (1.676 m)   Wt 210 lb 12.2 oz (95.6 kg)   SpO2 97%   BMI 34.02 kg/m²     Current medications that patient is taking have been reviewed. General appearance: Moribund appearing elderly female  HEENT: AT/NC, dry mucous membranes  Neck: Trached  Lungs: Anterior rhonchi, mildly tachypneic, some belly breathing  CV: RRR, no MRGs  Abdomen: Soft, non-tender; no masses or HSM, +BS  Extremities: No peripheral edema or digital cyanosis  Skin: no rash, lesions or ulcers  Psych: Calm  Neuro: Lethargic but does awaken to voice, briefly.     Labs:  CBC with Differential:    Lab Results   Component Value Date    WBC 18.6 01/14/2021    RBC 2.68 01/14/2021    HGB 8.1 01/14/2021    HCT 28.9 01/14/2021     01/14/2021    .8 01/14/2021    MCH 30.2 01/14/2021    MCHC 28.0 01/14/2021    RDW 19.6 01/14/2021    NRBC 0.9 01/14/2021    SEGSPCT 89 03/10/2014    BANDSPCT 1 05/19/2016    METASPCT 1.0 01/11/2021    LYMPHOPCT 8.8 01/14/2021    PROMYELOPCT 1.8 12/19/2020    MONOPCT 0.0 01/14/2021    MYELOPCT 0.9 01/14/2021    BASOPCT 0.0 01/14/2021    MONOSABS 0.00 01/14/2021    LYMPHSABS 1.67 01/14/2021    EOSABS 0.00 01/14/2021    BASOSABS 0.00 01/14/2021     CMP:    Lab Results   Component Value Date     01/14/2021    K 4.2 01/14/2021    K 3.3 12/15/2020     01/14/2021    CO2 35 01/14/2021    BUN 20 01/14/2021    CREATININE 0.3 01/14/2021    GFRAA >60 01/14/2021    LABGLOM >60 01/14/2021    GLUCOSE 146 01/14/2021    GLUCOSE 106 12/15/2011    PROT 5.0 01/03/2021    LABALBU 3.0 01/03/2021    CALCIUM 8.1 01/14/2021    BILITOT 0.7 01/03/2021    ALKPHOS 130 01/03/2021    AST 21 01/03/2021    ALT 14 01/03/2021          Assessment/Plan:  Principal Problem:    Acute respiratory failure with hypoxia (HCC)  Active Problems:    COPD (chronic obstructive pulmonary disease) (HCC)    History of DVT (deep vein thrombosis)    Coronary artery disease involving native coronary artery without angina pectoris    Acute respiratory failure due to COVID-19 [facility])    Septic shock ([facility])    Atrial fibrillation with RVR ([facility] [address]    Pneumonia due to COVID-19 virus  Resolved Problems:    * No resolved hospital problems. *       Wean vent as tolerated    Continue steroids    Continue Eliquis and amiodarone    Continue bronchodilators    Continue Plavix    Prognosis not good. She has been in the hospital for 1 month. Her baseline health is not good - CAD, PAD, VTE, CHF, COPD. She remains minimally responsive, trached and pegged. If a facility can take her and we can successfully move her, she can be d/c'ed. Evidently she has been declined for LTAC.     Humble Cerrato Solid    8:08 PM  1/14/2021  Cell: [phone]

## 2021-01-15 NOTE — CARE COORDINATION
Social Work/Discharge Planning:  Patient did not discharge yesterday due to not ventilating well and decreased oxygen saturation. Suzy with [facility] called stating facility can accept patient today if medically stable. Yaneli Galdamez requested to be notified if patient does not discharge today, then facility to submit for pre-cert. [facility] can accept patient over the weekend with pre-cert pending. Received update from  patient will not discharge today. Called liaison Yaneli Galdamez with [facility] and requested for facility initiate pre-cert. Called patient ziyad Angulo (ph: [phone]) to provide update and left a voicemail to return call. Will continue to follow. Electronically signed by DAVID Nickerson on 1/15/2021 at 10:09 AM    Addendum:  Patient ziyad Angulo returned call and update provided.   Electronically signed by DAVID Nickerson on 1/15/2021 at 12:39 PM

## 2021-01-15 NOTE — PROGRESS NOTES
Transport here to take patient to [place]. Patient not ventilating well and O2 staturation decreased in the 50's.  Dr. Minnie Vicente aware and will keep patient here due to change in condition

## 2021-01-15 NOTE — PROGRESS NOTES
1/15/2021  12:03 PM      Comprehensive Nutrition Assessment    Type and Reason for Visit:  Reassess    Nutrition Recommendations/Plan: Continue current TF, as tolerated    Nutrition Assessment:  Pt remains intubated/sedated 2/2Covid+ and is IM status waiting for bed to transfer out of ICU. Seems tolerating current TF via PEG. Malnutrition Assessment:  Malnutrition Status: At risk for malnutrition (Comment)    Context:  Acute Illness     Findings of the 6 clinical characteristics of malnutrition:  Energy Intake:  1 - 75% or less of estimated energy requirements for 7 or more days  Weight Loss:  Unable to assess(d/t fluids/fluctuations)     Body Fat Loss:  Unable to assess     Muscle Mass Loss:  Unable to assess    Fluid Accumulation:  No significant fluid accumulation     Strength:  Not Performed    Estimated Daily Nutrient Needs:  Energy (kcal):  ; Weight Used for Energy Requirements:  Admission     Protein (g):  75-90 (1.3-1.5 g/kg);  Weight Used for Protein Requirements:  Ideal        Fluid (ml/day):  ; Method Used for Fluid Requirements:  1 ml/kcal      Nutrition Related Findings:  intubated/trach, PEG to TF, +2 edema, hypo BS, I/O WNL,      Wounds:  Surgical Incision(trach/PEG sites)       Current Nutrition Therapies:    Current Tube Feeding (TF) Orders:  · Feeding Route: PEG  · Formula: Immune Enhancing  · Schedule: Continuous  · Additives/Modulars: (none)  · Water Flushes: 200 ml Q 6 hrs  · Current TF & Flush Orders Provides: at goal  · Goal TF & Flush Orders Provides: 1080 ml/d, 1620 nguyen, 102 g pro, 1620 ml total free water    Additional Calorie Sources:   none now    Anthropometric Measures:  · Height: 5' 6\" (167.6 cm)  · Current Body Weight: 210 lb (95.3 kg)(1/11)   · Admission Body Weight: 184 lb (83.5 kg)(12/15 first measured wt)    · Usual Body Weight: 161 lb (73 kg)(per EMR x 6 mo)     · Ideal Body Weight: 130 lbs; % Ideal Body Weight 161.5 %   · BMI: 33.9  ·   · BMI Categories: Overweight (BMI 25.0-29.9)(29.7 at admit wt)       Nutrition Diagnosis:   · Inadequate oral intake related to impaired respiratory function(2/2 COVID-19 PNA) as evidenced by NPO or clear liquid status due to medical condition, intubation      Nutrition Interventions:   Food and/or Nutrient Delivery:  Continue Current Tube Feeding  Nutrition Education/Counseling:  No recommendation at this time   Coordination of Nutrition Care:  Continue to monitor while inpatient    Goals:  Pt ernestine EN at goal rate       Nutrition Monitoring and Evaluation:   Behavioral-Environmental Outcomes:  None Identified   Food/Nutrient Intake Outcomes:  Enteral Nutrition Intake/Tolerance  Physical Signs/Symptoms Outcomes:  GI Status, Biochemical Data, Fluid Status or Edema, Nutrition Focused Physical Findings, Weight, Skin     Discharge Planning:    Enteral Nutrition     Electronically signed by Justin Duran RD, CNSC, LD on 1/15/21 at 12:03 PM EST    Contact: [phone]

## 2021-01-15 NOTE — CONSULTS
Assessed patient for placement of PICC line. Right arm is grossly swollen. Recommended that an ultrasound be done to be sure there is no DVT in the right upper extremity prior to assessing for PICC placement. Unable to use left arm due to pacemaker to left side of chest. Stephanie Ramos RN notified of findings. She will speak to physician regarding moving forward.

## 2021-01-15 NOTE — PROGRESS NOTES
Critical Care Team - Daily Progress Note         Date and time: 1/15/2021 2:32 PM  Patient's name:  Clementina [facility] Record Number: [id number]  Patient's account/billing number: [de-identified]  Patient's YOB: 1950  Age: 65 y.o. Date of Admission: 12/13/2020 10:51 AM  Length of stay during current admission: 33      Primary Care Physician: Miranda Burris DO  ICU Attending Physician: Dr. Nguyen Fail    Code Status: DNR-CCA    Reason for ICU admission: respiratory failure   Shock   Covid 19 pneumonitis       SUBJECTIVE:     OVERNIGHT EVENTS:         12/16: Overnight able to titrate down some of the vasopressors though still intermittently hypotensive and requiring vasopressin as well as Levophed. Still sedated with fentanyl and Versed. Hyponatremia notable at 119 new today. 12/17: Patient was able to be titrated off the vasopressors. Is now on midodrine. Minimally hypothermic overnight now on Melissa hugger with improvement in temperature. Continue sedation with fentanyl and Versed. 12/18: Patient continues on the ventilator. Back on small amount of Levophed in addition to the vasopressin. Hoping to wean sedation more today. 12/19: Back on levophed overnight. Initially able to titrate fio2 to 40%, became hypoxic and increased to 60%. Apparently their were several episodes of bradycardia overnight. Patient has pacemaker which did not fire per nursing. Follows with Dr. Adrienne Lee for cardiology. 12/20: Patient: 6 L overnight, nephrology ordered K-Phos, and half-normal saline, will CT her head today. 12/21: Patient tolerated being supine overnight. Plan for PICC line today. Pacemaker interrogated with no runs of V. tach or other rhythm needing acute intervention. 12/22: Patient remained supine without difficulty. Continuing to try to wean down her FiO2. Her sodium has improved. 12/23: Patient continues to tolerate the vent. She is waking up more.   Sodium is much better. No acute overnight events. 12/24: Failed weaning trial yesterday. Became agitated. Continues to tolerate the ventilator. Day 9 on the ventilator. 12/25: The patient occasionally opens her eyes. She occasionally follows commands. She gets extremely anxious when sedation is weaned further. Continues on Versed and fentanyl. 12/26: Patient continues on the ventilator. Opens eyes with sedation vacation, not really following commands. Day 11 on the ventilator. No other acute overnight events. 12:27: Continues on the vent. Awake on sedation, does follow commands. Day 12 on the ventilator. 12/28: Patient continues on the ventilator. Still occasionally becomes agitated when attempting to wean sedation. Day 13 on the ventilator. Occasionally hypotensive requiring Levophed. 12/29: trach and peg today. Spent 29th on the phone talking to her son who is the power of  yesterday explaining her prognosis and the treatment course going forward. He wants a trach and PEG and everything done at this point. Patient is actually much more awake today and following commands. When asked if she needs the volume turned up on the TV she shakes her head yes. Plan for trach and PEG this afternoon. 12/30: Trach and PEG yesterday. Had to be placed back on Levophed, currently running at 13 mics. Patient is now awake, has restraints off, is alert and responding to commands. Weaning sedatives. 12/31: No acute events overnight, episode of A. fib with RVR yesterday, changed to needle from Levophed, and started on amiodarone bolus and amiodarone per PEG tube. Off fern today. 01/01: No acute events, Weaned off fentanyl drip    01/02: No acute events    1/4: No acute events overnight    1/5: Become more agitated overnight with tachypnea and fluctuating blood pressure. Also ventilatory status declining requiring increased sedation.     1/6: Day 23 Ventilation, Continues tachypnea and poor ventilation. 1/7: Day 24 ventilation. Not tolerating ventilation. Continue to wean sedation as able. Cultures redrawn. 1/8: Day 25 ventilation. Patient found overnight 700 cc residual volume, stopped tube feedings at that time. Recheck this morning only 50 cc residual restarted tube feedings. ,  Start Reglan every 6 hours. 1/9: Remains on ventilator. Unable to wean FiO2. Tube feeds were stopped again last night for having residual 300 cc will resume trickle feeds today. Weaned off pressors. 1/10: Vent day #27, off all pressors and IV sedation. Opens eyes upon calling her name and withdraws to pain. Still on high FIO2 and PEEP     1/11: Vent Day #28, increase Lantus, decrease Ativan to 0.5 every 6 hours. Hold Lasix. Wean phenylephrine as tolerated and decrease PEEP to 12.    1/12: Vent Day #29, Change to Ativan PRN. Continue to wean. Off pressors. 1/13: Vent day #30, patient overall clinically better. Weaning oxygen support. Transfer patient to intermediate floor pending discharge to skilled nursing facility. 1/14: Vent Day #31, patient improving clinically. Will be discharged to nursing facility. 1/15: Vent day #32, patient acutely decompensated when trying to transfer her to the nursing facility. Due to this she was knitted back to the ICU yesterday. Patient will be transferred to intermediate today for continued intermediate level treatment.     CURRENT VENTILATION STATUS:     [x] Ventilator  [] BIPAP  [] Nasal Cannula [] Room Air      IF INTUBATED, ET TUBE MARKING AT LOWER LIP:       cms    SECRETIONS Amount:  [] Small [] Moderate  [] Large  [x] None  Color:     [] White [] Colored  [] Bloody    SEDATION:  RAAS Score:+1  [] Fentanyl gtt  [] Versed gtt  [x] Ativan gtt   [] No Sedation    PARALYZED:  [x] No    [] Yes      VASOPRESSORS:  [x] No    [] Yes    If yes -   [] Levophed       [] Dopamine     [] Vasopressin       [] Dobutamine  [] Phenylephrine         [] Epinephrine    CENTRAL LINES:     [] No   [x] Yes   (Date of Insertion:  12/15 ) PICC line          If yes -     [] Right IJ     [] Left IJ [] Right Femoral [] Left Femoral                   [] Right Subclavian [] Left Subclavian   PICC line    GARCIA'S CATHETER:   [] No   [x] Yes  (Date of Insertion: 12/15  )     URINE OUTPUT:            [x] Good   [] Low              [] Anuric      OBJECTIVE:     VITAL SIGNS:  BP (!) 140/66   Pulse 77   Temp 97.7 °F (36.5 °C) (Axillary)   Resp (!) 35   Ht 5' 6\" (1.676 m)   Wt 210 lb 12.2 oz (95.6 kg)   SpO2 90%   BMI 34.02 kg/m²   Tmax over 24 hours:  Temp (24hrs), Av °F (36.7 °C), Min:97.7 °F (36.5 °C), Max:98.3 °F (36.8 °C)      Patient Vitals for the past 6 hrs:   BP Temp Temp src Pulse Resp SpO2 Height   01/15/21 1251 -- -- -- 77 (!) 35 90 % --   01/15/21 1250 -- -- -- -- (!) 34 90 % --   01/15/21 1200 (!) 140/66 97.7 °F (36.5 °C) Axillary 72 25 93 % --   01/15/21 1149 -- -- -- -- -- -- 5' 6\" (1.676 m)   01/15/21 1100 133/60 -- -- 80 (!) 31 92 % --   01/15/21 1000 (!) 116/54 -- -- 80 (!) 34 92 % --   01/15/21 0900 112/60 -- -- 78 28 92 % --   01/15/21 0858 -- -- -- 75 25 92 % --   01/15/21 0856 -- -- -- -- 28 92 % --         Intake/Output Summary (Last 24 hours) at 1/15/2021 1432  Last data filed at 1/15/2021 0400  Gross per 24 hour   Intake 540 ml   Output 1150 ml   Net -610 ml     Wt Readings from Last 2 Encounters:   21 210 lb 12.2 oz (95.6 kg)   20 188 lb 9 oz (85.5 kg)     Body mass index is 34.02 kg/m². PHYSICAL EXAMINATION:    General: Follows commands, opens eyes spontaneously. Improved overall. HEENT: Pupils are equal round and reactive to light, there are no oral lesions and no post-nasal drip   Neck: supple without adenopathy, tracheostomy in place  Cardiovascular: regular rate, and rhythm without murmur or gallop  Respiratory:  Decreased breath sounds bilaterally without wheezing or crackles.   Air entry is symmetric, currently tachypneic.   Abdomen: soft, non-tender, non-distended, normal bowel sounds, lateral bruising in the flanks; PEG in place  Extremities: warm, bilateral equal edema upper and lower extremities, no clubbing   Skin:  Large bilateral ecchymoses on the flanks, and lower quadrants of the abdomen  Neurologic: Trached, pupils equal round, interactive, opens eyes spontaneously    Any additional physical findings:    MEDICATIONS:    Scheduled Meds:   lidocaine PF  5 mL Intradermal Once    heparin flush  3 mL Intravenous 2 times per day    amiodarone  200 mg Oral BID    Followed by   Adelia Vázquez ON 1/22/2021] amiodarone  200 mg Oral Daily    digoxin  250 mcg Oral Daily    senna  10 mL Per G Tube Nightly    insulin glargine  40 Units Subcutaneous Daily    vitamin C  1,000 mg Oral Daily    midodrine  15 mg Oral TID    Vitamin D  1,000 Units Oral Daily    lansoprazole  30 mg Per G Tube QAM AC    apixaban  5 mg Oral BID    nicotine  1 patch Transdermal Daily    thiamine  100 mg Oral Daily    zinc sulfate  50 mg Oral Daily    heparin flush  3 mL Intravenous 2 times per day    docusate  100 mg Oral Daily    insulin lispro  0-18 Units Subcutaneous Q6H    ipratropium-albuterol  1 ampule Inhalation Q4H WA    budesonide  500 mcg Nebulization BID    Arformoterol Tartrate  15 mcg Nebulization BID    clopidogrel  75 mg Oral Daily    chlorhexidine  15 mL Mouth/Throat BID    sodium chloride flush  10 mL Intravenous 2 times per day    gabapentin  800 mg Oral TID    levothyroxine  100 mcg Oral Daily    atorvastatin  20 mg Oral Daily     Continuous Infusions:   dextrose       PRN Meds:       sodium chloride flush, 10 mL, PRN      heparin flush, 3 mL, PRN      LORazepam, 0.5 mg, Q6H PRN      oxyCODONE, 5 mg, Q6H PRN      glucose, 15 g, PRN      dextrose, 12.5 g, PRN      glucagon (rDNA), 1 mg, PRN      dextrose, 100 mL/hr, PRN      LORazepam, 1 mg, Q4H PRN      acetaminophen, 650 mg, Q6H PRN      metoprolol, 5 mg, Q6H PRN      sodium chloride flush, 10 mL, PRN      heparin flush, 3 mL, PRN      potassium chloride, 20 mEq, PRN      ondansetron, 4 mg, Q6H PRN      polyethylene glycol, 17 g, Daily PRN          VENT SETTINGS (Comprehensive) (if applicable):  Vent Information  $Ventilation: $Subsequent Day  Skin Assessment: Clean, dry, & intact  Equipment ID: [id number]  Equipment Changed: (S) Humidification  Vent Type: [id number]  Vent Mode: AC/VC+  Vt Ordered: 400 mL  Pressure Ordered: 14  Rate Set: 22 bmp  Peak Flow: 0 L/min  Pressure Support: 0 cmH20  FiO2 : 75 %  SpO2: 90 %  SpO2/FiO2 ratio: 120  PaO2/FiO2 ratio: 86  Sensitivity: 2  PEEP/CPAP: 12  I Time/ I Time %: 0 s  Humidification Source: Heated wire  Humidification Temp: 37  Humidification Temp Measured: 35.8  Circuit Condensation: Drained  Mask Type: Full face mask  Mask Size: Small  Additional Respiratory  Assessments  Pulse: 77  Resp: (!) 35  SpO2: 90 %  Position: Semi-Marrero's  Humidification Source: Heated wire  Humidification Temp: 37  Circuit Condensation: Drained  Oral Care: Mouth suctioned, Mouth moisturizer, Mouth swabbed  Subglottic Suction Done?: No  Airway Type: Trachial  Airway Size: 8  Cuff Pressure (cm H2O): 29 cm H2O    ABGs:   Recent Labs     01/13/21  0516   PH 7.396   PCO2 55.4*   PO2 72.6*   HCO3 33.2*   BE 7.3*   O2SAT 94.8       Laboratory findings:    Complete Blood Count:   Recent Labs     01/13/21  0505 01/14/21  0540 01/15/21  0516   WBC 17.9* 18.6* 13.6*   HGB 8.2* 8.1* 7.1*   HCT 29.3* 28.9* 24.3*    233 199        Last 3 Blood Glucose:   Recent Labs     01/13/21  0505 01/14/21  0540 01/15/21  0516   GLUCOSE 173* 146* 132*        PT/INR:    Lab Results   Component Value Date    PROTIME 11.4 12/13/2020    PROTIME 11.9 12/15/2011    INR 1.0 12/13/2020     PTT:    Lab Results   Component Value Date    APTT 21.6 12/13/2020       Comprehensive Metabolic Profile:   Recent Labs     01/13/21  0505 01/14/21  0540 01/15/21  0516    140 142   K 4.3 4.2 4.2    103 103   CO2 34* 35* 36*   BUN 21 20 19   CREATININE 0.3* 0.3* 0.3*   GLUCOSE 173* 146* 132*   CALCIUM 8.2* 8.1* 7.6*      Magnesium:   Lab Results   Component Value Date    MG 2.4 01/13/2021     Phosphorus:   Lab Results   Component Value Date    PHOS 2.4 01/13/2021     Ionized Calcium: No results found for: CAION     Urinalysis:     Troponin:   No results for input(s): TROPONINI in the last 72 hours. Microbiology:    Cultures during this admission:     Blood cultures:                 [] Pending            [x] Negative             []  Positive (Details:  )  Urine Culture:                   [] None drawn      [] Negative             []  Positive (Details:  )  Sputum Culture:               [] None drawn       [x] Negative             []  Positive (Details:  )   CULTURE, RESPIRATORY 01/05/2021 11:15 AM [facility]   Oral Pharyngeal Rachell present    Smear, Respiratory 01/05/2021 11:15 AM [facility]   Group 6: <25 PMN's/LPF and <25 Epithelial cells/LPF   Few Polymorphonuclear leukocytes   Rare Epithelial cells   Rare yeast      Endotracheal aspirate:     [] None drawn       [] Negative             []  Positive (Details:  )     Other pertinent Labs:   COVID-19 + December 13  MRSA negative screening   radiology/Imaging:     Chest Xray (1/15/2021):       Right PICC line with tip at the junction of the subclavian and SVC   Extensive bilateral pulmonary infiltrates unchanged   ET and NG tubes appear in satisfactory position             ASSESSMENT:      1. Acute on chronic hypoxic respiratory failure   2. Covid pneumonitis - critically severe   3. Shock-septic   4. Adrenal insufficiency  5. Electrolyte abnormalities  6. Leukocytosis    7. COPD  8. pvd  9. chf not in acute exacerbatino   10. Hx lacunar infarct   11. SSS s/p pacemaker   12. Hx seizure do   13.  Bilateral carotid stenosis       SYSTEMS ASSESSMENT    Neuro   Previous history of lacunar stroke  Weaned off sedation Ativan and oxycodone  Patient was now blank with commands moves some extremities slightly. Will need physical therapy and continued respiratory support going forward. Respiratory   Acute respiratory failure with hypoxia secondary to COVID-19 viral pneumonia  Trach and Peg 12/29 1/5 scheduled Ativan for every 6 and Oxy 10 milligrams every 6 hours. Stop fentanyl patch. Patient tachypneic more today than previous, requiring increased sedation in order to maintain oxygen saturation. 1/6: Repeat CT head and Chest today. 1/7; patient continues to require increased PEEP. Continue to wean as tolerated. 1/8: Decrease tidal volume to 360 increase rate to 24.  1/9; trying to wean FiO2 but not successful so far  1/10: continue to wean Fio2 as tolertaed  1/11: Decrease PEEP to 12, wean phenylephrine as tolerated. 1/12: Change to Volume support mode, wean sedation. 1/13: Overall respiratory improvement. Patient still on 70% O2 however we will continue to titrate down. 1/14: Tolerated volume support today, clinically continues to improve  1/15: Continue to wean oxygen as able. Continues to improve clinically      Cardiovascular   Shock, septic -weaned off pressors   Increase midodrine to 15 tid  intermittent atrial fib/flutter  Afib RVR: Amiodarone 400mg 2 times daily decrease to 200mg once daily on 1/6  Pacemaker interrogated that showed several episodes of supraventricular tachycardia over the last couple of months but no persistent shockable rhythm  Continue plavix, hx of CAD with stent  Continue to hold Lasix. Stop Diamox. Persistent Afib RVR, Start Digoxin-improving, continue digoxin treatment. 1/13:  Increase amiodarone to 200 mg twice daily      Gastrointestinal   GI prophylaxis with Protonix  Diet per dietary recs   Colace/senna for constipation  Ct abdomen pelvis 12/27 showed no acute intraabdominal etiology       Renal   Hyponatremia -resolved   -Continue to trend metabolic panel   -Nephrology following for fluid recommendations intravenously  Replete potassium as needed  Adrenal Insufficiency: Increase Solu-Cortef to 100 mg every 8 hours. PATRICK resolved kidney function improving. Infectious Disease   COVID-19 viral pneumonia   Received remdesivir, Toci, vit D, vit c, thiamine   1/6  Blood cultures pending. 1/6  ua sent today  With repeat urine culture. 1/7: Restart vancomycin and Zosyn due to possible sepsis. Cultures negative  1/14: Patient off pressors overall doing better    Hematology/Oncology   Anemia on admission, trend H&H, improving  CBC daily  1/5 No more Blood in stools, Restarted Lovenox. Endocrine   Diabetes: High-dose sliding scale insulin for hyperglycemia  Hyperglycemia: Increase Lantus dose to 40mg  daily. Hydrocortisone weaned, continue to monitor blood pressure      Social/Spiritual/DNR/Other   Full code. Palliative consulted. Spoke with son he dresses concerns of the patient not being ready to be discharged to the nursing home. He also want to readdress whether there would be any clinical improvement going forward. ASSESSMENT/ PLAN     1. Trach and peg 12/29  2. Continue midodrine TID ,   3. Palliative following , Code stauts changed to DNR-CCA  4. Continue nicotine patch   5. We will try to wean FiO2 for saturations above 94%  6. Digoxin 250 mg once daily for A. fib continue  7. Change to Pressure Support Mode, wean vent as tolerated  8. Overall improved clinically. Patient stable to be discharged to vent facility unit. Electronically signed by Lashell Quiñones DO on 1/15/2021 at 2:31 PM    I personally saw, examined and provided care for the patient. Radiographs, labs and medication list were reviewed by me independently. I spoke with bedside nursing, therapists and consultants. Critical care services and times documented are independent of procedures and multidisciplinary rounds with Residents.  Additionally comprehensive, multidisciplinary rounds were conducted with the MICU team. The case was discussed in detail and plans for care were established. Review of Residents documentation was conducted and revisions were made as appropriate. I agree with the above documented exam, problem list and plan of care.   Scot Campos MD   CCT excluding procedures:35'

## 2021-01-16 NOTE — PROGRESS NOTES
Chief Complaint:  Chief Complaint   Patient presents with    Shortness of Breath     70% RA at NH 88% NRB , being tx for pneumonia hx of copd      Acute respiratory failure with hypoxia ([address]     Subjective:    Lethargic but eyes are open. Objective:    BP (!) 138/58   Pulse 84   Temp 97.9 °F (36.6 °C) (Axillary)   Resp (!) 39   Ht 5' 6\" (1.676 m)   Wt 210 lb 12.2 oz (95.6 kg)   SpO2 91%   BMI 34.02 kg/m²     Current medications that patient is taking have been reviewed. General appearance: Moribund appearing elderly female  Psych: Calm  Neuro: Lethargic but does awaken to voice, briefly. Face-to-face physical exam was not done today, to limit spread of coronavirus and preserve PPE.      Labs:  CBC with Differential:    Lab Results   Component Value Date    WBC 13.6 01/15/2021    RBC 2.26 01/15/2021    HGB 7.1 01/15/2021    HCT 24.3 01/15/2021     01/15/2021    .5 01/15/2021    MCH 31.4 01/15/2021    MCHC 29.2 01/15/2021    RDW 19.8 01/15/2021    NRBC 0.9 01/14/2021    SEGSPCT 89 03/10/2014    BANDSPCT 1 05/19/2016    METASPCT 1.0 01/11/2021    LYMPHOPCT 4.8 01/15/2021    PROMYELOPCT 1.8 12/19/2020    MONOPCT 3.0 01/15/2021    MYELOPCT 0.9 01/14/2021    BASOPCT 0.1 01/15/2021    MONOSABS 0.41 01/15/2021    LYMPHSABS 0.65 01/15/2021    EOSABS 0.12 01/15/2021    BASOSABS 0.02 01/15/2021     CMP:    Lab Results   Component Value Date     01/15/2021    K 4.2 01/15/2021    K 3.3 12/15/2020     01/15/2021    CO2 36 01/15/2021    BUN 19 01/15/2021    CREATININE 0.3 01/15/2021    GFRAA >60 01/15/2021    LABGLOM >60 01/15/2021    GLUCOSE 132 01/15/2021    GLUCOSE 106 12/15/2011    PROT 5.0 01/03/2021    LABALBU 3.0 01/03/2021    CALCIUM 7.6 01/15/2021    BILITOT 0.7 01/03/2021    ALKPHOS 130 01/03/2021    AST 21 01/03/2021    ALT 14 01/03/2021          Assessment/Plan:  Principal Problem:    Acute respiratory failure with hypoxia (HCC)  Active Problems:    COPD (chronic obstructive pulmonary disease) ([facility] [address]    History of DVT (deep vein thrombosis)    Coronary artery disease involving native coronary artery without angina pectoris    Acute respiratory failure due to COVID-19 [facility])    Septic shock (HCC)    Atrial fibrillation with RVR ([facility]    Pneumonia due to COVID-19 virus  Resolved Problems:    * No resolved hospital problems. *       Wean vent as tolerated    Completed steroids. Continue Eliquis, digoxin, and amiodarone    Continue bronchodilators    Glucose well controlled    Continue Plavix    Prognosis not good. She has been in the hospital for 1 month. Her baseline health is not good - CAD, PAD, VTE, CHF, COPD. She remains minimally responsive, trached and pegged. If a facility can take her and we can successfully move her, she can be d/c'ed.     Kayla Mosquera    10:08 PM  1/15/2021  Cell: [phone]

## 2021-01-16 NOTE — PROGRESS NOTES
Dakota Cervantes M.D.,[facility]  Sepideh Stephen D.O., F.A.C.O.I., Braden Taylor M.D. Donna Lino M.D., Anne-Marie Pedraza M.D. Kenna Shaw D.O. Daily Pulmonary Progress Note    Patient:  Dianne Torres 65 y.o. female MRN: [id number]     Date of Service: 1/16/2021      Synopsis     We are following patient for vent dependent respiratory failure, Covid pneumonia, COPD    \"CC\" : Shortness of breath    Code status: Full code      Subjective      Patient was seen and examined. No acute events overnight. Hemodynamically stable. Remains in A. fib with rate controlled. Still requiring FiO2 of 70% and PEEP of 12. Opens eyes and follows commands.       Review of Systems:  Unable to obtain    24-hour events:      Objective   Vitals: /66   Pulse 89   Temp 98.8 °F (37.1 °C) (Oral)   Resp (!) 37   Ht 5' 6\" (1.676 m)   Wt 210 lb 12.2 oz (95.6 kg)   SpO2 91%   BMI 34.02 kg/m²     I/O:    Intake/Output Summary (Last 24 hours) at 1/16/2021 1135  Last data filed at 1/16/2021 0600  Gross per 24 hour   Intake 2413 ml   Output 1280 ml   Net 1133 ml       Vent Information  $Ventilation: $Subsequent Day  Skin Assessment: Clean, dry, & intact  Equipment ID: [id number]  Equipment Changed: (S) Humidification  Vent Type: 980  Vent Mode: AC/VC+  Vt Ordered: 400 mL  Pressure Ordered: 14  Rate Set: 22 bmp  Peak Flow: 0 L/min  Pressure Support: 0 cmH20  FiO2 : 75 %  SpO2: 91 %  SpO2/FiO2 ratio: 121.33  PaO2/FiO2 ratio: 86  Sensitivity: 2  PEEP/CPAP: 12  I Time/ I Time %: 0.85 s  Humidification Source: Heated wire  Humidification Temp: 37  Humidification Temp Measured: 37  Circuit Condensation: Drained  Mask Type: Full face mask  Mask Size: Small       IPAP: 14 cmH20  CPAP/EPAP: 8 cmH2O     CURRENT MEDS :  Scheduled Meds:   lidocaine PF  5 mL Intradermal Once    heparin flush  3 mL Intravenous 2 times per day    amiodarone  200 mg Oral BID    Followed by   Sydna  ON 1/22/2021] amiodarone  200 mg Oral Daily    digoxin  250 mcg Oral Daily    senna  10 mL Per G Tube Nightly    insulin glargine  40 Units Subcutaneous Daily    vitamin C  1,000 mg Oral Daily    midodrine  15 mg Oral TID    Vitamin D  1,000 Units Oral Daily    lansoprazole  30 mg Per G Tube QAM AC    apixaban  5 mg Oral BID    nicotine  1 patch Transdermal Daily    thiamine  100 mg Oral Daily    zinc sulfate  50 mg Oral Daily    heparin flush  3 mL Intravenous 2 times per day    docusate  100 mg Oral Daily    insulin lispro  0-18 Units Subcutaneous Q6H    ipratropium-albuterol  1 ampule Inhalation Q4H WA    budesonide  500 mcg Nebulization BID    Arformoterol Tartrate  15 mcg Nebulization BID    clopidogrel  75 mg Oral Daily    chlorhexidine  15 mL Mouth/Throat BID    sodium chloride flush  10 mL Intravenous 2 times per day    gabapentin  800 mg Oral TID    levothyroxine  100 mcg Oral Daily    atorvastatin  20 mg Oral Daily       Physical Exam:  General Appearance: appears comfortable in no acute distress. HEENT: Normocephalic atraumatic without obvious abnormality   Neck: Lips, mucosa, and tongue normal.  Supple, symmetrical, trachea midline, no adenopathy;thyroid:  no enlargement/tenderness/nodules or JVD. Lung: Coarse bilateral breathing sounds  Heart: RRR, normal S1, S2. No MRG  Abdomen: Soft, NT, ND. BS present x 4 quadrants. No bruit or organomegaly. Extremities: Has right arm swelling, positive peripheral pulses   Musculokeletal: No joint swelling, no muscle tenderness. ROM normal in all joints of extremities.    Neurologic: Awake and alert and able to follow commands    Pertinent/ New Labs and Imaging Studies     Imaging Personally Reviewed:    Narrative   EXAMINATION:   ONE XRAY VIEW OF THE CHEST   1/15/2021 5:16 am   COMPARISON:   January 12, 2021   HISTORY:   ORDERING SYSTEM PROVIDED HISTORY: resp failure   TECHNOLOGIST PROVIDED HISTORY:   Reason for exam:->resp failure   FINDINGS:   Right PICC line has been removed.  Stable support lines otherwise.  Extensive   bilateral airspace disease is not appreciably changed.  Layering pleural   effusion on the right is suggested and is more apparent compared to the prior   study.       Impression   Extensive bilateral airspace disease.  Likely layering right pleural effusion   which is more apparent than on the prior study. Labs:  Lab Results   Component Value Date    WBC 17.5 01/16/2021    HGB 7.8 01/16/2021    HCT 27.6 01/16/2021    .0 01/16/2021    MCH 30.2 01/16/2021    MCHC 28.3 01/16/2021    RDW 19.5 01/16/2021     01/16/2021    MPV 10.3 01/16/2021     Lab Results   Component Value Date     01/16/2021    K 4.8 01/16/2021    K 3.3 12/15/2020    CL 98 01/16/2021    CO2 36 01/16/2021    BUN 18 01/16/2021    CREATININE 0.2 01/16/2021    LABALBU 3.0 01/03/2021    CALCIUM 8.4 01/16/2021    GFRAA >60 01/16/2021    LABGLOM >60 01/16/2021     Lab Results   Component Value Date    PROTIME 11.4 12/13/2020    PROTIME 11.9 12/15/2011    INR 1.0 12/13/2020     No results for input(s): PROBNP in the last 72 hours. No results for input(s): PROCAL in the last 72 hours. This SmartLink has not been configured with any valid records. Micro:  No results for input(s): CULTRESP in the last 72 hours. No results for input(s): LABGRAM in the last 72 hours. No results for input(s): LEGUR in the last 72 hours. No results for input(s): STREPNEUMAGU in the last 72 hours. No results for input(s): LP1UAG in the last 72 hours. Assessment:    1. Acute chronic hypoxic and hypercapnic respiratory failure  2. Vent dependent respiratory failure  3. COVID-19 meningitis severe  4. A. fib RVR  5. History of severe COPD  6. PATRICK resolved      Plan:   1. Continue full vent support. Patient is requiring still FiO2 of 75% and PEEP of 12. We will try to wean as tolerated. We will resume pressure support trials today.   2. Continue bronchodilators Brovana, Pulmicort and Mei  3. Continue amiodarone for A. Fib. 4. Patient on anticoagulation with Eliquis. 5. Tolerating tube feeds.       This plan of care was reviewed in collaboration with    Electronically signed by Alan Lock MD on 1/16/2021 at 11:35 AM

## 2021-01-17 NOTE — PROGRESS NOTES
Subjective:    Patient seen and examined at bedside, opens eyes to verbal stimuli, trach+      Objective:    BP (!) 108/48   Pulse 107   Temp 98.7 °F (37.1 °C) (Oral)   Resp (!) 36   Ht 5' 6\" (1.676 m)   Wt 210 lb 12.2 oz (95.6 kg)   SpO2 90%   BMI 34.02 kg/m²     Current medications that patient is taking have been reviewed. Heart:  RRR, no murmurs, gallops, or rubs.   Lungs:  CTA bilaterally, no wheeze, rales or rhonchi  Abd: PEG  Extrem:  No cyanosis or edema    CBC:   Lab Results   Component Value Date    WBC 17.5 01/16/2021    RBC 2.58 01/16/2021    HGB 7.8 01/16/2021    HCT 27.6 01/16/2021    .0 01/16/2021    MCH 30.2 01/16/2021    MCHC 28.3 01/16/2021    RDW 19.5 01/16/2021     01/16/2021    MPV 10.3 01/16/2021     BMP:    Lab Results   Component Value Date     01/16/2021    K 4.8 01/16/2021    K 3.3 12/15/2020    CL 98 01/16/2021    CO2 36 01/16/2021    BUN 18 01/16/2021    LABALBU 3.0 01/03/2021    CREATININE 0.2 01/16/2021    CALCIUM 8.4 01/16/2021    GFRAA >60 01/16/2021    LABGLOM >60 01/16/2021    GLUCOSE 151 01/16/2021    GLUCOSE 106 12/15/2011        Assessment:    Patient Active Problem List   Diagnosis    History of hypertension    HLD (hyperlipidemia)    Tobacco abuse    COPD (chronic obstructive pulmonary disease) (HCC)    History of DVT (deep vein thrombosis)    [facility]    [facility] syndrome[facility]    Pacemaker    Hypothyroid    PVD (peripheral vascular disease) ([facility] [facility]    Coronary artery disease involving native coronary artery without angina pectoris    Left carotid artery occlusion    Osteoporosis    Bilateral carotid artery stenosis    Atherosclerosis of native artery of both lower extremities with intermittent claudication (HCC)    Osteoarthritis    Post-traumatic osteoarthritis of left hip    Trauma    Multiple closed fractures of ribs of left side    Chest wall pain    Nodule of spleen    CAD in native artery    Shoulder dislocation, left, subsequent encounter    Neuropathy    [facility]ar infarction ([facility] [facility]    CHF (congestive heart failure) (HCC)    Pneumonia    Acute respiratory failure with hypoxia (HCC)    Acute respiratory failure due to COVID-19 ([facility]    Septic shock (HCC)    Atrial fibrillation with RVR ([facility]    Pneumonia due to COVID-19 virus       Plan:  1. Acute resp failure with hypoxemia - COVID 19 PNA   Trach and peg 12/29   Wean oxygen as tolerated 75% PEEP 12  2. Septic shock - weaned off pressors  3. Afib RVR - continue amiodarone and eliquis  4.  DM2 - inslulin sliding scale    Please call my cell phone between 8pm-6am 21 [phone]    Chandra Palacios    7:08 PM  1/16/2021

## 2021-01-17 NOTE — PATIENT CARE CONFERENCE
ICU Team Members:   Dr. Mc Smith, charge nurse, bedside nurse and respiratory therpay  ICU Day #: 32     Intubation Date: placed on vent 12/15, trach Dec. 29th, changed 1/4     Ventilator Day #: 32     Central Line Insertion Date: PICC Grisel Serrano                                                    Day #: 25      Arterial Line Insertion Date: Jan. 7th                             Day #: 9     DVT Prophylaxis: Eliquis    GI Prophylaxis: Protonix     Allen Catheter Insertion Date: Dec. 30                             Day #: 17                             Continued need (if yes, reason documented and discussed with physician): yes, need for accurate I+O's     Skin Issues/ Wounds and ordered treatment discussed on rounds: no issues, SOS precautions     Goals/ Plans for the Day: Monitor vitals and labs. IV fluids for hypotension and 1 unit of packed RBC's.  PICC ordered.         Revision History

## 2021-01-17 NOTE — PROGRESS NOTES
Here to place PICC line, right  Arm neg for DVT. Consent not yet signed per DR. Welch RN to call me when signed.

## 2021-01-17 NOTE — FLOWSHEET NOTE
Patients BP 66/47, notified Dr. Louisa Del Rosario, ordered a 500 cc NS bolus, plus an extra dose of midodrine. Will continue to monitor.

## 2021-01-17 NOTE — PROCEDURES
PICC    Catheter insertion date 1/17/2021     Product Number:  Ref [id number]   Lot No: [id number]   Gauge: 18 x 2   Lumen: dual, 5FR   Rt brachial vein    Vein Diameter : 0.49cm   Upper arm circumference (10CM ABOVE AC): 31cm   Catheter Length : 42cm   Internal Length: 42cm   External Catheter Length: 0cm   Ultrasound Used:yes  VPS [facility] [place] confirms PICC tip is placed in the lower 1/3 of the SVC or at the Cavoatrial junction. Floor nurse notified PICC is okay to use.    : CORINA Mendez-BC

## 2021-01-17 NOTE — PROGRESS NOTES
dislocation, left, subsequent encounter    Neuropathy    [facility] infarction ([facility] [facility]    CHF (congestive heart failure) (HCC)    Pneumonia    Acute respiratory failure with hypoxia (HCC)    Acute respiratory failure due to COVID-19 ([facility] [address] [facility]    Septic shock (HCC)    Atrial fibrillation with RVR ([facility] [address] [facility]    Pneumonia due to COVID-19 virus       Plan:    1. Acute resp failure with hypoxemia - COVID 19 PNA              Trach and peg 12/29              Wean oxygen as tolerated 75% PEEP 12  2. Septic shock - weaned off pressors  3. Afib RVR - continue amiodarone and eliquis  4. DM2 - inslulin sliding scale  5.  Anemia - transfuse Hb<7    Please call my cell phone between 8pm-6am [phone]    12:38 PM  1/17/2021

## 2021-01-17 NOTE — PROGRESS NOTES
ventilation. 1/7: Day 24 ventilation. Not tolerating ventilation. Continue to wean sedation as able. Cultures redrawn. 1/8: Day 25 ventilation. Patient found overnight 700 cc residual volume, stopped tube feedings at that time. Recheck this morning only 50 cc residual restarted tube feedings. ,  Start Reglan every 6 hours. 1/9: Remains on ventilator. Unable to wean FiO2. Tube feeds were stopped again last night for having residual 300 cc will resume trickle feeds today. Weaned off pressors. 1/10: Vent day #27, off all pressors and IV sedation. Opens eyes upon calling her name and withdraws to pain. Still on high FIO2 and PEEP     1/11: Vent Day #28, increase Lantus, decrease Ativan to 0.5 every 6 hours. Hold Lasix. Wean phenylephrine as tolerated and decrease PEEP to 12.    1/12: Vent Day #29, Change to Ativan PRN. Continue to wean. Off pressors. 1/13: Vent day #30, patient overall clinically better. Weaning oxygen support. Transfer patient to intermediate floor pending discharge to skilled nursing facility. 1/14: Vent Day #31, patient improving clinically. Will be discharged to nursing facility. 1/15: Vent day #32, patient acutely decompensated when trying to transfer her to the nursing facility. Due to this she was knitted back to the ICU yesterday. Patient will be transferred to intermediate today for continued intermediate level treatment. 1/16; patient was downgraded yesterday team intermediate please refer to pulmonary note for details    1/17: Patient became hypotensive tachycardic dropped H&H upgraded to intensive care again.     CURRENT VENTILATION STATUS:     [x] Ventilator  [] BIPAP  [] Nasal Cannula [] Room Air      IF INTUBATED, ET TUBE MARKING AT LOWER LIP:       cms    SECRETIONS Amount:  [] Small [] Moderate  [] Large  [x] None  Color:     [] White [] Colored  [] Bloody    SEDATION:  RAAS Score:+1  [] Fentanyl gtt  [] Versed gtt  [x] Ativan gtt   [] No Sedation    PARALYZED:  [x] No    [] Yes      VASOPRESSORS:  [x] No    [] Yes    If yes -   [] Levophed       [] Dopamine     [] Vasopressin       [] Dobutamine  [] Phenylephrine         [] Epinephrine    CENTRAL LINES:     [] No   [x] Yes   (Date of Insertion:  12/15 ) PICC line          If yes -     [] Right IJ     [] Left IJ [] Right Femoral [] Left Femoral                   [] Right Subclavian [] Left Subclavian   PICC line    GARCIA'S CATHETER:   [] No   [x] Yes  (Date of Insertion: 12/15  )     URINE OUTPUT:            [x] Good   [] Low              [] Anuric      OBJECTIVE:     VITAL SIGNS:  BP (!) 81/38   Pulse 68   Temp 97.2 °F (36.2 °C) (Axillary)   Resp 26   Ht 5' 6\" (1.676 m)   Wt 210 lb 12.2 oz (95.6 kg)   SpO2 (!) 88%   BMI 34.02 kg/m²   Tmax over 24 hours:  Temp (24hrs), Av.8 °F (36.6 °C), Min:97.2 °F (36.2 °C), Max:98.6 °F (37 °C)      Patient Vitals for the past 6 hrs:   BP Temp Temp src Pulse Resp SpO2   21 1544 (!) 81/38 97.2 °F (36.2 °C) Axillary 68 26 (!) 88 %   21 1415 (!) 92/39 -- -- 73 29 91 %   21 1408 (!) 92/39 -- -- 73 30 91 %   21 1400 (!) 105/59 97.6 °F (36.4 °C) Axillary 76 (!) 33 90 %   21 1300 (!) 93/39 -- -- 77 (!) 36 (!) 88 %   21 1207 -- -- -- 77 (!) 38 (!) 89 %   21 1200 (!) 86/44 98.6 °F (37 °C) Oral 76 (!) 35 (!) 88 %   21 1138 (!) 76/40 98.5 °F (36.9 °C) -- 76 (!) 34 (!) 86 %   21 1106 (!)  -- -- 72 28 91 %   21 1100 (!)  -- -- 72 27 (!) 89 %         Intake/Output Summary (Last 24 hours) at 2021 1602  Last data filed at 2021 1539  Gross per 24 hour   Intake 5083.5 ml   Output 1450 ml   Net 3633.5 ml     Wt Readings from Last 2 Encounters:   21 210 lb 12.2 oz (95.6 kg)   20 188 lb 9 oz (85.5 kg)     Body mass index is 34.02 kg/m². PHYSICAL EXAMINATION:    General: Follows commands, opens eyes spontaneously. Improved overall.   HEENT: Pupils are equal round and reactive to chloride      dextrose       PRN Meds:       sodium chloride, , PRN      sodium chloride flush, 10 mL, PRN      heparin flush, 3 mL, PRN      LORazepam, 0.5 mg, Q6H PRN      oxyCODONE, 5 mg, Q6H PRN      glucose, 15 g, PRN      dextrose, 12.5 g, PRN      glucagon (rDNA), 1 mg, PRN      dextrose, 100 mL/hr, PRN      LORazepam, 1 mg, Q4H PRN      acetaminophen, 650 mg, Q6H PRN      metoprolol, 5 mg, Q6H PRN      sodium chloride flush, 10 mL, PRN      heparin flush, 3 mL, PRN      potassium chloride, 20 mEq, PRN      ondansetron, 4 mg, Q6H PRN      polyethylene glycol, 17 g, Daily PRN          VENT SETTINGS (Comprehensive) (if applicable):  Vent Information  $Ventilation: $Subsequent Day  Skin Assessment: Clean, dry, & intact  Equipment ID: [id number]  Equipment Changed: (S) Humidification  Vent Type: 980  Vent Mode: AC/VC+  Vt Ordered: 400 mL  Pressure Ordered: 14  Rate Set: 22 bmp  Peak Flow: 0 L/min  Pressure Support: 0 cmH20  FiO2 : 75 %  SpO2: (!) 88 %  SpO2/FiO2 ratio: 120  PaO2/FiO2 ratio: 86  Sensitivity: 2  PEEP/CPAP: 12  I Time/ I Time %: 0.85 s  Humidification Source: Heated wire  Humidification Temp: 37  Humidification Temp Measured: 36.7  Circuit Condensation: Drained  Mask Type: Full face mask  Mask Size: Small  Additional Respiratory  Assessments  Pulse: 68  Resp: 26  SpO2: (!) 88 %  Position: Semi-Marrero's  Humidification Source: Heated wire  Humidification Temp: 37  Circuit Condensation: Drained  Oral Care: Mouth swabbed, Mouth moisturizer, Mouth suctioned, Lip moisturizer applied  Subglottic Suction Done?: No  Airway Type: Trachial  Airway Size: 8  Cuff Pressure (cm H2O): 29 cm H2O    ABGs:   No results for input(s): PH, PCO2, PO2, HCO3, BE, O2SAT in the last 72 hours.     Laboratory findings:    Complete Blood Count:   Recent Labs     01/15/21  0516 01/16/21  0604 01/17/21  0450   WBC 13.6* 17.5* 13.4*   HGB 7.1* 7.8* 6.7*   HCT 24.3* 27.6* 23.1*    242 202        Last 3 Blood Glucose:   Recent Labs     01/15/21  0516 01/16/21  0604 01/17/21  0450   GLUCOSE 132* 151* 153*        PT/INR:    Lab Results   Component Value Date    PROTIME 11.4 12/13/2020    PROTIME 11.9 12/15/2011    INR 1.0 12/13/2020     PTT:    Lab Results   Component Value Date    APTT 21.6 12/13/2020       Comprehensive Metabolic Profile:   Recent Labs     01/15/21  0516 01/16/21  0604 01/17/21  0450    137 136   K 4.2 4.8 4.7    98 98   CO2 36* 36* 36*   BUN 19 18 30*   CREATININE 0.3* 0.2* 0.3*   GLUCOSE 132* 151* 153*   CALCIUM 7.6* 8.4* 7.9*      Magnesium:   Lab Results   Component Value Date    MG 2.4 01/13/2021     Phosphorus:   Lab Results   Component Value Date    PHOS 2.4 01/13/2021     Ionized Calcium: No results found for: CAION     Urinalysis:     Troponin:   No results for input(s): TROPONINI in the last 72 hours.     Microbiology:    Cultures during this admission:     Blood cultures:                 [] Pending            [x] Negative             []  Positive (Details:  )  Urine Culture:                   [] None drawn      [] Negative             []  Positive (Details:  )  Sputum Culture:               [] None drawn       [x] Negative             []  Positive (Details:  )   CULTURE, RESPIRATORY 01/05/2021 11:15 AM [facility]   Oral Pharyngeal Rachell present    Smear, Respiratory 01/05/2021 11:15 AM [facility]   Group 6: <25 PMN's/LPF and <25 Epithelial cells/LPF   Few Polymorphonuclear leukocytes   Rare Epithelial cells   Rare yeast      Endotracheal aspirate:     [] None drawn       [] Negative             []  Positive (Details:  )     Other pertinent Labs:   COVID-19 + December 13  MRSA negative screening   radiology/Imaging:     Chest Xray (1/17/2021):       Right PICC line with tip at the junction of the subclavian and SVC   Extensive bilateral pulmonary infiltrates unchanged   ET and NG tubes appear in satisfactory position     ASSESSMENT:      1. Acute on chronic hypoxic respiratory failure   2. Covid pneumonitis - critically severe   3. Shock-septic   4. Adrenal insufficiency  5. Electrolyte abnormalities  6. Leukocytosis    7. COPD  8. pvd  9. chf not in acute exacerbatino   10. Hx lacunar infarct   11. SSS s/p pacemaker   12. Hx seizure do   13. Bilateral carotid stenosis       SYSTEMS ASSESSMENT    Neuro   Previous history of lacunar stroke  Weaned off sedation Ativan and oxycodone  Patient was now blank with commands moves some extremities slightly. Will need physical therapy and continued respiratory support going forward. Respiratory   Acute respiratory failure with hypoxia secondary to COVID-19 viral pneumonia  Trach and Peg 12/29 1/5 scheduled Ativan for every 6 and Oxy 10 milligrams every 6 hours. Stop fentanyl patch. Patient tachypneic more today than previous, requiring increased sedation in order to maintain oxygen saturation. 1/6: Repeat CT head and Chest today. 1/7; patient continues to require increased PEEP. Continue to wean as tolerated. 1/8: Decrease tidal volume to 360 increase rate to 24.  1/9; trying to wean FiO2 but not successful so far  1/10: continue to wean Fio2 as tolertaed  1/11: Decrease PEEP to 12, wean phenylephrine as tolerated. 1/12: Change to Volume support mode, wean sedation. 1/13: Overall respiratory improvement. Patient still on 70% O2 however we will continue to titrate down. 1/14: Tolerated volume support today, clinically continues to improve  1/15: Continue to wean oxygen as able. Continues to improve clinically    1/17; wean FiO2 and PEEP as tolerated. Due to hypotension and anemia we will hold off weaning today. Cardiovascular   Shock, septic -patient has been weaned  off pressors     Early this morning developed hypotension. Total of liter bolus fluid was given. Will increase midodrine to 20 mg 3 times daily.   If no response will have to consider starting pressors. intermittent atrial fib/flutter    Afib RVR: Amiodarone 400mg 2 times daily decrease to 200mg once daily on 1/6. Continue digoxin 250 mcg daily. Eliquis has been held due to acute anemia. Gastrointestinal   GI prophylaxis with Protonix increased dose to 40 mg twice daily  Diet per dietary recs   Colace/senna for constipation  Ct abdomen pelvis 12/27 showed no acute intraabdominal etiology       Renal   Hyponatremia -resolved   -Continue to trend metabolic panel   -Nephrology following for fluid recommendations intravenously  Replete potassium as needed  Adrenal Insufficiency: Increase Solu-Cortef to 100 mg every 8 hours. PATRICK resolved kidney function improving. Infectious Disease   COVID-19 viral pneumonia   Received remdesivir, Toci, vit D, vit c, thiamine   Patient completed Zosyn for possible superimposed bacterial infection. Infectious disease has signed off. Hematology/Oncology     Acute on chronic anemia. H&H down to 6.7 today. Transfuse 1 unit of packed RBC. Surgery has been consulted        Endocrine   Diabetes: Currently on Lantus 40 units daily plus high-dose sliding scale. Hydrocortisone weaned, continue to monitor blood pressure      Social/Spiritual/DNR/Other   Full code. Palliative consulted.    .       Electronically signed by Kaye Albert MD on 1/17/2021 at 4:02 PM   CCT excluding procedures:40'

## 2021-01-18 NOTE — PROGRESS NOTES
Chief Complaint:  Chief Complaint   Patient presents with    Shortness of Breath     70% RA at NH 88% NRB , being tx for pneumonia hx of copd      Acute respiratory failure with hypoxia ([address]     Subjective:    Awake, looking around    Objective:    BP (!) 96/48   Pulse 66   Temp 98.1 °F (36.7 °C) (Axillary)   Resp 28   Ht 5' 6\" (1.676 m)   Wt 210 lb 12.2 oz (95.6 kg)   SpO2 93%   BMI 34.02 kg/m²     Current medications that patient is taking have been reviewed. General appearance: Moribund appearing elderly female  Psych: Calm  Neuro: Awake looking around    Face-to-face physical exam was not done today, to limit spread of coronavirus and preserve PPE.      Labs:  CBC with Differential:    Lab Results   Component Value Date    WBC 16.8 01/18/2021    RBC 3.01 01/18/2021    HGB 9.3 01/18/2021    HCT 30.4 01/18/2021     01/18/2021    .0 01/18/2021    MCH 30.9 01/18/2021    MCHC 30.6 01/18/2021    RDW 19.4 01/18/2021    NRBC 0.9 01/14/2021    SEGSPCT 89 03/10/2014    BANDSPCT 1 05/19/2016    METASPCT 1.0 01/11/2021    LYMPHOPCT 3.5 01/18/2021    PROMYELOPCT 1.8 12/19/2020    MONOPCT 2.9 01/18/2021    MYELOPCT 0.9 01/14/2021    BASOPCT 0.4 01/18/2021    MONOSABS 0.49 01/18/2021    LYMPHSABS 0.59 01/18/2021    EOSABS 0.11 01/18/2021    BASOSABS 0.06 01/18/2021     CMP:    Lab Results   Component Value Date     01/18/2021    K 4.9 01/18/2021    K 3.3 12/15/2020    CL 97 01/18/2021    CO2 36 01/18/2021    BUN 22 01/18/2021    CREATININE 0.2 01/18/2021    GFRAA >60 01/18/2021    LABGLOM >60 01/18/2021    GLUCOSE 178 01/18/2021    GLUCOSE 106 12/15/2011    PROT 5.0 01/03/2021    LABALBU 3.0 01/03/2021    CALCIUM 7.9 01/18/2021    BILITOT 0.7 01/03/2021    ALKPHOS 130 01/03/2021    AST 21 01/03/2021    ALT 14 01/03/2021          Assessment/Plan:  Principal Problem:    Acute respiratory failure with hypoxia (HCC)  Active Problems:    COPD (chronic obstructive pulmonary disease) (HCC)    History of DVT (deep vein thrombosis)    Coronary artery disease involving native coronary artery without angina pectoris    Acute respiratory failure due to COVID-19 [facility])    Septic shock (HCC)    Atrial fibrillation with RVR ([facility] [address]    Pneumonia due to COVID-19 virus  Resolved Problems:    * No resolved hospital problems. *       Wean vent as tolerated    Completed steroids. Continue digoxin and amiodarone    Apixaban held due to gradually downtrending hemoglobin. Surgery on board. Continue PPI and carafate. Continue bronchodilators    Glucose well controlled    Continue Plavix    Prognosis not good. She has been in the hospital for 1 month. Her baseline health is not good - CAD, PAD, VTE, CHF, COPD. She is trached and pegged.   Requires continued inpatient level of care   Veronica Baer    3:54 PM  1/18/2021  Cell: [phone]

## 2021-01-18 NOTE — PROGRESS NOTES
Peg tube flushed with 60 mL water and asperated per order.  No blood or red tint noted in residual.  Electronically signed by Tonio Foreman RN on 1/18/2021 at 12:38 PM

## 2021-01-18 NOTE — CARE COORDINATION
COVID POSITIVE 1/16( Hx positive 12/13) . Vent since 12/15-FIO2 95%,PEEP 12. S/p trach/PEG on 12/29. Palliative care following- DNR-[facility]. [facility] continues to follow- has [employer]. Insurance-will need insurance precert prior to discharge- will need PT/OT evals when appropriate closer to when ready for discharge.  Will follow Raheem Nickerson

## 2021-01-18 NOTE — PLAN OF CARE
Problem: Skin Integrity:  Goal: Will show no infection signs and symptoms  Description: Will show no infection signs and symptoms  1/18/2021 1044 by Hood Wesley RN  Outcome: Met This Shift  1/18/2021 0024 by Michael Wilson RN  Outcome: Met This Shift  1/18/2021 0023 by Michael Wlison RN  Outcome: Met This Shift  Goal: Absence of new skin breakdown  Description: Absence of new skin breakdown  1/18/2021 1044 by Hood Wesley RN  Outcome: Met This Shift  1/18/2021 0024 by Michael Wilson RN  Outcome: Met This Shift  1/18/2021 0023 by Michael Wilson RN  Outcome: Met This Shift     Problem: Airway Clearance - Ineffective  Goal: Achieve or maintain patent airway  1/18/2021 1044 by Hood Wesley RN  Outcome: Met This Shift  1/18/2021 0024 by Michael Wilson RN  Outcome: Met This Shift  1/18/2021 0023 by Michael Wilson RN  Outcome: Met This Shift     Problem: Gas Exchange - Impaired  Goal: Absence of hypoxia  1/18/2021 1044 by Hood Wesley RN  Outcome: Met This Shift  1/18/2021 0024 by Michael Wilson RN  Outcome: Met This Shift  1/18/2021 0023 by Michael Wilson RN  Outcome: Met This Shift  Goal: Promote optimal lung function  1/18/2021 1044 by Hood Wesley RN  Outcome: Met This Shift  1/18/2021 0024 by Michael Wilson RN  Outcome: Met This Shift  1/18/2021 0023 by Michael Wilson RN  Outcome: Met This Shift     Problem: Breathing Pattern - Ineffective  Goal: Ability to achieve and maintain a regular respiratory rate  1/18/2021 1044 by Hood Wesley RN  Outcome: Met This Shift  1/18/2021 0024 by Michael Wilson RN  Outcome: Met This Shift  1/18/2021 0023 by Michael Wilson RN  Outcome: Met This Shift     Problem:  Body Temperature -  Risk of, Imbalanced  Goal: Ability to maintain a body temperature within defined limits  1/18/2021 1044 by Hood Wesley RN  Outcome: Met This Shift  1/18/2021 0024 by Michael Wilson RN  Outcome: Met This Shift  1/18/2021 0023 by Michael Wilson RN  Outcome: Met This Shift  Goal: Will regain or maintain usual level of consciousness  1/18/2021 1044 by Lindsay Bragg RN  Outcome: Met This Shift  1/18/2021 0024 by Bri Cordon RN  Outcome: Met This Shift  1/18/2021 0023 by Bri Cordon RN  Outcome: Met This Shift  Goal: Complications related to the disease process, condition or treatment will be avoided or minimized  1/18/2021 1044 by Lindsay Bragg RN  Outcome: Met This Shift  1/18/2021 0024 by Bri Cordon RN  Outcome: Met This Shift  1/18/2021 0023 by Bri Cordon RN  Outcome: Met This Shift     Problem: Isolation Precautions - Risk of Spread of Infection  Goal: Prevent transmission of infection  1/18/2021 1044 by Lindsay Bragg RN  Outcome: Met This Shift  1/18/2021 0024 by Bri Cordon RN  Outcome: Met This Shift  1/18/2021 0023 by Bri Codron RN  Outcome: Met This Shift     Problem: Nutrition Deficits  Goal: Optimize nutrtional status  1/18/2021 1044 by Lindsay Bragg RN  Outcome: Met This Shift  1/18/2021 0024 by Bri Cordon RN  Outcome: Met This Shift  1/18/2021 0023 by Bri Cordon RN  Outcome: Met This Shift     Problem: Risk for Fluid Volume Deficit  Goal: Maintain normal heart rhythm  1/18/2021 1044 by Lindsay Bragg RN  Outcome: Met This Shift  1/18/2021 0024 by Bri Cordon RN  Outcome: Met This Shift  1/18/2021 0023 by Bri Cordon RN  Outcome: Met This Shift  Goal: Maintain absence of muscle cramping  1/18/2021 1044 by Lindsay Bragg RN  Outcome: Met This Shift  1/18/2021 0024 by Bri Cordon RN  Outcome: Met This Shift  1/18/2021 0023 by Bri Cordon RN  Outcome: Met This Shift  Goal: Maintain normal serum potassium, sodium, calcium, phosphorus, and pH  1/18/2021 1044 by Lindsay Bragg RN  Outcome: Met This Shift  1/18/2021 0024 by Bri Cordon RN  Outcome: Met This Shift  1/18/2021 0023 by Bri Cordon RN  Outcome: Met This Shift     Problem: Loneliness or Risk for Loneliness  Goal: Demonstrate positive use of time alone when socialization is not possible  1/18/2021 1044 by Lindsay Bragg, RN  Outcome: Met This Shift  1/18/2021 0024 by Zena Baker RN  Outcome: Met This Shift  1/18/2021 0023 by Zena Baker RN  Outcome: Met This Shift     Problem: Fatigue  Goal: Verbalize increase energy and improved vitality  1/18/2021 1044 by Hamzah West RN  Outcome: Met This Shift  1/18/2021 0024 by Zena Baker RN  Outcome: Met This Shift  1/18/2021 0023 by Zena Baker RN  Outcome: Met This Shift     Problem: Patient Education: Go to Patient Education Activity  Goal: Patient/Family Education  1/18/2021 1044 by Hamzah West RN  Outcome: Met This Shift  1/18/2021 0024 by Zena Baker RN  Outcome: Met This Shift  1/18/2021 0023 by Zena Baker RN  Outcome: Met This Shift     Problem: Falls - Risk of:  Goal: Will remain free from falls  Description: Will remain free from falls  1/18/2021 1044 by Hamzah West RN  Outcome: Met This Shift  1/18/2021 0024 by Zena Baker RN  Outcome: Met This Shift  1/18/2021 0023 by Zena Baker RN  Outcome: Met This Shift  Goal: Absence of physical injury  Description: Absence of physical injury  1/18/2021 1044 by Hamzah West RN  Outcome: Met This Shift  1/18/2021 0024 by Zena Baker RN  Outcome: Met This Shift  1/18/2021 0023 by Zena Baker RN  Outcome: Met This Shift

## 2021-01-18 NOTE — PATIENT CARE CONFERENCE
Intensive Care Daily Quality Rounding Checklist    ICU Team Members:  Dr. Maura Castro, Dr. Jocelin Thrasher (resident), clinical pharmacist, charge nurse, bedside nurse, respiratory therapist    ICU Day #: 35     Intubation Date: placed on vent 12/15, trach Dec. 29th, changed 1/4     Ventilator Day #: 35     Central Line Insertion Date: PICC 1/17/2021                                                    Day #: 2      Arterial Line Insertion Date: N/A                             Day # : N/A     DVT Prophylaxis: Eliquis    GI Prophylaxis: Protonix     Allen Catheter Insertion Date: Dec. 30                             Day #: 20                             Continued need (if yes, reason documented and discussed with physician): yes, need for accurate I+O's     Skin Issues/ Wounds and ordered treatment discussed on rounds: no issues, SOS precautions     Goals/ Plans for the Day: Daily labs, wean vent as able, continue tube feeds, wean Neosynephrine drip for MAP > 65

## 2021-01-18 NOTE — PROGRESS NOTES
Critical Care Team - Daily Progress Note         Date and time: 1/18/2021 12:15 PM  Patient's name:  Adelina Holt  Medical Record Number: [id number]  Patient's account/billing number: [de-identified]  Patient's YOB: 1950  Age: 65 y.o. Date of Admission: 12/13/2020 10:51 AM  Length of stay during current admission: 36      Primary Care Physician: Serg Urbina DO  ICU Attending Physician: Dr. Mei Mcdermott    Code Status: DNR-CCA    Reason for ICU admission: respiratory failure   Shock   Covid 19 pneumonitis       SUBJECTIVE:     OVERNIGHT EVENTS:         12/16: Overnight able to titrate down some of the vasopressors though still intermittently hypotensive and requiring vasopressin as well as Levophed. Still sedated with fentanyl and Versed. Hyponatremia notable at 119 new today. 12/17: Patient was able to be titrated off the vasopressors. Is now on midodrine. Minimally hypothermic overnight now on Melissa hugger with improvement in temperature. Continue sedation with fentanyl and Versed. 12/18: Patient continues on the ventilator. Back on small amount of Levophed in addition to the vasopressin. Hoping to wean sedation more today. 12/19: Back on levophed overnight. Initially able to titrate fio2 to 40%, became hypoxic and increased to 60%. Apparently their were several episodes of bradycardia overnight. Patient has pacemaker which did not fire per nursing. Follows with Dr. Palma Estrada for cardiology. 12/20: Patient: 6 L overnight, nephrology ordered K-Phos, and half-normal saline, will CT her head today. 12/21: Patient tolerated being supine overnight. Plan for PICC line today. Pacemaker interrogated with no runs of V. tach or other rhythm needing acute intervention. 12/22: Patient remained supine without difficulty. Continuing to try to wean down her FiO2. Her sodium has improved. 12/23: Patient continues to tolerate the vent. She is waking up more.   Sodium is much better. No acute overnight events. 12/24: Failed weaning trial yesterday. Became agitated. Continues to tolerate the ventilator. Day 9 on the ventilator. 12/25: The patient occasionally opens her eyes. She occasionally follows commands. She gets extremely anxious when sedation is weaned further. Continues on Versed and fentanyl. 12/26: Patient continues on the ventilator. Opens eyes with sedation vacation, not really following commands. Day 11 on the ventilator. No other acute overnight events. 12:27: Continues on the vent. Awake on sedation, does follow commands. Day 12 on the ventilator. 12/28: Patient continues on the ventilator. Still occasionally becomes agitated when attempting to wean sedation. Day 13 on the ventilator. Occasionally hypotensive requiring Levophed. 12/29: trach and peg today. Spent 29th on the phone talking to her son who is the power of  yesterday explaining her prognosis and the treatment course going forward. He wants a trach and PEG and everything done at this point. Patient is actually much more awake today and following commands. When asked if she needs the volume turned up on the TV she shakes her head yes. Plan for trach and PEG this afternoon. 12/30: Trach and PEG yesterday. Had to be placed back on Levophed, currently running at 13 mics. Patient is now awake, has restraints off, is alert and responding to commands. Weaning sedatives. 12/31: No acute events overnight, episode of A. fib with RVR yesterday, changed to needle from Levophed, and started on amiodarone bolus and amiodarone per PEG tube. Off fern today. 01/01: No acute events, Weaned off fentanyl drip    01/02: No acute events    1/4: No acute events overnight    1/5: Become more agitated overnight with tachypnea and fluctuating blood pressure. Also ventilatory status declining requiring increased sedation.     1/6: Day 23 Ventilation, Continues tachypnea and poor ventilation. 1/7: Day 24 ventilation. Not tolerating ventilation. Continue to wean sedation as able. Cultures redrawn. 1/8: Day 25 ventilation. Patient found overnight 700 cc residual volume, stopped tube feedings at that time. Recheck this morning only 50 cc residual restarted tube feedings. ,  Start Reglan every 6 hours. 1/9: Remains on ventilator. Unable to wean FiO2. Tube feeds were stopped again last night for having residual 300 cc will resume trickle feeds today. Weaned off pressors. 1/10: Vent day #27, off all pressors and IV sedation. Opens eyes upon calling her name and withdraws to pain. Still on high FIO2 and PEEP     1/11: Vent Day #28, increase Lantus, decrease Ativan to 0.5 every 6 hours. Hold Lasix. Wean phenylephrine as tolerated and decrease PEEP to 12.    1/12: Vent Day #29, Change to Ativan PRN. Continue to wean. Off pressors. 1/13: Vent day #30, patient overall clinically better. Weaning oxygen support. Transfer patient to intermediate floor pending discharge to skilled nursing facility. 1/14: Vent Day #31, patient improving clinically. Will be discharged to nursing facility. 1/15: Vent day #32, patient acutely decompensated when trying to transfer her to the nursing facility. Due to this she was knitted back to the ICU yesterday. Patient will be transferred to intermediate today for continued intermediate level treatment. 1/16; patient was downgraded yesterday team intermediate please refer to pulmonary note for details    1/17: Patient became hypotensive tachycardic dropped H&H upgraded to intensive care again. 1/18: Hemoglobin stable today, still on phenylephrine continue to wean. Sedation off.     CURRENT VENTILATION STATUS:     [x] Ventilator  [] BIPAP  [] Nasal Cannula [] Room Air      IF INTUBATED, ET TUBE MARKING AT LOWER LIP:       cms    SECRETIONS Amount:  [] Small [] Moderate  [] Large  [x] None  Color:     [] White [] Colored  [] Bloody    SEDATION:  RAAS Score:+1  [] Fentanyl gtt  [] Versed gtt  [] Ativan gtt   [x] No Sedation    PARALYZED:  [x] No    [] Yes      VASOPRESSORS:  [] No    [x] Yes    If yes -   [] Levophed       [] Dopamine     [] Vasopressin       [] Dobutamine  [x] Phenylephrine         [] Epinephrine    CENTRAL LINES:     [] No   [x] Yes   (Date of Insertion:  01/15 ) PICC line          If yes -     [] Right IJ     [] Left IJ [] Right Femoral [] Left Femoral                   [] Right Subclavian [] Left Subclavian   PICC line    GARCIA'S CATHETER:   [] No   [x] Yes  (Date of Insertion: 01/15  )     URINE OUTPUT:            [x] Good   [] Low              [] Anuric      OBJECTIVE:     VITAL SIGNS:  BP (!) 111/52   Pulse 68   Temp 98.4 °F (36.9 °C) (Axillary)   Resp 24   Ht 5' 6\" (1.676 m)   Wt 210 lb 12.2 oz (95.6 kg)   SpO2 93%   BMI 34.02 kg/m²   Tmax over 24 hours:  Temp (24hrs), Av.2 °F (36.8 °C), Min:97.2 °F (36.2 °C), Max:99.2 °F (37.3 °C)      Patient Vitals for the past 6 hrs:   BP Temp Temp src Pulse Resp SpO2   21 1000 (!) 111/52 -- -- 68 24 93 %   21 0909 -- -- -- 67 25 92 %   21 0905 -- -- -- -- (!) 32 91 %   21 0903 -- -- -- -- (!) 36 (!) 89 %   21 0900 (!) 96/43 -- -- 70 25 95 %   21 0845 (!) 103/38 -- -- -- -- --   21 0830 (!) 125/57 -- -- -- -- --   21 0800 (!) 116/51 98.4 °F (36.9 °C) Axillary 72 29 94 %   21 0700 (!) 104/41 -- -- 69 27 94 %   21 0645 123/76 -- -- 73 (!) 42 91 %   21 0630 (!) 133/51 -- -- 74 (!) 35 91 %         Intake/Output Summary (Last 24 hours) at 2021 1215  Last data filed at 2021 0800  Gross per 24 hour   Intake 4349.5 ml   Output 763 ml   Net 3586.5 ml     Wt Readings from Last 2 Encounters:   21 210 lb 12.2 oz (95.6 kg)   20 188 lb 9 oz (85.5 kg)     Body mass index is 34.02 kg/m². PHYSICAL EXAMINATION:    General: Follows commands, opens eyes spontaneously.     HEENT: Pupils are equal round and reactive to light, there are no oral lesions and no post-nasal drip   Neck: supple without adenopathy, tracheostomy in place  Cardiovascular: regular rate, and rhythm without murmur or gallop  Respiratory:  Decreased breath sounds bilaterally without wheezing or crackles. Air entry is symmetric, currently tachypneic.   Abdomen: soft, non-tender, non-distended, normal bowel sounds, lateral bruising in the flanks; PEG in place  Extremities: warm, bilateral equal edema upper and lower extremities, no clubbing   Skin:  Large bilateral ecchymoses on the flanks, and lower quadrants of the abdomen  Neurologic: Trached, pupils equal round, interactive, opens eyes spontaneously    Any additional physical findings:    MEDICATIONS:    Scheduled Meds:   sucralfate  1 g Oral 4x Daily AC & HS    midodrine  20 mg Oral TID    sodium chloride  500 mL Intravenous Once    pantoprazole  40 mg Intravenous BID    And    sodium chloride (PF)  10 mL Intravenous BID    heparin flush  3 mL Intravenous 2 times per day    amiodarone  200 mg Oral BID    Followed by   Tessie Felty ON 1/22/2021] amiodarone  200 mg Oral Daily    digoxin  250 mcg Oral Daily    senna  10 mL Per G Tube Nightly    insulin glargine  40 Units Subcutaneous Daily    vitamin C  1,000 mg Oral Daily    Vitamin D  1,000 Units Oral Daily    [Held by provider] apixaban  5 mg Oral BID    nicotine  1 patch Transdermal Daily    thiamine  100 mg Oral Daily    zinc sulfate  50 mg Oral Daily    heparin flush  3 mL Intravenous 2 times per day    docusate  100 mg Oral Daily    insulin lispro  0-18 Units Subcutaneous Q6H    ipratropium-albuterol  1 ampule Inhalation Q4H WA    budesonide  500 mcg Nebulization BID    Arformoterol Tartrate  15 mcg Nebulization BID    clopidogrel  75 mg Oral Daily    chlorhexidine  15 mL Mouth/Throat BID    sodium chloride flush  10 mL Intravenous 2 times per day    gabapentin  800 mg Oral TID    levothyroxine  100 mcg Oral Daily    atorvastatin  20 mg Oral Daily     Continuous Infusions:   sodium chloride      phenylephrine (HORACE-SYNEPHRINE) 50mg/250mL infusion 75 mcg/min (01/18/21 1021)    dextrose       PRN Meds:       sodium chloride, , PRN      sodium chloride flush, 10 mL, PRN      heparin flush, 3 mL, PRN      LORazepam, 0.5 mg, Q6H PRN      oxyCODONE, 5 mg, Q6H PRN      glucose, 15 g, PRN      dextrose, 12.5 g, PRN      glucagon (rDNA), 1 mg, PRN      dextrose, 100 mL/hr, PRN      LORazepam, 1 mg, Q4H PRN      acetaminophen, 650 mg, Q6H PRN      metoprolol, 5 mg, Q6H PRN      sodium chloride flush, 10 mL, PRN      heparin flush, 3 mL, PRN      potassium chloride, 20 mEq, PRN      ondansetron, 4 mg, Q6H PRN      polyethylene glycol, 17 g, Daily PRN          VENT SETTINGS (Comprehensive) (if applicable):  Vent Information  $Ventilation: $Subsequent Day  Skin Assessment: Clean, dry, & intact  Equipment ID: [id number]  Equipment Changed: (S) Humidification  Vent Type: [id number]  Vent Mode: AC/VC+  Vt Ordered: 400 mL  Pressure Ordered: 14  Rate Set: 22 bmp  Peak Flow: 0 L/min  Pressure Support: 0 cmH20  FiO2 : 95 %  SpO2: 93 %  SpO2/FiO2 ratio: 97.89  PaO2/FiO2 ratio: 86  Sensitivity: 2  PEEP/CPAP: 12  I Time/ I Time %: 0 s  Humidification Source: Heated wire  Humidification Temp: 37  Humidification Temp Measured: 37  Circuit Condensation: Drained  Mask Type: Full face mask  Mask Size: Small  Additional Respiratory  Assessments  Pulse: 68  Resp: 24  SpO2: 93 %  Position: Semi-Marrero's  Humidification Source: Heated wire  Humidification Temp: 37  Circuit Condensation: Drained  Oral Care: Mouth swabbed, Mouth moisturizer, Mouth suctioned, Lip moisturizer applied  Subglottic Suction Done?: No  Airway Type: Trachial  Airway Size: 8  Cuff Pressure (cm H2O): 29 cm H2O    ABGs:   No results for input(s): PH, PCO2, PO2, HCO3, BE, O2SAT in the last 72 hours.     Laboratory findings:    Complete Blood Count:   Recent Labs 01/16/21  0604 01/17/21  0450 01/18/21  0355   WBC 17.5* 13.4* 16.8*   HGB 7.8* 6.7* 9.3*   HCT 27.6* 23.1* 30.4*    202 204        Last 3 Blood Glucose:   Recent Labs     01/16/21  0604 01/17/21  0450 01/18/21  0355   GLUCOSE 151* 153* 178*        PT/INR:    Lab Results   Component Value Date    PROTIME 11.4 12/13/2020    PROTIME 11.9 12/15/2011    INR 1.0 12/13/2020     PTT:    Lab Results   Component Value Date    APTT 21.6 12/13/2020       Comprehensive Metabolic Profile:   Recent Labs     01/16/21  0604 01/17/21  0450 01/18/21  0355    136 136   K 4.8 4.7 4.9   CL 98 98 97*   CO2 36* 36* 36*   BUN 18 30* 22   CREATININE 0.2* 0.3* 0.2*   GLUCOSE 151* 153* 178*   CALCIUM 8.4* 7.9* 7.9*      Magnesium:   Lab Results   Component Value Date    MG 2.4 01/13/2021     Phosphorus:   Lab Results   Component Value Date    PHOS 2.4 01/13/2021     Ionized Calcium: No results found for: CAION     Urinalysis:     Troponin:   No results for input(s): TROPONINI in the last 72 hours.     Microbiology:    Cultures during this admission:     Blood cultures:                 [] Pending            [x] Negative             []  Positive (Details:  )  Urine Culture:                   [] None drawn      [] Negative             []  Positive (Details:  )  Sputum Culture:               [] None drawn       [x] Negative             []  Positive (Details:  )   CULTURE, RESPIRATORY 01/05/2021 11:15 AM [facility]   Oral Pharyngeal Rachell present    Smear, Respiratory 01/05/2021 11:15 AM [facility]   Group 6: <25 PMN's/LPF and <25 Epithelial cells/LPF   Few Polymorphonuclear leukocytes   Rare Epithelial cells   Rare yeast      Endotracheal aspirate:     [] None drawn       [] Negative             []  Positive (Details:  )     Other pertinent Labs:   COVID-19 + December 13  MRSA negative screening   radiology/Imaging:     Chest Xray (1/18/2021):       Right PICC line with tip at the junction of the subclavian and SVC   Extensive bilateral pulmonary infiltrates unchanged   ET and NG tubes appear in satisfactory position             ASSESSMENT:      1. Acute on chronic hypoxic respiratory failure   2. Covid pneumonitis - critically severe   3. Shock-hypovolemic   4. Adrenal insufficiency  5. Electrolyte abnormalities  6. Leukocytosis    7. COPD  8. pvd  9. chf not in acute exacerbatino   10. Hx lacunar infarct   11. SSS s/p pacemaker   12. Hx seizure do   13. Bilateral carotid stenosis       SYSTEMS ASSESSMENT    Neuro   Previous history of lacunar stroke  Weaned off sedation, continue Ativan and oxycodone  1/18: Patient was sedated over the weekend due to respiratory issues and worsening anemia requiring blood and pressors. Sedation was weaned overnight patient still somnolent. Continue to monitor reassess as sedation wears off. Respiratory   Acute respiratory failure with hypoxia secondary to COVID-19 viral pneumonia  Trach and Peg 12/29 1/5 scheduled Ativan for every 6 and Oxy 10 milligrams every 6 hours. Stop fentanyl patch. Patient tachypneic more today than previous, requiring increased sedation in order to maintain oxygen saturation. 1/6: Repeat CT head and Chest today. 1/7; patient continues to require increased PEEP. Continue to wean as tolerated. 1/8: Decrease tidal volume to 360 increase rate to 24.  1/9; trying to wean FiO2 but not successful so far  1/10: continue to wean Fio2 as tolertaed  1/11: Decrease PEEP to 12, wean phenylephrine as tolerated. 1/12: Change to Volume support mode, wean sedation. 1/13: Overall respiratory improvement. Patient still on 70% O2 however we will continue to titrate down. 1/14: Tolerated volume support today, clinically continues to improve  1/15: Continue to wean oxygen as able. Continues to improve clinically  1/17; wean FiO2 and PEEP as tolerated. Due to hypotension and anemia we will hold off weaning today.   1/18: Continue to wean FiO2 and PEEP as tolerated    Cardiovascular   Shock, septic -patient has been weaned  off pressors     Early this morning developed hypotension. Total of liter bolus fluid was given. Will increase midodrine to 20 mg 3 times daily. If no response will have to consider starting pressors. intermittent atrial fib/flutter    Afib RVR: Amiodarone 400mg 2 times daily decrease to 200mg once daily on 1/6. Continue digoxin 250 mcg daily. 1/18: Hemoglobin stable today restart Eliquis. Gastrointestinal   GI prophylaxis with Protonix increased dose to 40 mg twice daily  Diet per dietary recs   Colace/senna for constipation  Ct abdomen pelvis 12/27 showed no acute intraabdominal etiology       Renal   Hyponatremia -resolved   -Continue to trend metabolic panel   -Nephrology following for fluid recommendations intravenously  Replete potassium as needed  Adrenal Insufficiency: Increase Solu-Cortef to 100 mg every 8 hours. PATRICK resolved kidney function improving. Infectious Disease   COVID-19 viral pneumonia   Received remdesivir, Toci, vit D, vit c, thiamine   Patient completed Zosyn for possible superimposed bacterial infection. Infectious disease has signed off. Hematology/Oncology     Acute on chronic anemia. 1/18: Hemoglobin more stable today. However patient has history of continued to slowly decrease her hemoglobin level until she needs a transfusion. General surgery has been consulted. Patient's right arm is more animation is than left. Ultrasound ordered on Saturday will be completed today. Endocrine   Diabetes: Currently on Lantus 40 units daily plus high-dose sliding scale. Hydrocortisone weaned, continue to monitor blood pressure      Social/Spiritual/DNR/Other   Full code. Palliative consulted. Electronically signed by Yandy Mcneal DO on 1/18/2021 at 12:15 PM     Critical Care Attending Addendum:    Patient seen and examined with the house staff.   X-rays personally reviewed through the PACS. Family is updated at the bedside as available. Additional findings listed below as necessary. Additional comments:  1. Acute blood loss anemia is better  2. Hypovolemic shock due to acute blood loss anemia  3. Acute on chronic respiratory failure still with very high FIO2 requirements.     30 min CCT

## 2021-01-18 NOTE — PROGRESS NOTES
GENERAL SURGERY  DAILY PROGRESS NOTE    Date:2021       Room:[address] [place] [address]  Patient Name:Sravanthi Torres     YOB: 1950     Age:70 y.o. Chief Complaint:  Chief Complaint   Patient presents with    Shortness of Breath     70% RA at NH 88% NRB , being tx for pneumonia hx of copd         Subjective:  Patient denies nausea or abdominal pain/tenderness. Brown diarrhea overnight x1, stools recently becoming looser. Objective:  BP (!) 111/52   Pulse 68   Temp 98.4 °F (36.9 °C) (Axillary)   Resp 24   Ht 5' 6\" (1.676 m)   Wt 210 lb 12.2 oz (95.6 kg)   SpO2 93%   BMI 34.02 kg/m²   Temp (24hrs), Av.3 °F (36.8 °C), Min:97.2 °F (36.2 °C), Max:99.2 °F (37.3 °C)      I/O (24Hr):  I/O last 3 completed shifts: In: 4699.5 [I.V.:1906; Blood:1227.5; NG/GT:1566]  Out: 638 [Urine:638]     GENERAL:  No acute distress. Alert and interactive. LUNGS:  No cough. Nonlabored breathing on vent. CARDIOVASC:  Normal rate, no cyanosis. ABDOMEN:  Soft, non-distended, non-tender. PEG intact w/o bleeding around it. No guarding / rigidity / rebound. Light brown diarrhea in rectum with slow FOBT+  EXTREMITIES:  Bilateral edema, no deformities.     Assessment:  65 y.o. female with acute on chronic anemia likely stress gastritis in setting of anticoagulation and covid sepsis on prolonged ventilator, last EGD for PEG was 20 (3 weeks ago)    Plan:  - PPI BID, add carafate  - hold eliquis (was on for intermittent afib), ok to continue plavix  - continue to monitor    Electronically signed by Kirstie Moraes MD on 2021 at 11:04 AM

## 2021-01-19 NOTE — PROGRESS NOTES
Critical Care Team - Daily Progress Note         Date and time: 1/19/2021 9:00 AM  Patient's name:  [facility] Record Number: [id number]  Patient's account/billing number: [de-identified]  Patient's YOB: 1950  Age: 65 y.o. Date of Admission: 12/13/2020 10:51 AM  Length of stay during current admission: 37      Primary Care Physician: Latrell Cook DO  ICU Attending Physician: Dr. Lisandro Roldan    Code Status: DNR-CCA    Reason for ICU admission: respiratory failure   Shock   Covid 19 pneumonitis       SUBJECTIVE:     OVERNIGHT EVENTS:         12/16: Overnight able to titrate down some of the vasopressors though still intermittently hypotensive and requiring vasopressin as well as Levophed. Still sedated with fentanyl and Versed. Hyponatremia notable at 119 new today. 12/17: Patient was able to be titrated off the vasopressors. Is now on midodrine. Minimally hypothermic overnight now on Melissa hugger with improvement in temperature. Continue sedation with fentanyl and Versed. 12/18: Patient continues on the ventilator. Back on small amount of Levophed in addition to the vasopressin. Hoping to wean sedation more today. 12/19: Back on levophed overnight. Initially able to titrate fio2 to 40%, became hypoxic and increased to 60%. Apparently their were several episodes of bradycardia overnight. Patient has pacemaker which did not fire per nursing. Follows with Dr. Junaid Coon for cardiology. 12/20: Patient: 6 L overnight, nephrology ordered K-Phos, and half-normal saline, will CT her head today. 12/21: Patient tolerated being supine overnight. Plan for PICC line today. Pacemaker interrogated with no runs of V. tach or other rhythm needing acute intervention. 12/22: Patient remained supine without difficulty. Continuing to try to wean down her FiO2. Her sodium has improved. 12/23: Patient continues to tolerate the vent. She is waking up more.   Sodium is much better. No acute overnight events. 12/24: Failed weaning trial yesterday. Became agitated. Continues to tolerate the ventilator. Day 9 on the ventilator. 12/25: The patient occasionally opens her eyes. She occasionally follows commands. She gets extremely anxious when sedation is weaned further. Continues on Versed and fentanyl. 12/26: Patient continues on the ventilator. Opens eyes with sedation vacation, not really following commands. Day 11 on the ventilator. No other acute overnight events. 12:27: Continues on the vent. Awake on sedation, does follow commands. Day 12 on the ventilator. 12/28: Patient continues on the ventilator. Still occasionally becomes agitated when attempting to wean sedation. Day 13 on the ventilator. Occasionally hypotensive requiring Levophed. 12/29: trach and peg today. Spent 29th on the phone talking to her son who is the power of  yesterday explaining her prognosis and the treatment course going forward. He wants a trach and PEG and everything done at this point. Patient is actually much more awake today and following commands. When asked if she needs the volume turned up on the TV she shakes her head yes. Plan for trach and PEG this afternoon. 12/30: Trach and PEG yesterday. Had to be placed back on Levophed, currently running at 13 mics. Patient is now awake, has restraints off, is alert and responding to commands. Weaning sedatives. 12/31: No acute events overnight, episode of A. fib with RVR yesterday, changed to needle from Levophed, and started on amiodarone bolus and amiodarone per PEG tube. Off fern today. 01/01: No acute events, Weaned off fentanyl drip    01/02: No acute events    1/4: No acute events overnight    1/5: Become more agitated overnight with tachypnea and fluctuating blood pressure. Also ventilatory status declining requiring increased sedation.     1/6: Day 23 Ventilation, Continues tachypnea and poor ventilation. 1/7: Day 24 ventilation. Not tolerating ventilation. Continue to wean sedation as able. Cultures redrawn. 1/8: Day 25 ventilation. Patient found overnight 700 cc residual volume, stopped tube feedings at that time. Recheck this morning only 50 cc residual restarted tube feedings. ,  Start Reglan every 6 hours. 1/9: Remains on ventilator. Unable to wean FiO2. Tube feeds were stopped again last night for having residual 300 cc will resume trickle feeds today. Weaned off pressors. 1/10: Vent day #27, off all pressors and IV sedation. Opens eyes upon calling her name and withdraws to pain. Still on high FIO2 and PEEP     1/11: Vent Day #28, increase Lantus, decrease Ativan to 0.5 every 6 hours. Hold Lasix. Wean phenylephrine as tolerated and decrease PEEP to 12.    1/12: Vent Day #29, Change to Ativan PRN. Continue to wean. Off pressors. 1/13: Vent day #30, patient overall clinically better. Weaning oxygen support. Transfer patient to intermediate floor pending discharge to skilled nursing facility. 1/14: Vent Day #31, patient improving clinically. Will be discharged to nursing facility. 1/15: Vent day #32, patient acutely decompensated when trying to transfer her to the nursing facility. Due to this she was knitted back to the ICU yesterday. Patient will be transferred to intermediate today for continued intermediate level treatment. 1/16; patient was downgraded yesterday team intermediate please refer to pulmonary note for details    1/17: Patient became hypotensive tachycardic dropped H&H upgraded to intensive care again. 1/18: Hemoglobin stable today, still on phenylephrine continue to wean. Sedation off.    1/19: Patient still on phenylephrine for blood pressure support. Requiring increased PEEP and O2. Wean as tolerated. NICOM today to assess volume responsiveness.     CURRENT VENTILATION STATUS:     [x] Ventilator  [] BIPAP  [] Nasal Cannula [] Room Air IF INTUBATED, ET TUBE MARKING AT LOWER LIP:       cms    SECRETIONS Amount:  [] Small [] Moderate  [] Large  [x] None  Color:     [] White [] Colored  [] Bloody    SEDATION:  RAAS Score:+1  [] Fentanyl gtt  [] Versed gtt  [] Ativan gtt   [x] No Sedation    PARALYZED:  [x] No    [] Yes      VASOPRESSORS:  [] No    [x] Yes    If yes -   [] Levophed       [] Dopamine     [] Vasopressin       [] Dobutamine  [x] Phenylephrine         [] Epinephrine    CENTRAL LINES:     [] No   [x] Yes   (Date of Insertion:  01/15 ) PICC line          If yes -     [] Right IJ     [] Left IJ [] Right Femoral [] Left Femoral                   [] Right Subclavian [] Left Subclavian   PICC line    GARCIA'S CATHETER:   [] No   [x] Yes  (Date of Insertion: 01/15  )     URINE OUTPUT:            [x] Good   [] Low              [] Anuric      OBJECTIVE:     VITAL SIGNS:  BP (!) 119/54   Pulse 71   Temp 98.6 °F (37 °C)   Resp (!) 36   Ht 5' 6\" (1.676 m)   Wt 210 lb 12.2 oz (95.6 kg)   SpO2 92%   BMI 34.02 kg/m²   Tmax over 24 hours:  Temp (24hrs), Av.5 °F (36.9 °C), Min:98.1 °F (36.7 °C), Max:99.1 °F (37.3 °C)      Patient Vitals for the past 6 hrs:   BP Temp Pulse Resp SpO2   21 0818 -- -- 71 (!) 36 92 %   21 0817 -- -- -- (!) 35 93 %   21 0812 -- -- -- (!) 36 92 %   21 0700 (!) 119/54 -- 70 (!) 33 92 %   21 0645 (!) 123/53 -- 73 (!) 38 92 %   21 0630 (!) 121/56 -- 74 (!) 40 91 %   21 0615 (!) 123/51 -- 73 (!) 35 91 %   21 0600 (!) 113/57 -- 77 (!) 36 90 %   21 0545 (!) 142/50 -- 76 (!) 37 91 %   21 0530 (!) 132/58 -- 77 (!) 34 91 %   21 0515 (!) 124/52 -- 71 (!) 37 91 %   21 0500 131/68 -- 77 (!) 43 91 %   21 0445 133/67 -- 75 (!) 37 91 %   21 0430 (!) 114/54 -- 73 (!) 36 91 %   21 0415 (!) 133/44 -- 73 (!) 41 93 %   21 0400 119/61 98.6 °F (37 °C) 72 (!) 43 93 %   21 0345 (!) 113/43 -- 62 26 93 %   21 0330 (!) 86/51 -- 71 (!) 44 92 %   01/19/21 0315 (!) 86/51 -- 64 (!) 34 92 %         Intake/Output Summary (Last 24 hours) at 1/19/2021 0900  Last data filed at 1/19/2021 0618  Gross per 24 hour   Intake 2286 ml   Output 1175 ml   Net 1111 ml     Wt Readings from Last 2 Encounters:   01/11/21 210 lb 12.2 oz (95.6 kg)   12/08/20 188 lb 9 oz (85.5 kg)     Body mass index is 34.02 kg/m². PHYSICAL EXAMINATION:    General: Follows commands, opens eyes spontaneously. HEENT: Pupils are equal round and reactive to light, there are no oral lesions and no post-nasal drip   Neck: supple without adenopathy, tracheostomy in place  Cardiovascular: regular rate, and rhythm without murmur or gallop  Respiratory:  Decreased breath sounds bilaterally without wheezing or crackles. Air entry is symmetric, currently tachypneic.   Abdomen: soft, non-tender, non-distended, normal bowel sounds, lateral bruising in the flanks; PEG in place  Extremities: warm, bilateral equal edema upper and lower extremities, no clubbing   Skin:  Large bilateral ecchymoses on the flanks, and lower quadrants of the abdomen  Neurologic: Trached, pupils equal round, interactive, opens eyes spontaneously    Any additional physical findings:    MEDICATIONS:    Scheduled Meds:   sucralfate  1 g Oral 4x Daily AC & HS    midodrine  20 mg Oral TID    sodium chloride  500 mL Intravenous Once    pantoprazole  40 mg Intravenous BID    And    sodium chloride (PF)  10 mL Intravenous BID    heparin flush  3 mL Intravenous 2 times per day    amiodarone  200 mg Oral BID    Followed by   Claudio Maloney ON 1/22/2021] amiodarone  200 mg Oral Daily    digoxin  250 mcg Oral Daily    senna  10 mL Per G Tube Nightly    insulin glargine  40 Units Subcutaneous Daily    vitamin C  1,000 mg Oral Daily    Vitamin D  1,000 Units Oral Daily    [Held by provider] apixaban  5 mg Oral BID    nicotine  1 patch Transdermal Daily    thiamine  100 mg Oral Daily    zinc sulfate  50 mg Oral Daily    heparin flush  3 mL Intravenous 2 times per day    docusate  100 mg Oral Daily    insulin lispro  0-18 Units Subcutaneous Q6H    ipratropium-albuterol  1 ampule Inhalation Q4H WA    budesonide  500 mcg Nebulization BID    Arformoterol Tartrate  15 mcg Nebulization BID    clopidogrel  75 mg Oral Daily    chlorhexidine  15 mL Mouth/Throat BID    sodium chloride flush  10 mL Intravenous 2 times per day    gabapentin  800 mg Oral TID    levothyroxine  100 mcg Oral Daily    atorvastatin  20 mg Oral Daily     Continuous Infusions:   sodium chloride      phenylephrine (HORACE-SYNEPHRINE) 50mg/250mL infusion 50 mcg/min (01/19/21 0714)    dextrose       PRN Meds:       sodium chloride, , PRN      sodium chloride flush, 10 mL, PRN      heparin flush, 3 mL, PRN      LORazepam, 0.5 mg, Q6H PRN      oxyCODONE, 5 mg, Q6H PRN      glucose, 15 g, PRN      dextrose, 12.5 g, PRN      glucagon (rDNA), 1 mg, PRN      dextrose, 100 mL/hr, PRN      LORazepam, 1 mg, Q4H PRN      acetaminophen, 650 mg, Q6H PRN      metoprolol, 5 mg, Q6H PRN      sodium chloride flush, 10 mL, PRN      heparin flush, 3 mL, PRN      potassium chloride, 20 mEq, PRN      ondansetron, 4 mg, Q6H PRN      polyethylene glycol, 17 g, Daily PRN          VENT SETTINGS (Comprehensive) (if applicable):  Vent Information  $Ventilation: $Subsequent Day  Skin Assessment: Clean, dry, & intact  Equipment ID: [id number]  Equipment Changed: (S) Humidification  Vent Type: [id number]  Vent Mode: AC/VC+  Vt Ordered: 400 mL  Pressure Ordered: 14  Rate Set: 22 bmp  Peak Flow: 0 L/min  Pressure Support: 0 cmH20  FiO2 : 90 %  SpO2: 92 %  SpO2/FiO2 ratio: 102.22  PaO2/FiO2 ratio: 86  Sensitivity: 2  PEEP/CPAP: 12  I Time/ I Time %: 0.85 s  Humidification Source: Heated wire  Humidification Temp: 37  Humidification Temp Measured: 37  Circuit Condensation: Drained  Mask Type: Full face mask  Mask Size: Small  Additional Respiratory  Assessments  Pulse: 71  Resp: (!) 36  SpO2: 92 %  Position: Semi-Marrero's  Humidification Source: Heated wire  Humidification Temp: 37  Circuit Condensation: Drained  Oral Care: Mouth swabbed, Mouth moisturizer, Mouth suctioned, Lip moisturizer applied  Subglottic Suction Done?: No  Airway Type: Trachial  Airway Size: 8  Cuff Pressure (cm H2O): 29 cm H2O    ABGs:   No results for input(s): PH, PCO2, PO2, HCO3, BE, O2SAT in the last 72 hours. Laboratory findings:    Complete Blood Count:   Recent Labs     01/17/21  0450 01/18/21  0355 01/19/21  0557   WBC 13.4* 16.8* 16.3*   HGB 6.7* 9.3* 9.3*   HCT 23.1* 30.4* 31.1*    204 196        Last 3 Blood Glucose:   Recent Labs     01/17/21 0450 01/18/21  0355 01/19/21  0557   GLUCOSE 153* 178* 131*        PT/INR:    Lab Results   Component Value Date    PROTIME 11.4 12/13/2020    PROTIME 11.9 12/15/2011    INR 1.0 12/13/2020     PTT:    Lab Results   Component Value Date    APTT 21.6 12/13/2020       Comprehensive Metabolic Profile:   Recent Labs     01/17/21 0450 01/18/21  0355 01/19/21  0557    136 136   K 4.7 4.9 4.7   CL 98 97* 97*   CO2 36* 36* 36*   BUN 30* 22 17   CREATININE 0.3* 0.2* 0.2*   GLUCOSE 153* 178* 131*   CALCIUM 7.9* 7.9* 8.0*      Magnesium:   Lab Results   Component Value Date    MG 2.4 01/13/2021     Phosphorus:   Lab Results   Component Value Date    PHOS 2.4 01/13/2021     Ionized Calcium:   Lab Results   Component Value Date    CAION 1.14 01/19/2021        Urinalysis:     Troponin:   No results for input(s): TROPONINI in the last 72 hours.     Microbiology:    Cultures during this admission:     Blood cultures:                 [] Pending            [x] Negative             []  Positive (Details:  )  Urine Culture:                   [] None drawn      [] Negative             []  Positive (Details:  )  Sputum Culture:               [] None drawn       [x] Negative             []  Positive (Details:  )   CULTURE, RESPIRATORY 01/05/2021 11:15 AM [facility]   Oral Pharyngeal Rachell present    Smear, Respiratory 01/05/2021 11:15 AM [facility]   Group 6: <25 PMN's/LPF and <25 Epithelial cells/LPF   Few Polymorphonuclear leukocytes   Rare Epithelial cells   Rare yeast      Endotracheal aspirate:     [] None drawn       [] Negative             []  Positive (Details:  )     Other pertinent Labs:   COVID-19 + December 13  MRSA negative screening   radiology/Imaging:     Chest Xray (1/19/2021):       Right PICC line with tip at the junction of the subclavian and SVC   Extensive bilateral pulmonary infiltrates unchanged   ET and NG tubes appear in satisfactory position             ASSESSMENT:      1. Acute on chronic hypoxic respiratory failure   2. Covid pneumonitis - critically severe   3. Shock-hypovolemic improved  4. Adrenal insufficiency  5. Electrolyte abnormalities  6. Leukocytosis    7. COPD  8. pvd  9. chf not in acute exacerbation  10. Hx lacunar infarct   11. SSS s/p pacemaker   12. Hx seizure do   13. Bilateral carotid stenosis       SYSTEMS ASSESSMENT    Neuro   Previous history of lacunar stroke  Weaned off sedation, continue Ativan and oxycodone  1/18: Patient was sedated over the weekend due to respiratory issues and worsening anemia requiring blood and pressors. Sedation was weaned overnight patient still somnolent. Continue to monitor reassess as sedation wears off.  1/19: Neuro status improving off sedation, patient responding to commands now/interactive. Respiratory   Acute respiratory failure with hypoxia secondary to COVID-19 viral pneumonia  Trach and Peg 12/29 1/5 scheduled Ativan for every 6 and Oxy 10 milligrams every 6 hours. Stop fentanyl patch. Patient tachypneic more today than previous, requiring increased sedation in order to maintain oxygen saturation. 1/6: Repeat CT head and Chest today. 1/7; patient continues to require increased PEEP. Continue to wean as tolerated.   1/8: Decrease tidal volume to 360 increase rate to 24.  1/9; trying to wean FiO2 but not successful so far  1/10: continue to wean Fio2 as tolertaed  1/11: Decrease PEEP to 12, wean phenylephrine as tolerated. 1/12: Change to Volume support mode, wean sedation. 1/13: Overall respiratory improvement. Patient still on 70% O2 however we will continue to titrate down. 1/14: Tolerated volume support today, clinically continues to improve  1/15: Continue to wean oxygen as able. Continues to improve clinically  1/17; wean FiO2 and PEEP as tolerated. Due to hypotension and anemia we will hold off weaning today. 1/18: Continue to wean FiO2 and PEEP as tolerated  1/19: Patient continues to be on 90% O2 with a PEEP of 12. Wean as tolerated    Cardiovascular   Shock, septic -patient has been weaned  off pressors     Early this morning developed hypotension. Total of liter bolus fluid was given. Will increase midodrine to 20 mg 3 times daily. If no response will have to consider starting pressors. intermittent atrial fib/flutter    Afib RVR: Amiodarone 400mg 2 times daily decrease to 200mg once daily on 1/6. Continue digoxin 250 mcg daily. 1/18: Hemoglobin stable today restart Eliquis. 1/19: NICOM found patient to be volume responsive. 1 L normal saline ordered, wean phenylephrine as tolerated    Gastrointestinal   GI prophylaxis with Protonix increased dose to 40 mg twice daily  Diet per dietary recs   Colace/senna for constipation  Ct abdomen pelvis 12/27 showed no acute intraabdominal etiology       Renal   Hyponatremia -resolved   -Continue to trend metabolic panel   -Nephrology following for fluid recommendations intravenously  Replete potassium as needed  Adrenal Insufficiency: Midodrine 20 mg 3 times daily. Infectious Disease   COVID-19 viral pneumonia   Received remdesivir, Toci, vit D, vit c, thiamine   Patient completed Zosyn for possible superimposed bacterial infection.   Infectious disease has signed off.  1/19: Patient retested for Covid on 116. Continues to be positive for Covid      Hematology/Oncology     Acute on chronic anemia. 1/18: Hemoglobin more stable today. However patient has history of continued to slowly decrease her hemoglobin level until she needs a transfusion. General surgery has been consulted. Patient's right arm is more edematous than left. 1/19: Right upper extremity ultrasound showed no evidence of DVT. Endocrine   Diabetes: Currently on Lantus 40 units daily plus high-dose sliding scale. Hydrocortisone weaned, continue to monitor blood pressure      Social/Spiritual/DNR/Other   Full code. Palliative consulted. Electronically signed by Rasheeda Perez DO on 1/19/2021 at 9:00 AM       Critical Care Attending Addendum:    Patient seen and examined with the house staff. X-rays personally reviewed through the PACS. Family is updated at the bedside as available. Additional findings listed below as necessary. Additional comments:  1. Hypotension persists and appears to be fluid responsive so given NS 1 liter over 4 hours. 2. Acute/ chronic hypoxic respiratory failure on 90% FIO2  3. COVID 19 pneumonia s/p treatment  4. DM adequately controlled.     30 min CCT

## 2021-01-19 NOTE — CARE COORDINATION
COVID POSITIVE 1/16( Hx positive 12/13) . Vent since 12/15-FIO2 90%-requiring pressor. S/p trach/PEG on 12/29. Palliative care following- DNR-CCA. [facility] continues to follow- has [employer]. Insurance-will need insurance precert prior to discharge- will need PT/OT evals when appropriate closer to when ready for discharge.  Will follow Lily Coley

## 2021-01-19 NOTE — PLAN OF CARE
Problem: Skin Integrity:  Goal: Will show no infection signs and symptoms  Description: Will show no infection signs and symptoms  1/19/2021 1754 by Mina Aguilar RN  Outcome: Met This Shift  1/19/2021 0645 by Hali Rincon RN  Outcome: Met This Shift  1/19/2021 0645 by Hali Rincon RN  Outcome: Met This Shift  Goal: Absence of new skin breakdown  Description: Absence of new skin breakdown  1/19/2021 0645 by Hali Rincon RN  Outcome: Met This Shift  1/19/2021 0645 by Hali Rincon RN  Outcome: Met This Shift     Problem: Gas Exchange - Impaired  Goal: Promote optimal lung function  1/19/2021 0645 by Hali Rincon RN  Outcome: Met This Shift  1/19/2021 0645 by Hali Rincon RN  Outcome: Met This Shift     Problem: Breathing Pattern - Ineffective  Goal: Ability to achieve and maintain a regular respiratory rate  1/19/2021 1754 by Mina Aguilar RN  Outcome: Met This Shift  1/19/2021 0645 by Hali Rincon RN  Outcome: Met This Shift  1/19/2021 0645 by Hali Rincon RN  Outcome: Met This Shift     Problem:  Body Temperature -  Risk of, Imbalanced  Goal: Ability to maintain a body temperature within defined limits  1/19/2021 1754 by Mina Aguilar RN  Outcome: Met This Shift  1/19/2021 0645 by Hali Rincon RN  Outcome: Met This Shift  1/19/2021 0645 by Hali Rincon RN  Outcome: Met This Shift  Goal: Will regain or maintain usual level of consciousness  1/19/2021 0645 by Hali Rincon RN  Outcome: Met This Shift  1/19/2021 0645 by Hali Rincon RN  Outcome: Met This Shift  Goal: Complications related to the disease process, condition or treatment will be avoided or minimized  1/19/2021 0645 by Hali Rincon RN  Outcome: Met This Shift  1/19/2021 0645 by Hali Rincon RN  Outcome: Met This Shift     Problem: Isolation Precautions - Risk of Spread of Infection  Goal: Prevent transmission of infection  1/19/2021 1754 by Mina Aguilar RN  Outcome: Met This Shift  1/19/2021 0645 by Hali Lace, RN  Outcome: Met This Shift  1/19/2021 0645 by Michael Wilson RN  Outcome: Met This Shift     Problem: Nutrition Deficits  Goal: Optimize nutrtional status  1/19/2021 0645 by Michael Wilson RN  Outcome: Met This Shift  1/19/2021 0645 by Michael Wilson RN  Outcome: Met This Shift     Problem: Risk for Fluid Volume Deficit  Goal: Maintain normal heart rhythm  1/19/2021 1754 by Sangeeta Rider RN  Outcome: Met This Shift  1/19/2021 0645 by Michael Wilson RN  Outcome: Met This Shift  1/19/2021 0645 by Michael Wilson RN  Outcome: Met This Shift  Goal: Maintain absence of muscle cramping  1/19/2021 0645 by Michael Wilson RN  Outcome: Met This Shift  1/19/2021 0645 by Michael Wilson RN  Outcome: Met This Shift  Goal: Maintain normal serum potassium, sodium, calcium, phosphorus, and pH  1/19/2021 0645 by Michael Wilson RN  Outcome: Met This Shift  1/19/2021 0645 by Michael Wilson RN  Outcome: Met This Shift     Problem: Loneliness or Risk for Loneliness  Goal: Demonstrate positive use of time alone when socialization is not possible  1/19/2021 0645 by Michael Wilson RN  Outcome: Met This Shift  1/19/2021 0645 by Michael Wilson RN  Outcome: Met This Shift     Problem: Fatigue  Goal: Verbalize increase energy and improved vitality  1/19/2021 0645 by Michael Wilson RN  Outcome: Met This Shift  1/19/2021 0645 by Michael Wilson RN  Outcome: Met This Shift     Problem: Patient Education: Go to Patient Education Activity  Goal: Patient/Family Education  1/19/2021 0645 by Michael Wilson RN  Outcome: Met This Shift  1/19/2021 0645 by Michael Wilson RN  Outcome: Met This Shift     Problem: Falls - Risk of:  Goal: Will remain free from falls  Description: Will remain free from falls  1/19/2021 1754 by Sangeeta Rider RN  Outcome: Met This Shift  1/19/2021 0645 by Michael Wilson RN  Outcome: Met This Shift  1/19/2021 0645 by Michael Wilson RN  Outcome: Met This Shift  Goal: Absence of physical injury  Description: Absence of physical injury  1/19/2021 0645 by Michael Wilson RN  Outcome:  Met This Shift  1/19/2021 0645 by Costa Dowell RN  Outcome: Met This Shift     Problem: Pain:  Goal: Pain level will decrease  Description: Pain level will decrease  1/19/2021 1754 by Junior Yesica RN  Outcome: Met This Shift  1/19/2021 0645 by Costa Dowell RN  Outcome: Met This Shift  1/19/2021 0645 by Costa Dowell RN  Outcome: Met This Shift  Goal: Control of acute pain  Description: Control of acute pain  1/19/2021 0645 by Costa Dowell RN  Outcome: Met This Shift  1/19/2021 0645 by Costa Dowell RN  Outcome: Met This Shift  Goal: Control of chronic pain  Description: Control of chronic pain  1/19/2021 0645 by Costa Dowell RN  Outcome: Met This Shift  1/19/2021 0645 by Costa Dowell RN  Outcome: Met This Shift     Problem: Airway Clearance - Ineffective  Goal: Achieve or maintain patent airway  1/19/2021 1754 by Junior Yesica RN  Outcome: Ongoing  1/19/2021 0645 by Costa Dowell RN  Outcome: Met This Shift  1/19/2021 0645 by Costa Dowell RN  Outcome: Met This Shift     Problem: Gas Exchange - Impaired  Goal: Absence of hypoxia  1/19/2021 1754 by Junior Yesica RN  Outcome: Ongoing  1/19/2021 0645 by Costa Dowell RN  Outcome: Met This Shift  1/19/2021 0645 by Costa Dowell RN  Outcome: Met This Shift

## 2021-01-19 NOTE — PROGRESS NOTES
[facility]                  [address] · [address] [address] · [place]    [phone]   · Fax  [phone] ·    Jovanny Garcia M.D., Barbara Sharif D.O., F.A.C.O.I., Araseli Lance M.D.,   Elsi Ramey M.D., JAMES Allen D.O. Pulmonary Progress Note    CC:  Respiratory failure   Covid 19   Subjective: On pressors   Increased o2 requirements overnight             Intake/Output Summary (Last 24 hours) at 1/19/2021 1445  Last data filed at 1/19/2021 1214  Gross per 24 hour   Intake 1459 ml   Output 1180 ml   Net 279 ml       Diet/Nutrition   DIET TUBE FEED CONTINUOUS/CYCLIC NPO; Immune Enhancing (PIVOT 1.5); Nasogastric; 45    Vitals:   BP (!) 99/52   Pulse 85   Temp 98.4 °F (36.9 °C) (Axillary)   Resp (!) 40   Ht 5' 6\" (1.676 m)   Wt 210 lb 12.2 oz (95.6 kg)   SpO2 91%   BMI 34.02 kg/m²  on     I/O   I/O (24 Hours)    Patient Vitals for the past 8 hrs:   BP Temp Temp src Pulse Resp SpO2   01/19/21 1400 (!) 99/52 -- -- 85 (!) 40 91 %   01/19/21 1300 (!) 86/56 -- -- 107 (!) 33 (!) 89 %   01/19/21 1200 135/73 98.4 °F (36.9 °C) Axillary 90 (!) 40 (!) 88 %   01/19/21 1158 -- -- -- 110 (!) 43 (!) 88 %   01/19/21 1150 -- -- -- -- (!) 42 (!) 88 %   01/19/21 1149 -- -- -- 90 (!) 34 (!) 87 %   01/19/21 1100 102/79 -- -- 92 (!) 53 (!) 88 %   01/19/21 1000 (!) 108/54 -- -- 73 (!) 36 (!) 89 %   01/19/21 0900 119/63 -- -- 75 (!) 36 93 %   01/19/21 0818 -- -- -- 71 (!) 36 92 %   01/19/21 0817 -- -- -- -- (!) 35 93 %   01/19/21 0812 -- -- -- -- (!) 36 92 %   01/19/21 0800 (!) 106/53 98.1 °F (36.7 °C) Axillary 70 (!) 34 92 %   01/19/21 0700 (!) 119/54 -- -- 70 (!) 33 92 %       Intake/Output Summary (Last 24 hours) at 1/19/2021 1445  Last data filed at 1/19/2021 1214  Gross per 24 hour   Intake 1459 ml   Output 1180 ml   Net 279 ml     I/O last 3 completed shifts:   In: 2286 [I.V.:962; NG/GT:1324]  Out: 1300 [IDVJJ:6344]   Date 01/19/21 0000 - 01/19/21 2359   Shift 5332-1502 8835-5647 9081-7117 24 Hour Total   INTAKE   I.V.(mL/kg) 607(6.3)   607(6.3)   NG/GT(mL/kg) 852(8.9)   852(8.9)   Shift Total(mL/kg) 1453(96.7)   1204(41.9)   OUTPUT   Urine(mL/kg/hr) 600(0.8) 330  930   Shift Total(mL/kg) 600(6.3) 330(3.5)  930(9.7)   Weight (kg) 95.6 95.6 95.6 95.6     No data found. PHYSICAL EXAMINATION:    General: Follows commands, opens eyes spontaneously. HEENT: Pupils are equal round and reactive to light, there are no oral lesions and no post-nasal drip   Neck: supple without adenopathy, tracheostomy in place  Cardiovascular: regular rate, and rhythm without murmur or gallop  Respiratory:  Decreased breath sounds bilaterally without wheezing or crackles.  Air entry is symmetric, currently tachypneic.   Abdomen: soft, non-tender, non-distended, normal bowel sounds, lateral bruising in the flanks; PEG in place  Extremities: warm, bilateral equal edema upper and lower extremities, no clubbing   Skin:  Large bilateral ecchymoses on the flanks, and lower quadrants of the abdomen  Neurologic: Trached, pupils equal round, interactive, opens eyes spontaneously  Medications:    Scheduled Meds:   enoxaparin  40 mg Subcutaneous Daily    insulin glargine  30 Units Subcutaneous Daily    sucralfate  1 g Oral 4x Daily AC & HS    midodrine  20 mg Oral TID    sodium chloride  500 mL Intravenous Once    pantoprazole  40 mg Intravenous BID    And    sodium chloride (PF)  10 mL Intravenous BID    heparin flush  3 mL Intravenous 2 times per day    amiodarone  200 mg Oral BID    Followed by   Loli Watt ON 1/22/2021] amiodarone  200 mg Oral Daily    digoxin  250 mcg Oral Daily    senna  10 mL Per G Tube Nightly    vitamin C  1,000 mg Oral Daily    Vitamin D  1,000 Units Oral Daily    nicotine  1 patch Transdermal Daily    thiamine  100 mg Oral Daily    zinc sulfate  50 mg Oral Daily    docusate  100 mg Oral Daily    insulin lispro  0-18 Units Subcutaneous Q6H    ipratropium-albuterol  1 ampule Inhalation Q4H WA    budesonide  500 mcg Nebulization BID    Arformoterol Tartrate  15 mcg Nebulization BID    clopidogrel  75 mg Oral Daily    chlorhexidine  15 mL Mouth/Throat BID    sodium chloride flush  10 mL Intravenous 2 times per day    gabapentin  800 mg Oral TID    levothyroxine  100 mcg Oral Daily    atorvastatin  20 mg Oral Daily       Continuous Infusions:   sodium chloride      phenylephrine (HORACE-SYNEPHRINE) 50mg/250mL infusion 20 mcg/min (01/19/21 1053)    dextrose         PRN Meds:  sodium chloride, sodium chloride flush, heparin flush, LORazepam, oxyCODONE, glucose, dextrose, glucagon (rDNA), dextrose, LORazepam, acetaminophen, metoprolol, heparin flush, potassium chloride, [DISCONTINUED] promethazine **OR** ondansetron, polyethylene glycol    Labs:  CBC:   Recent Labs     01/17/21  0450 01/18/21  0355 01/19/21  0557   WBC 13.4* 16.8* 16.3*   HGB 6.7* 9.3* 9.3*   HCT 23.1* 30.4* 31.1*   .5* 101.0* 103.0*    204 196     BMP:   Recent Labs     01/17/21  0450 01/18/21  0355 01/19/21  0557    136 136   K 4.7 4.9 4.7   CL 98 97* 97*   CO2 36* 36* 36*   BUN 30* 22 17   CREATININE 0.3* 0.2* 0.2*     LIVER PROFILE: No results for input(s): AST, ALT, LIPASE, BILIDIR, BILITOT, ALKPHOS in the last 72 hours. Invalid input(s): AMYLASE,  ALB  PT/INR: No results for input(s): PROTIME, INR in the last 72 hours. APTT: No results for input(s): APTT in the last 72 hours. UA:No results for input(s): NITRITE, COLORU, PHUR, LABCAST, WBCUA, RBCUA, MUCUS, TRICHOMONAS, YEAST, BACTERIA, CLARITYU, SPECGRAV, LEUKOCYTESUR, UROBILINOGEN, BILIRUBINUR, BLOODU, GLUCOSEU, AMORPHOUS in the last 72 hours. Invalid input(s): KETONESU  No results for input(s): PHART, NUL3GQT, PO2ART in the last 72 hours.     ABG   Lab Results   Component Value Date    PH 7.396 01/13/2021    PCO2 55.4 01/13/2021    PO2 72.6 01/13/2021 HCO3 33.2 01/13/2021    O2SAT 94.8 01/13/2021     Lab Results   Component Value Date    [facility] 01/13/2021             Assessment:     1. Acute on chronic respiratory failure s/p trach and vent dependence   2. Covid 19   3. Shock   4. Gi bleed on eliquis   5. afib rvr   6. Deconditioning   7. Adrenal insufficiency   8. Elevated d-dimer     Plan:    Full vent support   Sedation   Anticoagulation on dvt prophylaxis dosing but d-dimer > 4k. Check dopplers if (+) may need to increase to full dose or place ivc filter   Pressors per ccm   Ccm following     Momo Beck M.D.    Pulmonary/Critical Care Medicine

## 2021-01-19 NOTE — PROGRESS NOTES
[facility]                  [address] · [address] [address] · [place]    [phone]   · Fax  [phone] ·    Geno Yuen M.D., Kamilla Hernandez D.O., F.A.C.O.I., Clifton Rivera M.D.,   Pepper Meng M.D., Christina Jesus M.D. Roseline Savage D.O. Pulmonary Progress Note    CC:  Respiratory failure   Covid 19   Subjective: On pressors   Increased o2 requirements overnight             Intake/Output Summary (Last 24 hours) at 1/18/2021 1939  Last data filed at 1/18/2021 1400  Gross per 24 hour   Intake 3064 ml   Output 450 ml   Net 2614 ml       Diet/Nutrition   DIET TUBE FEED CONTINUOUS/CYCLIC NPO; Immune Enhancing (PIVOT 1.5); Nasogastric; 45    Vitals:   BP (!) 96/48   Pulse 66   Temp 98.1 °F (36.7 °C) (Axillary)   Resp 28   Ht 5' 6\" (1.676 m)   Wt 210 lb 12.2 oz (95.6 kg)   SpO2 93%   BMI 34.02 kg/m²  on     I/O   I/O (24 Hours)    Patient Vitals for the past 8 hrs:   BP Temp Temp src Pulse Resp SpO2   01/18/21 1543 -- -- -- 66 28 93 %   01/18/21 1500 (!) 96/48 -- -- 64 26 92 %   01/18/21 1445 (!) 102/44 -- -- -- -- --   01/18/21 1430 (!) 125/53 -- -- -- -- --   01/18/21 1400 (!) 133/41 -- -- 70 (!) 38 90 %   01/18/21 1300 132/63 -- -- 70 (!) 33 92 %   01/18/21 1238 -- -- -- 80 (!) 43 90 %   01/18/21 1200 (!) 141/51 98.1 °F (36.7 °C) Axillary 74 (!) 40 90 %       Intake/Output Summary (Last 24 hours) at 1/18/2021 1939  Last data filed at 1/18/2021 1400  Gross per 24 hour   Intake 3064 ml   Output 450 ml   Net 2614 ml     I/O last 3 completed shifts: In: 3941.5 [I.V.:1026; Blood:877.5; NG/GT:2038]  Out: 638 [Urine:638]   Date 01/18/21 0000 - 01/18/21 2359   Shift 3401-1931 9037-9357 0973-7540 24 Hour Total   INTAKE   P.O.(mL/kg/hr)  0(0)  0   I. V.(mL/kg) 671(7) 355(3.7)  1026(10.7)   NG/GT(mL/kg) 4423(16.0) 472(4.9)  5807(26.8)   Shift Total(mL/kg) 9188(96.3) 827(8.7)  8371(99.2)   OUTPUT   Urine(mL/kg/hr) 450(0.6)  450   Shift Total(mL/kg)  450(4.7)  450(4.7)   Weight (kg) 95.6 95.6 95.6 95.6     No data found. PHYSICAL EXAMINATION:    General: Follows commands, opens eyes spontaneously. HEENT: Pupils are equal round and reactive to light, there are no oral lesions and no post-nasal drip   Neck: supple without adenopathy, tracheostomy in place  Cardiovascular: regular rate, and rhythm without murmur or gallop  Respiratory:  Decreased breath sounds bilaterally without wheezing or crackles.  Air entry is symmetric, currently tachypneic.   Abdomen: soft, non-tender, non-distended, normal bowel sounds, lateral bruising in the flanks; PEG in place  Extremities: warm, bilateral equal edema upper and lower extremities, no clubbing   Skin:  Large bilateral ecchymoses on the flanks, and lower quadrants of the abdomen  Neurologic: Trached, pupils equal round, interactive, opens eyes spontaneously  Medications:    Scheduled Meds:   sucralfate  1 g Oral 4x Daily AC & HS    midodrine  20 mg Oral TID    sodium chloride  500 mL Intravenous Once    pantoprazole  40 mg Intravenous BID    And    sodium chloride (PF)  10 mL Intravenous BID    heparin flush  3 mL Intravenous 2 times per day    amiodarone  200 mg Oral BID    Followed by   Aidan Seal ON 1/22/2021] amiodarone  200 mg Oral Daily    digoxin  250 mcg Oral Daily    senna  10 mL Per G Tube Nightly    insulin glargine  40 Units Subcutaneous Daily    vitamin C  1,000 mg Oral Daily    Vitamin D  1,000 Units Oral Daily    [Held by provider] apixaban  5 mg Oral BID    nicotine  1 patch Transdermal Daily    thiamine  100 mg Oral Daily    zinc sulfate  50 mg Oral Daily    heparin flush  3 mL Intravenous 2 times per day    docusate  100 mg Oral Daily    insulin lispro  0-18 Units Subcutaneous Q6H    ipratropium-albuterol  1 ampule Inhalation Q4H [place]    budesonide  500 mcg Nebulization BID    Arformoterol Tartrate  15 mcg Nebulization BID    clopidogrel  75 mg Oral Daily    chlorhexidine  15 mL Mouth/Throat BID    sodium chloride flush  10 mL Intravenous 2 times per day    gabapentin  800 mg Oral TID    levothyroxine  100 mcg Oral Daily    atorvastatin  20 mg Oral Daily       Continuous Infusions:   sodium chloride      phenylephrine (HORACE-SYNEPHRINE) 50mg/250mL infusion 50 mcg/min (01/18/21 1430)    dextrose         PRN Meds:  sodium chloride, sodium chloride flush, heparin flush, LORazepam, oxyCODONE, glucose, dextrose, glucagon (rDNA), dextrose, LORazepam, acetaminophen, metoprolol, sodium chloride flush, heparin flush, potassium chloride, [DISCONTINUED] promethazine **OR** ondansetron, polyethylene glycol    Labs:  CBC:   Recent Labs     01/16/21  0604 01/17/21 0450 01/18/21  0355   WBC 17.5* 13.4* 16.8*   HGB 7.8* 6.7* 9.3*   HCT 27.6* 23.1* 30.4*   .0* 106.5* 101.0*    202 204     BMP:   Recent Labs     01/16/21  0604 01/17/21 0450 01/18/21  0355    136 136   K 4.8 4.7 4.9   CL 98 98 97*   CO2 36* 36* 36*   BUN 18 30* 22   CREATININE 0.2* 0.3* 0.2*     LIVER PROFILE: No results for input(s): AST, ALT, LIPASE, BILIDIR, BILITOT, ALKPHOS in the last 72 hours. Invalid input(s): AMYLASE,  ALB  PT/INR: No results for input(s): PROTIME, INR in the last 72 hours. APTT: No results for input(s): APTT in the last 72 hours. UA:No results for input(s): NITRITE, COLORU, PHUR, LABCAST, WBCUA, RBCUA, MUCUS, TRICHOMONAS, YEAST, BACTERIA, CLARITYU, SPECGRAV, LEUKOCYTESUR, UROBILINOGEN, BILIRUBINUR, BLOODU, GLUCOSEU, AMORPHOUS in the last 72 hours. Invalid input(s): KETONESU  No results for input(s): PHART, NYK4CAE, PO2ART in the last 72 hours. ABG   Lab Results   Component Value Date    PH 7.396 01/13/2021    PCO2 55.4 01/13/2021    PO2 72.6 01/13/2021    HCO3 33.2 01/13/2021    O2SAT 94.8 01/13/2021     Lab Results   Component Value Date    MODE [facility] 01/13/2021             Assessment:     1.  Acute on chronic respiratory failure s/p trach and vent dependence   2. Covid 19   3. Shock   4. Anemia   5. afib rvr   6. Deconditioning   7. Adrenal insufficiency     Plan:    Full vent support   Sedation   Pressors per [facility]   Ccm following     Henry Capps M.D.    Pulmonary/Critical Care Medicine

## 2021-01-19 NOTE — PROGRESS NOTES
CI HR MAP TPRI SVI TFC   Baseline 4.3 74 77 1447 58 69.1   Test 5.1 74 79 1240 69 67.9   % Change 19.7 1.0 2.6 -14.3 19.6 -1.8   noninvasive -  Start    Dr. Bella Patches notified of results.

## 2021-01-20 NOTE — PATIENT CARE CONFERENCE
.  Intensive Care Daily Quality Rounding Checklist      ICU Team Members: Dr. Anna Damon, Thomas Hand & Danny (residents), clinical pharmacist, charge nurse, bedside nurse, respiratory therapist    ICU Day #: NUMBER: 37    Intubation Date:  Placed on vent 12/15 trach 12/29 changed 1/4/21    Ventilator Day #: 40    Central Line Insertion Date:  picc  1/17/21        Day #: NUMBER: 4     Arterial Line Insertion Date:  n/a      Day #: n/a    Temporary Hemodialysis Catheter Insertion Date:  n/a      Day # n/a    DVT Prophylaxis: Lovenox    GI Prophylaxis: Protonix    Allen Catheter Insertion Date: December 30th       Day #: 22      Continued need (if yes, reason documented and discussed with physician): t strict I/o/vent    Skin Issues/ Wounds and ordered treatment discussed on rounds: sos precautions/wound care consult/wound under trach dressing    Goals/ Plans for the Day: Daily labs, wean vent as able, check thyroid studies, continue to monitor

## 2021-01-20 NOTE — PLAN OF CARE
Problem: Inadequate oral food/beverage intake (NI-2.1)  Goal: Food and/or Nutrient Delivery  Pt will ernestine EN at goal rate via PEG  Description: Individualized approach for food/nutrient provision.   Outcome: Met This Shift

## 2021-01-20 NOTE — PROGRESS NOTES
This note also relates to the following rows which could not be included:  Vt Ordered - Cannot attach notes to unvalidated device data  Rate Set - Cannot attach notes to unvalidated device data  Peak Flow - Cannot attach notes to unvalidated device data  Pressure Support - Cannot attach notes to unvalidated device data  SpO2 - Cannot attach notes to unvalidated device data  Sensitivity - Cannot attach notes to unvalidated device data  PEEP/CPAP - Cannot attach notes to unvalidated device data  I Time/ I Time % - Cannot attach notes to unvalidated device data  High Peep/I Pressure - Cannot attach notes to unvalidated device data  Peak Inspiratory Pressure - Cannot attach notes to unvalidated device data  Mean Airway Pressure - Cannot attach notes to unvalidated device data  Rate Measured - Cannot attach notes to unvalidated device data  Vt Exhaled - Cannot attach notes to unvalidated device data  Minute Volume - Cannot attach notes to unvalidated device data  I:E Ratio - Cannot attach notes to unvalidated device data  Pulse - Cannot attach notes to unvalidated device data  Resp - Cannot attach notes to unvalidated device data  High Pressure Alarm - Cannot attach notes to unvalidated device data       01/19/21 2154   Vent Information   FiO2  100 %

## 2021-01-20 NOTE — PROGRESS NOTES
Chief Complaint:  Chief Complaint   Patient presents with    Shortness of Breath     70% RA at NH 88% NRB , being tx for pneumonia hx of copd      Acute respiratory failure with hypoxia ([address]     Subjective:    Awake, looking around    Objective:    BP (!) 109/57   Pulse 72   Temp 98.8 °F (37.1 °C) (Axillary)   Resp (!) 33   Ht 5' 6\" (1.676 m)   Wt 210 lb 12.2 oz (95.6 kg)   SpO2 93%   BMI 34.02 kg/m²     Current medications that patient is taking have been reviewed. General appearance: Moribund appearing elderly female  Psych: Calm  Neuro: Awake looking around    Face-to-face physical exam was not done today, to limit spread of coronavirus and preserve PPE.      Labs:  CBC with Differential:    Lab Results   Component Value Date    WBC 16.3 01/19/2021    RBC 3.02 01/19/2021    HGB 9.3 01/19/2021    HCT 31.1 01/19/2021     01/19/2021    .0 01/19/2021    MCH 30.8 01/19/2021    MCHC 29.9 01/19/2021    RDW 18.6 01/19/2021    NRBC 0.9 01/14/2021    SEGSPCT 89 03/10/2014    BANDSPCT 1 05/19/2016    METASPCT 1.0 01/11/2021    LYMPHOPCT 4.3 01/19/2021    PROMYELOPCT 1.8 12/19/2020    MONOPCT 2.8 01/19/2021    MYELOPCT 0.9 01/14/2021    BASOPCT 0.4 01/19/2021    MONOSABS 0.46 01/19/2021    LYMPHSABS 0.71 01/19/2021    EOSABS 0.23 01/19/2021    BASOSABS 0.06 01/19/2021     CMP:    Lab Results   Component Value Date     01/19/2021    K 4.7 01/19/2021    K 3.3 12/15/2020    CL 97 01/19/2021    CO2 36 01/19/2021    BUN 17 01/19/2021    CREATININE 0.2 01/19/2021    GFRAA >60 01/19/2021    LABGLOM >60 01/19/2021    GLUCOSE 131 01/19/2021    GLUCOSE 106 12/15/2011    PROT 5.0 01/03/2021    LABALBU 3.0 01/03/2021    CALCIUM 8.0 01/19/2021    BILITOT 0.7 01/03/2021    ALKPHOS 130 01/03/2021    AST 21 01/03/2021    ALT 14 01/03/2021          Assessment/Plan:  Principal Problem:    Acute respiratory failure with hypoxia (HCC)  Active Problems:    [facility]

## 2021-01-20 NOTE — PROGRESS NOTES
Patients Son Governtino Chong) provided with an update; all questions have been answered at this time.

## 2021-01-20 NOTE — PROGRESS NOTES
1/20/2021  9:48 AM      Comprehensive Nutrition Assessment    Type and Reason for Visit:  Reassess    Nutrition Recommendations/Plan: Continue current TF, as tolerated    Nutrition Assessment:  Pt remains intubated/sedated 2/2 Covid+ PNA. On TF via PEG and vent/trach. Possible GIB w/anemia and surgery plan repeat PEG lavage/aspiration to eval for GIB. Will continue to monitor GI status and TF ernestine. Current TF approp to promote wound healing, as wound under trach plate doc. Malnutrition Assessment:  Malnutrition Status: At risk for malnutrition (Comment)    Context:  Acute Illness     Findings of the 6 clinical characteristics of malnutrition:  Energy Intake:  1 - 75% or less of estimated energy requirements for 7 or more days  Weight Loss:  Unable to assess(d/t fluids/fluctuations)     Body Fat Loss:  Unable to assess     Muscle Mass Loss:  Unable to assess    Fluid Accumulation:  No significant fluid accumulation     Strength:  Not Performed    Estimated Daily Nutrient Needs:  Energy (kcal):  ; Weight Used for Energy Requirements:  Admission     Protein (g):  75-90 (1.3-1.5 g/kg);  Weight Used for Protein Requirements:  Ideal        Fluid (ml/day):  Per Critical care; Method Used for Fluid Requirements:  Other (Comment)      Nutrition Related Findings:  Intubated/trach, PEG to TF, +I/O 9L, +2 edema,      Wounds:  Surgical Incision, Wound Consult Pending(trach/PEG sites and wound consult re: wound under trach plate)       Current Nutrition Therapies:    Current Tube Feeding (TF) Orders:  · Feeding Route: PEG  · Formula: Immune Enhancing  · Schedule: Continuous  · Additives/Modulars: (none)  · Water Flushes: 200 ml Q 6 hrs  · Current TF & Flush Orders Provides: at goal  · Goal TF & Flush Orders Provides: 1080 ml/d, 1620 nguyen, 102 g pro, 1620 ml total free water      Anthropometric Measures:  · Height: 5' 6\" (167.6 cm)  · Current Body Weight: 226 lb (102.5 kg)(1/20  wt gain with +fluid balance) · Admission Body Weight: 184 lb (83.5 kg)(12/15 first measured wt)    · Usual Body Weight: 161 lb (73 kg)(per EMR x 6 mo)     · Ideal Body Weight: 130 lbs; % Ideal Body Weight 173.8 %   · BMI: 36.5  · BMI Categories: Overweight (BMI 25.0-29.9)(29.7 at admit wt)       Nutrition Diagnosis:   · Inadequate oral intake related to impaired respiratory function(2/2 COVID-19 PNA) as evidenced by NPO or clear liquid status due to medical condition, intubation      Nutrition Interventions:   Food and/or Nutrient Delivery:  Continue Current Tube Feeding  Nutrition Education/Counseling:  No recommendation at this time   Coordination of Nutrition Care:  Continue to monitor while inpatient    Goals:  Pt ernestine EN at goal rate       Nutrition Monitoring and Evaluation:   Behavioral-Environmental Outcomes:  None Identified   Food/Nutrient Intake Outcomes:  Enteral Nutrition Intake/Tolerance  Physical Signs/Symptoms Outcomes:  GI Status, Biochemical Data, Fluid Status or Edema, Nutrition Focused Physical Findings, Skin, Weight     Discharge Planning:    Enteral Nutrition     Electronically signed by Sylvia Mortensen RD, CNSC, LD on 1/20/21 at 9:48 AM EST    Contact: [phone]

## 2021-01-20 NOTE — PROGRESS NOTES
GENERAL SURGERY  DAILY PROGRESS NOTE    Date:2021       Room:[address] [place] [address]  Patient Name:Sravanthi Torres     YOB: 1950     Age:70 y.o. Chief Complaint:  Chief Complaint   Patient presents with    Shortness of Breath     70% RA at NH 88% NRB , being tx for pneumonia hx of copd         Subjective:  Per nursing, another large bowel movement overnight but they did not say whether it was melenic or had blood in it. I did get a positive FOBT on light brown diarrhea 2 days ago. Hemoglobin dropped again 1 g this morning. Eliquis is still held but Plavix continues. Objective:  BP (!) 122/57   Pulse 71   Temp 99.1 °F (37.3 °C) (Oral)   Resp (!) 34   Ht 5' 6\" (1.676 m)   Wt 226 lb 3.1 oz (102.6 kg)   SpO2 93%   BMI 36.51 kg/m²   Temp (24hrs), Av.5 °F (36.9 °C), Min:97.7 °F (36.5 °C), Max:99.1 °F (37.3 °C)      I/O (24Hr):  I/O last 3 completed shifts: In: 6993 [I.V.:2149; NG/GT:2323]  Out: 1023 [OUEIC:0337]     Did not personally exam pt due to COVID+ precautions and reserving PPE for direct care staff.        Assessment:  65 y.o. female with acute on chronic anemia likely stress gastritis in setting of anticoagulation and covid sepsis on prolonged ventilator, last EGD for PEG was 20 (3 weeks ago)    Plan:  - continue PPI & carafate  - continue to hold eliquis (was on for intermittent afib), consider holding plavix as well  - continue to monitor; perform another PEG tube lavage aspiration during next patient care (last done 2 days ago and inconclusive)    Electronically signed by Miguel Gomez MD on 2021 at 8:11 AM

## 2021-01-20 NOTE — PROGRESS NOTES
Called ultrasound to do bilateral extremity ultrasound with vein mapping and ultrasound is unable to do until tomorrow a.m.

## 2021-01-20 NOTE — PROGRESS NOTES
[facility]                  [address] · [address] [address] · [place]    [phone]   · Fax  [phone] ·    Palma Haji M.D., Maria T Quintana D.O., F.A.C.O.I., David Portillo M.D.,   Ramon Saba M.D., Kuldip Simons M.D. Curtis Izquierdo D.O. Pulmonary Progress Note    CC:  Respiratory failure   Covid 19   Subjective: On pressors   fio2 95% , sat 88%  Per nursing pt has desat with any movement              Intake/Output Summary (Last 24 hours) at 1/20/2021 1532  Last data filed at 1/20/2021 1407  Gross per 24 hour   Intake 5617.13 ml   Output 1293 ml   Net 4324.13 ml       Diet/Nutrition   DIET TUBE FEED CONTINUOUS/CYCLIC NPO; Immune Enhancing (PIVOT 1.5);  Nasogastric; 45    Vitals:   BP (!) 115/56   Pulse 73   Temp 98.4 °F (36.9 °C) (Oral)   Resp (!) 33   Ht 5' 6\" (1.676 m)   Wt 226 lb 3.1 oz (102.6 kg)   SpO2 (!) 88%   BMI 36.51 kg/m²  on     I/O   I/O (24 Hours)    Patient Vitals for the past 8 hrs:   BP Temp Temp src Pulse Resp SpO2 Height   01/20/21 1515 (!) 115/56 -- -- 73 (!) 33 (!) 88 % --   01/20/21 1500 (!) 107/50 -- -- 72 (!) 35 (!) 88 % --   01/20/21 1430 (!) 98/46 -- -- 74 (!) 35 (!) 89 % --   01/20/21 1400 (!) 104/53 -- -- 72 (!) 33 (!) 88 % --   01/20/21 1330 (!) 113/54 -- -- 71 30 (!) 89 % --   01/20/21 1300 (!) 109/52 -- -- 69 (!) 31 92 % --   01/20/21 1220 -- -- -- 65 29 91 % --   01/20/21 1200 (!) 99/51 -- -- 68 (!) 31 91 % --   01/20/21 1155 (!) 79/54 98.4 °F (36.9 °C) Oral 68 (!) 32 90 % --   01/20/21 1100 (!) 105/48 -- -- 65 29 92 % --   01/20/21 1030 (!) 121/56 -- -- 72 (!) 35 91 % --   01/20/21 1000 128/73 -- -- 73 (!) 35 92 % --   01/20/21 0937 -- -- -- -- -- -- 5' 6\" (1.676 m)   01/20/21 0930 (!) 136/51 -- -- 69 (!) 37 90 % --   01/20/21 0900 125/63 -- -- 72 (!) 38 92 % --   01/20/21 0830 (!) 99/44 -- -- 67 25 95 % --   01/20/21 0800 (!) 113/50 98.6 °F (37 °C) Oral 71 (!) 31 92 % -- 01/20/21 0758 -- -- -- 71 (!) 34 93 % --   01/20/21 0754 -- -- -- -- (!) 36 91 % --   01/20/21 0753 -- -- -- -- (!) 36 93 % --   01/20/21 0752 -- -- -- -- (!) 34 93 % --       Intake/Output Summary (Last 24 hours) at 1/20/2021 1532  Last data filed at 1/20/2021 1407  Gross per 24 hour   Intake 5617.13 ml   Output 1293 ml   Net 4324.13 ml     I/O last 3 completed shifts: In: 5617.1 [I.V.:2455. 1; NG/GT:3062; IV Piggyback:100]  Out: 7684 [Urine:1293]   Date 01/20/21 0000 - 01/20/21 2359   Shift 5842-4470 9434-1128 6812-3049 24 Hour Total   INTAKE   I.V.(mL/kg/hr) 741(0.9) 306.1  1047.1   NG/GT 1123 739  1862   IV Piggyback  100  100   Shift Total(mL/kg) 9781(39.5) 1145. 1(11.2)  3009.1(29.3)   OUTPUT   Urine(mL/kg/hr) 330(0.4) 600  930   Shift Total(mL/kg) 330(3.2) 600(5.8)  930(9.1)   Weight (kg) 102.6 102.6 102.6 102.6     Patient Vitals for the past 96 hrs (Last 3 readings):   Weight   01/20/21 0000 226 lb 3.1 oz (102.6 kg)         PHYSICAL EXAMINATION:  Due to the current efforts to prevent transmission of COVID-19 and also the need to preserve PPE for other caregivers, a face-to-face encounter with the patient was not performed. That being said, all relevant records and diagnostic tests were reviewed, including laboratory results and imaging. Please reference any relevant documentation elsewhere. Care will be coordinated with the primary service.         Medications:    Scheduled Meds:   dexamethasone  6 mg Intravenous Q12H    enoxaparin  40 mg Subcutaneous Daily    insulin glargine  30 Units Subcutaneous Daily    sucralfate  1 g Oral 4x Daily AC & HS    midodrine  20 mg Oral TID    sodium chloride  500 mL Intravenous Once    pantoprazole  40 mg Intravenous BID    And    sodium chloride (PF)  10 mL Intravenous BID    heparin flush  3 mL Intravenous 2 times per day    amiodarone  200 mg Oral BID    Followed by   Kisha Strauss ON 1/22/2021] amiodarone  200 mg Oral Daily    digoxin  250 mcg Oral Daily    senna  10 mL Per G Tube Nightly    vitamin C  1,000 mg Oral Daily    Vitamin D  1,000 Units Oral Daily    nicotine  1 patch Transdermal Daily    thiamine  100 mg Oral Daily    zinc sulfate  50 mg Oral Daily    docusate  100 mg Oral Daily    insulin lispro  0-18 Units Subcutaneous Q6H    ipratropium-albuterol  1 ampule Inhalation Q4H WA    budesonide  500 mcg Nebulization BID    Arformoterol Tartrate  15 mcg Nebulization BID    clopidogrel  75 mg Oral Daily    chlorhexidine  15 mL Mouth/Throat BID    sodium chloride flush  10 mL Intravenous 2 times per day    gabapentin  800 mg Oral TID    levothyroxine  100 mcg Oral Daily    atorvastatin  20 mg Oral Daily       Continuous Infusions:   sodium chloride      phenylephrine (HORACE-SYNEPHRINE) 50mg/250mL infusion 25 mcg/min (01/20/21 1347)    dextrose         PRN Meds:  sodium chloride, sodium chloride flush, heparin flush, LORazepam, oxyCODONE, glucose, dextrose, glucagon (rDNA), dextrose, LORazepam, acetaminophen, metoprolol, heparin flush, potassium chloride, [DISCONTINUED] promethazine **OR** ondansetron, polyethylene glycol    Labs:  CBC:   Recent Labs     01/18/21  0355 01/19/21  0557 01/20/21  0615   WBC 16.8* 16.3* 14.7*   HGB 9.3* 9.3* 8.5*   HCT 30.4* 31.1* 29.3*   .0* 103.0* 104.6*    196 212     BMP:   Recent Labs     01/18/21  0355 01/19/21  0557 01/20/21  0615    136 133   K 4.9 4.7 4.9   CL 97* 97* 97*   CO2 36* 36* 35*   PHOS  --   --  2.4*   BUN 22 17 17   CREATININE 0.2* 0.2* 0.2*     LIVER PROFILE: No results for input(s): AST, ALT, LIPASE, BILIDIR, BILITOT, ALKPHOS in the last 72 hours. Invalid input(s): AMYLASE,  ALB  PT/INR: No results for input(s): PROTIME, INR in the last 72 hours. APTT: No results for input(s): APTT in the last 72 hours.   UA:No results for input(s): NITRITE, COLORU, PHUR, LABCAST, WBCUA, RBCUA, MUCUS, TRICHOMONAS, YEAST, BACTERIA, CLARITYU, SPECGRAV, LEUKOCYTESUR, UROBILINOGEN, BILIRUBINUR, BLOODU, GLUCOSEU, AMORPHOUS in the last 72 hours. Invalid input(s): KETONESU  No results for input(s): PHART, MMV5UCU, PO2ART in the last 72 hours. ABG   Lab Results   Component Value Date    PH 7.359 01/20/2021    PCO2 60.2 01/20/2021    PO2 123.3 01/20/2021    HCO3 33.2 01/20/2021    O2SAT 98.4 01/20/2021     Lab Results   Component Value Date    MODE [facility] 01/20/2021     EXAMINATION:   ONE XRAY VIEW OF THE CHEST   1/20/2021 7:28 am   COMPARISON:   01/15/2021   HISTORY:   ORDERING SYSTEM PROVIDED HISTORY: high fio2 demands   TECHNOLOGIST PROVIDED HISTORY:   Reason for exam:->high fio2 demands   FINDINGS:   EKG leads overlie the chest.  Right-sided PICC line terminates in the mid   superior vena cava.  Tracheostomy tube remains in place.  No pneumothorax. Extensive bilateral infiltrates greater towards the right appear worsened   from previous.  Left-sided cardiac pacemaker.  No other interval change.       Impression   Worsening bilateral infiltrates which may represent asymmetric edema and/or   pneumonia. Interval placement of right-sided PICC line.                   Assessment:     1. Acute on chronic respiratory failure s/p trach and vent dependence   2. Covid 19   3. Shock   4. Gi bleed on eliquis   5. afib rvr   6. Deconditioning   7. Adrenal insufficiency   8. Elevated d-dimer     Plan:    Full vent support, trach to vent peep 12 , fio2 100%   Sedation   Anticoagulation on dvt prophylaxis dosing but d-dimer > 4k. Check dopplers if (+) may need to increase to full dose or place ivc filter   Pressors per ccm   Ccm following       Electronically signed by ELLIE Coles on 1/20/2021 at 3:32 PM    I personally saw, examined, and cared for the patient. Labs, medications, radiographs reviewed. I agree with history exam and plans detailed in NP note.         Electronically signed by Kade Antony DO on 1/20/2021 at 4:18 PM

## 2021-01-20 NOTE — PROGRESS NOTES
Critical Care Team - Daily Progress Note         Date and time: 1/20/2021 11:59 AM  Patient's name:  Clementina [facility] Record Number: [id number]  Patient's account/billing number: [de-identified]  Patient's YOB: 1950  Age: 65 y.o. Date of Admission: 12/13/2020 10:51 AM  Length of stay during current admission: 38      Primary Care Physician: Todd Carmona DO  ICU Attending Physician: Dr. Charmayne Dun    Code Status: DNR-CCA    Reason for ICU admission: respiratory failure   Shock   Covid 19 pneumonitis       SUBJECTIVE:     OVERNIGHT EVENTS:         12/16: Overnight able to titrate down some of the vasopressors though still intermittently hypotensive and requiring vasopressin as well as Levophed. Still sedated with fentanyl and Versed. Hyponatremia notable at 119 new today. 12/17: Patient was able to be titrated off the vasopressors. Is now on midodrine. Minimally hypothermic overnight now on Melissa hugger with improvement in temperature. Continue sedation with fentanyl and Versed. 12/18: Patient continues on the ventilator. Back on small amount of Levophed in addition to the vasopressin. Hoping to wean sedation more today. 12/19: Back on levophed overnight. Initially able to titrate fio2 to 40%, became hypoxic and increased to 60%. Apparently their were several episodes of bradycardia overnight. Patient has pacemaker which did not fire per nursing. Follows with Dr. Meli Pablo for cardiology. 12/20: Patient: 6 L overnight, nephrology ordered K-Phos, and half-normal saline, will CT her head today. 12/21: Patient tolerated being supine overnight. Plan for PICC line today. Pacemaker interrogated with no runs of V. tach or other rhythm needing acute intervention. 12/22: Patient remained supine without difficulty. Continuing to try to wean down her FiO2. Her sodium has improved. 12/23: Patient continues to tolerate the vent. She is waking up more.   Sodium is much better. No acute overnight events. 12/24: Failed weaning trial yesterday. Became agitated. Continues to tolerate the ventilator. Day 9 on the ventilator. 12/25: The patient occasionally opens her eyes. She occasionally follows commands. She gets extremely anxious when sedation is weaned further. Continues on Versed and fentanyl. 12/26: Patient continues on the ventilator. Opens eyes with sedation vacation, not really following commands. Day 11 on the ventilator. No other acute overnight events. 12:27: Continues on the vent. Awake on sedation, does follow commands. Day 12 on the ventilator. 12/28: Patient continues on the ventilator. Still occasionally becomes agitated when attempting to wean sedation. Day 13 on the ventilator. Occasionally hypotensive requiring Levophed. 12/29: trach and peg today. Spent 29th on the phone talking to her son who is the power of  yesterday explaining her prognosis and the treatment course going forward. He wants a trach and PEG and everything done at this point. Patient is actually much more awake today and following commands. When asked if she needs the volume turned up on the TV she shakes her head yes. Plan for trach and PEG this afternoon. 12/30: Trach and PEG yesterday. Had to be placed back on Levophed, currently running at 13 mics. Patient is now awake, has restraints off, is alert and responding to commands. Weaning sedatives. 12/31: No acute events overnight, episode of A. fib with RVR yesterday, changed to needle from Levophed, and started on amiodarone bolus and amiodarone per PEG tube. Off fern today. 01/01: No acute events, Weaned off fentanyl drip    01/02: No acute events    1/4: No acute events overnight    1/5: Become more agitated overnight with tachypnea and fluctuating blood pressure. Also ventilatory status declining requiring increased sedation.     1/6: Day 23 Ventilation, Continues tachypnea and poor ventilation. 1/7: Day 24 ventilation. Not tolerating ventilation. Continue to wean sedation as able. Cultures redrawn. 1/8: Day 25 ventilation. Patient found overnight 700 cc residual volume, stopped tube feedings at that time. Recheck this morning only 50 cc residual restarted tube feedings. ,  Start Reglan every 6 hours. 1/9: Remains on ventilator. Unable to wean FiO2. Tube feeds were stopped again last night for having residual 300 cc will resume trickle feeds today. Weaned off pressors. 1/10: Vent day #27, off all pressors and IV sedation. Opens eyes upon calling her name and withdraws to pain. Still on high FIO2 and PEEP     1/11: Vent Day #28, increase Lantus, decrease Ativan to 0.5 every 6 hours. Hold Lasix. Wean phenylephrine as tolerated and decrease PEEP to 12.    1/12: Vent Day #29, Change to Ativan PRN. Continue to wean. Off pressors. 1/13: Vent day #30, patient overall clinically better. Weaning oxygen support. Transfer patient to intermediate floor pending discharge to skilled nursing facility. 1/14: Vent Day #31, patient improving clinically. Will be discharged to nursing facility. 1/15: Vent day #32, patient acutely decompensated when trying to transfer her to the nursing facility. Due to this she was knitted back to the ICU yesterday. Patient will be transferred to intermediate today for continued intermediate level treatment. 1/16; patient was downgraded yesterday team intermediate please refer to pulmonary note for details    1/17: Patient became hypotensive tachycardic dropped H&H upgraded to intensive care again. 1/18: Hemoglobin stable today, still on phenylephrine continue to wean. Sedation off.    1/19: Patient still on phenylephrine for blood pressure support. Requiring increased PEEP and O2. Wean as tolerated. NICOM today to assess volume responsiveness. 1/20: Continue to wean phenylephrine as tolerated.   Start Decadron 6 mg every 12 hours Labs:   COVID-19 + December 13  MRSA negative screening   radiology/Imaging:     Chest Xray (1/20/2021):       Right PICC line with tip at the junction of the subclavian and SVC   Extensive bilateral pulmonary infiltrates unchanged   ET and NG tubes appear in satisfactory position             ASSESSMENT:      1. Acute on chronic hypoxic respiratory failure   2. Covid pneumonitis - critically severe   3. Shock-hypovolemic improved  4. Adrenal insufficiency  5. Electrolyte abnormalities  6. Leukocytosis    7. COPD  8. pvd  9. chf not in acute exacerbation  10. Hx lacunar infarct   11. SSS s/p pacemaker   12. Hx seizure do   13. Bilateral carotid stenosis       SYSTEMS ASSESSMENT    Neuro   Previous history of lacunar stroke  Weaned off sedation, continue Ativan and oxycodone  1/18: Patient was sedated over the weekend due to respiratory issues and worsening anemia requiring blood and pressors. Sedation was weaned overnight patient still somnolent. Continue to monitor reassess as sedation wears off.  1/19: Neuro status improving off sedation, patient responding to commands now/interactive. 1/20: Less anxious, less tachypneic       Respiratory   Acute respiratory failure with hypoxia secondary to COVID-19 viral pneumonia  Trach and Peg 12/29 1/5 scheduled Ativan for every 6 and Oxy 10 milligrams every 6 hours. Stop fentanyl patch. Patient tachypneic more today than previous, requiring increased sedation in order to maintain oxygen saturation. 1/6: Repeat CT head and Chest today. 1/7; patient continues to require increased PEEP. Continue to wean as tolerated. 1/8: Decrease tidal volume to 360 increase rate to 24.  1/9; trying to wean FiO2 but not successful so far  1/10: continue to wean Fio2 as tolertaed  1/11: Decrease PEEP to 12, wean phenylephrine as tolerated. 1/12: Change to Volume support mode, wean sedation. 1/13: Overall respiratory improvement.   Patient still on 70% O2 however we will continue to decrease her hemoglobin level until she needs a transfusion. General surgery has been consulted. Patient's right arm is more edematous than left. 1/19: Right upper extremity ultrasound showed no evidence of DVT. 1/20: Hemoglobin 8.5 today relatively stable continue to monitor      Endocrine   Diabetes: Currently on Lantus 40 units daily plus high-dose sliding scale. Hydrocortisone weaned, continue to monitor blood pressure      Social/Spiritual/DNR/Other   Full code. Palliative consulted. Electronically signed by Libby Mora DO on 1/20/2021 at 11:59 AM       Critical Care Attending Addendum:    Patient seen and examined with the house staff. X-rays personally reviewed through the PACS. Family is updated at the bedside as available. Additional findings listed below as necessary. Additional comments:  1. Acute hypoxic respiratory failure still on 95%FIO2  2. COVID 19  3. Hypotension probable septic shock on Jay. 4. Acute blood loss anemia hg ok. 5. DM controlled. 6. Hypothyroidism, will increase levothyroxine  7. BP stable, will wean hydrocortisone.     30 min CCT

## 2021-01-21 PROBLEM — E87.5 HYPERKALEMIA: Status: ACTIVE | Noted: 2021-01-01

## 2021-01-21 NOTE — PROGRESS NOTES
Chief Complaint:  Chief Complaint   Patient presents with    Shortness of Breath     70% RA at NH 88% NRB , being tx for pneumonia hx of copd      Acute respiratory failure with hypoxia ([address]     Subjective:    Awake, looking around    Objective:    /66   Pulse 78   Temp 98 °F (36.7 °C) (Oral)   Resp (!) 40   Ht 5' 6\" (1.676 m)   Wt 226 lb 3.1 oz (102.6 kg)   SpO2 (!) 88%   BMI 36.51 kg/m²     Current medications that patient is taking have been reviewed. General appearance: Moribund appearing elderly female  Psych: Calm  Neuro: Awake looking around    Face-to-face physical exam was not done today, to limit spread of coronavirus and preserve PPE.      Labs:  CBC with Differential:    Lab Results   Component Value Date    WBC 14.7 01/20/2021    RBC 2.80 01/20/2021    HGB 8.5 01/20/2021    HCT 29.3 01/20/2021     01/20/2021    .6 01/20/2021    MCH 30.4 01/20/2021    MCHC 29.0 01/20/2021    RDW 17.8 01/20/2021    NRBC 0.9 01/14/2021    SEGSPCT 89 03/10/2014    BANDSPCT 1 05/19/2016    METASPCT 1.0 01/11/2021    LYMPHOPCT 4.8 01/20/2021    PROMYELOPCT 1.8 12/19/2020    MONOPCT 2.5 01/20/2021    MYELOPCT 0.9 01/14/2021    BASOPCT 0.2 01/20/2021    MONOSABS 0.37 01/20/2021    LYMPHSABS 0.71 01/20/2021    EOSABS 0.20 01/20/2021    BASOSABS 0.03 01/20/2021     CMP:    Lab Results   Component Value Date     01/20/2021    K 4.9 01/20/2021    K 3.3 12/15/2020    CL 97 01/20/2021    CO2 35 01/20/2021    BUN 17 01/20/2021    CREATININE 0.2 01/20/2021    GFRAA >60 01/20/2021    LABGLOM >60 01/20/2021    GLUCOSE 140 01/20/2021    GLUCOSE 106 12/15/2011    PROT 5.0 01/03/2021    LABALBU 3.0 01/03/2021    CALCIUM 7.7 01/20/2021    BILITOT 0.7 01/03/2021    ALKPHOS 130 01/03/2021    AST 21 01/03/2021    ALT 14 01/03/2021          Assessment/Plan:  Principal Problem:    Acute respiratory failure with hypoxia (HCC)  Active Problems:    COPD (chronic obstructive pulmonary disease) (HCC)    History of DVT (deep vein thrombosis)    Coronary artery disease involving native coronary artery without angina pectoris    Acute respiratory failure due to COVID-19 [facility])    Septic shock (HCC)    Atrial fibrillation with RVR ([facility] [address]    Pneumonia due to COVID-19 virus  Resolved Problems:    * No resolved hospital problems. *       Wean vent as tolerated    Completed steroids. Continue digoxin and amiodarone    Apixaban held due to gradually downtrending hemoglobin. Surgery on board. Continue PPI and carafate. Continue bronchodilators    Glucose well controlled    Continue Plavix    Prognosis not good. She has been in the hospital for 1 month. Her baseline health is not good - CAD, PAD, VTE, CHF, COPD. She is trached and pegged.     Requires continued inpatient level of care   Pablo Rubin    7:17 PM  1/20/2021  Cell: [phone]

## 2021-01-21 NOTE — PROGRESS NOTES
Son Diane Hassan called in to ask if his mothers glasses could be located for her; will search room on next entry.

## 2021-01-21 NOTE — PROGRESS NOTES
[facility]                  [address] · [address] [address] · [place]    [phone]   · Fax  [phone] ·    Sarbjit Peña M.D., Ivett Milan D.O., F.A.C.O.I., Brandy Rivera M.D.,   Mac Morgan M.D., Jag Russo M.D. Melly Doyle D.O. Pulmonary Progress Note    CC:  Respiratory failure   Covid 19   Subjective: On pressors   fio2 95% , sat 88%  Per nursing pt has desat with any movement              Intake/Output Summary (Last 24 hours) at 1/21/2021 1811  Last data filed at 1/21/2021 1731  Gross per 24 hour   Intake 2790 ml   Output 820 ml   Net 1970 ml       Diet/Nutrition   DIET TUBE FEED CONTINUOUS/CYCLIC NPO; Immune Enhancing (PIVOT 1.5); Nasogastric; 45    Vitals:   BP (!) 140/55   Pulse 74   Temp 98.6 °F (37 °C) (Oral)   Resp (!) 34   Ht 5' 6\" (1.676 m)   Wt 230 lb 2.6 oz (104.4 kg)   SpO2 95%   BMI 37.15 kg/m²  on     I/O   I/O (24 Hours)    Patient Vitals for the past 8 hrs:   BP Temp Temp src Pulse Resp SpO2   01/21/21 1700 (!) 140/55 -- -- 74 (!) 34 95 %   01/21/21 1627 -- -- -- 74 (!) 36 94 %   01/21/21 1600 (!) 157/69 -- -- 75 (!) 34 94 %   01/21/21 1500 (!) 145/73 -- -- 76 (!) 35 94 %   01/21/21 1430 (!) 151/72 -- -- 74 (!) 35 95 %   01/21/21 1400 (!) 147/64 -- -- 78 (!) 40 93 %   01/21/21 1300 (!) 140/62 -- -- 77 (!) 31 94 %   01/21/21 1237 -- -- -- 75 (!) 34 94 %   01/21/21 1235 -- -- -- -- (!) 36 95 %   01/21/21 1200 128/74 98.6 °F (37 °C) Oral 76 (!) 35 94 %   01/21/21 1100 137/62 -- -- 71 (!) 35 96 %       Intake/Output Summary (Last 24 hours) at 1/21/2021 1811  Last data filed at 1/21/2021 1731  Gross per 24 hour   Intake 2790 ml   Output 820 ml   Net 1970 ml     I/O last 3 completed shifts:   In: 5323 [I.V.:356; NG/GT:2434]  Out: 820 [Urine:745; Emesis/NG output:75]   Date 01/21/21 0000 - 01/21/21 2359   Shift 6120-3554 5318-6068 1229-3201 24 Hour Total   INTAKE   I.V.(mL/kg) 100(1) 173(1.7)  273(2.6)   NG/GT(mL/kg) 750(7.2) 585(5.6) 60(0.6) 0881(21.4)   Shift Total(mL/kg) 850(8.1) 758(7.3) 60(0.6) 9065(82)   OUTPUT   Urine(mL/kg/hr) 269(0.3) 300(0.4)  569   Emesis/NG output(mL/kg) 75(0.7)   75(0.7)   Shift Total(mL/kg) 344(3.3) 300(2.9)  644(6.2)   Weight (kg) 104.4 104.4 104.4 104.4     Patient Vitals for the past 96 hrs (Last 3 readings):   Weight   01/21/21 0000 230 lb 2.6 oz (104.4 kg)   01/20/21 2000 230 lb 2.6 oz (104.4 kg)   01/20/21 0000 226 lb 3.1 oz (102.6 kg)         PHYSICAL EXAMINATION:  Due to the current efforts to prevent transmission of COVID-19 and also the need to preserve PPE for other caregivers, a face-to-face encounter with the patient was not performed. That being said, all relevant records and diagnostic tests were reviewed, including laboratory results and imaging. Please reference any relevant documentation elsewhere. Care will be coordinated with the primary service.         Medications:    Scheduled Meds:   sodium polystyrene  15 g Oral Once    dexamethasone  6 mg Intravenous Q12H    levothyroxine  125 mcg Oral Daily    enoxaparin  40 mg Subcutaneous Daily    insulin glargine  30 Units Subcutaneous Daily    sucralfate  1 g Oral 4x Daily AC & HS    midodrine  20 mg Oral TID    sodium chloride  500 mL Intravenous Once    pantoprazole  40 mg Intravenous BID    And    sodium chloride (PF)  10 mL Intravenous BID    heparin flush  3 mL Intravenous 2 times per day    amiodarone  200 mg Oral BID    Followed by   Jolyne Bloch ON 1/22/2021] amiodarone  200 mg Oral Daily    digoxin  250 mcg Oral Daily    senna  10 mL Per G Tube Nightly    vitamin C  1,000 mg Oral Daily    Vitamin D  1,000 Units Oral Daily    nicotine  1 patch Transdermal Daily    thiamine  100 mg Oral Daily    zinc sulfate  50 mg Oral Daily    docusate  100 mg Oral Daily    insulin lispro  0-18 Units Subcutaneous Q6H    ipratropium-albuterol  1 ampule Inhalation Q4H WA    budesonide  500 mcg Nebulization BID    Arformoterol Tartrate  15 mcg Nebulization BID    clopidogrel  75 mg Oral Daily    chlorhexidine  15 mL Mouth/Throat BID    sodium chloride flush  10 mL Intravenous 2 times per day    gabapentin  800 mg Oral TID    atorvastatin  20 mg Oral Daily       Continuous Infusions:   sodium chloride      dextrose         PRN Meds:  sodium chloride, sodium chloride flush, heparin flush, LORazepam, oxyCODONE, glucose, dextrose, glucagon (rDNA), dextrose, LORazepam, acetaminophen, metoprolol, heparin flush, potassium chloride, [DISCONTINUED] promethazine **OR** ondansetron, polyethylene glycol    Labs:  CBC:   Recent Labs     01/19/21  0557 01/20/21  0615 01/21/21  0615   WBC 16.3* 14.7* 10.5   HGB 9.3* 8.5* 8.3*   HCT 31.1* 29.3* 28.7*   .0* 104.6* 105.1*    212 192     BMP:   Recent Labs     01/19/21  0557 01/20/21  0615 01/21/21  0615    133 132   K 4.7 4.9 5.3*   CL 97* 97* 96*   CO2 36* 35* 34*   PHOS  --  2.4* 2.3*   BUN 17 17 18   CREATININE 0.2* 0.2* 0.2*     LIVER PROFILE: No results for input(s): AST, ALT, LIPASE, BILIDIR, BILITOT, ALKPHOS in the last 72 hours. Invalid input(s): AMYLASE,  ALB  PT/INR: No results for input(s): PROTIME, INR in the last 72 hours. APTT: No results for input(s): APTT in the last 72 hours. UA:No results for input(s): NITRITE, COLORU, PHUR, LABCAST, WBCUA, RBCUA, MUCUS, TRICHOMONAS, YEAST, BACTERIA, CLARITYU, SPECGRAV, LEUKOCYTESUR, UROBILINOGEN, BILIRUBINUR, BLOODU, GLUCOSEU, AMORPHOUS in the last 72 hours. Invalid input(s): KETONESU  No results for input(s): PHART, WJI3OBV, PO2ART in the last 72 hours.     ABG   Lab Results   Component Value Date    PH 7.359 01/20/2021    PCO2 60.2 01/20/2021    PO2 123.3 01/20/2021    HCO3 33.2 01/20/2021    O2SAT 98.4 01/20/2021     Lab Results   Component Value Date    MODE [facility] 01/20/2021     EXAMINATION:   ONE XRAY VIEW OF THE CHEST   1/20/2021 7:28 am   COMPARISON:   01/15/2021   HISTORY: ORDERING SYSTEM PROVIDED HISTORY: high fio2 demands   TECHNOLOGIST PROVIDED HISTORY:   Reason for exam:->high fio2 demands   FINDINGS:   EKG leads overlie the chest.  Right-sided PICC line terminates in the mid   superior vena cava.  Tracheostomy tube remains in place.  No pneumothorax. Extensive bilateral infiltrates greater towards the right appear worsened   from previous.  Left-sided cardiac pacemaker.  No other interval change.       Impression   Worsening bilateral infiltrates which may represent asymmetric edema and/or   pneumonia. Interval placement of right-sided PICC line.                   Assessment:     1. Acute on chronic respiratory failure s/p trach and vent dependence   2. S/p trach and peg  3. Covid 19   4. Shock   5. Gi bleed on eliquis   6. afib rvr   7. Deconditioning   8. Adrenal insufficiency   9.  Elevated d-dimer     Plan:    Full vent support, trach to vent peep 12 , fio2 85%   Trach care, BP hygiene  Sedation   Pressors per ccm   Ccm following       Poor prognosis    Electronically signed by Liana Hutchison DO on 1/21/2021 at 6:11 PM

## 2021-01-21 NOTE — PROGRESS NOTES
Critical Care Team - Daily Progress Note         Date and time: 1/21/2021 4:14 PM  Patient's name:  Bo Sher  Medical Record Number: [id number]  Patient's account/billing number: [de-identified]  Patient's YOB: 1950  Age: 65 y.o. Date of Admission: 12/13/2020 10:51 AM  Length of stay during current admission: 39      Primary Care Physician: Todd Carmona DO  ICU Attending Physician: Dr. Charmayne Dun    Code Status: DNR-CCA    Reason for ICU admission: respiratory failure   Shock   Covid 19 pneumonitis       SUBJECTIVE:     OVERNIGHT EVENTS:         12/16: Overnight able to titrate down some of the vasopressors though still intermittently hypotensive and requiring vasopressin as well as Levophed. Still sedated with fentanyl and Versed. Hyponatremia notable at 119 new today. 12/17: Patient was able to be titrated off the vasopressors. Is now on midodrine. Minimally hypothermic overnight now on Melissa hugger with improvement in temperature. Continue sedation with fentanyl and Versed. 12/18: Patient continues on the ventilator. Back on small amount of Levophed in addition to the vasopressin. Hoping to wean sedation more today. 12/19: Back on levophed overnight. Initially able to titrate fio2 to 40%, became hypoxic and increased to 60%. Apparently their were several episodes of bradycardia overnight. Patient has pacemaker which did not fire per nursing. Follows with Dr. Meli Pablo for cardiology. 12/20: Patient: 6 L overnight, nephrology ordered K-Phos, and half-normal saline, will CT her head today. 12/21: Patient tolerated being supine overnight. Plan for PICC line today. Pacemaker interrogated with no runs of V. tach or other rhythm needing acute intervention. 12/22: Patient remained supine without difficulty. Continuing to try to wean down her FiO2. Her sodium has improved. 12/23: Patient continues to tolerate the vent. She is waking up more.   Sodium is much better. No acute overnight events. 12/24: Failed weaning trial yesterday. Became agitated. Continues to tolerate the ventilator. Day 9 on the ventilator. 12/25: The patient occasionally opens her eyes. She occasionally follows commands. She gets extremely anxious when sedation is weaned further. Continues on Versed and fentanyl. 12/26: Patient continues on the ventilator. Opens eyes with sedation vacation, not really following commands. Day 11 on the ventilator. No other acute overnight events. 12:27: Continues on the vent. Awake on sedation, does follow commands. Day 12 on the ventilator. 12/28: Patient continues on the ventilator. Still occasionally becomes agitated when attempting to wean sedation. Day 13 on the ventilator. Occasionally hypotensive requiring Levophed. 12/29: trach and peg today. Spent 29th on the phone talking to her son who is the power of  yesterday explaining her prognosis and the treatment course going forward. He wants a trach and PEG and everything done at this point. Patient is actually much more awake today and following commands. When asked if she needs the volume turned up on the TV she shakes her head yes. Plan for trach and PEG this afternoon. 12/30: Trach and PEG yesterday. Had to be placed back on Levophed, currently running at 13 mics. Patient is now awake, has restraints off, is alert and responding to commands. Weaning sedatives. 12/31: No acute events overnight, episode of A. fib with RVR yesterday, changed to needle from Levophed, and started on amiodarone bolus and amiodarone per PEG tube. Off fern today. 01/01: No acute events, Weaned off fentanyl drip    01/02: No acute events    1/4: No acute events overnight    1/5: Become more agitated overnight with tachypnea and fluctuating blood pressure. Also ventilatory status declining requiring increased sedation.     1/6: Day 23 Ventilation, Continues tachypnea and poor for improved renal support. : Continue to wean off oxygen support. Patient tolerating ventilator well. No acute events overnight Off phenylephrine.       CURRENT VENTILATION STATUS:     [x] Ventilator  [] BIPAP  [] Nasal Cannula [] Room Air      IF INTUBATED, ET TUBE MARKING AT LOWER LIP:       cms    SECRETIONS Amount:  [] Small [] Moderate  [] Large  [x] None  Color:     [] White [] Colored  [] Bloody    SEDATION:  RAAS Score:+1  [] Fentanyl gtt  [] Versed gtt  [] Ativan gtt   [x] No Sedation    PARALYZED:  [x] No    [] Yes      VASOPRESSORS:  [x] No    [] Yes    If yes -   [] Levophed       [] Dopamine     [] Vasopressin       [] Dobutamine  [] Phenylephrine         [] Epinephrine    CENTRAL LINES:     [] No   [x] Yes   (Date of Insertion:  01/15 ) PICC line          If yes -     [] Right IJ     [] Left IJ [] Right Femoral [] Left Femoral                   [] Right Subclavian [] Left Subclavian   PICC line    GARCIA'S CATHETER:   [] No   [x] Yes  (Date of Insertion: 01/15  )     URINE OUTPUT:            [x] Good   [] Low              [] Anuric      OBJECTIVE:     VITAL SIGNS:  BP (!) 145/73   Pulse 76   Temp 98.6 °F (37 °C) (Oral)   Resp (!) 35   Ht 5' 6\" (1.676 m)   Wt 230 lb 2.6 oz (104.4 kg)   SpO2 94%   BMI 37.15 kg/m²   Tmax over 24 hours:  Temp (24hrs), Av °F (36.7 °C), Min:97.6 °F (36.4 °C), Max:98.6 °F (37 °C)      Patient Vitals for the past 6 hrs:   BP Temp Temp src Pulse Resp SpO2   21 1600 -- -- -- -- -- 94 %   21 1500 (!) 145/73 -- -- 76 (!) 35 94 %   21 1430 (!) 151/72 -- -- 74 (!) 35 95 %   21 1400 (!) 147/64 -- -- 78 (!) 40 93 %   21 1300 (!) 140/62 -- -- 77 (!) 31 94 %   21 1237 -- -- -- 75 (!) 34 94 %   21 1235 -- -- -- -- (!) 36 95 %   21 1200 128/74 98.6 °F (37 °C) Oral 76 (!) 35 94 %   21 1100 137/62 -- -- 71 (!) 35 96 %         Intake/Output Summary (Last 24 hours) at 2021 1614  Last data filed at 2021 1425  Gross per 24 hour   Intake 2790 ml   Output 820 ml   Net 1970 ml     Wt Readings from Last 2 Encounters:   01/21/21 230 lb 2.6 oz (104.4 kg)   12/08/20 188 lb 9 oz (85.5 kg)     Body mass index is 37.15 kg/m². PHYSICAL EXAMINATION:    General: Follows commands, opens eyes spontaneously, interactive. Bob Angry HEENT: Pupils are equal round and reactive to light, there are no oral lesions and no post-nasal drip   Neck: supple without adenopathy, tracheostomy in place  Cardiovascular: regular rate, and rhythm without murmur or gallop  Respiratory:  Lung sounds equal bilaterally, wheezing noted in the right upper lung field.,   Air entry is symmetric,   Abdomen: soft, non-tender, non-distended, normal bowel sounds; PEG in place  Extremities: warm, bilateral equal edema upper and lower extremities, no clubbing   Skin:  Bilateral lower extremity edema 1+ bilaterally.   Neurologic: Trached, pupils equal round, interactive, opens eyes spontaneously    Any additional physical findings:    MEDICATIONS:    Scheduled Meds:   dexamethasone  6 mg Intravenous Q12H    levothyroxine  125 mcg Oral Daily    enoxaparin  40 mg Subcutaneous Daily    insulin glargine  30 Units Subcutaneous Daily    sucralfate  1 g Oral 4x Daily AC & HS    midodrine  20 mg Oral TID    sodium chloride  500 mL Intravenous Once    pantoprazole  40 mg Intravenous BID    And    sodium chloride (PF)  10 mL Intravenous BID    heparin flush  3 mL Intravenous 2 times per day    amiodarone  200 mg Oral BID    Followed by   Yamil Oconnor ON 1/22/2021] amiodarone  200 mg Oral Daily    digoxin  250 mcg Oral Daily    senna  10 mL Per G Tube Nightly    vitamin C  1,000 mg Oral Daily    Vitamin D  1,000 Units Oral Daily    nicotine  1 patch Transdermal Daily    thiamine  100 mg Oral Daily    zinc sulfate  50 mg Oral Daily    docusate  100 mg Oral Daily    insulin lispro  0-18 Units Subcutaneous Q6H    ipratropium-albuterol  1 ampule Inhalation Q4H WA  budesonide  500 mcg Nebulization BID    Arformoterol Tartrate  15 mcg Nebulization BID    clopidogrel  75 mg Oral Daily    chlorhexidine  15 mL Mouth/Throat BID    sodium chloride flush  10 mL Intravenous 2 times per day    gabapentin  800 mg Oral TID    atorvastatin  20 mg Oral Daily     Continuous Infusions:   sodium chloride      dextrose       PRN Meds:       sodium chloride, , PRN      sodium chloride flush, 10 mL, PRN      heparin flush, 3 mL, PRN      LORazepam, 0.5 mg, Q6H PRN      oxyCODONE, 5 mg, Q6H PRN      glucose, 15 g, PRN      dextrose, 12.5 g, PRN      glucagon (rDNA), 1 mg, PRN      dextrose, 100 mL/hr, PRN      LORazepam, 1 mg, Q4H PRN      acetaminophen, 650 mg, Q6H PRN      metoprolol, 5 mg, Q6H PRN      heparin flush, 3 mL, PRN      potassium chloride, 20 mEq, PRN      ondansetron, 4 mg, Q6H PRN      polyethylene glycol, 17 g, Daily PRN          VENT SETTINGS (Comprehensive) (if applicable):  Vent Information  $Ventilation: $Subsequent Day  Skin Assessment: Clean, dry, & intact  Equipment ID: [id number]  Equipment Changed: (S) Humidification  Vent Type: [id number]  Vent Mode: AC/VC  Vt Ordered: 400 mL  Pressure Ordered: 14  Rate Set: 22 bmp  Peak Flow: 75 L/min  Pressure Support: 0 cmH20  FiO2 : 90 %  SpO2: 94 %  SpO2/FiO2 ratio: 104.44  PaO2/FiO2 ratio: 86  Sensitivity: 2  PEEP/CPAP: 12  I Time/ I Time %: 0 s  Humidification Source: Heated wire  Humidification Temp: 37  Humidification Temp Measured: 36.7  Circuit Condensation: Drained  Mask Type: Full face mask  Mask Size: Small  Additional Respiratory  Assessments  Pulse: 76  Resp: (!) 35  SpO2: 94 %  Position: Semi-Marrero's  Humidification Source: Heated wire  Humidification Temp: 37  Circuit Condensation: Drained  Oral Care: Mouth swabbed, Mouth moisturizer, Mouth suctioned, Suction toothette  Subglottic Suction Done?: No  Airway Type: Trachial  Airway Size: 8(XL)  Cuff Pressure (cm H2O): 29 cm H2O    ABGs:   Recent Labs 01/20/21  0717   PH 7.359   PCO2 60.2*   PO2 123.3*   HCO3 33.2*   BE 6.4*   O2SAT 98.4       Laboratory findings:    Complete Blood Count:   Recent Labs     01/19/21  0557 01/20/21  0615 01/21/21  0615   WBC 16.3* 14.7* 10.5   HGB 9.3* 8.5* 8.3*   HCT 31.1* 29.3* 28.7*    212 192        Last 3 Blood Glucose:   Recent Labs     01/19/21  0557 01/20/21  0615 01/21/21  0615   GLUCOSE 131* 140* 228*        PT/INR:    Lab Results   Component Value Date    PROTIME 11.4 12/13/2020    PROTIME 11.9 12/15/2011    INR 1.0 12/13/2020     PTT:    Lab Results   Component Value Date    APTT 21.6 12/13/2020       Comprehensive Metabolic Profile:   Recent Labs     01/19/21  0557 01/20/21  0615 01/21/21  0615    133 132   K 4.7 4.9 5.3*   CL 97* 97* 96*   CO2 36* 35* 34*   BUN 17 17 18   CREATININE 0.2* 0.2* 0.2*   GLUCOSE 131* 140* 228*   CALCIUM 8.0* 7.7* 8.2*      Magnesium:   Lab Results   Component Value Date    MG 2.3 01/21/2021     Phosphorus:   Lab Results   Component Value Date    PHOS 2.3 01/21/2021     Ionized Calcium:   Lab Results   Component Value Date    CAION 1.14 01/21/2021        Urinalysis:     Troponin:   No results for input(s): TROPONINI in the last 72 hours.     Microbiology:    Cultures during this admission:     Blood cultures:                 [] Pending            [x] Negative             []  Positive (Details:  )  Urine Culture:                   [] None drawn      [] Negative             []  Positive (Details:  )  Sputum Culture:               [] None drawn       [x] Negative             []  Positive (Details:  )                   Endotracheal aspirate:     [] None drawn       [] Negative             []  Positive (Details:  )     Other pertinent Labs:   [id number] + December 13  MRSA negative screening   radiology/Imaging:     Chest Xray (1/21/2021):       Right PICC line with tip at the junction of the subclavian and SVC   Extensive bilateral pulmonary infiltrates unchanged   ET and NG tubes weaning today. 1/18: Continue to wean FiO2 and PEEP as tolerated  1/19: Patient continues to be on 90% O2 with a PEEP of 12. Wean as tolerated  1/20: Continue to be on ventilator support via her trach. Wean as tolerated  1/21: Continue to wean oxygen as tolerated. Overall improving     Cardiovascular   Shock, septic -patient has been weaned  off pressors     Early this morning developed hypotension. Total of liter bolus fluid was given. Will increase midodrine to 20 mg 3 times daily. If no response will have to consider starting pressors. intermittent atrial fib/flutter    Afib RVR: Amiodarone 400mg 2 times daily decrease to 200mg once daily on 1/6. Continue digoxin 250 mcg daily. 1/18: Hemoglobin stable today restart Eliquis. 1/19: NICOM found patient to be volume responsive. 1 L normal saline ordered, wean phenylephrine as tolerated  1/21: Reduce amiodarone from 400 mg daily to 200 mg daily. Gastrointestinal   GI prophylaxis with Protonix increased dose to 40 mg twice daily  Diet per dietary recs   Colace/senna for constipation  Ct abdomen pelvis 12/27 showed no acute intraabdominal etiology       Renal   Hyponatremia -resolved   -Continue to trend metabolic panel   -Nephrology following for fluid recommendations intravenously  Replete potassium as needed  Adrenal Insufficiency:    -Midodrine 20 mg 3 times daily.    -1/20: Add on Decadron 6mg q12hrs. Infectious Disease   COVID-19 viral pneumonia   Received remdesivir, Toci, vit D, vit c, thiamine   Patient completed Zosyn for possible superimposed bacterial infection. Infectious disease has signed off.  1/19: Patient retested for Covid on 116. Continues to be positive for Covid      Hematology/Oncology     Acute on chronic anemia. 1/18: Hemoglobin more stable today. However patient has history of continued to slowly decrease her hemoglobin level until she needs a transfusion. General surgery has been consulted.   Patient's right arm is more edematous than left. 1/19: Right upper extremity ultrasound showed no evidence of DVT. 1/20: Hemoglobin 8.5 today relatively stable continue to monitor      Endocrine   Diabetes: Currently on Lantus 40 units daily plus high-dose sliding scale. Hydrocortisone weaned, continue to monitor blood pressure  Hypothyroid so levothyroxine increased      Social/Spiritual/DNR/Other   Full code. Palliative consulted. Electronically signed by Libby Mora DO on 1/21/2021 at [address] [facility] [address] Attending Addendum:    Patient seen and examined with the house staff. X-rays personally reviewed through the PACS. Family is updated at the bedside as available. Additional findings listed below as necessary. Additional comments:  1. Acute hypoxic respiratory failure better wean FIO2 down to 85% today. Wean as able. 2. DM acceptably controlled. 3. Hypothyroidism levothyroxine increased. 4. On TF via PEG. 5. Hypotension and gi bleeding has stopped for now.     30 min CCT    t

## 2021-01-21 NOTE — PROGRESS NOTES
Chief Complaint:  Chief Complaint   Patient presents with    Shortness of Breath     70% RA at NH 88% NRB , being tx for pneumonia hx of copd      Acute respiratory failure with hypoxia ([address]     Subjective:    Awake, looking around, but lethargic. Objective:    BP (!) 145/73   Pulse 76   Temp 98.6 °F (37 °C) (Oral)   Resp (!) 35   Ht 5' 6\" (1.676 m)   Wt 230 lb 2.6 oz (104.4 kg)   SpO2 94%   BMI 37.15 kg/m²     Current medications that patient is taking have been reviewed.     General appearance: Ill appearing  HEENT: AT/NC, MMM  Neck: trach in place  Lungs: Mild b/l rales, WOB elevated and moderately tachypneic (pretty much baseline all week)  CV: RRR, no MRGs  Abdomen: Soft, non-tender; no masses or HSM, +BS  Extremities: No peripheral edema or digital cyanosis  Skin: no rash, lesions or ulcers  Psych: Calm and cooperative  Neuro: Drowsy/lethargic    Labs:  CBC with Differential:    Lab Results   Component Value Date    WBC 10.5 01/21/2021    RBC 2.73 01/21/2021    HGB 8.3 01/21/2021    HCT 28.7 01/21/2021     01/21/2021    .1 01/21/2021    MCH 30.4 01/21/2021    MCHC 28.9 01/21/2021    RDW 17.2 01/21/2021    NRBC 0.9 01/14/2021    SEGSPCT 89 03/10/2014    BANDSPCT 1 05/19/2016    METASPCT 1.0 01/11/2021    LYMPHOPCT 3.9 01/21/2021    PROMYELOPCT 1.8 12/19/2020    MONOPCT 1.7 01/21/2021    MYELOPCT 0.9 01/14/2021    BASOPCT 0.2 01/21/2021    MONOSABS 0.18 01/21/2021    LYMPHSABS 0.41 01/21/2021    EOSABS 0.00 01/21/2021    BASOSABS 0.02 01/21/2021     CMP:    Lab Results   Component Value Date     01/21/2021    K 5.3 01/21/2021    K 3.3 12/15/2020    CL 96 01/21/2021    CO2 34 01/21/2021    BUN 18 01/21/2021    CREATININE 0.2 01/21/2021    GFRAA >60 01/21/2021    LABGLOM >60 01/21/2021    GLUCOSE 228 01/21/2021    GLUCOSE 106 12/15/2011    PROT 5.0 01/03/2021    LABALBU 3.0 01/03/2021    CALCIUM 8.2 01/21/2021    BILITOT 0.7 01/03/2021    ALKPHOS 130 01/03/2021    AST 21 01/03/2021 ALT 14 01/03/2021          Assessment/Plan:  Principal Problem:    Acute respiratory failure with hypoxia (HCC)  Active Problems:    COPD (chronic obstructive pulmonary disease) (HCC)    History of DVT (deep vein thrombosis)    Coronary artery disease involving native coronary artery without angina pectoris    Acute respiratory failure due to COVID-19 [facility])    Septic shock (HCC)    Atrial fibrillation with RVR ([address]    Pneumonia due to COVID-19 virus  Resolved Problems:    * No resolved hospital problems. *       Wean vent as tolerated - -still very high FiO2 requirements 100%    Completed steroids. Continue digoxin and amiodarone    Hemoglobin has stabilized. Eliquis could be resumed in near future. TSH > 26. FT4 low. Increase Synthroid dose    Continue bronchodilators    Glucose well controlled    Continue Plavix    Hyperkalemic today. Kayexalate x1    Prognosis not good. She has been in the hospital for 1 month. Her baseline health is not good - CAD, PAD, VTE, CHF, COPD. She is trached and pegged.     Requires continued inpatient level of care   Mac Lunch    4:07 PM  1/21/2021  Cell: [phone]

## 2021-01-21 NOTE — CARE COORDINATION
COVID POSITIVE 1/16( Hx positive 12/13) . Vent since 12/15-FIO2 100%. S/p trach/PEG on 12/29. Palliative care following- DNR-CCA. [facility] [place] [facility] continues to follow. [facility] [employer]- [phone]. [facility] following.   Dianne Canales

## 2021-01-21 NOTE — PROGRESS NOTES
Palliative Medicine  Progress Note    Patient observed in the ICU, s/p trach/PEG, on vent, and she is appears to be alert. She was transferred back to the ICU due to hypotension, no not requiring any pressors. Overall her condition is the same. She is a DNR-CCA. Otherwise, there are no further PM needs at this time. PM will now sign off. If new PM needs arise, please re-consult. Thank you. Gabriel ARGUETA-GERONIMO  Palliative Medicine    Note: This report was completed using computerFablistic voiced recognition software. Every effort has been made to ensure accuracy; however, inadvertent computerized transcription errors may be present.

## 2021-01-21 NOTE — PLAN OF CARE
falls  Outcome: Met This Shift  Goal: Absence of physical injury  Description: Absence of physical injury  Outcome: Met This Shift     Problem: Pain:  Description: Pain management should include both nonpharmacologic and pharmacologic interventions.   Goal: Pain level will decrease  Description: Pain level will decrease  Outcome: Met This Shift  Goal: Control of acute pain  Description: Control of acute pain  Outcome: Met This Shift  Goal: Control of chronic pain  Description: Control of chronic pain  Outcome: Met This Shift

## 2021-01-22 NOTE — PROGRESS NOTES
Critical Care Team - Daily Progress Note         Date and time: 1/22/2021 9:13 AM  Patient's name:  Gloria Aden  Medical Record Number: [id number]  Patient's account/billing number: [de-identified]  Patient's YOB: 1950  Age: 65 y.o. Date of Admission: 12/13/2020 10:51 AM  Length of stay during current admission: 40      Primary Care Physician: Lissa Sanabria DO  ICU Attending Physician: Dr. Jorge Dailey    Code Status: DNR-CCA    Reason for ICU admission: respiratory failure   Shock   Covid 19 pneumonitis       SUBJECTIVE:     OVERNIGHT EVENTS:         12/16: Overnight able to titrate down some of the vasopressors though still intermittently hypotensive and requiring vasopressin as well as Levophed. Still sedated with fentanyl and Versed. Hyponatremia notable at 119 new today. 12/17: Patient was able to be titrated off the vasopressors. Is now on midodrine. Minimally hypothermic overnight now on Melissa hugger with improvement in temperature. Continue sedation with fentanyl and Versed. 12/18: Patient continues on the ventilator. Back on small amount of Levophed in addition to the vasopressin. Hoping to wean sedation more today. 12/19: Back on levophed overnight. Initially able to titrate fio2 to 40%, became hypoxic and increased to 60%. Apparently their were several episodes of bradycardia overnight. Patient has pacemaker which did not fire per nursing. Follows with Dr. Louis Brown for cardiology. 12/20: Patient: 6 L overnight, nephrology ordered K-Phos, and half-normal saline, will CT her head today. 12/21: Patient tolerated being supine overnight. Plan for PICC line today. Pacemaker interrogated with no runs of V. tach or other rhythm needing acute intervention. 12/22: Patient remained supine without difficulty. Continuing to try to wean down her FiO2. Her sodium has improved. 12/23: Patient continues to tolerate the vent. She is waking up more.   Sodium is much better. No acute overnight events. 12/24: Failed weaning trial yesterday. Became agitated. Continues to tolerate the ventilator. Day 9 on the ventilator. 12/25: The patient occasionally opens her eyes. She occasionally follows commands. She gets extremely anxious when sedation is weaned further. Continues on Versed and fentanyl. 12/26: Patient continues on the ventilator. Opens eyes with sedation vacation, not really following commands. Day 11 on the ventilator. No other acute overnight events. 12:27: Continues on the vent. Awake on sedation, does follow commands. Day 12 on the ventilator. 12/28: Patient continues on the ventilator. Still occasionally becomes agitated when attempting to wean sedation. Day 13 on the ventilator. Occasionally hypotensive requiring Levophed. 12/29: trach and peg today. Spent 29th on the phone talking to her son who is the power of  yesterday explaining her prognosis and the treatment course going forward. He wants a trach and PEG and everything done at this point. Patient is actually much more awake today and following commands. When asked if she needs the volume turned up on the TV she shakes her head yes. Plan for trach and PEG this afternoon. 12/30: Trach and PEG yesterday. Had to be placed back on Levophed, currently running at 13 mics. Patient is now awake, has restraints off, is alert and responding to commands. Weaning sedatives. 12/31: No acute events overnight, episode of A. fib with RVR yesterday, changed to needle from Levophed, and started on amiodarone bolus and amiodarone per PEG tube. Off fern today. 01/01: No acute events, Weaned off fentanyl drip    01/02: No acute events    1/4: No acute events overnight    1/5: Become more agitated overnight with tachypnea and fluctuating blood pressure. Also ventilatory status declining requiring increased sedation.     1/6: Day 23 Ventilation, Continues tachypnea and poor ventilation. 1/7: Day 24 ventilation. Not tolerating ventilation. Continue to wean sedation as able. Cultures redrawn. 1/8: Day 25 ventilation. Patient found overnight 700 cc residual volume, stopped tube feedings at that time. Recheck this morning only 50 cc residual restarted tube feedings. ,  Start Reglan every 6 hours. 1/9: Remains on ventilator. Unable to wean FiO2. Tube feeds were stopped again last night for having residual 300 cc will resume trickle feeds today. Weaned off pressors. 1/10: Vent day #27, off all pressors and IV sedation. Opens eyes upon calling her name and withdraws to pain. Still on high FIO2 and PEEP     1/11: Vent Day #28, increase Lantus, decrease Ativan to 0.5 every 6 hours. Hold Lasix. Wean phenylephrine as tolerated and decrease PEEP to 12.    1/12: Vent Day #29, Change to Ativan PRN. Continue to wean. Off pressors. 1/13: Vent day #30, patient overall clinically better. Weaning oxygen support. Transfer patient to intermediate floor pending discharge to skilled nursing facility. 1/14: Vent Day #31, patient improving clinically. Will be discharged to nursing facility. 1/15: Vent day #32, patient acutely decompensated when trying to transfer her to the nursing facility. Due to this she was knitted back to the ICU yesterday. Patient will be transferred to intermediate today for continued intermediate level treatment. 1/16; patient was downgraded yesterday team intermediate please refer to pulmonary note for details    1/17: Patient became hypotensive tachycardic dropped H&H upgraded to intensive care again. 1/18: Hemoglobin stable today, still on phenylephrine continue to wean. Sedation off.    1/19: Patient still on phenylephrine for blood pressure support. Requiring increased PEEP and O2. Wean as tolerated. NICOM today to assess volume responsiveness. 1/20: Continue to wean phenylephrine as tolerated.   Start Decadron 6 mg every 12 hours for improved renal support. : Continue to wean off oxygen support. Patient tolerating ventilator well. No acute events overnight Off phenylephrine. : Oxygen weaned to 80% overnight, continue to wean down as tolerated.       CURRENT VENTILATION STATUS:     [x] Ventilator  [] BIPAP  [] Nasal Cannula [] Room Air      IF INTUBATED, ET TUBE MARKING AT LOWER LIP:       cms    SECRETIONS Amount:  [] Small [] Moderate  [] Large  [x] None  Color:     [] White [] Colored  [] Bloody    SEDATION:  RAAS Score:+1  [] Fentanyl gtt  [] Versed gtt  [x] Ativan gtt   [x] No Sedation    PARALYZED:  [x] No    [] Yes      VASOPRESSORS:  [x] No    [] Yes    If yes -   [] Levophed       [] Dopamine     [] Vasopressin       [] Dobutamine  [] Phenylephrine         [] Epinephrine    CENTRAL LINES:     [] No   [x] Yes   (Date of Insertion:  01/15 ) PICC line          If yes -     [] Right IJ     [] Left IJ [] Right Femoral [] Left Femoral                   [] Right Subclavian [] Left Subclavian   PICC line    GARCIA'S CATHETER:   [] No   [x] Yes  (Date of Insertion: 01/15  )     URINE OUTPUT:            [x] Good   [] Low              [] Anuric      OBJECTIVE:     VITAL SIGNS:  BP (!) 128/58   Pulse 75   Temp 96.2 °F (35.7 °C) (Axillary)   Resp (!) 37   Ht 5' 6\" (1.676 m)   Wt 233 lb 4 oz (105.8 kg)   SpO2 93%   BMI 37.65 kg/m²   Tmax over 24 hours:  Temp (24hrs), Av.7 °F (36.5 °C), Min:96.2 °F (35.7 °C), Max:98.6 °F (37 °C)      Patient Vitals for the past 6 hrs:   BP Temp Temp src Pulse Resp SpO2   21 0900 (!) 128/58 -- -- 75 (!) 37 --   21 0842 -- -- -- -- (!) 34 --   21 0841 -- -- -- -- (!) 34 --   21 0840 -- -- -- -- (!) 34 --   21 0837 -- -- -- 72 (!) 34 93 %   21 0800 (!) 145/56 -- -- 66 28 94 %   21 0700 (!) 148/55 -- -- 73 (!) 37 94 %   21 0600 (!) 142/62 -- -- 76 (!) 40 90 %   21 0500 136/66 -- -- 74 (!) 37 93 %   21 0426 -- -- -- 73 (!) 36 95 % 01/22/21 0400 134/62 96.2 °F (35.7 °C) Axillary 72 (!) 37 95 %         Intake/Output Summary (Last 24 hours) at 1/22/2021 0913  Last data filed at 1/22/2021 0800  Gross per 24 hour   Intake 2096 ml   Output 1630 ml   Net 466 ml     Wt Readings from Last 2 Encounters:   01/22/21 233 lb 4 oz (105.8 kg)   12/08/20 188 lb 9 oz (85.5 kg)     Body mass index is 37.65 kg/m². PHYSICAL EXAMINATION:    General: Follows commands, opens eyes spontaneously, interactive. Anny  HEENT: Pupils are equal round and reactive to light, there are no oral lesions and no post-nasal drip   Neck: supple without adenopathy, tracheostomy in place  Cardiovascular: regular rate, and rhythm without murmur or gallop  Respiratory:  Lung sounds equal bilaterally, wheezing noted in the right upper lung field.,   Air entry is symmetric,   Abdomen: soft, non-tender, non-distended, normal bowel sounds; PEG in place  Extremities: warm, bilateral equal edema upper and lower extremities, no clubbing   Skin:  Bilateral lower extremity edema 1+ bilaterally.   Neurologic: Trached, pupils equal round, interactive, opens eyes spontaneously    Any additional physical findings:    MEDICATIONS:    Scheduled Meds:   sodium polystyrene  15 g Oral Once    dexamethasone  6 mg Intravenous Q12H    levothyroxine  125 mcg Oral Daily    enoxaparin  40 mg Subcutaneous Daily    insulin glargine  30 Units Subcutaneous Daily    sucralfate  1 g Oral 4x Daily AC & HS    midodrine  20 mg Oral TID    sodium chloride  500 mL Intravenous Once    pantoprazole  40 mg Intravenous BID    And    sodium chloride (PF)  10 mL Intravenous BID    heparin flush  3 mL Intravenous 2 times per day    amiodarone  200 mg Oral Daily    digoxin  250 mcg Oral Daily    senna  10 mL Per G Tube Nightly    vitamin C  1,000 mg Oral Daily    Vitamin D  1,000 Units Oral Daily    nicotine  1 patch Transdermal Daily    thiamine  100 mg Oral Daily    zinc sulfate  50 mg Oral Daily    docusate  100 mg Oral Daily    insulin lispro  0-18 Units Subcutaneous Q6H    ipratropium-albuterol  1 ampule Inhalation Q4H WA    budesonide  500 mcg Nebulization BID    Arformoterol Tartrate  15 mcg Nebulization BID    clopidogrel  75 mg Oral Daily    chlorhexidine  15 mL Mouth/Throat BID    sodium chloride flush  10 mL Intravenous 2 times per day    gabapentin  800 mg Oral TID    atorvastatin  20 mg Oral Daily     Continuous Infusions:   sodium chloride      dextrose       PRN Meds:       sodium chloride, , PRN      sodium chloride flush, 10 mL, PRN      heparin flush, 3 mL, PRN      LORazepam, 0.5 mg, Q6H PRN      oxyCODONE, 5 mg, Q6H PRN      glucose, 15 g, PRN      dextrose, 12.5 g, PRN      glucagon (rDNA), 1 mg, PRN      dextrose, 100 mL/hr, PRN      LORazepam, 1 mg, Q4H PRN      acetaminophen, 650 mg, Q6H PRN      metoprolol, 5 mg, Q6H PRN      heparin flush, 3 mL, PRN      potassium chloride, 20 mEq, PRN      ondansetron, 4 mg, Q6H PRN      polyethylene glycol, 17 g, Daily PRN          VENT SETTINGS (Comprehensive) (if applicable):  Vent Information  $Ventilation: $Subsequent Day  Skin Assessment: Clean, dry, & intact  Equipment ID: [id number]  Equipment Changed: Humidification  Vent Type: [id number]  Vent Mode: AC/VC  Vt Ordered: 400 mL  Pressure Ordered: 14  Rate Set: 22 bmp  Peak Flow: 75 L/min  Pressure Support: 0 cmH20  FiO2 : 75 %  SpO2: 93 %  SpO2/FiO2 ratio: 124  PaO2/FiO2 ratio: 86  Sensitivity: 2  PEEP/CPAP: 12  I Time/ I Time %: 0 s  Humidification Source: Heated wire  Humidification Temp: 37  Humidification Temp Measured: 37.1  Circuit Condensation: Drained  Mask Type: Full face mask  Mask Size: Small  Additional Respiratory  Assessments  Pulse: 75  Resp: (!) 37  SpO2: 93 %  Position: Semi-Marrero's  Humidification Source: Heated wire  Humidification Temp: 37  Circuit Condensation: Drained  Oral Care: Mouth moisturizer, Mouth suctioned, Mouth swabbed  Subglottic Suction Done?: No  Airway Type: Trachial  Airway Size: 8  Cuff Pressure (cm H2O): 29 cm H2O    ABGs:   Recent Labs     01/20/21 0717   PH 7.359   PCO2 60.2*   PO2 123.3*   HCO3 33.2*   BE 6.4*   O2SAT 98.4       Laboratory findings:    Complete Blood Count:   Recent Labs     01/20/21  0615 01/21/21  0615 01/22/21  0615   WBC 14.7* 10.5 13.4*   HGB 8.5* 8.3* 8.3*   HCT 29.3* 28.7* 27.7*    192 204        Last 3 Blood Glucose:   Recent Labs     01/20/21  0615 01/21/21  0615 01/22/21  0615   GLUCOSE 140* 228* 237*        PT/INR:    Lab Results   Component Value Date    PROTIME 11.4 12/13/2020    PROTIME 11.9 12/15/2011    INR 1.0 12/13/2020     PTT:    Lab Results   Component Value Date    APTT 21.6 12/13/2020       Comprehensive Metabolic Profile:   Recent Labs     01/20/21  0615 01/21/21  0615 01/22/21  0615    132 132   K 4.9 5.3* 5.1*   CL 97* 96* 93*   CO2 35* 34* 33*   BUN 17 18 17   CREATININE 0.2* 0.2* 0.2*   GLUCOSE 140* 228* 237*   CALCIUM 7.7* 8.2* 8.0*      Magnesium:   Lab Results   Component Value Date    MG 2.1 01/22/2021     Phosphorus:   Lab Results   Component Value Date    PHOS 1.8 01/22/2021     Ionized Calcium:   Lab Results   Component Value Date    CAION 1.14 01/21/2021        Urinalysis:     Troponin:   No results for input(s): TROPONINI in the last 72 hours. Microbiology:    Cultures during this admission:     Blood cultures:                 [] Pending            [x] Negative             []  Positive (Details:  )  Urine Culture:                   [] None drawn      [] Negative             []  Positive (Details:  )  Sputum Culture:               [] None drawn       [x] Negative             []  Positive (Details:  )                   Endotracheal aspirate:     [] None drawn       [] Negative             []  Positive (Details:  )     Other pertinent Labs:   COVID-19 + December 13  MRSA negative screening   radiology/Imaging:         ASSESSMENT:      1.  Acute on chronic hypoxic respiratory failure   2. Covid pneumonitis - critically severe   3. Shock-hypovolemic improved  4. Adrenal insufficiency  5. Electrolyte abnormalities  6. Leukocytosis    7. COPD  8. pvd  9. chf not in acute exacerbation  10. Hx lacunar infarct   11. SSS s/p pacemaker   12. Hx seizure do   13. Bilateral carotid stenosis       SYSTEMS ASSESSMENT    Neuro   Previous history of lacunar stroke  Weaned off sedation, continue Ativan and oxycodone  1/18: Patient was sedated over the weekend due to respiratory issues and worsening anemia requiring blood and pressors. Sedation was weaned overnight patient still somnolent. Continue to monitor reassess as sedation wears off.  1/19: Neuro status improving off sedation, patient responding to commands now/interactive. 1/20: Less anxious, less tachypneic       Respiratory   Acute respiratory failure with hypoxia secondary to COVID-19 viral pneumonia  Trach and Peg 12/29 1/5 scheduled Ativan for every 6 and Oxy 10 milligrams every 6 hours. Stop fentanyl patch. Patient tachypneic more today than previous, requiring increased sedation in order to maintain oxygen saturation. 1/6: Repeat CT head and Chest today. 1/7; patient continues to require increased PEEP. Continue to wean as tolerated. 1/8: Decrease tidal volume to 360 increase rate to 24.  1/9; trying to wean FiO2 but not successful so far  1/10: continue to wean Fio2 as tolertaed  1/11: Decrease PEEP to 12, wean phenylephrine as tolerated. 1/12: Change to Volume support mode, wean sedation. 1/13: Overall respiratory improvement. Patient still on 70% O2 however we will continue to titrate down. 1/14: Tolerated volume support today, clinically continues to improve  1/15: Continue to wean oxygen as able. Continues to improve clinically  1/17; wean FiO2 and PEEP as tolerated. Due to hypotension and anemia we will hold off weaning today.   1/18: Continue to wean FiO2 and PEEP as tolerated  1/19: Patient continues to be on 90% O2 with a PEEP of 12. Wean as tolerated  1/20: Continue to be on ventilator support via her trach. Wean as tolerated  1/21: Continue to wean oxygen as tolerated. Overall improving   1/22: O2 weaned to 60% during the day continue to titrate as tolerated    Cardiovascular   Shock, septic -patient has been weaned  off pressors     Early this morning developed hypotension. Total of liter bolus fluid was given. Will increase midodrine to 20 mg 3 times daily. If no response will have to consider starting pressors. intermittent atrial fib/flutter    Afib RVR: Amiodarone 400mg 2 times daily decrease to 200mg once daily on 1/6. Continue digoxin 250 mcg daily. 1/18: Hemoglobin stable today restart Eliquis. 1/19: NICOM found patient to be volume responsive. 1 L normal saline ordered, wean phenylephrine as tolerated  1/21: Reduce amiodarone from 400 mg daily to 200 mg daily. Gastrointestinal   GI prophylaxis with Protonix increased dose to 40 mg twice daily  Diet per dietary recs   Colace/senna for constipation  Ct abdomen pelvis 12/27 showed no acute intraabdominal etiology       Renal   Hyponatremia -resolved   -Continue to trend metabolic panel   -Nephrology following for fluid recommendations intravenously  Replete potassium as needed  Adrenal Insufficiency:    -Midodrine 20 mg 3 times daily.    -1/20: Add on Decadron 6mg q12hrs. Infectious Disease   COVID-19 viral pneumonia   Received remdesivir, Toci, vit D, vit c, thiamine   Patient completed Zosyn for possible superimposed bacterial infection. Infectious disease has signed off.  1/19: Patient retested for Covid on 116. Continues to be positive for Covid      Hematology/Oncology     Acute on chronic anemia. 1/18: Hemoglobin more stable today. However patient has history of continued to slowly decrease her hemoglobin level until she needs a transfusion. General surgery has been consulted. Patient's right arm is more edematous than left.    1/19: Right upper extremity ultrasound showed no evidence of DVT. 1/20: Hemoglobin 8.5 today relatively stable continue to monitor  1/22: Hemoglobin continues to be stable. Endocrine   Diabetes: Currently on Lantus 40 units daily plus high-dose sliding scale. Hydrocortisone weaned, continue to monitor blood pressure  Hypothyroid so levothyroxine increased      Social/Spiritual/DNR/Other   Full code. Palliative consulted. Son came to visit the patient in the hospital, patient planned to be transferred to the ICU if continues to be stable      Electronically signed by Melonie Zelaya DO on 1/22/2021 at 9:13 AM       Critical Care Attending Addendum:    Patient seen and examined with the house staff. X-rays personally reviewed through the PACS. Family is updated at the bedside as available. Additional findings listed below as necessary. Additional comments:  Acute hypoxic respiratory failure slowly improving  Hypotension resolved. Ok to downgrade.

## 2021-01-22 NOTE — PROGRESS NOTES
Chief Complaint:  Chief Complaint   Patient presents with    Shortness of Breath     70% RA at NH 88% NRB , being tx for pneumonia hx of copd      Acute respiratory failure with hypoxia ([address]     Subjective:    Awake, looking around    Objective:    /62   Pulse 74   Temp 98.1 °F (36.7 °C)   Resp (!) 37   Ht 5' 6\" (1.676 m)   Wt 233 lb 4 oz (105.8 kg)   SpO2 (!) 88%   BMI 37.65 kg/m²     Current medications that patient is taking have been reviewed. General appearance: Ill appearing  HEENT: AT/NC, MMM  Neck: trach in place  Psych: Calm and cooperative  Neuro: Awake, looking around    Face-to-face physical exam was not done today, to limit spread of coronavirus and preserve PPE.      Labs:  CBC with Differential:    Lab Results   Component Value Date    WBC 13.4 01/22/2021    RBC 2.69 01/22/2021    HGB 8.3 01/22/2021    HCT 27.7 01/22/2021     01/22/2021    .0 01/22/2021    MCH 30.9 01/22/2021    MCHC 30.0 01/22/2021    RDW 17.1 01/22/2021    NRBC 0.9 01/14/2021    SEGSPCT 89 03/10/2014    BANDSPCT 1 05/19/2016    METASPCT 1.0 01/11/2021    LYMPHOPCT 3.2 01/22/2021    PROMYELOPCT 1.8 12/19/2020    MONOPCT 2.5 01/22/2021    MYELOPCT 0.9 01/14/2021    BASOPCT 0.1 01/22/2021    MONOSABS 0.33 01/22/2021    LYMPHSABS 0.43 01/22/2021    EOSABS 0.00 01/22/2021    BASOSABS 0.02 01/22/2021     CMP:    Lab Results   Component Value Date     01/22/2021    K 5.1 01/22/2021    K 3.3 12/15/2020    CL 93 01/22/2021    CO2 33 01/22/2021    BUN 17 01/22/2021    CREATININE 0.2 01/22/2021    GFRAA >60 01/22/2021    LABGLOM >60 01/22/2021    GLUCOSE 237 01/22/2021    GLUCOSE 106 12/15/2011    PROT 5.0 01/03/2021    LABALBU 3.0 01/03/2021    CALCIUM 8.0 01/22/2021    BILITOT 0.7 01/03/2021    ALKPHOS 130 01/03/2021    AST 21 01/03/2021    ALT 14 01/03/2021          Assessment/Plan:  Principal Problem:    Acute respiratory failure with hypoxia (HCC)  Active Problems:    COPD (chronic obstructive pulmonary disease) ([facility]    History of DVT (deep vein thrombosis)    Coronary artery disease involving native coronary artery without angina pectoris    Acute respiratory failure due to COVID-19 [facility])    Septic shock (HCC)    Atrial fibrillation with RVR ([facility]    Pneumonia due to COVID-19 virus    Hyperkalemia  Resolved Problems:    * No resolved hospital problems. *       Wean vent as tolerated    Completed steroids. Continue digoxin and amiodarone    Hemoglobin has stabilized. Eliquis could be resumed in near future. TSH > 26. FT4 low. Synthroid dose increased    Continue bronchodilators    Glucose not well controlled. Increase glargine dose    Continue Plavix    Hyperkalemia improving.     Requires continued inpatient level of care   Aria Sagastume    5:46 PM  1/22/2021  Cell: [phone]

## 2021-01-22 NOTE — PATIENT CARE CONFERENCE
Intensive Care Daily Quality Rounding Checklist      ICU Team Members: n/a    ICU Day #: NUMBER: 39    Intubation Date:  Placed on vent 12/15 trach 12/29 changed 1/4/21    Ventilator Day #: 44    Central Line Insertion Date: January 17        Day #: NUMBER: 6     Arterial Line Insertion Date:  n/a      Day #: n/a    Temporary Hemodialysis Catheter Insertion Date:  n/a      Day # n/a    DVT Prophylaxis: lovenox/pcds    GI Prophylaxis:protonix iv    Allen Catheter Insertion Date:  December 30th       Day #: 24      Continued need (if yes, reason documented and discussed with physician): yes strict I/o/vent/decreased 02 sat with turning and activity    Skin Issues/ Wounds and ordered treatment discussed on rounds: y wound care on for wound under trach    Goals/ Plans for the Day: wean fio2 as able/increase activity as tolerated/replace elytes/stable airway and vitals

## 2021-01-22 NOTE — PROGRESS NOTES
Note from 1/10. superficial pink wound under trach plate. Alleveyn foam ordered. Martín Pena.  Keny Herrera, CNS, Wound Care

## 2021-01-22 NOTE — PROGRESS NOTES
Occupational Therapy  OCCUPATIONAL THERAPY INITIAL EVALUATION      Date:2021  Patient Name: Jena Gupta  MRN: [id number]  : 1950  Room: [facility]6/020-A    Referring Provider: Steven Traylor MD      Evaluating OT: Oriana Valdez OTR/L [id number]    AM-PAC Daily Activity Raw Score:     Recommended Adaptive Equipment:  TBD     Diagnosis: Acute Respiratory failure with hypoxia. COVID 19  Presented to ED from [place] [address]  (d/c'ed from hospital on 2020 for fall and shoulder dislocation)   Presented back to ED on 2020 with SOB. Vented 2020 . Trach/PEG 2020  Pertinent Medical History: COPD, CHF, PVD, lumbar stenosis   L shoulder dislocation,L shoulder reduction 20  s/p fall      Per ortho note 2020:   remain immobilized in her immobilizer or a sling, NWB LUE. Precautions:  Falls, Vent/trach , PEG, DROPLET PLUS,      SOCIAL:    Admitted from [place] [address]   Per chart:  Prior:   Patient lived alone, apartment, no steps. Ambulated with w/walker    Pain Level: grimacing noted with R LE ROM ; Cognition: alert, oriented to person.  Follow simple command   Patient reoriented to place and date     Ok'ed by nursing for OT eval this date :        Functional Assessment:   Initial Eval Status  Date: 21 Treatment Status  Date: STGs = LTGs  Time frame: 10-14 days   Feeding NPO  PEG     Grooming Max A/Depependent     Patient actively lifting L UE to reach toward her face   Min A    UB Dressing Dependent   Mod A   LB Dressing Dependent   Mod A    Bathing Dependent   Mod A    Toileting Dependent      Bed Mobility  Dependent   Rolling and repositioning      Functional Transfers Patient remained in bed   Mod A    Functional Mobility NT   Mod A with fair + tolerance    Balance Sitting:     Static:  Dependent - attempt long sit   Standing: NT  Min A sitting EOB   with good tolerance    Activity Tolerance Poor+ with light activity     O2 sats ranged 87- 90%  Good with ADL activity Visual/  Perceptual Glasses: yes                  Strength ROM Additional Info:    RUE   Trace strength noted R UE   AAROM noted throughout     Poor/dependent grasp         LUE  Patient actively moving L UE   Weak grasp       Hearing: WFL   Sensation:  No c/o numbness or tingling   Tone: WFL    Comments: Upon arrival patient lying in bed . At end of session, patient remained in bed  with call light and phone within reach, all lines and tubes intact. Overall patient demonstrated  decreased independence and safety during completion of ADL/functional transfer/mobility tasks. Pt would benefit from continued skilled OT to increase safety and independence with completion of ADL/IADL tasks for functional independence and quality of life.          Assessment of current deficits   Functional mobility [x]  ADLs [x] Strength [x]  Cognition []  Functional transfers  [x] IADLs [x] Safety Awareness [x]  Endurance [x]  Fine Motor Coordination [x] Balance [x] Vision/perception [] Sensation []   Gross Motor Coordination [x] ROM [x] Delirium [x]                  Motor Control []       Plan of Care: 1-3 days/week for 1-2 weeks PRN   [x]ADL retraining/adapted techniques and AE recommendations to increase functional independence within precautions                    [x]Energy conservation techniques to improve tolerance for selfcare routine   [x]Functional transfer/mobility training/DME recommendations for increased independence, safety and fall prevention         [x]Patient/family education to increase safety and functional independence             [x]Environmental modifications for safe mobility and completion of ADLs                             []Cognitive retraining ex's to improve problem solving skills & safe participation in ADLs/IADLs     []Sensory re-education techniques to improve extremity awareness, maintain skin integrity and improve hand function                             []Visual/Perceptual retraining  to assessment of data and education on plan of care and goals.             Rupal Albright OTR/L [id number]

## 2021-01-22 NOTE — PROGRESS NOTES
Physical Therapy    Facility/Department: [facility] 9A ICU  Initial Assessment    NAME: Dianne Torres  : 1950  MRN: [id number]    Date of Service: 2021      Patient Diagnosis(es): The primary encounter diagnosis was Acute respiratory failure due to COVID-19 [facility]). Diagnoses of Hyponatremia, Anemia, unspecified type, Elevated LFTs, Trauma, and Closed fracture of multiple ribs of left side, initial encounter were also pertinent to this visit. 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 has a past surgical history that includes Hysterectomy; Appendectomy; Cholecystectomy; Pacemaker insertion (Left, 2014); other surgical history (14); ECHO Limited (12/3/2014); other surgical history (2015); back surgery (10/26/15); other surgical history (2016); Carotid-subclavian Bypass Graft (Left, 2016); eye surgery; pr total hip arthroplasty (Left, 3/13/2018); Fixation Kyphoplasty; joint replacement (Bilateral); fracture surgery (Right); Cardiac catheterization (08/10/2020); Coronary angioplasty with stent (08/10/2020); picc line insertion nurse (2020); tracheostomy (N/A, 2020); Gastrostomy tube placement (N/A, 2020); and picc line insertion nurse (2021).       Referring Provider:  Alan Lock MD      Evaluating Therapist: [facility] PSYCHIATRY PT      Room #:206  DIAGNOSIS: Acute Respiratory failure  Additional Pertinent History:CHF, COPD, HTN  PRECAUTIONS: falls, trach to vent, PEG, droplet plus isolation    Social:  Per chart review pt admitted from SNF. Initial Evaluation  Date: 1/22/21 Treatment      Short Term/ Long Term   Goals   Was pt agreeable to Eval/treatment? yes     Does pt have pain? Grimaces in pain with ROM     Bed Mobility  Rolling: dependent  Supine <> long sit dependent of 2  Scooting: dependent of 2  Max assist   Transfers Sit to stand: NT  Stand to sit: NT  Stand pivot: NT  Max assist of 2   Ambulation    NT  N/A   Stair Negotiation  Ascended and descended  NT   N/A   LE strength     L LE 3-/5 R LE 2-/5    By 1 MMT grade   balance      NT     AM-PAC Raw score               6/24         Pt is alert and Oriented to person and place  LE ROM: WFL  Edema: B LEs  Endurance: poor     ASSESSMENT  Pt displays functional ability as noted in the objective portion of this evaluation. Patient education  Pt educated on PT objectives      Comments/treatment; AAROM B LE's all planes    Patient and or family understand(s) diagnosis, prognosis, and plan of care. no      PLAN OF CARE:    Current Treatment Recommendations     [x] Strengthening     [] ROM   [x] Balance Training   [x] Endurance Training   [x] Transfer Training   [] Gait Training   [] Stair Training   [x] Positioning   [x] Safety and Education Training   [x] Patient/Caregiver Education   [] HEP  [] Other     Frequency of treatments: 2-5x/week x 7-10.days    Time in  0956  Time out  1021    Total Treatment Time  Evaluation plus 10 minutes     Evaluation Time includes thorough review of current medical information, gathering information on past medical history/social history and prior level of function, completion of standardized testing/informal observation of tasks, assessment of data and education on plan of care and goals.     CPT codes:  [x] Low Complexity PT evaluation 41359  [] Moderate Complexity PT evaluation 67490  [] High Complexity PT evaluation [id number]  [] PT Re-evaluation 31382  [] Gait training 32294 minutes  [] Manual therapy 58488 minutes  [] Therapeutic activities 46469 minutes  [x] Therapeutic exercises 75192 10 minutes  [] Neuromuscular reeducation [address] [place] minutes     [facility] PT [id number]

## 2021-01-22 NOTE — CARE COORDINATION
COVID POSITIVE 1/16( Hx positive 12/13) . Vent since 12/15-FIO2 100%. S/p trach/PEG on 12/29. Palliative care following- DNR-CCA. [facility] continues to follow. Has [employer]- duc discharge planner contact is Derek Crawley [phone]. [facility] following- awaiting possible decrease in O2 demands- will re-eval on Monday for possible initiation of precert.  Will follow Poncho Soria

## 2021-01-22 NOTE — PLAN OF CARE
Problem: Skin Integrity:  Goal: Will show no infection signs and symptoms  Description: Will show no infection signs and symptoms  Outcome: Met This Shift  Goal: Absence of new skin breakdown  Description: Absence of new skin breakdown  Outcome: Met This Shift     Problem: Airway Clearance - Ineffective  Goal: Achieve or maintain patent airway  Outcome: Met This Shift     Problem: Gas Exchange - Impaired  Goal: Absence of hypoxia  Outcome: Met This Shift  Goal: Promote optimal lung function  Outcome: Met This Shift     Problem: Breathing Pattern - Ineffective  Goal: Ability to achieve and maintain a regular respiratory rate  Outcome: Met This Shift     Problem:  Body Temperature -  Risk of, Imbalanced  Goal: Ability to maintain a body temperature within defined limits  Outcome: Met This Shift  Goal: Will regain or maintain usual level of consciousness  Outcome: Met This Shift  Goal: Complications related to the disease process, condition or treatment will be avoided or minimized  Outcome: Met This Shift     Problem: Isolation Precautions - Risk of Spread of Infection  Goal: Prevent transmission of infection  Outcome: Met This Shift     Problem: Nutrition Deficits  Goal: Optimize nutrtional status  Outcome: Met This Shift     Problem: Risk for Fluid Volume Deficit  Goal: Maintain normal heart rhythm  Outcome: Met This Shift  Goal: Maintain normal serum potassium, sodium, calcium, phosphorus, and pH  Outcome: Met This Shift     Problem: Loneliness or Risk for Loneliness  Goal: Demonstrate positive use of time alone when socialization is not possible  Outcome: Met This Shift     Problem: Fatigue  Goal: Verbalize increase energy and improved vitality  Outcome: Met This Shift     Problem: Falls - Risk of:  Goal: Will remain free from falls  Description: Will remain free from falls  Outcome: Met This Shift  Goal: Absence of physical injury  Description: Absence of physical injury  Outcome: Met This Shift     Problem: Pain:  Goal: Pain level will decrease  Description: Pain level will decrease  Outcome: Met This Shift  Goal: Control of acute pain  Description: Control of acute pain  Outcome: Met This Shift  Goal: Control of chronic pain  Description: Control of chronic pain  Outcome: Met This Shift

## 2021-01-23 NOTE — PLAN OF CARE
Problem: Skin Integrity:  Goal: Will show no infection signs and symptoms  Description: Will show no infection signs and symptoms  Outcome: Met This Shift     Problem: Skin Integrity:  Goal: Absence of new skin breakdown  Description: Absence of new skin breakdown  Outcome: Met This Shift     Problem: Falls - Risk of:  Goal: Will remain free from falls  Description: Will remain free from falls  Outcome: Met This Shift     Problem: Falls - Risk of:  Goal: Absence of physical injury  Description: Absence of physical injury  Outcome: Met This Shift     Problem:  Body Temperature -  Risk of, Imbalanced  Goal: Will regain or maintain usual level of consciousness  Outcome: Ongoing

## 2021-01-23 NOTE — PROGRESS NOTES
intact  Equipment ID: [id number]  Equipment Changed: Humidification  Vent Type: 840  Vent Mode: AC/VC  Vt Ordered: 400 mL  Pressure Ordered: 14  Rate Set: 22 bmp  Peak Flow: 65 L/min  Pressure Support: 0 cmH20  FiO2 : 75 %  SpO2: 100 %  SpO2/FiO2 ratio: 133.33  PaO2/FiO2 ratio: 86  Sensitivity: 2  PEEP/CPAP: 12  I Time/ I Time %: 0 s  Humidification Source: HME  Humidification Temp: 37  Humidification Temp Measured: 37  Circuit Condensation: Drained  Mask Type: Full face mask  Mask Size: Small       IPAP: 14 cmH20  CPAP/EPAP: 8 cmH2O     CURRENT MEDS :  Scheduled Meds:   insulin glargine  25 Units Subcutaneous BID    sodium polystyrene  15 g Oral Once    dexamethasone  6 mg Intravenous Q12H    levothyroxine  125 mcg Oral Daily    enoxaparin  40 mg Subcutaneous Daily    sucralfate  1 g Oral 4x Daily AC & HS    midodrine  20 mg Oral TID    sodium chloride  500 mL Intravenous Once    pantoprazole  40 mg Intravenous BID    And    sodium chloride (PF)  10 mL Intravenous BID    heparin flush  3 mL Intravenous 2 times per day    amiodarone  200 mg Oral Daily    digoxin  250 mcg Oral Daily    senna  10 mL Per G Tube Nightly    vitamin C  1,000 mg Oral Daily    Vitamin D  1,000 Units Oral Daily    nicotine  1 patch Transdermal Daily    thiamine  100 mg Oral Daily    zinc sulfate  50 mg Oral Daily    docusate  100 mg Oral Daily    insulin lispro  0-18 Units Subcutaneous Q6H    ipratropium-albuterol  1 ampule Inhalation Q4H WA    budesonide  500 mcg Nebulization BID    Arformoterol Tartrate  15 mcg Nebulization BID    clopidogrel  75 mg Oral Daily    chlorhexidine  15 mL Mouth/Throat BID    sodium chloride flush  10 mL Intravenous 2 times per day    gabapentin  800 mg Oral TID    atorvastatin  20 mg Oral Daily       Physical Exam:  General Appearance: appears comfortable in no acute distress.    HEENT: Normocephalic atraumatic without obvious abnormality   Neck: Lips, mucosa, and tongue normal.  Supple, symmetrical, trachea midline, no adenopathy;thyroid:  no enlargement/tenderness/nodules or JVD. Lung: Breath sounds few scattered rhonchi. Respirations   unlabored. Symmetrical expansion. Heart: RRR, normal S1, S2. No MRG  Abdomen: Soft, NT, ND. BS present x 4 quadrants. No bruit or organomegaly. Extremities: Pedal pulses 2+ symmetric b/l. Extremities normal, no cyanosis, clubbing, or edema. Musculokeletal: No joint swelling, no muscle tenderness. ROM normal in all joints of extremities. Neurologic: Mental status: Alert and Oriented X3 . Pertinent/ New Labs and Imaging Studies     Imaging Personally Reviewed:      ECHO      Labs:  Lab Results   Component Value Date    WBC 15.0 01/23/2021    HGB 9.1 01/23/2021    HCT 31.6 01/23/2021    .3 01/23/2021    MCH 30.0 01/23/2021    MCHC 28.8 01/23/2021    RDW 17.1 01/23/2021     01/23/2021    MPV 10.1 01/23/2021     Lab Results   Component Value Date     01/23/2021    K 5.1 01/23/2021    K 3.3 12/15/2020    CL 93 01/23/2021    CO2 36 01/23/2021    BUN 16 01/23/2021    CREATININE 0.2 01/23/2021    LABALBU 3.0 01/03/2021    CALCIUM 8.2 01/23/2021    GFRAA >60 01/23/2021    LABGLOM >60 01/23/2021     Lab Results   Component Value Date    PROTIME 11.4 12/13/2020    PROTIME 11.9 12/15/2011    INR 1.0 12/13/2020     No results for input(s): PROBNP in the last 72 hours. No results for input(s): PROCAL in the last 72 hours. This SmartLink has not been configured with any valid records. Assessment:    1. Acute on chronic respiratory failure s/p trach and vent dependence   2. S/p trach and peg  3. Covid 19   4. Shock   5. Gi bleed on eliquis   6. afib rvr   7. Deconditioning   8. Adrenal insufficiency   9.  Elevated d-dimer       Plan:   Full vent support, trach to vent peep 12 , fio2 75%   Trach care, BP hygiene  Sedation   Off Pressors   still testing positive for covid    This plan of care was reviewed in collaboration with

## 2021-01-23 NOTE — PROGRESS NOTES
Assessment/Plan:  Principal Problem:    Acute respiratory failure with hypoxia (HCC)  Active Problems:    COPD (chronic obstructive pulmonary disease) (HCC)    History of DVT (deep vein thrombosis)    Coronary artery disease involving native coronary artery without angina pectoris    Acute respiratory failure due to COVID-19 [facility])    Septic shock ([facility])    Atrial fibrillation with RVR ([facility]    Pneumonia due to COVID-19 virus    Hyperkalemia  Resolved Problems:    * No resolved hospital problems. *       Wean vent as tolerated    Completed steroids. Continue digoxin and amiodarone    Hemoglobin has stabilized. Eliquis could be resumed in near future. TSH > 26. FT4 low. Synthroid dose increased    Continue bronchodilators    Glucose not well controlled. Increase glargine dose    Continue Plavix    Hyperkalemia improving.     Requires continued inpatient level of care   Marge Tran    8:35 AM  1/23/2021  Cell: [phone]

## 2021-01-24 NOTE — PROGRESS NOTES
Chief Complaint:  Chief Complaint   Patient presents with    Shortness of Breath     70% RA at NH 88% NRB , being tx for pneumonia hx of copd      Acute respiratory failure with hypoxia ([address]     Subjective:    Nonverbal but seems to be doing OK, looks around    Objective:    BP (!) 140/74   Pulse 100   Temp 98.2 °F (36.8 °C) (Axillary)   Resp 18   Ht 5' 6\" (1.676 m)   Wt 215 lb (97.5 kg)   SpO2 96%   BMI 34.70 kg/m²     Current medications that patient is taking have been reviewed. General appearance: Ill appearing  HEENT: AT/NC, MMM  Neck: trach in place  Lungs: Clear to auscultation, WOB normal  CV: RRR, no MRGs  Abdomen: Soft, non-tender; no masses or HSM, +BS  Extremities: R>L leg edema  Skin: no rash, lesions or ulcers  Psych: Calm and cooperative  Neuro: Awake, makes eye contact briefly but not really communicative.     Labs:  CBC with Differential:    Lab Results   Component Value Date    WBC 15.4 01/24/2021    RBC 2.89 01/24/2021    HGB 8.6 01/24/2021    HCT 30.2 01/24/2021     01/24/2021    .5 01/24/2021    MCH 29.8 01/24/2021    MCHC 28.5 01/24/2021    RDW 16.6 01/24/2021    NRBC 0.9 01/14/2021    SEGSPCT 89 03/10/2014    BANDSPCT 1 05/19/2016    METASPCT 1.0 01/11/2021    LYMPHOPCT 3.5 01/23/2021    PROMYELOPCT 1.8 12/19/2020    MONOPCT 2.9 01/23/2021    MYELOPCT 0.9 01/14/2021    BASOPCT 0.1 01/23/2021    MONOSABS 0.44 01/23/2021    LYMPHSABS 0.52 01/23/2021    EOSABS 0.00 01/23/2021    BASOSABS 0.01 01/23/2021     CMP:    Lab Results   Component Value Date     01/24/2021    K 5.0 01/24/2021    K 3.3 12/15/2020    CL 94 01/24/2021    CO2 40 01/24/2021    BUN 17 01/24/2021    CREATININE 0.2 01/24/2021    GFRAA >60 01/24/2021    LABGLOM >60 01/24/2021    GLUCOSE 178 01/24/2021    GLUCOSE 106 12/15/2011    PROT 5.0 01/03/2021    LABALBU 3.0 01/03/2021    CALCIUM 8.3 01/24/2021    BILITOT 0.7 01/03/2021    ALKPHOS 130 01/03/2021    AST 21 01/03/2021    ALT 14 01/03/2021 Assessment/Plan:  Principal Problem:    Acute respiratory failure with hypoxia (HCC)  Active Problems:    COPD (chronic obstructive pulmonary disease) (HCC)    History of DVT (deep vein thrombosis)    Coronary artery disease involving native coronary artery without angina pectoris    Acute respiratory failure due to COVID-19 [facility])    Septic shock ([facility])    Atrial fibrillation with RVR ([facility]    Pneumonia due to COVID-19 virus    Hyperkalemia  Resolved Problems:    * No resolved hospital problems. *       Wean vent as tolerated    Completed steroids. Continue digoxin and amiodarone    Hemoglobin has stabilized. Eliquis could be resumed in near future. Continue synthroid, dose recently adjusted. TSH should be rechecked in a few weeks. Continue bronchodilators    Glucose improving. Continue current regimen    Continue Plavix    Hyperkalemia resolved.     Requires continued inpatient level of care   Jose Francisco Rodriguezchure    9:27 AM  1/24/2021  Cell: [phone]

## 2021-01-24 NOTE — PROGRESS NOTES
While staff was in [phone] for an RRT, Audra Garcia had popped off the vent. Her 02 is connected to the monitor and being monitored by CMR. No phone call was placed to my cell phone. Another nurse answered the phone at the nurses station and [facility] informed her that the patients 02 sat was 4%. I then ran into the room, reconnected her to the vent and looked for a pulse. Could not find a pulse and the patient was unresponsive. About a minute later the patients oxygen came back up to 66% and I was able to find a pulse. The patient also began responding again and the code was cancelled.

## 2021-01-24 NOTE — PROGRESS NOTES
Johnson Martin M.D.,[facility]  Handy Donahue D.O., FPARRISOELIZABET., Jerrod Hernandez M.D. Celestina Miller M.D., Sal Steiner M.D. Manuelito London D.O. Daily Pulmonary Progress Note    Patient:  Ne Torres 65 y.o. female MRN: [id number]     Date of Service: 1/24/2021      Synopsis     We are following patient for respiratory failure, COVID-19    \"CC\" shortness of breath    Code status: DNR CCA      Subjective      Patient was seen and examined lying in the bed in no apparent distress. She is still on full vent support 75% FiO2 with PEEP 12. Sat 95%. He is definitely more awake and alert today. Review of Systems:  Constitutional: Denies fever, weight loss, night sweats, and fatigue  Skin: Denies pigmentation, dark lesions, and rashes   HEENT: Denies hearing loss, tinnitus, ear drainage, epistaxis, sore throat, and hoarseness. Cardiovascular: Denies palpitations, chest pain, and chest pressure. Respiratory: Denies cough, dyspnea at rest, hemoptysis, apnea, and choking.   Gastrointestinal: Denies nausea, vomiting, poor appetite, diarrhea, heartburn or reflux  Genitourinary: Denies dysuria, frequency, urgency or hematuria  Musculoskeletal: Denies myalgias, muscle weakness, and bone pain  Neurological: Denies dizziness, vertigo, headache, and focal weakness  Psychological: Denies anxiety and depression  Endocrine: Denies heat intolerance and cold intolerance  Hematopoietic/Lymphatic: Denies bleeding problems and blood transfusions    24-hour events:  none    Objective   Vitals: BP (!) 113/50   Pulse 70   Temp 98.3 °F (36.8 °C) (Axillary)   Resp (!) 32   Ht 5' 6\" (1.676 m)   Wt 215 lb (97.5 kg)   SpO2 99%   BMI 34.70 kg/m²     I/O:    Intake/Output Summary (Last 24 hours) at 1/24/2021 1145  Last data filed at 1/24/2021 0145  Gross per 24 hour   Intake 867 ml   Output 2000 ml   Net -1133 ml       Vent Information  $Ventilation: $Subsequent Day  Skin Assessment: Clean, dry, & symmetrical, trachea midline, no adenopathy;thyroid:  no enlargement/tenderness/nodules or JVD. Lung: Breath sounds few scattered rhonchi. Respirations   unlabored. Symmetrical expansion. Heart: RRR, normal S1, S2. No MRG  Abdomen: Soft, NT, ND. BS present x 4 quadrants. No bruit or organomegaly. Extremities: Pedal pulses 2+ symmetric b/l. Extremities normal, no cyanosis, clubbing, or edema. Musculokeletal: No joint swelling, no muscle tenderness. ROM normal in all joints of extremities. Neurologic: Mental status: Alert and Oriented X3 . Pertinent/ New Labs and Imaging Studies     Imaging Personally Reviewed:  1/20  Worsening bilateral infiltrates which may represent asymmetric edema and/or   pneumonia. Interval placement of right-sided PICC line. ECHO  7/15/2020    Labs:  Lab Results   Component Value Date    WBC 15.4 01/24/2021    HGB 8.6 01/24/2021    HCT 30.2 01/24/2021    .5 01/24/2021    MCH 29.8 01/24/2021    MCHC 28.5 01/24/2021    RDW 16.6 01/24/2021     01/24/2021    MPV 9.9 01/24/2021     Lab Results   Component Value Date     01/24/2021    K 5.0 01/24/2021    K 3.3 12/15/2020    CL 94 01/24/2021    CO2 40 01/24/2021    BUN 17 01/24/2021    CREATININE 0.2 01/24/2021    LABALBU 3.0 01/03/2021    CALCIUM 8.3 01/24/2021    GFRAA >60 01/24/2021    LABGLOM >60 01/24/2021     Lab Results   Component Value Date    PROTIME 11.4 12/13/2020    PROTIME 11.9 12/15/2011    INR 1.0 12/13/2020     No results for input(s): PROBNP in the last 72 hours. No results for input(s): PROCAL in the last 72 hours. This SmartLink has not been configured with any valid records. Assessment:    1. Acute on chronic respiratory failure s/p trach and vent dependence   2. S/p trach and peg  3. Covid 19   4. Shock   5. Gi bleed on eliquis   6. afib rvr   7. Deconditioning   8. Adrenal insufficiency   9.  Elevated d-dimer       Plan:   Full vent support, trach to vent peep 12 , fio2 75%   Vicente Gurrola care, BP hygiene  Off Sedation   Off Pressors   Still on Midodrine  still testing positive for covid    This plan of care was reviewed in collaboration with Dr. Alyson Bang  Electronically signed by ELLIE Boucher CNP on 1/24/2021 at 11:45 AM     Agree with current treatment and care.     Dr. Mikel Perez

## 2021-01-25 NOTE — PROGRESS NOTES
Chief Complaint:  Chief Complaint   Patient presents with    Shortness of Breath     70% RA at NH 88% NRB , being tx for pneumonia hx of copd      Acute respiratory failure with hypoxia ([address]     Subjective:    Sleeping today    Objective:    BP (!) 142/68   Pulse 77   Temp 98.1 °F (36.7 °C) (Axillary)   Resp 20   Ht 5' 6\" (1.676 m)   Wt 215 lb (97.5 kg)   SpO2 92%   BMI 34.70 kg/m²     Current medications that patient is taking have been reviewed.     General appearance: Ill appearing  HEENT: AT/NC, MMM  Neck: trach in place  Lungs: Clear to auscultation, WOB normal  CV: RRR, no MRGs  Abdomen: Soft, non-tender; no masses or HSM, +BS  Extremities: R>L leg edema  Skin: no rash, lesions or ulcers  Psych: Calm and cooperative  Neuro: Asleep    Labs:  CBC with Differential:    Lab Results   Component Value Date    WBC 15.8 01/25/2021    RBC 3.00 01/25/2021    HGB 8.9 01/25/2021    HCT 31.3 01/25/2021     01/25/2021    .3 01/25/2021    MCH 29.7 01/25/2021    MCHC 28.4 01/25/2021    RDW 16.3 01/25/2021    NRBC 0.9 01/14/2021    SEGSPCT 89 03/10/2014    BANDSPCT 1 05/19/2016    METASPCT 1.0 01/11/2021    LYMPHOPCT 2.4 01/25/2021    PROMYELOPCT 1.8 12/19/2020    MONOPCT 2.2 01/25/2021    MYELOPCT 0.9 01/14/2021    BASOPCT 0.1 01/25/2021    MONOSABS 0.35 01/25/2021    LYMPHSABS 0.38 01/25/2021    EOSABS 0.00 01/25/2021    BASOSABS 0.01 01/25/2021     CMP:    Lab Results   Component Value Date     01/25/2021    K 4.9 01/25/2021    K 3.3 12/15/2020    CL 91 01/25/2021    CO2 41 01/25/2021    BUN 17 01/25/2021    CREATININE 0.2 01/25/2021    GFRAA >60 01/25/2021    LABGLOM >60 01/25/2021    GLUCOSE 160 01/25/2021    GLUCOSE 106 12/15/2011    PROT 5.0 01/03/2021    LABALBU 3.0 01/03/2021    CALCIUM 8.1 01/25/2021    BILITOT 0.7 01/03/2021    ALKPHOS 130 01/03/2021    AST 21 01/03/2021    ALT 14 01/03/2021          Assessment/Plan:  Principal Problem:    Acute respiratory failure with hypoxia ([facility]  Active Problems:    COPD (chronic obstructive pulmonary disease) ([facility])    History of DVT (deep vein thrombosis)    Coronary artery disease involving native coronary artery without angina pectoris    Acute respiratory failure due to COVID-19 [facility])    Septic shock ([facility])    Atrial fibrillation with RVR ([facility] [address]    Pneumonia due to COVID-19 virus    Hyperkalemia  Resolved Problems:    * No resolved hospital problems. *       Wean vent as tolerated    Completed steroids. Continue digoxin and amiodarone    Hemoglobin has stabilized. Eliquis could be resumed in near future. Continue synthroid, dose recently adjusted. TSH should be rechecked in a few weeks. Continue bronchodilators    Glucose improving. Continue current regimen    Continue Plavix    Hyperkalemia resolved.     Requires continued inpatient level of care   Katelyn Foster    4:17 PM  1/25/2021  Cell: [phone]

## 2021-01-25 NOTE — PROGRESS NOTES
Gerardo Bray M.D.,[facility]  Johnathan Ramirez D.O., F.A.C.O.I., Shayy Peralta M.D. Lyudmila Nuno M.D., Jeremiah William M.D. John Mckeon D.O. Daily Pulmonary Progress Note    Patient:  Lisa Torres 65 y.o. female MRN: [id number]     Date of Service: 1/25/2021      Synopsis     We are following patient for respiratory failure, COVID-19    \"CC\" shortness of breath    Code status: DNR CCA      Subjective      Patient was seen and examined lying in the bed in no apparent distress. She is still on full vent support 80% FiO2 with PEEP 12. Sat 95%. She is still hypotensive at 96/60, requiring Midodrine. She does respond to her name and nods to questions. She is still edematous all over. Review of Systems:  Constitutional: Denies fever, weight loss, night sweats, and fatigue  Skin: Denies pigmentation, dark lesions, and rashes   HEENT: Denies hearing loss, tinnitus, ear drainage, epistaxis, sore throat, and hoarseness. Cardiovascular: Denies palpitations, chest pain, and chest pressure. Respiratory: Denies cough, dyspnea at rest, hemoptysis, apnea, and choking.   Gastrointestinal: Denies nausea, vomiting, poor appetite, diarrhea, heartburn or reflux  Genitourinary: Denies dysuria, frequency, urgency or hematuria  Musculoskeletal: Denies myalgias, muscle weakness, and bone pain  Neurological: Denies dizziness, vertigo, headache, and focal weakness  Psychological: Denies anxiety and depression  Endocrine: Denies heat intolerance and cold intolerance  Hematopoietic/Lymphatic: Denies bleeding problems and blood transfusions    24-hour events:  Became extremely hypoxic and unresponsive when trach tube popped off    Objective   Vitals: BP 96/60   Pulse 98   Temp 98.1 °F (36.7 °C) (Axillary)   Resp 22   Ht 5' 6\" (1.676 m)   Wt 215 lb (97.5 kg)   SpO2 95%   BMI 34.70 kg/m²     I/O:    Intake/Output Summary (Last 24 hours) at 1/25/2021 1133  Last data filed at 1/25/2021 8726  Gross per 24 hour   Intake 759 ml   Output 2350 ml   Net -1591 ml       Vent Information  $Ventilation: $Subsequent Day  Skin Assessment: Clean, dry, & intact  Equipment ID: [id number]  Equipment Changed: Humidification  Vent Type: 840  Vent Mode: AC/VC  Vt Ordered: 400 mL  Pressure Ordered: 14  Rate Set: 22 bmp  Peak Flow: 65 L/min  Pressure Support: 0 cmH20  FiO2 : 80 %  SpO2: 95 %  SpO2/FiO2 ratio: 118.75  PaO2/FiO2 ratio: 86  Sensitivity: 3  PEEP/CPAP: 12  I Time/ I Time %: 0 s  Humidification Source: HME  Humidification Temp: 37  Humidification Temp Measured: 37  Circuit Condensation: Drained  Mask Type: Full face mask  Mask Size: Small       IPAP: 14 cmH20  CPAP/EPAP: 8 cmH2O     CURRENT MEDS :  Scheduled Meds:   insulin glargine  25 Units Subcutaneous BID    sodium polystyrene  15 g Oral Once    dexamethasone  6 mg Intravenous Q12H    levothyroxine  125 mcg Oral Daily    enoxaparin  40 mg Subcutaneous Daily    sucralfate  1 g Oral 4x Daily AC & HS    midodrine  20 mg Oral TID    sodium chloride  500 mL Intravenous Once    pantoprazole  40 mg Intravenous BID    And    sodium chloride (PF)  10 mL Intravenous BID    heparin flush  3 mL Intravenous 2 times per day    amiodarone  200 mg Oral Daily    digoxin  250 mcg Oral Daily    senna  10 mL Per G Tube Nightly    vitamin C  1,000 mg Oral Daily    Vitamin D  1,000 Units Oral Daily    nicotine  1 patch Transdermal Daily    thiamine  100 mg Oral Daily    zinc sulfate  50 mg Oral Daily    docusate  100 mg Oral Daily    insulin lispro  0-18 Units Subcutaneous Q6H    ipratropium-albuterol  1 ampule Inhalation Q4H WA    budesonide  500 mcg Nebulization BID    Arformoterol Tartrate  15 mcg Nebulization BID    clopidogrel  75 mg Oral Daily    chlorhexidine  15 mL Mouth/Throat BID    sodium chloride flush  10 mL Intravenous 2 times per day    gabapentin  800 mg Oral TID    atorvastatin  20 mg Oral Daily       Physical Exam:  General Appearance: appears comfortable in no acute distress. HEENT: Normocephalic atraumatic without obvious abnormality   Neck: Lips, mucosa, and tongue normal.  Supple, symmetrical, trachea midline, no adenopathy;thyroid:  no enlargement/tenderness/nodules or JVD. Trach intact  Lung: Breath sounds few scattered rhonchi. Respirations   unlabored. Symmetrical expansion. Heart: RRR, normal S1, S2. No MRG  Abdomen: Soft, NT, ND. BS present x 4 quadrants. No bruit or organomegaly. Extremities: Pedal pulses 2+ symmetric b/l. Extremities normal, no cyanosis, clubbing. Generalized edema  Musculokeletal: No joint swelling, no muscle tenderness. ROM normal in all joints of extremities. Neurologic: Mental status: Alert and Oriented, unable to assess full orientation    Pertinent/ New Labs and Imaging Studies     Imaging Personally Reviewed:  1/20  Worsening bilateral infiltrates which may represent asymmetric edema and/or   pneumonia. Interval placement of right-sided PICC line. ECHO  7/15/2020    Labs:  Lab Results   Component Value Date    WBC 15.8 01/25/2021    HGB 8.9 01/25/2021    HCT 31.3 01/25/2021    .3 01/25/2021    MCH 29.7 01/25/2021    MCHC 28.4 01/25/2021    RDW 16.3 01/25/2021     01/25/2021    MPV 9.7 01/25/2021     Lab Results   Component Value Date     01/25/2021    K 4.9 01/25/2021    K 3.3 12/15/2020    CL 91 01/25/2021    CO2 41 01/25/2021    BUN 17 01/25/2021    CREATININE 0.2 01/25/2021    LABALBU 3.0 01/03/2021    CALCIUM 8.1 01/25/2021    GFRAA >60 01/25/2021    LABGLOM >60 01/25/2021     Lab Results   Component Value Date    PROTIME 11.4 12/13/2020    PROTIME 11.9 12/15/2011    INR 1.0 12/13/2020     No results for input(s): PROBNP in the last 72 hours. No results for input(s): PROCAL in the last 72 hours. This SmartLink has not been configured with any valid records. Assessment:    1. Acute on chronic respiratory failure s/p trach and vent dependence   2. S/p trach and peg  3.  Covid 19 4. Shock   5. Gi bleed on eliquis   6. afib rvr   7. Deconditioning   8. Adrenal insufficiency   9. Elevated d-dimer       Plan:   Full vent support, trach to vent peep 12 , fio2 80%   Trach care, BP hygiene  Still on Midodrine  still testing positive for covid    This plan of care was reviewed in collaboration with Dr. Casi Anthony  Electronically signed by ELLIE Hatch CNP on 1/25/2021 at 11:33 AM       I personally saw, examined, and cared for the patient. Labs, medications, radiographs reviewed. I agree with history exam and plans detailed in NP note.     Check CXR    Electronically signed by Risa Saldivar DO on 1/25/2021 at 3:58 PM

## 2021-01-26 PROBLEM — I82.621 ARM DVT (DEEP VENOUS THROMBOEMBOLISM), ACUTE, RIGHT (HCC): Status: ACTIVE | Noted: 2021-01-01

## 2021-01-26 NOTE — PROGRESS NOTES
Chief Complaint:  Chief Complaint   Patient presents with    Shortness of Breath     70% RA at NH 88% NRB , being tx for pneumonia hx of copd      Acute respiratory failure with hypoxia ([address]     Subjective:    Sleeping today    Objective:    /64   Pulse 75   Temp 98 °F (36.7 °C) (Skin)   Resp 28   Ht 5' 6\" (1.676 m)   Wt 189 lb (85.7 kg)   SpO2 92%   BMI 30.51 kg/m²     Current medications that patient is taking have been reviewed.     General appearance: Ill appearing  HEENT: AT/NC, MMM  Neck: trach in place  Lungs: Clear to auscultation, WOB normal  CV: RRR, no MRGs  Abdomen: Soft, non-tender; no masses or HSM, +BS  Extremities: R>L leg edema  Skin: no rash, lesions or ulcers  Psych: Calm and cooperative  Neuro: Asleep    Labs:  CBC with Differential:    Lab Results   Component Value Date    WBC 15.8 01/25/2021    RBC 3.00 01/25/2021    HGB 8.9 01/25/2021    HCT 31.3 01/25/2021     01/25/2021    .3 01/25/2021    MCH 29.7 01/25/2021    MCHC 28.4 01/25/2021    RDW 16.3 01/25/2021    NRBC 0.9 01/14/2021    SEGSPCT 89 03/10/2014    BANDSPCT 1 05/19/2016    METASPCT 1.0 01/11/2021    LYMPHOPCT 2.4 01/25/2021    PROMYELOPCT 1.8 12/19/2020    MONOPCT 2.2 01/25/2021    MYELOPCT 0.9 01/14/2021    BASOPCT 0.1 01/25/2021    MONOSABS 0.35 01/25/2021    LYMPHSABS 0.38 01/25/2021    EOSABS 0.00 01/25/2021    BASOSABS 0.01 01/25/2021     CMP:    Lab Results   Component Value Date     01/25/2021    K 4.9 01/25/2021    K 3.3 12/15/2020    CL 91 01/25/2021    CO2 41 01/25/2021    BUN 17 01/25/2021    CREATININE 0.2 01/25/2021    GFRAA >60 01/25/2021    LABGLOM >60 01/25/2021    GLUCOSE 160 01/25/2021    GLUCOSE 106 12/15/2011    PROT 5.0 01/03/2021    LABALBU 3.0 01/03/2021    CALCIUM 8.1 01/25/2021    BILITOT 0.7 01/03/2021    ALKPHOS 130 01/03/2021    AST 21 01/03/2021    ALT 14 01/03/2021          Assessment/Plan:  Principal Problem:    Acute respiratory failure with hypoxia (HCC)  Active

## 2021-01-26 NOTE — CARE COORDINATION
CASE MANAGEMENT. .. Chart reviewed. H/O COVID + 12/16/2020 and 1/16/21. COVID NEG 1/22 and 1/25. Patient continues on trach/vent ac/vc for resp support. Fio2 has been increased to 80%, peep remains at 12. Continues on iv decadron q12hrs and iv protonix bid. She is NPO with tube feeds per peg. Called and spoke with patient's son, Wagner Dao [phone]. We discussed patients hospital stay and discharge plans. Wagner Dao is now interested in other [facility] options at discharge. Provided him with facilities that accommodate trach/vents for him to review. He requested that I check with Andria. Called Dayanara from [place]. She will review chart. In the meantime, plan remains [facility] when ready. Will cont to follow along and assist with needs accordingly.

## 2021-01-27 NOTE — PROGRESS NOTES
Patient FiO2 able to be reduced today. SpO2 maintained >95% on 65% oxygen. Plateau pressures continue to be >30 with low compliance. Can we evaluate a decrease in PEEP, currently at 12?

## 2021-01-27 NOTE — PROGRESS NOTES
1/27/2021  4:14 PM      Comprehensive Nutrition Assessment    Type and Reason for Visit:  Reassess    Nutrition Recommendations/Plan: Continue current diet, as tolerated    Nutrition Assessment:  Pt continues intubated/trach and on TF via PEG. Hgb stabilized and contributed to acute on chronic anemia vs. GIB. Recommend continue TF and will monitor tolerance    Malnutrition Assessment:  Malnutrition Status: At risk for malnutrition (Comment)    Context:  Acute Illness     Findings of the 6 clinical characteristics of malnutrition:  Energy Intake:  1 - 75% or less of estimated energy requirements for 7 or more days  Weight Loss:  Unable to assess(d/t fluids/fluctuations)     Body Fat Loss:  Unable to assess     Muscle Mass Loss:  Unable to assess    Fluid Accumulation:  No significant fluid accumulation     Strength:  Not Performed    Estimated Daily Nutrient Needs:  Energy (kcal):  ; Weight Used for Energy Requirements:  Admission     Protein (g):  75-90 (1.3-1.5 g/kg);  Weight Used for Protein Requirements:  Ideal        Fluid (ml/day):  ; Method Used for Fluid Requirements:  1 ml/kcal      Nutrition Related Findings:  intubated/trach, PEG to TF, +4 edema, +I/O but decreasing 8L, edent      Wounds:  Surgical Incision(per wound care - superfical pink area under trach plate -foam)       Current Nutrition Therapies:    Current Tube Feeding (TF) Orders:  · Feeding Route: Gastrostomy  · Formula: Immune Enhancing  · Schedule: Continuous  · Additives/Modulars: (none)  · Water Flushes: 200 ml Q 6 hrs  · Current TF & Flush Orders Provides: at goal  · Goal TF & Flush Orders Provides: 1080 ml/d, 1620 nguyen, 102 g pro, 1620 ml total free water      Anthropometric Measures:  · Height: 5' 6\" (167.6 cm)  · Current Body Weight: 225 lb (102.1 kg)(1/27)   · Admission Body Weight: 184 lb (83.5 kg)(12/15 first measured wt)    · Usual Body Weight: 161 lb (73 kg)(per EMR x 6 mo)     · Ideal Body Weight: 130 lbs; % Ideal Body Weight 173.1 %   · BMI: 36.3  · BMI Categories: Overweight (BMI 25.0-29.9)(29.7 at admit wt)       Nutrition Diagnosis:   · Inadequate oral intake related to impaired respiratory function(2/2 COVID-19 PNA) as evidenced by NPO or clear liquid status due to medical condition, intubation      Nutrition Interventions:   Food and/or Nutrient Delivery:  Continue Current Tube Feeding  Nutrition Education/Counseling:  No recommendation at this time   Coordination of Nutrition Care:  Continue to monitor while inpatient    Goals:  Pt ernestine EN at goal rate       Nutrition Monitoring and Evaluation:   Behavioral-Environmental Outcomes:  None Identified   Food/Nutrient Intake Outcomes:  Enteral Nutrition Intake/Tolerance  Physical Signs/Symptoms Outcomes:  GI Status, Biochemical Data, Fluid Status or Edema, Hemodynamic Status, Weight, Skin, Nutrition Focused Physical Findings     Discharge Planning:    Enteral Nutrition     Electronically signed by Dwana Cranker, RD, CNSC, LD on 1/27/21 at 4:14 PM EST    Contact: [phone]

## 2021-01-27 NOTE — PROGRESS NOTES
Assessment/Plan:  Principal Problem:    Acute respiratory failure with hypoxia (HCC)  Active Problems:    COPD (chronic obstructive pulmonary disease) (HCC)    History of DVT (deep vein thrombosis)    Coronary artery disease involving native coronary artery without angina pectoris    Acute respiratory failure due to COVID-19 [facility])    Septic shock ([facility])    Atrial fibrillation with RVR ([facility])    Pneumonia due to COVID-19 virus    Hyperkalemia    Arm DVT (deep venous thromboembolism), acute, right ([address]  Resolved Problems:    * No resolved hospital problems. *       Wean vent as tolerated    Completed steroids. Continue digoxin and amiodarone    Drop in hemoglobin noted, but no report of bleeding. Hold Eliquis again, recheck CBC tomorrow. PICC associated DVT noted    Continue synthroid, dose recently adjusted. TSH should be rechecked in a few weeks. Continue bronchodilators    Glucose improving. Continue current regimen    Continue Plavix    Hyperkalemia resolved.     Requires continued inpatient level of care   Anahy Coad    2:58 PM  1/27/2021  Cell: [phone]

## 2021-01-27 NOTE — PROGRESS NOTES
Alberta Hong M.D.,[facility]  Lawanda Arroyo D.O., F.A.C.O.I., Pavan Thrasher M.D. Liborio Cuevas M.D., Adriane Castro M.D. Divya Colon D.O. Daily Pulmonary Progress Note    Patient:  Kamilla Torres 65 y.o. female MRN: [id number]     Date of Service: 1/26/2021      Synopsis     We are following patient for respiratory failure, COVID-19    \"CC\" shortness of breath    Code status: DNR CCA      Subjective      Patient was seen and examined lying in the bed in no apparent distress. She is still on full vent support 80% FiO2 with PEEP 12. Sat 97%. Review of Systems:  Constitutional: Denies fever, weight loss, night sweats, and fatigue  Skin: Denies pigmentation, dark lesions, and rashes   HEENT: Denies hearing loss, tinnitus, ear drainage, epistaxis, sore throat, and hoarseness. Cardiovascular: Denies palpitations, chest pain, and chest pressure. Respiratory: Denies cough, dyspnea at rest, hemoptysis, apnea, and choking.   Gastrointestinal: Denies nausea, vomiting, poor appetite, diarrhea, heartburn or reflux  Genitourinary: Denies dysuria, frequency, urgency or hematuria  Musculoskeletal: Denies myalgias, muscle weakness, and bone pain  Neurological: Denies dizziness, vertigo, headache, and focal weakness  Psychological: Denies anxiety and depression  Endocrine: Denies heat intolerance and cold intolerance  Hematopoietic/Lymphatic: Denies bleeding problems and blood transfusions    24-hour events:  Became extremely hypoxic and unresponsive when trach tube popped off    Objective   Vitals: /60   Pulse 73   Temp 98.4 °F (36.9 °C) (Axillary)   Resp 25   Ht 5' 6\" (1.676 m)   Wt 189 lb (85.7 kg)   SpO2 97%   BMI 30.51 kg/m²     I/O:    Intake/Output Summary (Last 24 hours) at 1/26/2021 2208  Last data filed at 1/26/2021 1414  Gross per 24 hour   Intake --   Output 1000 ml   Net -1000 ml       Vent Information  $Ventilation: $Subsequent Day  Skin Assessment: Clean, dry, & intact  Equipment ID: [id number]  Equipment Changed: Humidification  Vent Type: 840  Vent Mode: AC/VC  Vt Ordered: 400 mL  Pressure Ordered: 14  Rate Set: 22 bmp  Peak Flow: 75 L/min  Pressure Support: 0 cmH20  FiO2 : (S) 70 %  SpO2: 97 %  SpO2/FiO2 ratio: 138.57  PaO2/FiO2 ratio: 86  Sensitivity: 3  Flow by: 75  PEEP/CPAP: 12  I Time/ I Time %: 0 s  Humidification Source: HME  Humidification Temp: 37  Humidification Temp Measured: 37  Circuit Condensation: Drained  Mask Type: Full face mask  Mask Size: Small       IPAP: 14 cmH20  CPAP/EPAP: 8 cmH2O     CURRENT MEDS :  Scheduled Meds:   apixaban  5 mg Oral BID    insulin glargine  25 Units Subcutaneous BID    sodium polystyrene  15 g Oral Once    dexamethasone  6 mg Intravenous Q12H    levothyroxine  125 mcg Oral Daily    sucralfate  1 g Oral 4x Daily AC & HS    midodrine  20 mg Oral TID    sodium chloride  500 mL Intravenous Once    pantoprazole  40 mg Intravenous BID    And    sodium chloride (PF)  10 mL Intravenous BID    heparin flush  3 mL Intravenous 2 times per day    amiodarone  200 mg Oral Daily    digoxin  250 mcg Oral Daily    senna  10 mL Per G Tube Nightly    vitamin C  1,000 mg Oral Daily    Vitamin D  1,000 Units Oral Daily    nicotine  1 patch Transdermal Daily    thiamine  100 mg Oral Daily    zinc sulfate  50 mg Oral Daily    docusate  100 mg Oral Daily    insulin lispro  0-18 Units Subcutaneous Q6H    ipratropium-albuterol  1 ampule Inhalation Q4H WA    budesonide  500 mcg Nebulization BID    Arformoterol Tartrate  15 mcg Nebulization BID    clopidogrel  75 mg Oral Daily    chlorhexidine  15 mL Mouth/Throat BID    sodium chloride flush  10 mL Intravenous 2 times per day    gabapentin  800 mg Oral TID    atorvastatin  20 mg Oral Daily       Physical Exam:  General Appearance: appears comfortable in no acute distress.    HEENT: Normocephalic atraumatic without obvious abnormality   Neck: Lips, mucosa, and tongue normal. Supple, symmetrical, trachea midline, no adenopathy;thyroid:  no enlargement/tenderness/nodules or JVD. Trach intact  Lung: Breath sounds few scattered rhonchi. Respirations   unlabored. Symmetrical expansion. Heart: RRR, normal S1, S2. No MRG  Abdomen: Soft, NT, ND. BS present x 4 quadrants. No bruit or organomegaly. Extremities: Pedal pulses 2+ symmetric b/l. Extremities normal, no cyanosis, clubbing. Generalized edema  Musculokeletal: No joint swelling, no muscle tenderness. ROM normal in all joints of extremities. Neurologic: Mental status: Alert and Oriented, unable to assess full orientation    Pertinent/ New Labs and Imaging Studies     Imaging Personally Reviewed:  1/20  Worsening bilateral infiltrates which may represent asymmetric edema and/or   pneumonia. Interval placement of right-sided PICC line. ECHO  7/15/2020    Labs:  Lab Results   Component Value Date    WBC 14.8 01/26/2021    HGB 8.5 01/26/2021    HCT 29.1 01/26/2021    .8 01/26/2021    MCH 30.0 01/26/2021    MCHC 29.2 01/26/2021    RDW 16.3 01/26/2021     01/26/2021    MPV 10.1 01/26/2021     Lab Results   Component Value Date     01/26/2021    K 5.0 01/26/2021    K 3.3 12/15/2020    CL 89 01/26/2021    CO2 42 01/26/2021    BUN 16 01/26/2021    CREATININE 0.2 01/26/2021    LABALBU 3.0 01/03/2021    CALCIUM 8.4 01/26/2021    GFRAA >60 01/26/2021    LABGLOM >60 01/26/2021     Lab Results   Component Value Date    PROTIME 11.4 12/13/2020    PROTIME 11.9 12/15/2011    INR 1.0 12/13/2020     No results for input(s): PROBNP in the last 72 hours. No results for input(s): PROCAL in the last 72 hours. This SmartLink has not been configured with any valid records. Assessment:    1. Acute on chronic respiratory failure s/p trach and vent dependence   2. S/p trach and peg  3. Covid 19   4. Shock   5. Gi bleed on eliquis   6. afib rvr   7. Deconditioning   8. Adrenal insufficiency   9.  Elevated d-dimer       Plan: Vent support, trach to vent peep 12 , fio2 80%   Trach care, BP hygiene  bronchodilators  Still on Midodrine  Follow imaging  Dose of diuretic  Check abg in AM, attempt to wean FiO2 to 60% then start decreasing PEEP  GI/DVT    Electronically signed by Liana Hutchison DO on 1/26/2021 at 10:08 PM

## 2021-01-27 NOTE — PROGRESS NOTES
Wilder Ellsworth M.D.,[facility]  Andie Worley D.O., F.A.C.O.I., Jewels Smith M.D. Earnest Camp M.D., Tho López M.D. Bishop Carlos D.O. Daily Pulmonary Progress Note    Patient:  Mercedes Torres 65 y.o. female MRN: [id number]     Date of Service: 1/27/2021      Synopsis     We are following patient for respiratory failure, COVID-19    \"CC\" shortness of breath    Code status: DNR CCA      Subjective      Patient was seen and examined lying in the bed in no apparent distress. She is still on full vent support 80% FiO2 with PEEP 12. Sat 97%. Review of Systems:  Constitutional: Denies fever, weight loss, night sweats, and fatigue  Skin: Denies pigmentation, dark lesions, and rashes   HEENT: Denies hearing loss, tinnitus, ear drainage, epistaxis, sore throat, and hoarseness. Cardiovascular: Denies palpitations, chest pain, and chest pressure. Respiratory: Denies cough, dyspnea at rest, hemoptysis, apnea, and choking.   Gastrointestinal: Denies nausea, vomiting, poor appetite, diarrhea, heartburn or reflux  Genitourinary: Denies dysuria, frequency, urgency or hematuria  Musculoskeletal: Denies myalgias, muscle weakness, and bone pain  Neurological: Denies dizziness, vertigo, headache, and focal weakness  Psychological: Denies anxiety and depression  Endocrine: Denies heat intolerance and cold intolerance  Hematopoietic/Lymphatic: Denies bleeding problems and blood transfusions    24-hour events:  Became extremely hypoxic and unresponsive when trach tube popped off    Objective   Vitals: /68   Pulse 110   Temp 97.8 °F (36.6 °C) (Axillary)   Resp (!) 34   Ht 5' 6\" (1.676 m)   Wt 225 lb (102.1 kg)   SpO2 96%   BMI 36.32 kg/m²     I/O:    Intake/Output Summary (Last 24 hours) at 1/27/2021 1813  Last data filed at 1/27/2021 1414  Gross per 24 hour   Intake 1182 ml   Output 2300 ml   Net -1118 ml       Vent Information  $Ventilation: $Subsequent Day  Skin Assessment: mucosa, and tongue normal.  Supple, symmetrical, trachea midline, no adenopathy;thyroid:  no enlargement/tenderness/nodules or JVD. Trach intact  Lung: Breath sounds few scattered rhonchi. Respirations   unlabored. Symmetrical expansion. Heart: RRR, normal S1, S2. No MRG  Abdomen: Soft, NT, ND. BS present x 4 quadrants. No bruit or organomegaly. Extremities: Pedal pulses 2+ symmetric b/l. Extremities normal, no cyanosis, clubbing. Generalized edema  Musculokeletal: No joint swelling, no muscle tenderness. ROM normal in all joints of extremities. Neurologic: Mental status: Alert and Oriented, unable to assess full orientation    Pertinent/ New Labs and Imaging Studies     Imaging Personally Reviewed:  1/20  Worsening bilateral infiltrates which may represent asymmetric edema and/or   pneumonia. Interval placement of right-sided PICC line. ECHO  7/15/2020    Labs:  Lab Results   Component Value Date    WBC 14.1 01/27/2021    HGB 7.6 01/27/2021    HCT 26.8 01/27/2021    .9 01/27/2021    MCH 29.5 01/27/2021    MCHC 28.4 01/27/2021    RDW 16.1 01/27/2021     01/27/2021    MPV 10.2 01/27/2021     Lab Results   Component Value Date     01/27/2021    K 5.2 01/27/2021    K 3.3 12/15/2020    CL 89 01/27/2021    CO2 41 01/27/2021    BUN 23 01/27/2021    CREATININE 0.2 01/27/2021    LABALBU 3.0 01/03/2021    CALCIUM 7.9 01/27/2021    GFRAA >60 01/27/2021    LABGLOM >60 01/27/2021     Lab Results   Component Value Date    PROTIME 11.4 12/13/2020    PROTIME 11.9 12/15/2011    INR 1.0 12/13/2020     No results for input(s): PROBNP in the last 72 hours. No results for input(s): PROCAL in the last 72 hours. This SmartLink has not been configured with any valid records. Assessment:    1. Acute on chronic respiratory failure s/p trach and vent dependence   2. S/p trach and peg  3. Covid 19   4. Shock   5. afib rvr   6. Deconditioning   7.  Adrenal insufficiency   8. anemia      Plan:   · Vent support, trach to vent peep 12 , fio2 80%   · Trach care, BP hygiene  · bronchodilators  · Still on Midodrine  · Wean Decadron to 6 mg today  · Follow imaging  · GI/DVT - SCDs (eliquis held)    Electronically signed by Claudetta Proffer, DO on 1/27/2021 at 6:13 PM

## 2021-01-27 NOTE — CARE COORDINATION
CASE MANAGEMENT. Vira Libman Vira Libman Vira Libman Chart reviewed. H/O COVID + 12/16/2020 and 1/16/21. COVID NEG 1/22 and 1/25. Patient continues on trach/vent ac/vc for resp support. Fio2 has been decreased to 65%, peep remains at 12. Continues on iv decadron q12hrs and iv protonix bid. She is NPO with tube feeds per peg. [facility] facility can accept patient when ready and she will call and speak with patients son. Notified Enoch Clutter with [facility] to cancel referral. N 17 updated in epic. Forms also updated and in chart. PAS done. Will cont to follow along.

## 2021-01-27 NOTE — PLAN OF CARE
Problem: Skin Integrity:  Goal: Will show no infection signs and symptoms  Description: Will show no infection signs and symptoms  Outcome: Met This Shift  Goal: Absence of new skin breakdown  Description: Absence of new skin breakdown  Outcome: Met This Shift     Problem: Airway Clearance - Ineffective  Goal: Achieve or maintain patent airway  Outcome: Met This Shift     Problem: Gas Exchange - Impaired  Goal: Absence of hypoxia  Outcome: Met This Shift  Goal: Promote optimal lung function  Outcome: Met This Shift     Problem: Breathing Pattern - Ineffective  Goal: Ability to achieve and maintain a regular respiratory rate  Outcome: Met This Shift     Problem:  Body Temperature -  Risk of, Imbalanced  Goal: Ability to maintain a body temperature within defined limits  Outcome: Met This Shift  Goal: Will regain or maintain usual level of consciousness  Outcome: Met This Shift  Goal: Complications related to the disease process, condition or treatment will be avoided or minimized  Outcome: Met This Shift     Problem: Isolation Precautions - Risk of Spread of Infection  Goal: Prevent transmission of infection  Outcome: Met This Shift     Problem: Risk for Fluid Volume Deficit  Goal: Maintain normal heart rhythm  Outcome: Met This Shift

## 2021-01-28 NOTE — PROGRESS NOTES
Bridger Dillard M.D.,[facility]  Meng Bradshaw D.O., FPARRISOTrinidadI., Ernesto Prieto M.D. Ian Madsen M.D., Francisca Bird M.D. Denton Felty, D.O. Daily Pulmonary Progress Note    Patient:  Sylvie Torres 65 y.o. female MRN: [id number]     Date of Service: 1/28/2021        Subjective      Patient was seen and examined. Improved volumes on ventilator today. Remains on FiO2 75% and PEEP of 12 and tachypneic.     Objective   Vitals: BP (!) 94/56   Pulse 69   Temp 97.9 °F (36.6 °C) (Axillary)   Resp 28   Ht 5' 6\" (1.676 m)   Wt 225 lb (102.1 kg)   SpO2 93%   BMI 36.32 kg/m²     I/O:    Intake/Output Summary (Last 24 hours) at 1/28/2021 1526  Last data filed at 1/28/2021 1511  Gross per 24 hour   Intake --   Output 1525 ml   Net -1525 ml       CURRENT MEDS :  Scheduled Meds:   dexamethasone  6 mg Intravenous Q24H    apixaban  5 mg Oral BID    insulin glargine  25 Units Subcutaneous BID    sodium polystyrene  15 g Oral Once    levothyroxine  125 mcg Oral Daily    sucralfate  1 g Oral 4x Daily AC & HS    midodrine  20 mg Oral TID    sodium chloride  500 mL Intravenous Once    pantoprazole  40 mg Intravenous BID    And    sodium chloride (PF)  10 mL Intravenous BID    heparin flush  3 mL Intravenous 2 times per day    amiodarone  200 mg Oral Daily    digoxin  250 mcg Oral Daily    senna  10 mL Per G Tube Nightly    vitamin C  1,000 mg Oral Daily    Vitamin D  1,000 Units Oral Daily    nicotine  1 patch Transdermal Daily    thiamine  100 mg Oral Daily    zinc sulfate  50 mg Oral Daily    docusate  100 mg Oral Daily    insulin lispro  0-18 Units Subcutaneous Q6H    ipratropium-albuterol  1 ampule Inhalation Q4H WA    budesonide  500 mcg Nebulization BID    Arformoterol Tartrate  15 mcg Nebulization BID    clopidogrel  75 mg Oral Daily    chlorhexidine  15 mL Mouth/Throat BID    sodium chloride flush  10 mL Intravenous 2 times per day    gabapentin  800 mg Oral TID    atorvastatin  20 mg Oral Daily       Continuous Infusions:   sodium chloride      dextrose         PRN Meds:  oxyCODONE, sodium chloride, sodium chloride flush, heparin flush, LORazepam, glucose, dextrose, glucagon (rDNA), dextrose, acetaminophen, heparin flush, potassium chloride, [DISCONTINUED] promethazine **OR** ondansetron, polyethylene glycol      Physical Exam:  Physical Exam  Constitutional:       Appearance: She is ill-appearing. HENT:      Head: Normocephalic and atraumatic. Eyes:      Conjunctiva/sclera: Conjunctivae normal.   Neck:      Musculoskeletal: Neck supple. Trachea: No tracheal deviation. Comments: +trach  Cardiovascular:      Rate and Rhythm: Normal rate and regular rhythm. Heart sounds: Normal heart sounds. Pulmonary:      Effort: Tachypnea present. Breath sounds: Decreased breath sounds present. Abdominal:      General: Bowel sounds are normal.      Palpations: Abdomen is soft. Tenderness: There is no abdominal tenderness. Musculoskeletal:         General: Swelling present. Lymphadenopathy:      Cervical: No cervical adenopathy. Skin:     General: Skin is warm and dry. Findings: No rash. Neurological:      General: No focal deficit present. Mental Status: She is alert. She is disoriented. Pertinent/ New Labs and Imaging Studies     Pulmonary Function Testing personally reviewed and interpreted. PERTINENT LAB RESULTS: Labs reviewed. DIAGNOSTICS: Pertinent imaging reviewed. Assessment:      1. Acute on chronic respiratory failure s/p trach and vent dependence   2. S/p trach and peg  3. Covid 19   4. Shock   5. afib rvr - now nsr  6. Deconditioning   7.  Adrenal insufficiency   8. anemia      Plan:     · Vent support, trach to vent peep 12 , fio2 75%   · Trach care, BP hygiene  · bronchodilators  · Still on Midodrine  · Decadron down to 6 mg  · Follow imaging, CXR tomorrow  · GI/DVT - SCDs, Eliquis    Still not ready for discharge yet from pulmonary standpoint. Thank you for allowing me to participate in the care of Hallie Echeverria. Please feel free to call with questions.      Electronically signed by Argentina Cardenas DO on 1/28/2021 at 3:26 PM

## 2021-01-28 NOTE — CARE COORDINATION
CASE MANAGEMENT. ... Received call from patients son, Maria Fernanda Fam. He is interested in Dipak or Padmini. Referral called to [facility]. Dipak is out of network with patients insurance, but Padmini is in network. She will review. Await determination.

## 2021-01-28 NOTE — CARE COORDINATION
CASE MANAGEMENT. .. Chart reviewed. H/O COVID + 12/16/2020 and 1/16/21. COVID NEG 1/22 and 1/25. Patient continues on trach/vent ac/vc for resp support. Fio2 has been increased to 75%, peep remains at 12. IV decadron decreased to 6mg qd. IV protonix bid. She is NPO with tube feeds per peg. Called patients son, Sandie Garcia, to address any questions or concerns and to discuss discharge planning. No answer. Left . Spoke with Dayanara from [facility]. She states that she will be having a conversation with Sandie Garcia later today. Will cont to follow. N 17 updated in epic. Forms also updated and in chart. PAS done .

## 2021-01-28 NOTE — PROGRESS NOTES
Chief Complaint:  Chief Complaint   Patient presents with    Shortness of Breath     70% RA at NH 88% NRB , being tx for pneumonia hx of copd      Acute respiratory failure with hypoxia ([address]     Subjective:    Awake, makes eye contact, otherwise noninteractive    Objective:    /68   Pulse 80   Temp 98 °F (36.7 °C) (Axillary)   Resp (!) 38   Ht 5' 6\" (1.676 m)   Wt 225 lb (102.1 kg)   SpO2 93%   BMI 36.32 kg/m²     Current medications that patient is taking have been reviewed. General appearance: Ill appearing  HEENT: AT/NC, MMM  Neck: trach in place  Lungs: Clear to auscultation, WOB normal  CV: RRR, no MRGs  Abdomen: Soft, non-tender; no masses or HSM, +BS  Extremities: B/L leg edema, and significant RUE edema with PICC in place  Skin: no rash, lesions or ulcers  Psych: Calm and cooperative  Neuro: Awake but doesn't really interact, just looks at me.     Labs:  CBC with Differential:    Lab Results   Component Value Date    WBC 17.7 01/28/2021    RBC 2.88 01/28/2021    HGB 8.6 01/28/2021    HCT 29.4 01/28/2021     01/28/2021    .1 01/28/2021    MCH 29.9 01/28/2021    MCHC 29.3 01/28/2021    RDW 16.3 01/28/2021    NRBC 0.9 01/14/2021    SEGSPCT 89 03/10/2014    BANDSPCT 1 05/19/2016    METASPCT 1.0 01/11/2021    LYMPHOPCT 4.7 01/28/2021    PROMYELOPCT 1.8 12/19/2020    MONOPCT 2.8 01/28/2021    MYELOPCT 0.9 01/14/2021    BASOPCT 0.1 01/28/2021    MONOSABS 0.50 01/28/2021    LYMPHSABS 0.84 01/28/2021    EOSABS 0.06 01/28/2021    BASOSABS 0.02 01/28/2021     CMP:    Lab Results   Component Value Date     01/28/2021    K 4.5 01/28/2021    K 3.3 12/15/2020    CL 86 01/28/2021    CO2 42 01/28/2021    BUN 19 01/28/2021    CREATININE 0.2 01/28/2021    GFRAA >60 01/28/2021    LABGLOM >60 01/28/2021    GLUCOSE 117 01/28/2021    GLUCOSE 106 12/15/2011    PROT 5.0 01/03/2021    LABALBU 3.0 01/03/2021    CALCIUM 8.2 01/28/2021    BILITOT 0.7 01/03/2021    ALKPHOS 130 01/03/2021    AST 21 01/03/2021    ALT 14 01/03/2021          Assessment/Plan:  Principal Problem:    Acute respiratory failure with hypoxia (HCC)  Active Problems:    COPD (chronic obstructive pulmonary disease) (HCC)    History of DVT (deep vein thrombosis)    Coronary artery disease involving native coronary artery without angina pectoris    Acute respiratory failure due to COVID-19 [facility])    Septic shock ([facility])    Atrial fibrillation with RVR ([facility])    Pneumonia due to COVID-19 virus    Hyperkalemia    Arm DVT (deep venous thromboembolism), acute, right ([address]  Resolved Problems:    * No resolved hospital problems. *       Wean vent as tolerated    Completed steroids. Continue digoxin and amiodarone    Resume Eliquis. Hemoglobin stable. PICC associated DVT noted. Arm looks really swollen today. Will consult Vascular, I'm not sure if better to treat through or pull PICC. Continue synthroid, dose recently adjusted. TSH should be rechecked in a few weeks. Continue bronchodilators    Glucose improving. Continue current regimen    Continue Plavix    Hyperkalemia resolved.     Requires continued inpatient level of care   Humble Anderson    7:59 AM  1/28/2021  Cell: [phone]

## 2021-01-28 NOTE — PROGRESS NOTES
Spoke with Dr. Thang Mckinney regarding new consult.  Will update Dr. Clare Collins per his request.

## 2021-01-29 NOTE — CARE COORDINATION
CASE MANAGEMENT. .. Chart reviewed. H/O COVID + 12/16/2020 and 1/16/21. COVID NEG 1/22 and 1/25. Patient continues on trach/vent ac/vc for resp support. Fio2 continues at 75%, peep remains at 12. IV decadron still daily. IV protonix bid. She is NPO with tube feeds per peg. [facility] following-still waiting to speak with family. In the meantime, patient information faxed to Karen at [facility]. N 16 in epic for [facility]. Forms in chart. PAS done . OF NOTE. ... Dr Renee Ambrose willing to speak with family regarding prognosis. Will attempt to arrange an in person meeting today. Called and spoke with son, Ellie Spear [phone]. He will speak with his brother, Andreia Bishop and call back. IN ADDENDUM. .. Dr Renee Ambrose and sons will meet around 3:30 today.

## 2021-01-29 NOTE — PROGRESS NOTES
Pertinent History:CHF, COPD, HTN  PRECAUTIONS: falls, trach to vent, PEG, droplet plus isolation    Social:  Per chart review pt admitted from SNF. Initial Evaluation  Date: 1/22/21 Treatment  1/29/21    Short Term/ Long Term   Goals   Was pt agreeable to Eval/treatment? yes     Does pt have pain? Grimaces in pain with ROM Facial grimaces with movement    Bed Mobility  Rolling: dependent  Supine <> long sit dependent of 2  Scooting: dependent of 2 Rolling: Dep A  Supine to sit: Dep A  Sit to supine: Dep A  Scooting: Dep A seated to EOB and supine up in bed Max assist   Transfers Sit to stand: NT  Stand to sit: NT  Stand pivot: NT NT Max assist of 2   Ambulation    NT NT N/A   Stair Negotiation  Ascended and descended  NT NA  N/A   LE strength     L LE 3-/5 R LE 2-/5    By 1 MMT grade   balance      NT     AM-PAC Raw score               6/24 6/24          Pt is alert, appeared lethargic throughout, unable to attempt conversation or follow commands  Balance: very poor sitting EOB    Patient education  Pt was educated on UE usage to promoting sitting balance    Patient response to education:   Pt verbalized understanding Pt demonstrated skill Pt requires further education in this area   no no yes     ASSESSMENT:   Comments: Nurse ok with rx. Pt found in bed, on vent with trach. Pt assisted to EOB, sat EOB 5 minutes with Dep A for balance, trunk rocking performed. Pt with no attempt to help with sitting EOB using UEs for support. Pt assisted to supine, positioned on L side as found with B UEs and LEs elevated. Pt was left in bed as found with call light in reach. Chair/bed alarm: bed alarm active    Time in 1356   Time out 1410   Total Treatment Time 14 minutes   CPT codes:     Therapeutic activities 93307 14 minutes   Therapeutic exercises 71080 0 minutes       Pt is showing no progress toward established Physical Therapy goals due to inability to participate with bed level activity.   Continue with physical therapy current plan of care.     Kimber Beckwith PTA   License Number: [id number]

## 2021-01-29 NOTE — PROGRESS NOTES
Morteza Krishnamurthy M.D.,[facility]  Murleen Severs, D.O., F.A.C.O.I., Celeste Burch M.D. Bo Mistry M.D., Susi Mccoy M.D. Tali Staton D.O. Daily Pulmonary Progress Note    Patient:  Fermin Torres 65 y.o. female MRN: [id number]     Date of Service: 1/29/2021        Subjective      Patient was seen and examined. Improved volumes on ventilator today. Remains on FiO2 75% and PEEP of 12 and tachypneic.     Objective   Vitals: BP (!) 109/58   Pulse 70   Temp 97.4 °F (36.3 °C) (Axillary)   Resp 22   Ht 5' 6\" (1.676 m)   Wt 242 lb 11.2 oz (110.1 kg)   SpO2 99%   BMI 39.17 kg/m²     I/O:    Intake/Output Summary (Last 24 hours) at 1/29/2021 1625  Last data filed at 1/29/2021 0559  Gross per 24 hour   Intake 2068 ml   Output 550 ml   Net 1518 ml       CURRENT MEDS :  Scheduled Meds:   insulin glargine  20 Units Subcutaneous BID    dexamethasone  6 mg Intravenous Q24H    apixaban  5 mg Oral BID    sodium polystyrene  15 g Oral Once    levothyroxine  125 mcg Oral Daily    sucralfate  1 g Oral 4x Daily AC & HS    midodrine  20 mg Oral TID    sodium chloride  500 mL Intravenous Once    pantoprazole  40 mg Intravenous BID    And    sodium chloride (PF)  10 mL Intravenous BID    heparin flush  3 mL Intravenous 2 times per day    amiodarone  200 mg Oral Daily    digoxin  250 mcg Oral Daily    senna  10 mL Per G Tube Nightly    vitamin C  1,000 mg Oral Daily    Vitamin D  1,000 Units Oral Daily    nicotine  1 patch Transdermal Daily    thiamine  100 mg Oral Daily    zinc sulfate  50 mg Oral Daily    docusate  100 mg Oral Daily    insulin lispro  0-18 Units Subcutaneous Q6H    ipratropium-albuterol  1 ampule Inhalation Q4H WA    budesonide  500 mcg Nebulization BID    Arformoterol Tartrate  15 mcg Nebulization BID    clopidogrel  75 mg Oral Daily    chlorhexidine  15 mL Mouth/Throat BID    sodium chloride flush  10 mL Intravenous 2 times per day    improvement  · GI/DVT - SCDs, Eliquis    Prognosis still remains poor and difficult wean. There has been minimal improvement. I reviewed this with multiple family members at bedside including sons Gabbie Restrepo and Chilo Wooten. They wanted to make sure she was getting enough vitamin D and also inquired about giving her glutathione. Vit D increased to 2000 mg. Thank you for allowing me to participate in the care of Hallie Echeverria. Please feel free to call with questions.      Electronically signed by Vernon Rankin DO on 1/29/2021 at 4:25 PM

## 2021-01-29 NOTE — PROGRESS NOTES
Assessment/Plan:  Principal Problem:    Acute respiratory failure with hypoxia (HCC)  Active Problems:    COPD (chronic obstructive pulmonary disease) (HCC)    History of DVT (deep vein thrombosis)    Coronary artery disease involving native coronary artery without angina pectoris    Acute respiratory failure due to COVID-19 [facility])    Septic shock ([facility])    Atrial fibrillation with RVR ([facility])    Pneumonia due to COVID-19 virus    Hyperkalemia    Arm DVT (deep venous thromboembolism), acute, right ([address]  Resolved Problems:    * No resolved hospital problems. *       Wean vent as tolerated    Completed steroids. Continue digoxin and amiodarone    PICC associated DVT noted. D/w Vascular. PICC has been pulled. Continue Eliquis. Continue synthroid, dose recently adjusted. TSH should be rechecked in a few weeks. Persistent mild hyponatremia and hypochloremia. Will ask Nutrition to adjust TF's. Continue bronchodilators    Glucose a little low. Reduce glargine dose. Continue Plavix    Hyperkalemia resolved.     Requires continued inpatient level of care   Alexander Villagomez    3:50 PM  1/29/2021  Cell: [phone]

## 2021-01-29 NOTE — PROGRESS NOTES
ranged 87- 90%  Poor Good with ADL activity    Visual/  Perceptual Glasses: yes                           Comments: Upon arrival pt was supine in bed. At end of session pt was transferred to bed with alarm on, all lines and tubes intact and call light within reach. Treatment: Pt able to be seen for activity as tolerated per RN. Pt unable to utilize B UE to assist with correcting seated balance. Gentle anterior weight shifting was performed to decrease tone in trunk to assist in correcting posture. Education: Daily orientation, techniques to correct sitting balance and benefits of EOB activity    · Pt has made limited progress towards set goals. Plan of Care: 1-3 days/week for 1-2 weeks PRN   [x]? ?ADL retraining/adapted techniques and AE recommendations to increase functional independence within precautions                    [x]? ? Energy conservation techniques to improve tolerance for selfcare routine   [x]? ? Functional transfer/mobility training/DME recommendations for increased independence, safety and fall prevention         [x]? ?Patient/family education to increase safety and functional independence             [x]? ? Environmental modifications for safe mobility and completion of ADLs                             []? ? Cognitive retraining ex's to improve problem solving skills & safe participation in ADLs/IADLs     []? ?Sensory re-education techniques to improve extremity awareness, maintain skin integrity and improve hand function                             []? ? Visual/Perceptual retraining  to improve body awareness and safety during transfers and ADLs  []? ? Splinting/positioning needs to maintain joint/skin integrity and prevent contractures  [x]? ? Therapeutic activity to improve functional performance during ADLs.                                         [x]? ? Therapeutic exercise to improve tolerance and functional strength for ADLs   [x]? ? Balance retraining/tolerance tasks for facilitation of postural

## 2021-01-29 NOTE — CONSULTS
1/29/2021  1:58 PM       Nutrition Consult: TF Ordering and Management (Hyponatremia)    Pt continues to have hyponatremia on current TF. Since pt has +4 edema and +fluid balance, most likely relative to fluid status vs. Na intake. However, will order Renal TF (Nepro) which is low Na, low K+ and more calorie dense. Estimated Daily Nutrient Needs:  Energy (kcal):  ; Weight Used for Energy Requirements:  Admission     Protein (g):  75-90 (1.3-1.5 g/kg);  Weight Used for Protein Requirements:  Ideal        Fluid (ml/day):  ; Method Used for Fluid Requirements:  1 ml/kcal        NEW TUBE FEEDING ORDER:  Renal TF (Nepro) to goal 40 ml/hr  This will provide: 960 ml/d, 1728 nguyen, 78 g pro, 696 ml free water    Electronically signed by Hanna Jacob RD, CNSC, LD on 1/29/21 at 1:58 PM EST    Contact: [phone]

## 2021-01-30 NOTE — PROGRESS NOTES
Terrie Sher M.D.,[facility]  Gerardo Schafer D.O., FPARRISOTrinidadI., Jose Restrepo M.D. Stephanie Mccann M.D., Johnathon Lobato M.D. Brittany Preston D.O. Daily Pulmonary Progress Note    Patient:  Teddy Torres 65 y.o. female MRN: [id number]     Date of Service: 1/30/2021        Subjective      Patient was seen and examined. No significant change. Remains on FiO2 70% and PEEP of 12 and tachypneic.     Objective   Vitals: BP (!) 110/52   Pulse 81   Temp 97.2 °F (36.2 °C) (Axillary)   Resp 22   Ht 5' 6\" (1.676 m)   Wt 246 lb 4.1 oz (111.7 kg)   SpO2 99%   BMI 39.75 kg/m²     I/O:    Intake/Output Summary (Last 24 hours) at 1/30/2021 1528  Last data filed at 1/30/2021 1505  Gross per 24 hour   Intake 590 ml   Output 2350 ml   Net -1760 ml       CURRENT MEDS :  Scheduled Meds:   insulin glargine  20 Units Subcutaneous BID    Vitamin D  2,000 Units Oral Daily    dexamethasone  6 mg Intravenous Q24H    apixaban  5 mg Oral BID    sodium polystyrene  15 g Oral Once    levothyroxine  125 mcg Oral Daily    sucralfate  1 g Oral 4x Daily AC & HS    midodrine  20 mg Oral TID    sodium chloride  500 mL Intravenous Once    pantoprazole  40 mg Intravenous BID    And    sodium chloride (PF)  10 mL Intravenous BID    heparin flush  3 mL Intravenous 2 times per day    amiodarone  200 mg Oral Daily    digoxin  250 mcg Oral Daily    senna  10 mL Per G Tube Nightly    vitamin C  1,000 mg Oral Daily    nicotine  1 patch Transdermal Daily    thiamine  100 mg Oral Daily    zinc sulfate  50 mg Oral Daily    docusate  100 mg Oral Daily    insulin lispro  0-18 Units Subcutaneous Q6H    ipratropium-albuterol  1 ampule Inhalation Q4H WA    budesonide  500 mcg Nebulization BID    Arformoterol Tartrate  15 mcg Nebulization BID    clopidogrel  75 mg Oral Daily    chlorhexidine  15 mL Mouth/Throat BID    sodium chloride flush  10 mL Intravenous 2 times per day    gabapentin  800 mg Oral TID    atorvastatin  20 mg Oral Daily       Continuous Infusions:   sodium chloride      dextrose         PRN Meds:  oxyCODONE, sodium chloride, sodium chloride flush, heparin flush, LORazepam, glucose, dextrose, glucagon (rDNA), dextrose, acetaminophen, heparin flush, potassium chloride, [DISCONTINUED] promethazine **OR** ondansetron, polyethylene glycol      Physical Exam:  Physical Exam  Constitutional:       Appearance: She is ill-appearing. HENT:      Head: Normocephalic and atraumatic. Eyes:      Conjunctiva/sclera: Conjunctivae normal.   Neck:      Musculoskeletal: Neck supple. Trachea: No tracheal deviation. Comments: +trach  Cardiovascular:      Rate and Rhythm: Normal rate and regular rhythm. Heart sounds: Normal heart sounds. Pulmonary:      Effort: Tachypnea present. Breath sounds: Decreased breath sounds present. Abdominal:      General: Bowel sounds are normal.      Palpations: Abdomen is soft. Tenderness: There is no abdominal tenderness. Musculoskeletal:         General: Swelling present. Lymphadenopathy:      Cervical: No cervical adenopathy. Skin:     General: Skin is warm and dry. Findings: No rash. Neurological:      General: No focal deficit present. Mental Status: She is alert. She is disoriented. Pertinent/ New Labs and Imaging Studies     Pulmonary Function Testing personally reviewed and interpreted. PERTINENT LAB RESULTS: Labs reviewed. DIAGNOSTICS: Pertinent imaging reviewed. Assessment:      1. Acute on chronic respiratory failure s/p trach and vent dependence   2. S/p trach and peg  3. Covid 19   4. Shock   5. afib rvr - now nsr  6. Deconditioning   7.  Adrenal insufficiency   8. anemia      Plan:     · Vent support, trach to vent peep 12 , fio2 75%   · Trach care, BP hygiene  · bronchodilators  · Still on Midodrine  · Decadron down to 6 mg  · Follow imaging - no significant improvement  · GI/DVT - SCDs, Eliquis    Prognosis still remains poor and difficult wean. There has been minimal improvement. I reviewed this with multiple family members at bedside including sons Ellie Reza and Kaye Savi. We will reconsult palliative care for goals of care. Thank you for allowing me to participate in the care of Hallie Echeverria. Please feel free to call with questions.      Electronically signed by Pablo Reyes DO on 1/30/2021 at 3:28 PM

## 2021-01-30 NOTE — CONSULTS
Vascular Surgery Inpatient Consultation Note      Reason for Consultation:  RUE DVT. HISTORY OF PRESENT ILLNESS:                The patient is a 65 y.o. female who is admitted to the hospital for treatment of respiratory failure. She is known to me from treatment of carotid and PVD. She is severely ill. She had a PICC in the RUE and developed swelling. US + brachial DVT. Catheter was removed after discussion with Dr. Radha Kulkarni. Vascular surgery is consulted for evaluation and treatment. IMPRESSION:  Catheter related DVT RUE. RECOMMENDATIONS:  Anticoagulation continued for treatment of upper and lower extremity DVT and afib. No additional recommendations from vascular.     Patient Active Problem List   Diagnosis Code    History of hypertension Z86.79    HLD (hyperlipidemia) E78.5    Tobacco abuse Z72.0    COPD (chronic obstructive pulmonary disease) ([facility] J44.9    History of DVT (deep vein thrombosis) Z86.718    Seizure ([facility]) R56.9    SSS (sick sinus syndrome) [facility] I49.5    Pacemaker Z95.0    Hypothyroid E03.9    [facility]per[facility] I73.9    Coronary artery disease involving native coronary artery without angina pectoris I25.10    Left carotid artery occlusion I65.22    Osteoporosis M81.0    Bilateral carotid artery stenosis I65.23    Atherosclerosis of native artery of both lower extremities with intermittent claudication ([facility]) I70.213    Osteoarthritis M19.90    Post-traumatic osteoarthritis of left hip M16.52    Trauma T14.90XA    Multiple closed fractures of ribs of left side S22.42XA    Chest wall pain R07.89    Nodule of spleen D73.89    CAD in native artery I25.10    Shoulder dislocation, left, subsequent encounter S43.005D    Neuropathy G62.9    Lacunar infarction ([facility]) I63.81    CHF (congestive heart failure) ([facility]) I50.9    Pneumonia J18.9    Acute respiratory failure with hypoxia ([facility]) J96.01    Acute respiratory failure due to COVID-19 ([facility] U07.1, J96.00    Septic shock ([facility]) A41.9, R65.21    Atrial fibrillation with RVR ([facility]) I48.91    Pneumonia due to COVID-19 virus U07.1, J12.82    Hyperkalemia E87.5    Arm DVT (deep venous thromboembolism), acute, right ([facility]) I82.621       Past Medical History:   Diagnosis Date    Arthritis     asthmatic bronchitis    CAD (coronary artery disease)     Carotid stenosis     CHF (congestive heart failure) ([facility])     Closed fracture of pelvis with delayed healing 4/3/2017    Closed fracture of twelfth thoracic vertebra ([address] 4/3/2017    T 5,6,12    COPD (chronic obstructive pulmonary disease) ([facility])     DVT of leg (deep venous thrombosis) ([address] 2010    left    Hx of blood clots     Hyperlipidemia     Hypertension     Mitral regurgitation     Trace    Prolonged emergence from general anesthesia     PVD (peripheral vascular disease) with claudication ([address] 11/18/2014    Retinal ischemia     Stenosis of intracranial portions of left internal carotid artery 11/18/2014    Thyroid disease         Past Surgical History:   Procedure Laterality Date    APPENDECTOMY      BACK SURGERY  10/26/15    L3 kypho    CARDIAC CATHETERIZATION  08/10/2020    Dr Nelli Chong    CAROTID-SUBCLAVIAN BYPASS GRAFT Left 5/19/2016    CHOLECYSTECTOMY      CORONARY ANGIOPLASTY WITH STENT PLACEMENT  08/10/2020    Dr Nelli Chong Prox LAD rotablator 1.5/1.75  [employer] [place] 3.0x15  3.0x15    ECHOCARDIOGRAM LIMITED  12/3/2014         EYE SURGERY      FIXATION KYPHOPLASTY      FRACTURE SURGERY Right     knee    GASTROSTOMY TUBE PLACEMENT N/A 12/29/2020    EGD,  PEG TUBE PLACEMENT performed by Masha Galvan DO at [address] Bilateral     hips    OTHER SURGICAL HISTORY  12/2/14    reposition pacer lead and katty    OTHER SURGICAL HISTORY  5/5/2015    kyphoplasty T8    OTHER SURGICAL HISTORY  2/16/2016    lumbar myelogram    PACEMAKER INSERTION Left 11-    Dr. Rachel Hester  PICC LINE INSERTION NURSE  12/21/2020         PICC LINE INSERTION NURSE  1/17/2021         [place] TOTAL HIP ARTHROPLASTY Left 3/13/2018    LEFT HIP TOTAL JOINT  ARTHROPLASTY AND REMOVAL HARDWARE LEFT ACETABULUM  ---Alex Dennis--- performed by Clifton Gómez MD at [address] [place] [address] N/A 12/29/2020    PERCUTANEOUS ENDOSCOPIC CONVERTED TO OPEN TRACHEOSTOMY performed by Niki Jha DO at [facility] OR       Current Medications:    sodium chloride      dextrose        oxyCODONE, sodium chloride, sodium chloride flush, heparin flush, LORazepam, glucose, dextrose, glucagon (rDNA), dextrose, acetaminophen, heparin flush, potassium chloride, [DISCONTINUED] promethazine **OR** ondansetron, polyethylene glycol    insulin glargine  20 Units Subcutaneous BID    Vitamin D  2,000 Units Oral Daily    dexamethasone  6 mg Intravenous Q24H    apixaban  5 mg Oral BID    sodium polystyrene  15 g Oral Once    levothyroxine  125 mcg Oral Daily    sucralfate  1 g Oral 4x Daily AC & HS    midodrine  20 mg Oral TID    sodium chloride  500 mL Intravenous Once    pantoprazole  40 mg Intravenous BID    And    sodium chloride (PF)  10 mL Intravenous BID    heparin flush  3 mL Intravenous 2 times per day    amiodarone  200 mg Oral Daily    digoxin  250 mcg Oral Daily    senna  10 mL Per G Tube Nightly    vitamin C  1,000 mg Oral Daily    nicotine  1 patch Transdermal Daily    thiamine  100 mg Oral Daily    zinc sulfate  50 mg Oral Daily    docusate  100 mg Oral Daily    insulin lispro  0-18 Units Subcutaneous Q6H    ipratropium-albuterol  1 ampule Inhalation Q4H WA    budesonide  500 mcg Nebulization BID    Arformoterol Tartrate  15 mcg Nebulization BID    clopidogrel  75 mg Oral Daily    chlorhexidine  15 mL Mouth/Throat BID    sodium chloride flush  10 mL Intravenous 2 times per day    gabapentin  800 mg Oral TID    atorvastatin  20 mg Oral Daily        Allergies:  Anesthetics, lulu and Penicillins    Social History     Socioeconomic History    Marital status:      Spouse name: Not on file    Number of children: Not on file    Years of education: Not on file    Highest education level: Not on file   Occupational History    Occupation: retired- [employer],     Social Needs    Financial resource strain: Not on file    Food insecurity     Worry: Not on file     Inability: Not on file   [employer] needs     Medical: Not on file     Non-medical: Not on file   Tobacco Use    Smoking status: Current Every Day Smoker     Packs/day: 0.25     Years: 10.00     Pack years: 2.50     Types: Cigarettes     Last attempt to quit: 6/15/2017     Years since quitting: 3.6    Smokeless tobacco: Never Used   Substance and Sexual Activity    Alcohol use: Yes     Comment: socially    Drug use: No    Sexual activity: Not Currently     Partners: Male   Lifestyle    Physical activity     Days per week: Not on file     Minutes per session: Not on file    Stress: Not on file   Relationships    Social connections     Talks on phone: Not on file     Gets together: Not on file     Attends Shinto service: Not on file     Active member of club or organization: Not on file     Attends meetings of clubs or organizations: Not on file     Relationship status: Not on file    Intimate partner violence     Fear of current or ex partner: Not on file     Emotionally abused: Not on file     Physically abused: Not on file     Forced sexual activity: Not on file   Other Topics Concern    Not on file   Social History Narrative    Not on file        Family History   Problem Relation Age of Onset    Heart Disease Mother     Heart Disease Father        REVIEW OF SYSTEMS:  Pt unable to contribute.     Eyes:      Blurred vision:  No [x]/Yes []               Diplopia:   No [x]/Yes []               Vision loss:       No [x]/Yes []   Ears, nose, throat:             Hearing loss:    No [x]/Yes []      Vertigo:   No [x]/Yes [] RUE mild swelling  Skin: warm and dry, no rash or erythema    PULSE EXAM      Right      Left   Brachial     Radial     Femoral     Popliteal     Dorsalis Pedis     Posterior Tibial     (3=normal, 2=diminished, 1=barely palpable, 4=widened)      LABS:    Lab Results   Component Value Date    WBC 16.2 (H) 01/30/2021    HGB 7.7 (L) 01/30/2021    HCT 26.1 (L) 01/30/2021     01/30/2021    PROTIME 11.4 12/13/2020    INR 1.0 12/13/2020    K 4.5 01/30/2021    BUN 17 01/30/2021    CREATININE 0.2 (L) 01/30/2021       RADIOLOGY:    US reviewed

## 2021-01-31 NOTE — PROGRESS NOTES
Bridger Dillard M.D.,[facility]  Meng Bradshaw D.O., MIKEOELIZABET., Justin Glez M.D. Ian Madsen M.D., Francisca Bird M.D. Denton Felty, D.O. Daily Pulmonary Progress Note    Patient:  Sylvie Torres 65 y.o. female MRN: [id number]     Date of Service: 1/31/2021        Subjective      Patient was seen and examined. No significant change. Remains on FiO2 70% and PEEP of 12 and tachypneic.     Objective   Vitals: BP (!) 106/50   Pulse 70   Temp 97.9 °F (36.6 °C) (Axillary)   Resp (!) 38   Ht 5' 6\" (1.676 m)   Wt 240 lb 11.2 oz (109.2 kg)   SpO2 (!) 89%   BMI 38.85 kg/m²     I/O:    Intake/Output Summary (Last 24 hours) at 1/31/2021 1107  Last data filed at 1/31/2021 0640  Gross per 24 hour   Intake 775 ml   Output 1550 ml   Net -775 ml       CURRENT MEDS :  Scheduled Meds:   Vitamin D  2,000 Units Oral Daily    dexamethasone  6 mg Intravenous Q24H    apixaban  5 mg Oral BID    sodium polystyrene  15 g Oral Once    levothyroxine  125 mcg Oral Daily    sucralfate  1 g Oral 4x Daily AC & HS    midodrine  20 mg Oral TID    sodium chloride  500 mL Intravenous Once    pantoprazole  40 mg Intravenous BID    And    sodium chloride (PF)  10 mL Intravenous BID    amiodarone  200 mg Oral Daily    digoxin  250 mcg Oral Daily    senna  10 mL Per G Tube Nightly    vitamin C  1,000 mg Oral Daily    nicotine  1 patch Transdermal Daily    thiamine  100 mg Oral Daily    zinc sulfate  50 mg Oral Daily    docusate  100 mg Oral Daily    insulin lispro  0-18 Units Subcutaneous Q6H    ipratropium-albuterol  1 ampule Inhalation Q4H WA    budesonide  500 mcg Nebulization BID    Arformoterol Tartrate  15 mcg Nebulization BID    clopidogrel  75 mg Oral Daily    chlorhexidine  15 mL Mouth/Throat BID    sodium chloride flush  10 mL Intravenous 2 times per day    gabapentin  800 mg Oral TID    atorvastatin  20 mg Oral Daily       Continuous Infusions:   sodium chloride      dextrose         PRN Meds:  oxyCODONE, sodium chloride, sodium chloride flush, LORazepam, glucose, dextrose, glucagon (rDNA), dextrose, acetaminophen, heparin flush, potassium chloride, [DISCONTINUED] promethazine **OR** ondansetron, polyethylene glycol      Physical Exam:  Physical Exam  Constitutional:       Appearance: She is ill-appearing. HENT:      Head: Normocephalic and atraumatic. Eyes:      Conjunctiva/sclera: Conjunctivae normal.   Neck:      Musculoskeletal: Neck supple. Trachea: No tracheal deviation. Comments: +trach  Cardiovascular:      Rate and Rhythm: Normal rate and regular rhythm. Heart sounds: Normal heart sounds. Pulmonary:      Effort: Tachypnea present. Breath sounds: Decreased breath sounds present. Abdominal:      General: Bowel sounds are normal.      Palpations: Abdomen is soft. Tenderness: There is no abdominal tenderness. Musculoskeletal:         General: Swelling present. Lymphadenopathy:      Cervical: No cervical adenopathy. Skin:     General: Skin is warm and dry. Findings: No rash. Neurological:      General: No focal deficit present. Mental Status: She is alert. She is disoriented. Pertinent/ New Labs and Imaging Studies     Pulmonary Function Testing personally reviewed and interpreted. PERTINENT LAB RESULTS: Labs reviewed. DIAGNOSTICS: Pertinent imaging reviewed. Assessment:      1. Acute on chronic respiratory failure s/p trach and vent dependence   2. S/p trach and peg  3. Covid 19   4. Shock   5. afib rvr - now nsr  6. Deconditioning   7. Adrenal insufficiency   8. anemia      Plan:     · Vent support, trach to vent peep 12 , fio2 75%   · Trach care, BP hygiene  · bronchodilators  · Still on Midodrine  · Decrease Decadron down to 4 mg  · Follow imaging - no significant improvement  · GI/DVT - SCDs, Eliquis  · Check ABG today, CO2 on metabolic panel increasing.   Based on ABG may need Diamox. Prognosis still remains poor and difficult wean. There has been minimal improvement. I reviewed this with multiple family members at bedside including sons Charlinereginald Lima and Miller German. We will reconsult palliative care for goals of care. Thank you for allowing me to participate in the care of Hallie Echeverria. Please feel free to call with questions.      Electronically signed by Lucille Gandhi DO on 1/31/2021 at 11:07 AM

## 2021-01-31 NOTE — PROGRESS NOTES
Subjective:    Patient seen and examined at bedside, somnolent though able to shake/nod head. Objective:    BP (!) 158/68   Pulse 71   Temp 99.2 °F (37.3 °C) (Axillary)   Resp (!) 38   Ht 5' 6\" (1.676 m)   Wt 240 lb 11.2 oz (109.2 kg)   SpO2 92%   BMI 38.85 kg/m²     Current medications that patient is taking have been reviewed. GEN: NAD on vent through trach, opens eyes to verbal stimuli  Heart:  RRR, no murmurs, gallops, or rubs.   Lungs:  CTA bilaterally, no wheeze, rales or rhonchi  Abd: bowel sounds present, soft, nontender, nondistended, no masses  Extrem:  No cyanosis or edema    CBC:   Lab Results   Component Value Date    WBC 17.7 01/31/2021    RBC 2.65 01/31/2021    HGB 8.1 01/31/2021    HCT 27.0 01/31/2021    .9 01/31/2021    MCH 30.6 01/31/2021    MCHC 30.0 01/31/2021    RDW 16.7 01/31/2021     01/31/2021    MPV 10.0 01/31/2021     BMP:    Lab Results   Component Value Date     01/31/2021    K 4.6 01/31/2021    K 3.3 12/15/2020    CL 85 01/31/2021    CO2 46 01/31/2021    BUN 18 01/31/2021    LABALBU 3.0 01/03/2021    CREATININE 0.2 01/31/2021    CALCIUM 9.6 01/31/2021    GFRAA >60 01/31/2021    LABGLOM >60 01/31/2021    GLUCOSE 119 01/31/2021    GLUCOSE 106 12/15/2011        Assessment:    Patient Active Problem List   Diagnosis    History of hypertension    HLD (hyperlipidemia)    Tobacco abuse    [facility]ch[facility]) [facility]    History of DVT (deep vein thrombosis)    Seizure ([facility]    [facility]sick [facility] syndrome) [facility]    Pacemaker    Hypothyroid    PVD (peripheral vascular disease) ([facility]    Coronary artery disease involving native coronary artery without angina pectoris    Left carotid artery occlusion    Osteoporosis    Bilateral carotid artery stenosis    Atherosclerosis of native artery of both lower extremities with intermittent claudication ([facility])    Osteoarthritis    Post-traumatic osteoarthritis of left hip    Trauma    Multiple closed fractures of ribs of left side    Chest wall pain    Nodule of spleen    CAD in native artery    Shoulder dislocation, left, subsequent encounter    Neuropathy    Lacunar infarction (HCC)    CHF (congestive heart failure) (HCC)    Pneumonia    Acute respiratory failure with hypoxia (HCC)    [facility] respiratory failure due to COVID-19 [facility] [address]    Septic shock (HCC)    Atrial fibrillation with RVR (HCC)    Pneumonia due to COVID-19 virus    Hyperkalemia    Arm DVT (deep venous thromboembolism), acute, right ([address]       Plan:    1. Acute on chronic respiratory failure status post tracheostomy, COVID 19              Wean vent as tolerated              Pulmonology consulted              Wean steroids  2. Right upper extremity DVT              Seen by vascular-continue Eliquis  3. Hypothyroidism-continue Synthroid  4. DM2-hold lantus (20u BID), continue sliding scale  5. Macrocytic anemia-stable  6.   DVT prophylaxis-Eliquis    Please call my cell phone between 8pm-6am [phone]    6:18 PM  1/31/2021

## 2021-01-31 NOTE — PROGRESS NOTES
Subjective:    Patient seen and examined at bedside minimally responsive to painful stimuli, remains on the ventilator. Trach     Objective:    /62   Pulse 76   Temp 99 °F (37.2 °C) (Axillary)   Resp 20   Ht 5' 6\" (1.676 m)   Wt 246 lb 4.1 oz (111.7 kg)   SpO2 99%   BMI 39.75 kg/m²     Current medications that patient is taking have been reviewed. Heart:  RRR, no murmurs, gallops, or rubs.   Lungs:  Trach+, decreased air entry bilaterally   abd: PEG  Extrem:  No cyanosis or edema    CBC:   Lab Results   Component Value Date    WBC 16.2 01/30/2021    RBC 2.56 01/30/2021    HGB 7.7 01/30/2021    HCT 26.1 01/30/2021    .0 01/30/2021    MCH 30.1 01/30/2021    MCHC 29.5 01/30/2021    RDW 16.1 01/30/2021     01/30/2021    MPV 9.8 01/30/2021     BMP:    Lab Results   Component Value Date     01/30/2021    K 4.5 01/30/2021    K 3.3 12/15/2020    CL 87 01/30/2021    CO2 45 01/30/2021    BUN 17 01/30/2021    LABALBU 3.0 01/03/2021    CREATININE 0.2 01/30/2021    CALCIUM 8.9 01/30/2021    GFRAA >60 01/30/2021    LABGLOM >60 01/30/2021    GLUCOSE 39 01/30/2021    GLUCOSE 106 12/15/2011        Assessment:    Patient Active Problem List   Diagnosis    History of hypertension    HLD (hyperlipidemia)    Tobacco abuse    COPD (chronic obstructive pulmonary disease) (HCC)    History of DVT (deep vein thrombosis)    [facility]    [facility] (sick [facility] syndrome) [facility] [address] [facility]    Pacemaker    Hypothyroid    PVD (peripheral vascular disease) ([facility] [address] [facility]    Coronary artery disease involving native coronary artery without angina pectoris    Left carotid artery occlusion    Osteoporosis    Bilateral carotid artery stenosis    Atherosclerosis of native artery of both lower extremities with intermittent claudication (HCC)    Osteoarthritis    Post-traumatic osteoarthritis of left hip    Trauma    Multiple closed fractures of ribs of left side    Chest wall pain    Nodule of spleen    CAD in native artery    Shoulder dislocation, left, subsequent encounter    Neuropathy    [facility]unar infarction ([facility] [address] [facility]    CHF (congestive heart failure) (HCC)    Pneumonia    Acute respiratory failure with hypoxia (HCC)    Acute respiratory failure due to COVID-19 ([address]    Septic shock (HCC)    At[facility] fibrillation with RVR ([facility]    Pneumonia due to COVID-19 virus    Hyperkalemia    Arm DVT (deep venous thromboembolism), acute, right ([address]       Plan:    1. Acute on chronic respiratory failure status post tracheostomy, COVID 19   Wean vent as tolerated   Pulmonology consulted   Wean steroids  2. Right upper extremity DVT   Seen by vascular-continue Eliquis  3. Hypothyroidism-continue Synthroid  4. DM2-Lantus and sliding scale  5. Macrocytic anemia-stable  6.   DVT prophylaxis-Eliquis    Please call my cell phone between 8pm-6am [phone]    Dione Novak    9:37 PM  1/30/2021

## 2021-01-31 NOTE — PROGRESS NOTES
Palliative Care Department  [phone]  Palliative Care Progress Note  Provider Salas Jade PA-C    PATIENT: Mercedes Torres  : 1950  MRN: [id number]  ADMISSION DATE: 2020 10:51 AM  Referring Provider: Buck Madison DO      Palliative Medicine was consulted on hospital day 24 for assistance with Goals of care, Code Status Discussion    Brief Summary:  Mami Galeano is a 65 y.o. with a history of arthritis, falls, CAD with ptca/stenting by Dr Anna Marie Delgado, carotid stenosis, CHF, closed fracture of the pelvis with total hip arthroplasty on the left  with delayed healing, DVT of the leg, history of lacunar infarct, seizure, hypertension, hyperlipidemia, peripheral vascular disease with claudication, mitral regurgitation, retinal ischemia, stenosis of intracranial portions of the left internal carotid artery, thyroid disease, permanent pacemaker in situ , carotid subclavian bypass graft , and fixed kyphoplasty. who presented to [facility]) on 2020 with shortness of breath. She was found to have COVID, she was intubated and currently in ICU. She had trachestomy placed and continues on ventilator support. ASSESSMENT/PLAN:     Pertinent hospital diagnoses:     1. Viral Pneumonia, COVID-19, 1/15 NEGATIVE   - resolved, molecular    2. Acute hypoxic respiratory failure  - Treatment per pulmonology. primary service  - Trach/PEG placed 2020  3. Septic shock   -Continue pressor support, midodrine  4. Atrial fibrillation with RVR, NSR  -Controlled rate  5. Right UE DVT, line associated with history of LE DVT   - continue anticoagulation  6. Mental status   - varying LOC and interactions    PALLIATIVE CARE ENCOUNTER  -  Capacity: At this time, Mami Galeano, Does Not have capacity for medical decision-making.   Capacity is time limited and situation/question specific  - Surrogate decision maker/Legal NOK:    Momo Blake [phone]   STACY Torres [phone]  - Outcome of goals of care meeting: Continue care  - Code Status:   DNR-CCA  - Advanced directives: No POA or living will in [facility]  - Spiritual assessment: none identififed  - Bereavement and grief: to be determined    - DISPO: LTAC    Referrals to: none today    SUBJECTIVE:   Chart reviewed  Re-consulted  Patient remains on ventilator support via trach  Developed RUE DVT, line removed and vascular recommendations to continue eliquis  Hx a fib and DVT of LE  Patient with decreased level of consciousness/mental status changes Friday/Saturday confirmed by son Enrique Flores. Today he visited and reported she was alert and tracking and appeared very cognizant about what was going on     discussion held over telephone due to current visitor restriction secondary to [address] pandemic  I spoke with the patient's son Enrique Flores. He did confirm patient seemed sick the previous couple days however today he said she appeared very well. He admitted some concerns with her recovery after seeing her Friday and Saturday. He was happy with her ability to interact, follow commands and appeared to understand their interaction during his visit. I did reintroduce palliative medicine. We have seen him and his brother and earlier in her hospitalization. He did not identify any current needs and the goals for her care still to be discharged to an LTAC and attempt to wean from ventilator.     Inpatient medications reviewed: yes  Home Medications reviewed: yes    OBJECTIVE:   Per nurse report:  Prognosis: unknown    Physical Exam:  BP (!) 158/68   Pulse 71   Temp 99.2 °F (37.3 °C) (Axillary)   Resp (!) 38   Ht 5' 6\" (1.676 m)   Wt 240 lb 11.2 oz (109.2 kg)   SpO2 92%   BMI 38.85 kg/m²   Gen: alert, follows commands and tracks  HEENT: Normocephalic atraumatic, mucosa dry   Neck: Tracheostomy  Lungs: mechanical ventilation   Heart: NSR, hx a fib with RVR per monitor, pacemaker  Abdomen: PEG  : randhawa catheter   Skin: Coccyx red, buttocks red  Ext: significant pitting edema of extremitites reported     Neuro: following simple commands    Objective data reviewed: labs, images, records, medication use, vitals and chart    Time/Communication  Greater than 50% of time spent, total 25 minutes in counseling and coordination of care at the bedside/over the telephone regarding diagnosis and prognosis and see above. Thank you for allowing Palliative Medicine to participate in the care of Hallie Echeverria. Provider Lizeth Greenwood PA-C  Palliative Medicine    Note: This report was completed using computerFlavourly voiced recognition software. Every effort has been made to ensure accuracy; however, inadvertent computerized transcription errors may be present.

## 2021-02-01 NOTE — DISCHARGE SUMMARY
Death note :      RRT this am secondary to hypoxemic hypercarbic acute on chronic resp failure   Pt found to be with right sided flaccid , fixed pupils   Family chose to withdraw care and make her comfortable   Pt passed away soon thereafter   Brief in pt course :  Please see chart for details: 1. Acute on chronic respiratory failure status post tracheostomy, COVID 19              Transferred to ICU for hypercarbic hypoxemic resp failure 2/01               On pressors               Leukocytosis sudden spike - panculture , resume abx therapy ? ,              on decadron                 Wean vent as tolerated              Pulmonology following                 2.  Right upper extremity DVT              Seen by vascular-continue Eliquis- hgb stable near 8   3.  Hypothyroidism-continue Synthroid  4.  DM2-hold lantus (20u BID), continue sliding scale  5.  Macrocytic anemia-stable

## 2021-02-01 NOTE — PROGRESS NOTES
[facility]  Department of Internal Medicine   MICU Progress Note    Patient:  Rush Torres 65 y.o. female  MRN: [id number]     Date of Service: 2/1/2021    Allergy: Anesthetics, lulu and Penicillins    Subjective     64-year-old female admitted to the hospital for treatment of respiratory failure. Patient was seen at bedside after RRT was called for altered mental status and hypoxic respiratory failure.  24h change: change in mental status, hypotension, desaturation  ROS: unable to complete 2/2 patient's condition  Objective     VS: BP (!) 75/32   Pulse 62   Temp 97.2 °F (36.2 °C) (Axillary)   Resp (!) 0   Ht 5' 6\" (1.676 m)   Wt 244 lb 14.4 oz (111.1 kg)   SpO2 (!) 41%   BMI 39.53 kg/m²   ABP (Arterial line BP): 148/73  ABP mean (Arterial line mean): 100 mmHg    I & O - 24hr:     Intake/Output Summary (Last 24 hours) at 2/1/2021 1755  Last data filed at 2/1/2021 1100  Gross per 24 hour   Intake 591 ml   Output 720 ml   Net -129 ml       Physical Exam:  · General Appearance: Respiratory distress, pale, toxic appearing  · Neck: no JVD and supple, symmetrical, trachea midline  · Lung: rales bilaterally and rhonchi bilaterally  · Heart: regular rate and rhythm  · Abdomen: normal findings: Soft, nonacute  · Extremities:  edema Pitting, diffuse throughout  · Neurologic: Acutely worsened mental status, right upper and lower extremity flaccid paralysis, left-sided facial droop. Pupils nonreactive. Patient neither alert nor oriented.     Lines     site day    Art line   None    TLC L Fem    PICC None    Hemoaccess None      Mechanical Ventilation:   Mode: A/C SIMV  PS  low tidal   TV: 400 ml RR: 28  PEEP 16 cmH2O PS 0 FiO2 100%    ABG:     Lab Results   Component Value Date    PH 7.291 02/01/2021    PCO2 105.4 02/01/2021    PO2 75.8 02/01/2021    HCO3 49.7 02/01/2021    BE 20.0 02/01/2021    THB 8.5 02/01/2021    O2SAT 93.3 02/01/2021         Resident's Assessment and Plan       77year-old female admitted to the hospital for treatment of respiratory failure. Patient was seen at bedside after RRT was called for altered mental status and hypoxic respiratory failure; she was found to be hypotensive with left-sided facial droop and right-sided flaccidness with fixed pupils bilaterally, and with persisting hypoxia despite being on ventilator. Patient had one 22-gauge IV in the left AC; fluid started and she received 300 cc of NS; Accu-Chek 142; pressure remained 50 over Doppler, Levophed was started, patient's pressure slightly improved. Tidal volume was increased. She was persistently hypoxic with severe neurologic deficits. ABGs obtained, CO2 105 and pH 7.29. She was transferred to the unit. Prognosis for this patient is very poor, family was counseled and chose to withdraw care and for patient to receive comfort care measures only. Patient passed away shortly thereafter with family at bedside, time of death was called at 46. Sharlene Hobbs DO, PGY-1    Attending physician: Dr. Brooke De Anda      I personally saw, examined and provided care for the patient. Radiographs, labs and medication list were reviewed by me independently. Review of Residents documentation was conducted and revisions were made as appropriate. I agree with the above documented exam, problem list and plan of care. Patient brought to critical care unit for developing shock and worsen hypoxia. 1.  Acute respiratory failure  2. Shock  3. Altered mental status    Called multiple family members and including sons Spencer De Luna and Laura Tejada. Had long discussion regarding goals of care and treatment options. Patient with poor prognosis. At this time they would like to make her comfortable. Comfort care measures ordered. Patient DNR/CC.       CCT excluding procedures 35 minutes    Nitza Castro

## 2021-02-01 NOTE — CARE COORDINATION
CASE  MANAGEMENT. .. H/O COVID + 12/16/2020 and 1/16/21. COVID NEG 1/22 and 1/25. Trach/vent for resp support. Peg tube with tube feeds. RRT this am for being less responsive and hypotensive. Started on iv levophed and transferred to ICU. Current vent settings ac/pc rate 28, peep 16, 95% on fio2 100%. Will cont to follow along. In the meantime, Palliative Care following. OF NOTE. ... Caprice/Ana María following. N 16 in epic. Forms in chart. PAS done-?? Needs updated.

## 2021-02-01 NOTE — PLAN OF CARE
Problem: Skin Integrity:  Goal: Will show no infection signs and symptoms  Description: Will show no infection signs and symptoms  2/1/2021 1002 by Caden Prior  Outcome: Ongoing  2/1/2021 1002 by Caden Prior  Outcome: Ongoing  2/1/2021 0932 by Javier Smith RN  Outcome: Ongoing  2/1/2021 0217 by Stacey Dutta RN  Outcome: Met This Shift  Goal: Absence of new skin breakdown  Description: Absence of new skin breakdown  2/1/2021 1002 by Caden Prior  Outcome: Ongoing  2/1/2021 1002 by Caden Prior  Outcome: Ongoing  2/1/2021 0932 by Javier Smith RN  Outcome: Ongoing  2/1/2021 0217 by Stacey Dutta RN  Outcome: Met This Shift     Problem: Airway Clearance - Ineffective  Goal: Achieve or maintain patent airway  2/1/2021 1002 by Caden Prior  Outcome: Ongoing  2/1/2021 1002 by Caden Prior  Outcome: Ongoing  2/1/2021 0932 by Javier Smith RN  Outcome: Ongoing     Problem: Gas Exchange - Impaired  Goal: Absence of hypoxia  2/1/2021 1002 by Caden Prior  Outcome: Ongoing  2/1/2021 1002 by Caden Prior  Outcome: Ongoing  2/1/2021 0932 by Javier Smith RN  Outcome: Ongoing  Goal: Promote optimal lung function  2/1/2021 1002 by Caden Prior  Outcome: Ongoing  2/1/2021 1002 by Caden Prior  Outcome: Ongoing  2/1/2021 0932 by Javier Smith RN  Outcome: Ongoing     Problem: Breathing Pattern - Ineffective  Goal: Ability to achieve and maintain a regular respiratory rate  2/1/2021 1002 by Caden Prior  Outcome: Ongoing  2/1/2021 1002 by Caden Prior  Outcome: Ongoing  2/1/2021 0932 by Javier Smith RN  Outcome: Ongoing     Problem:  Body Temperature -  Risk of, Imbalanced  Goal: Ability to maintain a body temperature within defined limits  2/1/2021 1002 by Caden Prior  Outcome: Ongoing  2/1/2021 1002 by Caden Prior  Outcome: Ongoing  2/1/2021 0932 by Javier Smith RN  Outcome: Ongoing  Goal: Will regain or maintain usual level of consciousness  2/1/2021 1002 by Ellie King  Outcome: Ongoing  2/1/2021 1002 by Ellie King  Outcome: Ongoing  2/1/2021 0932 by Amirah Vyas RN  Outcome: Ongoing  Goal: Complications related to the disease process, condition or treatment will be avoided or minimized  2/1/2021 1002 by Ellie King  Outcome: Ongoing  2/1/2021 1002 by Ellie King  Outcome: Ongoing  2/1/2021 0932 by Amirah Vyas RN  Outcome: Ongoing     Problem: Isolation Precautions - Risk of Spread of Infection  Goal: Prevent transmission of infection  2/1/2021 1002 by Ellie King  Outcome: Ongoing  2/1/2021 1002 by Ellie King  Outcome: Ongoing  2/1/2021 0932 by Amirah Vyas RN  Outcome: Ongoing     Problem: Nutrition Deficits  Goal: Optimize nutrtional status  2/1/2021 1002 by Ellie King  Outcome: Ongoing  2/1/2021 1002 by Ellie King  Outcome: Ongoing  2/1/2021 0932 by Amirah Vyas RN  Outcome: Ongoing     Problem: Risk for Fluid Volume Deficit  Goal: Maintain normal heart rhythm  2/1/2021 1002 by Ellie King  Outcome: Ongoing  2/1/2021 1002 by Ellie King  Outcome: Ongoing  2/1/2021 0932 by Amirah Vyas RN  Outcome: Ongoing  Goal: Maintain absence of muscle cramping  2/1/2021 1002 by Ellie King  Outcome: Ongoing  2/1/2021 1002 by Ellie King  Outcome: Ongoing  2/1/2021 0932 by Amirah Vyas RN  Outcome: Ongoing  Goal: Maintain normal serum potassium, sodium, calcium, phosphorus, and pH  2/1/2021 1002 by Ellie King  Outcome: Ongoing  2/1/2021 0932 by Amirah Vyas RN  Outcome: Ongoing     Problem: Loneliness or Risk for Loneliness  Goal: Demonstrate positive use of time alone when socialization is not possible  2/1/2021 1002 by Ellie King  Outcome: Ongoing  2/1/2021 0932 by Amirah Vyas RN  Outcome: Ongoing     Problem: Fatigue  Goal: Verbalize increase energy and improved vitality  2/1/2021 1002 by Ellie King  Outcome: Ongoing  2/1/2021 0932 by Ruth Pena RN  Outcome: Ongoing     Problem: Patient Education: Go to Patient Education Activity  Goal: Patient/Family Education  2/1/2021 1002 by Levi Grant  Outcome: Ongoing  2/1/2021 0932 by Ruth Pena RN  Outcome: Ongoing     Problem: Falls - Risk of:  Goal: Will remain free from falls  Description: Will remain free from falls  2/1/2021 1002 by Levi Grant  Outcome: Ongoing  2/1/2021 0932 by Ruth Pena RN  Outcome: Ongoing  2/1/2021 0217 by Lupe Sanders RN  Outcome: Met This Shift  Goal: Absence of physical injury  Description: Absence of physical injury  2/1/2021 1002 by Levi Grant  Outcome: Ongoing  2/1/2021 0932 by Ruth Pena RN  Outcome: Ongoing  2/1/2021 0217 by Lupe Sanders RN  Outcome: Met This Shift     Problem: Pain:  Goal: Pain level will decrease  Description: Pain level will decrease  2/1/2021 1002 by Levi Grant  Outcome: Ongoing  2/1/2021 0932 by Ruth Pnea RN  Outcome: Ongoing  2/1/2021 0217 by Lupe Sanders RN  Outcome: Met This Shift  Goal: Control of acute pain  Description: Control of acute pain  2/1/2021 1002 by Levi Grant  Outcome: Ongoing  2/1/2021 0932 by Ruth Pena RN  Outcome: Ongoing  2/1/2021 0217 by Lupe Sanders RN  Outcome: Met This Shift  Goal: Control of chronic pain  Description: Control of chronic pain  2/1/2021 1002 by Levi Grant  Outcome: Ongoing  2/1/2021 0932 by Ruth Pena RN  Outcome: Ongoing     Problem: Skin Integrity:  Goal: Will show no infection signs and symptoms  Description: Will show no infection signs and symptoms  2/1/2021 1002 by Levi Grant  Outcome: Ongoing  2/1/2021 1002 by Levi Grant  Outcome: Ongoing  2/1/2021 0932 by Ruth Pena RN  Outcome: Ongoing  2/1/2021 0217 by Lupe Sanders RN  Outcome: Met This Shift  Goal: Absence of new skin breakdown  Description: Absence of new skin breakdown  2/1/2021 1002 by RN  Outcome: Ongoing  2/1/2021 0217 by Amanda Stephenson RN  Outcome: Met This Shift  Goal: Absence of physical injury  Description: Absence of physical injury  2/1/2021 1002 by Matt Vidal  Outcome: Ongoing  2/1/2021 0932 by Joe Gonzalez RN  Outcome: Ongoing  2/1/2021 0217 by Amanda Stephenson RN  Outcome: Met This Shift     Problem: Pain:  Goal: Pain level will decrease  Description: Pain level will decrease  2/1/2021 1002 by Matt Vidal  Outcome: Ongoing  2/1/2021 0932 by Joe Gonzalez RN  Outcome: Ongoing  2/1/2021 0217 by Amanda Stephenson RN  Outcome: Met This Shift  Goal: Control of acute pain  Description: Control of acute pain  2/1/2021 1002 by Matt Vidal  Outcome: Ongoing  2/1/2021 0932 by Joe Gonzalez RN  Outcome: Ongoing  2/1/2021 0217 by Amanda Stephenson RN  Outcome: Met This Shift  Goal: Control of chronic pain  Description: Control of chronic pain  2/1/2021 1002 by Matt Vidal  Outcome: Ongoing  2/1/2021 0932 by Joe Gonzalez RN  Outcome: Ongoing

## 2021-02-01 NOTE — PLAN OF CARE
Problem: Skin Integrity:  Goal: Will show no infection signs and symptoms  Description: Will show no infection signs and symptoms  2/1/2021 0932 by Juliana Stout RN  Outcome: Ongoing  2/1/2021 0217 by Lisandra Emery RN  Outcome: Met This Shift  Goal: Absence of new skin breakdown  Description: Absence of new skin breakdown  2/1/2021 0932 by Juliana Stout RN  Outcome: Ongoing  2/1/2021 0217 by Lisandra Emery RN  Outcome: Met This Shift     Problem: Airway Clearance - Ineffective  Goal: Achieve or maintain patent airway  Outcome: Ongoing     Problem: Gas Exchange - Impaired  Goal: Absence of hypoxia  Outcome: Ongoing  Goal: Promote optimal lung function  Outcome: Ongoing     Problem: Breathing Pattern - Ineffective  Goal: Ability to achieve and maintain a regular respiratory rate  Outcome: Ongoing     Problem:  Body Temperature -  Risk of, Imbalanced  Goal: Ability to maintain a body temperature within defined limits  Outcome: Ongoing  Goal: Will regain or maintain usual level of consciousness  Outcome: Ongoing  Goal: Complications related to the disease process, condition or treatment will be avoided or minimized  Outcome: Ongoing     Problem: Isolation Precautions - Risk of Spread of Infection  Goal: Prevent transmission of infection  Outcome: Ongoing     Problem: Nutrition Deficits  Goal: Optimize nutrtional status  Outcome: Ongoing     Problem: Risk for Fluid Volume Deficit  Goal: Maintain normal heart rhythm  Outcome: Ongoing  Goal: Maintain absence of muscle cramping  Outcome: Ongoing  Goal: Maintain normal serum potassium, sodium, calcium, phosphorus, and pH  Outcome: Ongoing     Problem: Loneliness or Risk for Loneliness  Goal: Demonstrate positive use of time alone when socialization is not possible  Outcome: Ongoing     Problem: Fatigue  Goal: Verbalize increase energy and improved vitality  Outcome: Ongoing     Problem: Patient Education: Go to Patient Education Activity  Goal: Patient/Family Education  Outcome: Ongoing     Problem: Falls - Risk of:  Goal: Will remain free from falls  Description: Will remain free from falls  2/1/2021 0932 by Meryle Older, RN  Outcome: Ongoing  2/1/2021 0217 by Antoni Rashid RN  Outcome: Met This Shift  Goal: Absence of physical injury  Description: Absence of physical injury  2/1/2021 0932 by Meryle Older, RN  Outcome: Ongoing  2/1/2021 0217 by Antoni Rashid RN  Outcome: Met This Shift     Problem: Pain:  Goal: Pain level will decrease  Description: Pain level will decrease  2/1/2021 0932 by Meryle Older, RN  Outcome: Ongoing  2/1/2021 0217 by Antoni Rashid RN  Outcome: Met This Shift  Goal: Control of acute pain  Description: Control of acute pain  2/1/2021 0932 by Meryle Older, RN  Outcome: Ongoing  2/1/2021 0217 by Antoni Rashid RN  Outcome: Met This Shift  Goal: Control of chronic pain  Description: Control of chronic pain  Outcome: Ongoing

## 2021-02-01 NOTE — PROGRESS NOTES
Updated son, Huber Devlin, on patient's change in condition and RRT called. Will have JULIAN Capone update with results of CXR and plan for patient. Questions answered.   Johanna KINNEY  8:24 AM

## 2021-02-01 NOTE — SIGNIFICANT EVENT
Responded to RRT. RRT was called as patient was noted less responsive  On my arrival, residents from the ICU already present. Patient who had initially low BP, was given fluids did receive 300 cc of saline  Accu-Chek 142  ABGs were obtained, did show CO2 of 105, also pH 7.29  Vent changes were made increasing tidal volume. Stat portable x-ray was ordered, attempted by radiology, unfortunately not successful. BP still remaining low, patient was started on pressors, Levophed. ICU bed was requested. Patient who had initially showed some droop, CT head was already requested. We will keep Eliquis on hold. Patient transferred to ICU, now with improving BP on pressors. Portable x-ray of 7:48 AM resulted as no change  Total critical care time spent 45 minutes.

## 2021-02-02 LAB — MRSA CULTURE ONLY: NORMAL

## 2021-11-22 NOTE — PROGRESS NOTES
LIST:    Active Problems:    Acute respiratory failure with hypoxia (HCC)    Pneumonia due to COVID-19 virus    Septic shock (HCC)    Atrial fibrillation with RVR (HCC)  Resolved Problems:    * No resolved hospital problems. *         DIET:    DIET TUBE FEED CONTINUOUS/CYCLIC NPO; Immune Enhancing (PIVOT 1.5);  Nasogastric; 25; 45     MEDS (scheduled):    sodium phosphate IVPB  30 mmol Intravenous Once    bumetanide  0.5 mg Intravenous Once    enoxaparin  1 mg/kg Subcutaneous BID    hydrocortisone sodium succinate PF  50 mg Intravenous Q12H    vitamin C  500 mg Oral BID    thiamine  100 mg Oral Daily    zinc sulfate  50 mg Oral Daily    heparin flush  3 mL Intravenous 2 times per day    insulin glargine  25 Units Subcutaneous Daily    docusate  100 mg Oral Daily    senna  5 mL Oral Nightly    midodrine  10 mg Oral TID    insulin lispro  0-18 Units Subcutaneous Q6H    pantoprazole  40 mg Intravenous Daily    And    sodium chloride (PF)  10 mL Intravenous Daily    ipratropium-albuterol  1 ampule Inhalation Q4H WA    budesonide  500 mcg Nebulization BID    Arformoterol Tartrate  15 mcg Nebulization BID    clopidogrel  75 mg Oral Daily    chlorhexidine  15 mL Mouth/Throat BID    sodium chloride flush  10 mL Intravenous 2 times per day    Vitamin D  2,000 Units Oral Daily    gabapentin  800 mg Oral TID    levothyroxine  100 mcg Oral Daily    atorvastatin  20 mg Oral Daily    acetaminophen  650 mg Rectal Once       MEDS (infusions):   norepinephrine 4 mcg/min (12/26/20 8064)    fentaNYL 5 mcg/ml in 0.9%  ml infusion 200 mcg/hr (12/27/20 0313)    midazolam 2 mg/hr (12/27/20 0149)       MEDS (prn):  metoprolol, sodium chloride flush, heparin flush, potassium chloride, fentanNYL, promethazine **OR** ondansetron, polyethylene glycol, acetaminophen **OR** acetaminophen    PHYSICAL EXAM:     Patient Vitals for the past 24 hrs:   BP Temp Temp src Pulse Resp SpO2 Weight   12/27/20 1209 -- -- -- 112 (!) 40 92 % --   12/27/20 1200 -- 98.1 °F (36.7 °C) Esophageal 95 13 91 % --   12/27/20 1100 -- -- -- 101 20 92 % --   12/27/20 1025 -- -- -- 94 22 92 % --   12/27/20 1000 -- -- -- 98 23 90 % --   12/27/20 0900 -- -- -- 99 23 -- --   12/27/20 0800 -- 98.2 °F (36.8 °C) Esophageal 80 18 91 % --   12/27/20 0753 -- -- -- 61 23 92 % --   12/27/20 0752 -- -- -- -- 17 92 % --   12/27/20 0750 -- -- -- -- 16 93 % --   12/27/20 0749 -- -- -- -- 19 92 % --   12/27/20 0700 -- -- -- 59 23 -- --   12/27/20 0600 -- -- -- 65 24 96 % --   12/27/20 0500 -- -- -- 59 24 94 % --   12/27/20 0413 -- -- -- 59 24 96 % --   12/27/20 0400 (!) 145/62 97.6 °F (36.4 °C) Esophageal 59 24 96 % --   12/27/20 0300 (!) 101/49 -- -- 60 23 96 % --   12/27/20 0200 (!) 159/71 -- -- 74 23 97 % --   12/27/20 0100 (!) 154/66 -- -- 59 24 95 % --   12/27/20 0022 -- -- -- 60 23 94 % --   12/27/20 0014 -- -- -- 61 25 96 % --   12/27/20 0000 (!) 146/61 97.7 °F (36.5 °C) Esophageal 61 24 95 % 220 lb 3.8 oz (99.9 kg)   12/26/20 2300 (!) 151/61 -- -- 59 23 96 % --   12/26/20 2200 (!) 109/46 -- -- 59 23 95 % --   12/26/20 2100 (!) 90/39 -- -- 59 23 91 % --   12/26/20 2000 (!) 88/40 97 °F (36.1 °C) Esophageal 63 24 91 % --   12/26/20 1929 -- -- -- 67 19 95 % --   12/26/20 1800 -- -- -- 69 23 97 % --   12/26/20 1700 -- -- -- 65 24 95 % --   12/26/20 1617 -- -- -- 75 27 95 % --   12/26/20 1615 -- -- -- -- 27 95 % --   12/26/20 1600 -- 97 °F (36.1 °C) Esophageal 60 24 98 % --   12/26/20 1500 -- -- -- 63 23 97 % --   12/26/20 1400 -- -- -- 73 24 96 % --   @      Intake/Output Summary (Last 24 hours) at 12/27/2020 1351  Last data filed at 12/27/2020 1200  Gross per 24 hour   Intake 3725.15 ml   Output 2155 ml   Net 1570.15 ml         Wt Readings from Last 3 Encounters:   12/27/20 220 lb 3.8 oz (99.9 kg)   12/08/20 188 lb 9 oz (85.5 kg)   10/09/20 163 lb 9.6 oz (74.2 kg)       PE:  COVID-19 isolation protocol, I did not conduct a physical examination but discussed her Will f/u PRN.  Please call if needed     Electronically signed by Juanetta Buerger, MD on 12/27/2020 domiciled at [facility], has not lived independent since the 1990s, has a sister who is local but does not see her often

## 2023-06-01 NOTE — CARE COORDINATION
COVID POSITIVE 1/16( Hx positive 12/13) . Vent since 12/15-FIO2 95%-requiring pressor. S/p trach/PEG on 12/29. Palliative care following- DNR-[facility]. [facility] continues to follow- has [facility]. Insurance-will need insurance precert prior to discharge- will need PT/OT evals when appropriate closer to when ready for discharge. Spoke w/ [facility] discharge planner Arcelia Edwin yesterday [phone]- IJ voiced frustration w/ [facility] not approving LTACS- she suggested we make a referral and try. Referral made to [facility] to follow.   Alfredo Myers  No No
